# Patient Record
Sex: MALE | Race: ASIAN | Employment: OTHER | URBAN - METROPOLITAN AREA
[De-identification: names, ages, dates, MRNs, and addresses within clinical notes are randomized per-mention and may not be internally consistent; named-entity substitution may affect disease eponyms.]

---

## 2018-05-23 ENCOUNTER — HOSPITAL ENCOUNTER (OUTPATIENT)
Dept: GENERAL RADIOLOGY | Age: 83
Discharge: HOME | End: 2018-05-23
Payer: MEDICARE

## 2018-05-23 DIAGNOSIS — M16.0 PRIMARY OSTEOARTHRITIS OF BOTH HIPS: ICD-10-CM

## 2018-05-23 DIAGNOSIS — M17.0 PRIMARY OSTEOARTHRITIS OF BOTH KNEES: ICD-10-CM

## 2018-05-23 DIAGNOSIS — I10 HYPERTENSION: ICD-10-CM

## 2018-05-23 DIAGNOSIS — M54.16 LUMBAR RADICULOPATHY: ICD-10-CM

## 2018-05-23 PROCEDURE — 71046 X-RAY EXAM CHEST 2 VIEWS: CPT

## 2018-05-23 PROCEDURE — 72110 X-RAY EXAM L-2 SPINE 4/>VWS: CPT

## 2018-05-23 PROCEDURE — 73564 X-RAY EXAM KNEE 4 OR MORE: CPT

## 2018-05-23 PROCEDURE — 73521 X-RAY EXAM HIPS BI 2 VIEWS: CPT

## 2018-06-27 ENCOUNTER — HOSPITAL ENCOUNTER (EMERGENCY)
Age: 83
Discharge: HOME | End: 2018-06-27
Attending: EMERGENCY MEDICINE | Admitting: EMERGENCY MEDICINE
Payer: MEDICARE

## 2018-06-27 ENCOUNTER — APPOINTMENT (OUTPATIENT)
Dept: GENERAL RADIOLOGY | Age: 83
End: 2018-06-27
Attending: EMERGENCY MEDICINE
Payer: MEDICARE

## 2018-06-27 ENCOUNTER — APPOINTMENT (OUTPATIENT)
Dept: VASCULAR SURGERY | Age: 83
End: 2018-06-27
Attending: EMERGENCY MEDICINE
Payer: MEDICARE

## 2018-06-27 ENCOUNTER — APPOINTMENT (OUTPATIENT)
Dept: CT IMAGING | Age: 83
End: 2018-06-27
Attending: EMERGENCY MEDICINE
Payer: MEDICARE

## 2018-06-27 VITALS
HEIGHT: 68 IN | RESPIRATION RATE: 17 BRPM | DIASTOLIC BLOOD PRESSURE: 56 MMHG | WEIGHT: 200 LBS | OXYGEN SATURATION: 100 % | BODY MASS INDEX: 30.31 KG/M2 | TEMPERATURE: 97.3 F | HEART RATE: 64 BPM | SYSTOLIC BLOOD PRESSURE: 156 MMHG

## 2018-06-27 DIAGNOSIS — R29.898 WEAKNESS OF LEFT LOWER EXTREMITY: Primary | ICD-10-CM

## 2018-06-27 DIAGNOSIS — N30.00 ACUTE CYSTITIS WITHOUT HEMATURIA: ICD-10-CM

## 2018-06-27 LAB
ALBUMIN SERPL-MCNC: 3.4 G/DL (ref 3.4–5)
ALBUMIN/GLOB SERPL: 0.8 {RATIO} (ref 0.8–1.7)
ALP SERPL-CCNC: 83 U/L (ref 45–117)
ALT SERPL-CCNC: 22 U/L (ref 16–61)
ANION GAP SERPL CALC-SCNC: 5 MMOL/L (ref 3–18)
APPEARANCE UR: ABNORMAL
AST SERPL-CCNC: 24 U/L (ref 15–37)
BACTERIA URNS QL MICRO: ABNORMAL /HPF
BASOPHILS # BLD: 0.1 K/UL (ref 0–0.06)
BASOPHILS NFR BLD: 2 % (ref 0–2)
BILIRUB SERPL-MCNC: 0.4 MG/DL (ref 0.2–1)
BILIRUB UR QL: NEGATIVE
BUN SERPL-MCNC: 24 MG/DL (ref 7–18)
BUN/CREAT SERPL: 13 (ref 12–20)
CALCIUM SERPL-MCNC: 10 MG/DL (ref 8.5–10.1)
CHLORIDE SERPL-SCNC: 104 MMOL/L (ref 100–108)
CO2 SERPL-SCNC: 31 MMOL/L (ref 21–32)
COLOR UR: YELLOW
CREAT SERPL-MCNC: 1.79 MG/DL (ref 0.6–1.3)
D DIMER PPP FEU-MCNC: 0.82 UG/ML(FEU)
DIFFERENTIAL METHOD BLD: ABNORMAL
EOSINOPHIL # BLD: 0.9 K/UL (ref 0–0.4)
EOSINOPHIL NFR BLD: 12 % (ref 0–5)
EPITH CASTS URNS QL MICRO: ABNORMAL /LPF (ref 0–5)
ERYTHROCYTE [DISTWIDTH] IN BLOOD BY AUTOMATED COUNT: 12.8 % (ref 11.6–14.5)
GLOBULIN SER CALC-MCNC: 4.1 G/DL (ref 2–4)
GLUCOSE SERPL-MCNC: 121 MG/DL (ref 74–99)
GLUCOSE UR STRIP.AUTO-MCNC: NEGATIVE MG/DL
HCT VFR BLD AUTO: 32.7 % (ref 36–48)
HGB BLD-MCNC: 11.1 G/DL (ref 13–16)
HGB UR QL STRIP: ABNORMAL
INR PPP: 1 (ref 0.8–1.2)
KETONES UR QL STRIP.AUTO: NEGATIVE MG/DL
LEUKOCYTE ESTERASE UR QL STRIP.AUTO: ABNORMAL
LYMPHOCYTES # BLD: 1.7 K/UL (ref 0.9–3.6)
LYMPHOCYTES NFR BLD: 23 % (ref 21–52)
MCH RBC QN AUTO: 29 PG (ref 24–34)
MCHC RBC AUTO-ENTMCNC: 33.9 G/DL (ref 31–37)
MCV RBC AUTO: 85.4 FL (ref 74–97)
MONOCYTES # BLD: 0.5 K/UL (ref 0.05–1.2)
MONOCYTES NFR BLD: 7 % (ref 3–10)
NEUTS SEG # BLD: 4.3 K/UL (ref 1.8–8)
NEUTS SEG NFR BLD: 56 % (ref 40–73)
NITRITE UR QL STRIP.AUTO: NEGATIVE
PH UR STRIP: 5 [PH] (ref 5–8)
PLATELET # BLD AUTO: 217 K/UL (ref 135–420)
PMV BLD AUTO: 11.2 FL (ref 9.2–11.8)
POTASSIUM SERPL-SCNC: 3.7 MMOL/L (ref 3.5–5.5)
PROT SERPL-MCNC: 7.5 G/DL (ref 6.4–8.2)
PROT UR STRIP-MCNC: 30 MG/DL
PROTHROMBIN TIME: 13.1 SEC (ref 11.5–15.2)
RBC # BLD AUTO: 3.83 M/UL (ref 4.7–5.5)
RBC #/AREA URNS HPF: ABNORMAL /HPF (ref 0–5)
SODIUM SERPL-SCNC: 140 MMOL/L (ref 136–145)
SP GR UR REFRACTOMETRY: 1.01 (ref 1–1.03)
UROBILINOGEN UR QL STRIP.AUTO: 0.2 EU/DL (ref 0.2–1)
WBC # BLD AUTO: 7.5 K/UL (ref 4.6–13.2)
WBC URNS QL MICRO: ABNORMAL /HPF (ref 0–4)

## 2018-06-27 PROCEDURE — 80053 COMPREHEN METABOLIC PANEL: CPT | Performed by: EMERGENCY MEDICINE

## 2018-06-27 PROCEDURE — 85379 FIBRIN DEGRADATION QUANT: CPT | Performed by: EMERGENCY MEDICINE

## 2018-06-27 PROCEDURE — 87086 URINE CULTURE/COLONY COUNT: CPT | Performed by: EMERGENCY MEDICINE

## 2018-06-27 PROCEDURE — 81001 URINALYSIS AUTO W/SCOPE: CPT | Performed by: EMERGENCY MEDICINE

## 2018-06-27 PROCEDURE — 70450 CT HEAD/BRAIN W/O DYE: CPT

## 2018-06-27 PROCEDURE — 99285 EMERGENCY DEPT VISIT HI MDM: CPT

## 2018-06-27 PROCEDURE — 93005 ELECTROCARDIOGRAM TRACING: CPT

## 2018-06-27 PROCEDURE — 87186 SC STD MICRODIL/AGAR DIL: CPT | Performed by: EMERGENCY MEDICINE

## 2018-06-27 PROCEDURE — 87077 CULTURE AEROBIC IDENTIFY: CPT | Performed by: EMERGENCY MEDICINE

## 2018-06-27 PROCEDURE — 93971 EXTREMITY STUDY: CPT

## 2018-06-27 PROCEDURE — 85025 COMPLETE CBC W/AUTO DIFF WBC: CPT | Performed by: EMERGENCY MEDICINE

## 2018-06-27 PROCEDURE — 85610 PROTHROMBIN TIME: CPT | Performed by: EMERGENCY MEDICINE

## 2018-06-27 PROCEDURE — 71045 X-RAY EXAM CHEST 1 VIEW: CPT

## 2018-06-27 RX ORDER — CEPHALEXIN 500 MG/1
500 CAPSULE ORAL 2 TIMES DAILY
Qty: 14 CAP | Refills: 0 | Status: SHIPPED | OUTPATIENT
Start: 2018-06-27 | End: 2018-07-04

## 2018-06-27 NOTE — ED PROVIDER NOTES
EMERGENCY DEPARTMENT HISTORY AND PHYSICAL EXAM    1:28 PM      Date: 6/27/2018  Patient Name: Masha Valero    History of Presenting Illness     No chief complaint on file. History Provided By: Patient and patient's wife    Chief Complaint: Leg stiffness  Duration: 1 Weeks  Timing:  Acute  Location: left leg  Quality: n/a  Severity: Moderate  Modifying Factors: No modifying or aggravating factors were reported. Associated Symptoms: decreased  strength, fatigue    Additional History (Context): 1:32 PM Masha Valero is a 80 y.o. male with no pertinent PMHx who presents to ED with his wife for evaluation of acute moderate left leg stiffness and uncontrollability with associated sx of decreased  strength and fatigue. Patient has had problems with this leg for about two months, but the loss of control started a few days ago. Pt fell 3 days ago and 2 days ago. Pt uses a cane to ambulate. Denies a cough or recently choking. Denies taking any new medications. No hx of a stroke. Denies cigarette use, drug use, and etoh use. No modifying or aggravating factors were reported. No other concerns or symptoms at this time. PCP: Kendall Hernandez MD    Current Outpatient Prescriptions   Medication Sig Dispense Refill    cephALEXin (KEFLEX) 500 mg capsule Take 1 Cap by mouth two (2) times a day for 7 days. 14 Cap 0    amLODIPine (NORVASC) 5 mg tablet Take 1 Tab by mouth daily. Indications: HYPERTENSION 100 Tab 0    aspirin 81 mg chewable tablet Take 1 Tab by mouth daily. 100 Tab 0       Past History     Past Medical History:  Past Medical History:   Diagnosis Date    Cardiac echocardiogram 08/29/2016    EF 60-65%. No WMA. Mild LVH. Indeterminate diastolic fx. RVSP 35 mmHg. No significant valvular heart disease.  Cardiac nuclear imaging test, abnormal 08/29/2016    Intermediate risk. Previous inferior infarction w/very mild fabiana-infarct ischemia. Inferior hypk. EF 68%.   Nondiagnostic EKG on pharm stress test.  Pt's BP increased from 193/96 to 207/83 during study.  Carotid duplex 08/30/2016    Mild <50% bilateral ICA stenosis. Past Surgical History:  No past surgical history on file. Family History:  No family history on file. Social History:  Social History   Substance Use Topics    Smoking status: Never Smoker    Smokeless tobacco: Never Used    Alcohol use No       Allergies:  Not on File      Review of Systems       Review of Systems   Constitutional: Positive for fatigue. Negative for activity change, chills and fever. HENT: Negative for congestion, ear pain, sore throat and trouble swallowing. Eyes: Negative for visual disturbance. Respiratory: Negative for cough, shortness of breath and wheezing. Cardiovascular: Negative for chest pain, palpitations and leg swelling. Gastrointestinal: Negative for abdominal pain, diarrhea, nausea and vomiting. Genitourinary: Negative for decreased urine volume, dysuria, frequency and urgency. Musculoskeletal: Negative for arthralgias and joint swelling. Positive for leg stiffness  Decreased  strength   Skin: Negative for rash. Neurological: Negative for weakness, numbness and headaches. Psychiatric/Behavioral: Negative for agitation and confusion. All other systems reviewed and are negative. Physical Exam     Visit Vitals    /56    Pulse 64    Temp 97.3 °F (36.3 °C)    Resp 17    Ht 5' 8\" (1.727 m)    Wt 90.7 kg (200 lb)    SpO2 100%    BMI 30.41 kg/m2         Physical Exam   Constitutional: He is oriented to person, place, and time. He appears well-developed and well-nourished. He is cooperative. No distress. HENT:   Head: Normocephalic and atraumatic. Mouth/Throat: Oropharynx is clear and moist. No oropharyngeal exudate. Eyes: Conjunctivae and EOM are normal. Right eye exhibits no discharge. Left eye exhibits no discharge. No scleral icterus.    Neck: Normal range of motion and phonation normal. Neck supple. No JVD present. No thyromegaly present. Cardiovascular: Normal rate, regular rhythm, S1 normal, S2 normal, normal heart sounds and intact distal pulses. Exam reveals no gallop and no friction rub. No murmur heard. Pulmonary/Chest: Effort normal and breath sounds normal. No accessory muscle usage. No respiratory distress. He has no wheezes. He has no rhonchi. He has no rales. Abdominal: Soft. Normal appearance and bowel sounds are normal. He exhibits no distension and no pulsatile midline mass. There is no tenderness. There is no rebound and no guarding. Musculoskeletal: Normal range of motion. He exhibits edema ( trace edema on right extremity to the knee, 1,2+ pitting edema on left). He exhibits no deformity. Normal upper extremity strength, no pronator drift. LLE strength 4/5  RLE strength 5/5   Lymphadenopathy:        Head (right side): No submandibular adenopathy present. He has no cervical adenopathy. Neurological: He is alert and oriented to person, place, and time. No cranial nerve deficit. Moves all extremities. No obvious focal deficits or facial asymmetry. Slow to answer questions. Skin: Skin is warm and dry. No rash noted. Psychiatric: He has a normal mood and affect. His speech is normal and behavior is normal.   Nursing note and vitals reviewed. Diagnostic Study Results     Labs -  No results found for this or any previous visit (from the past 12 hour(s)). Radiologic Studies -   No results found. Medical Decision Making   I am the first provider for this patient. I reviewed the vital signs, available nursing notes, past medical history, past surgical history, family history and social history. Vital Signs-Reviewed the patient's vital signs.     Pulse Oximetry Analysis -  99% on room air (Interpretation)    Records Reviewed: Nursing Notes and Old Medical Records (Time of Review: 1:28 PM)      Provider Notes (Medical Decision Making):   ASSESSMENT / PLAN:       81y/o AAM w/PMhx of HTN, not on any meds presents with vague general weakness, left leg feeling heavy/swollen for past week or so. Wife reports seems to be having difficulty using eating utensils. Pt has no complaints other than leg now. Has had a few GLFs this past week due to this no injuries. No weakness or tingling. Exam with BP elevated ~170/70, Rest of vitals normal. NAD, HEENT, lung, cv, abd benging. Neuro benign other than ~4/5 proximal LE weakness but left leg does have ~1-2+ pitting edema to  knees where as right has only trace pitting edema. Some mild ttp behind calve but no cords or eyrhtma. Really nonfocal neuro exam but symptoms vague. Left leg swollen could be DVT. Also concerning for uremia, tamika, elecroltye disturbance, anemia, UTI or other occult infection.      -Head CT  -CBC, CMP, Coags  -UA  -EKG  -CXR  -D-dimer      Bimal Mkceon MD  EM-IM Physician      ED Course: Progress Notes, Reevaluation, and Consults:  UPDATE 7:50 PM  -Head CT nothing acute  -Labs benign  -D-dimer elevated    -Vascular US pending -will dispo per US for dvt    Soledad Sepulveda MD  EM-IM Physician             Diagnosis     Clinical Impression:   1. Weakness of left lower extremity    2. Acute cystitis without hematuria        Disposition: Home    Follow-up Information     Follow up With Details Comments [address], [address] [id number] [facility]  [phone]             Discharge Medication List as of 6/27/2018  9:27 PM      START taking these medications    Details   cephALEXin (KEFLEX) 500 mg capsule Take 1 Cap by mouth two (2) times a day for 7 days. , Print, Disp-14 Cap, R-0         CONTINUE these medications which have NOT CHANGED    Details   amLODIPine (NORVASC) 5 mg tablet Take 1 Tab by mouth daily. Indications: HYPERTENSION, Print, Disp-100 Tab, R-0      aspirin 81 mg chewable tablet Take 1 Tab by mouth daily. , Print, Disp-100 Tab, R-0 _______________________________    Attestations:  Scribe Attestation     Marilyn Dumont acting as a scribe for and in the presence of Dalila Lucero MD      June 27, 2018 at [facility] PM       Provider Attestation:      I personally performed the services described in the documentation, reviewed the documentation, as recorded by the scribe in my presence, and it accurately and completely records my words and actions.  June 27, 2018 at 1:28 PM - Dalila Lucero MD    _______________________________

## 2018-06-27 NOTE — ED TRIAGE NOTES
\"1 week looking funny\", dozing a lot, having hard time chewing and swallowing. = , no droop.  . Sensation bilat =,   does have left leg weakness

## 2018-06-27 NOTE — ED NOTES
Assumed care and received report of patient from [facility] RN,pt currently at vascular for leg [facility].

## 2018-06-28 LAB
ATRIAL RATE: 63 BPM
CALCULATED P AXIS, ECG09: 42 DEGREES
CALCULATED R AXIS, ECG10: -48 DEGREES
CALCULATED T AXIS, ECG11: -87 DEGREES
DIAGNOSIS, 93000: NORMAL
P-R INTERVAL, ECG05: 206 MS
Q-T INTERVAL, ECG07: 428 MS
QRS DURATION, ECG06: 118 MS
QTC CALCULATION (BEZET), ECG08: 437 MS
VENTRICULAR RATE, ECG03: 63 BPM

## 2018-06-28 NOTE — ED NOTES
7pm; pt turned over ot me dr Alonzo Paulino. Left leg weakness; has been \"giving out\" feels 100% after it gives out. General; AOX3. Pulmonary; CTA-B. Cardiac: RRR no MRG; Abd S/NT/ND. Plan:  No swelling. Signed out pending duplex. Duplex neg. Will d/c     UA noted. Asx but will tx.

## 2018-06-28 NOTE — ED NOTES
Pt returned from Vascular leg study at this time no acute distress noted pt placed back on cardiac monitor.

## 2018-06-28 NOTE — DISCHARGE INSTRUCTIONS

## 2018-06-28 NOTE — ED NOTES
I have reviewed discharge instructions with the patient and spouse. The patient and spouse verbalized understanding. I have reviewed the provider's instructions with the patient, answering all questions to his satisfaction. Discharge medications reviewed with patient and spouse and appropriate educational materials and side effects teaching were provided. Pt signed paper discharge instructions pt removed all belongings pt left ED via wheelchair without distress or discomfort.

## 2018-06-30 LAB
BACTERIA SPEC CULT: ABNORMAL
SERVICE CMNT-IMP: ABNORMAL

## 2018-07-16 ENCOUNTER — HOSPITAL ENCOUNTER (OUTPATIENT)
Dept: PHYSICAL THERAPY | Age: 83
Discharge: HOME | End: 2018-07-16
Payer: MEDICARE

## 2018-07-16 PROCEDURE — G8978 MOBILITY CURRENT STATUS: HCPCS

## 2018-07-16 PROCEDURE — G8979 MOBILITY GOAL STATUS: HCPCS

## 2018-07-16 PROCEDURE — 97110 THERAPEUTIC EXERCISES: CPT

## 2018-07-16 PROCEDURE — 97161 PT EVAL LOW COMPLEX 20 MIN: CPT

## 2018-07-16 NOTE — PROGRESS NOTES
[facility]  [facility]  [address] [facility]  [phone] fax    Plan of Care/ Statement of Necessity for Physical Therapy Services  Patient name: Yisroel Denver Start of Care: 2018   Referral source: Mesfin Hernadez MD : 1935    Medical Diagnosis: Repeated falls [R29.6]  Unspecified abnormalities of gait and mobility [R26.9]  Bilateral primary osteoarthritis of knee [M17.0]   Onset Date: initial 7 years ago per pt report, per [facility] 2 months ago    Treatment Diagnosis: left knee/LE pain, impaired balance and gait   Prior Hospitalization: see medical history Provider#: [id number]   Medications: Verified on Patient summary List    Comorbidities: hx CAD per [facility]     Prior Level of Function: States he lives with his wife and he is retired. Reports having progressively worsening pain in his left LE over the past several years. The Plan of Care and following information is based on the information from the initial evaluation. Assessment/ key information:   Pt is a 80year old male who presents to therapy today with left knee/LE pain, stiffness, and impaired balance/gait. Pt states that his symptoms began about 7 years ago with insidious onset. Pt seems to have some difficulty remembering recent hx but per [facility] the pt was in the ED in late 2018 for left LE stiffness. Per ED note dated 2018 in [facility], the pt's left LE symptoms started ~2 months ago. Pt reports that he was not able to move is left LE when he went to the ED and he states he has fallen ~5 times over the past 3 months. Per [facility], doppler was performed and no DVT was noted in the left LE. Pt presents to the clinic today with [facility] but has a FWW at home. Pt demonstrated decreased AROM, decreased strength, TUG time 1 min and 50 seconds with FWW. Pt had significant difficulty with sit to stands from the chair and a plinth.  Pt needed cueing to place his UEs on the chair's arm rest or on the plinth when performing a sit to stand, along with education on shifting his body weight to the edge of the chair/plinth. With ambulation with the pt's SPC, the pt's B feet would drag and cause stumbles where he would have to stop ambulating and regain his balance with his SPC. During ambulation with FWW, the pt had no stumbles but continues to drag his right foot at times. Educated pt to use his FWW for ambulation at this time secondary to pt being unsafe with his SPC. Pt would benefit from physical therapy to improve the above impairments to help the pt return to performing ADLs and functional  activities.      Evaluation Complexity History MEDIUM  Complexity : 1-2 comorbidities / personal factors will impact the outcome/ POC ; Examination MEDIUM Complexity : 3 Standardized tests and measures addressing body structure, function, activity limitation and / or participation in recreation  ;Presentation LOW Complexity : Stable, uncomplicated  ;Clinical Decision Making MEDIUM Complexity : FOTO score of 26-74  Overall Complexity Rating: LOW   Problem List: pain affecting function, decrease ROM, decrease strength, edema affecting function, impaired gait/ balance, decrease ADL/ functional abilitiies, decrease activity tolerance, decrease flexibility/ joint mobility and decrease transfer abilities   Treatment Plan may include any combination of the following: Therapeutic exercise, Therapeutic activities, Neuromuscular re-education, Physical agent/modality, Gait/balance training, Manual therapy, Patient education, Self Care training, Functional mobility training, Home safety training and Stair training  Patient / Family readiness to learn indicated by: asking questions, trying to perform skills and interest  Persons(s) to be included in education: patient (P) and family support person (FSP);list pt's wife  Barriers to Learning/Limitations: yes;  physical  Patient Goal (s): get rid of some stiffness and soreness  Patient Self Reported Health Status: fair  Rehabilitation Potential: fair    Short Term Goals: To be accomplished in 2 weeks:  1. Pt will report compliance and independence to [facility] to help the pt manage their pain and symptoms. Long Term Goals: To be accomplished in 8 weeks:  1. Pt will increase FOTO score to 61 points to improve ability to perform ADLs. 2. Pt will improve TUG score to 1 minute or less with FWW to decrease the pt's fall risk. 3. Pt will increase MMT left hip flex to 3+/5, left knee flex to 4/5 to improve ability to tolerate ambulation in the community. 4. Pt will report having no falls over the past month to improve the pt's safety at home. Frequency / Duration: Patient to be seen 2-3 times per week for 8 weeks. Patient/ Caregiver education and instruction: Diagnosis, prognosis, self care, activity modification and exercises   [x]  Plan of care has been reviewed with PTA    G-Codes (GP)  Mobility   Current  CL= 60-79%   Goal  CJ= 20-39%    The severity rating is based on clinical judgment and the FOTO score. Certification Period: 7/16/2018 - 9/13/2018  Evita Samson, PT 7/16/2018 11:00 AM  _____________________________________________________________________  I certify that the above Therapy Services are being furnished while the patient is under my care. I agree with the treatment plan and certify that this therapy is necessary.     Physician's Signature:____________________  Date:__________Time:______    Please sign and return to [facility] [address]  [phone] fax

## 2018-07-16 NOTE — PROGRESS NOTES
PT DAILY TREATMENT NOTE - [facility]     Patient Name: Bee Russell  Date:2018  : 1935  [x]  Patient  Verified  [place] / Plan: [place] PART [place] & B / Product Type: Medicare /    In time:10:15  Out time:10:58  Total Treatment Time (min): 43  Total Timed Codes (min): 15  1:1 Treatment Time ( [address] only): 37   Visit #: 1 of 24    Treatment Area: Repeated falls [R29.6]  Unspecified abnormalities of gait and mobility [R26.9]  Bilateral primary osteoarthritis of knee [M17.0]    SUBJECTIVE  Pain Level (0-10 scale): 5  Any medication changes, allergies to medications, adverse drug reactions, diagnosis change, or new procedure performed?: [x] No    [] Yes (see summary sheet for update)  Subjective functional status/changes:   [] No changes reported  See POC    OBJECTIVE    28 min [x]Eval                  []Re-Eval     15 min Therapeutic Exercise:  [] See flow sheet : HEP instruction and demonstration, pt education regarding anatomy and physiology of the LEs and how it relates to the pt's condition, education on using FWW for gait to improve safety. Rationale: increase ROM and increase strength to improve the patients ability to tolerate ADLs          With   [] TE   [] TA   [] neuro   [] other: Patient Education: [x] Review HEP    [] Progressed/Changed HEP based on:   [] positioning   [] body mechanics   [] transfers   [] heat/ice application    [] other:      Other Objective/Functional Measures: See evaluation. Pain Level (0-10 scale) post treatment: 5    ASSESSMENT/Changes in Function: Pt given HEP handout to perform. Pt understood exercises in HEP handout. Pt demonstrated decreased AROM, decreased strength, TUG time 1 min and 50 seconds with FWW. Pt had significant difficulty with sit to stands from the chair and a plinth.  Pt needed cueing to place his UEs on the chair's arm rest or on the plinth when performing a sit to stand, along with education on shifting his body weight to the edge of the chair/plinth. With ambulation with the pt's SPC, the pt's B feet would drag and cause stumbles where he would have to stop ambulating and regain his balance with his SPC. During ambulation with FWW, the pt had no stumbles but continues to drag his right foot at times. Educated pt to use his FWW for ambulation at this time secondary to pt being unsafe with his SPC. Pt would benefit from physical therapy to improve the above impairments to help the pt return to performing ADLs and functional  activities. Patient will continue to benefit from skilled PT services to modify and progress therapeutic interventions, address functional mobility deficits, address ROM deficits, address strength deficits, analyze and address soft tissue restrictions, analyze and cue movement patterns, analyze and modify body mechanics/ergonomics, address imbalance/dizziness and instruct in home and community integration to attain remaining goals. [x]  See Plan of Care  []  See progress note/recertification  []  See Discharge Summary         Progress towards goals / Updated goals:  See POC.      PLAN  [x]  Upgrade activities as tolerated     [x]  Continue plan of care  [x]  Update interventions per flow sheet       []  Discharge due to:_  []  Other:_      Janice Stacy, PT 7/16/2018  11:05 AM    Future Appointments  Date Time Provider Noel Katiuska   7/16/2018 10:00 AM Janice Stacy PT [id number]   7/19/2018 1:45 PM Won Reed MD [facility] [address]

## 2018-07-19 ENCOUNTER — OFFICE VISIT (OUTPATIENT)
Dept: ORTHOPEDIC SURGERY | Facility: CLINIC | Age: 83
End: 2018-07-19

## 2018-07-19 VITALS
RESPIRATION RATE: 16 BRPM | BODY MASS INDEX: 28.61 KG/M2 | HEART RATE: 63 BPM | HEIGHT: 68 IN | OXYGEN SATURATION: 98 % | SYSTOLIC BLOOD PRESSURE: 181 MMHG | TEMPERATURE: 95.9 F | WEIGHT: 188.8 LBS | DIASTOLIC BLOOD PRESSURE: 74 MMHG

## 2018-07-19 DIAGNOSIS — M25.562 CHRONIC PAIN OF LEFT KNEE: ICD-10-CM

## 2018-07-19 DIAGNOSIS — Z91.81 RISK FOR FALLS: ICD-10-CM

## 2018-07-19 DIAGNOSIS — M17.12 PRIMARY OSTEOARTHRITIS OF LEFT KNEE: Primary | ICD-10-CM

## 2018-07-19 DIAGNOSIS — G89.29 CHRONIC PAIN OF LEFT KNEE: ICD-10-CM

## 2018-07-19 RX ORDER — BUPIVACAINE HYDROCHLORIDE 2.5 MG/ML
4 INJECTION, SOLUTION EPIDURAL; INFILTRATION; INTRACAUDAL ONCE
Qty: 4 ML | Refills: 0
Start: 2018-07-19 | End: 2018-07-19

## 2018-07-19 RX ORDER — GARLIC 1000 MG
CAPSULE ORAL
COMMUNITY
End: 2020-10-03

## 2018-07-19 RX ORDER — BETAMETHASONE SODIUM PHOSPHATE AND BETAMETHASONE ACETATE 3; 3 MG/ML; MG/ML
6 INJECTION, SUSPENSION INTRA-ARTICULAR; INTRALESIONAL; INTRAMUSCULAR; SOFT TISSUE ONCE
Qty: 0.5 ML | Refills: 0
Start: 2018-07-19 | End: 2018-07-19

## 2018-07-19 NOTE — PROGRESS NOTES
Patient: Maggie Pryor                MRN: [id number]       SSN: [id number] YOB: 1935        AGE: 80 y.o. SEX: male PCP: Carmen Andrade MD 
07/19/18 Chief Complaint Patient presents with  Knee Pain L KNEE PAIN  
 
HISTORY:  Maggie Pryor is a 80 y.o. male who is seen for left knee pain. He reports that his knee pain has been stopping him from doing things around the house. He is usually very active, remodeling his home and other homes with friends. He notes pain with standing and walking. He has previously been seen at [facility] where x rays revealed arthritis but no acute abnormalities. He states he has balance problems. 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 He lives in [place] with his wife. He has 2 adult children, one son and one daughter. He has plenty of grandchildren. He lost 8 pounds recently due to diet changes  He is not diabetic. He is not hypertensive. Current weight is 188 pounds. He is 5'8\" tall. Lab Results Component Value Date/Time Hemoglobin A1c 5.8 (H) 08/29/2016 07:00 AM  
 
Weight Metrics 7/19/2018 6/27/2018 9/28/2016 9/1/2016 Weight 188 lb 12.8 oz 200 lb 192 lb 199 lb 15.3 oz  
BMI 28.71 kg/m2 30.41 kg/m2 30.99 kg/m2 32.27 kg/m2 Patient Active Problem List  
Diagnosis Code  Traumatic rhabdomyolysis ([facility] [address] T79. Allyne Oppenheim  CAD (coronary artery disease) I25.10  
 HTN (hypertension) I10  
 Troponin level elevated R74.8 REVIEW OF SYSTEMS: All Below are Negative except: See HPI Constitutional: negative for fever, chills, and weight loss. Cardiovascular: negative for chest pain, claudication, leg swelling, SOB, BERNARDO Gastrointestinal: Negative for pain, N/V/C/D, Blood in stool or urine, dysuria,  hematuria, incontinence, pelvic pain. Musculoskeletal: See HPI Neurological: Negative for dizziness and weakness. Negative for headaches, Visual changes, confusion, seizures Phychiatric/Behavioral: Negative for depression, memory loss, substance  abuse. Extremities: Negative for hair changes, rash, or skin lesion changes. Hematologic: Negative for bleeding problems, bruising, pallor or swollen lymph  nodes Peripheral Vascular: No calf pain, no circulation deficits. Social History Social History  Marital status:  Spouse name: N/A  
 Number of children: N/A  
 Years of education: N/A Occupational History  Not on file. Social History Main Topics  Smoking status: Never Smoker  Smokeless tobacco: Never Used  Alcohol use No  
 Drug use: Not on file  Sexual activity: Not on file Other Topics Concern  Not on file Social History Narrative No Known Allergies Current Outpatient Prescriptions Medication Sig  
 garlic 8,961 mg cap Take  by mouth.  amLODIPine (NORVASC) 5 mg tablet Take 1 Tab by mouth daily. Indications: HYPERTENSION  aspirin 81 mg chewable tablet Take 1 Tab by mouth daily. No current facility-administered medications for this visit. PHYSICAL EXAMINATION: 
Visit Vitals  /74  Pulse 63  Temp 95.9 °F (35.5 °C) (Oral)  Resp 16  
 Ht 5' 8\" (1.727 m)  Wt 188 lb 12.8 oz (85.6 kg)  SpO2 98%  BMI 28.71 kg/m2 ORTHO EXAMINATION: 
Examination Right knee Left knee Skin Intact Intact Range of motion 120-0 90-10 Effusion - - Medial joint line tenderness - + Lateral joint line tenderness - - Popliteal tenderness - -  
Osteophytes palpable - -  
Hernandos - - Patella crepitus - - Anterior drawer - - Lateral laxity - - Medial laxity - - Varus deformity - -  
Valgus deformity - - Pretibial edema - 3+ Calf tenderness - - Examination Right hip Left hip Skin Intact Intact External Rotation ROM 20 20 Internal Rotation ROM 10 10 Trochanteric tenderness - - Hip flexion contracture - - Antalgic gait - - Trendelenberg sign - - Lumbar tenderness - - Straight leg raise - - Calf tenderness - - Neurovascular Intact Intact Examination Lumbar Thoracic Skin Intact Intact Tenderness + - Tightness - - Lordosis Normal N/A Kyphosis N/A Normal  
Scoliosis - - Flexion Fingertips to ankle N/A Extension 10 N/A Knee reflexes Normal N/A Ankle reflexes Normal N/A Straight leg raise - N/A Calf tenderness - N/A  
 
PROCEDURE:  After discussing treatment options, patient's left knee was injected with 4 cc Marcaine and 1/2 cc Celestone. Chart reviewed for the following: 
 Torey Garcia MD, have reviewed the History, Physical and updated the Allergic reactions for Renetta Orourke TIME OUT performed immediately prior to start of procedure: 
Torey Garcia MD, have performed the following reviews on Renetta Orourke prior to the start of the procedure: 
         
* Patient was identified by name and date of birth * Agreement on procedure being performed was verified * Risks and Benefits explained to the patient * Procedure site verified and marked as necessary * Patient was positioned for comfort * Consent was obtained Time: 1:30 PM  
 
Date of procedure: 7/19/2018 Procedure performed by:  Won Reed MD 
 
Mr. Wolf Crews tolerated the procedure well with no complications. DUPLEX VENOUS STUDY EXT VENOUS BILAT 6/27/2018 No evidence of acute deep vein thrombosis in the left common femoral, superficial femoral, popliteal, posterior tibial, and peroneal veins.  The veins were imaged in the transverse and longitudinal planes. The vessels showed normal color filling and compressibility. Doppler interrogation showed phasic and spontaneous flow. RADIOGRAPHS:  
XR BILAT KNEES 5/23/2018 [facility] 
-I have independently reviewed these images during this office visit. -Dr. Yvonne Paniagua IMPRESSION:  Three views - No fractures, no effusion, moderate L>R joint space narrowing, patellar entesmophyte osteophytes present. IKDC Grade C squaring of condyles XR HIP 5/23/2018 [facility] 
-I have independently reviewed these images during this office visit. -Dr. Yvonne Paniagua IMPRESSION:  AP pelvis and two views - No fractures, mild joint space narrowing, + osteophytes present. Tonnis grade 1 XR SPINE 5/23/2018 [facility] 
-I have independently reviewed these images during this office visit. -Dr. Yvonne Paniagua IMPRESSION:  Two views - no fractures, L2-3 disc space narrowing, + osteophytes present, no spondylolisthesis. IMPRESSION:   
  ICD-10-CM ICD-9-CM 1. Primary osteoarthritis of left knee M17.12 715.16 betamethasone (CELESTONE SOLUSPAN) 6 mg/mL injection BETAMETHASONE ACETATE & SODIUM PHOSPHATE INJECTION 3 MG EA.  
   DRAIN/INJECT LARGE JOINT/BURSA  
   bupivacaine, PF, (MARCAINE, PF,) 0.25 % (2.5 mg/mL) injection REFERRAL TO PHYSICAL THERAPY PROCEDURE AUTHORIZATION TO   
2. Chronic pain of left knee M25.562 719.46 betamethasone (CELESTONE SOLUSPAN) 6 mg/mL injection G89.29 338.29 BETAMETHASONE ACETATE & SODIUM PHOSPHATE INJECTION 3 MG EA.  
   DRAIN/INJECT LARGE JOINT/BURSA  
   bupivacaine, PF, (MARCAINE, PF,) 0.25 % (2.5 mg/mL) injection REFERRAL TO PHYSICAL THERAPY PROCEDURE AUTHORIZATION TO  3. Risk for falls Z91.81 V15.88 PLAN:  After discussing treatment options, patient's left knee was injected with 4 cc Marcaine and 1/2 cc Celestone. He will follow up as needed. Consider visco supplementation if pain continues.   He will start a brief course of outpatient physical therapy to his left knee.    
 
Scribed by Sheri Forde [facility]) as dictated by Rocio Romero MD

## 2018-07-25 ENCOUNTER — HOSPITAL ENCOUNTER (OUTPATIENT)
Dept: PHYSICAL THERAPY | Age: 83
Discharge: HOME | End: 2018-07-25
Payer: MEDICARE

## 2018-07-25 PROCEDURE — 97110 THERAPEUTIC EXERCISES: CPT

## 2018-07-25 NOTE — PROGRESS NOTES
PT DAILY TREATMENT NOTE - [facility]     Patient Name: Guera Moreno  Date:2018  : 1935  [x]  Patient  Verified  Payor: [place] MEDICARE / Plan: [place] MEDICARE PART A & B / Product Type: Medicare /    In time: 8:30   Out time: 9:06  Total Treatment Time (min): 36  Total Timed Codes (min): 36  1:1 Treatment Time ( [address] only): 24  Visit #: 2 of 24    Treatment Area: Pain in left leg [M79.605]  Unspecified lack of coordination [R27.9]    SUBJECTIVE  Pain Level (0-10 scale): 0  Any medication changes, allergies to medications, adverse drug reactions, diagnosis change, or new procedure performed?: [x] No    [] Yes (see summary sheet for update)  Subjective functional status/changes:   [] No changes reported  Pt reports he's been having a good week. Reports compliance with HEP exercises. OBJECTIVE    36 min Therapeutic Exercise:  [x] See flow sheet :    Rationale: increase ROM and increase strength to improve the patients ability to tolerate ADLs          With   [] TE   [] TA   [] neuro   [] other: Patient Education: [x] Review HEP    [] Progressed/Changed HEP based on:   [] positioning   [] body mechanics   [] transfers   [] heat/ice application    [] other:      Other Objective/Functional Measures: Initiated exercises/interventions in flow sheet. Pain Level (0-10 scale) post treatment: 0    ASSESSMENT/Changes in Function: Needed cueing to avoid B hip circumduction with step ups on 4 inch step. Able to perform sit to stand from an elevated plinth with no UEs after proper cueing for B foot position, hip hinge, and moving his bottom towards the edge of the plinth to improve ease of sit to stands. Needed cueing to avoid excessive B hip ER with lateral stepping in parallel bars. B UE use used for all standing exercises but educated pt to put most of his weight through his LEs. Continue POC as tolerated.      Patient will continue to benefit from skilled PT services to modify and progress therapeutic interventions, address functional mobility deficits, address ROM deficits, address strength deficits, analyze and address soft tissue restrictions, analyze and cue movement patterns, analyze and modify body mechanics/ergonomics, address imbalance/dizziness and instruct in home and community integration to attain remaining goals. []  See Plan of Care  []  See progress note/recertification  []  See Discharge Summary         Progress towards goals / Updated goals:  Short Term Goals: To be accomplished in 2 weeks:  1. Pt will report compliance and independence to HEP to help the pt manage their pain and symptoms. Reports compliance with HEP 7/25/2018       Long Term Goals: To be accomplished in 8 weeks:  1. Pt will increase FOTO score to 61 points to improve ability to perform ADLs. 2. Pt will improve TUG score to 1 minute or less with FWW to decrease the pt's fall risk. 3. Pt will increase MMT left hip flex to 3+/5, left knee flex to 4/5 to improve ability to tolerate ambulation in the community. 4. Pt will report having no falls over the past month to improve the pt's safety at home.      PLAN  [x]  Upgrade activities as tolerated     [x]  Continue plan of care  [x]  Update interventions per flow sheet       []  Discharge due to:_  []  Other:_      Drenda Falling, PT 7/25/2018  8:45 AM    Future Appointments  Date Time Provider Noel Harp   7/30/2018 9:00 AM Drenda Falling, PT [facility] [address]   8/1/2018 9:00 AM Drenda Falling, PT [facility] [address]   8/6/2018 10:00 AM Drenda Falling, PT GGDECGL [address]   8/8/2018 2:00 PM Thienphuc Delories Woodruff [facility] [address]   8/14/2018 9:30 AM Thienphuc Delories Woodruff [facility] [address]   8/17/2018 9:30 AM Thienphuc Delories Woodruff [facility] [address]   8/21/2018 9:30 AM Thienphuc Delories Woodruff [facility] [address]   8/23/2018 1:20 PM Won Reed MD [facility]   8/24/2018 9:30 AM Thienphuc Delories Woodruff [facility] [address]   8/28/2018 9:00 AM [facility]rez [facility] [address]   8/31/2018 9:00 AM Tonya Arambula, PT [facility] [address] [facility]

## 2018-08-01 ENCOUNTER — HOSPITAL ENCOUNTER (OUTPATIENT)
Dept: PHYSICAL THERAPY | Age: 83
Discharge: HOME | End: 2018-08-01
Payer: MEDICARE

## 2018-08-01 PROCEDURE — 97110 THERAPEUTIC EXERCISES: CPT

## 2018-08-01 PROCEDURE — 97530 THERAPEUTIC ACTIVITIES: CPT

## 2018-08-01 NOTE — PROGRESS NOTES
PT DAILY TREATMENT NOTE - [facility]  Patient Name: Miky Espinoza Date:2018 : 1935 [x]  Patient  Verified Payor: [place] MEDICARE / Plan: Isaías Huff / Product Type: Medicare / In time:907  Out time:942 Total Treatment Time (min): 35 Total Timed Codes (min): 35 
1:1 Treatment Time ( only): 35 Visit #: 3 of 24 Treatment Area: Pain in left leg [M79.605] Unspecified lack of coordination [R27.9] SUBJECTIVE Pain Level (0-10 scale): 3/10 Any medication changes, allergies to medications, adverse drug reactions, diagnosis change, or new procedure performed?: [x] No    [] Yes (see summary sheet for update) Subjective functional status/changes:   [] No changes reported Reports he has been doing his HEP. OBJECTIVE 15 min Therapeutic Exercise:  [] See flow sheet :  
Rationale: increase ROM and increase strength to improve the patients ability to ease with ADL's 
 
20 min Therapeutic Activity:  []  See flow sheet :  
Rationale: increase ROM and increase strength  to improve the patients ability to ease with ADL's With 
 [] TE 
 [] TA 
 [] neuro 
 [] other: Patient Education: [x] Review HEP [] Progressed/Changed HEP based on:  
[] positioning   [] body mechanics   [] transfers   [] heat/ice application   
[] other:   
 
Other Objective/Functional Measures: Pt is a fall risk and a gait belt should be worn at all times. Pt continues to have weak hip flexors and quads. Poor balance during Romberg EC Pt ambulates with a stiff legged gait and stumbled 2 times while ambulating across the clinic. Pain Level (0-10 scale) post treatment: 0/10 ASSESSMENT/Changes in Function: Pt is progressing well today. He is pleased with ongoing progress all new HEP issued today. Pt is motivated to be able to walk w/o LOB and eventually w/o an AD. He is doing better than last 3 weeks. He was unable to walk due to weakness Patient will continue to benefit from skilled PT services to modify and progress therapeutic interventions, address functional mobility deficits, address ROM deficits, address strength deficits, analyze and address soft tissue restrictions, analyze and cue movement patterns, analyze and modify body mechanics/ergonomics, assess and modify postural abnormalities and address imbalance/dizziness to attain remaining goals. [x]  See Plan of Care 
[]  See progress note/recertification 
[]  See Discharge Summary Progress towards goals / Updated goals: 1. Pt will report compliance and independence to HEP to help the pt manage their pain and symptoms. Reports compliance with HEP 7/25/2018      
Long Term Goals: To be accomplished in 8 weeks: 1. Pt will increase FOTO score to 61 points to improve ability to perform ADLs. 2. Pt will improve TUG score to 1 minute or less with FWW to decrease the pt's fall risk. 3. Pt will increase MMT left hip flex to 3+/5, left knee flex to 4/5 to improve ability to tolerate ambulation in the community. 4. Pt will report having no falls over the past month to improve the pt's safety at home.  
  
 
PLAN 
[]  Upgrade activities as tolerated     [x]  Continue plan of care 
[]  Update interventions per flow sheet      
[]  Discharge due to:_ 
[]  Other:_ Odalys Betancourt PT 8/1/2018  9:12 AM 
 
Future Appointments Date Time Provider Noel Harp 8/6/2018 10:00 AM Yu Rodarte [facility]  
8/8/2018 2:00 PM [facility] [address] [facility]  
8/14/2018 9:30 AM Thienlul Annaome YWEHFAS [facility]  
8/17/2018 9:30 AM Ross Tang [id number] [facility]  
8/21/2018 9:30 AM [facility] [address] [facility]  
8/23/2018 1:20 PM Blade Meléndez MD [facility]  
8/24/2018 9:30 AM Thiporfirio Cullen [facility]  
8/28/2018 9:00 AM Coreen Beryle Levee, PT [id number] [facility]  
8/31/2018 9:00 AM Coreen Beryle Levee, PT [facility]

## 2018-08-06 ENCOUNTER — HOSPITAL ENCOUNTER (OUTPATIENT)
Dept: PHYSICAL THERAPY | Age: 83
Discharge: HOME | End: 2018-08-06
Payer: MEDICARE

## 2018-08-06 PROCEDURE — 97110 THERAPEUTIC EXERCISES: CPT

## 2018-08-06 NOTE — PROGRESS NOTES
PT DAILY TREATMENT NOTE - [facility]     Patient Name: Carin Lamb  Date:2018  : 1935  [x]  Patient  Verified  Payor: [place] MEDICARE / Plan: VA MEDICARE PART A & B / Product Type: Medicare /    In time:  Out time:1037  Total Treatment Time (min): 37  Total Timed Codes (min): 37  1:1 Treatment Time ( [address] only): 32   Visit #: 4 of 24    Treatment Area: Pain in left leg [M79.605]  Unspecified lack of coordination [R27.9]    SUBJECTIVE  Pain Level (0-10 scale): 1  Any medication changes, allergies to medications, adverse drug reactions, diagnosis change, or new procedure performed?: [x] No    [] Yes (see summary sheet for update)  Subjective functional status/changes:   [] No changes reported  Pt reports feeling some stiffness in left knee. OBJECTIVE      37 min Therapeutic Exercise:  [] See flow sheet :   Rationale: increase ROM and increase strength to improve the patients ability to perform ADL's and improve quality of gait. With   [x] TE   [] TA   [] neuro   [] other: Patient Education: [x] Review HEP    [] Progressed/Changed HEP based on:   [] positioning   [] body mechanics   [] transfers   [] heat/ice application    [] other:      Other Objective/Functional Measures: Pt had difficulty with knee flexion there ex on left LE due to limited ROM. Pt demonstrates compensatory behaviors during sit to stand with more weight shifted to right side. Pt cued to correct, limited carry over. Pain Level (0-10 scale) post treatment: 0    ASSESSMENT/Changes in Function: Pt tolerated there ex well, demonstrates limitations in left knee flexion. Focused on LE strengthening to ease ADL's and improve quality of gait and decrease fall risk. Pt reported decrease in sx at conclusion of session.     Patient will continue to benefit from skilled PT services to modify and progress therapeutic interventions, address functional mobility deficits, address ROM deficits and address strength deficits to attain remaining goals. [x]  See Plan of Care  []  See progress note/recertification  []  See Discharge Summary         Progress towards goals / Updated goals:  1. Pt will report compliance and independence to HEP to help the pt manage their pain and symptoms. Reports compliance with HEP 7/25/2018       Long Term Goals: To be accomplished in 8 weeks:  1. Pt will increase FOTO score to 61 points to improve ability to perform ADLs. 2. Pt will improve TUG score to 1 minute or less with FWW to decrease the pt's fall risk. 3. Pt will increase MMT left hip flex to 3+/5, left knee flex to 4/5 to improve ability to tolerate ambulation in the community. 4. Pt will report having no falls over the past month to improve the pt's safety at home.      PLAN  []  Upgrade activities as tolerated     [x]  Continue plan of care  []  Update interventions per flow sheet       []  Discharge due to:_  []  Other:_      Toña Javier 8/6/2018  10:10 AM    Future Appointments  Date Time Provider Noel Harp   8/8/2018 2:00 PM [facility] [address] [facility]   8/14/2018 9:30 AM Thienphuc Gladystine Boots [id number] [facility]   8/17/2018 9:30 AM Thienphuc M Car Peace [facility]   8/21/2018 9:30 AM [facility] PT [facility] [address] [facility]   8/23/2018 1:20 PM Lara Dotson MD [facility]   8/24/2018 9:30 AM Thienphuc Gladystine Boots [facility]   8/28/2018 9:00 AM William Valencia PT SIMIN [facility]   8/31/2018 9:00 AM Tonya Ramírez, PT [facility]

## 2018-08-08 ENCOUNTER — HOSPITAL ENCOUNTER (OUTPATIENT)
Dept: PHYSICAL THERAPY | Age: 83
Discharge: HOME | End: 2018-08-08
Payer: MEDICARE

## 2018-08-08 PROCEDURE — 97110 THERAPEUTIC EXERCISES: CPT

## 2018-08-08 NOTE — PROGRESS NOTES
PT DAILY TREATMENT NOTE - [facility]     Patient Name: Trisha Martin  Date:2018  : 1935  [x]  Patient  Verified  Payor: VA MEDICARE / Plan: VA MEDICARE PART A & B / Product Type: Medicare /    In time:200  Out time:248  Total Treatment Time (min): 48  Total Timed Codes (min): 43  1:1 Treatment Time ( [address] only): 25   Visit #: 5 of 24    Treatment Area: Pain in left leg [M79.605]  Unspecified lack of coordination [R27.9]    SUBJECTIVE  Pain Level (0-10 scale): 1  Any medication changes, allergies to medications, adverse drug reactions, diagnosis change, or new procedure performed?: [x] No    [] Yes (see summary sheet for update)  Subjective functional status/changes:   [] No changes reported  Pt reports feeling stiffness in the left knee and c/o weakness in the left hip. OBJECTIVE      25 1:1 min Therapeutic Exercise:  [x] See flow sheet :   Rationale: increase ROM and increase strength to improve the patients ability to perform ADL's and improve quality of gait. With   [x] TE   [] TA   [] neuro   [] other: Patient Education: [x] Review HEP    [] Progressed/Changed HEP based on:   [] positioning   [] body mechanics   [] transfers   [] heat/ice application    [] other:      Other Objective/Functional Measures:   FOTO 47/100  Pt with + Romberg EC on foam for LOB with UE assist to correct  Poor LLE hip flexion with compensatory circumduction with lefting    Pain Level (0-10 scale) post treatment: 0    ASSESSMENT/Changes in Function: Pt continues to have balance deficits and LLE weakness proximal>distal. Added clam shells and lateral stepping to increase hip strength. Pt with + left hip circumduction with step ups secondary to hip flexor weakness and knee flexion restrictions.     Patient will continue to benefit from skilled PT services to modify and progress therapeutic interventions, address functional mobility deficits, address ROM deficits and address strength deficits to attain remaining goals. [x]  See Plan of Care  []  See progress note/recertification  []  See Discharge Summary         Progress towards goals / Updated goals:  1. Pt will report compliance and independence to HEP to help the pt manage their pain and symptoms. Reports compliance with HEP 7/25/2018         Long Term Goals: To be accomplished in 8 weeks:  1. Pt will increase FOTO score to 61 points to improve ability to perform ADLs. PROGRESSING 47 8/8/18  2. Pt will improve TUG score to 1 minute or less with FWW to decrease the pt's fall risk. 3. Pt will increase MMT left hip flex to 3+/5, left knee flex to 4/5 to improve ability to tolerate ambulation in the community. 4. Pt will report having no falls over the past month to improve the pt's safety at home.      PLAN  []  Upgrade activities as tolerated     [x]  Continue plan of care  []  Update interventions per flow sheet       []  Discharge due to:_  []  Other:_      Rozella Fothergill, PT 8/8/2018  10:10 AM    Future Appointments  Date Time Provider Noel Harp   8/14/2018 9:30 AM Thienphuc Lei [place] [facility]   8/17/2018 9:30 AM Thienphuc Lei [place] [facility]   8/21/2018 9:30 AM [facility] [address] [facility]   8/23/2018 1:20 PM Rocio Romero MD [facility]   8/24/2018 9:30 AM Thienphuc Lei [place] [facility]   8/28/2018 9:00 AM Edith Mayorga, PT TYVEFUA [facility]   8/31/2018 9:00 AM Tonya Andrade, PT [facility]

## 2018-08-14 ENCOUNTER — HOSPITAL ENCOUNTER (OUTPATIENT)
Dept: PHYSICAL THERAPY | Age: 83
Discharge: HOME | End: 2018-08-14
Payer: MEDICARE

## 2018-08-14 PROCEDURE — 97110 THERAPEUTIC EXERCISES: CPT

## 2018-08-14 NOTE — PROGRESS NOTES
PT DAILY TREATMENT NOTE - [facility]     Patient Name: Trudy Moreno  Date:2018  : 1935  [x]  Patient  Verified  Payor: [place] MEDICARE / Plan: [place] MEDICARE PART A & B / Product Type: Medicare /    In time:930  Out time:1001  Total Treatment Time (min): 31  Total Timed Codes (min): 31  1:1 Treatment Time ( only): 31   Visit #: 6 of 24    Treatment Area: Pain in left leg [M79.605]  Unspecified lack of coordination [R27.9]    SUBJECTIVE  Pain Level (0-10 scale): 5/10  Any medication changes, allergies to medications, adverse drug reactions, diagnosis change, or new procedure performed?: [x] No    [] Yes (see summary sheet for update)  Subjective functional status/changes:   [] No changes reported  Pt stated that he mainly has stiffness and minimal pain. Reported that the pain is about gone    OBJECTIVE    31 min Therapeutic Exercise:  [x] See flow sheet :   Rationale: increase ROM and increase strength to improve the patients ability to increase ease with ADLs    With   [x] TE   [] TA   [] neuro   [] other: Patient Education: [x] Review HEP    [] Progressed/Changed HEP based on:   [] positioning   [] body mechanics   [] transfers   [] heat/ice application    [] other:      Other Objective/Functional Measures:   Cont to circumduct left leg with ambulation  No difficulty with bike  Had no LOB with static balance exercise     Pain Level (0-10 scale) post treatment: 0/10    ASSESSMENT/Changes in Function:   Pt is slowly progressing toward goals. Pt cont with weakness in left LE, but is improving. Cont to have some difficulty with sit to stand transfers. Pt reported that he has had no falls since eval    Patient will continue to benefit from skilled PT services to modify and progress therapeutic interventions, address functional mobility deficits, address ROM deficits, address strength deficits and address imbalance/dizziness to attain remaining goals.      [x]  See Plan of Care  []  See progress note/recertification  []  See Discharge Summary         Progress towards goals / Updated goals:  1. Pt will report compliance and independence to HEP to help the pt manage their pain and symptoms. Reports compliance with HEP 7/25/2018          Long Term Goals: To be accomplished in 8 weeks:  1. Pt will increase FOTO score to 61 points to improve ability to perform ADLs. PROGRESSING 47 8/8/18  2. Pt will improve TUG score to 1 minute or less with FWW to decrease the pt's fall risk. 3. Pt will increase MMT left hip flex to 3+/5, left knee flex to 4/5 to improve ability to tolerate ambulation in the community. 4. Pt will report having no falls over the past month to improve the pt's safety at home. Progressing.  8/14/18    PLAN  []  Upgrade activities as tolerated     [x]  Continue plan of care  []  Update interventions per flow sheet       []  Discharge due to:_  []  Other:_      Claudell Dye, PTA 8/14/2018  9:26 AM    Future Appointments  Date Time Provider Noel Harp   8/14/2018 9:30 AM Claudell Dye, PTA [facility]   8/17/2018 9:30 AM Thifadiphzulay Ellis Childes OBHSZDC [facility]   8/21/2018 9:30 AM [facility] PT [address] [facility]   8/23/2018 1:20 PM Cameron Lou MD [facility]   8/24/2018 9:30 AM Thienphzulay Yuanis Childes [facility]   8/28/2018 9:00 AM Elaina Rowe, PT REKATWX [facility]   8/31/2018 9:00 AM Tonya Torres, PT [facility]

## 2018-08-21 ENCOUNTER — HOSPITAL ENCOUNTER (OUTPATIENT)
Dept: PHYSICAL THERAPY | Age: 83
Discharge: HOME | End: 2018-08-21
Payer: MEDICARE

## 2018-08-21 PROCEDURE — G8978 MOBILITY CURRENT STATUS: HCPCS

## 2018-08-21 PROCEDURE — G8979 MOBILITY GOAL STATUS: HCPCS

## 2018-08-21 PROCEDURE — 97110 THERAPEUTIC EXERCISES: CPT

## 2018-08-21 NOTE — PROGRESS NOTES
PT DAILY TREATMENT NOTE - [facility]     Patient Name: Maggie Pryor  Date:2018  : 1935  [x]  Patient  Verified  Payor: [place] MEDICARE / Plan: [place] MEDICARE PART A & B / Product Type: Medicare /    In time: 10:05  Out time: 10:30  Total Treatment Time (min): 25  Total Timed Codes (min): 25  1:1 Treatment Time (MC only): 25  Visit #: 7 of 24    Treatment Area: Bilateral primary osteoarthritis of knee [M17.0]  Pain in left leg [M79.605]  Unspecified lack of coordination [R27.9]    SUBJECTIVE  Pain Level (0-10 scale): 5/10  Any medication changes, allergies to medications, adverse drug reactions, diagnosis change, or new procedure performed?: [x] No    [] Yes (see summary sheet for update)  Subjective functional status/changes:   [] No changes reported  I think it's helping, my pain is going down. OBJECTIVE    25 min Therapeutic Exercise:  [x] See flow sheet : TE, re-assess and TUG   Rationale: increase ROM and increase strength to improve the patients ability to increase ease with ADLs    With   [x] TE   [] TA   [] neuro   [] other: Patient Education: [x] Review HEP    [] Progressed/Changed HEP based on:   [] positioning   [] body mechanics   [] transfers   [] heat/ice application    [] other:      Other Objective/Functional Measures:   Pt continues to require cuing for form with all TE.  TU seconds  Left hip flex MMT: 3/5  Left knee flex MMT: 3+/5      Pain Level (0-10 scale) post treatment:     ASSESSMENT/Changes in Function: SEE PN    Pt reports 60% improvement since starting therapy. He reports max pain of 5/10. Patient will continue to benefit from skilled PT services to modify and progress therapeutic interventions, address functional mobility deficits, address ROM deficits, address strength deficits and address imbalance/dizziness to attain remaining goals.      []  See Plan of Care  [x]  See progress note/recertification  []  See Discharge Summary         Progress towards goals / Updated goals:   1. Pt will report compliance and independence to HEP to help the pt manage their pain and symptoms. Reports compliance with HEP 7/25/2018          Long Term Goals: To be accomplished in 8 weeks:  1. Pt will increase FOTO score to 61 points to improve ability to perform ADLs. PROGRESSING 47 8/8/18  2. Pt will improve TUG score to 1 minute or less with FWW to decrease the pt's fall risk. Current: Pt is able to ambulate the 3 meters of TUG Test with cane in 35 seconds. MET 8/21/18  3. Pt will increase MMT left hip flex to 3+/5, left knee flex to 4/5 to improve ability to tolerate ambulation in the community. Left hip flexion 3/5, left knee flex 3+/5, Progressing. 8/21/18  4. Pt will report having no falls over the past month to improve the pt's safety at home.    MET 8/21/18    PLAN  [x]  Upgrade activities as tolerated     [x]  Continue plan of care  []  Update interventions per flow sheet       []  Discharge due to:_  []  Other:_      VEGA Sinha 8/21/2018  10:30 AM    Future Appointments  Date Time Provider Noel Harp   8/21/2018 9:30 AM [facility] [address] [facility]   8/23/2018 1:20 PM Jefry Martin MD [facility]   8/24/2018 9:30 AM Ross Cali [facility]   8/28/2018 9:00 AM [facility]   8/31/2018 9:00 AM Tonya Ha, PT [facility]

## 2018-08-23 ENCOUNTER — OFFICE VISIT (OUTPATIENT)
Dept: ORTHOPEDIC SURGERY | Facility: CLINIC | Age: 83
End: 2018-08-23

## 2018-08-23 VITALS
TEMPERATURE: 97.9 F | BODY MASS INDEX: 30.41 KG/M2 | HEART RATE: 66 BPM | SYSTOLIC BLOOD PRESSURE: 166 MMHG | HEIGHT: 66 IN | OXYGEN SATURATION: 99 % | RESPIRATION RATE: 18 BRPM | DIASTOLIC BLOOD PRESSURE: 64 MMHG | WEIGHT: 189.2 LBS

## 2018-08-23 DIAGNOSIS — G89.29 CHRONIC PAIN OF LEFT KNEE: ICD-10-CM

## 2018-08-23 DIAGNOSIS — M25.562 CHRONIC PAIN OF LEFT KNEE: ICD-10-CM

## 2018-08-23 DIAGNOSIS — M17.12 PRIMARY OSTEOARTHRITIS OF LEFT KNEE: Primary | ICD-10-CM

## 2018-08-23 DIAGNOSIS — R60.9 PERIPHERAL EDEMA: ICD-10-CM

## 2018-08-23 RX ORDER — HYALURONATE SODIUM 10 MG/ML
2 SYRINGE (ML) INTRAARTICULAR ONCE
Qty: 2 ML | Refills: 0
Start: 2018-08-23 | End: 2018-08-23

## 2018-08-23 NOTE — PATIENT INSTRUCTIONS
Knee Arthritis: Care Instructions  Your Care Instructions    Knee arthritis is a breakdown of the cartilage that cushions your knee joint. When the cartilage wears down, your bones rub against each other. This causes pain and stiffness. Knee arthritis tends to get worse with time. Treatment for knee arthritis involves reducing pain, making the leg muscles stronger, and staying at a healthy body weight. The treatment usually does not improve the health of the cartilage, but it can reduce pain and improve how well your knee works. You can take simple measures to protect your knee joints, ease your pain, and help you stay active. Follow-up care is a key part of your treatment and safety. Be sure to make and go to all appointments, and call your doctor if you are having problems. It's also a good idea to know your test results and keep a list of the medicines you take. How can you care for yourself at home? · Know that knee arthritis will cause more pain on some days than on others. · Stay at a healthy weight. Lose weight if you are overweight. When you stand up, the pressure on your knees from every pound of body weight is multiplied four times. So if you lose 10 pounds, you will reduce the pressure on your knees by 40 pounds. · Talk to your doctor or physical therapist about exercises that will help ease joint pain. ¨ Stretch to help prevent stiffness and to prevent injury before you exercise. You may enjoy gentle forms of yoga to help keep your knee joints and muscles flexible. ¨ Walk instead of jog. ¨ Ride a bike. This makes your thigh muscles stronger and takes pressure off your knee. ¨ Wear well-fitting and comfortable shoes. ¨ Exercise in chest-deep water. This can help you exercise longer with less pain. ¨ Avoid exercises that include squatting or kneeling. They can put a lot of strain on your knees.   ¨ Talk to your doctor to make sure that the exercise you do is not making the arthritis worse.  · Do not sit for long periods of time. Try to walk once in a while to keep your knee from getting stiff. · Ask your doctor or physical therapist whether shoe inserts may reduce your arthritis pain. · If you can afford it, get new athletic shoes at least every year. This can help reduce the strain on your knees. · Use a device to help you do everyday activities. ¨ A cane or walking stick can help you keep your balance when you walk. Hold the cane or walking stick in the hand opposite the painful knee. ¨ If you feel like you may fall when you walk, try using crutches or a front-wheeled walker. These can prevent falls that could cause more damage to your knee. ¨ A knee brace may help keep your knee stable and prevent pain. ¨ You also can use other things to make life easier, such as a higher toilet seat and handrails in the bathtub or shower. · Take pain medicines exactly as directed. ¨ Do not wait until you are in severe pain. You will get better results if you take it sooner. ¨ If you are not taking a prescription pain medicine, take an over-the-counter medicine such as acetaminophen (Tylenol), ibuprofen (Advil, Motrin), or naproxen (Aleve). Read and follow all instructions on the label. ¨ Do not take two or more pain medicines at the same time unless the doctor told you to. Many pain medicines have acetaminophen, which is Tylenol. Too much acetaminophen (Tylenol) can be harmful. ¨ Tell your doctor if you take a blood thinner, have diabetes, or have allergies to shellfish. · Ask your doctor if you might benefit from a shot of steroid medicine into your knee. This may provide pain relief for several months. · Many people take the supplements glucosamine and chondroitin for osteoarthritis. Some people feel they help, but the medical research does not show that they work. Talk to your doctor before you take these supplements. When should you call for help?   Call your doctor now or seek immediate medical care if:    · You have sudden swelling, warmth, or pain in your knee.     · You have knee pain and a fever or rash.     · You have such bad pain that you cannot use your knee.    Watch closely for changes in your health, and be sure to contact your doctor if you have any problems. Where can you learn more? Go to http://sourav-shannon.info/. Enter H204 in the search box to learn more about \"Knee Arthritis: Care Instructions. \"  Current as of: October 10, 2017  Content Version: 11.7  © 7773-5211 [employer]. Care instructions adapted under license by [employer] (which disclaims liability or warranty for this information). If you have questions about a medical condition or this instruction, always ask your healthcare professional. [address] any warranty or liability for your use of this information. Knee Arthritis: Exercises  Your Care Instructions  Here are some examples of exercises for knee arthritis. Start each exercise slowly. Ease off the exercise if you start to have pain. Your doctor or physical therapist will tell you when you can start these exercises and which ones will work best for you. How to do the exercises  Knee flexion with heel slide    1. Lie on your back with your knees bent. 2. Slide your heel back by bending your affected knee as far as you can. Then hook your other foot around your ankle to help pull your heel even farther back. 3. Hold for about 6 seconds, then rest for up to 10 seconds. 4. Repeat 8 to 12 times. 5. Switch legs and repeat steps 1 through 4, even if only one knee is sore. Quad sets    1. Sit with your affected leg straight and supported on the floor or a firm bed. Place a small, rolled-up towel under your knee. Your other leg should be bent, with that foot flat on the floor. 2. Tighten the thigh muscles of your affected leg by pressing the back of your knee down into the towel.   3. Hold for about 6 seconds, then rest for up to 10 seconds. 4. Repeat 8 to 12 times. 5. Switch legs and repeat steps 1 through 4, even if only one knee is sore. Straight-leg raises to the front    1. Lie on your back with your good knee bent so that your foot rests flat on the floor. Your affected leg should be straight. Make sure that your low back has a normal curve. You should be able to slip your hand in between the floor and the small of your back, with your palm touching the floor and your back touching the back of your hand. 2. Tighten the thigh muscles in your affected leg by pressing the back of your knee flat down to the floor. Hold your knee straight. 3. Keeping the thigh muscles tight and your leg straight, lift your affected leg up so that your heel is about 12 inches off the floor. Hold for about 6 seconds, then lower slowly. 4. Relax for up to 10 seconds between repetitions. 5. Repeat 8 to 12 times. 6. Switch legs and repeat steps 1 through 5, even if only one knee is sore. Active knee flexion    1. Lie on your stomach with your knees straight. If your kneecap is uncomfortable, roll up a washcloth and put it under your leg just above your kneecap. 2. Lift the foot of your affected leg by bending the knee so that you bring the foot up toward your buttock. If this motion hurts, try it without bending your knee quite as far. This may help you avoid any painful motion. 3. Slowly move your leg up and down. 4. Repeat 8 to 12 times. 5. Switch legs and repeat steps 1 through 4, even if only one knee is sore. Quadriceps stretch (facedown)    1. Lie flat on your stomach, and rest your face on the floor. 2. Wrap a towel or belt strap around the lower part of your affected leg. Then use the towel or belt strap to slowly pull your heel toward your buttock until you feel a stretch. 3. Hold for about 15 to 30 seconds, then relax your leg against the towel or belt strap. 4. Repeat 2 to 4 times.   5. Switch legs and repeat steps 1 through 4, even if only one knee is sore. Stationary exercise bike    1. If you do not have a stationary exercise bike at home, you can find one to ride at your local health club or community center. 2. Adjust the height of the bike seat so that your knee is slightly bent when your leg is extended downward. If your knee hurts when the pedal reaches the top, you can raise the seat so that your knee does not bend as much. 3. Start slowly. At first, try to do 5 to 10 minutes of cycling with little to no resistance. Then increase your time and the resistance bit by bit until you can do 20 to 30 minutes without pain. 4. If you start to have pain, rest your knee until your pain gets back to the level that is normal for you. Or cycle for less time or with less effort. Follow-up care is a key part of your treatment and safety. Be sure to make and go to all appointments, and call your doctor if you are having problems. It's also a good idea to know your test results and keep a list of the medicines you take. Where can you learn more? Go to http://souravWrittenshannon.info/. Enter C159 in the search box to learn more about \"Knee Arthritis: Exercises. \"  Current as of: November 29, 2017  Content Version: 11.7  © 9050-8153 [employer]. Care instructions adapted under license by [employer] (which disclaims liability or warranty for this information). If you have questions about a medical condition or this instruction, always ask your healthcare professional. [place] [address] any warranty or liability for your use of this information. Hyaluronic Acid (By injection)   Hyaluronic Acid (ygp-ge-vnm-ON-ate AS-id)  Treats severe pain in your knee due to osteoarthritis. Brand Name(s): Euflexxa, Gel-One, GelSyn-3, GenVisc 850, Hyalgan, Hymovis, Monovisc, Orthovisc, Supartz FX, Visco-3   There may be other brand names for this medicine.   When This Medicine Should Not Be Used: This medicine is not right for everyone. You should not receive it if you had an allergic reaction to hyaluronic acid or if you have a bleeding disorder. How to Use This Medicine:   Injectable  · Your doctor will tell you how many injections you will need. This medicine is injected into your knee joint. · A nurse or other health provider will give you this medicine. Drugs and Foods to Avoid:      Ask your doctor or pharmacist before using any other medicine, including over-the-counter medicines, vitamins, and herbal products. Warnings While Using This Medicine:   · Tell your doctor if you are pregnant or breastfeeding, or if you have any allergies, including to birds, feathers, or eggs. · Rest your knee for 48 hours after you receive an injection. Do not do strenuous, weightbearing activities, such as jogging or tennis. Avoid activities that keep you standing for longer than 1 hour. Possible Side Effects While Using This Medicine:   Call your doctor right away if you notice any of these side effects:  · Allergic reaction: Itching or hives, swelling in your face or hands, swelling or tingling in your mouth or throat, chest tightness, trouble breathing  If you notice these less serious side effects, talk with your doctor:   · Mild increase in pain or swelling in your knee  · Pain, redness, or swelling where the medicine is injected  If you notice other side effects that you think are caused by this medicine, tell your doctor. Call your doctor for medical advice about side effects. You may report side effects to FDA at 5-995-FDA-8537  © 2017 [facility] Information is for End User's use only and may not be sold, redistributed or otherwise used for commercial purposes. The above information is an  only. It is not intended as medical advice for individual conditions or treatments.  Talk to your doctor, nurse or pharmacist before following any medical regimen to see if it is safe and effective for you.

## 2018-08-23 NOTE — PROGRESS NOTES
Patient: Horace Cipro                MRN: [id number]       SSN: [id number]  YOB: 1935        AGE: 80 y.o. SEX: male    PCP: Faiza Allen MD  08/23/18    CC: Peripheral edema and left knee pain    HISTORY:  Horace Bruce is a 80 y.o. male who is seen for left knee pain. He reports that his knee pain has been stopping him from doing things around the house. He is usually very active, remodeling his home and friend's homes. He notes pain with standing and walking. He has previously been seen at [facility] where x rays revealed arthritis but no acute abnormalities. He states he has balance problems. He reports pain relief from a cortisone iinjection last OV. He states he has started walking without a cane in his house. 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 He lives in [place] with his wife. He has 2 adult children, one son and one daughter. He states that he has plenty of grandchildren. He lost 8 pounds recently due to diet changes  He is not diabetic. He is not hypertensive. Current weight is 188 pounds. He is 5'8\" tall. Lab Results   Component Value Date/Time    Hemoglobin A1c 5.8 (H) 08/29/2016 07:00 AM     Weight Metrics 8/23/2018 7/19/2018 6/27/2018 9/28/2016 9/1/2016   Weight 189 lb 3.2 oz 188 lb 12.8 oz 200 lb 192 lb 199 lb 15.3 oz   BMI 30.54 kg/m2 28.71 kg/m2 30.41 kg/m2 30.99 kg/m2 32.27 kg/m2       Patient Active Problem List   Diagnosis Code    Traumatic rhabdomyolysis ([facility] [address] T79. 6XXA    CAD (coronary artery disease) I25.10    HTN (hypertension) I10    Troponin level elevated R74.8     REVIEW OF SYSTEMS: All Below are Negative except: See HPI   Constitutional: negative for fever, chills, and weight loss. Cardiovascular: negative for chest pain, claudication, leg swelling, SOB, BERNARDO   Gastrointestinal: Negative for pain, N/V/C/D, Blood in stool or urine, dysuria,  hematuria, incontinence, pelvic pain. Musculoskeletal: See HPI   Neurological: Negative for dizziness and weakness. Negative for headaches, Visual changes, confusion, seizures   Phychiatric/Behavioral: Negative for depression, memory loss, substance  abuse. Extremities: Negative for hair changes, rash, or skin lesion changes. Hematologic: Negative for bleeding problems, bruising, pallor or swollen lymph  nodes   Peripheral Vascular: No calf pain, no circulation deficits. Social History     Social History    Marital status:      Spouse name: N/A    Number of children: N/A    Years of education: N/A     Occupational History    Not on file. Social History Main Topics    Smoking status: Never Smoker    Smokeless tobacco: Never Used    Alcohol use No    Drug use: No    Sexual activity: Not on file     Other Topics Concern    Not on file     Social History Narrative      No Known Allergies   Current Outpatient Prescriptions   Medication Sig    garlic 4,036 mg cap Take  by mouth.  amLODIPine (NORVASC) 5 mg tablet Take 1 Tab by mouth daily. Indications: HYPERTENSION    aspirin 81 mg chewable tablet Take 1 Tab by mouth daily. No current facility-administered medications for this visit.        PHYSICAL EXAMINATION:  Visit Vitals    /64    Pulse 66    Temp 97.9 °F (36.6 °C) (Oral)    Resp 18    Ht 5' 6\" (1.676 m)    Wt 189 lb 3.2 oz (85.8 kg)    SpO2 99%    BMI 30.54 kg/m2      ORTHO EXAMINATION:  Examination Right knee Left knee   Skin Intact Intact   Range of motion 110-0 90-10   Effusion - -   Medial joint line tenderness - +   Lateral joint line tenderness - -   Popliteal tenderness - -   Osteophytes palpable - -   Hernandos - -   Patella crepitus + +   Anterior drawer - -   Lateral laxity - -   Medial laxity - -   Varus deformity - -   Valgus deformity - -   Pretibial edema 2+ 2-3+   Calf tenderness - -     Examination Right hip Left hip   Skin Intact Intact   External Rotation ROM 20 20   Internal Rotation ROM 10 10   Trochanteric tenderness - -   Hip flexion contracture - -   Antalgic gait - -   Trendelenberg sign - -   Lumbar tenderness - -   Straight leg raise - -   Calf tenderness - -   Neurovascular Intact Intact     Examination Lumbar Thoracic   Skin Intact Intact   Tenderness + -   Tightness - -   Lordosis Normal N/A   Kyphosis N/A Normal   Scoliosis - -   Flexion Fingertips to mid shin N/A   Extension 10 N/A   Knee reflexes Normal N/A   Ankle reflexes Normal N/A   Straight leg raise - N/A   Calf tenderness - N/A     PROCEDURE:  After discussing treatment options, patient's left knee was injected with 4 cc Marcaine and 1/2 cc Celestone. Chart reviewed for the following:   Jane Galicia MD, have reviewed the History, Physical and updated the Allergic reactions for [address] performed immediately prior to start of procedure:  Jane Galicia MD, have performed the following reviews on Guera Butt prior to the start of the procedure:            * Patient was identified by name and date of birth   * Agreement on procedure being performed was verified  * Risks and Benefits explained to the patient  * Procedure site verified and marked as necessary  * Patient was positioned for comfort  * Consent was obtained     Time: 1:30 PM     Date of procedure: 8/23/2018    Procedure performed by:  Rogelio Toledo MD    Mr. Tom Bloom tolerated the procedure well with no complications. DUPLEX VENOUS STUDY EXT VENOUS BILAT 6/27/2018   No evidence of acute deep vein thrombosis in the left common femoral, superficial femoral, popliteal, posterior tibial, and peroneal veins. The veins were imaged in the transverse and longitudinal planes. The vessels showed normal color filling and compressibility. Doppler interrogation showed phasic and spontaneous flow. RADIOGRAPHS:   XR BILAT KNEES 5/23/2018 [facility]  -I have independently reviewed these images during this office visit. -Dr. Earl Carr:  Three views - No fractures, no effusion, moderate L>R joint space narrowing, patellar entesmophyte osteophytes present. IKDC Grade C squaring of condyles    XR HIP 5/23/2018 [facility]  -I have independently reviewed these images during this office visit. -Dr. Earl Carr:  AP pelvis and two views - No fractures, mild joint space narrowing, + osteophytes present. Tonnis grade 1    XR SPINE 5/23/2018 [facility]  -I have independently reviewed these images during this office visit. -Dr. Earl Carr:  Two views - no fractures, L2-3 disc space narrowing, + osteophytes present, no spondylolisthesis. IMPRESSION:      ICD-10-CM ICD-9-CM    1. Primary osteoarthritis of left knee M17.12 715.16 [place] DRAIN/INJECT LARGE JOINT/BURSA      EUFLEXXA INJECTION PER DOSE      sodium hyaluronate (SUPARTZ FX/HYALGAN/GENIVSC) 10 mg/mL syrg injection   2. Chronic pain of left knee M25.562 719.46 [place] DRAIN/INJECT LARGE JOINT/BURSA    G89.29 338.29 EUFLEXXA INJECTION PER DOSE      sodium hyaluronate (SUPARTZ FX/HYALGAN/GENIVSC) 10 mg/mL syrg injection   3. Peripheral edema R60.9 782.3 AMB SUPPLY ORDER     PLAN:  He was provided a prescription for compression stockings. After discussing treatment options, patient's left knee was injected with 4 cc Marcaine and 1/2 cc Celestone. He will follow up in one week for Euflexxa #2. He will complete his course of outpatient physical therapy to his left knee.     Scribed by Rupert Olmstead [facility]) as dictated by Elena Braswell MD

## 2018-08-28 ENCOUNTER — HOSPITAL ENCOUNTER (OUTPATIENT)
Dept: PHYSICAL THERAPY | Age: 83
Discharge: HOME | End: 2018-08-28
Payer: MEDICARE

## 2018-08-28 PROCEDURE — G8979 MOBILITY GOAL STATUS: HCPCS

## 2018-08-28 PROCEDURE — G8978 MOBILITY CURRENT STATUS: HCPCS

## 2018-08-28 PROCEDURE — 97110 THERAPEUTIC EXERCISES: CPT

## 2018-08-28 NOTE — PROGRESS NOTES
[facility] [address] [facility] 
[phone] fax Continued Plan of Care/ Re-certification for Physical Therapy Services Patient name: Trisha Martin Start of Care: 2018 Referral source:Dr Price : 1935 Medical Diagnosis: Repeated falls [R29.6] Unspecified abnormalities of gait and mobility [R26.9] Bilateral primary osteoarthritis of knee [M17.0] Onset Date: initial 7 years ago per pt report, per [facility] care 2 months ago Treatment Diagnosis: left knee/LE pain, impaired balance and gait Prior Hospitalization: see medical history Provider#: [id number] Medications: Verified on Patient summary List  
 Comorbidities: hx CAD per [facility] care Prior Level of Function: States he lives with his wife and he is retired. Reports having progressively worsening pain in his left LE over the past several years. Visits from Start of Care: 8    Missed Visits: 1 The Plan of Care and following information is based on the patient's current status: 
Goal:Pt will report compliance and independence to [facility] to help the pt manage their pain and symptoms Status at last note/certification: 
Current Status: met Goal:Pt will increase FOTO score to 61 points to improve ability to perform ADLs Status at last note/certification: 
Current Status: progressing Goal:Pt will improve TUG score to 1 minute or less with FWW to decrease the pt's fall risk. Status at last note/certification: 
Current Status:MET TUG=35 sec Goal:Pt will increase MMT left hip flex to 3+/5, left knee flex to 4/5 to improve ability to tolerate ambulation in the community Status at last note/certification: 
Current Status: NOT met. Pt will report having no falls over the past month to improve the pt's safety at home. Progressing. 18 Key functional changes: Pt is progressing well in therapy. He continues to improve slowly with dynamic gait but is limited due to ongoing LE weakness. Left LE is weak and pt would mostly compensate with hip hike and flexors for stepping up strategies. Static balance is improving with EO/EC on a firm surface x 30 sec with decreased sway. TUG=35 sec. Pt reports the therapy is helping his balance and ambulation. He reports no falls. Problems/ barriers to goal attainment: NONE. Problem List: pain affecting function, decrease ROM, decrease strength, impaired gait/ balance, decrease ADL/ functional abilitiies, decrease activity tolerance, decrease flexibility/ joint mobility and decrease transfer abilities Treatment Plan: Therapeutic exercise, Therapeutic activities, Neuromuscular re-education, Physical agent/modality, Gait/balance training, Manual therapy, Patient education, Self Care training, Functional mobility training, Home safety training and Stair training Patient Goal (s) has been updated and includes: To get rid of the cane. Goals for this certification period to be accomplished in 4 weeks: 1. Pt will increase MMT left hip flex to 3+/5, left knee flex to 4/5 to improve ability to tolerate ambulation in the community. Left hip flexion 3/5, left knee flex 3+/5, 2. Pt will increase FOTO score to 61 points to improve ability to perform ADLs. 3. Pt will perform sit to stand independently and with min to no cueing for safety. 4. Pt will improve TUG score to 30 sec or less with FWW to decrease the pt's fall risk. Frequency / Duration: Patient to be seen 2 times per week for 4 weeks: 
Assessment / Recommendations:Pt continues to have balance deficits and LLE weakness proximal>distal. Added clam shells and lateral stepping to increase hip strength.  Pt with + left hip circumduction with step ups secondary to hip flexor weakness and knee flexion restrictions. 
  
 Patient will continue to benefit from skilled PT services to modify and progress therapeutic interventions, address functional mobility deficits, address ROM deficits and address strength deficits to attain remaining goals. 
   
 
G-Codes (GP) Mobility  Current  CK= 40-59%   Goal  CJ= 20-39% The severity rating is based on clinical judgment and the FOTO score. Certification Period: 8/28/18 - 9/27/18 Arturorisa Serafin, PT 8/28/2018 9:29 AM 
 
________________________________________________________________________ I certify that the above Therapy Services are being furnished while the patient is under my care. I agree with the treatment plan and certify that this therapy is necessary. [] I have read the above and request that my patient continue as recommended. [] I have read the above report and request that my patient continue therapy with the following changes/special instructions: _______________________________________ [] I have read the above report and request that my patient be discharged from therapy [de-identified] Signature:____________Date:_________TIME:________ 
 
[employer], Date and Time must be completed for valid certification ** Please sign and return to [facility]  [address] [facility] 
[phone] fax

## 2018-08-28 NOTE — PROGRESS NOTES
PT DAILY TREATMENT NOTE - [facility]  Patient Name: Naomi Ornelas Date:2018 : 1935 [x]  Patient  Verified Payor: [place] / Plan: Isaías Mills y / Product Type: Medicare / In time:907  Out time:938 Total Treatment Time (min): 31 Total Timed Codes (min): 31 
1:1 Treatment Time (MC only): 31 Visit #: 8 of 12 Treatment Area: Pain in left leg [M79.605] Unspecified lack of coordination [R27.9] SUBJECTIVE Pain Level (0-10 scale): 5/10 Any medication changes, allergies to medications, adverse drug reactions, diagnosis change, or new procedure performed?: [x] No    [] Yes (see summary sheet for update) Subjective functional status/changes:   [] No changes reported Reports he would like to get rid of his cane eventually. OBJECTIVE 31 min Therapeutic Exercise:  [] See flow sheet :  
Rationale: increase ROM, increase strength and improve balance to improve the patients ability to ease with ADl's With 
 [] TE 
 [] TA 
 [] neuro 
 [] other: Patient Education: [x] Review HEP [] Progressed/Changed HEP based on:  
[] positioning   [] body mechanics   [] transfers   [] heat/ice application   
[] other:   
 
Other Objective/Functional Measures: Requires cueing for LE control during Hipx 3. Pt continues to be unstable with EC. Pt is unstable dynamically with a SPC. Added 2 # to LE's, right >left. Pain Level (0-10 scale) post treatment: 5/10 ASSESSMENT/Changes in Function: Pt making good progress. He sometimes drags his left LE during ambulation when fatigued.   
 
Patient will continue to benefit from skilled PT services to modify and progress therapeutic interventions, address functional mobility deficits, address ROM deficits, address strength deficits, analyze and address soft tissue restrictions, analyze and cue movement patterns, analyze and modify body mechanics/ergonomics, assess and modify postural abnormalities and address imbalance/dizziness to attain remaining goals. []  See Plan of Care 
[]  See progress note/recertification 
[]  See Discharge Summary Progress towards goals / Updated goals: 1. Pt will report compliance and independence to HEP to help the pt manage their pain and symptoms. Reports compliance with HEP 7/25/2018      
   
Long Term Goals: To be accomplished in 8 weeks: 1. Pt will increase FOTO score to 61 points to improve ability to perform ADLs. PROGRESSING 47 8/8/18 2. Pt will improve TUG score to 1 minute or less with FWW to decrease the pt's fall risk. 3. Pt will increase MMT left hip flex to 3+/5, left knee flex to 4/5 to improve ability to tolerate ambulation in the community. 4. Pt will report having no falls over the past month to improve the pt's safety at home. Progressing. 8/14/18 PLAN 
[]  Upgrade activities as tolerated     []  Continue plan of care 
[]  Update interventions per flow sheet      
[]  Discharge due to:_ 
[]  Other:_ Stephanie Roach, PT 8/28/2018  9:13 AM 
 
Future Appointments Date Time Provider Noel Harp 8/30/2018 4:10 PM Charo Rivas MD [facility]  
8/31/2018 9:00 AM Stephanie Roach, PT [id number]  
9/6/2018 4:10 PM Charo Rivas MD [facility] [address]

## 2018-08-30 ENCOUNTER — OFFICE VISIT (OUTPATIENT)
Dept: ORTHOPEDIC SURGERY | Facility: CLINIC | Age: 83
End: 2018-08-30

## 2018-08-30 VITALS
DIASTOLIC BLOOD PRESSURE: 54 MMHG | SYSTOLIC BLOOD PRESSURE: 179 MMHG | RESPIRATION RATE: 16 BRPM | HEIGHT: 66 IN | OXYGEN SATURATION: 100 % | HEART RATE: 64 BPM | BODY MASS INDEX: 30.7 KG/M2 | TEMPERATURE: 98.6 F | WEIGHT: 191 LBS

## 2018-08-30 DIAGNOSIS — M25.562 CHRONIC PAIN OF LEFT KNEE: ICD-10-CM

## 2018-08-30 DIAGNOSIS — M17.12 PRIMARY OSTEOARTHRITIS OF LEFT KNEE: Primary | ICD-10-CM

## 2018-08-30 DIAGNOSIS — G89.29 CHRONIC PAIN OF LEFT KNEE: ICD-10-CM

## 2018-08-30 RX ORDER — HYALURONATE SODIUM 10 MG/ML
2 SYRINGE (ML) INTRAARTICULAR ONCE
Qty: 2 ML | Refills: 0
Start: 2018-08-30 | End: 2018-08-30

## 2018-08-30 NOTE — PROGRESS NOTES
Patient: Marcelina Johnston                MRN: [id number]       SSN: [id number] YOB: 1935        AGE: 80 y.o. SEX: male Body mass index is 30.83 kg/(m^2). PCP: France Daniels MD 
08/30/18 Chief Complaint Patient presents with  Knee Pain LEFT KNEE PAIN, F/U APPT. HISTORY:  Marcelina Johnston is a 80 y.o. male who is seen for left knee pain. ICD-10-CM ICD-9-CM 1. Primary osteoarthritis of left knee M17.12 715.16 [place] DRAIN/INJECT LARGE JOINT/BURSA EUFLEXXA INJECTION PER DOSE  
   sodium hyaluronate (SUPARTZ FX/HYALGAN/GENIVSC) 10 mg/mL syrg injection 2. Chronic pain of left knee M25.562 719.46 [place] DRAIN/INJECT LARGE JOINT/BURSA  
 G89.29 338.29 EUFLEXXA INJECTION PER DOSE  
   sodium hyaluronate (SUPARTZ FX/HYALGAN/GENIVSC) 10 mg/mL syrg injection PROCEDURE:  After discussing treatment options, Mr. Otto Barnes left knee was injected with 2 cc of Euflexxa. Chart reviewed for the following: 
 Renuka Luna MD, have reviewed the History, Physical and updated the Allergic reactions for Marcelina Johnston TIME OUT performed immediately prior to start of procedure: 
Renuka Luna MD, have performed the following reviews on Marcelina Johnston prior to the start of the procedure: 
         
* Patient was identified by name and date of birth * Agreement on procedure being performed was verified * Risks and Benefits explained to the patient * Procedure site verified and marked as necessary * Patient was positioned for comfort * Consent was obtained Time: 4:52 PM  
 
Date of procedure: 8/30/2018 Procedure performed by:  Beth Davies MD 
 
Mr. Kenny Antony tolerated the procedure well with no complications. PLAN:  After discussing treatment options, patient's left knee was injected with 2 cc of Euflexxa. Mr. Kenny Antony will follow up in one week to complete his Euflexxa injection series. Scribed by Lauro Lomeli [facility]) as dictated by Vikki Coffey MD

## 2018-09-06 ENCOUNTER — OFFICE VISIT (OUTPATIENT)
Dept: ORTHOPEDIC SURGERY | Facility: CLINIC | Age: 83
End: 2018-09-06

## 2018-09-06 VITALS
HEIGHT: 66 IN | WEIGHT: 187.6 LBS | OXYGEN SATURATION: 100 % | RESPIRATION RATE: 18 BRPM | TEMPERATURE: 98.5 F | SYSTOLIC BLOOD PRESSURE: 173 MMHG | DIASTOLIC BLOOD PRESSURE: 70 MMHG | HEART RATE: 68 BPM | BODY MASS INDEX: 30.15 KG/M2

## 2018-09-06 DIAGNOSIS — G89.29 CHRONIC PAIN OF LEFT KNEE: ICD-10-CM

## 2018-09-06 DIAGNOSIS — M25.562 CHRONIC PAIN OF LEFT KNEE: ICD-10-CM

## 2018-09-06 DIAGNOSIS — M17.12 PRIMARY OSTEOARTHRITIS OF LEFT KNEE: Primary | ICD-10-CM

## 2018-09-06 RX ORDER — HYALURONATE SODIUM 10 MG/ML
2 SYRINGE (ML) INTRAARTICULAR ONCE
Qty: 2 ML | Refills: 0
Start: 2018-09-06 | End: 2018-09-06

## 2018-09-06 NOTE — PROGRESS NOTES
Patient: Yvonne Connelly                MRN: [id number]       SSN: [id number] YOB: 1935        AGE: 80 y.o. SEX: male Body mass index is 30.28 kg/(m^2). PCP: Lina Jose MD 
09/06/18 Chief Complaint Patient presents with  Knee Pain Left HISTORY:  Yvonne Connelly is a 80 y.o. male who is seen for left knee pain. PROCEDURE:  After discussing treatment options, Mr. Justino Garcia left knee was injected with 2 cc of Euflexxa. TIME OUT performed immediately prior to start of procedure: 
Bk Goodrich MD, have performed the following reviews on Yvonne Connelly prior to the start of the procedure: 
         
* Patient was identified by name and date of birth * Agreement on procedure being performed was verified * Risks and Benefits explained to the patient * Procedure site verified and marked as necessary * Patient was positioned for comfort * Consent was obtained Time: 5:00 PM  
 
Date of procedure: 9/6/2018 Procedure performed by:  Charo Rivas MD 
 
Mr. Rehana Jerez tolerated the procedure well with no complications ICD-10-CM ICD-9-CM 1. Primary osteoarthritis of left knee M17.12 715.16 [place] DRAIN/INJECT LARGE JOINT/BURSA EUFLEXXA INJECTION PER DOSE  
   sodium hyaluronate (SUPARTZ FX/HYALGAN/GENIVSC) 10 mg/mL syrg injection 2. Chronic pain of left knee M25.562 719.46 [place] DRAIN/INJECT LARGE JOINT/BURSA  
 G89.29 338.29 EUFLEXXA INJECTION PER DOSE  
   sodium hyaluronate (SUPARTZ FX/HYALGAN/GENIVSC) 10 mg/mL syrg injection PLAN:  After discussing treatment options, patient's left knee was injected with 2 cc of Euflexxa. Mr. Rehana Jerez will follow up PRN now that he has completed his Euflexxa injection series.    
 
Scribed by Derick Marquez [facility]) as dictated by Charo Rivas

## 2018-10-05 ENCOUNTER — HOSPITAL ENCOUNTER (OUTPATIENT)
Dept: PHYSICAL THERAPY | Age: 83
Discharge: HOME | End: 2018-10-05
Payer: MEDICARE

## 2018-10-05 PROCEDURE — 97110 THERAPEUTIC EXERCISES: CPT

## 2018-10-05 PROCEDURE — G8978 MOBILITY CURRENT STATUS: HCPCS

## 2018-10-05 PROCEDURE — G8979 MOBILITY GOAL STATUS: HCPCS

## 2018-10-05 PROCEDURE — 97162 PT EVAL MOD COMPLEX 30 MIN: CPT

## 2018-10-05 NOTE — PROGRESS NOTES
[facility] [address] [facility] 
[phone] fax Plan of Care/ Statement of Necessity for Physical Therapy Services Patient name: Dylan Mauricio Start of Care: 10/5/2018 Referral source: Franklin Hartley MD : 1935 Medical Diagnosis: Gait disturbance [R26.9] Onset Date:>1 yr   
Treatment Diagnosis: Balance and Gait Prior Hospitalization: see medical history Provider#: [id number] Medications: Verified on Patient summary List  
 Comorbidities: CAD Prior Level of Function:  States he lives with his wife and he is retired . Reports having progressively worsening stiffnessin his left LE over the past several years. The Plan of Care and following information is based on the information from the initial evaluation. Assessment/ key information: Pt is a 80year old male who presents to therapy today with LE stiffness, and impaired balance/gait. Pt states that his symptoms began about 7 years ago with insidious onset. Per connect care, doppler was performed and no DVT was noted in the left LE. Pt presents to the clinic today with [facility] and with with dynamic instability. He has difficulty with transfers and with grooming and dressing activities due to stiffness to his LE's, decreased Hip/pelvic stability and decreased hip and ankle strategies during balance activities. Romberg Test was positive with EC. TUG=31 sec.  
 X rays reveals Arthritis w/o acute abnormalities.  . Pt reports current left knee pain relief from a cortisone injection he recently received.  
  
Patient will benefit from skilled PT services to modify and progress therapeutic interventions, address functional mobility deficits, address ROM deficits, address strength deficits, analyze and address soft tissue restrictions, analyze and cue movement patterns, analyze and modify body mechanics/ergonomics, assess and modify postural abnormalities and address imbalance/dizziness to attain functional goals. Evaluation Complexity History MEDIUM  Complexity : 1-2 comorbidities / personal factors will impact the outcome/ POC ; Examination MEDIUM Complexity : 3 Standardized tests and measures addressing body structure, function, activity limitation and / or participation in recreation  ;Presentation MEDIUM Complexity : Evolving with changing characteristics  ; Clinical Decision Making MEDIUM Complexity : FOTO score of 26-74 Overall Complexity Rating: MEDIUM Problem List: pain affecting function, decrease ROM, decrease strength, impaired gait/ balance, decrease ADL/ functional abilitiies, decrease activity tolerance, decrease flexibility/ joint mobility and decrease transfer abilities Treatment Plan may include any combination of the following: Therapeutic exercise, Therapeutic activities, Neuromuscular re-education, Physical agent/modality, Gait/balance training, Manual therapy, Patient education, Self Care training, Functional mobility training, Home safety training and Stair training Patient / Family readiness to learn indicated by: asking questions, trying to perform skills and interest 
Persons(s) to be included in education: patient (P) Barriers to Learning/Limitations: None Patient Goal (s): Loosening up, Stiffness to go away Patient Self Reported Health Status: good Rehabilitation Potential: good Short Term Goals: To be accomplished in 1 weeks: 1. Pt will report compliance and independence to HEP to help the pt manage stiffness and improve flexibility. Long Term Goals: To be accomplished in 6 weeks: 1. Pt will increase FOTO score by 11 points to improve ability to perform ADLs. 2. Pt will improve TUG score to 30 sec or less with SPC to decrease the pt's fall risk.  
3. Pt will increase MMT left hip flex to 3+/5, left knee flex to 4/5 to improve ability to tolerate ambulation in the community. 4. Pt will report >70% improvement to return to community ambulation safely. Frequency / Duration: Patient to be seen 2 times per week for 8 weeks. Patient/ Caregiver education and instruction: Diagnosis, prognosis, self care and exercises 
 [x]  Plan of care has been reviewed with ANJU 
 
G-Codes (GP) Mobility  Current  CK= 40-59%   Goal  CK= 40-59% The severity rating is based on clinical judgment and the FOTO score. Certification Period: 10/5/18-12/4/18 Lisa Johnson, PT 10/5/2018 9:41 AM 
 
________________________________________________________________________ I certify that the above Therapy Services are being furnished while the patient is under my care. I agree with the treatment plan and certify that this therapy is necessary. [de-identified] Signature:____________Date:_________TIME:________ 
 
[employer], Date and Time must be completed for valid certification ** Please sign and return to [address] [facility] [place] 
[phone] fax

## 2018-10-05 NOTE — PROGRESS NOTES
PT DAILY TREATMENT NOTE - [facility]  Patient Name: Viviann Blizzard Date:10/5/2018 : 1935 [x]  Patient  Verified Payor: [place] MED[place] / Plan: Isaías Mills y / Product Type: Medicare / In time:905  Out time:1000 Total Treatment Time (min): 55 Total Timed Codes (min): 45 
1:1 Treatment Time ( only): 55 Visit #: 1 of 12 Treatment Area: Gait disturbance [R26.9] SUBJECTIVE Pain Level (0-10 scale): 0/10 Any medication changes, allergies to medications, adverse drug reactions, diagnosis change, or new procedure performed?: [x] No    [] Yes (see summary sheet for update) Subjective functional status/changes:   [] No changes reported Chronic arthritis. Reports no pain only stiffness and imbalance. Had 4 cortisone shots and left knee pain is improved. Difficulty dressing bending due to stiffness. Left LE much improved. OBJECTIVE Modality rationale: decrease pain to improve the patients ability to ease with Ambulation Min Type Additional Details  
 [] Estim:  []Unatt       []IFC  []Premod []Other:  []w/ice   []w/heat Position: Location:  
 [] Estim: []Att    []TENS instruct  []NMES []Other:  []w/US   []w/ice   []w/heat Position: Location:  
 []  Traction: [] Cervical       []Lumbar 
                     [] Prone          []Supine []Intermittent   []Continuous Lbs: 
[] before manual 
[] after manual  
 []  Ultrasound: []Continuous   [] Pulsed []1MHz   []3MHz W/cm2: 
Location:  
 []  Iontophoresis with dexamethasone Location: [] Take home patch  
[] In clinic  
10 []  Ice     [x]  heat 
[]  Ice massage 
[]  Laser  
[]  Anodyne Position:supine Location:Bilateral thighs  
 []  Laser with stim 
[]  Other:  Position: Location:  
 []  Vasopneumatic Device Pressure:       [] lo [] med [] hi  
Temperature: [] lo [] med [] hi  
 [] Skin assessment post-treatment:  []intact []redness- no adverse reaction 
  []redness  adverse reaction:  
 
10 min []Eval                  []Re-Eval  
 
 
38 min Therapeutic Exercise with manual stretching:  [] See flow sheet :  
Rationale: increase ROM, increase strength, improve coordination and improve balance to improve the patients ability to ease with ambulation safety With 
 [] TE 
 [] TA 
 [] neuro 
 [] other: Patient Education: [x] Review HEP [] Progressed/Changed HEP based on:  
[] positioning   [] body mechanics   [] transfers   [] heat/ice application   
[] other:   
 
Other Objective/Functional Measures: TUG=31 sec Amb with spc with gait ataxia. Difficulty with transfers due to instability and stiffness. LE strength4/5. Decreased pelvic and HS flexibility. Hip are stiff and IT band and quads tight Positive for fall risk due to dynamic instability. Pain Level (0-10 scale) post treatment: 0/10 ASSESSMENT/Changes in Function: see poc Patient will continue to benefit from skilled PT services to modify and progress therapeutic interventions, address functional mobility deficits, address ROM deficits, address strength deficits, analyze and address soft tissue restrictions, analyze and cue movement patterns, analyze and modify body mechanics/ergonomics, assess and modify postural abnormalities and address imbalance/dizziness to attain remaining goals. [x]  See Plan of Care 
[]  See progress note/recertification 
[]  See Discharge Summary Progress towards goals / Updated goals: 
See poc PLAN [x]  Upgrade activities as tolerated     [x]  Continue plan of care 
[]  Update interventions per flow sheet      
[]  Discharge due to:_ 
[]  Other:_ Car Taylor, PT 10/5/2018  9:07 AM 
 
No future appointments.

## 2018-10-09 ENCOUNTER — HOSPITAL ENCOUNTER (OUTPATIENT)
Dept: PHYSICAL THERAPY | Age: 83
Discharge: HOME | End: 2018-10-09
Payer: MEDICARE

## 2018-10-09 PROCEDURE — 97110 THERAPEUTIC EXERCISES: CPT

## 2018-10-09 NOTE — PROGRESS NOTES
PT DAILY TREATMENT NOTE 10-18 Patient Name: Caesar Lr Date:10/9/2018 : 1935 [x]  Patient  Verified Payor: [place] MEDICARE / Plan: Isaías Huff / Product Type: Medicare / In time: 4:00 Out time:4:35 Total Treatment Time (min): 35 Visit #: 2 of 12 Medicare/BCBS Only Total Timed Codes (min):  35 1:1 Treatment Time:  35 Treatment Area: Gait disturbance [R26.9] SUBJECTIVE Pain Level (0-10 scale): 3/10 Any medication changes, allergies to medications, adverse drug reactions, diagnosis change, or new procedure performed?: [x] No    [] Yes (see summary sheet for update) Subjective functional status/changes:   [] No changes reported Pt reports generalized stiffness especially in his left hip. He notes his left knee is bending and moving better since the cortisone shot. He is doing his exercises at home and using heat to help limber himself up. OBJECTIVE 35 min Therapeutic Exercise:  [x] See flow sheet :  
Rationale: increase ROM and increase strength to improve the patients ability to ambulate with decreased stiffness With 
 [] TE 
 [] TA 
 [] neuro 
 [] other: Patient Education: [x] Review HEP [] Progressed/Changed HEP based on:  
[] positioning   [] body mechanics   [] transfers   [] heat/ice application   
[] other:   
 
Other Objective/Functional Measures:  
Initiated exercises with cues for form Educated on use of heat for stiffness; gave pt trial of hollis as well for left hip Decreased left hip mobility noted with flexion No increased pain with exercises Pain Level (0-10 scale) post treatment: 0/10 ASSESSMENT/Changes in Function: Initiated exercise program per POC towards established goals. Pt compliant with HEP for independent management of stiffness/pain. He has decreased left hip flexibility and mobility due to arthritic changes causing antalgic gait.  Will work to improve mobility through use of exercise and heat for improved ease of ambulation and transfers. Patient will continue to benefit from skilled PT services to modify and progress therapeutic interventions, address functional mobility deficits, address ROM deficits, address strength deficits, analyze and address soft tissue restrictions, analyze and cue movement patterns, analyze and modify body mechanics/ergonomics, assess and modify postural abnormalities, address imbalance/dizziness and instruct in home and community integration to attain remaining goals. Progress towards goals / Updated goals: 
Short Term Goals: To be accomplished in 1 weeks: 1. Pt will report compliance and independence to [facility] to help the pt manage stiffness and improve flexibility.        
Long Term Goals: To be accomplished in 6 weeks: 1. Pt will increase FOTO score by 11 points to improve ability to perform ADLs. 2. Pt will improve TUG score to 30 sec or less with SPC to decrease the pt's fall risk. 3. Pt will increase MMT left hip flex to 3+/5, left knee flex to 4/5 to improve ability to tolerate ambulation in the community. 4. Pt will report >70% improvement to return to community ambulation safely. PLAN [x]  Upgrade activities as tolerated     [x]  Continue plan of care 
[]  Update interventions per flow sheet      
[]  Discharge due to:_ 
[]  Other:_ Charles Pineda PTA 10/9/2018  3:45 PM 
 
Future Appointments Date Time Provider Noel Harp 10/9/2018 4:00 PM Charles Pineda PTA [facility]  
10/17/2018 10:30 AM Margie Allen PTA [facility]  
10/19/2018 9:00 AM Charles Pineda PTA [facility]  
10/23/2018 11:00 AM Inés Smoke, PTA [facility]  
10/26/2018 12:30 PM Charles Pineda PTA [facility]  
10/30/2018 9:00 AM Inés Smoke, PTA [facility]  
11/2/2018 9:00 AM Inés Smoke, PTA [facility]

## 2018-10-17 ENCOUNTER — HOSPITAL ENCOUNTER (OUTPATIENT)
Dept: PHYSICAL THERAPY | Age: 83
Discharge: HOME | End: 2018-10-17
Payer: MEDICARE

## 2018-10-17 PROCEDURE — 97110 THERAPEUTIC EXERCISES: CPT

## 2018-10-17 NOTE — PROGRESS NOTES
PT DAILY TREATMENT NOTE 10-18 Patient Name: Collette Rimes Date:10/17/2018 : 1935 [x]  Patient  Verified Payor: [place] MEDICARE / Plan: Isaías Huff / Product Type: Medicare / In time:10:34  Out time:11:13 Total Treatment Time (min): 39 Visit #: 4 of 12 Medicare/BCBS Only Total Timed Codes (min):  39 1:1 Treatment Time:  30 Treatment Area: Gait disturbance [R26.9] SUBJECTIVE Pain Level (0-10 scale): 5/10 Any medication changes, allergies to medications, adverse drug reactions, diagnosis change, or new procedure performed?: [x] No    [] Yes (see summary sheet for update) Subjective functional status/changes:   [] No changes reported \"I've been feeling pretty good, just stiff\". OBJECTIVE 39 min Therapeutic Exercise:  [x] See flow sheet :  
Rationale: increase ROM and increase strength to improve the patients ability to perform ADL's without pain. With 
 [x] TE 
 [] TA 
 [] neuro 
 [] other: Patient Education: [x] Review HEP [] Progressed/Changed HEP based on:  
[] positioning   [] body mechanics   [] transfers   [] heat/ice application   
[] other:   
 
Other Objective/Functional Measures: Pt extremely stiff, had to complete some stretches manually. Pain Level (0-10 scale) post treatment: 2/10 ASSESSMENT/Changes in Function: Pt progressed TE's evidenced by sit to stand repetitions and hip 3 ways. Pt was extremely stiff and hamstrings had to be stretched manually. At the conclusion of the session the pt was very happy stating \"I feel great\". Pt's pain decreased by the end of the session. Patient will continue to benefit from skilled PT services to modify and progress therapeutic interventions, address functional mobility deficits, address ROM deficits, address strength deficits, analyze and address soft tissue restrictions and analyze and cue movement patterns to attain remaining goals. [x]  See Plan of Care []  See progress note/recertification 
[]  See Discharge Summary Progress towards goals / Updated goals: 
Short Term Goals: To be accomplished in 1 weeks: 1. Pt will report compliance and independence to HEP to help the pt manage stiffness and improve flexibility.        
Long Term Goals: To be accomplished in 6 weeks: 1. Pt will increase FOTO score by 11 points to improve ability to perform ADLs. 2. Pt will improve TUG score to 30 sec or less with SPC to decrease the pt's fall risk. 3. Pt will increase MMT left hip flex to 3+/5, left knee flex to 4/5 to improve ability to tolerate ambulation in the community. 4. Pt will report >70% improvement to return to community ambulation safely.   
  
 
 
 
PLAN [x]  Upgrade activities as tolerated     [x]  Continue plan of care [x]  Update interventions per flow sheet      
[]  Discharge due to:_ 
[]  Other:_ Gabriele Dean, ANJU 10/17/2018  12:54 PM 
 
Future Appointments Date Time Provider Noel Harp 10/19/2018  9:00 AM Ady Hammond PTA [facility]  
10/23/2018 11:00 AM Zoila Gomez PTA [facility]  
10/26/2018 12:30 PM Ady Hammond PTA [facility]  
10/30/2018  9:00 AM Zoila Gomez PTA [facility]  
11/2/2018  9:00 AM Zoila Gomez PTA [facility]

## 2018-10-18 NOTE — PROGRESS NOTES
[facility] [address] [facility] 
[phone] fax Physical Therapy Discharge Summary Patient name: Miky Espinoza Start of Care:  18 Referral source: Bertha Angelo MD : 1935 Medical/Treatment Diagnosis: Pain in left leg [M79.605] Unspecified lack of coordination [R27.9] Onset Date:initial 7 years ago per pt report, per [facility] care 2 months ago           
  
Prior Hospitalization: see medical history Provider#: [id number] Medications: Verified on Patient Summary List   
Comorbidities: hx CAD per [facility]   
Prior Level of Function: States he lives with his wife and he is retired. Reports having progressively worsening pain in his left LE over the past several years. Visits from Start of Care: 8    Missed Visits: 4 Reporting Period : 18 to 18 Summary of Care: 
Goal: Pt will increase MMT left hip flex to 3+/5, left knee flex to 4/5 to improve ability to tolerate ambulation in the community. Left hip flexion 3/5, left knee flex 3+/5, Status at last note/certification: decreased strength Status at discharge: not met Goal: Pt will increase FOTO score to 61 points to improve ability to perform ADLs. Status at last note/certification: 47 points Status at discharge: not met Goal: Pt will perform sit to stand independently and with min to no cueing for safety. Status at last note/certification: unable Status at discharge: not met Goal: Pt will improve TUG score to 30 sec or less with FWW to decrease the pt's fall risk. Status at last note/certification: increased time Status at discharge: not met Pt. Did not return to PT after last progress note and will be D/C secondary to too many missed appointments. Per last progress note \"Pt continues to have balance deficits and LLE weakness proximal>distal. Added clam shells and lateral stepping to increase hip strength.  Pt with + left hip circumduction with step ups secondary to hip flexor weakness and knee flexion restrictions. \" 
 
ASSESSMENT/RECOMMENDATIONS: 
[x]Discontinue therapy: []Patient has reached or is progressing toward set goals [x]Patient is non-compliant or has abdicated 
    []Due to lack of appreciable progress towards set goals Darius Castillo, PT 10/18/2018 1:38 PM

## 2018-10-19 ENCOUNTER — HOSPITAL ENCOUNTER (OUTPATIENT)
Dept: PHYSICAL THERAPY | Age: 83
Discharge: HOME | End: 2018-10-19
Payer: MEDICARE

## 2018-10-19 PROCEDURE — 97110 THERAPEUTIC EXERCISES: CPT

## 2018-10-19 NOTE — PROGRESS NOTES
PT DAILY TREATMENT NOTE 10-18 Patient Name: Kavita De Los Santos Date:10/19/2018 : 1935 [x]  Patient  Verified Payor: [place] MEDICARE / Plan: sIaías Huff / Product Type: Medicare / In time:9:00  Out time: 9:34 Total Treatment Time (min): 34 Visit #: 5 of 12 Medicare/BCBS Only Total Timed Codes (min):  34 1:1 Treatment Time:  34 Treatment Area: Gait disturbance [R26.9] SUBJECTIVE Pain Level (0-10 scale): 10/10 Any medication changes, allergies to medications, adverse drug reactions, diagnosis change, or new procedure performed?: [x] No    [] Yes (see summary sheet for update) Subjective functional status/changes:   [] No changes reported Pt reports increased stiffness today due to the cold weather. He has a stepper type exercise machine at home that he uses twice a day. He notes the back of his legs feel really tight and he has a hard time lifting his left leg. Pt reports only using SPC in the community not at home. OBJECTIVE 34 min Therapeutic Exercise:  [x] See flow sheet :  
Rationale: increase ROM, increase strength, improve coordination, improve balance and increase proprioception to improve the patients ability to ambulate with decreased pain/stiffness With 
 [] TE 
 [] TA 
 [] neuro 
 [] other: Patient Education: [x] Review HEP [] Progressed/Changed HEP based on:  
[] positioning   [] body mechanics   [] transfers   [] heat/ice application   
[] other:   
 
Other Objective/Functional Measures:  
Challenged with hip flexor weakness on the left during step taps; able to perform with 1 UE Cues with hip x 3 to not use momentum for the exercise Decreased pain/stiffness with stretching Assisted manual with HS and SKTC stretches Cues to sit back with mini squats to prevent anterior knee translation Pain Level (0-10 scale) post treatment: 2/10 ASSESSMENT/Changes in Function: Pt progressing in regards to decreased pain/stiffness with exercises. He continues to have slow rae due to stiffness. He does have continued left hip weakness that needs further strengthening to assist with ambulation and transfers. He isn't using the [facility] at home just for community ambulation as a safety measure. Patient will continue to benefit from skilled PT services to modify and progress therapeutic interventions, address functional mobility deficits, address ROM deficits, address strength deficits, analyze and address soft tissue restrictions, analyze and cue movement patterns, analyze and modify body mechanics/ergonomics, assess and modify postural abnormalities, address imbalance/dizziness and instruct in home and community integration to attain remaining goals. Progress towards goals / Updated goals: 
Short Term Goals: To be accomplished in 1 weeks: 1. Pt will report compliance and independence to [facility] to help the pt manage stiffness and improve flexibility.  Met (10/19/18)     
Long Term Goals: To be accomplished in 6 weeks: 1. Pt will increase FOTO score by 11 points to improve ability to perform ADLs. 2. Pt will improve TUG score to 30 sec or less with SPC to decrease the pt's fall risk. 3. Pt will increase MMT left hip flex to 3+/5, left knee flex to 4/5 to improve ability to tolerate ambulation in the community. 4. Pt will report >70% improvement to return to community ambulation safely.   
 
 
PLAN [x]  Upgrade activities as tolerated     [x]  Continue plan of care 
[]  Update interventions per flow sheet      
[]  Discharge due to:_ 
[]  Other:_ Viviane Vaughn PTA 10/19/2018  8:28 AM 
 
Future Appointments Date Time Provider Noel Harp 10/19/2018  9:00 AM Mack Giang PTA [facility] [address]  
10/23/2018 11:00 AM John Philippe PTA [facility] [address]  
10/26/2018 12:30 PM Mack Giang PTA [facility] [address]  
10/30/2018  9:00 AM John Philippe PTA [facility] [address]  
 11/2/2018  9:00 AM John Philippe PTA [facility]

## 2018-10-23 ENCOUNTER — HOSPITAL ENCOUNTER (OUTPATIENT)
Dept: PHYSICAL THERAPY | Age: 83
Discharge: HOME | End: 2018-10-23
Payer: MEDICARE

## 2018-10-23 PROCEDURE — 97110 THERAPEUTIC EXERCISES: CPT

## 2018-10-26 ENCOUNTER — HOSPITAL ENCOUNTER (OUTPATIENT)
Dept: PHYSICAL THERAPY | Age: 83
Discharge: HOME | End: 2018-10-26
Payer: MEDICARE

## 2018-10-26 PROCEDURE — 97140 MANUAL THERAPY 1/> REGIONS: CPT

## 2018-10-26 PROCEDURE — 97110 THERAPEUTIC EXERCISES: CPT

## 2018-10-26 NOTE — PROGRESS NOTES
PT DAILY TREATMENT NOTE 10-18 Patient Name: Annabella Paniagua Date:10/26/2018 : 1935 [x]  Patient  Verified Payor: [place] MED[place]ARE / Plan: Isaías Huff / Product Type: Medicare / In time: 12:32 Out time: 1:00 Total Treatment Time (min): 28 Visit #: 6 of 12 Medicare/BCBS Only Total Timed Codes (min):  28 1:1 Treatment Time:  28 Treatment Area: Gait disturbance [R26.9] SUBJECTIVE Pain Level (0-10 scale): 5/10 Any medication changes, allergies to medications, adverse drug reactions, diagnosis change, or new procedure performed?: [x] No    [] Yes (see summary sheet for update) Subjective functional status/changes:   [] No changes reported Pt reports pinpoint pain in the left buttocks/hip (points to SI region). He states that is where it hurts a lot. OBJECTIVE 18 min Therapeutic Exercise:  [x] See flow sheet :  
Rationale: increase ROM, increase strength, improve coordination, improve balance and increase proprioception to improve the patients ability to ambulate with decreased pain 10 min Manual Therapy:  Pelvic alignment check; gentle leg pull x 10 for left upslip. MET for right AI; shotgun technique Isometric right hip IR and isometric left hip ER contraction to correct sacrum Rationale: decrease pain, increase ROM and increase tissue extensibility to decrease pain with ambulation With 
 [] TE 
 [] TA 
 [] neuro 
 [] other: Patient Education: [x] Review HEP [] Progressed/Changed HEP based on:  
[] positioning   [] body mechanics   [] transfers   [] heat/ice application   
[] other:   
 
Other Objective/Functional Measures: No pain in left SI post manual correction and pt very pleased Ambulates with decreased left knee flexion No increased pain with exercises Assistance for LE stretching Slow progression with position changes due to stiffness 1 UE assist for step taps Pain Level (0-10 scale) post treatment: 2/10 ASSESSMENT/Changes in Function: Pt making slow progression with fluctuations of pain. He did have a left upslip which was likely contributing to his left SI pain. He ambulates with decreased left knee flexion which does cause hip compensations. Will work to restore strength and mobility in the left LE for ease of ambulation and decreased fall risk. Patient will continue to benefit from skilled PT services to modify and progress therapeutic interventions, address functional mobility deficits, address ROM deficits, address strength deficits, analyze and address soft tissue restrictions, analyze and cue movement patterns, analyze and modify body mechanics/ergonomics, assess and modify postural abnormalities, address imbalance/dizziness and instruct in home and community integration to attain remaining goals. Progress towards goals / Updated goals: 
Short Term Goals: To be accomplished in 1 weeks: 1. Pt will report compliance and independence to [facility] to help the pt manage stiffness and improve flexibility.  Met (10/19/18)     
Long Term Goals: To be accomplished in 6 weeks: 1. Pt will increase FOTO score by 11 points to improve ability to perform ADLs. 2. Pt will improve TUG score to 30 sec or less with SPC to decrease the pt's fall risk. 3. Pt will increase MMT left hip flex to 3+/5, left knee flex to 4/5 to improve ability to tolerate ambulation in the community. 4. Pt will report >70% improvement to return to community ambulation safely.   
  
PLAN [x]  Upgrade activities as tolerated     [x]  Continue plan of care 
[]  Update interventions per flow sheet      
[]  Discharge due to:_ 
[]  Other:_ Melissa Mercado PTA 10/26/2018  1:30 PM 
 
Future Appointments Date Time Provider Noel Harp 10/30/2018  9:00 AM Samir Shine PTA [facility]  
11/2/2018  9:00 AM Samir Shine PTA [facility]

## 2018-10-30 ENCOUNTER — HOSPITAL ENCOUNTER (OUTPATIENT)
Dept: PHYSICAL THERAPY | Age: 83
Discharge: HOME | End: 2018-10-30
Payer: MEDICARE

## 2018-10-30 PROCEDURE — 97110 THERAPEUTIC EXERCISES: CPT

## 2018-10-30 NOTE — PROGRESS NOTES
PT DAILY TREATMENT NOTE 10-18 Patient Name: Caesar Lr Date:10/30/2018 : 1935 [x]  Patient  Verified Payor: [place] MEDICARE / Plan: Isaías Mills Refugio / Product Type: Medicare / In time:900  Out time:934 Total Treatment Time (min): 34 Visit #: 7 of 12 Medicare/BCBS Only Total Timed Codes (min):  34 1:1 Treatment Time:  23 Treatment Area: Gait disturbance [R26.9] SUBJECTIVE Pain Level (0-10 scale): 5/10 Any medication changes, allergies to medications, adverse drug reactions, diagnosis change, or new procedure performed?: [x] No    [] Yes (see summary sheet for update) Subjective functional status/changes:   [] No changes reported Pt stated that he is about the same OBJECTIVE 34 min Therapeutic Exercise:  [x] See flow sheet :  
Rationale: increase ROM and increase strength to improve the patients ability to increase ease with ADLs With 
 [x] TE 
 [] TA 
 [] neuro 
 [] other: Patient Education: [x] Review HEP [] Progressed/Changed HEP based on:  
[] positioning   [] body mechanics   [] transfers   [] heat/ice application   
[] other:   
 
Other Objective/Functional Measures:  
Cont with decreased flexibility in B HS Had no difficulty with standing exercises Needed cueing to slow down with LTR Pain Level (0-10 scale) post treatment: 0/10 ASSESSMENT/Changes in Function: Pt cont to slowly progress toward goals. Pt cont with decreased left strength. Cont to ambulate with SPC. Pt cont to have difficulty with sit to stand transfers. Patient will continue to benefit from skilled PT services to modify and progress therapeutic interventions, address functional mobility deficits, address ROM deficits and address strength deficits to attain remaining goals. [x]  See Plan of Care 
[]  See progress note/recertification 
[]  See Discharge Summary Progress towards goals / Updated goals: 
Short Term Goals: To be accomplished in 1 weeks: 1. Pt will report compliance and independence to HEP to help the pt manage stiffness and improve flexibility.  Met (10/19/18)     
Long Term Goals: To be accomplished in 6 weeks: 1. Pt will increase FOTO score by 11 points to improve ability to perform ADLs. 2. Pt will improve TUG score to 30 sec or less with SPC to decrease the pt's fall risk. 3. Pt will increase MMT left hip flex to 3+/5, left knee flex to 4/5 to improve ability to tolerate ambulation in the community. 4. Pt will report >70% improvement to return to community ambulation safely. PLAN 
[]  Upgrade activities as tolerated     [x]  Continue plan of care 
[]  Update interventions per flow sheet      
[]  Discharge due to:_ 
[]  Other:_ Inés Posey, PTA 10/30/2018  9:07 AM 
 
Future Appointments Date Time Provider Neol Harp 11/2/2018  9:00 AM Eric Tapia PTA [facility]

## 2018-11-02 ENCOUNTER — APPOINTMENT (OUTPATIENT)
Dept: PHYSICAL THERAPY | Age: 83
End: 2018-11-02
Payer: MEDICARE

## 2018-11-02 ENCOUNTER — HOSPITAL ENCOUNTER (OUTPATIENT)
Dept: PHYSICAL THERAPY | Age: 83
Discharge: HOME | End: 2018-11-02
Payer: MEDICARE

## 2018-11-06 ENCOUNTER — HOSPITAL ENCOUNTER (OUTPATIENT)
Dept: PHYSICAL THERAPY | Age: 83
Discharge: HOME | End: 2018-11-06
Payer: MEDICARE

## 2018-11-06 PROCEDURE — 97110 THERAPEUTIC EXERCISES: CPT

## 2018-11-06 PROCEDURE — 97530 THERAPEUTIC ACTIVITIES: CPT

## 2018-11-06 NOTE — PROGRESS NOTES
PT DAILY TREATMENT NOTE 10-18 Patient Name: Kacey Hooper Date:2018 : 1935 [x]  Patient  Verified Payor: [place] MEDICARE / Plan: Isaías Huff / Product Type: Medicare / In time:10:34  Out time:11:12 Total Treatment Time (min): 38 Visit #: 8 of 12 Medicare/BCBS Only Total Timed Codes (min):  38 1:1 Treatment Time:  25 Treatment Area: Gait disturbance [R26.9] SUBJECTIVE Pain Level (0-10 scale): 4/10 Any medication changes, allergies to medications, adverse drug reactions, diagnosis change, or new procedure performed?: [x] No    [] Yes (see summary sheet for update) Subjective functional status/changes:   [] No changes reported Pt reports he doesn't have that sharp pain in his left hip anymore. He notes 75% improvement since starting therapy and is getting up better from chairs. He wants to continue working on the strength of his left leg so he can walk better and get in/out of the car easier. OBJECTIVE 28 min Therapeutic Exercise:  [x] See flow sheet :  
Rationale: increase ROM, increase strength, improve coordination, improve balance and increase proprioception to improve the patients ability to ambulate with decreased stiffness 10 min Therapeutic Activity:  [x]  See flow sheet :  
Rationale: increase ROM, increase strength, improve coordination, improve balance and increase proprioception  to improve the patients ability to ambulate with decreased stiffness or fall risk With 
 [] TE 
 [] TA 
 [] neuro 
 [] other: Patient Education: [x] Review HEP [] Progressed/Changed HEP based on:  
[] positioning   [] body mechanics   [] transfers   [] heat/ice application   
[] other:   
 
Other Objective/Functional Measures: FOTO: 62 TUG: Trial 1: 38 seconds Trial 2: 27 seconds Trial 3: 29 seconds Average: 31.3 seconds Left hip flexion MMT: 3+/5 Left knee flexion MMT: 4-/5 
% improvement: 75% Observed car transfer; pt with increase posterior lean in order to lift left LE in car; no LOB but appears very unsafe Pain Level (0-10 scale) post treatment: 0/10 ASSESSMENT/Changes in Function: Pt progressing well in therapy meeting 3/5 goals in 8 visits. He has improving left LE strength with reduction of left hip pain. He has 75% improvement since [facility] with improvement in FOTO score. He would continue to benefit from further left LE strengthening to improve ease/safety with ambulation and car transfers. Patient will continue to benefit from skilled PT services to modify and progress therapeutic interventions, address functional mobility deficits, address ROM deficits, address strength deficits, analyze and address soft tissue restrictions, analyze and cue movement patterns, analyze and modify body mechanics/ergonomics, assess and modify postural abnormalities, address imbalance/dizziness and instruct in home and community integration to attain remaining goals. Progress towards goals / Updated goals: 
Short Term Goals: To be accomplished in 1 weeks: 1. Pt will report compliance and independence to [facility] to help the pt manage stiffness and improve flexibility.  Met (10/19/18)     
Long Term Goals: To be accomplished in 6 weeks: 1. Pt will increase FOTO score by 11 points to improve ability to perform ADLs. Met 2. Pt will improve TUG score to 30 sec or less with SPC to decrease the pt's fall risk. Not met 31.3 second average 3. Pt will increase MMT left hip flex to 3+/5, left knee flex to 4/5 to improve ability to tolerate ambulation in the community. Partially met; flexion 3+/5; knee flexion 4-/5 4. Pt will report >70% improvement to return to community ambulation safely. Met PLAN [x]  Upgrade activities as tolerated     [x]  Continue plan of care 
[]  Update interventions per flow sheet      
[]  Discharge due to:_ 
[]  Other:_ Steve Bustos, ANJU 11/6/2018  9:03 AM 
 
 Future Appointments Date Time Provider Noel Katiuska 11/6/2018 10:30 AM Edu Santos PTA [facility]  
11/9/2018  3:00 PM Edu Santos PTA [facility]  
11/13/2018  1:00 PM Edu Santos PTA [facility]  
11/16/2018 10:00 AM Leticia Mckeon, PT [facility]  
11/20/2018  9:00 AM Leticia Mckeon, PT [facility]  
11/26/2018  9:00 AM Edu Santos PTA [facility]  
11/28/2018  9:00 AM Edu Santos PTA [facility]

## 2018-11-08 NOTE — PROGRESS NOTES
[facility] [address] [facility], [place] 
[phone] fax Continued Plan of Care/ Re-certification for Physical Therapy Services Patient name: Edith Jacob Start of Care: 2018 Referral source: Albert Quiles MD : 1935 Medical Diagnosis: Repeated falls [R29.6] Unspecified abnormalities of gait and mobility [R26.9] Bilateral primary osteoarthritis of knee [M17.0] 
  Onset Date: initial 7 years ago per pt report, per [facility] care 2 months ago Treatment Diagnosis: left knee/LE pain, impaired balance and gait Prior Hospitalization: see medical history Provider#: [id number] Medications: Verified on Patient summary List  
 Comorbidities: hx CAD per [facility] Prior Level of Function: States he lives with his wife and he is retired. Reports having progressively worsening pain in his left LE over the past several years. Visits from Start of Care: 8    Missed Visits: 1 The Plan of Care and following information is based on the patient's current status: 
Goal:. Pt will report compliance and independence to [facility] to help the pt manage stiffness and improve flexibility. Status at last note/certification: 
Current Status: met Goal:. Pt will increase FOTO score by 11 points to improve ability to perform ADLs Status at last note/certification: 
Current Status: met Goal:Pt will improve TUG score to 30 sec or less with SPC to decrease the pt's fall risk. Status at last note/certification: 
Current Status: not met Goal:Pt will increase MMT left hip flex to 3+/5, left knee flex to 4/5 to improve ability to tolerate ambulation in the community. Status at last note/certification: 
Current Status: Progressing Key functional changes:   
TUG: Trial 1: 38 seconds Trial 2: 27 seconds Trial 3: 29 seconds Average: 31.3 seconds Left hip flexion MMT: 3+/5 Left knee flexion MMT: 4-/5 
% improvement: 75% Observed car transfer; pt with increase posterior lean in order to lift left LE in car; no LOB but appears very unsafe Problems/ barriers to goal attainment: NONE. Problem List: pain affecting function, decrease ROM, decrease strength, impaired gait/ balance, decrease ADL/ functional abilitiies, decrease activity tolerance, decrease flexibility/ joint mobility and decrease transfer abilities Treatment Plan: Therapeutic exercise, Therapeutic activities, Neuromuscular re-education, Physical agent/modality, Gait/balance training, Manual therapy, Aquatic therapy, Patient education, Self Care training, Functional mobility training, Home safety training and Stair training Patient Goal (s) has been updated and includes: To get stronger. Goals for this certification period to be accomplished in 4 weeks: 1. Pt will increase MMT left hip flex to 3+/5, left knee flex to 4/5 to improve ability to tolerate ambulation in the community. 2. Pt will improve TUG score to 30 sec or less with SPC to decrease the pt's fall risk. 3. Pt will be observed performing transfers to be able to safely transfer into his car without fall risk. Frequency / Duration: Patient to be seen 2 times per week for 4 weeks: 
Assessment / Recommendations: Pt progressing well in therapy. . He has improving left LE strength with reduction of left hip pain. He has 75% improvement since [facility] with improvement in FOTO score. He would continue to benefit from further left LE strengthening to improve ease/safety with ambulation and car transfers. G-Codes (GP) Mobility  Current  CK= 40-59%   Goal  CK= 40-59% The severity rating is based on clinical judgment and the FOTO score. Certification Period: 11/8/18-12/7/18 Johnn Soulier, PT 11/8/2018 1:46 PM 
 
________________________________________________________________________ I certify that the above Therapy Services are being furnished while the patient is under my care. I agree with the treatment plan and certify that this therapy is necessary. [] I have read the above and request that my patient continue as recommended. [] I have read the above report and request that my patient continue therapy with the following changes/special instructions: _______________________________________ [] I have read the above report and request that my patient be discharged from therapy [de-identified] Signature:____________Date:_________TIME:________ 
 
[employer], Date and Time must be completed for valid certification ** Please sign and return to [address] [facility] 
[phone] fax

## 2018-11-09 ENCOUNTER — HOSPITAL ENCOUNTER (OUTPATIENT)
Dept: PHYSICAL THERAPY | Age: 83
Discharge: HOME | End: 2018-11-09
Payer: MEDICARE

## 2018-11-09 PROCEDURE — 97110 THERAPEUTIC EXERCISES: CPT

## 2018-11-09 PROCEDURE — 97116 GAIT TRAINING THERAPY: CPT

## 2018-11-09 NOTE — PROGRESS NOTES
PT DAILY TREATMENT NOTE 10-18 Patient Name: Alecia Cabrera Date:2018 : 1935 [x]  Patient  Verified Payor: [place] MEDICARE / Plan: Isaías Huff / Product Type: Medicare / In time: 3:04  Out time: 3:36 Total Treatment Time (min): 32 Visit #: 9 of 12 Medicare/BCBS Only Total Timed Codes (min):  32 1:1 Treatment Time:  32 Treatment Area: Gait disturbance [R26.9] SUBJECTIVE Pain Level (0-10 scale): 3-4/10 Any medication changes, allergies to medications, adverse drug reactions, diagnosis change, or new procedure performed?: [x] No    [] Yes (see summary sheet for update) Subjective functional status/changes:   [] No changes reported Pt. Reports he is doing pretty good today. OBJECTIVE 24 min Therapeutic Exercise:  [x] See flow sheet :  
Rationale: increase ROM, increase strength and improve balance to improve the patients ability to increase ease of ADLs 8 min Gait Training:  _10__ feet x4 with _cane__ device on level surfaces with _SBA__ level of assist with mirror Rationale: to improve knee flexion during ambulation With 
 [x] TE 
 [] TA 
 [] neuro 
 [] other: Patient Education: [x] Review HEP [] Progressed/Changed HEP based on:  
[] positioning   [] body mechanics   [] transfers   [] heat/ice application   
[] other:   
 
Other Objective/Functional Measures:  
TUG test: 31, 21, 35 seconds MMT left hip flex: 4-/5  left knee flex: 4-/5 Pt. Was provided with band for HS curls for HEP. Pain Level (0-10 scale) post treatment: 0/10 ASSESSMENT/Changes in Function:  Pt. Is progressing well with physical therapy. He has improving TUG test scores. He continues to have decreased left hip strength Patient will continue to benefit from skilled PT services to modify and progress therapeutic interventions, address functional mobility deficits, address ROM deficits, address strength deficits, analyze and address soft tissue restrictions, analyze and cue movement patterns, analyze and modify body mechanics/ergonomics and assess and modify postural abnormalities to attain remaining goals. Progress towards goals / Updated goals: 
Short Term Goals: To be accomplished in 1 weeks: 1. Pt will report compliance and independence to HEP to help the pt manage stiffness and improve flexibility.  Met (10/19/18)     
Long Term Goals: To be accomplished in 6 weeks: 1. Pt will increase FOTO score by 11 points to improve ability to perform ADLs. Met 2. Pt will improve TUG score to 30 sec or less with SPC to decrease the pt's fall risk. Met: 21 seconds (11/9/19) 3. Pt will increase MMT left hip flex to 3+/5, left knee flex to 4/5 to improve ability to tolerate ambulation in the community. Not met: 4-/5 (11/9/18) 4. Pt will report >70% improvement to return to community ambulation safely. Met PLAN 
[]  Upgrade activities as tolerated     [x]  Continue plan of care 
[]  Update interventions per flow sheet      
[]  Discharge due to:_ 
[]  Other:_ Florian Desouza, PT 11/9/2018  3:10 PM 
 
Future Appointments Date Time Provider Noel Harp 11/13/2018  1:00 PM Luís Feliz PTA [facility]  
11/16/2018 10:00 AM Luís Feliz PTA [facility]  
11/20/2018  9:00 AM Melecio Candelaria PT [facility]  
11/26/2018  9:00 AM Luís Feliz PTA [facility]  
11/28/2018  9:00 AM Luís Feliz PTA [facility]

## 2018-11-13 ENCOUNTER — HOSPITAL ENCOUNTER (OUTPATIENT)
Dept: PHYSICAL THERAPY | Age: 83
Discharge: HOME | End: 2018-11-13
Payer: MEDICARE

## 2018-11-13 PROCEDURE — 97110 THERAPEUTIC EXERCISES: CPT

## 2018-11-13 NOTE — PROGRESS NOTES
PT DAILY TREATMENT NOTE 10-18 Patient Name: Dylan Mauricio Date:2018 : 1935 [x]  Patient  Verified Payor: [place] MEDICARE / Plan: Isaías Huff / Product Type: Medicare / In time:1:00  Out time:1:30 Total Treatment Time (min): 30 Visit #: 10 of 12 Medicare/BCBS Only Total Timed Codes (min):  30 1:1 Treatment Time:  30 Treatment Area: Gait disturbance [R26.9] SUBJECTIVE Pain Level (0-10 scale): 5/10 Any medication changes, allergies to medications, adverse drug reactions, diagnosis change, or new procedure performed?: [x] No    [] Yes (see summary sheet for update) Subjective functional status/changes:   [] No changes reported Pt reports an aching pain all the way down his left leg today. His wife (at the end of session) reported he fell the other day from his left leg giving out on him. Pt states hard time lifting his leg to place it in the car and hard time bending his knee when walking. OBJECTIVE 30 min Therapeutic Exercise:  [x] See flow sheet :  
Rationale: increase ROM, increase strength, improve coordination, improve balance and increase proprioception to improve the patients ability to improve ease of car transfers with decreased fall risk With 
 [] TE 
 [] TA 
 [] neuro 
 [] other: Patient Education: [x] Review HEP [] Progressed/Changed HEP based on:  
[] positioning   [] body mechanics   [] transfers   [] heat/ice application   
[] other:   
 
Other Objective/Functional Measures:  
Difficulty stepping into car due to high height of step >10 inches in height Decreased pain post stretching Will need more work on gait training and high step overs for carryover to gait and car transfers Pt unable to differentiate between nerve type versus muscle pain to rule out l/s causing leg symptoms. Decreased left knee flexion during gait Pain Level (0-10 scale) post treatment: 0/10 ASSESSMENT/Changes in Function: pt making slow steady progress towards goals. He continues with fluctuating left leg pain. He has difficulty with car transfers due to the height he must lift his left leg to step in the car. He has decreased left knee flexion during gait and locks his knee for stability which likely caused his recent fall. Will continue to progress strength and balance for ease of car transfers and to decrease fall risk with ambulation. Patient will continue to benefit from skilled PT services to modify and progress therapeutic interventions, address functional mobility deficits, address ROM deficits, address strength deficits, analyze and address soft tissue restrictions, analyze and cue movement patterns, analyze and modify body mechanics/ergonomics, assess and modify postural abnormalities, address imbalance/dizziness and instruct in home and community integration to attain remaining goals. Goals for this certification period to be accomplished in 4 weeks: 1. Pt will increase MMT left hip flex to 3+/5, left knee flex to 4/5 to improve ability to tolerate ambulation in the community. 2. Pt will improve TUG score to 30 sec or less with SPC to decrease the pt's fall risk. 3. Pt will be observed performing transfers to be able to safely transfer into his car without fall risk. PLAN [x]  Upgrade activities as tolerated     [x]  Continue plan of care 
[]  Update interventions per flow sheet      
[]  Discharge due to:_ 
[]  Other:_ Maira Collins PTA 11/13/2018  11:26 AM 
 
Future Appointments Date Time Provider Noel Harp 11/13/2018  1:00 PM Aaliyah Chirinos PTA [facility]  
11/16/2018 10:00 AM Aaliyah Chirinos PTA [facility]  
11/20/2018  9:00 AM Kenny Christopher, VERONICA [facility]  
11/26/2018  9:00 AM Aaliyah Chirinos PTA [facility]  
11/28/2018  9:00 AM Aaliyah Chirinos PTA [facility]

## 2018-11-16 ENCOUNTER — HOSPITAL ENCOUNTER (OUTPATIENT)
Dept: PHYSICAL THERAPY | Age: 83
Discharge: HOME | End: 2018-11-16
Payer: MEDICARE

## 2018-11-16 PROCEDURE — 97110 THERAPEUTIC EXERCISES: CPT

## 2018-11-16 NOTE — PROGRESS NOTES
PT DAILY TREATMENT NOTE 10-18 Patient Name: Trae Quinn Date:2018 : 1935 [x]  Patient  Verified Payor: [place] / Plan: Isaías Mills eric / Product Type: Medicare / In time:10:00  Out time:10:30 Total Treatment Time (min): 30 Visit #: 98 KG 45 Medicare/BCBS Only Total Timed Codes (min):  30 1:1 Treatment Time:  30 Treatment Area: Gait disturbance [R26.9] SUBJECTIVE Pain Level (0-10 scale): 4/10 Any medication changes, allergies to medications, adverse drug reactions, diagnosis change, or new procedure performed?: [x] No    [] Yes (see summary sheet for update) Subjective functional status/changes:   [] No changes reported Pt reports difficulty moving his left leg when he is walking. He is looking forward to his birthday cake next week (pineapple). He still is having a hard time lifting his leg into the car. OBJECTIVE 30 min Therapeutic Exercise:  [x] See flow sheet :  
Rationale: increase ROM, increase strength, improve coordination, improve balance and increase proprioception to improve the patients ability to transfer into the car and ambulate with improved gait pattern With 
 [] TE 
 [] TA 
 [] neuro 
 [] other: Patient Education: [x] Review HEP [] Progressed/Changed HEP based on:  
[] positioning   [] body mechanics   [] transfers   [] heat/ice application   
[] other:   
 
Other Objective/Functional Measures:  
Left hamstring 3-/5 Pt able to perform SLR Has difficulty with 8\" step up with the left Continues to struggle with car negotiation (lack of hamstring strength and high march; car floor board height is about 10\") Decreased knee flexion during swing phase causing foot drag and imbalance Educated to add AROM heel slides in bed to home program  
 
Pain Level (0-10 scale) post treatment: 0/10 ASSESSMENT/Changes in Function: Pt progressing in regards to decreased stiffness but continues to be fall risk due to decreased foot clearance during left swing phase of gait. He continues to struggle with car transfers due to high floor board and decreased hip flexion and knee flexion strength and mobility. Will continue progressing strength for safety with transfers and ambulation. Patient will continue to benefit from skilled PT services to modify and progress therapeutic interventions, address functional mobility deficits, address ROM deficits, address strength deficits, analyze and address soft tissue restrictions, analyze and cue movement patterns, analyze and modify body mechanics/ergonomics, assess and modify postural abnormalities, address imbalance/dizziness and instruct in home and community integration to attain remaining goals. Goals for this certification period to be accomplished in 4 weeks: 1. Pt will increase MMT left hip flex to 3+/5, left knee flex to 4/5 to improve ability to tolerate ambulation in the community. 2. Pt will improve TUG score to 30 sec or less with SPC to decrease the pt's fall risk. 3. Pt will be observed performing transfers to be able to safely transfer into his car without fall risk.  PLAN [x]  Upgrade activities as tolerated     [x]  Continue plan of care 
[]  Update interventions per flow sheet      
[]  Discharge due to:_ 
[]  Other:_ Edmund Palacios PTA 11/16/2018  11:08 AM 
 
Future Appointments Date Time Provider Noel Harp 11/20/2018  9:00 AM Agata Tsang PT [facility]  
11/26/2018  9:00 AM Rogelio Rubio PTA [facility]  
11/28/2018  9:00 AM Rogelio Rubio PTA [facility]

## 2018-11-20 ENCOUNTER — HOSPITAL ENCOUNTER (EMERGENCY)
Age: 83
Discharge: HOME | End: 2018-11-20
Attending: EMERGENCY MEDICINE
Payer: MEDICARE

## 2018-11-20 ENCOUNTER — APPOINTMENT (OUTPATIENT)
Dept: VASCULAR SURGERY | Age: 83
End: 2018-11-20
Attending: PHYSICIAN ASSISTANT
Payer: MEDICARE

## 2018-11-20 ENCOUNTER — APPOINTMENT (OUTPATIENT)
Dept: PHYSICAL THERAPY | Age: 83
End: 2018-11-20
Payer: MEDICARE

## 2018-11-20 VITALS
HEIGHT: 66 IN | TEMPERATURE: 99 F | BODY MASS INDEX: 28.93 KG/M2 | DIASTOLIC BLOOD PRESSURE: 63 MMHG | SYSTOLIC BLOOD PRESSURE: 176 MMHG | RESPIRATION RATE: 16 BRPM | OXYGEN SATURATION: 100 % | HEART RATE: 64 BPM | WEIGHT: 180 LBS

## 2018-11-20 DIAGNOSIS — L03.116 LEFT LEG CELLULITIS: Primary | ICD-10-CM

## 2018-11-20 DIAGNOSIS — M79.605 ACUTE LEG PAIN, LEFT: ICD-10-CM

## 2018-11-20 LAB
ALBUMIN SERPL-MCNC: 2.9 G/DL (ref 3.4–5)
ALBUMIN/GLOB SERPL: 0.6 {RATIO} (ref 0.8–1.7)
ALP SERPL-CCNC: 80 U/L (ref 45–117)
ALT SERPL-CCNC: 18 U/L (ref 16–61)
ANION GAP SERPL CALC-SCNC: 8 MMOL/L (ref 3–18)
APTT PPP: 29.1 SEC (ref 23–36.4)
AST SERPL-CCNC: 21 U/L (ref 15–37)
BASOPHILS # BLD: 0.1 K/UL (ref 0–0.1)
BASOPHILS NFR BLD: 1 % (ref 0–2)
BILIRUB SERPL-MCNC: 0.3 MG/DL (ref 0.2–1)
BUN SERPL-MCNC: 17 MG/DL (ref 7–18)
BUN/CREAT SERPL: 11 (ref 12–20)
CALCIUM SERPL-MCNC: 9 MG/DL (ref 8.5–10.1)
CHLORIDE SERPL-SCNC: 105 MMOL/L (ref 100–108)
CO2 SERPL-SCNC: 30 MMOL/L (ref 21–32)
CREAT SERPL-MCNC: 1.6 MG/DL (ref 0.6–1.3)
DIFFERENTIAL METHOD BLD: ABNORMAL
EOSINOPHIL # BLD: 0.5 K/UL (ref 0–0.4)
EOSINOPHIL NFR BLD: 7 % (ref 0–5)
ERYTHROCYTE [DISTWIDTH] IN BLOOD BY AUTOMATED COUNT: 12.5 % (ref 11.6–14.5)
GLOBULIN SER CALC-MCNC: 4.5 G/DL (ref 2–4)
GLUCOSE SERPL-MCNC: 125 MG/DL (ref 74–99)
HCT VFR BLD AUTO: 31.1 % (ref 36–48)
HGB BLD-MCNC: 10.1 G/DL (ref 13–16)
INR PPP: 1 (ref 0.8–1.2)
LYMPHOCYTES # BLD: 1.3 K/UL (ref 0.9–3.6)
LYMPHOCYTES NFR BLD: 17 % (ref 21–52)
MCH RBC QN AUTO: 28.1 PG (ref 24–34)
MCHC RBC AUTO-ENTMCNC: 32.5 G/DL (ref 31–37)
MCV RBC AUTO: 86.6 FL (ref 74–97)
MONOCYTES # BLD: 0.7 K/UL (ref 0.05–1.2)
MONOCYTES NFR BLD: 8 % (ref 3–10)
NEUTS SEG # BLD: 5.3 K/UL (ref 1.8–8)
NEUTS SEG NFR BLD: 67 % (ref 40–73)
PLATELET # BLD AUTO: 260 K/UL (ref 135–420)
PMV BLD AUTO: 10.8 FL (ref 9.2–11.8)
POTASSIUM SERPL-SCNC: 4.1 MMOL/L (ref 3.5–5.5)
PROT SERPL-MCNC: 7.4 G/DL (ref 6.4–8.2)
PROTHROMBIN TIME: 13.3 SEC (ref 11.5–15.2)
RBC # BLD AUTO: 3.59 M/UL (ref 4.7–5.5)
SODIUM SERPL-SCNC: 143 MMOL/L (ref 136–145)
WBC # BLD AUTO: 7.9 K/UL (ref 4.6–13.2)

## 2018-11-20 PROCEDURE — 85025 COMPLETE CBC W/AUTO DIFF WBC: CPT

## 2018-11-20 PROCEDURE — 85610 PROTHROMBIN TIME: CPT

## 2018-11-20 PROCEDURE — 93971 EXTREMITY STUDY: CPT

## 2018-11-20 PROCEDURE — 96374 THER/PROPH/DIAG INJ IV PUSH: CPT

## 2018-11-20 PROCEDURE — 74011250637 HC RX REV CODE- 250/637: Performed by: EMERGENCY MEDICINE

## 2018-11-20 PROCEDURE — 74011250636 HC RX REV CODE- 250/636: Performed by: EMERGENCY MEDICINE

## 2018-11-20 PROCEDURE — 85730 THROMBOPLASTIN TIME PARTIAL: CPT

## 2018-11-20 PROCEDURE — 80053 COMPREHEN METABOLIC PANEL: CPT

## 2018-11-20 PROCEDURE — 74011000250 HC RX REV CODE- 250: Performed by: EMERGENCY MEDICINE

## 2018-11-20 PROCEDURE — 99284 EMERGENCY DEPT VISIT MOD MDM: CPT

## 2018-11-20 RX ORDER — TRAMADOL HYDROCHLORIDE 50 MG/1
50 TABLET ORAL
Status: COMPLETED | OUTPATIENT
Start: 2018-11-20 | End: 2018-11-20

## 2018-11-20 RX ORDER — SULFAMETHOXAZOLE AND TRIMETHOPRIM 800; 160 MG/1; MG/1
1 TABLET ORAL 2 TIMES DAILY
Qty: 10 TAB | Refills: 0 | Status: SHIPPED | OUTPATIENT
Start: 2018-11-20 | End: 2018-11-25

## 2018-11-20 RX ORDER — CEPHALEXIN 500 MG/1
500 CAPSULE ORAL 4 TIMES DAILY
Qty: 28 CAP | Refills: 0 | Status: SHIPPED | OUTPATIENT
Start: 2018-11-20 | End: 2018-11-27

## 2018-11-20 RX ORDER — HYDROCODONE BITARTRATE AND ACETAMINOPHEN 5; 325 MG/1; MG/1
1 TABLET ORAL ONCE
Status: COMPLETED | OUTPATIENT
Start: 2018-11-20 | End: 2018-11-20

## 2018-11-20 RX ORDER — HYDROCODONE BITARTRATE AND ACETAMINOPHEN 5; 325 MG/1; MG/1
1 TABLET ORAL
Qty: 12 TAB | Refills: 0 | Status: SHIPPED | OUTPATIENT
Start: 2018-11-20 | End: 2020-10-03

## 2018-11-20 RX ADMIN — TRAMADOL HYDROCHLORIDE 50 MG: 50 TABLET, FILM COATED ORAL at 17:25

## 2018-11-20 RX ADMIN — HYDROCODONE BITARTRATE AND ACETAMINOPHEN 1 TABLET: 5; 325 TABLET ORAL at 14:21

## 2018-11-20 RX ADMIN — CEFTRIAXONE SODIUM 1 G: 1 INJECTION, POWDER, FOR SOLUTION INTRAMUSCULAR; INTRAVENOUS at 17:24

## 2018-11-20 NOTE — PROGRESS NOTES
conducted an initial consultation and Spiritual Assessment for Lisa Clarke, who is a 80 y. o.,male. Patients Primary Language is: [place]. According to the patients EMR Oriental orthodox Affiliation is: Orthodoxy. The reason the Patient came to the hospital is:  
Patient Active Problem List  
 Diagnosis Date Noted  CAD (coronary artery disease) 09/01/2016  
 HTN (hypertension) 09/01/2016  Troponin level elevated 09/01/2016  Traumatic rhabdomyolysis ([facility] [address] 08/29/2016 The  provided the following Interventions: 
Initiated a relationship of care and support. Explored issues of alan, belief, spirituality and Gnosticist/ritual needs while hospitalized. Listened empathically. Provided chaplaincy education. Provided information about Spiritual Care Services. Offered prayer and assurance of continued prayers on patient's behalf. Chart reviewed. The following outcomes where achieved: 
Patient shared limited information about both his medical narrative and spiritual journey/beliefs.  confirmed Patient's Oriental orthodox Affiliation. Patient processed feeling about current hospitalization. Patient expressed gratitude for 's visit. Assessment: 
Patient shared that his left leg was swollen and painful that prompted him to see his doctor and he was advised to come to the ER to be checked. Patient does not have any Gnosticist/cultural needs that will affect patients preferences in health care. There are no spiritual or Gnosticist issues which require intervention at this time. Plan: 
Chaplains will continue to follow and will provide pastoral care on an as needed/requested basis.  recommends bedside caregivers page  on duty if patient shows signs of acute spiritual or emotional distress. Chaplain Resident Tanna Valadez Spiritual Care  
[phone]

## 2018-11-20 NOTE — ED NOTES
Spoke with vascular tech after several attempts to contact by phone, she is aware of order and will come to ER next when she is finished with outpatient

## 2018-11-20 NOTE — DISCHARGE INSTRUCTIONS
Cellulitis: Care Instructions  Your Care Instructions    Cellulitis is a skin infection caused by bacteria, most often strep or staph. It often occurs after a break in the skin from a scrape, cut, bite, or puncture, or after a rash. Cellulitis may be treated without doing tests to find out what caused it. But your doctor may do tests, if needed, to look for a specific bacteria, like methicillin-resistant Staphylococcus aureus (MRSA). The doctor has checked you carefully, but problems can develop later. If you notice any problems or new symptoms, get medical treatment right away. Follow-up care is a key part of your treatment and safety. Be sure to make and go to all appointments, and call your doctor if you are having problems. It's also a good idea to know your test results and keep a list of the medicines you take. How can you care for yourself at home? · Take your antibiotics as directed. Do not stop taking them just because you feel better. You need to take the full course of antibiotics. · Prop up the infected area on pillows to reduce pain and swelling. Try to keep the area above the level of your heart as often as you can. · If your doctor told you how to care for your wound, follow your doctor's instructions. If you did not get instructions, follow this general advice:  ? Wash the wound with clean water 2 times a day. Don't use hydrogen peroxide or alcohol, which can slow healing. ? You may cover the wound with a thin layer of petroleum jelly, such as Vaseline, and a nonstick bandage. ? Apply more petroleum jelly and replace the bandage as needed. · Be safe with medicines. Take pain medicines exactly as directed. ? If the doctor gave you a prescription medicine for pain, take it as prescribed. ? If you are not taking a prescription pain medicine, ask your doctor if you can take an over-the-counter medicine.   To prevent cellulitis in the future  · Try to prevent cuts, scrapes, or other injuries to your skin. Cellulitis most often occurs where there is a break in the skin. · If you get a scrape, cut, mild burn, or bite, wash the wound with clean water as soon as you can to help avoid infection. Don't use hydrogen peroxide or alcohol, which can slow healing. · If you have swelling in your legs (edema), support stockings and good skin care may help prevent leg sores and cellulitis. · Take care of your feet, especially if you have diabetes or other conditions that increase the risk of infection. Wear shoes and socks. Do not go barefoot. If you have athlete's foot or other skin problems on your feet, talk to your doctor about how to treat them. When should you call for help? Call your doctor now or seek immediate medical care if:    · You have signs that your infection is getting worse, such as:  ? Increased pain, swelling, warmth, or redness. ? Red streaks leading from the area. ? Pus draining from the area. ? A fever.     · You get a rash.    Watch closely for changes in your health, and be sure to contact your doctor if:    · You do not get better as expected. Where can you learn more? Go to http://sourav-shannon.info/. Tommy Hughes in the search box to learn more about \"Cellulitis: Care Instructions. \"  Current as of: April 18, 2018  Content Version: 11.8  © 0078-4781 [employer]. Care instructions adapted under license by [employer] (which disclaims liability or warranty for this information). If you have questions about a medical condition or this instruction, always ask your healthcare professional. [place] [address] any warranty or liability for your use of this information. Leg Pain: Care Instructions  Your Care Instructions  Many things can cause leg pain. Too much exercise or overuse can cause a muscle cramp (or charley horse).  You can get leg cramps from not eating a balanced diet that has enough potassium, calcium, and other minerals. If you do not drink enough fluids or are taking certain medicines, you may develop leg cramps. Other causes of leg pain include injuries, blood flow problems, nerve damage, and twisted and enlarged veins (varicose veins). You can usually ease pain with self-care. Your doctor may recommend that you rest your leg and keep it elevated. Follow-up care is a key part of your treatment and safety. Be sure to make and go to all appointments, and call your doctor if you are having problems. It's also a good idea to know your test results and keep a list of the medicines you take. How can you care for yourself at home? · Take pain medicines exactly as directed. ? If the doctor gave you a prescription medicine for pain, take it as prescribed. ? If you are not taking a prescription pain medicine, ask your doctor if you can take an over-the-counter medicine. · Take any other medicines exactly as prescribed. Call your doctor if you think you are having a problem with your medicine. · Rest your leg while you have pain, and avoid standing for long periods of time. · Prop up your leg at or above the level of your heart when possible. · Make sure you are eating a balanced diet that is rich in calcium, potassium, and magnesium, especially if you are pregnant. · If directed by your doctor, put ice or a cold pack on the area for 10 to 20 minutes at a time. Put a thin cloth between the ice and your skin. · Your leg may be in a splint, a brace, or an elastic bandage, and you may have crutches to help you walk. Follow your doctor's directions about how long to wear supports and how to use the crutches. When should you call for help? Call 911 anytime you think you may need emergency care.  For example, call if:    · You have sudden chest pain and shortness of breath, or you cough up blood.     · Your leg is cool or pale or changes color.    Call your doctor now or seek immediate medical care if:    · You have increasing or severe pain.     · Your leg suddenly feels weak and you cannot move it.     · You have signs of a blood clot, such as:  ? Pain in your calf, back of the knee, thigh, or groin. ? Redness and swelling in your leg or groin.     · You have signs of infection, such as:  ? Increased pain, swelling, warmth, or redness. ? Red streaks leading from the sore area. ? Pus draining from a place on your leg. ? A fever.     · You cannot bear weight on your leg.    Watch closely for changes in your health, and be sure to contact your doctor if:    · You do not get better as expected. Where can you learn more? Go to http://sourav-shannon.info/. Enter R408 in the search box to learn more about \"Leg Pain: Care Instructions. \"  Current as of: November 20, 2017  Content Version: 11.8  © 7186-5906 [employer]. Care instructions adapted under license by [employer] (which disclaims liability or warranty for this information). If you have questions about a medical condition or this instruction, always ask your healthcare professional. [place] [address] any warranty or liability for your use of this information.

## 2018-11-20 NOTE — ED TRIAGE NOTES
Patient c/o left lower leg pain and swelling. His doctor told him to come here to check for blood clots.

## 2018-11-20 NOTE — ED PROVIDER NOTES
EMERGENCY DEPARTMENT HISTORY AND PHYSICAL EXAM 
 
1:58 PM 
 
 
Date: 11/20/2018 Patient Name: Kacey Hooper History of Presenting Illness Chief Complaint Patient presents with  Leg Pain History Provided By: Patient Chief Complaint: Leg Pain Duration: 2-3 Days Timing:  Constant Location: Left leg Quality: Swollen, Aching Severity: 10 out of 10 Modifying Factors: Nothing makes the Sx better or worse. Associated Symptoms: denies any other associated signs or symptoms Additional History (Context): Kacey Hooper is a 80 y.o. male with PMHx of HTN, carotid duplex who presents with constant aching and swollen left leg pain onset x 2-3 days ago. Pain level 10/10. Nothing makes the Sx better or worse. No Hx of blood clot. Denies cp, vomiting, fever. Denies any further complaints or symptoms at the moment. PCP: Vita Lux MD 
 
 
Past History Past Medical History: 
Past Medical History:  
Diagnosis Date  Cardiac echocardiogram 08/29/2016 EF 60-65%. No WMA. Mild LVH. Indeterminate diastolic fx. RVSP 35 mmHg. No significant valvular heart disease.  Cardiac nuclear imaging test, abnormal 08/29/2016 Intermediate risk. Previous inferior infarction w/very mild fabiana-infarct ischemia. Inferior hypk. EF 68%. Nondiagnostic EKG on pharm stress test.  Pt's BP increased from 193/96 to 207/83 during study.  Carotid duplex 08/30/2016 Mild <50% bilateral ICA stenosis.  Hypertension Past Surgical History: 
History reviewed. No pertinent surgical history. Family History: 
History reviewed. No pertinent family history. Social History: 
Social History Tobacco Use  Smoking status: Never Smoker  Smokeless tobacco: Never Used Substance Use Topics  Alcohol use: No  
 Drug use: No  
 
 
Allergies: 
No Known Allergies Review of Systems Review of Systems Constitutional: Negative for chills and fever. HENT: Negative for congestion, rhinorrhea, sore throat and trouble swallowing. Eyes: Negative for redness and visual disturbance. Respiratory: Negative for cough, shortness of breath and wheezing. Cardiovascular: Negative for chest pain and leg swelling. Gastrointestinal: Negative for abdominal pain, nausea and vomiting. Endocrine: Negative for polyuria. Genitourinary: Negative for difficulty urinating and dysuria. Musculoskeletal: Negative for arthralgias and neck stiffness. Positive for leg pain Skin: Negative for rash. Neurological: Negative for dizziness, weakness, numbness and headaches. Hematological: Does not bruise/bleed easily. Psychiatric/Behavioral: Negative for confusion and dysphoric mood. All other systems reviewed and are negative. Physical Exam  
 
Visit Vitals /63 Pulse 64 Temp 99 °F (37.2 °C) Resp 16 Ht 5' 6\" (1.676 m) Wt 81.6 kg (180 lb) SpO2 100% BMI 29.05 kg/m² Physical Exam  
Constitutional: He is oriented to person, place, and time. He appears well-developed and well-nourished. No distress. HENT:  
Head: Normocephalic and atraumatic. Mouth/Throat: Oropharynx is clear and moist.  
Eyes: Conjunctivae are normal. Pupils are equal, round, and reactive to light. No scleral icterus. Neck: Normal range of motion. Neck supple. Cardiovascular: Normal rate and intact distal pulses. Capillary refill < 3 seconds Symmetric dorsalis pedal pulse Pulmonary/Chest: Effort normal and breath sounds normal. No respiratory distress. He has no wheezes. Abdominal: Soft. Bowel sounds are normal. He exhibits no distension. There is no tenderness. Musculoskeletal: Normal range of motion. He exhibits edema (pitting in left lower extremity, warm to touch). Positive for calf TTP. Good dorsalis flexion, good plantar flexion. Can fully flex knees Lymphadenopathy:  
  He has no cervical adenopathy. Neurological: He is alert and oriented to person, place, and time. No cranial nerve deficit. Sensation intact. Skin: Skin is warm and dry. He is not diaphoretic. No wound, no drainage. Not circumferential  
Nursing note and vitals reviewed. Diagnostic Study Results Labs - Recent Results (from the past 12 hour(s)) CBC WITH AUTOMATED DIFF Collection Time: 11/20/18  2:10 PM  
Result Value Ref Range WBC 7.9 4.6 - 13.2 K/uL  
 RBC 3.59 (L) 4.70 - 5.50 M/uL  
 HGB 10.1 (L) 13.0 - 16.0 g/dL HCT 31.1 (L) 36.0 - 48.0 % MCV 86.6 74.0 - 97.0 FL  
 MCH 28.1 24.0 - 34.0 PG  
 MCHC 32.5 31.0 - 37.0 g/dL  
 RDW 12.5 11.6 - 14.5 % PLATELET 072 422 - 936 K/uL MPV 10.8 9.2 - 11.8 FL  
 NEUTROPHILS 67 40 - 73 % LYMPHOCYTES 17 (L) 21 - 52 % MONOCYTES 8 3 - 10 % EOSINOPHILS 7 (H) 0 - 5 % BASOPHILS 1 0 - 2 %  
 ABS. NEUTROPHILS 5.3 1.8 - 8.0 K/UL  
 ABS. LYMPHOCYTES 1.3 0.9 - 3.6 K/UL  
 ABS. MONOCYTES 0.7 0.05 - 1.2 K/UL  
 ABS. EOSINOPHILS 0.5 (H) 0.0 - 0.4 K/UL  
 ABS. BASOPHILS 0.1 0.0 - 0.1 K/UL  
 DF AUTOMATED METABOLIC PANEL, COMPREHENSIVE Collection Time: 11/20/18  2:10 PM  
Result Value Ref Range Sodium 143 136 - 145 mmol/L Potassium 4.1 3.5 - 5.5 mmol/L Chloride 105 100 - 108 mmol/L  
 CO2 30 21 - 32 mmol/L Anion gap 8 3.0 - 18 mmol/L Glucose 125 (H) 74 - 99 mg/dL BUN 17 7.0 - 18 MG/DL Creatinine 1.60 (H) 0.6 - 1.3 MG/DL  
 BUN/Creatinine ratio 11 (L) 12 - 20 GFR est AA 50 (L) >60 ml/min/1.73m2 GFR est non-AA 42 (L) >60 ml/min/1.73m2 Calcium 9.0 8.5 - 10.1 MG/DL Bilirubin, total 0.3 0.2 - 1.0 MG/DL  
 ALT (SGPT) 18 16 - 61 U/L  
 AST (SGOT) 21 15 - 37 U/L Alk. phosphatase 80 45 - 117 U/L Protein, total 7.4 6.4 - 8.2 g/dL Albumin 2.9 (L) 3.4 - 5.0 g/dL Globulin 4.5 (H) 2.0 - 4.0 g/dL A-G Ratio 0.6 (L) 0.8 - 1.7 PTT Collection Time: 11/20/18  2:10 PM  
Result Value Ref Range  aPTT 29.1 23.0 - 36.4 SEC  
 PROTHROMBIN TIME + INR Collection Time: 11/20/18  2:10 PM  
Result Value Ref Range Prothrombin time 13.3 11.5 - 15.2 sec INR 1.0 0.8 - 1.2 Radiologic Studies - No orders to display Medical Decision Making I am the first provider for this patient. I reviewed the vital signs, available nursing notes, past medical history, past surgical history, family history and social history. Vital Signs-Reviewed the patient's vital signs. Pulse Oximetry Analysis -  99 % on RA, normal  
 
Records Reviewed: Nursing Notes and Old Medical Records (Time of Review: 1:58 PM) Provider Notes (Medical Decision Making): MDM Number of Diagnoses or Management Options Acute leg pain, left:  
Left leg cellulitis:  
Diagnosis management comments: DDX: DVT, cellulitis, dependent edema, renal.  
Will check labs, vascular, and ultrasound. Amount and/or Complexity of Data Reviewed Clinical lab tests: ordered and reviewed Tests in the radiology section of CPT®: ordered and reviewed Tests in the medicine section of CPT®: ordered and reviewed Patient Progress Patient progress: improved Medications HYDROcodone-acetaminophen (NORCO) 5-325 mg per tablet 1 Tab (1 Tab Oral Given 11/20/18 1421) cefTRIAXone (ROCEPHIN) 1 g in sterile water (preservative free) 10 mL IV syringe (1 g IntraVENous Given 11/20/18 1724)  
traMADol (ULTRAM) tablet 50 mg (50 mg Oral Given 11/20/18 1725) ED Course: Progress Notes, Reevaluation, and Consults: 
 
Wbc wnl Awaiting duplex Duplex LLE: Interpretation Summary Left lower venous · No evidence of acute deep vein thrombosis in the left common femoral, superficial femoral, popliteal, posterior tibial, and peroneal veins. The veins were imaged in the transverse and longitudinal planes. The vessels showed normal color filling and compressibility. Doppler interrogation showed phasic and spontaneous flow. Right Lower Venous For comparison purposes, the right common femoral vein was briefly interrogated. The vein demonstrates normal color filing and compressibility. Doppler flow was phasic and spontaneous. Will give dose rocephin in ED. I have reassessed the patient. I have discussed the workup, results and plan with the patient and patient is in agreement. Patient is feeling better. Patient will be prescribed norco, bactrim, keflex. Patient was discharge in stable condition. Patient was given outpatient follow up. Patient is to return to emergency department if any new or worsening condition. Diagnosis Clinical Impression: 1. Left leg cellulitis 2. Acute leg pain, left Disposition: discharged. Follow-up Information Follow up With Specialties Details Why Contact Info Eliseo Tai MD Family Practice Schedule an appointment as soon as possible for a visit in 3 days  [address] [facility] [place] [address] 
[phone] [id number] [facility] EMERGENCY DEPT Emergency Medicine  As needed, If symptoms worsen [place] [id number] [phone] Medication List  
  
START taking these medications   
cephALEXin 500 mg capsule Commonly known as:  Yaima Camden Take 1 Cap by mouth four (4) times daily for 7 days. HYDROcodone-acetaminophen 5-325 mg per tablet Commonly known as:  [place] Take 1 Tab by mouth every eight (8) hours as needed for Pain. Max Daily Amount: 3 Tabs. trimethoprim-sulfamethoxazole 160-800 mg per tablet Commonly known as:  BACTRIM DS Take 1 Tab by mouth two (2) times a day for 5 days. ASK your doctor about these medications   
amLODIPine 5 mg tablet Commonly known as:  Maria Dean Take 1 Tab by mouth daily. Indications: HYPERTENSION 
  
garlic 2,473 mg Cap Where to Get Your Medications Information about where to get these medications is not yet available Ask your nurse or doctor about these medications · cephALEXin 500 mg capsule · HYDROcodone-acetaminophen 5-325 mg per tablet · trimethoprim-sulfamethoxazole 160-800 mg per tablet 
  
 
_______________________________ Attestations: 
Scribe Attestation Viktoria Thayer (Aj) acting as a scribe for and in the presence of Matt NicoleBelington, DO November 20, 2018 at 1:58 PM 
    
Provider Attestation:     
I personally performed the services described in the documentation, reviewed the documentation, as recorded by the scribe in my presence, and it accurately and completely records my words and actions. November 20, 2018 at 1:58 PM - Radha Aaron DO   
_______________________________

## 2018-11-20 NOTE — ED NOTES
Patient armband removed and shreddedI have reviewed discharge instructions with the patient and spouse. The patient and spouse verbalized understanding. rx x 3 given; pt to car via w[place], spouse driving

## 2018-11-23 ENCOUNTER — APPOINTMENT (OUTPATIENT)
Dept: VASCULAR SURGERY | Age: 83
End: 2018-11-23
Attending: EMERGENCY MEDICINE
Payer: MEDICARE

## 2018-11-23 ENCOUNTER — HOSPITAL ENCOUNTER (EMERGENCY)
Age: 83
Discharge: HOME | End: 2018-11-23
Attending: EMERGENCY MEDICINE | Admitting: EMERGENCY MEDICINE
Payer: MEDICARE

## 2018-11-23 ENCOUNTER — APPOINTMENT (OUTPATIENT)
Dept: GENERAL RADIOLOGY | Age: 83
End: 2018-11-23
Attending: EMERGENCY MEDICINE
Payer: MEDICARE

## 2018-11-23 VITALS
HEIGHT: 66 IN | DIASTOLIC BLOOD PRESSURE: 70 MMHG | SYSTOLIC BLOOD PRESSURE: 164 MMHG | WEIGHT: 180 LBS | HEART RATE: 64 BPM | BODY MASS INDEX: 28.93 KG/M2 | TEMPERATURE: 99 F | OXYGEN SATURATION: 98 % | RESPIRATION RATE: 16 BRPM

## 2018-11-23 DIAGNOSIS — M25.579 ANKLE PAIN, UNSPECIFIED CHRONICITY, UNSPECIFIED LATERALITY: Primary | ICD-10-CM

## 2018-11-23 DIAGNOSIS — R60.9 EDEMA, UNSPECIFIED TYPE: ICD-10-CM

## 2018-11-23 LAB
ANION GAP SERPL CALC-SCNC: 9 MMOL/L (ref 3–18)
BASOPHILS # BLD: 0.1 K/UL (ref 0–0.1)
BASOPHILS NFR BLD: 1 % (ref 0–2)
BUN SERPL-MCNC: 21 MG/DL (ref 7–18)
BUN/CREAT SERPL: 10 (ref 12–20)
CALCIUM SERPL-MCNC: 8.9 MG/DL (ref 8.5–10.1)
CHLORIDE SERPL-SCNC: 101 MMOL/L (ref 100–108)
CO2 SERPL-SCNC: 28 MMOL/L (ref 21–32)
CREAT SERPL-MCNC: 2.12 MG/DL (ref 0.6–1.3)
DIFFERENTIAL METHOD BLD: ABNORMAL
EOSINOPHIL # BLD: 0.5 K/UL (ref 0–0.4)
EOSINOPHIL NFR BLD: 5 % (ref 0–5)
ERYTHROCYTE [DISTWIDTH] IN BLOOD BY AUTOMATED COUNT: 12.5 % (ref 11.6–14.5)
GLUCOSE SERPL-MCNC: 112 MG/DL (ref 74–99)
HCT VFR BLD AUTO: 31.2 % (ref 36–48)
HGB BLD-MCNC: 10.2 G/DL (ref 13–16)
LYMPHOCYTES # BLD: 1.6 K/UL (ref 0.9–3.6)
LYMPHOCYTES NFR BLD: 18 % (ref 21–52)
MCH RBC QN AUTO: 28.1 PG (ref 24–34)
MCHC RBC AUTO-ENTMCNC: 32.7 G/DL (ref 31–37)
MCV RBC AUTO: 86 FL (ref 74–97)
MONOCYTES # BLD: 0.8 K/UL (ref 0.05–1.2)
MONOCYTES NFR BLD: 9 % (ref 3–10)
NEUTS SEG # BLD: 6.1 K/UL (ref 1.8–8)
NEUTS SEG NFR BLD: 67 % (ref 40–73)
PLATELET # BLD AUTO: 285 K/UL (ref 135–420)
PMV BLD AUTO: 10.9 FL (ref 9.2–11.8)
POTASSIUM SERPL-SCNC: 4.2 MMOL/L (ref 3.5–5.5)
RBC # BLD AUTO: 3.63 M/UL (ref 4.7–5.5)
SODIUM SERPL-SCNC: 138 MMOL/L (ref 136–145)
WBC # BLD AUTO: 9.1 K/UL (ref 4.6–13.2)

## 2018-11-23 PROCEDURE — 73610 X-RAY EXAM OF ANKLE: CPT

## 2018-11-23 PROCEDURE — 99283 EMERGENCY DEPT VISIT LOW MDM: CPT

## 2018-11-23 PROCEDURE — 85025 COMPLETE CBC W/AUTO DIFF WBC: CPT

## 2018-11-23 PROCEDURE — 96361 HYDRATE IV INFUSION ADD-ON: CPT

## 2018-11-23 PROCEDURE — 80048 BASIC METABOLIC PNL TOTAL CA: CPT

## 2018-11-23 PROCEDURE — 74011250637 HC RX REV CODE- 250/637: Performed by: EMERGENCY MEDICINE

## 2018-11-23 PROCEDURE — 74011250636 HC RX REV CODE- 250/636: Performed by: EMERGENCY MEDICINE

## 2018-11-23 PROCEDURE — 96360 HYDRATION IV INFUSION INIT: CPT

## 2018-11-23 PROCEDURE — 93971 EXTREMITY STUDY: CPT

## 2018-11-23 RX ORDER — PREDNISONE 20 MG/1
60 TABLET ORAL DAILY
Qty: 15 TAB | Refills: 0 | Status: SHIPPED | OUTPATIENT
Start: 2018-11-23 | End: 2018-11-28

## 2018-11-23 RX ORDER — HYDROCODONE BITARTRATE AND ACETAMINOPHEN 5; 325 MG/1; MG/1
2 TABLET ORAL
Status: COMPLETED | OUTPATIENT
Start: 2018-11-23 | End: 2018-11-23

## 2018-11-23 RX ORDER — FUROSEMIDE 20 MG/1
20 TABLET ORAL DAILY
COMMUNITY
End: 2020-10-03

## 2018-11-23 RX ORDER — LIDOCAINE 50 MG/G
PATCH TOPICAL
Qty: 1 EACH | Refills: 0 | Status: SHIPPED | OUTPATIENT
Start: 2018-11-23 | End: 2020-10-03

## 2018-11-23 RX ADMIN — HYDROCODONE BITARTRATE AND ACETAMINOPHEN 2 TABLET: 5; 325 TABLET ORAL at 15:43

## 2018-11-23 RX ADMIN — SODIUM CHLORIDE 500 ML: 900 INJECTION, SOLUTION INTRAVENOUS at 16:50

## 2018-11-23 NOTE — ED NOTES
4:49 PM: I updated the pt, they are aware that if the doppler study is negative they will be going home for outpatient treatment of gout.  
 
Nohemy Gaspar MD

## 2018-11-23 NOTE — DISCHARGE INSTRUCTIONS
Please see your doctor or clinic in 2-3 days. Please return for increased pain, swelling, fevers, chills or any other concerns.      Your kidney function was elevated, this could be a result of your medications, Please have your doctor recheck it in the office

## 2018-11-23 NOTE — ED TRIAGE NOTES
Patient states increased pain to left leg. States improvement in left leg swelling, but advises pain is worse.

## 2018-11-23 NOTE — ED PROVIDER NOTES
Pt with ankle and leg pain and swelling for about 1 week- was seen in ED prior, states that the swelling and redness no worse, but increased pain. Pt does have a history of gout, in his feet, states that is has been a very long time since then, but the pain was similar. No fevers or chills, no hcest pain or shortness of breath. No change in bowel or bladder The history is provided by the patient. Past Medical History:  
Diagnosis Date  Cardiac echocardiogram 08/29/2016 EF 60-65%. No WMA. Mild LVH. Indeterminate diastolic fx. RVSP 35 mmHg. No significant valvular heart disease.  Cardiac nuclear imaging test, abnormal 08/29/2016 Intermediate risk. Previous inferior infarction w/very mild fabiana-infarct ischemia. Inferior hypk. EF 68%. Nondiagnostic EKG on pharm stress test.  Pt's BP increased from 193/96 to 207/83 during study.  Carotid duplex 08/30/2016 Mild <50% bilateral ICA stenosis.  Hypertension History reviewed. No pertinent surgical history. History reviewed. No pertinent family history. Social History Socioeconomic History  Marital status:  Spouse name: Not on file  Number of children: Not on file  Years of education: Not on file  Highest education level: Not on file Social Needs  Financial resource strain: Not on file  Food insecurity - worry: Not on file  Food insecurity - inability: Not on file  Transportation needs - medical: Not on file  Transportation needs - non-medical: Not on file Occupational History  Not on file Tobacco Use  Smoking status: Never Smoker  Smokeless tobacco: Never Used Substance and Sexual Activity  Alcohol use: No  
 Drug use: No  
 Sexual activity: Not on file Other Topics Concern  Not on file Social History Narrative  Not on file ALLERGIES: Patient has no known allergies. Review of Systems Constitutional:      All ROS per HPI  
 Respiratory: Negative for chest tightness. Cardiovascular: Positive for leg swelling. Negative for chest pain. All other systems reviewed and are negative. Vitals:  
 11/23/18 1439 BP: 164/70 Pulse: 64 Resp: 16 Temp: 99 °F (37.2 °C) SpO2: 98% Weight: 81.6 kg (180 lb) Height: 5' 6\" (1.676 m) Physical Exam  
 
MDM Number of Diagnoses or Management Options Diagnosis management comments: Pt sx not suggestive of cellulitis- givne gout with sx worse in ankle, suspect gouty flair Procedures

## 2018-11-23 NOTE — ED TRIAGE NOTES
Pt c/o persistent LLE swelling, redness and increased pain. Diagnosed with cellulitis on 11/20/18. Reports compliance with cephalexin and bactrim DS. Negative PVL on 11/20/18. I performed a brief evaluation, including history and physical, of the patient here in triage and I have determined that pt will need further treatment and evaluation from the main side ER physician. I have placed initial orders to help in expediting patients care. November 23, 2018 at 2:41 PM - Vy Giang PA-C Visit Vitals /70 (BP 1 Location: Left arm, BP Patient Position: At rest) Pulse 64 Temp 99 °F (37.2 °C) Resp 16 Ht 5' 6\" (1.676 m) Wt 81.6 kg (180 lb) SpO2 98% BMI 29.05 kg/m²

## 2018-11-26 ENCOUNTER — APPOINTMENT (OUTPATIENT)
Dept: PHYSICAL THERAPY | Age: 83
End: 2018-11-26
Payer: MEDICARE

## 2018-11-28 ENCOUNTER — APPOINTMENT (OUTPATIENT)
Dept: PHYSICAL THERAPY | Age: 83
End: 2018-11-28
Payer: MEDICARE

## 2018-12-06 ENCOUNTER — OFFICE VISIT (OUTPATIENT)
Dept: ORTHOPEDIC SURGERY | Facility: CLINIC | Age: 83
End: 2018-12-06

## 2018-12-06 VITALS
RESPIRATION RATE: 16 BRPM | WEIGHT: 188 LBS | BODY MASS INDEX: 30.22 KG/M2 | HEIGHT: 66 IN | OXYGEN SATURATION: 99 % | SYSTOLIC BLOOD PRESSURE: 176 MMHG | DIASTOLIC BLOOD PRESSURE: 66 MMHG | TEMPERATURE: 96.7 F | HEART RATE: 66 BPM

## 2018-12-06 DIAGNOSIS — M54.50 LUMBAR PAIN: ICD-10-CM

## 2018-12-06 DIAGNOSIS — M25.562 CHRONIC PAIN OF BOTH KNEES: ICD-10-CM

## 2018-12-06 DIAGNOSIS — M17.12 PRIMARY OSTEOARTHRITIS OF LEFT KNEE: ICD-10-CM

## 2018-12-06 DIAGNOSIS — Z91.81 RISK FOR FALLS: ICD-10-CM

## 2018-12-06 DIAGNOSIS — G89.29 CHRONIC PAIN OF BOTH KNEES: ICD-10-CM

## 2018-12-06 DIAGNOSIS — R60.9 PERIPHERAL EDEMA: Primary | ICD-10-CM

## 2018-12-06 DIAGNOSIS — M25.561 CHRONIC PAIN OF BOTH KNEES: ICD-10-CM

## 2018-12-06 NOTE — PROGRESS NOTES
Patient: Chad Santos                MRN: [id number]       N: [id number] YOB: 1935        AGE: 80 y.o. SEX: male PCP: Lashaun Subramanian MD 
12/06/18 CC: Peripheral edema and left knee pain HISTORY:  Chad Santos is a 80 y.o. male who is seen for increased left knee/leg pain and swelling. He reports that his knee pain has been stopping him from doing things around the house. He is usually very active, remodeling his home and friend's homes. He notes pain with standing and walking. He has previously been seen at [facility] where x rays revealed arthritis but no acute abnormalities. He states he has balance problems. He reports pain relief from a cortisone iinjection last OV. He states he has started walking without a cane in his house. He completed a successful Euflexxa series on 9/6/18. He has had several negative duplex ultrasound vascular studies recently. He was seen at [facility] and treated for cellulitis on 11/23/18. Pain Assessment  12/6/2018 Location of Pain Leg Location Modifiers Left Severity of Pain 5 Quality of Pain Aching; Throbbing; Hildegard Deirdre Quality of Pain Comment - Duration of Pain A few minutes Duration of Pain Comment - Frequency of Pain Several times daily Frequency of Pain Comment - Aggravating Factors Other (Comment) Aggravating Factors Comment Pain occured after injection to left knee; pain radiates down lower leg to foot Limiting Behavior Yes Relieving Factors Nothing Result of Injury No  
Work-Related Injury - Type of Injury - Type of Injury Comment -  
 
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 He has not been active since retiring. His wife states he does not move enough or drink enough water. He lives in [place] with his wife. He has 2 adult children, one son and one daughter. He states that he has plenty of grandchildren.   He lost 8 pounds recently due to diet changes He is not diabetic. He is not hypertensive. Current weight is 188 pounds. He is 5'8\" tall. Lab Results Component Value Date/Time Hemoglobin A1c 5.8 (H) 08/29/2016 07:00 AM  
 
Weight Metrics 12/6/2018 11/23/2018 11/20/2018 9/6/2018 8/30/2018 8/23/2018 7/19/2018 Weight 188 lb 180 lb 180 lb 187 lb 9.6 oz 191 lb 189 lb 3.2 oz 188 lb 12.8 oz BMI 30.34 kg/m2 29.05 kg/m2 29.05 kg/m2 30.28 kg/m2 30.83 kg/m2 30.54 kg/m2 28.71 kg/m2 Patient Active Problem List  
Diagnosis Code  [facility] T79. Kassandra Rezaas  CAD (coronary artery disease) I25.10  
 HTN (hypertension) I10  
 Troponin level elevated R74.8 REVIEW OF SYSTEMS: All Below are Negative except: See HPI Constitutional: negative for fever, chills, and weight loss. Cardiovascular: negative for chest pain, claudication, leg swelling, SOB, BERNARDO Gastrointestinal: Negative for pain, N/V/C/D, Blood in stool or urine, dysuria,  hematuria, incontinence, pelvic pain. Musculoskeletal: See HPI Neurological: Negative for dizziness and weakness. Negative for headaches, Visual changes, confusion, seizures Phychiatric/Behavioral: Negative for depression, memory loss, substance  abuse. Extremities: Negative for hair changes, rash, or skin lesion changes. Hematologic: Negative for bleeding problems, bruising, pallor or swollen lymph  nodes Peripheral Vascular: No calf pain, no circulation deficits. Social History Socioeconomic History  Marital status:  Spouse name: Not on file  Number of children: Not on file  Years of education: Not on file  Highest education level: Not on file Social Needs  Financial resource strain: Not on file  Food insecurity - worry: Not on file  Food insecurity - inability: Not on file  Transportation needs - medical: Not on file  Transportation needs - non-medical: Not on file Occupational History  Not on file Tobacco Use  
  Smoking status: Never Smoker  Smokeless tobacco: Never Used Substance and Sexual Activity  Alcohol use: No  
 Drug use: No  
 Sexual activity: Not on file Other Topics Concern  Not on file Social History Narrative  Not on file No Known Allergies Current Outpatient Medications Medication Sig  cephalexin (KEFLEX PO) Take  by mouth.  furosemide (LASIX) 20 mg tablet Take 20 mg by mouth daily.  lidocaine (LIDODERM) 5 % Apply patch to the affected area for 12 hours a day and remove for 12 hours a day.  HYDROcodone-acetaminophen (NORCO) 5-325 mg per tablet Take 1 Tab by mouth every eight (8) hours as needed for Pain. Max Daily Amount: 3 Tabs.  garlic 4,622 mg cap Take  by mouth.  amLODIPine (NORVASC) 5 mg tablet Take 1 Tab by mouth daily. Indications: HYPERTENSION No current facility-administered medications for this visit. PHYSICAL EXAMINATION: 
Visit Vitals /66 (BP 1 Location: Left arm, BP Patient Position: Sitting) Pulse 66 Temp 96.7 °F (35.9 °C) (Oral) Resp 16 Ht 5' 6\" (1.676 m) Wt 188 lb (85.3 kg) SpO2 99% BMI 30.34 kg/m² ORTHO EXAMINATION: 
Examination Right knee Left knee Skin Intact Intact Range of motion 110-0 90-10 Effusion - - Medial joint line tenderness - + Lateral joint line tenderness - - Popliteal tenderness - -  
Osteophytes palpable - - Hernandos - - Patella crepitus + + Anterior drawer - - Lateral laxity - - Medial laxity - - Varus deformity - -  
Valgus deformity - - Pretibial edema 2+ 2-3+ Calf tenderness - - Examination Right hip Left hip Skin Intact Intact External Rotation ROM 20 20 Internal Rotation ROM 10 10 Trochanteric tenderness - - Hip flexion contracture - - Antalgic gait - - Trendelenberg sign - - Lumbar tenderness - - Straight leg raise - - Calf tenderness - - Neurovascular Intact Intact Examination Lumbar Thoracic Skin Intact Intact Tenderness + - Tightness - - Lordosis Normal N/A Kyphosis N/A Normal  
Scoliosis - - Flexion Fingertips to mid shin N/A Extension 10 N/A Knee reflexes Normal N/A Ankle reflexes Normal N/A Straight leg raise - N/A Calf tenderness - N/A  
 
DUPLEX VENOUS STUDY EXT VENOUS BILAT 11/23/2018 There is no evidence of a left lower extremity deep venous thrombosis. There is an enlarged lymph node in the left groin. DUPLEX VENOUS STUDY EXT VENOUS BILAT 6/27/2018 No evidence of acute deep vein thrombosis in the left common femoral, superficial femoral, popliteal, posterior tibial, and peroneal veins. The veins were imaged in the transverse and longitudinal planes. The vessels showed normal color filling and compressibility. Doppler interrogation showed phasic and spontaneous flow. RADIOGRAPHS:  
XR BILAT KNEES 5/23/2018 [facility] 
-I have independently reviewed these images during this office visit. -Dr. Galileo Rainey IMPRESSION:  Three views - No fractures, no effusion, moderate L>R joint space narrowing, patellar entesmophyte osteophytes present. IKDC Grade C squaring of condyles XR HIP 5/23/2018 [facility] 
-I have independently reviewed these images during this office visit. -Dr. Galileo Rainey IMPRESSION:  AP pelvis and two views - No fractures, mild joint space narrowing, + osteophytes present. Tonnis grade 1 XR SPINE 5/23/2018 [facility] 
-I have independently reviewed these images during this office visit. -Dr. aGlileo Rainey IMPRESSION:  Two views - no fractures, L2-3 disc space narrowing, + osteophytes present, no spondylolisthesis. IMPRESSION:   
  ICD-10-CM ICD-9-CM 1. Peripheral edema R60.9 782.3 AMB SUPPLY ORDER  
   REFERRAL TO PHYSICAL THERAPY 2. Chronic pain of both knees M25.561 719.46 REFERRAL TO PHYSICAL THERAPY  
 M25.562 338.29 G89.29 3. Lumbar pain M54.5 724.2 REFERRAL TO PHYSICAL THERAPY 4. Primary osteoarthritis of left knee M17.12 715.16   
5.  Risk for falls Z91.81 V15.88   
 
 PLAN:  He was provided a prescription for compression stockings. He will follow up with his PCP for peripheral edema. He will start a brief course of outpatient physical therapy.   
 
Scribed by Kimberly Agustin [address]) as dictated by Dani Figueroa MD

## 2018-12-13 ENCOUNTER — HOSPITAL ENCOUNTER (OUTPATIENT)
Dept: PHYSICAL THERAPY | Age: 83
Discharge: HOME | End: 2018-12-13
Payer: MEDICARE

## 2018-12-13 PROCEDURE — 97164 PT RE-EVAL EST PLAN CARE: CPT

## 2018-12-13 PROCEDURE — 97110 THERAPEUTIC EXERCISES: CPT

## 2018-12-13 PROCEDURE — G8978 MOBILITY CURRENT STATUS: HCPCS

## 2018-12-13 PROCEDURE — G8979 MOBILITY GOAL STATUS: HCPCS

## 2018-12-13 NOTE — PROGRESS NOTES
PT DAILY TREATMENT NOTE - [facility]     Patient Name: Shun Coleman  Date:2018  : 1935  [x]  Patient  Verified  [place]or: [place] MEDICARE / Plan: [place] MEDICARE PART A & B / Product Type: Medicare /    In time:1000  Out time:1040  Total Treatment Time (min): 40  Total Timed Codes (min): 40  1:1 Treatment Time ( only): 40   Visit #: 1 of 12    Treatment Area: Gait disturbance [R26.9]    SUBJECTIVE  Pain Level (0-10 scale): 3/10  Any medication changes, allergies to medications, adverse drug reactions, diagnosis change, or new procedure performed?: [x] No    [] Yes (see summary sheet for update)  Subjective functional status/changes:   [] No changes reported  Have been in hospital for pain, swelling, in left ankle and leg. Negative for blood clots. Pt has new script to continue with current treatment. Pt ambulates with a SPC. No falls reported. OBJECTIVE    15 min []Eval                  [x]Re-Eval       30 min Therapeutic Exercise:  [] See flow sheet :   Rationale: increase ROM, increase strength, improve coordination and improve balance to improve the patients ability to ease with ADL's          With   [] TE   [] TA   [] neuro   [] other: Patient Education: [x] Review HEP    [] Progressed/Changed HEP based on:   [] positioning   [] body mechanics   [] transfers   [] heat/ice application    [] other:      Other Objective/Functional Measures: Left hip flexor tightness and weakness. Left HS 4/5, abd=4/5  Hip flexor =4/5  Romberg=30 sec EO/EC  Ambulated with SPC x 30 ft. Pain Level (0-10 scale) post treatment: 0/10    ASSESSMENT/Changes in Function: Pt will continue with current PT to improve with balance, strength and safe ambulation in the community.     Patient will continue to benefit from skilled PT services to modify and progress therapeutic interventions, address functional mobility deficits, address ROM deficits, address strength deficits, analyze and address soft tissue restrictions, analyze and cue movement patterns, analyze and modify body mechanics/ergonomics, assess and modify postural abnormalities and address imbalance/dizziness to attain remaining goals. []  See Plan of Care  [x]  See progress note/recertification  []  See Discharge Summary         Progress towards goals / Updated goals:  See Recert.      PLAN  []  Upgrade activities as tolerated     []  Continue plan of care  []  Update interventions per flow sheet       []  Discharge due to:_  []  Other:_      Mamta Mendoza PT 12/13/2018  10:07 AM    Future Appointments   Date Time Provider Noel Harp   12/13/2018 10:30 AM Melecio Candelaria, PT [facility]

## 2018-12-13 NOTE — PROGRESS NOTES
[facility]  Lowella Loots  [address] [facility]  [phone] fax    Continued Plan of Care/ Re-certification for Physical Therapy Services    Patient name: Caesar Lr Start of Care: 18   Referral source:Dr Price : 1935   Medical/Treatment Diagnosis: Gait disturbance [R26.9]  Payor: [place] MEDICARE / Plan: [place] MEDICARE PART A & B / Product Type: Medicare /  Onset Date:>1 yr. Hospitalized recently for leg pain     Prior Hospitalization: see medical history Provider#: [id number]   Medications: Verified on Patient Summary List    Comorbidities: CAD  Prior Level of Function: States he lives with his wife and he is retired . Reports having progressively worsening stiffnessin his left LE over the past several years. Recently in PT for Gait and Balance. He was hospitalized a few weeks ago due to increased left LE pain and Swelling. He completed a successful Euflexxa series on 18. He has had several negative duplex ultrasound vascular studies recently. He was seen at [facility] and treated for cellulitis on 18.            Visits from Start of Care: 10 Previous    Missed Visits: 0    The Plan of Care and following information is based on the patient's current status:  Goal:  1. Pt will report compliance and independence to [facility] to help the pt manage stiffness and improve flexibility.   Status at last note/certification:  Current Status: met    Goal:Pt will increase FOTO score by 11 points to improve ability to perform ADLs. Status at last note/certification:  Current Status: met      Pt will improve TUG score to 30 sec or less with SPC to decrease the pt's fall risk.   Status at last note/certification:  Current Status: not met    Goal:Pt will be observed performing transfers to be able to safely transfer into his car without fall risk.    Status at last note/certification:  Current Status: not met    Key functional changes:  Pt has been seen in PT previously, 10 visits for Gait and Imbalance (prior to recent ER visit). He reported Left Leg pain and stiffness most times and his balance continued to be poor. He recently had left ankle  Swelling and leg pain that he went to the ER to be checked for blood clots and Gout. Doppler studies were Negative. He was placed on Antibiotics which helped decrease the pain in his Leg. Pt denies any LS pain    Left hip flexor tightness and weakness. Left HS 4/5, abd=4/5  Hip flexor =4/5. No swelling observed  Romberg=30 sec EO/EC  Ambulates with a SPC with decreased Gait Mechanics and Risk for Falls as observed by left foot drag. Problems/ barriers to goal attainment: Recent Hospitalization     Problem List: pain affecting function, decrease ROM, decrease strength, edema affecting function, impaired gait/ balance, decrease ADL/ functional abilitiies, decrease activity tolerance, decrease flexibility/ joint mobility and decrease transfer abilities    Treatment Plan: Therapeutic exercise, Therapeutic activities, Neuromuscular re-education, Physical agent/modality, Gait/balance training, Manual therapy, Aquatic therapy, Patient education, Self Care training, Functional mobility training, Home safety training and Stair training     Patient Goal (s) has been updated and includes: to work on strength and balance. Goals for this certification period to be accomplished in 8 weeks:  accomplished in 4 weeks:  1. Pt will increase MMT left hip flex to 3+/5, left knee flex to 4/5 to improve ability to tolerate ambulation in the community. 2. Pt will improve TUG score to 30 sec or less with SPC to decrease the pt's fall risk.    3. Pt will be observed performing transfers to be able to safely transfer into his car without fall risk.      Frequency / Duration: Patient to be seen 2 times per week for 8 weeks:    Assessment / Recommendations:Patient will continue to benefit from skilled PT services to modify and progress therapeutic interventions, address functional mobility deficits, address ROM deficits, address strength deficits, analyze and address soft tissue restrictions, analyze and cue movement patterns, analyze and modify body mechanics/ergonomics, assess and modify postural abnormalities, address imbalance/dizziness and instruct in home and community integration to attain remaining goals. G-Codes (GP)  Mobility  [id number] Current  CL= 60-79%  [id number] Goal  CK= 40-59%    The severity rating is based on clinical judgment and the FOTO score. Certification Period: 12/13/18-2/12/19    Bulmaro Izaguirre, PT 12/13/2018 1:10 PM    ________________________________________________________________________  I certify that the above Therapy Services are being furnished while the patient is under my care. I agree with the treatment plan and certify that this therapy is necessary. [] I have read the above and request that my patient continue as recommended.   [] I have read the above report and request that my patient continue therapy with the following changes/special instructions: _______________________________________  [] I have read the above report and request that my patient be discharged from therapy    Physician's Signature:____________Date:_________TIME:________    ** Signature, Date and Time must be completed for valid certification **    Please sign and return to [facility]  [facility] [employer] [facility]  JESUSITA  [address] [place] [address]  [phone] fax

## 2018-12-17 ENCOUNTER — HOSPITAL ENCOUNTER (OUTPATIENT)
Dept: PHYSICAL THERAPY | Age: 83
Discharge: HOME | End: 2018-12-17
Payer: MEDICARE

## 2018-12-17 PROCEDURE — 97110 THERAPEUTIC EXERCISES: CPT

## 2018-12-17 NOTE — PROGRESS NOTES
PT DAILY TREATMENT NOTE - [facility]     Patient Name: Erik Eckert  Date:2018  : 1935  [x]  Patient  Verified  Payor: [place] MEDICARE / Plan: VA MEDICARE PART A & B / Product Type: Medicare /    In time:907  Out time:935  Total Treatment Time (min): 28  Total Timed Codes (min): 28  1:1 Treatment Time (MC only): 28   Visit #: 2 of 12    Treatment Area: Gait disturbance [R26.9]    SUBJECTIVE  Pain Level (0-10 scale): 0/10  Any medication changes, allergies to medications, adverse drug reactions, diagnosis change, or new procedure performed?: [x] No    [] Yes (see summary sheet for update)  Subjective functional status/changes:   [] No changes reported  Reports he is very stiff today. OBJECTIVE      28 min Therapeutic Exercise:  [] See flow sheet :   Rationale: increase ROM, increase strength, improve coordination and improve balance to improve the patients ability to ease with ADL's         With   [] TE   [] TA   [] neuro   [] other: Patient Education: [x] Review HEP    [] Progressed/Changed HEP based on:   [] positioning   [] body mechanics   [] transfers   [] heat/ice application    [] other:      Other Objective/Functional Measures: Increased left >rigth HS tightness and general stiffness. Ambulated out to his car with cues to bend his left knee with heel toe gait mechanics. LE leg/calf roll with stick x 3 mins. Requires cueing for proper use of SPC/ gait mechanics. Pain Level (0-10 scale) post treatment: 0/10    ASSESSMENT/Changes in Function: Progressing well in therapy . Pt reports she will roll his legs with the rolling pin at home to decrease muscle stiffness.      Patient will continue to benefit from skilled PT services to modify and progress therapeutic interventions, address functional mobility deficits, address ROM deficits, address strength deficits, analyze and address soft tissue restrictions, analyze and cue movement patterns, analyze and modify body mechanics/ergonomics, assess and modify postural abnormalities and address imbalance/dizziness to attain remaining goals. []  See Plan of Care  []  See progress note/recertification  []  See Discharge Summary         Progress towards goals / Updated goals:  1. Pt will increase MMT left hip flex to 3+/5, left knee flex to 4/5 to improve ability to tolerate ambulation in the community. 2. Pt will improve TUG score to 30 sec or less with SPC to decrease the pt's fall risk.    3. Pt will be observed performing transfers to be able to safely transfer into his car without fall risk.       PLAN  []  Upgrade activities as tolerated     [x]  Continue plan of care  []  Update interventions per flow sheet       []  Discharge due to:_  []  Other:_      Pineda Pollard, PT 12/17/2018  9:17 AM    Future Appointments   Date Time Provider Noel Harp   12/19/2018 10:30 AM Sofia Walker, PT [facility]   12/26/2018 11:30 AM Marylou Lockwood PTA [facility]   12/31/2018  8:30 AM Marylou Lockwood PTA [facility]   1/3/2019  9:30 AM Sofia Walker PT [facility]   1/7/2019  9:00 AM Marylou Lockwood PTA [facility]   1/9/2019 10:00 AM [facility] Walker PT [facility]

## 2018-12-19 ENCOUNTER — HOSPITAL ENCOUNTER (OUTPATIENT)
Dept: PHYSICAL THERAPY | Age: 83
Discharge: HOME | End: 2018-12-19
Payer: MEDICARE

## 2018-12-19 PROCEDURE — 97140 MANUAL THERAPY 1/> REGIONS: CPT

## 2018-12-19 PROCEDURE — 97110 THERAPEUTIC EXERCISES: CPT

## 2018-12-19 NOTE — PROGRESS NOTES
PT DAILY TREATMENT NOTE - [facility]     Patient Name: Chad Santos  Date:2018  : 1935  [x]  Patient  Verified  Payor: [place] MEDICARE / Plan: [place] MEDICARE PART A & B / Product Type: Medicare /    In time:1033  Out time:1115  Total Treatment Time (min): 42  Total Timed Codes (min): 42  1:1 Treatment Time (MC only): 42   Visit #: 3 of 12    Treatment Area: Gait disturbance [R26.9]    SUBJECTIVE  Pain Level (0-10 scale): 4/10  Any medication changes, allergies to medications, adverse drug reactions, diagnosis change, or new procedure performed?: [x] No    [] Yes (see summary sheet for update)  Subjective functional status/changes:   [] No changes reported  Pt reports he has been rolling his muscles with a rolling pin at home. OBJECTIVE      34 min Therapeutic Exercise:  [] See flow sheet :   Rationale: increase ROM, increase strength, improve coordination and improve balance to improve the patients ability to ease with ADL's    8 min Manual Therapy:  Stick roll to both legs including medial thigh   Rationale: decrease pain, increase ROM and decrease trigger points to ease with ADL's          With   [] TE   [] TA   [] neuro   [] other: Patient Education: [x] Review HEP    [] Progressed/Changed HEP based on:   [] positioning   [] body mechanics   [] transfers   [] heat/ice application    [] other:      Other Objective/Functional Measures: improved flexibility to left LE after manual stick roll and manual stretch of left LE.   TUG=39 sec with LOB    Pain Level (0-10 scale) post treatment: 0/10    ASSESSMENT/Changes in Function: Progressing well with being able to step up on 6 inch step with greater ease after stick roll. Pt reminded to increase left knee flexion with self lunge stretch.     Patient will continue to benefit from skilled PT services to modify and progress therapeutic interventions, address functional mobility deficits, address ROM deficits, address strength deficits, analyze and address soft tissue restrictions, analyze and cue movement patterns, analyze and modify body mechanics/ergonomics, assess and modify postural abnormalities and address imbalance/dizziness to attain remaining goals. []  See Plan of Care  [x]  See progress note/recertification  []  See Discharge Summary         Progress towards goals / Updated goals:  1. Pt will increase MMT left hip flex to 3+/5, left knee flex to 4/5 to improve ability to tolerate ambulation in the community. 2. Pt will improve TUG score to 30 sec or less with SPC to decrease the pt's fall risk. NOT met. 12/19/18  3.  Pt will be observed performing transfers to be able to safely transfer into his car without fall risk.        PLAN  []  Upgrade activities as tolerated     [x]  Continue plan of care  []  Update interventions per flow sheet       []  Discharge due to:_  []  Other:_      Ayala Casiano, VERONICA 12/19/2018  11:15 AM    Future Appointments   Date Time Provider Noel Harp   12/26/2018 11:30 AM Elzie Bamberger, PTA [facility]   12/31/2018  8:30 AM Elzie Bamberger, PTA [facility]   1/3/2019  9:30 AM Nuris Shell, PT [facility]   1/7/2019  9:00 AM Elzie Bamberger, PTA [facility]   1/9/2019 10:00 AM Nuris Shell PT [facility]

## 2018-12-26 ENCOUNTER — HOSPITAL ENCOUNTER (OUTPATIENT)
Dept: PHYSICAL THERAPY | Age: 83
Discharge: HOME | End: 2018-12-26
Payer: MEDICARE

## 2018-12-26 PROCEDURE — 97110 THERAPEUTIC EXERCISES: CPT

## 2018-12-26 NOTE — PROGRESS NOTES
PT DAILY TREATMENT NOTE 10-18    Patient Name: Erik Eckert  Date:2018  : 1935  [x]  Patient  Verified  Payor: [place] MEDICARE / Plan: VA MEDICARE PART A & B / Product Type: Medicare /    In time: 11:30 Out time:12:07  Total Treatment Time (min): 37  Visit #: 4 of 12    Medicare/BCBS Only   Total Timed Codes (min):  37 1:1 Treatment Time:  30       Treatment Area: Gait disturbance [R26.9]    SUBJECTIVE  Pain Level (0-10 scale): 0/10  Any medication changes, allergies to medications, adverse drug reactions, diagnosis change, or new procedure performed?: [x] No    [] Yes (see summary sheet for update)  Subjective functional status/changes:   [] No changes reported  Pt states no current pain just stiffness. He is rolling his tight muscles at home. He still has a hard time picking up his left leg to get in the car. He also has a hard time with walking still. OBJECTIVE    37 min Therapeutic Exercise:  [x] See flow sheet :   Rationale: increase ROM and increase strength to improve the patients ability to ambulate and perform car transfers safely  With decreased fall risk          With   [] TE   [] TA   [] neuro   [] other: Patient Education: [x] Review HEP    [] Progressed/Changed HEP based on:   [] positioning   [] body mechanics   [] transfers   [] heat/ice application    [] other:      Other Objective/Functional Measures:   Lack of knee flexion during gait causing left toe drag during swing and LOB  Challenged with hip flexion for clearance of baseboard of car  Worked on assisted step up to practice for cars  Unable to perform standing [address] independently; educated on working on supine heel slides     Pain Level (0-10 scale) post treatment: 0/10    ASSESSMENT/Changes in Function: Pt making slow progress towards goals. He continues to have left LE weakness making car transfers and ambulation difficult. He has toe drag on the left during swing due to decreased clearance.  He also struggles with lifting his left LE to clear the car baseboard (~12 inches). Will continue to progress mobility and strength to improve independence and increase safety with transfers. Progress towards goals / Updated goals:  1. Pt will increase MMT left hip flex to 3+/5, left knee flex to 4/5 to improve ability to tolerate ambulation in the community. 2. Pt will improve TUG score to 30 sec or less with SPC to decrease the pt's fall risk. NOT met. 12/19/18  3.  Pt will be observed performing transfers to be able to safely transfer into his car without fall risk.     PLAN  [x]  Upgrade activities as tolerated     [x]  Continue plan of care  []  Update interventions per flow sheet       []  Discharge due to:_  []  Other:_      Aiden Vegas PTA 12/26/2018  8:49 AM    Future Appointments   Date Time Provider Noel Harp   12/26/2018 11:30 AM Connie Albert PTA [facility]   12/31/2018  8:30 AM Connie Albert PTA [facility]   1/3/2019  9:30 AM Juanpablo Richmond, PT [facility]   1/7/2019  9:00 AM Connie Albert PTA [facility]   1/9/2019 10:00 AM Juanpablo Richmond, PT [facility]

## 2018-12-31 ENCOUNTER — HOSPITAL ENCOUNTER (OUTPATIENT)
Dept: PHYSICAL THERAPY | Age: 83
Discharge: HOME | End: 2018-12-31
Payer: MEDICARE

## 2018-12-31 PROCEDURE — 97110 THERAPEUTIC EXERCISES: CPT

## 2018-12-31 NOTE — PROGRESS NOTES
PT DAILY TREATMENT NOTE 10-18 Patient Name: Dae Art Date:2018 : 1935 [x]  Patient  Verified Payor: [place] MEDICARE / Plan: Isaías Huff / Product Type: Medicare / In time: 8:30  Out time:9:15 Total Treatment Time (min): 45 Visit #: 5 of 12 Medicare/BCBS Only Total Timed Codes (min):  35 1:1 Treatment Time:  30 Treatment Area: Gait disturbance [R26.9] SUBJECTIVE Pain Level (0-10 scale): 8/10 stiff Any medication changes, allergies to medications, adverse drug reactions, diagnosis change, or new procedure performed?: [x] No    [] Yes (see summary sheet for update) Subjective functional status/changes:   [] No changes reported Pt reports increased pain/stiffness today in his left hip and leg. He states the left knee just doesn't want to bend. He has a walker at home but doesn't like to use it because he doesn't want to stay bent over. OBJECTIVE Modality rationale: decrease pain and increase tissue extensibility to improve the patients ability to improve ease of ambulation Min Type Additional Details  
 [] Estim:  []Unatt       []IFC  []Premod []Other:  []w/ice   []w/heat Position: Location:  
 [] Estim: []Att    []TENS instruct  []NMES []Other:  []w/US   []w/ice   []w/heat Position: Location:  
 []  Traction: [] Cervical       []Lumbar 
                     [] Prone          []Supine []Intermittent   []Continuous Lbs: 
[] before manual 
[] after manual  
 []  Ultrasound: []Continuous   [] Pulsed []1MHz   []3MHz W/cm2: 
Location:  
 []  Iontophoresis with dexamethasone Location: [] Take home patch  
[] In clinic  
10 []  Ice     [x]  heat 
[]  Ice massage 
[]  Laser  
[]  Anodyne Position:seated Location: left hip  
 []  Laser with stim 
[]  Other:  Position: Location:  
 []  Vasopneumatic Device Pressure:       [] lo [] med [] hi  
 Temperature: [] lo [] med [] hi  
[x] Skin assessment post-treatment:  [x]intact []redness- no adverse reaction 
  []redness  adverse reaction:  
 
35 min Therapeutic Exercise:  [x] See flow sheet :  
Rationale: increase ROM and increase strength to improve the patients ability to improve ease of ambulation With 
 [] TE 
 [] TA 
 [] neuro 
 [] other: Patient Education: [x] Review HEP [] Progressed/Changed HEP based on:  
[] positioning   [] body mechanics   [] transfers   [] heat/ice application   
[] other:   
 
Other Objective/Functional Measures:  
Educated pt to use walker until improvement in strength Educated wife of concern of ambulation and strength of left leg Pt should refer back to MD of continued weakness of left posterior leg HS MMT left 3-/5 Dragging of the left leg during gait increases risk for LOB Continues to be challenged with left hip flexion as well for car transfers Pain Level (0-10 scale) post treatment: 2/10 ASSESSMENT/Changes in Function: Pt is making limited progress towards goals. He continues to have limited progression of left leg strength needed for ambulation and transfers and was educated on use of RW for balance and safety. He has decrease knee flexion during gait causing leg to drag and sometime lose balance. Will suggest referring back to MD for continued weakness. Progress towards goals / Updated goals: 1. Pt will increase MMT left hip flex to 3+/5, left knee flex to 4/5 to improve ability to tolerate ambulation in the community. 2. Pt will improve TUG score to 30 sec or less with SPC to decrease the pt's fall risk.  
NOT met. 12/19/18 3. Pt will be observed performing transfers to be able to safely transfer into his car without fall risk.  PLAN [x]  Upgrade activities as tolerated     [x]  Continue plan of care 
[]  Update interventions per flow sheet      
[]  Discharge due to:_ 
[]  Other:_ Siomara Medina, ANJU 12/31/2018  12:57 PM 
 
 Future Appointments Date Time Provider Noel Katiuska 1/3/2019  9:30 AM Sheila Bustamante, VERONICA [facility]  
1/7/2019  9:00 AM Rudy Reyna PTA [facility]  
1/9/2019 10:00 AM Sheila Bustamante, PT [facility]

## 2019-01-03 ENCOUNTER — APPOINTMENT (OUTPATIENT)
Dept: PHYSICAL THERAPY | Age: 84
End: 2019-01-03
Payer: MEDICARE

## 2019-01-07 ENCOUNTER — HOSPITAL ENCOUNTER (OUTPATIENT)
Dept: PHYSICAL THERAPY | Age: 84
Discharge: HOME | End: 2019-01-07
Payer: MEDICARE

## 2019-01-07 PROCEDURE — 97110 THERAPEUTIC EXERCISES: CPT

## 2019-01-07 NOTE — PROGRESS NOTES
PT DAILY TREATMENT NOTE 10-18 Patient Name: Trey Valle Date:2019 : 1935 [x]  Patient  Verified Payor: [place] MEDICARE / Plan: Isaías Huff / Product Type: Medicare / In time: 9:00  Out time:9:33 Total Treatment Time (min): 33 Visit #: 6 of 12 Medicare/BCBS Only Total Timed Codes (min):  33 1:1 Treatment Time:  35 Treatment Area: Gait disturbance [R26.9] SUBJECTIVE Pain Level (0-10 scale): 4/10 Any medication changes, allergies to medications, adverse drug reactions, diagnosis change, or new procedure performed?: [x] No    [] Yes (see summary sheet for update) Subjective functional status/changes:   [] No changes reported Pt reports the walker is at home and he doesn't like walking with it. He notes the MD said he had a weak ligament in the back of the leg. He notes getting in the car is getting a little easier but he still has a hard time bending his knee when he walks. OBJECTIVE 33 min Therapeutic Exercise:  [x] See flow sheet :  
Rationale: increase ROM, increase strength, improve coordination, improve balance and increase proprioception to improve the patients ability to ambulate with decreased fall risk and improved gait pattern With 
 [] TE 
 [] TA 
 [] neuro 
 [] other: Patient Education: [x] Review HEP [] Progressed/Changed HEP based on:  
[] positioning   [] body mechanics   [] transfers   [] heat/ice application   
[] other:   
 
Other Objective/Functional Measures: SB flexion caused burning in the l/s and posterior left thigh but extension relieved it Continues with decreased HS strength in standing Circumduction of the left leg during gait causing increased instability walking with SPC; pt educated again to follow up with MD and use RW for more balance/support Assistance for high march to step to simulate car transfers Assistance for HS curls in standing Improving left HS flexibility and working on SLR strength but able to perform now with extensor lag Challenged with bridges with right leg forward Pain Level (0-10 scale) post treatment: 0/10 ASSESSMENT/Changes in Function: Pt continues to struggle with left hamstring strength which seems indicative of myotome loss despite max  Efforts of strengthening. While his car transfers have improved some and hip flexion strength has improved he continues to have decreased hamstring activation needed for gait and knee flexion for stairs/car transfers. Will continue for a few more sessions to see if any strength return is evident and then refer back to MD if no more progress is made. He does have peripheralization of symptoms with flexion and centralization with extension. Progress towards goals / Updated goals: 1. Pt will increase MMT left hip flex to 3+/5, left knee flex to 4/5 to improve ability to tolerate ambulation in the community. 2. Pt will improve TUG score to 30 sec or less with SPC to decrease the pt's fall risk.  
NOT met. 12/19/18 3. Pt will be observed performing transfers to be able to safely transfer into his car without fall risk.  
  
PLAN [x]  Upgrade activities as tolerated     [x]  Continue plan of care 
[]  Update interventions per flow sheet      
[]  Discharge due to:_ 
[]  Other:_ Dejan Pryor PTA 1/7/2019  8:48 AM 
 
Future Appointments Date Time Provider Noel Harp 1/7/2019  9:00 AM Kamari De La Paz, PTA [facility]  
1/9/2019 10:00 AM Mike Ha, PT [facility]

## 2019-01-10 ENCOUNTER — HOSPITAL ENCOUNTER (OUTPATIENT)
Dept: PHYSICAL THERAPY | Age: 84
Discharge: HOME | End: 2019-01-10
Payer: MEDICARE

## 2019-01-10 PROCEDURE — 97110 THERAPEUTIC EXERCISES: CPT

## 2019-01-10 PROCEDURE — 97140 MANUAL THERAPY 1/> REGIONS: CPT

## 2019-01-10 NOTE — PROGRESS NOTES
PT DAILY TREATMENT NOTE 10-18 Patient Name: Nicole Bryant Date:1/10/2019 : 1935 [x]  Patient  Verified Payor: [place] MEDICARE / Plan: Isaías Mills eric / Product Type: Medicare / In time:1130  Out time:1210 Total Treatment Time (min): 40 Visit #: 7 of 12 Medicare/BCBS Only Total Timed Codes (min):  40 1:1 Treatment Time:  40 Treatment Area: Gait disturbance [R26.9] SUBJECTIVE Pain Level (0-10 scale): 0/10 Any medication changes, allergies to medications, adverse drug reactions, diagnosis change, or new procedure performed?: [x] No    [] Yes (see summary sheet for update) Subjective functional status/changes:   [] No changes reported Reports he is 75% improved OBJECTIVE 32 min Therapeutic Exercise:  [] See flow sheet :  
Rationale: increase ROM, increase strength, improve coordination and improve balance to improve the patients ability to ease with ADL's 
 
8 min Manual Therapy:  Manual LE stretch, HS, Piriformis, SKTC, PNFLS rotation Rationale: decrease pain, increase ROM, increase tissue extensibility and decrease trigger points to ease with ADL's With 
 [] TE 
 [] TA 
 [] neuro 
 [] other: Patient Education: [x] Review HEP [] Progressed/Changed HEP based on:  
[] positioning   [] body mechanics   [] transfers   [] heat/ice application   
[] other:   
 
Other Objective/Functional Measures: All manual stretches with OP. Good foot clearance on the left during step over 1/2 foam.  
Pain Level (0-10 scale) post treatment: 0/10 ASSESSMENT/Changes in Function: Pt reports he does not have to use his can at home anymore and he can get into the car easier now.   
 
Patient will continue to benefit from skilled PT services to modify and progress therapeutic interventions, address functional mobility deficits, address ROM deficits, address strength deficits, analyze and address soft tissue restrictions, analyze and cue movement patterns, analyze and modify body mechanics/ergonomics, assess and modify postural abnormalities and address imbalance/dizziness to attain remaining goals. []  See Plan of Care [x]  See progress note/recertification 
[]  See Discharge Summary Progress towards goals / Updated goals: 1. Pt will increase MMT left hip flex to 3+/5, left knee flex to 4/5 to improve ability to tolerate ambulation in the community. 2. Pt will improve TUG score to 30 sec or less with SPC to decrease the pt's fall risk.  
NOT met. 12/19/18 3. Pt will be observed performing transfers to be able to safely transfer into his car without fall risk.  
  
 
 
 
PLAN [x]  Upgrade activities as tolerated     [x]  Continue plan of care 
[]  Update interventions per flow sheet      
[]  Discharge due to:_ 
[]  Other:_ Malgorzata Rivas, PT 1/10/2019  2:35 PM 
 
Future Appointments Date Time Provider Noel Harp 1/28/2019 10:00 AM Kylie Gilmore, PT [facility]  
2/4/2019 10:30 AM Cassidy Lynn, PTA [facility]  
2/6/2019 10:30 AM Kylie Gilmore, PT [facility]  
2/11/2019 10:30 AM Cassidy Lynn, PTA [facility]  
2/13/2019 10:30 AM Kylie Gilmore PT [facility]  
2/18/2019 10:30 AM Cassidy Shane, PTA [facility]  
2/20/2019 10:30 AM Kylie Gilmore, PT [facility]  
2/25/2019 10:30 AM Cassidy Lynn, PTA [facility]  
2/27/2019 10:30 AM [facility] Gilmore, [facility]

## 2019-01-28 ENCOUNTER — HOSPITAL ENCOUNTER (OUTPATIENT)
Dept: PHYSICAL THERAPY | Age: 84
Discharge: HOME | End: 2019-01-28
Payer: MEDICARE

## 2019-01-28 PROCEDURE — 97110 THERAPEUTIC EXERCISES: CPT

## 2019-01-28 NOTE — PROGRESS NOTES
PT DAILY TREATMENT NOTE 10-18 Patient Name: Raiza Wakefield Date:2019 : 1935 [x]  Patient  Verified Payor: [place] MEDICARE / Plan: Isaías Huff / Product Type: Medicare / In time:1001  Out time:1033 Total Treatment Time (min): 32 Visit #: 8 of 12 Medicare/BCBS Only Total Timed Codes (min):  32 1:1 Treatment Time:  21 Treatment Area: Gait disturbance [R26.9] SUBJECTIVE Pain Level (0-10 scale): 4/10 Any medication changes, allergies to medications, adverse drug reactions, diagnosis change, or new procedure performed?: [x] No    [] Yes (see summary sheet for update) Subjective functional status/changes:   [] No changes reported Reports he has had a lot of MD's appts lately. OBJECTIVE 32 min Therapeutic Exercise:  [] See flow sheet :  
Rationale: increase ROM and increase strength to improve the patients ability to ease with ADL's With 
 [] TE 
 [] TA 
 [] neuro 
 [] other: Patient Education: [x] Review HEP [] Progressed/Changed HEP based on:  
[] positioning   [] body mechanics   [] transfers   [] heat/ice application   
[] other:   
 
Other Objective/Functional Measures: NO pain , mostly stiffness. Piriformis stretch performed with greater ease. TUG=31 sec. Educated on picking up feet during swing phase  and bending left knee during toe off Pain Level (0-10 scale) post treatment: 0/10 ASSESSMENT/Changes in Function: Pt reports he can put on his shoes and socks with greater ease when he has more flexibility. Patient will continue to benefit from skilled PT services to modify and progress therapeutic interventions, address functional mobility deficits, address ROM deficits, address strength deficits, analyze and address soft tissue restrictions, analyze and cue movement patterns, analyze and modify body mechanics/ergonomics, assess and modify postural abnormalities and address imbalance/dizziness to attain remaining goals. []  See Plan of Care 
[]  See progress note/recertification 
[]  See Discharge Summary Progress towards goals / Updated goals: 1. Pt will increase MMT left hip flex to 3+/5, left knee flex to 4/5 to improve ability to tolerate ambulation in the community. 2. Pt will improve TUG score to 30 sec or less with SPC to decrease the pt's fall risk.  
NOT met. 12/19/18 3. Pt will be observed performing transfers to be able to safely transfer into his car without fall risk.  
  
 
 
 
PLAN [x]  Upgrade activities as tolerated     [x]  Continue plan of care 
[]  Update interventions per flow sheet      
[]  Discharge due to:_ 
[]  Other:_ Teddy Lennon, PT 1/28/2019  10:09 AM 
 
Future Appointments Date Time Provider Noel Harp 2/4/2019 10:30 AM Sun Bourne, PTA [facility]  
2/6/2019 10:30 AM Vaughn Albert, PT [facility]  
2/11/2019 10:30 AM Sarthak Quintero, PTA [facility]  
2/13/2019 10:30 AM Vaughn Albert, PT [id number]  
2/18/2019 10:30 AM Vaughn Albert, PT [facility]  
2/20/2019 10:30 AM Vaughn Albert, PT [facility]  
2/25/2019 10:30 AM Sun Bourne, PTA [facility]  
2/27/2019 10:30 AM Vaughn Albert, PT [facility]

## 2019-01-30 NOTE — PROGRESS NOTES
[facility] [address] [facility] 
[phone] fax Continued Plan of Care/ Re-certification for Physical Therapy Services 
  
Patient name: Janell Easton Start of Care: 18 Referral source:Dr Price : 1935 Medical/Treatment Diagnosis: Gait disturbance [R26.9] Payor: [place] MEDICARE / Plan: [facility] [address] / Product Type: Medicare /  Onset Date:>1 yr. Hospitalized recently for leg pain 
   
Prior Hospitalization: see medical history Provider#: [id number] Medications: Verified on Patient Summary List    
Comorbidities: CAD Prior Level of Function: States he lives with his wife and he is retired . Reports having progressively worsening stiffnessin his left LE over the past several years. 
   
 
Visits from Start of Care: 19    Missed Visits: 1 The Plan of Care and following information is based on the patient's current status: 
Goal:. Pt will increase MMT left hip flex to 3+/5, left knee flex to 4/5 to improve ability to tolerate ambulation in the community Status at last note/certification: 
Current Status: Progressing Goal:Pt will improve TUG score to 30 sec or less with SPC to decrease the pt's fall risk. Status at last note/certification: 
Current Status: not met Goal:Pt will be observed performing transfers to be able to safely transfer into his car without fall risk.  
Status at last note/certification: 
Current Status: Progressing. Pt was observed performing a car transfer and is aware of potential risks if not done properly. Key functional changes: Pt is slowly progressing in therapy, and has reported 75% improvement. He has been compliant with therapy, however he has been to several MD's appts recently, hence the gap in treatment. TUG score=31 sec   Pt is slowly progressing with ambulation function, but continues to require periodic reminders for foot clearance to prevent a fall. Pt continues to ambulate with a SPC. Problems/ barriers to goal attainment: NONE. Problem List: decrease ROM, decrease strength, impaired gait/ balance, decrease ADL/ functional abilitiies, decrease activity tolerance, decrease flexibility/ joint mobility and decrease transfer abilities Treatment Plan: Therapeutic exercise, Therapeutic activities, Neuromuscular re-education, Physical agent/modality, Gait/balance training, Manual therapy, Patient education, Self Care training, Functional mobility training, Home safety training and Stair training Patient Goal (s) has been updated and includes: improve walking Goals for this certification period to be accomplished in 4 weeks: 
11. Pt will increase MMT left hip flex to 4/5 or greater, left knee flex to 4/5 to improve ability to tolerate ambulation in the community. 2. Pt will improve TUG score to 30 sec or less with SPC to decrease the pt's fall risk.  
3. Pt will ambulate stairs x2 with bilateral HR to ease with ADL's. Frequency / Duration: Patient to be seen 2 times per week for 4 weeks: 
Assessment / Recommendations:  
Patient will continue to benefit from skilled PT services to modify and progress therapeutic interventions, address functional mobility deficits, address ROM deficits, address strength deficits, analyze and address soft tissue restrictions, analyze and cue movement patterns, analyze and modify body mechanics/ergonomics, assess and modify postural abnormalities and address imbalance/dizziness to attain remaining goals. Certification Period: 1/28/19-2/27/19 Erika Avalos, PT 1/29/2019 10:57 PM 
 
________________________________________________________________________ I certify that the above Therapy Services are being furnished while the patient is under my care. I agree with the treatment plan and certify that this therapy is necessary. [] I have read the above and request that my patient continue as recommended. [] I have read the above report and request that my patient continue therapy with the following changes/special instructions: _______________________________________ [] I have read the above report and request that my patient be discharged from therapy [de-identified] Signature:____________Date:_________TIME:________ 
 
[employer], Date and Time must be completed for valid certification ** Please sign and return to [facility]  [address] [facility] 
[phone] fax

## 2019-02-04 ENCOUNTER — HOSPITAL ENCOUNTER (OUTPATIENT)
Dept: PHYSICAL THERAPY | Age: 84
Discharge: HOME | End: 2019-02-04
Payer: MEDICARE

## 2019-02-04 PROCEDURE — 97110 THERAPEUTIC EXERCISES: CPT

## 2019-02-04 NOTE — PROGRESS NOTES
PT DAILY TREATMENT NOTE 10-18 Patient Name: Yelitza Jolly Date:2019 : 1935 [x]  Patient  Verified Payor: [place] MEDICARE / Plan: [place] [place] eric / Product Type: Medicare / In time:10:30  Out time:11:03 Total Treatment Time (min): 33 Visit #: 9 of 12 Medicare/BCBS Only Total Timed Codes (min):  33 1:1 Treatment Time:  23 Treatment Area: Gait disturbance [R26.9] SUBJECTIVE Pain Level (0-10 scale): 0/10 Any medication changes, allergies to medications, adverse drug reactions, diagnosis change, or new procedure performed?: [x] No    [] Yes (see summary sheet for update) Subjective functional status/changes:   [] No changes reported Pt reports when he followed up with the MD he told him to keep strengthening. He states it has gotten a little easier to bend his knee. He wants to keep working on the strength in his left leg. OBJECTIVE 33 min Therapeutic Exercise:  [x] See flow sheet :  
Rationale: increase ROM, increase strength, improve coordination, improve balance and increase proprioception to improve the patients ability to ambulate and negotiate stairs with greater ease With 
 [] TE 
 [] TA 
 [] neuro 
 [] other: Patient Education: [x] Review HEP [] Progressed/Changed HEP based on:  
[] positioning   [] body mechanics   [] transfers   [] heat/ice application   
[] other:   
 
Other Objective/Functional Measures:  
Continues to ambulate with decreased left knee flexion and foot clearance increasing his risk for falls Cues to prevent l/s compensation on machines No increased pain during session Continues to have complaints of weakness/stiffness Pain Level (0-10 scale) post treatment: 0/10 ASSESSMENT/Changes in Function: Pt making slow progress towards goals with continued decreased left LE strength. He struggles with foot clearance during ambulation which increases his risk for falls.  Will continue working on strength and balance for functional tasks like stairs, ambulation and car transfers with improved safety. Goals for this certification period to be accomplished in 4 weeks: 
11. Pt will increase MMT left hip flex to 4/5 or greater, left knee flex to 4/5 to improve ability to tolerate ambulation in the community. 2. Pt will improve TUG score to 30 sec or less with SPC to decrease the pt's fall risk.  
3. Pt will ambulate stairs x2 with bilateral HR to ease with ADLs. PLAN [x]  Upgrade activities as tolerated     [x]  Continue plan of care 
[]  Update interventions per flow sheet      
[]  Discharge due to:_ 
[]  Other:_ Dez Su, ANJU 2/4/2019  9:04 AM 
 
Future Appointments Date Time Provider Noel Harp 2/4/2019 10:30 AM Lenny Mcknight PTA [facility]  
2/6/2019 10:30 AM Robby Sue, PT [facility]  
2/11/2019 10:30 AM Angel Adaems, PTA [facility]  
2/13/2019 10:30 AM Robby Sue, PT [id number] [facility]  
2/18/2019 10:30 AM Robby Sue, PT [id number] [facility]  
2/20/2019 10:30 AM Robby Sue, PT [id number] [facility]  
2/25/2019 10:30 AM Lenny Mcknight PTA [facility]  
2/27/2019 10:30 AM Robby Sue, PT [facility]

## 2019-02-06 ENCOUNTER — APPOINTMENT (OUTPATIENT)
Dept: PHYSICAL THERAPY | Age: 84
End: 2019-02-06
Payer: MEDICARE

## 2019-02-11 ENCOUNTER — HOSPITAL ENCOUNTER (OUTPATIENT)
Dept: PHYSICAL THERAPY | Age: 84
Discharge: HOME | End: 2019-02-11
Payer: MEDICARE

## 2019-02-11 PROCEDURE — 97110 THERAPEUTIC EXERCISES: CPT

## 2019-02-11 NOTE — PROGRESS NOTES
PT DAILY TREATMENT NOTE 10-18 Patient Name: Trey Valle Date:2019 : 1935 [x]  Patient  Verified Payor: [place] MEDICARE / Plan: Isaías [place] / Product Type: Medicare / In time:10:34  Out time:11:00 Total Treatment Time (min): 26 Visit #: 10 of 12 Medicare/BCBS Only Total Timed Codes (min):  26 1:1 Treatment Time:  26  
 
 
Treatment Area: Gait disturbance [R26.9] SUBJECTIVE Pain Level (0-10 scale): 4/10 left knee Any medication changes, allergies to medications, adverse drug reactions, diagnosis change, or new procedure performed?: [x] No    [] Yes (see summary sheet for update) Subjective functional status/changes:   [] No changes reported \"I'm not hurting or anything, I just stiff\". OBJECTIVE 26 min Therapeutic Exercise:  [x] See flow sheet :  
Rationale: increase ROM and increase strength to improve the patients ability to perform ADL's safely and without pain. With 
 [x] TE 
 [] TA 
 [] neuro 
 [] other: Patient Education: [x] Review HEP [] Progressed/Changed HEP based on:  
[] positioning   [] body mechanics   [] transfers   [] heat/ice application   
[] other:   
 
Other Objective/Functional Measures: Performed TE's per flow sheet without increased pain. Pain Level (0-10 scale) post treatment: 0/10 ASSESSMENT/Changes in Function: Pt performed TE's well. Pt achieved LTG#2 completing the TUG in 25 seconds. Progressing towards LTG#1 with left hip flexion MMT 3+/5 and left knee flexion 3+/5. Patient will continue to benefit from skilled PT services to modify and progress therapeutic interventions, address functional mobility deficits, address ROM deficits, address strength deficits, analyze and address soft tissue restrictions and analyze and cue movement patterns to attain remaining goals. []  See Plan of Care 
[]  See progress note/recertification 
[]  See Discharge Summary Progress towards goals / Updated goals: 11. Pt will increase MMT left hip flex to 4/5 or greater, left knee flex to 4/5 to improve ability to tolerate ambulation in the community. Progressing 2/11/19 2. Pt will improve TUG score to 30 sec or less with SPC to decrease the pt's fall risk. MET 2/11/19  
3. Pt will ambulate stairs x2 with bilateral HR to ease with ADLs.   
 
 
PLAN 
[]  Upgrade activities as tolerated     []  Continue plan of care 
[]  Update interventions per flow sheet      
[]  Discharge due to:_ 
[]  Other:_ Laurita Chavira, ANJU 2/11/2019  10:13 AM 
 
Future Appointments Date Time Provider Noel Harp 2/11/2019 10:30 AM Adriana Anderson PTA [facility]  
2/13/2019 10:30 AM Vikki Hernandez, PT [id number] [facility]  
2/18/2019 10:30 AM Vikki Hernandez, PT [id number] [facility]  
2/20/2019 10:30 AM Vikki Hernandez, PT [facility]  
2/25/2019 10:30 AM Yesy Gloria PTA [facility]  
2/27/2019 10:30 AM Vikki Hernandez, PT [facility]

## 2019-02-13 ENCOUNTER — HOSPITAL ENCOUNTER (OUTPATIENT)
Dept: PHYSICAL THERAPY | Age: 84
Discharge: HOME | End: 2019-02-13
Payer: MEDICARE

## 2019-02-13 PROCEDURE — 97110 THERAPEUTIC EXERCISES: CPT

## 2019-02-13 NOTE — PROGRESS NOTES
PT DAILY TREATMENT NOTE 10-18 Patient Name: Alcides Guerrero Date:2019 : 1935 [x]  Patient  Verified Payor: [place] MEDICARE / Plan: Isaías Huff / Product Type: Medicare / In HAEN:2627  Out time:1105 Total Treatment Time (min): 35 Visit #: 07 MK 10 Medicare/BCBS Only Total Timed Codes (min):  35 1:1 Treatment Time:  35 Treatment Area: Gait disturbance [R26.9] SUBJECTIVE Pain Level (0-10 scale): 5/10 Any medication changes, allergies to medications, adverse drug reactions, diagnosis change, or new procedure performed?: [x] No    [] Yes (see summary sheet for update) Subjective functional status/changes:   [] No changes reported Reports his left  knee is hurting a lot today. OBJECTIVE 35 min Therapeutic Exercise:  [] See flow sheet :  
Rationale: increase ROM, increase strength, improve coordination and improve balance to improve the patients ability to ease with ADL's With 
 [] TE 
 [] TA 
 [] neuro 
 [] other: Patient Education: [x] Review HEP [] Progressed/Changed HEP based on:  
[] positioning   [] body mechanics   [] transfers   [] heat/ice application   
[] other:   
 
Other Objective/Functional Measures: Worked on functional standing stretches. Due to left knee pain, leg extension and HS curls on the machines were held. Ambulates with a stiff legged gait and corking on decreasing circumduction during high steo Pain Level (0-10 scale) post treatment: 0/10 ASSESSMENT/Changes in Function: Progressing well. Pt reports he is getting in and out of his car with greater ease.   
 
Patient will continue to benefit from skilled PT services to modify and progress therapeutic interventions, address functional mobility deficits, address ROM deficits, address strength deficits, analyze and address soft tissue restrictions, analyze and cue movement patterns, analyze and modify body mechanics/ergonomics, assess and modify postural abnormalities and address imbalance/dizziness to attain remaining goals. []  See Plan of Care [x]  See progress note/recertification 
[]  See Discharge Summary Progress towards goals / Updated goals: 1. Pt will increase MMT left hip flex to 4/5 or greater, left knee flex to 4/5 to improve ability to tolerate ambulation in the community. Progressing 2/11/19 2. Pt will improve TUG score to 30 sec or less with SPC to decrease the pt's fall risk. MET 2/11/19  
3. Pt will ambulate stairs x2 with bilateral HR to ease with ADLs PLAN [x]  Upgrade activities as tolerated     [x]  Continue plan of care 
[]  Update interventions per flow sheet      
[]  Discharge due to:_ 
[]  Other:_ Sruthi Hillman, PT 2/13/2019  10:49 AM 
 
Future Appointments Date Time Provider Noel Harp 2/18/2019 10:30 AM Gab Fried, PT [facility]  
2/20/2019 10:30 AM Gab Fried, PT [facility]  
2/25/2019 10:30 AM Johana Lux PTA [facility]  
2/27/2019 10:30 AM Gab Fried, PT [facility]

## 2019-02-18 ENCOUNTER — HOSPITAL ENCOUNTER (OUTPATIENT)
Dept: PHYSICAL THERAPY | Age: 84
Discharge: HOME | End: 2019-02-18
Payer: MEDICARE

## 2019-02-18 PROCEDURE — 97110 THERAPEUTIC EXERCISES: CPT

## 2019-02-18 NOTE — PROGRESS NOTES
PT DAILY TREATMENT NOTE 10-18 Patient Name: Alfonso Leonard Date:2019 : 1935 [x]  Patient  Verified Payor: [place] MEDICARE / Plan: Isaías Huff / Product Type: Medicare / In time:1030  Out time:1110 Total Treatment Time (min): 40 Visit #: 4 of 8 Medicare/BCBS Only Total Timed Codes (min):  40 1:1 Treatment Time:  30 Treatment Area: Gait disturbance [R26.9] SUBJECTIVE Pain Level (0-10 scale): 4/10 left knee Any medication changes, allergies to medications, adverse drug reactions, diagnosis change, or new procedure performed?: [x] No    [] Yes (see summary sheet for update) Subjective functional status/changes:   [] No changes reported Reports his left knee hurt a lot today. OBJECTIVE Modality rationale: decrease inflammation and decrease pain to improve the patients ability to ease with ambulation Min Type Additional Details  
 [] Estim:  []Unatt       []IFC  []Premod []Other:  []w/ice   []w/heat Position: Location:  
 [] Estim: []Att    []TENS instruct  []NMES []Other:  []w/US   []w/ice   []w/heat Position: Location:  
 []  Traction: [] Cervical       []Lumbar 
                     [] Prone          []Supine []Intermittent   []Continuous Lbs: 
[] before manual 
[] after manual  
 []  Ultrasound: []Continuous   [] Pulsed []1MHz   []3MHz W/cm2: 
Location:  
 []  Iontophoresis with dexamethasone Location: [] Take home patch  
[] In clinic  
10 []  Ice     [x]  heat 
[]  Ice massage 
[]  Laser  
[]  Anodyne Position:supine Location:left knee  
 []  Laser with stim 
[]  Other:  Position: Location:  
 []  Vasopneumatic Device Pressure:       [] lo [] med [] hi  
Temperature: [] lo [] med [] hi  
[] Skin assessment post-treatment:  []intact []redness- no adverse reaction 
  []redness  adverse 30 min Therapeutic Exercise:  [] See flow sheet :  
 Rationale: increase strength, improve coordination, improve balance and increase proprioception to improve the patients ability to ease with ADL's With 
 [] TE 
 [] TA 
 [] neuro 
 [] other: Patient Education: [x] Review HEP [] Progressed/Changed HEP based on:  
[] positioning   [] body mechanics   [] transfers   [] heat/ice application   
[] other:   
 
Other Objective/Functional Measures: Pt very stiff and unstable today. Ambulates with SPC with a decreased weight bearing on the left LE, stiff legged gait, and trunk side bend away from involved side. Pain Level (0-10 scale) post treatment: 0/10 ASSESSMENT/Changes in Function: Poor tolerance today to exercise  on the left LE due to left knee pain from arthritis. No change despite static stretches Patient will continue to benefit from skilled PT services to modify and progress therapeutic interventions, address functional mobility deficits, address ROM deficits, address strength deficits, analyze and address soft tissue restrictions, analyze and cue movement patterns, analyze and modify body mechanics/ergonomics and assess and modify postural abnormalities to attain remaining goals. []  See Plan of Care 
[]  See progress note/recertification 
[]  See Discharge Summary Progress towards goals / Updated goals: 1. Pt will increase MMT left hip flex to 4/5 or greater, left knee flex to 4/5 to improve ability to tolerate ambulation in the community. Progressing 2/11/19 2. Pt will improve TUG score to 30 sec or less with SPC to decrease the pt's fall risk.  MET 2/11/19  
3. Pt will ambulate stairs x2 with bilateral HR to ease with ADLs PLAN [x]  Upgrade activities as tolerated     [x]  Continue plan of care 
[]  Update interventions per flow sheet      
[]  Discharge due to:_ 
[]  Other:_ Ani Arriaga, PT 2/18/2019  10:38 AM 
 
Future Appointments Date Time Provider Noel Harp 2/20/2019 10:30 AM Jody Casiano, PT [facility]  
2/25/2019 10:30 AM Preston Emery, ANJU [facility]  
2/27/2019 10:30 AM Jody Casiano, PT [facility]

## 2019-02-20 ENCOUNTER — HOSPITAL ENCOUNTER (OUTPATIENT)
Dept: PHYSICAL THERAPY | Age: 84
Discharge: HOME | End: 2019-02-20
Payer: MEDICARE

## 2019-02-20 PROCEDURE — 97110 THERAPEUTIC EXERCISES: CPT

## 2019-02-20 PROCEDURE — 97140 MANUAL THERAPY 1/> REGIONS: CPT

## 2019-02-20 NOTE — PROGRESS NOTES
PT DAILY TREATMENT NOTE 10-18 Patient Name: De Reasoner Date:2019 : 1935 [x]  Patient  Verified Payor: [place] MEDICARE / Plan: Isaías Huff / Product Type: Medicare / In time:1030  Out time:1121 Total Treatment Time (min): 51 Visit #: 5 of 7 Medicare/BCBS Only Total Timed Codes (min):  51 1:1 Treatment Time:  51 Treatment Area: Gait disturbance [R26.9] SUBJECTIVE Pain Level (0-10 scale): 4/10 Any medication changes, allergies to medications, adverse drug reactions, diagnosis change, or new procedure performed?: [x] No    [] Yes (see summary sheet for update) Subjective functional status/changes:   [] No changes reported His left knee continues to hurt a lot. OBJECTIVE 36 min Therapeutic Exercise:  [] See flow sheet :  
Rationale: increase ROM, increase strength, improve coordination and improve balance to improve the patients ability to ease with ADL's 
 
15 min Manual Therapy:  Manual HS, piriformis, SKTC stretch. Stick roll. Rationale: decrease pain, increase ROM, increase tissue extensibility and decrease trigger points to ease with ADL's With 
 [] TE 
 [] TA 
 [] neuro 
 [] other: Patient Education: [x] Review HEP [] Progressed/Changed HEP based on:  
[] positioning   [] body mechanics   [] transfers   [] heat/ice application   
[] other:   
 
Other Objective/Functional Measures: Pt continues to be unstable dynamically while ambulating into clinic with SPC. He was offered a RW bu refused. Worked on supine manual stretching including HS, Piriformis,  
Standing: HS with stick roll to calf, ITB, quad. Observed ambulating with improved knee flexion during swing phase of gait. Pain Level (0-10 scale) post treatment: 3/10 ASSESSMENT/Changes in Function: Progressing slowly. Pt has a lot of tone in his muscles but mostly due to decreased flexibility.  He stated he would be purchasing a  (rolling pin) for loosing up muscles for ease of flexibility and mobility. Patient will continue to benefit from skilled PT services to modify and progress therapeutic interventions, address functional mobility deficits, address ROM deficits, address strength deficits, analyze and address soft tissue restrictions and analyze and cue movement patterns to attain remaining goals. []  See Plan of Care 
[]  See progress note/recertification 
[]  See Discharge Summary Progress towards goals / Updated goals: 1. Pt will increase MMT left hip flex to 4/5 or greater, left knee flex to 4/5 to improve ability to tolerate ambulation in the community. Progressing 2/11/19 2. Pt will improve TUG score to 30 sec or less with SPC to decrease the pt's fall risk.  MET 2/11/19  
3. Pt will ambulate stairs x2 with bilateral HR to ease with ADLs 
 Progressing. Ambulating 4 inch and 6 inch step. In parALLEL BArs. 2/20/19 PLAN 
[]  Upgrade activities as tolerated     []  Continue plan of care 
[]  Update interventions per flow sheet      
[]  Discharge due to:_ 
[]  Other:_ Vivian Vincent, PT 2/20/2019  11:50 AM 
 
Future Appointments Date Time Provider Noel Harp 2/25/2019 10:30 AM Kamari De La Paz, PTA [facility]  
2/27/2019 10:30 AM Mike Ha, PT [facility]

## 2019-02-25 ENCOUNTER — HOSPITAL ENCOUNTER (OUTPATIENT)
Dept: PHYSICAL THERAPY | Age: 84
Discharge: HOME | End: 2019-02-25
Payer: MEDICARE

## 2019-02-25 PROCEDURE — 97110 THERAPEUTIC EXERCISES: CPT

## 2019-02-25 NOTE — PROGRESS NOTES
PT DAILY TREATMENT NOTE 10-18 Patient Name: Eddie Mora Date:2019 : 1935 [x]  Patient  Verified Payor: [place] MEDICARE / Plan: Isaías Sanchezeric / Product Type: Medicare / In time: 10:30  Out time:11:00 Total Treatment Time (min): 30 Visit #: 6 of 7 Medicare/BCBS Only Total Timed Codes (min):  30 1:1 Treatment Time:  25 Treatment Area: Gait disturbance [R26.9] SUBJECTIVE Pain Level (0-10 scale): 4/10 Any medication changes, allergies to medications, adverse drug reactions, diagnosis change, or new procedure performed?: [x] No    [] Yes (see summary sheet for update) Subjective functional status/changes:   [] No changes reported Pt reports a lot of stiffness and swelling in his legs. He states he is on a fluid pill to help. He notes if he could just get his knee bending he would be alright. He still has a hard time getting his leg into the car. He states he is going to get the MD to send him back for more therapy. OBJECTIVE 30 min Therapeutic Exercise:  [x] See flow sheet :  
Rationale: increase ROM, increase strength, improve coordination, improve balance and increase proprioception to improve the patients ability to perform car transfers safely With 
 [] TE 
 [] TA 
 [] neuro 
 [] other: Patient Education: [x] Review HEP [] Progressed/Changed HEP based on:  
[] positioning   [] body mechanics   [] transfers   [] heat/ice application   
[] other:   
 
Other Objective/Functional Measures:  
Discussed D/C next visit with updated HEP; educated pt the goal of therapy is to progress independence to manage symptoms on his own and that he needs to do that at home as we have hit plateau at this time Will update HEP for next/final visit Decreased march height on the left making car transfers difficult Decreased left knee flexion during gait due to weakness Worked on independent stretching today with strap to show pt he could do it on his own without assistance Pain Level (0-10 scale) post treatment: 0/10 ASSESSMENT/Changes in Function: Pt is making slow progress towards final goals in therapy. He continues to have decreased left hip flexion and knee flexion strength needed for car transfers and ambulation. He has been educated on working on exercises at home and will be given updated HEP for final session. Pt will benefit from continuing independently at home as he has plateaued in therapy at this time with further progression. Progress towards goals / Updated goals: 1. Pt will increase MMT left hip flex to 4/5 or greater, left knee flex to 4/5 to improve ability to tolerate ambulation in the community. Progressing 2/11/19 2. Pt will improve TUG score to 30 sec or less with SPC to decrease the pt's fall risk.  MET 2/11/19  
3. Pt will ambulate stairs x2 with bilateral HR to ease with ADLs 
 Progressing. Ambulating 4 inch and 6 inch step. In parALLEL BArs. 2/20/19 PLAN [x]  Upgrade activities as tolerated     [x]  Continue plan of care 
[]  Update interventions per flow sheet      
[]  Discharge due to:_ 
[]  Other:_ Alfonso Krause, PTA 2/25/2019  11:23 AM 
 
Future Appointments Date Time Provider Noel Harp 2/27/2019 10:30 AM Rena Singh PT [facility]

## 2019-02-27 ENCOUNTER — APPOINTMENT (OUTPATIENT)
Dept: PHYSICAL THERAPY | Age: 84
End: 2019-02-27
Payer: MEDICARE

## 2019-03-27 NOTE — ANCILLARY DISCHARGE INSTRUCTIONS
[facility] [address] [facility] 
[phone] fax Physical Therapy Discharge Summary 
 
 
  
Patient name: Oumar LOPES [facility] of Care: 12/13/18 Referral source:Dr Becca Mesa YLL: [id number] Medical/Treatment Diagnosis: Gait disturbance [R26.9] Payor: VA MEDICARE / Plan: VA MEDICARE PART A & B / Product Type: Medicare /  Onset Date:>1 yr. Hospitalized recently for leg pain 
   
Prior Hospitalization: see medical history Provider#: [id number] Medications: Verified on Patient Summary List    
Comorbidities: CAD Prior Level of Function: States he lives with his wife and he is retired . Reports having progressively worsening stiffnessin his left LE over the past several years. 
   
 
Visits from Start of Care: 25    Missed Visits: 1 Summary of Care: 1. Pt will increase MMT left hip flex to 4/5 or greater, left knee flex to 4/5 to improve ability to tolerate ambulation in the community. Progressing 2/11/19 2. Pt will improve TUG score to 30 sec or less with SPC to decrease the pt's fall risk.  MET 2/11/19  
3. Pt will ambulate stairs x2 with bilateral HR to ease with ADLs 
 Progressing. Ambulating 4 inch and 6 inch step. In parALLEL BArs. 2/20/19 
  
  Pt is making slow progress towards final goals in therapy. He continues to have decreased left hip flexion and knee flexion strength needed for car transfers and ambulation. He has been educated on working on exercises at home and will be given updated HEP for final session. Pt will benefit from continuing independently at home as he has plateaued in therapy at this time with further progression. Pt DC'd. ASSESSMENT/RECOMMENDATIONS: 
[x]Discontinue therapy: []Patient has reached or is progressing toward set goals []Patient is non-compliant or has abdicated 
    []Due to lack of appreciable progress towards set goals Ankit Subramanian, PT 3/27/2019 10:14 AM

## 2019-05-20 ENCOUNTER — HOSPITAL ENCOUNTER (OUTPATIENT)
Dept: PHYSICAL THERAPY | Age: 84
Discharge: HOME | End: 2019-05-20
Payer: MEDICARE

## 2019-05-20 PROCEDURE — 97530 THERAPEUTIC ACTIVITIES: CPT

## 2019-05-20 PROCEDURE — 97162 PT EVAL MOD COMPLEX 30 MIN: CPT

## 2019-05-20 NOTE — PROGRESS NOTES
PT DAILY TREATMENT NOTE/KNEE EVAL 10-18 Patient Name: Mark River Date:2019 : 1935 [x]  Patient  Verified Payor: VA MEDICARE / Plan: Isaías Huff / Product Type: Medicare / In time:9:31  Out time:10:02 Total Treatment Time (min): 31 Visit #: 1 of - Medicare/BCBS Only Total Timed Codes (min):  10 1:1 Treatment Time:    
 
Treatment Area: Bilateral primary osteoarthritis of knee [M17.0] SUBJECTIVE Pain Level (0-10 scale): 10 []constant []intermittent []improving []worsening []no change since onset Any medication changes, allergies to medications, adverse drug reactions, diagnosis change, or new procedure performed?: [x] No    [] Yes (see summary sheet for update) Subjective functional status/changes:  
 
Pt is an 81 yo M who presents with left knee pain of insidious onset after retiring years ago. He had therapy in the past which helped, and his MD wanted him to return to therapy following another round of injections. Pt reports he took his BP medication at 8am this morning. He takes medication for BP and he takes medication for swelling in his legs, but he can't remember the name of the medications. Denies any abnormal symptoms.   
  
Barriers: []pain []financial []time []transportation []other Motivation: high Substance use: []Alcohol []Tobacco []other:  
FABQ Score: []low []elevate Cognition: A & O x 4    Other: OBJECTIVE/EXAMINATION 
 
5 minutes - therapist on phone with MD's office 16 min [x]Eval                  []Re-Eval  
 
 
10 min Therapeutic Activity:  []  See flow sheet :  
Rationale: Educated patient and wife regarding BP norms, advised monitoring BP 3-4x per day and keeping a log, follow up with MD if BP remained elevated, signs/sx of heart attack/stroke and go to emergency room immediately if signs/sx, plan to hold therapy for 1 week to allow pt to follow up with MD before returning to therapy Educated patient regarding signs/sx of DVT and go to emergency room immediately if signs/sx With 
 [] TE 
 [] TA 
 [] neuro 
 [] other: Patient Education: [x] Review HEP [] Progressed/Changed HEP based on:  
[] positioning   [] body mechanics   [] transfers   [] heat/ice application   
[] other:   
 
Other Objective/Functional Measures:  
 
/98 taken manually prior to therapy O2 98%, HR 70bpm prior to therapy Physical Therapy Evaluation - Knee Posture: [] Varus    [x] Valgus    [] Recurvatum     
  [] Tibial Torsion    [] Foot Supination    [] Foot Pronation Describe: 
 
Gait:  [] Normal    [x] Abnormal    [] Antalgic    [] NWB    Device: 
  Describe: right lateral trunk lean, maintains weight over SPC held on right, decreased WS to left, decreased rae, decreased heel strike B 
 
ROM / Strength [x] Unable to assess Flexibility: [x] Unable to assess at this time Palpation: 
 Neg/Pos  Neg/Pos  Neg/Pos Joint Line neg Quad tendon neg Patellar ligament neg Patella neg Fibular head neg Pes Anserinus neg Tibial tubercle neg Hamstring tendons neg Infrapatellar fat pad neg Optional Tests: 
Girth Measurements:  
  Cm 5\" distal to patella Left 39.8 Right  40.8 Other tests/comments: SPC height was too high, adjusted SPC to correct height and pt reported improved comfort with ambulation 1+ pitting edema BLE No tenderness, increased temp, or redness B calves No findings indicative of DVT using Well's Clinical Prediction Rules Held further evaluation/treatment d/t elevated BP Instructed pt to be available by phone today as his MD's office was going to call once MD arrived at 11am.  Pt reported he was going to stop by his MD's office on the way home instead Pain Level (0-10 scale) post treatment: 0/10 ASSESSMENT/Changes in Function: See POC Patient will continue to benefit from skilled PT services to modify and progress therapeutic interventions, address functional mobility deficits, address ROM deficits, address strength deficits, analyze and address soft tissue restrictions, analyze and cue movement patterns, assess and modify postural abnormalities, address imbalance/dizziness and instruct in home and community integration to attain remaining goals. [x]  See Plan of Care 
[]  See progress note/recertification 
[]  See Discharge Summary Progress towards goals / Updated goals: 
See POC PLAN [x]  Upgrade activities as tolerated     []  Continue plan of care [x]  Update interventions per flow sheet      
[]  Discharge due to:_ 
[x]  Other: complete initial evaluation pending lowered BP Margot Doshi, PT 5/20/2019  9:20 AM

## 2019-05-20 NOTE — PROGRESS NOTES
[facility] [address] [facility] 
[phone] fax Plan of Care/ Statement of Necessity for Physical Therapy Services Patient name: Floyd Mann Start of Care: 2019 Referral source: Manolo Acevedo MD : 1935 Medical Diagnosis: Bilateral primary osteoarthritis of knee [M17.0] Payor: VA MEDICARE / Plan: [address] [place] [address] / Product Type: Medicare /  Onset Date:years, injections over the past few months Treatment Diagnosis: Left Knee Pain, Unsteady Gait Prior Hospitalization: see medical history Provider#: [id number] Medications: Verified on Patient summary List  
 Comorbidities: coronary artery disease, HTN Prior Level of Function: lives with family in a 1story home with 3 steps to enter with handrail, SPC for community ambulation, Ind with household ambulation, Ind with ADLs The Plan of Care and following information is based on the information from the initial evaluation. Assessment/ key information: Pt is an 79 yo M who presents with left knee pain of insidious onset after retiring years ago. He had therapy in the past which helped, and his MD wanted him to return to therapy following another round of injections. Pt ambulates with right lateral trunk lean, maintains weight over SPC held on right, decreased WS to left, decreased rae, and decreased heel strike B. No tenderness is noted B knees. 1+ pitting edema is evident BLE with 1cm girth difference compared B, and pt reports he takes medication for edema. No findings indicative of DVT using Well's Clinical Prediction Rules. Pt presented with significantly elevated BP prior to therapy evaluation. /98 taken manually, HR 70bpm, O2 98%. Pt denied any abnormal sx. Further evaluation/treatment was held d/t elevated BP, and MD was notified.   Pending lowered BP for safe participation in therapy, pt will benefit from skilled PT to address strength, ROM, flexibility, and functional mobility deficits in order to improve ease/safety with ambulation and daily tasks and improve QOL. Evaluation Complexity History MEDIUM  Complexity : 1-2 comorbidities / personal factors will impact the outcome/ POC ; Examination MEDIUM Complexity : 3 Standardized tests and measures addressing body structure, function, activity limitation and / or participation in recreation  ;Presentation MEDIUM Complexity : Evolving with changing characteristics  ; Clinical Decision Making MEDIUM Complexity : FOTO score of 26-74 Overall Complexity Rating: MEDIUM Problem List: pain affecting function, decrease ROM, decrease strength, impaired gait/ balance, decrease ADL/ functional abilitiies, decrease activity tolerance, decrease flexibility/ joint mobility and decrease transfer abilities Treatment Plan may include any combination of the following: Therapeutic exercise, Therapeutic activities, Neuromuscular re-education, Physical agent/modality, Gait/balance training, Manual therapy, Patient education, Self Care training, Functional mobility training, Home safety training and Stair training Patient / Family readiness to learn indicated by: asking questions, trying to perform skills and interest 
Persons(s) to be included in education: patient (P) and family support person (FSP);list wife Barriers to Learning/Limitations: None Patient Goal (s): activate my limbs Patient Self Reported Health Status: good Rehabilitation Potential: good Short Term Goals: To be accomplished in 1 weeks: 1. Pt will be compliant with initial HEP in order to optimize therapy outcomes Long Term Goals: To be accomplished in 8 weeks: 1. Pt will improve FOTO by 10 points in order to demonstrate functional improvement 2. Pt will report 50% improvement in sx in order to improve QOL. 3. Pt will report a little difficulty with housework in order to demonstrate improved ease of household management. Frequency / Duration: Patient to be seen 2-3 times per week for 8 weeks. Patient/ Caregiver education and instruction: Diagnosis, prognosis, activity modification and exercises 
 [x]  Plan of care has been reviewed with PTA Certification Period: 5/20/19 to 7/17/19 Lewis Dawson, PT 5/20/2019 9:20 AM 
 
________________________________________________________________________ I certify that the above Therapy Services are being furnished while the patient is under my care. I agree with the treatment plan and certify that this therapy is necessary. [de-identified] Signature:____________Date:_________TIME:________ 
 
[employer], Date and Time must be completed for valid certification ** Please sign and return to [facility]  [address] [facility] [place] 
[phone] fax

## 2019-06-10 ENCOUNTER — HOSPITAL ENCOUNTER (OUTPATIENT)
Dept: PHYSICAL THERAPY | Age: 84
Discharge: HOME | End: 2019-06-10
Payer: MEDICARE

## 2019-06-10 PROCEDURE — 97110 THERAPEUTIC EXERCISES: CPT

## 2019-06-10 PROCEDURE — 97530 THERAPEUTIC ACTIVITIES: CPT

## 2019-06-10 NOTE — PROGRESS NOTES
PT DAILY TREATMENT NOTE 10-18    Patient Name: Naomi Ornelas  Date:6/10/2019  : 1935  [x]  Patient  Verified  Payor: VA MEDICARE / Plan: VA MEDICARE PART A & B / Product Type: Medicare /    In time:11:00 Out time:11:44  Total Treatment Time (min): 44  Visit #: 2 of     Medicare/BCBS Only   Total Timed Codes (min):  39 1:1 Treatment Time:  44       Treatment Area: Bilateral primary osteoarthritis of knee [M17.0]    SUBJECTIVE  Pain Level (0-10 scale): 0/10 \"stiff\"  Any medication changes, allergies to medications, adverse drug reactions, diagnosis change, or new procedure performed?: [x] No    [] Yes (see summary sheet for update)  Subjective functional status/changes:   [] No changes reported  Pt reports that he saw his MD after his BP was really high at his physical therapy evaluation. They prescribed some new BP medication, which he has been taking for ~2 weeks. He can't remember the name of the medication. Pt reports he took his BP medication around 8am.  He had breakfast.  Denies any abnormal symptoms. OBJECTIVE      5 min Completion of Initial Evaluation, No Charge - BLE strength and B knee AROM assessment        27 min Therapeutic Exercise:  [x] See flow sheet :   Rationale: increase ROM and increase strength to improve the patients ability to improve ease of ambulation and daily tasks     12 min Therapeutic Activity:  []  See flow sheet : monitoring BP, pt education    Rationale:  To ensure patient's safe participation in therapy         With   [] TE   [x] TA   [] neuro   [] other: Patient Education: [x] Review HEP    [] Progressed/Changed HEP based on:   [] positioning   [] body mechanics   [] transfers   [] heat/ice application    [x] other: reviewed signs/sx of heart attack/stroke and go to emergency room immediately if sign/sx, reviewed BP norms, importance of taking BP medication at the same time every day, continue to monitor BP 3-4x per day and keep a log, follow up with MD if BP remained elevated      Other Objective/Functional Measures:     /86, HR 83bpm taken electronically prior to therapy  /76 taken manually after 4 minute seated rest break    BLE Strength  Hip Flexion 4+/5 B  Hip ER Right 4+/5, Left 4/5  Hip IR 4/5 B  Knee Extension Right 4+/5, Left 4/5  Knee Flexion Right 4+/5, Left 3+/5  DF 4+/5    B Knee AROM  Right 0-110 dgrs  Left 0-85 dgrs     Challenged with HSC with green TB on left  No pain with interventions   Held balance assessment and standing interventions d/t elevated BP    Assessed BP following table interventions  /76 taken manually  /76 taken manually after 3 minute seated rest break     Gave and reviewed initial HEP    Pain Level (0-10 scale) post treatment: 0/10    ASSESSMENT/Changes in Function:     Initiated treatment per POC. Pt was motivated in therapy and put forth good effort with interventions. Pt was prescribed new BP medication following significantly elevated BP at initial evaluation. Initial evaluation was completed this visit as BP has lowered compared to initial evaluation; however, pt continues to present with asymptomatic elevated BP. Pt was instructed not to perform HEP if BP was higher than clinic's cutoff of 180/100. Called MD's office and left message asking for BP range for participation in therapy. Pt presents with mild strength deficits of left > right LE and decreased left knee flexion ROM. Findings consistent with referring dx of arthritic changes. Pt will benefit from skilled PT to address strength, ROM, flexibility, and functional mobility deficits in order to improve ease of ambulation/daily tasks.         Patient will continue to benefit from skilled PT services to modify and progress therapeutic interventions, address functional mobility deficits, address ROM deficits, address strength deficits, analyze and address soft tissue restrictions, analyze and cue movement patterns, assess and modify postural abnormalities, address imbalance/dizziness and instruct in home and community integration to attain remaining goals. Progress towards goals / Updated goals:  1. Pt will be compliant with initial HEP in order to optimize therapy outcomes. Initiated this visit 6/10/19   Long Term Goals: To be accomplished in 8 weeks:  1. Pt will improve FOTO by 10 points in order to demonstrate functional improvement  2. Pt will report 50% improvement in sx in order to improve QOL.   3. Pt will report a little difficulty with housework in order to demonstrate improved ease of household management.       PLAN  []  Upgrade activities as tolerated     [x]  Continue plan of care  []  Update interventions per flow sheet       []  Discharge due to:_  [x]  Other: assess Romberg Floor/Foam and TUG for balance assessment in future sessions pending lowered BP       Lewis Dawson, PT 6/10/2019  11:02 AM    Future Appointments   Date Time Provider Noel Harp   6/14/2019 12:00 PM Avtar Law PT [facility]   6/17/2019  9:00 AM Yulia Epstein [facility]   6/21/2019 12:00 PM Avtar Law PT [facility]   6/24/2019 11:00 AM Yulia Epstein [facility]   6/28/2019 10:30 AM Avtar Law PT [facility]   7/1/2019 10:30 AM Sandhya Trevizo PT [facility]   7/3/2019 10:30 AM Avtar Law PT [facility]   7/8/2019 11:00 AM Capgoyo Trevizo, PT [facility]   7/12/2019 12:00 PM Avtar Law PT [facility]   7/15/2019 11:00 AM Sandhya Trevizo, PT [facility]   7/19/2019 10:30 AM Avtar Law, PT [facility]

## 2019-06-14 ENCOUNTER — HOSPITAL ENCOUNTER (OUTPATIENT)
Dept: PHYSICAL THERAPY | Age: 84
Discharge: HOME | End: 2019-06-14
Payer: MEDICARE

## 2019-06-14 PROCEDURE — 97110 THERAPEUTIC EXERCISES: CPT

## 2019-06-14 NOTE — PROGRESS NOTES
PT DAILY TREATMENT NOTE 10-18    Patient Name: Félix Rod  Date:2019  : [id number]  [x]  Patient  Verified  Payor: VA MEDICARE / Plan: VA MEDICARE PART A & B / Product Type: Medicare /    In time:1200  Out time:1230  Total Treatment Time (min): 30  Visit #: 3 of -    Medicare/BCBS Only   Total Timed Codes (min):  30 1:1 Treatment Time:  30       Treatment Area: Bilateral primary osteoarthritis of knee [M17.0]    SUBJECTIVE  Pain Level (0-10 scale): 0/10  Any medication changes, allergies to medications, adverse drug reactions, diagnosis change, or new procedure performed?: [x] No    [] Yes (see summary sheet for update)  Subjective functional status/changes:   [] No changes reported  Reports he does not know why his BP is so high , he has lost a lot of weight, 35 #    OBJECTIVE      30 min Therapeutic Exercise:  [] See flow sheet :   Rationale: increase ROM, increase strength, improve coordination and improve balance to improve the patients ability to ease with ADL's       With   [] TE   [] TA   [] neuro   [] other: Patient Education: [x] Review HEP    [] Progressed/Changed HEP based on:   [] positioning   [] body mechanics   [] transfers   [] heat/ice application    [] other:      Other Objective/Functional Measures: BP Dhiexr=122/94, 5 ygmm=300/98  BP at end of rxurhcx=852/94  Pt reported he did not have any HA's or dizziness. Weight shift with lean to the  Right due to left LE weakness. Pt requires guarding during standing ex.'s.   Decreased left glute activation , and left hip flexor weakness. Pain Level (0-10 scale) post treatment: 0/10    ASSESSMENT/Changes in Function: Initiated ex's pt requires tactile feedback for posture treatment.      Patient will continue to benefit from skilled PT services to modify and progress therapeutic interventions, address functional mobility deficits, address ROM deficits, address strength deficits, analyze and address soft tissue restrictions, analyze and cue movement patterns, analyze and modify body mechanics/ergonomics and address imbalance/dizziness to attain remaining goals. [x]  See Plan of Care  []  See progress note/recertification  []  See Discharge Summary         Progress towards goals / Updated goals:    1. Pt will be compliant with initial HEP in order to optimize therapy outcomes   Long Term Goals: To be accomplished in 8 weeks:  1. Pt will improve FOTO by 10 points in order to demonstrate functional improvement  2. Pt will report 50% improvement in sx in order to improve QOL. 3. Pt will report a little difficulty with housework in order to demonstrate improved ease of household management.         PLAN  []  Upgrade activities as tolerated     [x]  Continue plan of care  []  Update interventions per flow sheet       []  Discharge due to:_  []  Other:_      Leona Parekh, PT 6/14/2019  12:10 PM    Future Appointments   Date Time Provider Noel Harp   6/17/2019  9:00 AM Emily Davis TTEYVUE [facility]   6/21/2019 12:00 PM Keith Huggins, PT [facility]   6/24/2019 11:00 AM Jane Jenkins YOCMEAG [facility]   6/28/2019 10:30 AM Keith Huggins, PT [facility]   7/1/2019 10:30 AM Saint Agatha James, PT [id number] [facility]   7/3/2019 10:30 AM Keith Huggins, PT [id number] [facility]   7/8/2019 11:00 AM Saint Agatha James, PT [facility]   7/12/2019 12:00 PM Keith Huggins, PT [id number] [facility]   7/15/2019 11:00 AM Kiersten James, PT [facility]   7/19/2019 10:30 AM Keith Huggins, PT [facility]

## 2019-06-17 ENCOUNTER — HOSPITAL ENCOUNTER (OUTPATIENT)
Dept: PHYSICAL THERAPY | Age: 84
Discharge: HOME | End: 2019-06-17
Payer: MEDICARE

## 2019-06-17 NOTE — PROGRESS NOTES
PT DAILY TREATMENT NOTE 10-18    Patient Name: Randy Cueva  Date:2019  : [id number]  [x]  Patient  Verified  Payor: [place] MED[place]ARE / Plan: [place] MEDICARE PART A & B / Product Type: Medicare /    In time: 8:56  Out time: 9:10  Total Treatment Time (min): N/A  Visit #: NC of 16-24    Medicare/BCBS Only   Total Timed Codes (min):  N/A 1:1 Treatment Time:  N/A       Treatment Area: Bilateral primary osteoarthritis of knee [M17.0]    SUBJECTIVE  Pain Level (0-10 scale): 0/10  Any medication changes, allergies to medications, adverse drug reactions, diagnosis change, or new procedure performed?: [x] No    [] Yes (see summary sheet for update)  Subjective functional status/changes:   [] No changes reported  They feel the same. Patient reports he has been taking his medications. OBJECTIVE    NC min Therapeutic Exercise:  [] See flow sheet :   Rationale: increase ROM, increase strength, improve coordination and improve balance to improve the patients ability to ease with ADL's       With   [] TE   [] TA   [] neuro   [] other: Patient Education: [x] Review HEP    [] Progressed/Changed HEP based on:   [] positioning   [] body mechanics   [] transfers   [] heat/ice application    [] other:      Other Objective/Functional Measures:   BP Before=203/96 via machine, 5 mins=190/82 manually  Session rescheduled due to elevated BP    Pain Level (0-10 scale) post treatment: -/10    ASSESSMENT/Changes in Function: Patient not seen, appt rescheduled to elevated BP. Patient will continue to benefit from skilled PT services to modify and progress therapeutic interventions, address functional mobility deficits, address ROM deficits, address strength deficits, analyze and address soft tissue restrictions, analyze and cue movement patterns, analyze and modify body mechanics/ergonomics and address imbalance/dizziness to attain remaining goals.      [x]  See Plan of Care  []  See progress note/recertification  []  See Discharge Summary         Progress towards goals / Updated goals:   Short Term Goals:  1. Pt will be compliant with initial HEP in order to optimize therapy outcomes   Long Term Goals: To be accomplished in 8 weeks:  1. Pt will improve FOTO by 10 points in order to demonstrate functional improvement  2. Pt will report 50% improvement in sx in order to improve QOL. 3. Pt will report a little difficulty with housework in order to demonstrate improved ease of household management.         PLAN  []  Upgrade activities as tolerated     [x]  Continue plan of care  []  Update interventions per flow sheet       []  Discharge due to:_  []  Other:_      Vamsi Watkins 6/17/2019  9:10 AM    Future Appointments   Date Time Provider Noel Harp   6/17/2019  9:00 AM Bassem Bee [id number] [facility]   6/21/2019 12:00 PM Bibiana Carballo, PT MMCPTPB [facility]   6/24/2019 11:00 AM Pham Arteaga [id number] [facility]   6/28/2019 10:30 AM Bibiana Carballo, PT MMCPTPB [facility]   7/1/2019 10:30 AM Micah Nova, PT [id number] [facility]   7/3/2019 10:30 AM Bibiana Carballo, PT [id number] [facility]   7/8/2019 11:00 AM Micah Nova, PT MMCPTPB [facility]   7/12/2019 12:00 PM Bibiana Carballo, PT MMCPTPB [facility]   7/15/2019 11:00 AM Micah Nova, PT MMCPTPB [facility]   7/19/2019 10:30 AM Bibiana Carballo, PT MMCPTPB [facility]

## 2019-07-03 ENCOUNTER — APPOINTMENT (OUTPATIENT)
Dept: PHYSICAL THERAPY | Age: 84
End: 2019-07-03

## 2019-07-08 ENCOUNTER — APPOINTMENT (OUTPATIENT)
Dept: PHYSICAL THERAPY | Age: 84
End: 2019-07-08

## 2019-07-09 ENCOUNTER — HOSPITAL ENCOUNTER (EMERGENCY)
Age: 84
Discharge: HOME | End: 2019-07-10
Attending: EMERGENCY MEDICINE
Payer: MEDICARE

## 2019-07-09 DIAGNOSIS — R00.2 PALPITATIONS: Primary | ICD-10-CM

## 2019-07-09 DIAGNOSIS — I10 ESSENTIAL HYPERTENSION: ICD-10-CM

## 2019-07-09 DIAGNOSIS — D64.9 ANEMIA, UNSPECIFIED TYPE: ICD-10-CM

## 2019-07-09 DIAGNOSIS — R79.89 ELEVATED SERUM CREATININE: ICD-10-CM

## 2019-07-09 PROCEDURE — 99283 EMERGENCY DEPT VISIT LOW MDM: CPT

## 2019-07-09 PROCEDURE — 93005 ELECTROCARDIOGRAM TRACING: CPT

## 2019-07-10 ENCOUNTER — APPOINTMENT (OUTPATIENT)
Dept: GENERAL RADIOLOGY | Age: 84
End: 2019-07-10
Attending: PHYSICIAN ASSISTANT
Payer: MEDICARE

## 2019-07-10 VITALS
OXYGEN SATURATION: 100 % | HEART RATE: 88 BPM | WEIGHT: 179 LBS | TEMPERATURE: 98.3 F | SYSTOLIC BLOOD PRESSURE: 193 MMHG | DIASTOLIC BLOOD PRESSURE: 80 MMHG | BODY MASS INDEX: 28.77 KG/M2 | HEIGHT: 66 IN | RESPIRATION RATE: 14 BRPM

## 2019-07-10 LAB
ANION GAP SERPL CALC-SCNC: 7 MMOL/L (ref 3–18)
BASOPHILS # BLD: 0.1 K/UL (ref 0–0.1)
BASOPHILS NFR BLD: 1 % (ref 0–2)
BUN SERPL-MCNC: 64 MG/DL (ref 7–18)
BUN/CREAT SERPL: 13 (ref 12–20)
CALCIUM SERPL-MCNC: 8.9 MG/DL (ref 8.5–10.1)
CHLORIDE SERPL-SCNC: 118 MMOL/L (ref 100–108)
CK MB CFR SERPL CALC: 1.7 % (ref 0–4)
CK MB SERPL-MCNC: 2.8 NG/ML (ref 5–25)
CK SERPL-CCNC: 163 U/L (ref 39–308)
CO2 SERPL-SCNC: 19 MMOL/L (ref 21–32)
CREAT SERPL-MCNC: 4.94 MG/DL (ref 0.6–1.3)
DIFFERENTIAL METHOD BLD: ABNORMAL
EOSINOPHIL # BLD: 0.3 K/UL (ref 0–0.4)
EOSINOPHIL NFR BLD: 3 % (ref 0–5)
ERYTHROCYTE [DISTWIDTH] IN BLOOD BY AUTOMATED COUNT: 15 % (ref 11.6–14.5)
GLUCOSE SERPL-MCNC: 104 MG/DL (ref 74–99)
HCT VFR BLD AUTO: 25.7 % (ref 36–48)
HGB BLD-MCNC: 8.7 G/DL (ref 13–16)
LYMPHOCYTES # BLD: 1.8 K/UL (ref 0.9–3.6)
LYMPHOCYTES NFR BLD: 22 % (ref 21–52)
MCH RBC QN AUTO: 28.2 PG (ref 24–34)
MCHC RBC AUTO-ENTMCNC: 33.9 G/DL (ref 31–37)
MCV RBC AUTO: 83.2 FL (ref 74–97)
MONOCYTES # BLD: 0.9 K/UL (ref 0.05–1.2)
MONOCYTES NFR BLD: 10 % (ref 3–10)
NEUTS SEG # BLD: 5.2 K/UL (ref 1.8–8)
NEUTS SEG NFR BLD: 64 % (ref 40–73)
PLATELET # BLD AUTO: 271 K/UL (ref 135–420)
PMV BLD AUTO: 10.4 FL (ref 9.2–11.8)
POTASSIUM SERPL-SCNC: 5.5 MMOL/L (ref 3.5–5.5)
RBC # BLD AUTO: 3.09 M/UL (ref 4.7–5.5)
SODIUM SERPL-SCNC: 144 MMOL/L (ref 136–145)
TROPONIN I SERPL-MCNC: <0.02 NG/ML (ref 0–0.04)
TSH SERPL DL<=0.05 MIU/L-ACNC: 3.72 UIU/ML (ref 0.36–3.74)
WBC # BLD AUTO: 8.2 K/UL (ref 4.6–13.2)

## 2019-07-10 PROCEDURE — 85025 COMPLETE CBC W/AUTO DIFF WBC: CPT

## 2019-07-10 PROCEDURE — 84443 ASSAY THYROID STIM HORMONE: CPT

## 2019-07-10 PROCEDURE — 80048 BASIC METABOLIC PNL TOTAL CA: CPT

## 2019-07-10 PROCEDURE — 82550 ASSAY OF CK (CPK): CPT

## 2019-07-10 PROCEDURE — 71045 X-RAY EXAM CHEST 1 VIEW: CPT

## 2019-07-10 NOTE — ED TRIAGE NOTES
Pt arrived to ED for c/o high blood pressure. Pt states that he felt like his heart was beating faster than normal, so his wife checked his BP and had a reading of 200/92 with a HR of 72. Pt denies chest pain, or SOB.

## 2019-07-10 NOTE — ED PROVIDER NOTES
EMERGENCY DEPARTMENT HISTORY AND PHYSICAL EXAM    12:51 AM      Date: 7/9/2019  Patient Name: Trish Victor    History of Presenting Illness     Chief Complaint   Patient presents with    Hypertension         History Provided By: Patient, Patient's wife    Additional History (Context): Trish Victor is a 80 y.o. male with hypertension who presents with wife due to palpitations and elevated blood pressure reading today. Patient denies dizziness, changes in vision, chest pain, shortness of breath, nausea, vomiting, diaphoresis, numbness or tingling. Notes he took his Amlodipine as prescribed this morning. PCP: Tammy Meehan MD    Current Outpatient Medications   Medication Sig Dispense Refill    cephalexin (KEFLEX PO) Take  by mouth.  furosemide (LASIX) 20 mg tablet Take 20 mg by mouth daily.  lidocaine (LIDODERM) 5 % Apply patch to the affected area for 12 hours a day and remove for 12 hours a day. 1 Each 0    HYDROcodone-acetaminophen (NORCO) 5-325 mg per tablet Take 1 Tab by mouth every eight (8) hours as needed for Pain. Max Daily Amount: 3 Tabs. 12 Tab 0    garlic 0,281 mg cap Take  by mouth.  amLODIPine (NORVASC) 5 mg tablet Take 1 Tab by mouth daily. Indications: HYPERTENSION 100 Tab 0       Past History     Past Medical History:  Past Medical History:   Diagnosis Date    Cardiac echocardiogram 08/29/2016    EF 60-65%. No WMA. Mild LVH. Indeterminate diastolic fx. RVSP 35 mmHg. No significant valvular heart disease.  Cardiac nuclear imaging test, abnormal 08/29/2016    Intermediate risk. Previous inferior infarction w/very mild fabiana-infarct ischemia. Inferior hypk. EF 68%. Nondiagnostic EKG on pharm stress test.  Pt's BP increased from 193/96 to 207/83 during study.  Carotid duplex 08/30/2016    Mild <50% bilateral ICA stenosis.  Hypertension        Past Surgical History:  No past surgical history on file.     Family History:  No family history on file.    Social History:  Social History     Tobacco Use    Smoking status: Never Smoker    Smokeless tobacco: Never Used   Substance Use Topics    Alcohol use: No    Drug use: No       Allergies:  No Known Allergies      Review of Systems       Review of Systems   Constitutional: Negative for chills and fever. Respiratory: Negative for shortness of breath. Cardiovascular: Positive for palpitations. Negative for chest pain. Gastrointestinal: Negative for abdominal pain, nausea and vomiting. Skin: Negative for rash. Neurological: Negative for weakness. All other systems reviewed and are negative. Physical Exam     Visit Vitals  /80   Pulse 88   Temp 98.3 °F (36.8 °C)   Resp 14   Ht 5' 6\" (1.676 m)   Wt 81.2 kg (179 lb)   SpO2 100%   BMI 28.89 kg/m²         Physical Exam   Constitutional: He appears well-developed and well-nourished. No distress. HENT:   Head: Normocephalic and atraumatic. Neck: Normal range of motion. Neck supple. Cardiovascular: Normal rate, regular rhythm, normal heart sounds and intact distal pulses. Exam reveals no gallop and no friction rub. No murmur heard. Pulmonary/Chest: Effort normal and breath sounds normal. No respiratory distress. He has no wheezes. He has no rales. Musculoskeletal: Normal range of motion. Neurological: He is alert. Skin: Skin is warm. No rash noted. He is not diaphoretic. Nursing note and vitals reviewed.         Diagnostic Study Results     Labs -  Recent Results (from the past 12 hour(s))   EKG, 12 LEAD, INITIAL    Collection Time: 07/09/19 11:27 PM   Result Value Ref Range    Ventricular Rate 73 BPM    Atrial Rate 73 BPM    P-R Interval 168 ms    QRS Duration 104 ms    Q-T Interval 416 ms    QTC Calculation (Bezet) 458 ms    Calculated P Axis -16 degrees    Calculated R Axis -45 degrees    Calculated T Axis -150 degrees    Diagnosis       Normal sinus rhythm  Left axis deviation  Voltage criteria for left ventricular hypertrophy  ST & T wave abnormality, consider inferolateral ischemia  Abnormal ECG  When compared with ECG of 27-JUN-2018 14:24,  T wave inversion less evident in Inferior leads     CBC WITH AUTOMATED DIFF    Collection Time: 07/10/19  1:53 AM   Result Value Ref Range    WBC 8.2 4.6 - 13.2 K/uL    RBC 3.09 (L) 4.70 - 5.50 M/uL    HGB 8.7 (L) 13.0 - 16.0 g/dL    HCT 25.7 (L) 36.0 - 48.0 %    MCV 83.2 74.0 - 97.0 FL    MCH 28.2 24.0 - 34.0 PG    MCHC 33.9 31.0 - 37.0 g/dL    RDW 15.0 (H) 11.6 - 14.5 %    PLATELET 083 946 - 211 K/uL    MPV 10.4 9.2 - 11.8 FL    NEUTROPHILS 64 40 - 73 %    LYMPHOCYTES 22 21 - 52 %    MONOCYTES 10 3 - 10 %    EOSINOPHILS 3 0 - 5 %    BASOPHILS 1 0 - 2 %    ABS. NEUTROPHILS 5.2 1.8 - 8.0 K/UL    ABS. LYMPHOCYTES 1.8 0.9 - 3.6 K/UL    ABS. MONOCYTES 0.9 0.05 - 1.2 K/UL    ABS. EOSINOPHILS 0.3 0.0 - 0.4 K/UL    ABS. BASOPHILS 0.1 0.0 - 0.1 K/UL    DF AUTOMATED     METABOLIC PANEL, BASIC    Collection Time: 07/10/19  1:53 AM   Result Value Ref Range    Sodium 144 136 - 145 mmol/L    Potassium 5.5 3.5 - 5.5 mmol/L    Chloride 118 (H) 100 - 108 mmol/L    CO2 19 (L) 21 - 32 mmol/L    Anion gap 7 3.0 - 18 mmol/L    Glucose 104 (H) 74 - 99 mg/dL    BUN 64 (H) 7.0 - 18 MG/DL    Creatinine 4.94 (H) 0.6 - 1.3 MG/DL    BUN/Creatinine ratio 13 12 - 20      GFR est AA 14 (L) >60 ml/min/1.73m2    GFR est non-AA 11 (L) >60 ml/min/1.73m2    Calcium 8.9 8.5 - 10.1 MG/DL   CARDIAC PANEL,(CK, CKMB & TROPONIN)    Collection Time: 07/10/19  1:53 AM   Result Value Ref Range     39 - 308 U/L    CK - MB 2.8 <3.6 ng/ml    CK-MB Index 1.7 0.0 - 4.0 %    Troponin-I, QT <0.02 0.0 - 0.045 NG/ML   TSH 3RD GENERATION    Collection Time: 07/10/19  1:53 AM   Result Value Ref Range    TSH 3.72 0.36 - 3.74 uIU/mL       Radiologic Studies -   XR CHEST PORT    (Results Pending)         Medical Decision Making   I am the first provider for this patient.     I reviewed the vital signs, available nursing notes, past medical history, past surgical history, family history and social history. Vital Signs-Reviewed the patient's vital signs. Pulse Oximetry Analysis -  100 on room air       Records Reviewed: Nursing Notes and Old Medical Records (Time of Review: 12:51 AM)    ED Course: Progress Notes, Reevaluation, and Consults:  3:00 AM Reviewed results with patient and family. Thorough discussion about anemia and elevated creatinine/BUN. Wife notes patient was evaluated by PMD last week and was told his \"kidney function is critical\", unsure the level. Discussed need for admission with patient and wife for further assessment and nephrology consultation. Pt adamantly declining. Will call nephrologist for further assessment tomorrow. Declining rectal examination as well. Denies BRBPR, melena, hx of GI bleed. Provider Notes (Medical Decision Making): 19-year-old male who presents due to palpitations and elevated blood pressure reading x 1 day. Denies chest pain, shortness of breath, diaphoresis, nausea or vomiting. EKG without ischemic changes, troponin negative. Labs demonstrate anemia and elevated creatinine at 4.94. Discussed importance of admission with patient and wife. Patient declining. Will employ kinder discharge and refer patient to nephrologist for close follow-up this week for further assessment. Diagnosis     Clinical Impression:   1. Palpitations    2. Essential hypertension    3. Anemia, unspecified type    4.  Elevated serum creatinine        Disposition: kinder discharge    Follow-up Information     Follow up With Specialties Details Why [address] [facility] EMERGENCY DEPT Emergency Medicine  If symptoms worsen [address] [place] [address] Rd [address]    Padmini Keen MD Nephrology Schedule an appointment as soon as possible for a visit nephrologist [address] [place] [address]      Leslie Osborne MD Nephrology Schedule an appointment as soon as possible for a visit nephrologist [address]  [place] [address] [facility]      Valentín Cleary MD Nephrology Schedule an appointment as soon as possible for a visit nephrologist [address] [address]  [phone]             Patient's Medications   Start Taking    No medications on file   Continue Taking    AMLODIPINE (NORVASC) 5 MG TABLET    Take 1 Tab by mouth daily. Indications: HYPERTENSION    CEPHALEXIN (KEFLEX PO)    Take  by mouth. FUROSEMIDE (LASIX) 20 MG TABLET    Take 20 mg by mouth daily. GARLIC 2,665 MG CAP    Take  by mouth. HYDROCODONE-ACETAMINOPHEN (NORCO) 5-325 MG PER TABLET    Take 1 Tab by mouth every eight (8) hours as needed for Pain. Max Daily Amount: 3 Tabs. LIDOCAINE (LIDODERM) 5 %    Apply patch to the affected area for 12 hours a day and remove for 12 hours a day. These Medications have changed    No medications on file   Stop Taking    No medications on file       Dictation disclaimer:  Please note that this dictation was completed with [employer] solution, the computer voice recognition software. Quite often unanticipated grammatical, syntax, homophones, and other interpretive errors are inadvertently transcribed by the computer software. Please disregard these errors. Please excuse any errors that have escaped final proofreading.

## 2019-07-10 NOTE — DISCHARGE INSTRUCTIONS
Patient Education      Take medication as prescribed. Follow-up with the nephrologist within 2 days for reassessment. Bring the results from this visit with you for their review. Your kidney function was 4.94 today. Return to the ED immediately for any new, worsening, or persistent symptoms. Anemia: Care Instructions  Your Care Instructions    Anemia is a low level of red blood cells, which carry oxygen throughout your body. Many things can cause anemia. Lack of iron is one of the most common causes. Your body needs iron to make hemoglobin, a substance in red blood cells that carries oxygen from the lungs to your body's cells. Without enough iron, the body produces fewer and smaller red blood cells. As a result, your body's cells do not get enough oxygen, and you feel tired and weak. And you may have trouble concentrating. Bleeding is the most common cause of a lack of iron. You may have heavy menstrual bleeding or bleeding caused by conditions such as ulcers, hemorrhoids, or cancer. Regular use of aspirin or other anti-inflammatory medicines (such as ibuprofen) also can cause bleeding in some people. A lack of iron in your diet also can cause anemia, especially at times when the body needs more iron, such as during pregnancy, infancy, and the teen years. Your doctor may have prescribed iron pills. It may take several months of treatment for your iron levels to return to normal. Your doctor also may suggest that you eat foods that are rich in iron, such as meat and beans. There are many other causes of anemia. It is not always due to a lack of iron. Finding the specific cause of your anemia will help your doctor find the right treatment for you. Follow-up care is a key part of your treatment and safety. Be sure to make and go to all appointments, and call your doctor if you are having problems. It's also a good idea to know your test results and keep a list of the medicines you take.   How can you care for yourself at home? · Take your medicines exactly as prescribed. Call your doctor if you think you are having a problem with your medicine. · If your doctor recommends iron pills, take them as directed:  ? Try to take the pills on an empty stomach about 1 hour before or 2 hours after meals. But you may need to take iron with food to avoid an upset stomach. ? Do not take antacids or drink milk or caffeine drinks (such as coffee, tea, or cola) at the same time or within 2 hours of the time that you take your iron. They can make it hard for your body to absorb the iron. ? Vitamin C (from food or supplements) helps your body absorb iron. Try taking iron pills with a glass of orange juice or some other food that is high in vitamin C, such as citrus fruits. ? Iron pills may cause stomach problems, such as heartburn, nausea, diarrhea, constipation, and cramps. Be sure to drink plenty of fluids, and include fruits, vegetables, and fiber in your diet each day. Iron pills often make your bowel movements dark or green. ? If you forget to take an iron pill, do not take a double dose of iron the next time you take a pill. ? Keep iron pills out of the reach of small children. An overdose of iron can be very dangerous. · Follow your doctor's advice about eating iron-rich foods. These include red meat, shellfish, poultry, eggs, beans, raisins, whole-grain bread, and leafy green vegetables. · Steam vegetables to help them keep their iron content. When should you call for help? Call 911 anytime you think you may need emergency care. For example, call if:    · You have symptoms of a heart attack. These may include:  ? Chest pain or pressure, or a strange feeling in the chest.  ? Sweating. ? Shortness of breath. ? Nausea or vomiting. ? Pain, pressure, or a strange feeling in the back, neck, jaw, or upper belly or in one or both shoulders or arms. ? Lightheadedness or sudden weakness.   ? A fast or irregular heartbeat. After you call 911, the  may tell you to chew 1 adult-strength or 2 to 4 low-dose aspirin. Wait for an ambulance. Do not try to drive yourself.     · You passed out (lost consciousness).    Call your doctor now or seek immediate medical care if:    · You have new or increased shortness of breath.     · You are dizzy or lightheaded, or you feel like you may faint.     · Your fatigue and weakness continue or get worse.     · You have any abnormal bleeding, such as:  ? Nosebleeds. ? Vaginal bleeding that is different (heavier, more frequent, at a different time of the month) than what you are used to.  ? Bloody or black stools, or rectal bleeding. ? Bloody or pink urine.    Watch closely for changes in your health, and be sure to contact your doctor if:    · You do not get better as expected. Where can you learn more? Go to http://sourav-shannon.info/. Enter R301 in the search box to learn more about \"Anemia: Care Instructions. \"  Current as of: May 6, 2018  Content Version: 11.9  © 7428-4184 [employer]. Care instructions adapted under license by [employer] (which disclaims liability or warranty for this information). If you have questions about a medical condition or this instruction, always ask your healthcare professional. [place] [address] any warranty or liability for your use of this information. Patient Education        High Blood Pressure: Care Instructions  Overview    It's normal for blood pressure to go up and down throughout the day. But if it stays up, you have high blood pressure. Another name for high blood pressure is hypertension. Despite what a lot of people think, high blood pressure usually doesn't cause headaches or make you feel dizzy or lightheaded. It usually has no symptoms. But it does increase your risk of stroke, heart attack, and other problems.  You and your doctor will talk about your risks of these problems based on your blood pressure. Your doctor will give you a goal for your blood pressure. Your goal will be based on your health and your age. Lifestyle changes, such as eating healthy and being active, are always important to help lower blood pressure. You might also take medicine to reach your blood pressure goal.  Follow-up care is a key part of your treatment and safety. Be sure to make and go to all appointments, and call your doctor if you are having problems. It's also a good idea to know your test results and keep a list of the medicines you take. How can you care for yourself at home? Medical treatment  · If you stop taking your medicine, your blood pressure will go back up. You may take one or more types of medicine to lower your blood pressure. Be safe with medicines. Take your medicine exactly as prescribed. Call your doctor if you think you are having a problem with your medicine. · Talk to your doctor before you start taking aspirin every day. Aspirin can help certain people lower their risk of a heart attack or stroke. But taking aspirin isn't right for everyone, because it can cause serious bleeding. · See your doctor regularly. You may need to see the doctor more often at first or until your blood pressure comes down. · If you are taking blood pressure medicine, talk to your doctor before you take decongestants or anti-inflammatory medicine, such as ibuprofen. Some of these medicines can raise blood pressure. · Learn how to check your blood pressure at home. Lifestyle changes  · Stay at a healthy weight. This is especially important if you put on weight around the waist. Losing even 10 pounds can help you lower your blood pressure. · If your doctor recommends it, get more exercise. Walking is a good choice. Bit by bit, increase the amount you walk every day. Try for at least 30 minutes on most days of the week. You also may want to swim, bike, or do other activities.   · Avoid or limit alcohol. Talk to your doctor about whether you can drink any alcohol. · Try to limit how much sodium you eat to less than 2,300 milligrams (mg) a day. Your doctor may ask you to try to eat less than 1,500 mg a day. · Eat plenty of fruits (such as bananas and oranges), vegetables, legumes, whole grains, and low-fat dairy products. · Lower the amount of saturated fat in your diet. Saturated fat is found in animal products such as milk, cheese, and meat. Limiting these foods may help you lose weight and also lower your risk for heart disease. · Do not smoke. Smoking increases your risk for heart attack and stroke. If you need help quitting, talk to your doctor about stop-smoking programs and medicines. These can increase your chances of quitting for good. When should you call for help? Call 911 anytime you think you may need emergency care. This may mean having symptoms that suggest that your blood pressure is causing a serious heart or blood vessel problem. Your blood pressure may be over 180/120.   For example, call 911 if:    · You have symptoms of a heart attack. These may include:  ? Chest pain or pressure, or a strange feeling in the chest.  ? Sweating. ? Shortness of breath. ? Nausea or vomiting. ? Pain, pressure, or a strange feeling in the back, neck, jaw, or upper belly or in one or both shoulders or arms. ? Lightheadedness or sudden weakness. ? A fast or irregular heartbeat.     · You have symptoms of a stroke. These may include:  ? Sudden numbness, tingling, weakness, or loss of movement in your face, arm, or leg, especially on only one side of your body. ? Sudden vision changes. ? Sudden trouble speaking. ? Sudden confusion or trouble understanding simple statements. ? Sudden problems with walking or balance. ? A sudden, severe headache that is different from past headaches.     · You have severe back or belly pain.    Do not wait until your blood pressure comes down on its own. Get help right away.   Call your doctor now or seek immediate care if:    · Your blood pressure is much higher than normal (such as 180/120 or higher), but you don't have symptoms.     · You think high blood pressure is causing symptoms, such as:  ? Severe headache.  ? Blurry vision.    Watch closely for changes in your health, and be sure to contact your doctor if:    · Your blood pressure measures higher than your doctor recommends at least 2 times. That means the top number is higher or the bottom number is higher, or both.     · You think you may be having side effects from your blood pressure medicine. Where can you learn more? Go to http://sourav-shannon.info/. Enter Q294 in the search box to learn more about \"High Blood Pressure: Care Instructions. \"  Current as of: July 22, 2018  Content Version: 11.9  © 9234-3984 [employer]. Care instructions adapted under license by [employer] (which disclaims liability or warranty for this information). If you have questions about a medical condition or this instruction, always ask your healthcare professional. [place] [address] any warranty or liability for your use of this information. Patient Education        Kidney Disease and High Blood Pressure: Care Instructions  Your Care Instructions    Long-term (chronic) kidney disease happens when the kidneys cannot remove waste and keep your body's fluids and chemicals in balance. Usually, the kidneys remove waste from the blood through the urine. When the kidneys are not working well, waste can build up so much that it poisons the body. Kidney disease can make you very tired. It also can cause swelling, or edema, in your legs or other areas of your body. High blood pressure is one of the major causes of chronic kidney disease. And kidney disease can also cause high blood pressure.  No matter which came first, having high blood pressure damages the tiny blood vessels in the kidneys. If you have high blood pressure, it is important to lower it. There are many things you can do to lower your blood pressure, which may help slow or stop the damage to your kidneys. Follow-up care is a key part of your treatment and safety. Be sure to make and go to all appointments, and call your doctor if you are having problems. It's also a good idea to know your test results and keep a list of the medicines you take. How can you care for yourself at home? · Be safe with medicines. Take your medicines exactly as prescribed. Call your doctor if you have any problems with your medicine. You will probably need more than one medicine to lower your blood pressure. You will get more details on the specific medicines your doctor prescribes. · Work with your doctor and a dietitian to plan meals that have the right amount of nutrients for you. You will probably have to limit salt, fluids, and protein. · Stay at a healthy weight. This is very important if you put on weight around the waist. Losing even 10 pounds can help you lower your blood pressure. · Manage other health problems such as diabetes and high cholesterol. You can help lower your risk for heart disease and blood vessel problems with a healthy lifestyle along with medicines. · Do not take ibuprofen (Advil, Motrin) or naproxen (Aleve), or similar medicines, unless your doctor tells you to. They may make chronic kidney disease worse. It is okay to take acetaminophen (Tylenol). · If your doctor recommends it, get more exercise. Walking is a good choice. Bit by bit, increase the amount you walk every day. Try for at least 30 minutes on most days of the week. You also may want to swim, bike, or do other activities. · Limit or avoid alcohol. Talk to your doctor about whether you can drink any alcohol. · Do not smoke or allow others to smoke around you.  If you need help quitting, talk to your doctor about stop-smoking programs and medicines. These can increase your chances of quitting for good. When should you call for help? Call 911 anytime you think you may need emergency care. For example, call if:    · You passed out (lost consciousness).    Call your doctor now or seek immediate medical care if:    · You have new or worse nausea and vomiting.     · You have much less urine than normal, or you have no urine.     · You are feeling confused or cannot think clearly.     · You have new or more blood in your urine.     · You have new swelling.     · You are dizzy or lightheaded, or you feel like you may faint.    Watch closely for changes in your health, and be sure to contact your doctor if:    · You do not get better as expected. Where can you learn more? Go to http://sourav-shannon.info/. Enter U490 in the search box to learn more about \"Kidney Disease and High Blood Pressure: Care Instructions. \"  Current as of: March 14, 2018  Content Version: 11.9  © 9504-5905 [employer]  Patient Education        Palpitations: Care Instructions  Your Care Instructions    Heart palpitations are the uncomfortable sensation that your heart is beating fast or irregularly. You might feel pounding or fluttering in your chest. It might feel like your heart is skipping a beat. Although palpitations may be caused by a heart problem, they also occur because of stress, fatigue, or use of alcohol, caffeine, or nicotine. Many medicines, including diet pills, antihistamines, decongestants, and some herbal products, can cause heart palpitations. Nearly everyone has palpitations from time to time. Depending on your symptoms, your doctor may need to do more tests to try to find the cause of your palpitations. Follow-up care is a key part of your treatment and safety. Be sure to make and go to all appointments, and call your doctor if you are having problems.  It's also a good idea to know your test results and keep a list of the medicines you take. How can you care for yourself at home? · Avoid caffeine, nicotine, and excess alcohol. · Do not take illegal drugs, such as methamphetamines and cocaine. · Do not take weight loss or diet medicines unless you talk with your doctor first.  · Get plenty of sleep. · Do not overeat. · If you have palpitations again, take deep breaths and try to relax. This may slow a racing heart. · If you start to feel lightheaded, lie down to avoid injuries that might result if you pass out and fall down. · Keep a record of your palpitations and bring it to your next doctor's appointment. Write down:  ? The date and time. ? Your pulse. (If your heart is beating fast, it may be hard to count your pulse.)  ? What you were doing when the palpitations started. ? How long the palpitations lasted. ? Any other symptoms. · If an activity causes palpitations, slow down or stop. Talk to your doctor before you do that activity again. · Take your medicines exactly as prescribed. Call your doctor if you think you are having a problem with your medicine. When should you call for help? Call 911 anytime you think you may need emergency care. For example, call if:    · You passed out (lost consciousness).     · You have symptoms of a heart attack. These may include:  ? Chest pain or pressure, or a strange feeling in the chest.  ? Sweating. ? Shortness of breath. ? Pain, pressure, or a strange feeling in the back, neck, jaw, or upper belly or in one or both shoulders or arms. ? Lightheadedness or sudden weakness. ? A fast or irregular heartbeat. After you call 911, the  may tell you to chew 1 adult-strength or 2 to 4 low-dose aspirin. Wait for an ambulance. Do not try to drive yourself.     · You have symptoms of a stroke. These may include:  ? Sudden numbness, tingling, weakness, or loss of movement in your face, arm, or leg, especially on only one side of your body. ? Sudden vision changes.   ? Sudden trouble speaking. ? Sudden confusion or trouble understanding simple statements. ? Sudden problems with walking or balance. ? A sudden, severe headache that is different from past headaches.    Call your doctor now or seek immediate medical care if:    · You have heart palpitations and:  ? Are dizzy or lightheaded, or you feel like you may faint. ? Have new or increased shortness of breath.    Watch closely for changes in your health, and be sure to contact your doctor if:    · You continue to have heart palpitations. Where can you learn more? Go to http://sourav-shannon.info/. Enter R508 in the search box to learn more about \"Palpitations: Care Instructions. \"  Current as of: July 22, 2018  Content Version: 11.9  © 3348-4770 [employer]. Care instructions adapted under license by [employer] (which disclaims liability or warranty for this information). If you have questions about a medical condition or this instruction, always ask your healthcare professional. [address] any warranty or liability for your use of this information. . Care instructions adapted under license by [employer] (which disclaims liability or warranty for this information). If you have questions about a medical condition or this instruction, always ask your healthcare professional. [address] any warranty or liability for your use of this information.

## 2019-07-11 LAB
ATRIAL RATE: 73 BPM
CALCULATED P AXIS, ECG09: -16 DEGREES
CALCULATED R AXIS, ECG10: -45 DEGREES
CALCULATED T AXIS, ECG11: -150 DEGREES
DIAGNOSIS, 93000: NORMAL
P-R INTERVAL, ECG05: 168 MS
Q-T INTERVAL, ECG07: 416 MS
QRS DURATION, ECG06: 104 MS
QTC CALCULATION (BEZET), ECG08: 458 MS
VENTRICULAR RATE, ECG03: 73 BPM

## 2019-07-12 ENCOUNTER — APPOINTMENT (OUTPATIENT)
Dept: PHYSICAL THERAPY | Age: 84
End: 2019-07-12

## 2019-07-15 ENCOUNTER — APPOINTMENT (OUTPATIENT)
Dept: PHYSICAL THERAPY | Age: 84
End: 2019-07-15

## 2019-07-19 ENCOUNTER — APPOINTMENT (OUTPATIENT)
Dept: PHYSICAL THERAPY | Age: 84
End: 2019-07-19

## 2019-07-31 NOTE — PROGRESS NOTES
[facility] [address]  [address] [facility]  [phone] fax    Physical Therapy Discharge Summary    Patient name: Maggie Pryor Start of Care: 19   Referral source: Ben Mcadams MD : [id number]   Medical/Treatment Diagnosis: Bilateral primary osteoarthritis of knee [M17.0]  Payor: [place] MED[place]ARE / Plan: [place] MEDICARE PART A & B / Product Type: Medicare /  Onset Date:years, injections over the past few months     Prior Hospitalization: see medical history Provider#: [id number]   Medications: Verified on Patient Summary List    Comorbidities: coronary artery disease, HTN    Prior Level of Function: lives with family in a 1story home with 3 steps to enter with handrail, SPC for community ambulation, Ind with household ambulation, Ind with ADLs                       Visits from [place] of Care: 3    Missed Visits: 4 (1 d/t elevated BP)    Reporting Period : 19 to 19    Summary of Care:    Short Term Goals:  1. Pt will be compliant with initial HEP in order to optimize therapy outcomes   Long Term Goals: To be accomplished in 8 weeks:  1. Pt will improve FOTO by 10 points in order to demonstrate functional improvement  2. Pt will report 50% improvement in sx in order to improve QOL. 3. Pt will report a little difficulty with housework in order to demonstrate improved ease of household management.       Goals not assessed d/t unplanned DC     ASSESSMENT/RECOMMENDATIONS:  Pt has made limited progress in therapy as he only attended 2 sessions since initial evaluation. Pt's BP was too high to participate in therapy for his last session, and he has NS 3 appointments since start of care. Pt is DC at this time d/t noncompliance and clinic's CX/NS policy.       [x]Discontinue therapy: []Patient has reached or is progressing toward set goals      [x]Patient is non-compliant or has abdicated      []Due to lack of appreciable progress towards set goals    Lon Ramírez, PT 7/31/2019 2:59 PM

## 2020-09-25 ENCOUNTER — HOSPITAL ENCOUNTER (INPATIENT)
Age: 85
LOS: 8 days | Discharge: HOME HEALTH | DRG: 674 | End: 2020-10-03
Attending: EMERGENCY MEDICINE | Admitting: INTERNAL MEDICINE
Payer: MEDICARE

## 2020-09-25 ENCOUNTER — APPOINTMENT (OUTPATIENT)
Dept: GENERAL RADIOLOGY | Age: 85
DRG: 674 | End: 2020-09-25
Attending: INTERNAL MEDICINE
Payer: MEDICARE

## 2020-09-25 DIAGNOSIS — N18.6 ESRD (END STAGE RENAL DISEASE) (HCC): ICD-10-CM

## 2020-09-25 DIAGNOSIS — E87.20 METABOLIC ACIDOSIS: ICD-10-CM

## 2020-09-25 DIAGNOSIS — D64.9 SYMPTOMATIC ANEMIA: ICD-10-CM

## 2020-09-25 DIAGNOSIS — R77.8 TROPONIN LEVEL ELEVATED: ICD-10-CM

## 2020-09-25 DIAGNOSIS — N13.30 HYDRONEPHROSIS, UNSPECIFIED HYDRONEPHROSIS TYPE: ICD-10-CM

## 2020-09-25 DIAGNOSIS — D12.2 ADENOMATOUS POLYP OF ASCENDING COLON: ICD-10-CM

## 2020-09-25 DIAGNOSIS — I10 ESSENTIAL HYPERTENSION: Chronic | ICD-10-CM

## 2020-09-25 DIAGNOSIS — N17.9 ACUTE RENAL FAILURE, UNSPECIFIED ACUTE RENAL FAILURE TYPE (HCC): ICD-10-CM

## 2020-09-25 LAB
ALBUMIN SERPL-MCNC: 3.6 G/DL (ref 3.4–5)
ALBUMIN/GLOB SERPL: 0.7 {RATIO} (ref 0.8–1.7)
ALP SERPL-CCNC: 79 U/L (ref 45–117)
ALT SERPL-CCNC: 15 U/L (ref 16–61)
ANION GAP SERPL CALC-SCNC: 14 MMOL/L (ref 3–18)
APPEARANCE UR: ABNORMAL
AST SERPL-CCNC: 10 U/L (ref 10–38)
BACTERIA URNS QL MICRO: ABNORMAL /HPF
BASOPHILS # BLD: 0.1 K/UL (ref 0–0.1)
BASOPHILS NFR BLD: 1 % (ref 0–2)
BILIRUB SERPL-MCNC: 0.3 MG/DL (ref 0.2–1)
BILIRUB UR QL: NEGATIVE
BUN SERPL-MCNC: 129 MG/DL (ref 7–18)
BUN/CREAT SERPL: 8 (ref 12–20)
CALCIUM SERPL-MCNC: 8.4 MG/DL (ref 8.5–10.1)
CHLORIDE SERPL-SCNC: 122 MMOL/L (ref 100–111)
CO2 SERPL-SCNC: 8 MMOL/L (ref 21–32)
COLOR UR: YELLOW
CREAT SERPL-MCNC: 15.4 MG/DL (ref 0.6–1.3)
CREAT UR-MCNC: 78 MG/DL (ref 30–125)
DIFFERENTIAL METHOD BLD: ABNORMAL
EOSINOPHIL # BLD: 0.2 K/UL (ref 0–0.4)
EOSINOPHIL #/AREA URNS HPF: NORMAL /[HPF]
EOSINOPHIL NFR BLD: 2 % (ref 0–5)
EPITH CASTS URNS QL MICRO: ABNORMAL /LPF (ref 0–5)
ERYTHROCYTE [DISTWIDTH] IN BLOOD BY AUTOMATED COUNT: 15.1 % (ref 11.6–14.5)
FERRITIN SERPL-MCNC: 108 NG/ML (ref 8–388)
GLOBULIN SER CALC-MCNC: 5.2 G/DL (ref 2–4)
GLUCOSE SERPL-MCNC: 82 MG/DL (ref 74–99)
GLUCOSE UR STRIP.AUTO-MCNC: NEGATIVE MG/DL
HBV SURFACE AG SER QL: <0.1 INDEX
HBV SURFACE AG SER QL: NEGATIVE
HCT VFR BLD AUTO: 19.8 % (ref 36–48)
HGB BLD-MCNC: 6.6 G/DL (ref 13–16)
HGB UR QL STRIP: ABNORMAL
HISTORY CHECKED?,CKHIST: NORMAL
IRON SATN MFR SERPL: 29 % (ref 20–50)
IRON SERPL-MCNC: 84 UG/DL (ref 50–175)
KETONES UR QL STRIP.AUTO: NEGATIVE MG/DL
LEUKOCYTE ESTERASE UR QL STRIP.AUTO: ABNORMAL
LYMPHOCYTES # BLD: 1.6 K/UL (ref 0.9–3.6)
LYMPHOCYTES NFR BLD: 17 % (ref 21–52)
MAGNESIUM SERPL-MCNC: 2 MG/DL (ref 1.6–2.6)
MCH RBC QN AUTO: 28.6 PG (ref 24–34)
MCHC RBC AUTO-ENTMCNC: 33.3 G/DL (ref 31–37)
MCV RBC AUTO: 85.7 FL (ref 74–97)
MONOCYTES # BLD: 0.7 K/UL (ref 0.05–1.2)
MONOCYTES NFR BLD: 7 % (ref 3–10)
NEUTS SEG # BLD: 6.8 K/UL (ref 1.8–8)
NEUTS SEG NFR BLD: 73 % (ref 40–73)
NITRITE UR QL STRIP.AUTO: NEGATIVE
PH UR STRIP: 5 [PH] (ref 5–8)
PLATELET # BLD AUTO: 277 K/UL (ref 135–420)
PMV BLD AUTO: 11.2 FL (ref 9.2–11.8)
POTASSIUM SERPL-SCNC: 5.2 MMOL/L (ref 3.5–5.5)
PROT SERPL-MCNC: 8.8 G/DL (ref 6.4–8.2)
PROT UR STRIP-MCNC: 100 MG/DL
PROT UR-MCNC: 72 MG/DL
PROT/CREAT UR-RTO: 0.9
RBC # BLD AUTO: 2.31 M/UL (ref 4.7–5.5)
RBC #/AREA URNS HPF: ABNORMAL /HPF (ref 0–5)
SODIUM SERPL-SCNC: 144 MMOL/L (ref 136–145)
SODIUM UR-SCNC: 74 MMOL/L (ref 20–110)
SP GR UR REFRACTOMETRY: 1.01 (ref 1–1.03)
T4 FREE SERPL-MCNC: 0.8 NG/DL (ref 0.7–1.5)
TIBC SERPL-MCNC: 289 UG/DL (ref 250–450)
TROPONIN I SERPL-MCNC: 0.35 NG/ML (ref 0–0.04)
TSH SERPL DL<=0.05 MIU/L-ACNC: 6.31 UIU/ML (ref 0.36–3.74)
UROBILINOGEN UR QL STRIP.AUTO: 0.2 EU/DL (ref 0.2–1)
WBC # BLD AUTO: 9.4 K/UL (ref 4.6–13.2)
WBC URNS QL MICRO: ABNORMAL /HPF (ref 0–4)

## 2020-09-25 PROCEDURE — 80053 COMPREHEN METABOLIC PANEL: CPT

## 2020-09-25 PROCEDURE — 74011000250 HC RX REV CODE- 250: Performed by: EMERGENCY MEDICINE

## 2020-09-25 PROCEDURE — 83935 ASSAY OF URINE OSMOLALITY: CPT

## 2020-09-25 PROCEDURE — 84300 ASSAY OF URINE SODIUM: CPT

## 2020-09-25 PROCEDURE — 87077 CULTURE AEROBIC IDENTIFY: CPT

## 2020-09-25 PROCEDURE — 96374 THER/PROPH/DIAG INJ IV PUSH: CPT

## 2020-09-25 PROCEDURE — 86923 COMPATIBILITY TEST ELECTRIC: CPT

## 2020-09-25 PROCEDURE — 36430 TRANSFUSION BLD/BLD COMPNT: CPT

## 2020-09-25 PROCEDURE — 87186 SC STD MICRODIL/AGAR DIL: CPT

## 2020-09-25 PROCEDURE — 87205 SMEAR GRAM STAIN: CPT

## 2020-09-25 PROCEDURE — 83883 ASSAY NEPHELOMETRY NOT SPEC: CPT

## 2020-09-25 PROCEDURE — 83735 ASSAY OF MAGNESIUM: CPT

## 2020-09-25 PROCEDURE — 86706 HEP B SURFACE ANTIBODY: CPT

## 2020-09-25 PROCEDURE — 30233N1 TRANSFUSION OF NONAUTOLOGOUS RED BLOOD CELLS INTO PERIPHERAL VEIN, PERCUTANEOUS APPROACH: ICD-10-PCS | Performed by: EMERGENCY MEDICINE

## 2020-09-25 PROCEDURE — 71045 X-RAY EXAM CHEST 1 VIEW: CPT

## 2020-09-25 PROCEDURE — 84443 ASSAY THYROID STIM HORMONE: CPT

## 2020-09-25 PROCEDURE — 86160 COMPLEMENT ANTIGEN: CPT

## 2020-09-25 PROCEDURE — 93005 ELECTROCARDIOGRAM TRACING: CPT

## 2020-09-25 PROCEDURE — 87086 URINE CULTURE/COLONY COUNT: CPT

## 2020-09-25 PROCEDURE — P9016 RBC LEUKOCYTES REDUCED: HCPCS

## 2020-09-25 PROCEDURE — 84439 ASSAY OF FREE THYROXINE: CPT

## 2020-09-25 PROCEDURE — 99223 1ST HOSP IP/OBS HIGH 75: CPT | Performed by: INTERNAL MEDICINE

## 2020-09-25 PROCEDURE — 84156 ASSAY OF PROTEIN URINE: CPT

## 2020-09-25 PROCEDURE — 82728 ASSAY OF FERRITIN: CPT

## 2020-09-25 PROCEDURE — 86900 BLOOD TYPING SEROLOGIC ABO: CPT

## 2020-09-25 PROCEDURE — 65660000000 HC RM CCU STEPDOWN

## 2020-09-25 PROCEDURE — 87147 CULTURE TYPE IMMUNOLOGIC: CPT

## 2020-09-25 PROCEDURE — 74011250636 HC RX REV CODE- 250/636: Performed by: EMERGENCY MEDICINE

## 2020-09-25 PROCEDURE — 84484 ASSAY OF TROPONIN QUANT: CPT

## 2020-09-25 PROCEDURE — 99284 EMERGENCY DEPT VISIT MOD MDM: CPT

## 2020-09-25 PROCEDURE — 86920 COMPATIBILITY TEST SPIN: CPT

## 2020-09-25 PROCEDURE — 81001 URINALYSIS AUTO W/SCOPE: CPT

## 2020-09-25 PROCEDURE — 83540 ASSAY OF IRON: CPT

## 2020-09-25 PROCEDURE — 85025 COMPLETE CBC W/AUTO DIFF WBC: CPT

## 2020-09-25 PROCEDURE — 87340 HEPATITIS B SURFACE AG IA: CPT

## 2020-09-25 RX ORDER — ACETAMINOPHEN 325 MG/1
650 TABLET ORAL
Status: DISCONTINUED | OUTPATIENT
Start: 2020-09-25 | End: 2020-10-03 | Stop reason: HOSPADM

## 2020-09-25 RX ORDER — POLYETHYLENE GLYCOL 3350 17 G/17G
17 POWDER, FOR SOLUTION ORAL DAILY PRN
Status: DISCONTINUED | OUTPATIENT
Start: 2020-09-25 | End: 2020-10-03 | Stop reason: HOSPADM

## 2020-09-25 RX ORDER — ACETAMINOPHEN 650 MG/1
650 SUPPOSITORY RECTAL
Status: DISCONTINUED | OUTPATIENT
Start: 2020-09-25 | End: 2020-10-03 | Stop reason: HOSPADM

## 2020-09-25 RX ORDER — SODIUM CHLORIDE 0.9 % (FLUSH) 0.9 %
5-40 SYRINGE (ML) INJECTION EVERY 8 HOURS
Status: DISCONTINUED | OUTPATIENT
Start: 2020-09-25 | End: 2020-10-03 | Stop reason: HOSPADM

## 2020-09-25 RX ORDER — SODIUM CHLORIDE 9 MG/ML
250 INJECTION, SOLUTION INTRAVENOUS AS NEEDED
Status: DISCONTINUED | OUTPATIENT
Start: 2020-09-25 | End: 2020-10-01 | Stop reason: ALTCHOICE

## 2020-09-25 RX ORDER — LABETALOL HCL 20 MG/4 ML
20 SYRINGE (ML) INTRAVENOUS
Status: DISCONTINUED | OUTPATIENT
Start: 2020-09-25 | End: 2020-10-03 | Stop reason: HOSPADM

## 2020-09-25 RX ORDER — PANTOPRAZOLE SODIUM 40 MG/1
40 TABLET, DELAYED RELEASE ORAL
Status: DISCONTINUED | OUTPATIENT
Start: 2020-09-26 | End: 2020-10-03 | Stop reason: HOSPADM

## 2020-09-25 RX ORDER — ONDANSETRON 2 MG/ML
4 INJECTION INTRAMUSCULAR; INTRAVENOUS
Status: DISCONTINUED | OUTPATIENT
Start: 2020-09-25 | End: 2020-10-03 | Stop reason: HOSPADM

## 2020-09-25 RX ORDER — PROMETHAZINE HYDROCHLORIDE 25 MG/1
12.5 TABLET ORAL
Status: DISCONTINUED | OUTPATIENT
Start: 2020-09-25 | End: 2020-10-03 | Stop reason: HOSPADM

## 2020-09-25 RX ORDER — SODIUM CHLORIDE 0.9 % (FLUSH) 0.9 %
5-40 SYRINGE (ML) INJECTION AS NEEDED
Status: DISCONTINUED | OUTPATIENT
Start: 2020-09-25 | End: 2020-10-03 | Stop reason: HOSPADM

## 2020-09-25 RX ADMIN — Medication 10 ML: at 22:00

## 2020-09-25 RX ADMIN — SODIUM BICARBONATE: 84 INJECTION, SOLUTION INTRAVENOUS at 20:52

## 2020-09-25 NOTE — ED NOTES
Sent to the ED by Dr. Sushil Salazar due to Cr 14, K 5.6, Hgb 7.0, elevated TSH. Pt denies any symptoms at this time. I performed a brief evaluation, including history and physical, of the patient here in triage and I have determined that pt will need further treatment and evaluation from the main side ER physician. I have placed initial orders to help in expediting patients care.      September 25, 2020 at 4:56 PM - CORNEL Morales        Visit Vitals  BP (!) 165/74 (BP 1 Location: Left arm, BP Patient Position: Sitting)   Pulse 91   Temp 97.1 °F (36.2 °C)   Resp 16   SpO2 97%

## 2020-09-25 NOTE — Clinical Note
TRANSFER - IN REPORT:     Verbal report received from: Wilfred Salazar RN. Report consisted of patient's Situation, Background, Assessment and   Recommendations(SBAR). Opportunity for questions and clarification was provided. Assessment completed upon patient's arrival to unit and care assumed. Patient transported with a Cardiac Cath Tech / Patient Care Tech.

## 2020-09-25 NOTE — Clinical Note
Right neck clipped prepped with ChloraPrep and draped. Wet prep solution applied at: 1249. Wet prep solution dried at: 1252. Wet prep elapsed drying time: 3 mins.

## 2020-09-25 NOTE — Clinical Note
TRANSFER - OUT REPORT:     Verbal report given to: Marvel Norris RN. Report consisted of patient's Situation, Background, Assessment and   Recommendations(SBAR). Opportunity for questions and clarification was provided. Patient transported with a Cardiac Cath Tech / Patient Care Tech. Patient transported to: [address].

## 2020-09-25 NOTE — PROGRESS NOTES
Sent this Patient from my office with very High BUN/Creatinine, Acidosis,Hyperkalemia & severe anemia with lab report  From 8/24/20. Maria De Jesus Tanyateddy Luis Felipeis very new& was First sai sent to me. , He looks Tired,Pale,weak,loss of Appetite with swollen ankle,no SOB . He needsFurther evaluation & admission tp plan of further care. Discussed with ER.

## 2020-09-26 ENCOUNTER — APPOINTMENT (OUTPATIENT)
Dept: NON INVASIVE DIAGNOSTICS | Age: 85
DRG: 674 | End: 2020-09-26
Attending: NURSE PRACTITIONER
Payer: MEDICARE

## 2020-09-26 PROBLEM — E87.20 METABOLIC ACIDOSIS: Status: ACTIVE | Noted: 2020-09-26

## 2020-09-26 PROBLEM — E87.5 HYPERKALEMIA: Status: ACTIVE | Noted: 2020-09-26

## 2020-09-26 PROBLEM — N18.6 ESRD (END STAGE RENAL DISEASE) (HCC): Status: ACTIVE | Noted: 2020-09-26

## 2020-09-26 PROBLEM — D64.9 ANEMIA: Status: ACTIVE | Noted: 2020-09-26

## 2020-09-26 PROBLEM — N25.81 SECONDARY HYPERPARATHYROIDISM, RENAL (HCC): Status: ACTIVE | Noted: 2020-09-26

## 2020-09-26 LAB
ANION GAP SERPL CALC-SCNC: 14 MMOL/L (ref 3–18)
ATRIAL RATE: 76 BPM
BASOPHILS # BLD: 0.1 K/UL (ref 0–0.1)
BASOPHILS NFR BLD: 1 % (ref 0–2)
BUN SERPL-MCNC: 122 MG/DL (ref 7–18)
BUN/CREAT SERPL: 8 (ref 12–20)
C3 SERPL-MCNC: 95 MG/DL (ref 82–167)
C4 SERPL-MCNC: 27 MG/DL (ref 14–44)
CALCIUM SERPL-MCNC: 8.1 MG/DL (ref 8.5–10.1)
CALCIUM SERPL-MCNC: 8.1 MG/DL (ref 8.5–10.1)
CALCULATED P AXIS, ECG09: 31 DEGREES
CALCULATED R AXIS, ECG10: -37 DEGREES
CALCULATED T AXIS, ECG11: -117 DEGREES
CHLORIDE SERPL-SCNC: 119 MMOL/L (ref 100–111)
CK MB CFR SERPL CALC: 3 % (ref 0–4)
CK MB SERPL-MCNC: 4.8 NG/ML (ref 5–25)
CK SERPL-CCNC: 161 U/L (ref 39–308)
CO2 SERPL-SCNC: 11 MMOL/L (ref 21–32)
CREAT SERPL-MCNC: 14.8 MG/DL (ref 0.6–1.3)
DIAGNOSIS, 93000: NORMAL
DIFFERENTIAL METHOD BLD: ABNORMAL
ECHO AO ROOT DIAM: 3.11 CM
ECHO LA AREA 4C: 19.16 CM2
ECHO LA VOL 2C: 61.08 ML (ref 18–58)
ECHO LA VOL 4C: 51.09 ML (ref 18–58)
ECHO LA VOL BP: 62.53 ML (ref 18–58)
ECHO LA VOL/BSA BIPLANE: 32.77 ML/M2 (ref 16–28)
ECHO LA VOLUME INDEX A2C: 32.01 ML/M2 (ref 16–28)
ECHO LA VOLUME INDEX A4C: 26.78 ML/M2 (ref 16–28)
ECHO LV E' SEPTAL VELOCITY: 3.59 CM/S
ECHO LV INTERNAL DIMENSION DIASTOLIC: 4.64 CM (ref 4.2–5.9)
ECHO LV INTERNAL DIMENSION SYSTOLIC: 3.1 CM
ECHO LV IVSD: 1.29 CM (ref 0.6–1)
ECHO LV MASS 2D: 228 G (ref 88–224)
ECHO LV MASS INDEX 2D: 119.5 G/M2 (ref 49–115)
ECHO LV POSTERIOR WALL DIASTOLIC: 1.27 CM (ref 0.6–1)
ECHO LVOT DIAM: 2.01 CM
ECHO MV A VELOCITY: 124.7 CM/S
ECHO MV E DECELERATION TIME (DT): 0.17 S
ECHO MV E VELOCITY: 74.91 CM/S
ECHO MV E/A RATIO: 0.6
ECHO MV E/E' SEPTAL: 20.87
EOSINOPHIL # BLD: 0.2 K/UL (ref 0–0.4)
EOSINOPHIL NFR BLD: 3 % (ref 0–5)
ERYTHROCYTE [DISTWIDTH] IN BLOOD BY AUTOMATED COUNT: 14.9 % (ref 11.6–14.5)
GLUCOSE SERPL-MCNC: 168 MG/DL (ref 74–99)
HCT VFR BLD AUTO: 21.1 % (ref 36–48)
HGB BLD-MCNC: 7.2 G/DL (ref 13–16)
LYMPHOCYTES # BLD: 1.4 K/UL (ref 0.9–3.6)
LYMPHOCYTES NFR BLD: 18 % (ref 21–52)
MAGNESIUM SERPL-MCNC: 1.9 MG/DL (ref 1.6–2.6)
MCH RBC QN AUTO: 29.1 PG (ref 24–34)
MCHC RBC AUTO-ENTMCNC: 34.1 G/DL (ref 31–37)
MCV RBC AUTO: 85.4 FL (ref 74–97)
MONOCYTES # BLD: 0.7 K/UL (ref 0.05–1.2)
MONOCYTES NFR BLD: 9 % (ref 3–10)
NEUTS SEG # BLD: 5.6 K/UL (ref 1.8–8)
NEUTS SEG NFR BLD: 69 % (ref 40–73)
OSMOLALITY UR: 312 MOSM/KG H2O (ref 50–1400)
P-R INTERVAL, ECG05: 172 MS
PHOSPHATE SERPL-MCNC: 8.5 MG/DL (ref 2.5–4.9)
PLATELET # BLD AUTO: 262 K/UL (ref 135–420)
PMV BLD AUTO: 12 FL (ref 9.2–11.8)
POTASSIUM SERPL-SCNC: 4.5 MMOL/L (ref 3.5–5.5)
PTH-INTACT SERPL-MCNC: 353.5 PG/ML (ref 18.4–88)
Q-T INTERVAL, ECG07: 456 MS
QRS DURATION, ECG06: 112 MS
QTC CALCULATION (BEZET), ECG08: 513 MS
RBC # BLD AUTO: 2.47 M/UL (ref 4.7–5.5)
SODIUM SERPL-SCNC: 144 MMOL/L (ref 136–145)
TROPONIN I SERPL-MCNC: 0.33 NG/ML (ref 0–0.04)
VENTRICULAR RATE, ECG03: 76 BPM
WBC # BLD AUTO: 8.1 K/UL (ref 4.6–13.2)

## 2020-09-26 PROCEDURE — 74011250637 HC RX REV CODE- 250/637: Performed by: NURSE PRACTITIONER

## 2020-09-26 PROCEDURE — 74011000250 HC RX REV CODE- 250: Performed by: INTERNAL MEDICINE

## 2020-09-26 PROCEDURE — 74011250636 HC RX REV CODE- 250/636: Performed by: INTERNAL MEDICINE

## 2020-09-26 PROCEDURE — 83735 ASSAY OF MAGNESIUM: CPT

## 2020-09-26 PROCEDURE — 74011250637 HC RX REV CODE- 250/637: Performed by: INTERNAL MEDICINE

## 2020-09-26 PROCEDURE — 74011250636 HC RX REV CODE- 250/636: Performed by: EMERGENCY MEDICINE

## 2020-09-26 PROCEDURE — 85025 COMPLETE CBC W/AUTO DIFF WBC: CPT

## 2020-09-26 PROCEDURE — 2709999900 HC NON-CHARGEABLE SUPPLY

## 2020-09-26 PROCEDURE — 83970 ASSAY OF PARATHORMONE: CPT

## 2020-09-26 PROCEDURE — 93306 TTE W/DOPPLER COMPLETE: CPT

## 2020-09-26 PROCEDURE — 99232 SBSQ HOSP IP/OBS MODERATE 35: CPT | Performed by: FAMILY MEDICINE

## 2020-09-26 PROCEDURE — 74011000250 HC RX REV CODE- 250: Performed by: EMERGENCY MEDICINE

## 2020-09-26 PROCEDURE — 86038 ANTINUCLEAR ANTIBODIES: CPT

## 2020-09-26 PROCEDURE — 82550 ASSAY OF CK (CPK): CPT

## 2020-09-26 PROCEDURE — 84100 ASSAY OF PHOSPHORUS: CPT

## 2020-09-26 PROCEDURE — 65660000000 HC RM CCU STEPDOWN

## 2020-09-26 PROCEDURE — 80048 BASIC METABOLIC PNL TOTAL CA: CPT

## 2020-09-26 PROCEDURE — 82784 ASSAY IGA/IGD/IGG/IGM EACH: CPT

## 2020-09-26 RX ORDER — AMLODIPINE BESYLATE 10 MG/1
10 TABLET ORAL DAILY
Status: DISCONTINUED | OUTPATIENT
Start: 2020-09-27 | End: 2020-10-03 | Stop reason: HOSPADM

## 2020-09-26 RX ORDER — AMLODIPINE BESYLATE 5 MG/1
2.5 TABLET ORAL DAILY
Status: DISCONTINUED | OUTPATIENT
Start: 2020-09-26 | End: 2020-09-26

## 2020-09-26 RX ORDER — CALCIUM ACETATE 667 MG/1
2 CAPSULE ORAL
Status: DISCONTINUED | OUTPATIENT
Start: 2020-09-26 | End: 2020-09-30

## 2020-09-26 RX ORDER — AMLODIPINE BESYLATE 5 MG/1
5 TABLET ORAL DAILY
Status: DISCONTINUED | OUTPATIENT
Start: 2020-09-26 | End: 2020-09-26

## 2020-09-26 RX ADMIN — SODIUM BICARBONATE: 84 INJECTION, SOLUTION INTRAVENOUS at 19:09

## 2020-09-26 RX ADMIN — Medication 5 ML: at 17:00

## 2020-09-26 RX ADMIN — AMLODIPINE BESYLATE 5 MG: 5 TABLET ORAL at 09:59

## 2020-09-26 RX ADMIN — PANTOPRAZOLE SODIUM 40 MG: 40 TABLET, DELAYED RELEASE ORAL at 09:59

## 2020-09-26 RX ADMIN — CEFTRIAXONE SODIUM 1 G: 1 INJECTION, POWDER, FOR SOLUTION INTRAMUSCULAR; INTRAVENOUS at 04:16

## 2020-09-26 RX ADMIN — Medication 10 ML: at 22:00

## 2020-09-26 NOTE — ED NOTES
TRANSFER - OUT REPORT:    Verbal report given to Iliana RN (name) on Marcelina Johnston  being transferred to [facility] (unit) for routine progression of care       Report consisted of patients Situation, Background, Assessment and   Recommendations(SBAR). Information from the following report(s) ED Summary was reviewed with the receiving nurse. Lines:   Peripheral IV 09/25/20 Right Hand (Active)   Site Assessment Clean, dry, & intact 09/25/20 1807   Phlebitis Assessment 0 09/25/20 1807   Infiltration Assessment 0 09/25/20 1807   Dressing Status Clean, dry, & intact 09/25/20 1807   Hub Color/Line Status Blue 09/25/20 1807        Opportunity for questions and clarification was provided.       Patient transported with:   Tech transportation

## 2020-09-26 NOTE — ED NOTES
Pt resting comfortably. Wife informed of pt status. No signs or symptoms of distress. Will continue to monitor.

## 2020-09-26 NOTE — H&P
History & Physical    Patient: Meredith Davidson MRN: [id number]  CSN: [id number]    YOB: 1935  Age: 80 y.o. Sex: male      DOA: 9/25/2020  CC: acute renal failure     PCP: Juan Velasquez MD       HPI:     Meredith Davidson is a 80 y.o. male who presents from home with recommendations from nephrology Dr. Arsen Barber. Patient was seen in Dr. Mills Grade office and referred to ER for further medical treatment. Currently patient c/o being cold. No complaint from the last few day. He is not sure why he needed to see Dr. Arsen Barber, he was referred to him by his PCP. Today was his first time meeting Dr. Arsen Barber. Denies active bleeding or bruising. Denies urinary symptoms. Left leg pain from arthritis and uses a cane. Wife confirmed this was the first time visit with nephrology. No kidney history that she can recall. No issues at home. In ER blood transfusion ordered. Sodium bicarb gtt initiated. Past Medical History:   Diagnosis Date    Cardiac echocardiogram 08/29/2016    EF 60-65%. No WMA. Mild LVH. Indeterminate diastolic fx. RVSP 35 mmHg. No significant valvular heart disease.  Cardiac nuclear imaging test, abnormal 08/29/2016    Intermediate risk. Previous inferior infarction w/very mild fabiana-infarct ischemia. Inferior hypk. EF 68%. Nondiagnostic EKG on pharm stress test.  Pt's BP increased from 193/96 to 207/83 during study.  Carotid duplex 08/30/2016    Mild <50% bilateral ICA stenosis.  Hypertension        History reviewed. No pertinent surgical history. History reviewed. No pertinent family history.     Social History     Socioeconomic History    Marital status:      Spouse name: Not on file    Number of children: Not on file    Years of education: Not on file    Highest education level: Not on file   Tobacco Use    Smoking status: Never Smoker    Smokeless tobacco: Never Used   Substance and Sexual Activity    Alcohol use: No    Drug use: No       Prior to Admission medications    Medication Sig Start Date End Date Taking? Authorizing Provider   cephalexin (KEFLEX PO) Take  by mouth. Provider, Historical   furosemide (LASIX) 20 mg tablet Take 20 mg by mouth daily. Other, MD Rick   lidocaine (LIDODERM) 5 % Apply patch to the affected area for 12 hours a day and remove for 12 hours a day. 11/23/18   Viviana Fenton MD   HYDROcodone-acetaminophen [facility]) 5-325 mg per tablet Take 1 Tab by mouth every eight (8) hours as needed for Pain. Max Daily Amount: 3 Tabs. 11/20/18   Phoebe Litten, DO   garlic 8,495 mg cap Take  by mouth. Provider, Historical   amLODIPine (NORVASC) 5 mg tablet Take 1 Tab by mouth daily. Indications: HYPERTENSION 9/1/16   Sondra Baumgarten, MD       No Known Allergies           Physical Exam:      Visit Vitals  BP (!) 165/74 (BP 1 Location: Left arm, BP Patient Position: Sitting)   Pulse 91   Temp 97.1 °F (36.2 °C)   Resp 16   SpO2 97%       Physical Exam:  General:  Alert, NAD  HEENT: normocephalic, EOM, PERRL, moist mucous membranes   Cardiovascular:  RRR  Pulmonary:  LSC throughout; respiratory effort WNL  GI:  +BS in all four quadrants, soft, non-tender  Extremities:  No edema; 2+ dorsalis pedis pulses bilaterally  Neuro: alert and oriented x3    Lab/Data Review:  Labs: Results:       Chemistry Recent Labs     09/25/20  1800   GLU 82      K 5.2   *   CO2 8*   *   CREA 15.40*   CA 8.4*   AGAP 14   BUCR 8*   AP 79   TP 8.8*   ALB 3.6   GLOB 5.2*   AGRAT 0.7*      CBC w/Diff Recent Labs     09/25/20  1800   WBC 9.4   RBC 2.31*   HGB 6.6*   HCT 19.8*      GRANS 73   LYMPH 17*   EOS 2      Coagulation No results for input(s): PTP, INR, APTT, INREXT in the last 72 hours. Iron/Ferritin Recent Labs     09/25/20  1800   IRON 84      BNP No results for input(s): BNPP in the last 72 hours. Cardiac Enzymes No results for input(s): CPK, CKND1, LILLY in the last 72 hours.     No lab exists for component: CKRMB, TROIP   Liver Enzymes Recent Labs     09/25/20  1800   TP 8.8*   ALB 3.6   AP 79      Thyroid Studies Lab Results   Component Value Date/Time    TSH 6.31 (H) 09/25/2020 06:00 PM          All Micro Results     None          Imaging Reviewed:  Chest xray pending       Assessment:   Active Problems:    Acute renal failure (ARF) ([address] (9/25/2020)    Acute Anemia   Metabolic Acidosis  Hyperkalemia   Elevated troponin   HTN  OA   Peripheral edema       Plan:   1. Nephrology consult, Dr. Eleno Figueroa. Continue sodium bicarb gtt. Urine studies. Renal US ordered. Urine culture pending. 2. Monitor HH and transfuse per protocol. PRBC ordered for tonight. Monitor for active bleeding. Occult stool with next BM. PPI   3. Monitor BP. Home amlodipine and lasix on hold. As needed labetalol with parameters. Follow cardiac enzymes and echo ordered. 4. Monitor metabolic panel and renal function. Monitor I and O's. Avoid nephrotoxin agents  5.  Updated wife Romeo Colon [phone] on admission             [facility], [place]  9/25/2020, 9:24 PM

## 2020-09-26 NOTE — CONSULTS
[facility] - Consult Note    Consultation request by Juliana Sprague MD for advice/opinion related to evaluating Acute renal failure (ARF) ([facility] [N17.9], Anemia, indeterminant trop, h/o abn nuc presumed CAD    Date of  Admission: 9/25/2020  4:56 PM   Primary Care Physician:  MD Dr. Elena Curiel, pt s & e, case extensively reviewed, my assessment is as follows:     Assessment:     Patient Active Problem List   Diagnosis Code    Traumatic rhabdomyolysis ([facility] T79. 6XXA    CAD (coronary artery disease) I25.10    HTN (hypertension) I10    Troponin level elevated R79.89    Acute renal failure (ARF) (H[facility]) N17.9     -Indeterminate troponin- secondary to demand ischemia in setting of acute renal failure. Do not suspect ACS  -Acute renal failure with creatinine at 14. Renal consulted and management plan pending.  -Hyperkalemia secondary to above  -Anemia most likely secondary to ARF    -No primary cardiologist/ Has seen Dr. Ayana Castillo in past last 2016     Plan:     -Stable, resting comfortably in ED at time of my evaluation with no CP  - Presented with SCr of 14, so trop very likely due to severe [place]/ electrolyte disturbances/ no signs of ACS (his ekg is unchanged)  - Had a nuc in 2016 with normal LV function, no reversible ischemia, but fixed defect suspected to be artifact, thus has had no ACS but he has presumed CAD due to his age and ASCVD/ mild carotid disease  -Echo overall unchanged, low normal LV function no significant volume overload, see full report for details, no further CV workup planned at this point, will follow with you  -Will continue on amlodipine and defer BP management to renal       History of Present Illness: This is a 80 y.o. male admitted for Acute renal failure (ARF) ([facility] [N17.9]. Patient complains of:  Patient presented on the advice of his nephrologist who noted him to be more weak, tired and run down.  It was noted that he had elevated potassium, anemia and a markedly elevated creatinine. The patient states that he is tired, but does not complain of any other symptoms. No chest pain, shortness of breath, lightheadedness, dizziness or other symptoms. Cardiac risk factors: sedentary life style, male gender, hypertension, age      Review of Symptoms:  Except as stated above include:  Constitutional:  negative  Respiratory:  negative  Cardiovascular:  negative  Gastrointestinal: negative  Genitourinary:  negative  Musculoskeletal:  Negative  Neurological:  Negative  Dermatological:  Negative  Endocrinological: Negative  Psychological:  Negative    Pertinent items are noted in HPI. Past Medical History:     Past Medical History:   Diagnosis Date    Cardiac echocardiogram 08/29/2016    EF 60-65%. No WMA. Mild LVH. Indeterminate diastolic fx. RVSP 35 mmHg. No significant valvular heart disease.  Cardiac nuclear imaging test, abnormal 08/29/2016    Intermediate risk. Previous inferior infarction w/very mild fabiana-infarct ischemia. Inferior hypk. EF 68%. Nondiagnostic EKG on pharm stress test.  Pt's BP increased from 193/96 to 207/83 during study.  Carotid duplex 08/30/2016    Mild <50% bilateral ICA stenosis.  Hypertension          Social History:     Social History     Socioeconomic History    Marital status:      Spouse name: Not on file    Number of children: Not on file    Years of education: Not on file    Highest education level: Not on file   Tobacco Use    Smoking status: Never Smoker    Smokeless tobacco: Never Used   Substance and Sexual Activity    Alcohol use: No    Drug use: No        Family History:   History reviewed. No pertinent family history.      Medications:   No Known Allergies     Current Facility-Administered Medications   Medication Dose Route Frequency    cefTRIAXone (ROCEPHIN) 1 g in sterile water (preservative free) 10 mL IV syringe  1 g IntraVENous Q24H    amLODIPine (NORVASC) tablet 5 mg  5 mg Oral DAILY    0.9% sodium chloride infusion 250 mL  250 mL IntraVENous PRN    sodium bicarbonate (8.4%) 100 mEq in dextrose 5% 1,000 mL infusion   IntraVENous CONTINUOUS    sodium chloride (NS) flush 5-40 mL  5-40 mL IntraVENous Q8H    sodium chloride (NS) flush 5-40 mL  5-40 mL IntraVENous PRN    acetaminophen (TYLENOL) tablet 650 mg  650 mg Oral Q6H PRN    Or    acetaminophen (TYLENOL) suppository 650 mg  650 mg Rectal Q6H PRN    polyethylene glycol (MIRALAX) packet 17 g  17 g Oral DAILY PRN    promethazine (PHENERGAN) tablet 12.5 mg  12.5 mg Oral Q6H PRN    Or    ondansetron (ZOFRAN) injection 4 mg  4 mg IntraVENous Q6H PRN    pantoprazole (PROTONIX) tablet 40 mg  40 mg Oral ACB    labetaloL (NORMODYNE;TRANDATE) 20 mg/4 mL (5 mg/mL) injection 20 mg  20 mg IntraVENous Q5MIN PRN     Current Outpatient Medications   Medication Sig    cephalexin (KEFLEX PO) Take  by mouth.  furosemide (LASIX) 20 mg tablet Take 20 mg by mouth daily.  lidocaine (LIDODERM) 5 % Apply patch to the affected area for 12 hours a day and remove for 12 hours a day.  HYDROcodone-acetaminophen (NORCO) 5-325 mg per tablet Take 1 Tab by mouth every eight (8) hours as needed for Pain. Max Daily Amount: 3 Tabs.  garlic 3,881 mg cap Take  by mouth.  amLODIPine (NORVASC) 5 mg tablet Take 1 Tab by mouth daily.  Indications: HYPERTENSION         Physical Exam:     Visit Vitals  BP (!) 157/53   Pulse 72   Temp 98.2 °F (36.8 °C)   Resp 15   Ht 5' 6\" (1.676 m)   Wt 81.2 kg (179 lb)   SpO2 100%   BMI 28.89 kg/m²     BP Readings from Last 3 Encounters:   09/26/20 (!) 157/53   07/10/19 193/80   12/06/18 176/66     Pulse Readings from Last 3 Encounters:   09/25/20 72   07/09/19 88   12/06/18 66     Wt Readings from Last 3 Encounters:   09/26/20 81.2 kg (179 lb)   07/09/19 81.2 kg (179 lb)   12/06/18 85.3 kg (188 lb)       General:  alert, cooperative, no distress, appears stated age  Neck:  nontender, no carotid bruit, no JVD  Lungs: clear to auscultation bilaterally  Heart:  regular rate and rhythm, S1, S2 normal, no murmur, click, rub or gallop  Abdomen:  abdomen is soft without significant tenderness, masses, organomegaly or guarding  Extremities:  extremities normal, atraumatic, no cyanosis or edema  Skin: Warm and dry.  no hyperpigmentation, vitiligo, or suspicious lesions  Neuro: alert, oriented x3, affect appropriate, no focal neurological deficits, moves all extremities well, no involuntary movements  Psych: non focal     Data Review:     Recent Labs     09/26/20  0410 09/25/20  1800   WBC 8.1 9.4   HGB 7.2* 6.6*   HCT 21.1* 19.8*    277     Recent Labs     09/26/20  0715 09/25/20  1800    144   K 4.5 5.2   * 122*   CO2 11* 8*   * 82   * 129*   CREA 14.80* 15.40*   CA 8.1*  8.1* 8.4*   MG 1.9 2.0   PHOS 8.5*  --    ALB  --  3.6   ALT  --  15*       Results for orders placed or performed during the hospital encounter of 09/25/20   EKG, 12 LEAD, INITIAL   Result Value Ref Range    Ventricular Rate 76 BPM    Atrial Rate 76 BPM    P-R Interval 172 ms    QRS Duration 112 ms    Q-T Interval 456 ms    QTC Calculation (Bezet) 513 ms    Calculated P Axis 31 degrees    Calculated R Axis -37 degrees    Calculated T Axis -117 degrees    Diagnosis       Normal sinus rhythm  Left axis deviation  Moderate voltage criteria for LVH, may be normal variant  Septal infarct , age undetermined  Marked ST abnormality, possible inferior subendocardial injury  Prolonged QT  Abnormal ECG  When compared with ECG of 09-JUL-2019 23:27,  Septal infarct is now present  ST more depressed in Inferior leads  T wave inversion more evident in Inferior leads  QT has lengthened     Results for orders placed or performed in visit on 09/28/16   AMB POC EKG ROUTINE W/ 12 LEADS, INTER & REP    Narrative    EKG: unchanged from previous tracings, normal sinus rhythm, nonspecific ST and T waves changes, left axis deviation, upper limit NV,mild IVCD         All Cardiac Markers in the last 24 hours:    Lab Results   Component Value Date/Time     09/26/2020 07:15 AM    CKMB 4.8 (H) 09/26/2020 07:15 AM    CKND1 3.0 09/26/2020 07:15 AM    TROIQ 0.33 (H) 09/26/2020 07:15 AM    TROIQ 0.35 (H) 09/25/2020 06:00 PM       Last Lipid:    Lab Results   Component Value Date/Time    Cholesterol, total 213 (H) 08/29/2016 01:35 AM    HDL Cholesterol 54 08/29/2016 01:35 AM    LDL, calculated 136.6 (H) 08/29/2016 01:35 AM    Triglyceride 112 08/29/2016 01:35 AM    CHOL/HDL Ratio 3.9 08/29/2016 01:35 AM     09/25/20   ECHO ADULT COMPLETE 09/26/2020 9/26/2020    Narrative · LV: Calculated LVEF is 50%. Visually measured ejection fraction. Normal   cavity size. Mildly increased wall thickness. Globally reduced systolic   function. Low normal systolic function. Mild (grade 1) left ventricular   diastolic dysfunction. · MV: Mild mitral valve regurgitation is present.         Signed by: Dana Lebron DO       Signed By: CORNEL Garner     September 26, 2020

## 2020-09-26 NOTE — CONSULTS
[address] [facility]    Name:  Maday Paredes  MR#:   [id number]  :  1935  ACCOUNT #:  [de-identified]  DATE OF SERVICE:  2020    RENAL CONSULTATION    Consult originally was requested by primary care physician, Dr. Joe Nunes. Subsequently, by the emergency room. Physician. REASON FOR CONSULTATION:  Renal failure. HISTORY OF PRESENT ILLNESS:  This pleasant 80-year-old [facility] male, whom I  have encountered first time for a renal problem. He was brought in the office on wheelchair by his wife. The patient was not sure why he was there. By reviewing the information that was sent from his primary care physician, found out that lab workup that was done sometime last month, 2020, which was abnormal lab with a very high blood urea nitrogen and creatinine, acidotic, hyperkalemic and severely anemic. Subsequently, I tried to go through the care everywhere, but I could not find any other information except few other labs as of 2019 with serum creatinine of 4.94. No renal evaluation was done. The patient feels tired, weak with a poor appetite. Denies any shortness of breath, any palpitation, any chest pain, or any urinary symptoms. No urgency, no hesitancy. No dysuria. The patient is not aware of any kidney problem. Denies any nausea or vomiting, but has poor appetite. No hematemesis or melena. No hematuria. No flank pain. He does not have any history of any diabetes, not sure whether he has taken any NSAIDs or not. By reviewing the chart, found that the echocardiogram done sometime in 2016 did show he had ejection fraction around 60%-65% with no wall motion abnormality, mild LVH pattern, and diastolic dysfunction with a right ventricular systolic pressure of 35 mmHg and also the abnormal nuclear cardiac image study with ejection fraction around 68%.   Beside that, he also has a nonsignificant mild bilateral internal carotid artery stenosis. PAST MEDICAL HISTORY:  Hypertension, diastolic dysfunction, nonsignificant carotid artery disease. PAST SURGICAL HISTORY:  None    FAMILY HISTORY:  Not clear. SOCIAL HISTORY:  . Never smoked. No drug or alcohol. LIST OF MEDICATIONS BEFORE ADMISSION:  Keflex by mouth how long he is taking that tablet is not clear Norvasc  tablet  5 mg daily and lidocaine patch applied to the affected area for 12 hours a day and off for 12 hours a day,  Norco 5/325 mg tablet every 8 hours p.r.n. for pain, also takes garlic 092 mg capsule and Norvasc 5 mg tablet daily. REVIEW OF SYSTEMS:  GENERAL:  An elderly person tired looking and fatigued. CARDIOVASCULAR SYSTEM:  No chest pain, no palpitations, but has leg edema. PULMONARY SYSTEM:  No cough, no wheezing, no hemoptysis. GASTROINTESTINAL SYSTEM:  Poor appetite without any nausea, vomiting, abdominal pain, or diarrhea. GENITOURINARY SYSTEM:  No urgency, no hesitancy, no hematuria, no flank pain. NEUROLOGIC:  No headache, no seizures. PHYSICAL EXAMINATION:  VITAL SIGNS:  Temperature 97.6; heart rate 79-91 per minute; blood pressure 165/74, 162/54; mean arterial pressure is  mmHg at rest; oxygen saturation is 97%-98% on room air. No weight was taken. HEENT:  Oral mucosa moist.  NECK:  Jugular venous pressure not distended. Carotid, no audible bruits. LUNGS:  Clear to auscultation mostly. HEART:  S1, S2 with systolic murmur in the apical area. ABDOMEN:  Soft. No rebound tenderness. Positive bowel sounds. EXTREMITIES:  Ankles, 2+ edema. No calf muscle tenderness. LABORATORY DATA:  Labs that was done in the emergency room today:  Sodium 144, potassium 5.2, chloride 122, bicarb of only 8, anion gap of 14, glucose of 82, blood urea nitrogen of 129, creatinine of 15.40, calcium of 8.4, magnesium 2.0, eGFR of only 4 mL/min, total protein of 8.8 with albumin of 3.6. Troponin 0.35. Iron saturation 29%, ferritin 100 and TIBC of 289. Chest x-ray pending. Hematology:  WBC of 9.4 with a hemoglobin of only 6.6, hematocrit 19.8, platelet of 801,409. Urinalysis being done shows specific gravity of 1.010, pH of 5.0, protein 100, glucose negative, ketone negative, blood small, nitrite negative, leukocyte esterase large, epithelial cells 2+, wbc 21-35, rbc 0-4, bacteria few . Urine chemistry:  Protein-creatinine ratio of 0.9, urine sodium of 74, urine-creatinine is 78. Renal ultrasound has been ordered, still pending. IMPRESSION AND PLAN:  The patient looks like in advanced renal failure. How long his renal failure is going on is not clear, but certainly for the last few years, seems he has reached to the point that probably is end-stage renal disease. He has severe anemia, acidotic, mildly hyperkalemic, has some symptoms of renal failure including tiredness, weak, fatigue, poor appetite. Certainly with this degree  of anemia, we must rule out whether he is passing any blood in the stool or not and to check the Hemoccult stool, but his iron saturation is more than 20% indicating this anemia is most likely from chronic kidney disease. He is quite acidotic. For tonight we are going to carefully give one unit of packed red blood cells and also start on bicarb drip. Watch for any signs of any volume overload, wait for the renal ultrasound. Although this renal failure is not clear, but definitely we will need to rule out any light chain disease versus myeloma or other causes. Hypertension is probably  the cause of his advanced renal failure. Most likely he will need renal replacement therapy provided if he wants. This question will be answered to the family.   His wife was already asking about this question while she was in the office and I told them that until we do the further workup, it will be premature to comment on any plan whether he needs any renal replacement therapy or not, but again we will do further workup and depending on the workup findings, we will decide whether he needs any renal replacement therapy or not, and again, tonight he will get one unit blood transfusion and the bicarb and wait for the further workup.         MD JAMISON [email or web address]  D:  09/25/2020 23:20  T:  09/26/2020 10:10  JOB #:  [id number]

## 2020-09-26 NOTE — ED NOTES
Spoke to patient wife, informed her that patient had a good night. He continues to wait for an inpatient bed. She will call back later.

## 2020-09-26 NOTE — PROGRESS NOTES
Progress Note    Bulmaro Juan  80 y.o. Admit Date: 9/25/2020  Active Problems:    Acute renal failure (ARF) ([facility] (9/25/2020) POA: Unknown      ESRD (end stage renal disease) ([facility] (9/26/2020) POA: Unknown      Hyperkalemia (9/26/2020) POA: Unknown      Metabolic acidosis (0/83/6848) POA: Unknown      Secondary hyperparathyroidism, renal ([facility] (9/26/2020) POA: Unknown      Anemia (9/26/2020) POA: Unknown            Subjective:     Patient says feels much better after long time  & he is glad that he has  agreed to come to hospital.  Received 1 Unit of PRBC last night & getting Low dose of Bicarb Drip. Says something needs to be done  Even ready for Dialysis both Hemo or Home Dialysis if possible. A comprehensive review of systems was negative except for that written in the History of Present Illness.     Objective:     Visit Vitals  BP (!) 175/66   Pulse 75   Temp 98.3 °F (36.8 °C)   Resp 18   Ht 5' 6\" (1.676 m)   Wt 81.2 kg (179 lb)   SpO2 100%   BMI 28.89 kg/m²       No intake or output data in the 24 hours ending 09/26/20 1232    Current Facility-Administered Medications   Medication Dose Route Frequency Provider Last Rate Last Dose    cefTRIAXone (ROCEPHIN) 1 g in sterile water (preservative free) 10 mL IV syringe  1 g IntraVENous Q24H Maegan Pugh MD   1 g at 09/26/20 0416    amLODIPine (NORVASC) tablet 5 mg  5 mg Oral DAILY Maegan Pugh MD   5 mg at 09/26/20 0959    0.9% sodium chloride infusion 250 mL  250 mL IntraVENous PRN Theone Balsam, DO        sodium bicarbonate (8.4%) 100 mEq in dextrose 5% 1,000 mL infusion   IntraVENous CONTINUOUS Theone Balsam, DO 75 mL/hr at 09/25/20 2052      sodium chloride (NS) flush 5-40 mL  5-40 mL IntraVENous Q8H Silva Beverly NP   10 mL at 09/25/20 2200    sodium chloride (NS) flush 5-40 mL  5-40 mL IntraVENous PRN Mitchell-Holston, Mortimer Santos, GAVIOTA        acetaminophen (TYLENOL) tablet 650 mg  650 mg Oral Q6H PRN Helena Zaragoza NP Or    acetaminophen (TYLENOL) suppository 650 mg  650 mg Rectal Q6H PRN Silva Beverly NP        polyethylene glycol (MIRALAX) packet 17 g  17 g Oral DAILY PRN Suresh Beverly NP        promethazine (PHENERGAN) tablet 12.5 mg  12.5 mg Oral Q6H PRN Silva Beverly NP        Or    ondansetron (ZOFRAN) injection 4 mg  4 mg IntraVENous Q6H PRN Silva Beverly NP        pantoprazole (PROTONIX) tablet 40 mg  40 mg Oral ACB Silva Beverly NP   40 mg at 09/26/20 0959    labetaloL (NORMODYNE;TRANDATE) 20 mg/4 mL (5 mg/mL) injection 20 mg  20 mg IntraVENous Q5MIN PRN Suresh Beverly NP         Current Outpatient Medications   Medication Sig Dispense Refill    cephalexin (KEFLEX PO) Take  by mouth.  furosemide (LASIX) 20 mg tablet Take 20 mg by mouth daily.  lidocaine (LIDODERM) 5 % Apply patch to the affected area for 12 hours a day and remove for 12 hours a day. 1 Each 0    HYDROcodone-acetaminophen (NORCO) 5-325 mg per tablet Take 1 Tab by mouth every eight (8) hours as needed for Pain. Max Daily Amount: 3 Tabs. 12 Tab 0    garlic 3,757 mg cap Take  by mouth.  amLODIPine (NORVASC) 5 mg tablet Take 1 Tab by mouth daily. Indications: HYPERTENSION 100 Tab 0        Physical Exam:     Physical Exam:   General:  Alert, cooperative, no distress, appears stated age. Neck: Supple, symmetrical, trachea midline, no adenopathy, thyroid: no enlargement/tenderness/nodules, no carotid bruit and no JVD. Lungs:   Clear to auscultation bilaterally. Heart:  Regular rate and rhythm, S1, S2 normal, no murmur, click, rub or gallop. Abdomen:   Soft, non-tender. Bowel sounds normal. No masses,  No organomegaly.    Extremities: Extremities normal, atraumatic, no cyanosis, has edema         Data Review:    CBC w/Diff    Recent Labs     09/26/20  0410 09/25/20  1800   WBC 8.1 9.4   RBC 2.47* 2.31*   HGB 7.2* 6.6*   HCT 21.1* 19.8*   MCV 85.4 85.7   MCH 29.1 28.6   MCHC 34.1 33.3   RDW 14.9* 15.1*    Recent Labs     09/26/20  0410 09/25/20  1800   MONOS 9 7   EOS 3 2   BASOS 1 1   RDW 14.9* 15.1*        Comprehensive Metabolic Profile    Recent Labs     09/26/20  0715 09/25/20  1800    144   K 4.5 5.2   * 122*   CO2 11* 8*   * 129*   CREA 14.80* 15.40*    Recent Labs     09/26/20  0715 09/25/20  1800   CA 8.1*  8.1* 8.4*   PHOS 8.5*  --    ALB  --  3.6   TP  --  8.8*   TBILI  --  0.3        Study Result     EXAM: CHEST PORTABLE      CLINICAL HISTORY/INDICATION: Shortness of breath for a few days, clinical  concern for CHF.     COMPARISON: 6/27/2018, 7/10/2019.     TECHNIQUE: Portable AP view was obtained.     FINDINGS:      LINES/DEVICES: None.     HEART/MEDIASTINUM: The cardiomediastinal silhouette is unremarkable in size.     LUNGS: No focal airspace opacity suggestive of pneumonia, pulmonary edema,  pneumothorax, or pleural effusion.     BONES: Unremarkable for age.     IMPRESSION  IMPRESSION:  1. No radiographic evidence of acute cardiopulmonary disease.     Thank you for enabling us to participate in the care of this patient. Results for Magui Cullen (MRN [id number]) as of 9/26/2020 12:38   Ref.  Range 9/25/2020 20:34   Color Latest Units:   YELLOW   Appearance Latest Units:   CLOUDY   Specific gravity Latest Ref Range: 1.005 - 1.030   1.010   pH (UA) Latest Ref Range: 5.0 - 8.0   5.0   Protein Latest Ref Range: NEG mg/dL 100 (A)   Glucose Latest Ref Range: NEG mg/dL Negative   Ketone Latest Ref Range: NEG mg/dL Negative   Blood Latest Ref Range: NEG   SMALL (A)   Bilirubin Latest Ref Range: NEG   Negative   Urobilinogen Latest Ref Range: 0.2 - 1.0 EU/dL 0.2   Nitrites Latest Ref Range: NEG   Negative   Leukocyte Esterase Latest Ref Range: NEG   LARGE (A)   Epithelial cells Latest Ref Range: 0 - 5 /lpf 2+   WBC Latest Ref Range: 0 - 4 /hpf 21 to 35   RBC Latest Ref Range: 0 - 5 /hpf 0 to 4   Bacteria Latest Ref Range: NEG /hpf FEW (A) Eosinophils,urine Latest Units:   MANY EOSINOPHILS SEEN     Results for Simon Silva (MRN [id number]) as of 9/26/2020 12:38   Ref. Range 9/25/2020 20:34   Creatinine, urine Latest Ref Range: 30 - 125 mg/dL 78.00   Protein, urine random Latest Ref Range: <11.9 mg/dL 72 (H)   Protein/Creat. urine Ratio Latest Units:   0.9   Sodium,urine random Latest Ref Range: 20 - 110 MMOL/L 74   Osmolality,urine Latest Ref Range: 50 - 1,400 MOSM/kg H2O 312       Results for Simon Silva (MRN [id number]) as of 9/26/2020 12:38   Ref. Range 11/23/2018 14:59 7/10/2019 01:53 9/25/2020 18:00 9/26/2020 07:15 9/26/2020 07:15   Troponin-I, Qt.  Latest Ref Range: 0.0 - 0.045 NG/ML  <0.02 0.35 (H) 0.33 (H)        /26/2020  6:39 AM - Yan, Card Result In     Component  Value  Ref Range & Units  Status    Ventricular Rate  76  BPM  Preliminary    Atrial Rate  76  BPM  Preliminary    P-R Interval  172  ms  Preliminary    QRS Duration  112  ms  Preliminary    Q-T Interval  456  ms  Preliminary    QTC Calculation (Bezet)  513  ms  Preliminary    Calculated P Axis  31  degrees  Preliminary    Calculated R Axis  -37  degrees  Preliminary    Calculated T Axis  -117  degrees  Preliminary    Diagnosis    Preliminary    Normal sinus rhythm   Left axis deviation   Moderate voltage criteria for LVH, may be normal variant   Septal infarct , age undetermined   Marked ST abnormality, possible inferior subendocardial injury   Prolonged QT   Abnormal ECG   When compared with ECG of 09-JUL-2019 23:27,   Septal infarct is now present   ST more depressed in Inferior leads   T wave inversion more evident in Inferior leads   QT has lengthened             Impression:       Active Hospital Problems    Diagnosis Date Noted    ESRD (end stage renal disease) ([facility] 09/26/2020    Hyperkalemia 36/97/1642    Metabolic acidosis 91/72/2594    Secondary hyperparathyroidism, renal ([facility] 09/26/2020    Anemia 09/26/2020    Acute renal failure (ARF) ([facility] 09/25/2020    Elevated Troponin        Plan:     He  Needs Long Term dialysis, will continue current Bicarb drip, add Rocaltrol, Needs TDC  By Vascular Surgery, also needs Cardiology to see for positive Troponin,Needs Echocardiogram. Start Phosphorus Binders, Rocaltrol, Nephrocap, Nepro & Renal Diet.  Discussed with Christy Calix MD, vascular Surgeon, he will try to insert [facility] on Monday, once done will initiate Dialysis,await Renal US. & other Jayla Youngblood MD

## 2020-09-26 NOTE — ED NOTES
Patient laying in bed alert and oriented 3, no signs of distress noted at this time. He is on cardiac monitoring equipment. Will continue to monitor.

## 2020-09-26 NOTE — PROGRESS NOTES
[facility] Hospital[facility]  Progress Note    Patient: Leticia Cantu Age: 80 y.o. : 1935 MR#: [id number] SSN: [id number]  Date: 2020     Subjective/24-hour events:     Remains roomed in ED    Assessment:   CHARLOTTE on CKD, now ESRD  Secondary hyperparathyroidism  Metabolic acidosis  Troponin elevation  HTN  Anemia, etiology uncertain  OA  Advanced age      Plan:   Bicarb infusion continued for now. Noted nephrology plan recommendation of initiation of maintenance hemodialysis. Renal US pending. Vascular surgery evaluation for HD access. Cardiology eval/recs noted. Assistance appreciated. Monitor H/H. No need for transfusion acutely. Check iron profile. Continue rocephin for now, follow urine cultures. PT/OT. Disposition TBD. Best supportive care o/w. Follow. Case discussed with:  [x]Patient  []Family  [x]Nursing  []Case Management  DVT Prophylaxis:  []Lovenox  []Hep SQ  [x]SCDs  []Coumadin   []On Heparin gtt    Objective:   VS:   Visit Vitals  BP (!) 160/59   Pulse 78   Temp 98.3 °F (36.8 °C)   Resp 19   Ht 5' 6\" (1.676 m)   Wt 81.2 kg (179 lb)   SpO2 100%   BMI 28.89 kg/m²      Tmax/24hrs: Temp (24hrs), Av.8 °F (36.6 °C), Min:97.1 °F (36.2 °C), Max:98.3 °F (36.8 °C)  No intake or output data in the 24 hours ending 20 1545    General:  In NAD. Nontoxic-appearing. Cardiovascular:  RRR. Pulmonary:  Lungs clear bilaterally, no wheezes. GI:  Abdomen soft, NTTP. Extremities:  Warm, no edema or ischemia. Neuro:  Awake and alert. Moves extremities spontaneously.     Labs:    Recent Results (from the past 24 hour(s))   TYPE & SCREEN    Collection Time: 20  6:00 PM   Result Value Ref Range    Crossmatch Expiration 2020     ABO/Rh(D) O POSITIVE     Antibody screen NEG     CALLED TO: CELESTINA MARTIN, AT 21:07 ON 2020 BY [facility]     Unit number [id number]     Blood component type RC LR,2     Unit division 00     Status of unit TRANSFUSED Crossmatch result Compatible    CBC WITH AUTOMATED DIFF    Collection Time: 09/25/20  6:00 PM   Result Value Ref Range    WBC 9.4 4.6 - 13.2 K/uL    RBC 2.31 (L) 4.70 - 5.50 M/uL    HGB 6.6 (L) 13.0 - 16.0 g/dL    HCT 19.8 (L) 36.0 - 48.0 %    MCV 85.7 74.0 - 97.0 FL    MCH 28.6 24.0 - 34.0 PG    MCHC 33.3 31.0 - 37.0 g/dL    RDW 15.1 (H) 11.6 - 14.5 %    PLATELET 340 793 - 211 K/uL    MPV 11.2 9.2 - 11.8 FL    NEUTROPHILS 73 40 - 73 %    LYMPHOCYTES 17 (L) 21 - 52 %    MONOCYTES 7 3 - 10 %    EOSINOPHILS 2 0 - 5 %    BASOPHILS 1 0 - 2 %    ABS. NEUTROPHILS 6.8 1.8 - 8.0 K/UL    ABS. LYMPHOCYTES 1.6 0.9 - 3.6 K/UL    ABS. MONOCYTES 0.7 0.05 - 1.2 K/UL    ABS. EOSINOPHILS 0.2 0.0 - 0.4 K/UL    ABS. BASOPHILS 0.1 0.0 - 0.1 K/UL    DF AUTOMATED     METABOLIC PANEL, COMPREHENSIVE    Collection Time: 09/25/20  6:00 PM   Result Value Ref Range    Sodium 144 136 - 145 mmol/L    Potassium 5.2 3.5 - 5.5 mmol/L    Chloride 122 (H) 100 - 111 mmol/L    CO2 8 (LL) 21 - 32 mmol/L    Anion gap 14 3.0 - 18 mmol/L    Glucose 82 74 - 99 mg/dL     (H) 7.0 - 18 MG/DL    Creatinine 15.40 (H) 0.6 - 1.3 MG/DL    BUN/Creatinine ratio 8 (L) 12 - 20      GFR est AA 4 (L) >60 ml/min/1.73m2    GFR est non-AA 3 (L) >60 ml/min/1.73m2    Calcium 8.4 (L) 8.5 - 10.1 MG/DL    Bilirubin, total 0.3 0.2 - 1.0 MG/DL    ALT (SGPT) 15 (L) 16 - 61 U/L    AST (SGOT) 10 10 - 38 U/L    Alk.  phosphatase 79 45 - 117 U/L    Protein, total 8.8 (H) 6.4 - 8.2 g/dL    Albumin 3.6 3.4 - 5.0 g/dL    Globulin 5.2 (H) 2.0 - 4.0 g/dL    A-G Ratio 0.7 (L) 0.8 - 1.7     IRON PROFILE    Collection Time: 09/25/20  6:00 PM   Result Value Ref Range    Iron 84 50 - 175 ug/dL    TIBC 289 250 - 450 ug/dL    Iron % saturation 29 20 - 50 %   FERRITIN    Collection Time: 09/25/20  6:00 PM   Result Value Ref Range    Ferritin 108 8 - 388 NG/ML   COMPLEMENT, C3 & C4    Collection Time: 09/25/20  6:00 PM   Result Value Ref Range    Complement C3 95 82 - 167 mg/dL    Complement C4 27 14 - 44 mg/dL   TROPONIN I    Collection Time: 09/25/20  6:00 PM   Result Value Ref Range    Troponin-I, QT 0.35 (H) 0.0 - 0.045 NG/ML   T4, FREE    Collection Time: 09/25/20  6:00 PM   Result Value Ref Range    T4, Free 0.8 0.7 - 1.5 NG/DL   TSH 3RD GENERATION    Collection Time: 09/25/20  6:00 PM   Result Value Ref Range    TSH 6.31 (H) 0.36 - 3.74 uIU/mL   MAGNESIUM    Collection Time: 09/25/20  6:00 PM   Result Value Ref Range    Magnesium 2.0 1.6 - 2.6 mg/dL   HEP B SURFACE AG    Collection Time: 09/25/20  6:00 PM   Result Value Ref Range    Hepatitis B surface Ag <0.10 <1.00 Index    Hep B surface Ag Interp.  Negative NEG     EKG, 12 LEAD, INITIAL    Collection Time: 09/25/20  6:11 PM   Result Value Ref Range    Ventricular Rate 76 BPM    Atrial Rate 76 BPM    P-R Interval 172 ms    QRS Duration 112 ms    Q-T Interval 456 ms    QTC Calculation (Bezet) 513 ms    Calculated P Axis 31 degrees    Calculated R Axis -37 degrees    Calculated T Axis -117 degrees    Diagnosis       Normal sinus rhythm  Left axis deviation  Moderate voltage criteria for LVH, may be normal variant  Septal infarct , age undetermined  Marked ST abnormality, possible inferior subendocardial injury  Prolonged QT  Abnormal ECG  When compared with ECG of 09-JUL-2019 23:27,  Septal infarct is now present  ST more depressed in Inferior leads  T wave inversion more evident in Inferior leads  QT has lengthened     URINALYSIS W/ RFLX MICROSCOPIC    Collection Time: 09/25/20  8:34 PM   Result Value Ref Range    Color YELLOW      Appearance CLOUDY      Specific gravity 1.010 1.005 - 1.030      pH (UA) 5.0 5.0 - 8.0      Protein 100 (A) NEG mg/dL    Glucose Negative NEG mg/dL    Ketone Negative NEG mg/dL    Bilirubin Negative NEG      Blood SMALL (A) NEG      Urobilinogen 0.2 0.2 - 1.0 EU/dL    Nitrites Negative NEG      Leukocyte Esterase LARGE (A) NEG     SODIUM, UR, RANDOM    Collection Time: 09/25/20  8:34 PM   Result Value Ref Range Sodium,urine random 74 20 - 110 MMOL/L   OSMOLALITY, UR    Collection Time: 09/25/20  8:34 PM   Result Value Ref Range    Osmolality,urine 312 50 - 1,400 MOSM/kg H2O   EOSINOPHILS, URINE    Collection Time: 09/25/20  8:34 PM   Result Value Ref Range    Eosinophils,urine MANY EOSINOPHILS SEEN     PROTEIN/CREATININE RATIO, URINE    Collection Time: 09/25/20  8:34 PM   Result Value Ref Range    Protein, urine random 72 (H) <11.9 mg/dL    Creatinine, urine 78.00 30 - 125 mg/dL    Protein/Creat. urine Ratio 0.9     URINE MICROSCOPIC ONLY    Collection Time: 09/25/20  8:34 PM   Result Value Ref Range    WBC 21 to 35 0 - 4 /hpf    RBC 0 to 4 0 - 5 /hpf    Epithelial cells 2+ 0 - 5 /lpf    Bacteria FEW (A) NEG /hpf   RBC, ALLOCATE    Collection Time: 09/25/20  9:00 PM   Result Value Ref Range    HISTORY CHECKED? Historical check performed    CBC WITH AUTOMATED DIFF    Collection Time: 09/26/20  4:10 AM   Result Value Ref Range    WBC 8.1 4.6 - 13.2 K/uL    RBC 2.47 (L) 4.70 - 5.50 M/uL    HGB 7.2 (L) 13.0 - 16.0 g/dL    HCT 21.1 (L) 36.0 - 48.0 %    MCV 85.4 74.0 - 97.0 FL    MCH 29.1 24.0 - 34.0 PG    MCHC 34.1 31.0 - 37.0 g/dL    RDW 14.9 (H) 11.6 - 14.5 %    PLATELET 958 385 - 189 K/uL    MPV 12.0 (H) 9.2 - 11.8 FL    NEUTROPHILS 69 40 - 73 %    LYMPHOCYTES 18 (L) 21 - 52 %    MONOCYTES 9 3 - 10 %    EOSINOPHILS 3 0 - 5 %    BASOPHILS 1 0 - 2 %    ABS. NEUTROPHILS 5.6 1.8 - 8.0 K/UL    ABS. LYMPHOCYTES 1.4 0.9 - 3.6 K/UL    ABS. MONOCYTES 0.7 0.05 - 1.2 K/UL    ABS. EOSINOPHILS 0.2 0.0 - 0.4 K/UL    ABS.  BASOPHILS 0.1 0.0 - 0.1 K/UL    DF AUTOMATED     METABOLIC PANEL, BASIC    Collection Time: 09/26/20  7:15 AM   Result Value Ref Range    Sodium 144 136 - 145 mmol/L    Potassium 4.5 3.5 - 5.5 mmol/L    Chloride 119 (H) 100 - 111 mmol/L    CO2 11 (L) 21 - 32 mmol/L    Anion gap 14 3.0 - 18 mmol/L    Glucose 168 (H) 74 - 99 mg/dL     (H) 7.0 - 18 MG/DL    Creatinine 14.80 (H) 0.6 - 1.3 MG/DL    BUN/Creatinine ratio 8 (L) 12 - 20      GFR est AA 4 (L) >60 ml/min/1.73m2    GFR est non-AA 3 (L) >60 ml/min/1.73m2    Calcium 8.1 (L) 8.5 - 10.1 MG/DL   CARDIAC PANEL,(CK, CKMB & TROPONIN)    Collection Time: 09/26/20  7:15 AM   Result Value Ref Range    CK - MB 4.8 (H) <3.6 ng/ml    CK-MB Index 3.0 0.0 - 4.0 %     39 - 308 U/L    Troponin-I, QT 0.33 (H) 0.0 - 0.045 NG/ML   MAGNESIUM    Collection Time: 09/26/20  7:15 AM   Result Value Ref Range    Magnesium 1.9 1.6 - 2.6 mg/dL   PHOSPHORUS    Collection Time: 09/26/20  7:15 AM   Result Value Ref Range    Phosphorus 8.5 (H) 2.5 - 4.9 MG/DL   PTH INTACT    Collection Time: 09/26/20  7:15 AM   Result Value Ref Range    Calcium 8.1 (L) 8.5 - 10.1 MG/DL    PTH, Intact 353.5 (H) 18.4 - 88.0 pg/mL   ECHO ADULT COMPLETE    Collection Time: 09/26/20  9:17 AM   Result Value Ref Range    IVSd 1.29 (A) 0.6 - 1.0 cm    LVIDd 4.64 4.2 - 5.9 cm    LVIDs 3.10 cm    LVOT d 2.01 cm    LVPWd 1.27 (A) 0.6 - 1.0 cm    LA Volume 62.53 18 - 58 mL    LA Area 4C 19.16 cm2    LA Vol 2C 61.08 (A) 18 - 58 mL    LA Vol 4C 51.09 18 - 58 mL    MV A Ike 124.70 cm/s    Mitral Valve E Wave Deceleration Time 0.17 s    MV E Ike 74.91 cm/s    MV E/A 0.60     E/E' septal 20.87     LV E' Septal Velocity 3.59 cm/s    Ao Root D 3.11 cm    LV Mass .0 88 - 224 g    LV Mass AL Index 119.5 49 - 115 g/m2    LA Vol Index 32.77 16 - 28 ml/m2    LA Vol Index 32.01 16 - 28 ml/m2    LA Vol Index 26.78 16 - 28 ml/m2       Signed By: Erika Stephens MD     September 26, 2020

## 2020-09-26 NOTE — CONSULTS
Comprehensive Nutrition Assessment    Type and Reason for Visit: Initial, Consult(Oral Nutrition Supplements)    Nutrition Recommendations/Plan:   - Add supplements: Nepro Shake, once daily.  - Monitor and encourage po intake as tolerated. - IVF per Nephrology    Nutrition Assessment:  Pt sent to ED from Nephrologist office 2/2 ARF on sodium bicarbonate drip. Renal diet changed to cardiac diet today. Consulted for oral supplements. Unable to speak with pt, in ED tday. Malnutrition Assessment:  Malnutrition Status:  No malnutrition      Nutrition History and Allergies: Hx of HTN. Per chart -8#, 4% x 2 years per chart otherwise recent stable weight hx. Weakness, fatigue and loss of appetite PTA. NKFA. Estimated Daily Nutrient Needs:  Energy (kcal):  5562-2263  Protein (g):  59-89       Fluid (ml/day):  8001-9214    Nutrition Related Findings:  BM PTA. Labs: Phos 8.5 mg/dL, K 4.5 mmol/L, Cr 14.8 mg/dL. Not on HD at this time      Wounds:    None       Additional Caloric Sources: Sodium bicarbonated in D5 at 75 mL/hr (90 gm dextrose, 306 kcal per day)    Current Nutrition Therapies:   DIET CARDIAC Regular    Anthropometric Measures:  · Height:  5' 6\" (167.6 cm)  · Current Body Wt:  81.2 kg (179 lb)   · Admission Body Wt:  179 lb    · Usual Body Wt:  180 lb     · Ideal Body Wt:  142 lbs:  126.1 %   · BMI Category: Overweight (BMI 25.0-29. 9)       Nutrition Diagnosis:   · Predicted inadequate energy intake related to renal dysfunction(decreased appetite and weakness PTA) as evidenced by (consulted by provider for oral supplements)    Nutrition Interventions:   Food and/or Nutrient Delivery: Continue current diet, Start oral nutrition supplement, IV fluid delivery  Nutrition Education and Counseling: Education not indicated  Coordination of Nutrition Care: Continued inpatient monitoring    Goals:  PO nutrition intake will meet >75% of patient estimated nutritional needs within the next 7 days.        Nutrition Monitoring and Evaluation:   Food/Nutrient Intake Outcomes: Food and nutrient intake, Supplement intake, IVF intake  Physical Signs/Symptoms Outcomes: Biochemical data, Fluid status or edema, Nutrition focused physical findings    Discharge Planning:     Too soon to determine     Electronically signed by Cat Camejo RD on 9/26/2020 at 10:15 AM    Contact: [phone]

## 2020-09-26 NOTE — ED NOTES
Spoke to Dr. Samantha Goodwin to inform him of troponin results. No new orders received at this time.

## 2020-09-26 NOTE — ED PROVIDER NOTES
Patient is a 29-year-old male with history of chronic kidney disease who presents to the emergency department today at the request of his nephrologist for remarkably abnormal laboratory studies. Patient had labs drawn on 8/24, ordered by an outside provider and patient followed up with nephrologist Dr. Donnie Sands and was referred immediately here. Evidently outpatient labs demonstrated bicarbonate level of 8, creatinine of over 14 with a potassium that was 5.6 with a hemoglobin of 7. Patient evidently had these labs drawn at the end of last month. Unknown if patient has had labs drawn in the interim. Patient was sent over by his nephrologist for admission. Patient has no complaints, denies any melena, hematochezia or any other areas of bleeding. Reports some generalized fatigue otherwise in denial of any new symptoms. Past Medical History:   Diagnosis Date    Cardiac echocardiogram 08/29/2016    EF 60-65%. No WMA. Mild LVH. Indeterminate diastolic fx. RVSP 35 mmHg. No significant valvular heart disease.  Cardiac nuclear imaging test, abnormal 08/29/2016    Intermediate risk. Previous inferior infarction w/very mild fabiana-infarct ischemia. Inferior hypk. EF 68%. Nondiagnostic EKG on pharm stress test.  Pt's BP increased from 193/96 to 207/83 during study.  Carotid duplex 08/30/2016    Mild <50% bilateral ICA stenosis.  Hypertension        History reviewed. No pertinent surgical history. History reviewed. No pertinent family history.     Social History     Socioeconomic History    Marital status:      Spouse name: Not on file    Number of children: Not on file    Years of education: Not on file    Highest education level: Not on file   Occupational History    Not on file   Social Needs    Financial resource strain: Not on file    Food insecurity     Worry: Not on file     Inability: Not on file    Transportation needs     Medical: Not on file     Non-medical: Not on file   Tobacco Use    Smoking status: Never Smoker    Smokeless tobacco: Never Used   Substance and Sexual Activity    Alcohol use: No    Drug use: No    Sexual activity: Not on file   Lifestyle    Physical activity     Days per week: Not on file     Minutes per session: Not on file    Stress: Not on file   Relationships    Social connections     Talks on phone: Not on file     Gets together: Not on file     Attends Taoism service: Not on file     Active member of club or organization: Not on file     Attends meetings of clubs or organizations: Not on file     Relationship status: Not on file    Intimate partner violence     Fear of current or ex partner: Not on file     Emotionally abused: Not on file     Physically abused: Not on file     Forced sexual activity: Not on file   Other Topics Concern    Not on file   Social History Narrative    Not on file         ALLERGIES: Patient has no known allergies. Review of Systems   Constitutional: Negative for chills and fever. HENT: Negative for congestion, rhinorrhea, sinus pressure and sneezing. Eyes: Negative for visual disturbance. Respiratory: Negative for cough and shortness of breath. Cardiovascular: Negative for chest pain and leg swelling. Gastrointestinal: Negative for abdominal pain, diarrhea, nausea and vomiting. Endocrine: Negative for polyuria. Genitourinary: Negative for dysuria, frequency and urgency. Musculoskeletal: Negative for back pain and neck pain. Skin: Negative for rash. Neurological: Negative for syncope, numbness and headaches. Psychiatric/Behavioral: Negative for confusion. Vitals:    09/25/20 2215 09/25/20 2245 09/25/20 2315 09/25/20 2345   BP:    (!) 157/53   Pulse:    72   Resp:    15   Temp: 97.8 °F (36.6 °C) 97.6 °F (36.4 °C) 98.2 °F (36.8 °C)    SpO2:    100%            Physical Exam  Constitutional:       General: He is not in acute distress. Appearance: Normal appearance.  He is normal weight. He is not ill-appearing or toxic-appearing. HENT:      Head: Normocephalic and atraumatic. Right Ear: External ear normal.      Left Ear: External ear normal.      Nose: Nose normal. No congestion or rhinorrhea. Mouth/Throat:      Mouth: Mucous membranes are moist.      Pharynx: Oropharynx is clear. No oropharyngeal exudate or posterior oropharyngeal erythema. Eyes:      Extraocular Movements: Extraocular movements intact. Pupils: Pupils are equal, round, and reactive to light. Neck:      Musculoskeletal: Normal range of motion and neck supple. No muscular tenderness. Cardiovascular:      Rate and Rhythm: Normal rate and regular rhythm. Pulses: Normal pulses. Heart sounds: Normal heart sounds. No murmur. Pulmonary:      Effort: Pulmonary effort is normal.      Breath sounds: Normal breath sounds. No wheezing, rhonchi or rales. Abdominal:      General: Abdomen is flat. Bowel sounds are normal.      Tenderness: There is no abdominal tenderness. There is no guarding or rebound. Musculoskeletal: Normal range of motion. General: No swelling, tenderness or deformity. Skin:     General: Skin is warm and dry. Capillary Refill: Capillary refill takes less than 2 seconds. Findings: No lesion or rash. Neurological:      General: No focal deficit present. Mental Status: He is alert. Motor: No weakness. EKG interpretation September 25, 2020, normal sinus rhythm with left axis deviation, ventricular rate of 76 bpm, TN and QRS intervals essentially normal, QTC of 513. Left axis. Diffuse T wave inversions in leads V2V6 as well as 1, aVL, 2, aVF, no ST segment elevation or depression. No TN depression.   Overall interpretation: Normal sinus rhythm with diffuse T wave inversions    Recent Results (from the past 12 hour(s))   TYPE & SCREEN    Collection Time: 09/25/20  6:00 PM   Result Value Ref Range    Crossmatch Expiration 09/28/2020 ABO/Rh(D) O POSITIVE     Antibody screen NEG     CALLED TO: CELESTINA MARTIN, AT 21:07 ON 9/25/2020 BY [facility]     Unit number [id number]     Blood component type RC LR,2     Unit division 00     Status of unit ISSUED     Crossmatch result Compatible    CBC WITH AUTOMATED DIFF    Collection Time: 09/25/20  6:00 PM   Result Value Ref Range    WBC 9.4 4.6 - 13.2 K/uL    RBC 2.31 (L) 4.70 - 5.50 M/uL    HGB 6.6 (L) 13.0 - 16.0 g/dL    HCT 19.8 (L) 36.0 - 48.0 %    MCV 85.7 74.0 - 97.0 FL    MCH 28.6 24.0 - 34.0 PG    MCHC 33.3 31.0 - 37.0 g/dL    RDW 15.1 (H) 11.6 - 14.5 %    PLATELET 932 671 - 023 K/uL    MPV 11.2 9.2 - 11.8 FL    NEUTROPHILS 73 40 - 73 %    LYMPHOCYTES 17 (L) 21 - 52 %    MONOCYTES 7 3 - 10 %    EOSINOPHILS 2 0 - 5 %    BASOPHILS 1 0 - 2 %    ABS. NEUTROPHILS 6.8 1.8 - 8.0 K/UL    ABS. LYMPHOCYTES 1.6 0.9 - 3.6 K/UL    ABS. MONOCYTES 0.7 0.05 - 1.2 K/UL    ABS. EOSINOPHILS 0.2 0.0 - 0.4 K/UL    ABS. BASOPHILS 0.1 0.0 - 0.1 K/UL    DF AUTOMATED     METABOLIC PANEL, COMPREHENSIVE    Collection Time: 09/25/20  6:00 PM   Result Value Ref Range    Sodium 144 136 - 145 mmol/L    Potassium 5.2 3.5 - 5.5 mmol/L    Chloride 122 (H) 100 - 111 mmol/L    CO2 8 (LL) 21 - 32 mmol/L    Anion gap 14 3.0 - 18 mmol/L    Glucose 82 74 - 99 mg/dL     (H) 7.0 - 18 MG/DL    Creatinine 15.40 (H) 0.6 - 1.3 MG/DL    BUN/Creatinine ratio 8 (L) 12 - 20      GFR est AA 4 (L) >60 ml/min/1.73m2    GFR est non-AA 3 (L) >60 ml/min/1.73m2    Calcium 8.4 (L) 8.5 - 10.1 MG/DL    Bilirubin, total 0.3 0.2 - 1.0 MG/DL    ALT (SGPT) 15 (L) 16 - 61 U/L    AST (SGOT) 10 10 - 38 U/L    Alk.  phosphatase 79 45 - 117 U/L    Protein, total 8.8 (H) 6.4 - 8.2 g/dL    Albumin 3.6 3.4 - 5.0 g/dL    Globulin 5.2 (H) 2.0 - 4.0 g/dL    A-G Ratio 0.7 (L) 0.8 - 1.7     IRON PROFILE    Collection Time: 09/25/20  6:00 PM   Result Value Ref Range    Iron 84 50 - 175 ug/dL    TIBC 289 250 - 450 ug/dL    Iron % saturation 29 20 - 50 %   FERRITIN    Collection Time: 09/25/20  6:00 PM   Result Value Ref Range    Ferritin 108 8 - 388 NG/ML   TROPONIN I    Collection Time: 09/25/20  6:00 PM   Result Value Ref Range    Troponin-I, QT 0.35 (H) 0.0 - 0.045 NG/ML   T4, FREE    Collection Time: 09/25/20  6:00 PM   Result Value Ref Range    T4, Free 0.8 0.7 - 1.5 NG/DL   TSH 3RD GENERATION    Collection Time: 09/25/20  6:00 PM   Result Value Ref Range    TSH 6.31 (H) 0.36 - 3.74 uIU/mL   MAGNESIUM    Collection Time: 09/25/20  6:00 PM   Result Value Ref Range    Magnesium 2.0 1.6 - 2.6 mg/dL   HEP B SURFACE AG    Collection Time: 09/25/20  6:00 PM   Result Value Ref Range    Hepatitis B surface Ag <0.10 <1.00 Index    Hep B surface Ag Interp.  Negative NEG     EKG, 12 LEAD, INITIAL    Collection Time: 09/25/20  6:11 PM   Result Value Ref Range    Ventricular Rate 76 BPM    Atrial Rate 76 BPM    P-R Interval 172 ms    QRS Duration 112 ms    Q-T Interval 456 ms    QTC Calculation (Bezet) 513 ms    Calculated P Axis 31 degrees    Calculated R Axis -37 degrees    Calculated T Axis -117 degrees    Diagnosis       Normal sinus rhythm  Left axis deviation  Moderate voltage criteria for LVH, may be normal variant  Septal infarct , age undetermined  Marked ST abnormality, possible inferior subendocardial injury  Prolonged QT  Abnormal ECG  When compared with ECG of 09-JUL-2019 23:27,  Septal infarct is now present  ST more depressed in Inferior leads  T wave inversion more evident in Inferior leads  QT has lengthened     URINALYSIS W/ RFLX MICROSCOPIC    Collection Time: 09/25/20  8:34 PM   Result Value Ref Range    Color YELLOW      Appearance CLOUDY      Specific gravity 1.010 1.005 - 1.030      pH (UA) 5.0 5.0 - 8.0      Protein 100 (A) NEG mg/dL    Glucose Negative NEG mg/dL    Ketone Negative NEG mg/dL    Bilirubin Negative NEG      Blood SMALL (A) NEG      Urobilinogen 0.2 0.2 - 1.0 EU/dL    Nitrites Negative NEG      Leukocyte Esterase LARGE (A) NEG     SODIUM, UR, RANDOM Collection Time: 09/25/20  8:34 PM   Result Value Ref Range    Sodium,urine random 74 20 - 110 MMOL/L   EOSINOPHILS, URINE    Collection Time: 09/25/20  8:34 PM   Result Value Ref Range    Eosinophils,urine MANY EOSINOPHILS SEEN     PROTEIN/CREATININE RATIO, URINE    Collection Time: 09/25/20  8:34 PM   Result Value Ref Range    Protein, urine random 72 (H) <11.9 mg/dL    Creatinine, urine 78.00 30 - 125 mg/dL    Protein/Creat. urine Ratio 0.9     URINE MICROSCOPIC ONLY    Collection Time: 09/25/20  8:34 PM   Result Value Ref Range    WBC 21 to 35 0 - 4 /hpf    RBC 0 to 4 0 - 5 /hpf    Epithelial cells 2+ 0 - 5 /lpf    Bacteria FEW (A) NEG /hpf   RBC, ALLOCATE    Collection Time: 09/25/20  9:00 PM   Result Value Ref Range    HISTORY CHECKED? Historical check performed          MDM  Number of Diagnoses or Management Options  Diagnosis management comments: 80-year-old male patient presenting to the emergency department today with a chief complaint of abnormal labs reported by his nephrologist.  His labs were repeated today, creatinine is slightly higher, bicarbonate low again at 8, hemoglobin is now below 7. The case was reviewed again with Dr. Angie Beck requested bicarbonate infusion as well as unit of packed red cells. Hemodynamically patient is stable, EKG does not show any QRS widening but does have diffuse T wave inversions patient denies having any chest pain or complaints of shortness of breath.,  His troponin is slightly elevated as well but I will hold off on heparinizing the patient as he is having no symptoms and this could be secondary to his renal failure. Nephrology plans to possibly dialyze the patient tomorrow, and I agree there is no emergent indications assuming that he can be stabilized using the bicarbonate drip. Differential includes blood loss anemia, anemia of chronic disease, anemia due to underlying chronic kidney disease, Prerenal azotemia, post renal failure, ATN.   Patient reports he has been urinating well, do not suspect a distal urinary tract obstruction. Ultrasound has been ordered by nephrology to evaluate for further pathology from that standpoint. Patient will require admission, he is hemodynamically stable but will go to the telemetry floor. Discussed with Dr. Shanelle Caraballo who agrees to admit the patient. NOTES   :  I have spent 35 minutes of critical care time involved in lab review, consultations with specialist, family decision- making, bedside attention and documentation. During this entire length of time I was immediately available to the patient. Karina Murphy DO         Amount and/or Complexity of Data Reviewed  Clinical lab tests: ordered and reviewed  Tests in the radiology section of CPT®: ordered and reviewed    Risk of Complications, Morbidity, and/or Mortality  Presenting problems: high  Diagnostic procedures: low  Management options: moderate      ED Course as of Sep 26 0009   Fri Sep 25, 2020   1930 My x-ray read: No pneumothorax. No focal infiltrate or consolidation. Overall no effusion or edema. [DELIA]   2052 Case discussed with Dr. Frankie Lucero. Recommended bicarbonate infusion 2 A/L +1 unit of packed red cells, bicarbonate of 75 cc/h.     [DELIA]      ED Course User Index  [DELIA] Eloise Koo DO       Procedures

## 2020-09-27 LAB
ANION GAP SERPL CALC-SCNC: 13 MMOL/L (ref 3–18)
BASOPHILS # BLD: 0.2 K/UL (ref 0–0.06)
BASOPHILS NFR BLD: 2 % (ref 0–3)
BUN SERPL-MCNC: 124 MG/DL (ref 7–18)
BUN/CREAT SERPL: 8 (ref 12–20)
CALCIUM SERPL-MCNC: 8.2 MG/DL (ref 8.5–10.1)
CHLORIDE SERPL-SCNC: 118 MMOL/L (ref 100–111)
CO2 SERPL-SCNC: 13 MMOL/L (ref 21–32)
CREAT SERPL-MCNC: 14.7 MG/DL (ref 0.6–1.3)
DIFFERENTIAL METHOD BLD: ABNORMAL
EOSINOPHIL # BLD: 0.1 K/UL (ref 0–0.4)
EOSINOPHIL NFR BLD: 1 % (ref 0–5)
ERYTHROCYTE [DISTWIDTH] IN BLOOD BY AUTOMATED COUNT: 15 % (ref 11.6–14.5)
GLUCOSE SERPL-MCNC: 113 MG/DL (ref 74–99)
HBV SURFACE AG SER QL: <0.1 INDEX
HBV SURFACE AG SER QL: NEGATIVE
HCT VFR BLD AUTO: 20.5 % (ref 36–48)
HGB BLD-MCNC: 6.9 G/DL (ref 13–16)
HISTORY CHECKED?,CKHIST: NORMAL
INR PPP: 1.1 (ref 0.8–1.2)
IRON SATN MFR SERPL: 20 % (ref 20–50)
IRON SERPL-MCNC: 59 UG/DL (ref 50–175)
LYMPHOCYTES # BLD: 1.4 K/UL (ref 0.8–3.5)
LYMPHOCYTES NFR BLD: 17 % (ref 20–51)
MAGNESIUM SERPL-MCNC: 1.8 MG/DL (ref 1.6–2.6)
MCH RBC QN AUTO: 28.5 PG (ref 24–34)
MCHC RBC AUTO-ENTMCNC: 33.7 G/DL (ref 31–37)
MCV RBC AUTO: 84.7 FL (ref 74–97)
MONOCYTES # BLD: 0.7 K/UL (ref 0–1)
MONOCYTES NFR BLD: 9 % (ref 2–9)
NEUTS SEG # BLD: 5.8 K/UL (ref 1.8–8)
NEUTS SEG NFR BLD: 71 % (ref 42–75)
PHOSPHATE SERPL-MCNC: 8.4 MG/DL (ref 2.5–4.9)
PLATELET # BLD AUTO: 244 K/UL (ref 135–420)
PLATELET COMMENTS,PCOM: ABNORMAL
PMV BLD AUTO: 11.6 FL (ref 9.2–11.8)
POTASSIUM SERPL-SCNC: 3.9 MMOL/L (ref 3.5–5.5)
PROTHROMBIN TIME: 14.5 SEC (ref 11.5–15.2)
RBC # BLD AUTO: 2.42 M/UL (ref 4.7–5.5)
RBC MORPH BLD: ABNORMAL
SODIUM SERPL-SCNC: 144 MMOL/L (ref 136–145)
TIBC SERPL-MCNC: 290 UG/DL (ref 250–450)
WBC # BLD AUTO: 8.2 K/UL (ref 4.6–13.2)

## 2020-09-27 PROCEDURE — 2709999900 HC NON-CHARGEABLE SUPPLY

## 2020-09-27 PROCEDURE — 86803 HEPATITIS C AB TEST: CPT

## 2020-09-27 PROCEDURE — 36415 COLL VENOUS BLD VENIPUNCTURE: CPT

## 2020-09-27 PROCEDURE — 83735 ASSAY OF MAGNESIUM: CPT

## 2020-09-27 PROCEDURE — 83540 ASSAY OF IRON: CPT

## 2020-09-27 PROCEDURE — 80048 BASIC METABOLIC PNL TOTAL CA: CPT

## 2020-09-27 PROCEDURE — 97161 PT EVAL LOW COMPLEX 20 MIN: CPT

## 2020-09-27 PROCEDURE — 97530 THERAPEUTIC ACTIVITIES: CPT

## 2020-09-27 PROCEDURE — 97165 OT EVAL LOW COMPLEX 30 MIN: CPT

## 2020-09-27 PROCEDURE — 74011250637 HC RX REV CODE- 250/637: Performed by: INTERNAL MEDICINE

## 2020-09-27 PROCEDURE — 99232 SBSQ HOSP IP/OBS MODERATE 35: CPT | Performed by: PHYSICIAN ASSISTANT

## 2020-09-27 PROCEDURE — 87340 HEPATITIS B SURFACE AG IA: CPT

## 2020-09-27 PROCEDURE — 74011250636 HC RX REV CODE- 250/636: Performed by: EMERGENCY MEDICINE

## 2020-09-27 PROCEDURE — 99221 1ST HOSP IP/OBS SF/LOW 40: CPT | Performed by: SURGERY

## 2020-09-27 PROCEDURE — 74011250637 HC RX REV CODE- 250/637: Performed by: NURSE PRACTITIONER

## 2020-09-27 PROCEDURE — 74011250636 HC RX REV CODE- 250/636: Performed by: INTERNAL MEDICINE

## 2020-09-27 PROCEDURE — 85025 COMPLETE CBC W/AUTO DIFF WBC: CPT

## 2020-09-27 PROCEDURE — 97535 SELF CARE MNGMENT TRAINING: CPT

## 2020-09-27 PROCEDURE — 74011000250 HC RX REV CODE- 250: Performed by: INTERNAL MEDICINE

## 2020-09-27 PROCEDURE — 84100 ASSAY OF PHOSPHORUS: CPT

## 2020-09-27 PROCEDURE — 85610 PROTHROMBIN TIME: CPT

## 2020-09-27 PROCEDURE — 86706 HEP B SURFACE ANTIBODY: CPT

## 2020-09-27 PROCEDURE — 86705 HEP B CORE ANTIBODY IGM: CPT

## 2020-09-27 PROCEDURE — 99232 SBSQ HOSP IP/OBS MODERATE 35: CPT | Performed by: FAMILY MEDICINE

## 2020-09-27 PROCEDURE — 74011000250 HC RX REV CODE- 250: Performed by: EMERGENCY MEDICINE

## 2020-09-27 PROCEDURE — 65660000000 HC RM CCU STEPDOWN

## 2020-09-27 RX ORDER — DOXERCALCIFEROL 4 UG/2ML
1 INJECTION INTRAVENOUS
Status: DISCONTINUED | OUTPATIENT
Start: 2020-09-28 | End: 2020-09-29

## 2020-09-27 RX ORDER — SODIUM CHLORIDE 9 MG/ML
250 INJECTION, SOLUTION INTRAVENOUS AS NEEDED
Status: DISCONTINUED | OUTPATIENT
Start: 2020-09-27 | End: 2020-10-01 | Stop reason: ALTCHOICE

## 2020-09-27 RX ORDER — LANOLIN ALCOHOL/MO/W.PET/CERES
1 CREAM (GRAM) TOPICAL
Status: DISCONTINUED | OUTPATIENT
Start: 2020-09-28 | End: 2020-10-03 | Stop reason: HOSPADM

## 2020-09-27 RX ADMIN — Medication 10 ML: at 05:28

## 2020-09-27 RX ADMIN — CALCIUM ACETATE 1334 MG: 667 CAPSULE ORAL at 17:25

## 2020-09-27 RX ADMIN — Medication 10 ML: at 22:00

## 2020-09-27 RX ADMIN — NEPHROCAP 1 CAPSULE: 1 CAP ORAL at 08:39

## 2020-09-27 RX ADMIN — PANTOPRAZOLE SODIUM 40 MG: 40 TABLET, DELAYED RELEASE ORAL at 08:39

## 2020-09-27 RX ADMIN — CALCIUM ACETATE 1334 MG: 667 CAPSULE ORAL at 08:39

## 2020-09-27 RX ADMIN — Medication 10 ML: at 17:26

## 2020-09-27 RX ADMIN — AMLODIPINE BESYLATE 10 MG: 10 TABLET ORAL at 08:39

## 2020-09-27 RX ADMIN — CALCIUM ACETATE 1334 MG: 667 CAPSULE ORAL at 13:04

## 2020-09-27 RX ADMIN — CEFTRIAXONE SODIUM 1 G: 1 INJECTION, POWDER, FOR SOLUTION INTRAMUSCULAR; INTRAVENOUS at 02:52

## 2020-09-27 RX ADMIN — SODIUM BICARBONATE: 84 INJECTION, SOLUTION INTRAVENOUS at 10:52

## 2020-09-27 NOTE — PROGRESS NOTES
[facility]  -  Progress Note      Patient: Merlene Brown MRN: [id number]  SSN: [id number]    YOB: 1935  Age: 80 y.o. Sex: male      Admit Date: 9/25/2020     Follow up  +trop in setting of severe ESRD, HTN    Assessment:     Hospital Problems  Date Reviewed: 12/6/2018          Codes Class Noted POA    ESRD (end stage renal disease) ([email or web address] 75.) ICD-10-CM: N18.6  ICD-9-CM: 585.6  9/26/2020 Unknown        Hyperkalemia ICD-10-CM: E87.5  ICD-9-CM: 276.7  9/26/2020 Unknown        Metabolic acidosis TDG-48-GR: E87.2  ICD-9-CM: 276.2  9/26/2020 Unknown        Secondary hyperparathyroidism, renal ([facility] [email or web address] 75.) ICD-10-CM: N25.81  ICD-9-CM: 588.81  9/26/2020 Unknown        Anemia ICD-10-CM: D64.9  ICD-9-CM: 285.9  9/26/2020 Unknown        Acute renal failure (ARF) ([facility]) ICD-10-CM: N17.9  ICD-9-CM: 584.9  9/25/2020 Unknown            -Indeterminate troponin- secondary to demand ischemia in setting of acute renal failure. Do not suspect ACS. No significant change to echo on 9/26/2020 with EF 50%. -Acute renal failure with creatinine at 14, assoc acidosis  -Hyperkalemia secondary to above- improved  -Anemia most likely secondary to ARF  -Carotid disease and presumed CAD, no reversible defects on nuc 2016     -No primary cardiologist/ Has seen Dr. Caleb Garcia in past last 2016    Plan:     Majority of his acute on chronic issues appear to be from declining renal function (incl anemia, acidosis/ uremia), suspect pending HD soon  BP under better control and no CV complaints. Continue with present regimen  No plans for any ischemic workup    Subjective:     No new complaints.  He is feeling a little better he states    Objective:      Patient Vitals for the past 8 hrs:   Temp Pulse Resp BP SpO2   09/27/20 0900 97.3 °F (36.3 °C) 77 20 (!) 160/70 100 %   09/27/20 0433 98.6 °F (37 °C) 73 18 (!) 156/66 99 %         Patient Vitals for the past 96 hrs:   Weight   09/26/20 0852 81.2 kg (179 lb) Intake/Output Summary (Last 24 hours) at 9/27/2020 0943  Last data filed at 9/27/2020 0925  Gross per 24 hour   Intake 1320 ml   Output 900 ml   Net 420 ml       Physical Exam:  General:  alert, cooperative, no distress, appears stated age  Neck:  nontender, no JVD  Lungs:  clear to auscultation bilaterally  Heart:  regular rate and rhythm, S1, S2 normal, no murmur, click, rub or gallop  Abdomen:  abdomen is soft without significant tenderness, masses, organomegaly or guarding  Extremities:  extremities normal, atraumatic, no cyanosis or edema    Data Review:     Labs: Results:       Chemistry Recent Labs     09/27/20  0433 09/26/20  0715 09/25/20  1800   * 168* 82    144 144   K 3.9 4.5 5.2   * 119* 122*   CO2 13* 11* 8*   * 122* 129*   CREA 14.70* 14.80* 15.40*   CA 8.2* 8.1*  8.1* 8.4*   MG 1.8 1.9 2.0   PHOS 8.4* 8.5*  --    AGAP 13 14 14   BUCR 8* 8* 8*   AP  --   --  79   TP  --   --  8.8*   ALB  --   --  3.6   GLOB  --   --  5.2*   AGRAT  --   --  0.7*      CBC w/Diff Recent Labs     09/27/20  0433 09/26/20  0410 09/25/20  1800   WBC 8.2 8.1 9.4   RBC 2.42* 2.47* 2.31*   HGB 6.9* 7.2* 6.6*   HCT 20.5* 21.1* 19.8*    262 277   GRANS 71 69 73   LYMPH 17* 18* 17*   EOS 1 3 2      Cardiac Enzymes No results found for: CPK, CK, CKMMB, CKMB, RCK3, CKMBT, CKNDX, CKND1, LILLY, TROPT, TROIQ, EDEL, TROPT, TNIPOC, BNP, BNPP   Coagulation No results for input(s): PTP, INR, APTT, INREXT in the last 72 hours.     Lipid Panel Lab Results   Component Value Date/Time    Cholesterol, total 213 (H) 08/29/2016 01:35 AM    HDL Cholesterol 54 08/29/2016 01:35 AM    LDL, calculated 136.6 (H) 08/29/2016 01:35 AM    VLDL, calculated 22.4 08/29/2016 01:35 AM    Triglyceride 112 08/29/2016 01:35 AM    CHOL/HDL Ratio 3.9 08/29/2016 01:35 AM      BNP No results found for: BNP, BNPP, XBNPT   Liver Enzymes Recent Labs     09/25/20  1800   TP 8.8*   ALB 3.6   AP 79      Digoxin    Thyroid Studies Lab Results   Component Value Date/Time    TSH 6.31 (H) 09/25/2020 06:00 PM

## 2020-09-27 NOTE — H&P (VIEW-ONLY)
Surgery Consult Patient: Liza Miller MRN: [id number]  CSN: [id number] YOB: 1935 Age: 80 y.o. Sex: male DOA: 9/25/2020 HPI:  
 
Liza Miller is a 80 y.o. male who presents with CKD in need of HD. Dr. Jac Todd has seen the patient and discussed starting dialysis with him. He has consulted the vascular service for access. The patient is without complaints today. Past Medical History:  
Diagnosis Date  Cardiac echocardiogram 08/29/2016 EF 60-65%. No WMA. Mild LVH. Indeterminate diastolic fx. RVSP 35 mmHg. No significant valvular heart disease.  Cardiac nuclear imaging test, abnormal 08/29/2016 Intermediate risk. Previous inferior infarction w/very mild fabiana-infarct ischemia. Inferior hypk. EF 68%. Nondiagnostic EKG on pharm stress test.  Pt's BP increased from 193/96 to 207/83 during study.  Carotid duplex 08/30/2016 Mild <50% bilateral ICA stenosis.  Hypertension History reviewed. No pertinent surgical history. History reviewed. No pertinent family history. Social History Socioeconomic History  Marital status:  Spouse name: Not on file  Number of children: Not on file  Years of education: Not on file  Highest education level: Not on file Tobacco Use  Smoking status: Never Smoker  Smokeless tobacco: Never Used Substance and Sexual Activity  Alcohol use: No  
 Drug use: No  
 
 
Prior to Admission medications Medication Sig Start Date End Date Taking? Authorizing Provider  
cephalexin (KEFLEX PO) Take  by mouth. Provider, Cecy  
furosemide (LASIX) 20 mg tablet Take 20 mg by mouth daily. Other, MD Rick  
lidocaine (LIDODERM) 5 % Apply patch to the affected area for 12 hours a day and remove for 12 hours a day.  11/23/18   Estela Mccormack MD  
HYDROcodone-acetaminophen [facility]) 5-325 mg per tablet Take 1 Tab by mouth every eight (8) hours as needed for Pain. Max Daily Amount: 3 Tabs. 11/20/18   Cathy Signs, DO  
garlic 2,911 mg cap Take  by mouth. Provider, Historical  
amLODIPine (NORVASC) 5 mg tablet Take 1 Tab by mouth daily. Indications: HYPERTENSION 9/1/16   Yadira Ward MD  
 
 
No Known Allergies Physical Exam:  
  
Visit Vitals /65 (BP 1 Location: Left arm, BP Patient Position: At rest) Pulse 69 Temp 98.3 °F (36.8 °C) Resp 18 Ht 5' 6\" (1.676 m) Wt 179 lb (81.2 kg) SpO2 98% BMI 28.89 kg/m² GENERAL: alert, cooperative, no distress, appears stated age, EYE: negative findings: anicteric sclera, LYMPHATIC: Cervical, supraclavicular, and axillary nodes normal. , THROAT & NECK: normal, LUNG: clear to auscultation bilaterally, HEART: regular rate and rhythm, ABDOMEN: soft, non-tender. Bowel sounds normal. No masses,  no organomegaly, EXTREMITIES:  No significant edema ROS: 
Constitutional: negative Eyes: negative Ears, nose, mouth, throat, and face: negative Respiratory: negative Cardiovascular: negative Gastrointestinal: negative Unless otherwise mentioned in the HPI. Data Review: CBC:  
Lab Results Component Value Date/Time WBC 8.2 09/27/2020 04:33 AM  
 RBC 2.42 (L) 09/27/2020 04:33 AM  
 HGB 6.9 (L) 09/27/2020 04:33 AM  
 HCT 20.5 (L) 09/27/2020 04:33 AM  
 PLATELET 478 53/22/8743 04:33 AM  
  
BMP:  
Lab Results Component Value Date/Time Glucose 113 (H) 09/27/2020 04:33 AM  
 Sodium 144 09/27/2020 04:33 AM  
 Potassium 3.9 09/27/2020 04:33 AM  
 Chloride 118 (H) 09/27/2020 04:33 AM  
 CO2 13 (L) 09/27/2020 04:33 AM  
  (H) 09/27/2020 04:33 AM  
 Creatinine 14.70 (H) 09/27/2020 04:33 AM  
 Calcium 8.2 (L) 09/27/2020 04:33 AM  
 
Coagulation:  
Lab Results Component Value Date/Time Prothrombin time 13.3 11/20/2018 02:10 PM  
 INR 1.0 11/20/2018 02:10 PM  
 aPTT 29.1 11/20/2018 02:10 PM  
 
 
 
Assessment/Plan 80 M with ARF with normalized potassium in need of HD. 1) Discussed placing a TDC with the patient. We discussed risks and benefits of the procedure including bleeding, infection, heart death and stroke. Mr. Leila Almanza wishes to proceed 2) Will place [facility] around noon tomorrow when cath lab is available and I can leave my office. 3) NPO after midnight. Active Problems: 
  Acute renal failure (ARF) ([address] (9/25/2020) ESRD (end stage renal disease) ([address] (9/26/2020) Hyperkalemia (9/26/2020) Metabolic acidosis (6/28/7096) Secondary hyperparathyroidism, renal ([address] (9/26/2020) Anemia (9/26/2020) Denita Cheema MD 
September 27, 2020

## 2020-09-27 NOTE — PROGRESS NOTES
Problem: Self Care Deficits Care Plan (Adult)  Goal: *Acute Goals and Plan of Care (Insert Text)  Description: Occupational Therapy Goals  Initiated 9/27/2020 within 7 day(s). 1.  Patient will perform grooming with modified independence. 2.  Patient will perform upper body dressing with modified independence. 3.  Patient will perform lower body dressing with minimal assistance  4. Patient will perform toilet transfers with contact guard assist.  5.  Patient will perform all aspects of toileting with minimal assistance/contact guard assist.    Prior Level of Function:Patient reported was modified independent with functional mobility PTA, with use of cane. Patient reported his wife helps bathe him and assists with socks/shoes. Outcome: Progressing Towards Goal   OCCUPATIONAL THERAPY EVALUATION    Patient: Mamta Iniguez (35 y.o. male)  Date: 9/27/2020  Primary Diagnosis: Acute renal failure (ARF) ([facility] [address] [N17.9]        Precautions:   Fall  PLOF: Patient reported was modified independent with functional mobility PTA, with use of cane. Patient reported his wife helps bathe him and assists with socks/shoes    ASSESSMENT :  Based on the objective data described below, the patient seen for OT evaluation, patient agreeable. Patient is oriented to person and place only, with confusion noted during conversation. LUE slightly weaker when compared to RUE. Min A to perform supine to sit transfer; ADLs performed seated on EOB. CGA for UE ADLs and max A for LE ADLs, with additional time. Min A x2 to stand with RW, progressing to min A x1. Mod/max A for balance and mod A for fabiana care tasks when standing with RW. Noted unsteadiness. Min A to return to supine. Consider SNF on d/c. Patient will benefit from skilled intervention to address the above impairments.   Patient's rehabilitation potential is considered to be Good  Factors which may influence rehabilitation potential include:   []             None noted  [] Mental ability/status  [x]             Medical condition  []             Home/family situation and support systems  []             Safety awareness  []             Pain tolerance/management  []             Other:      PLAN :  Recommendations and Planned Interventions:   [x]               Self Care Training                  [x]      Therapeutic Activities  [x]               Functional Mobility Training   []      Cognitive Retraining  [x]               Therapeutic Exercises           [x]      Endurance Activities  [x]               Balance Training                    []      Neuromuscular Re-Education  []               Visual/Perceptual Training     [x]      Home Safety Training  [x]               Patient Education                   [x]      Family Training/Education  []               Other (comment):    Frequency/Duration: Patient will be followed by occupational therapy 1-2 times per day/4-7 days per week to address goals. Discharge Recommendations: Piyush De León  Further Equipment Recommendations for Discharge: bedside commode and transfer bench     SUBJECTIVE:   Patient stated Mercedes Aus.     OBJECTIVE DATA SUMMARY:     Past Medical History:   Diagnosis Date    Cardiac echocardiogram 08/29/2016    EF 60-65%. No WMA. Mild LVH. Indeterminate diastolic fx. RVSP 35 mmHg. No significant valvular heart disease. Cardiac nuclear imaging test, abnormal 08/29/2016    Intermediate risk. Previous inferior infarction w/very mild fabiana-infarct ischemia. Inferior hypk. EF 68%. Nondiagnostic EKG on pharm stress test.  Pt's BP increased from 193/96 to 207/83 during study. Carotid duplex 08/30/2016    Mild <50% bilateral ICA stenosis. Hypertension    History reviewed. No pertinent surgical history.   Barriers to Learning/Limitations: yes;  cognitive and physical  Compensate with: visual, verbal, tactile, kinesthetic cues/model    Home Situation:   Home Situation  Home Environment: Private residence  # Steps to Enter: 3  Rails to Enter: Yes  Wheelchair Ramp: No  One/Two Story Residence: One story  Living Alone: No  Support Systems: Spouse/Significant Other/Partner  Patient Expects to be Discharged to[de-identified] Unknown  Current DME Used/Available at Home: Cane, straight, Walker, rolling  Tub or Shower Type: Tub/Shower combination  [x]  Right hand dominant   []  Left hand dominant    Cognitive/Behavioral Status:  Neurologic State: Alert;Confused  Orientation Level: Oriented to person;Oriented to place; Disoriented to time;Disoriented to situation  Cognition: Follows commands;Poor safety awareness    Skin: No skin changes noted    Edema: No edema noted     Vision/Perceptual:       Acuity: Within Defined Limits      Coordination: BUE  Coordination: Within functional limits       Balance:  Sitting: Impaired; With support  Sitting - Static: Fair (occasional)  Sitting - Dynamic: Fair (occasional)  Standing: Impaired; With support  Standing - Static: Fair  Standing - Dynamic : Poor    Strength: BUE  Strength: Generally decreased, functional(LUE<RUE)     Tone & Sensation: BUE  Tone: Normal  Sensation: Intact     Range of Motion: BUE  AROM: Generally decreased, functional     Functional Mobility and Transfers for ADLs:  Bed Mobility:  Supine to Sit: Minimum assistance  Sit to Supine: Minimum assistance  Scooting: Minimum assistance  Transfers:  Sit to Stand: Minimum assistance;Assist x2;Assist x1  Stand to Sit: Minimum assistance   Toilet Transfer : Minimum assistance;Assist x2(with RW to simulate BSC transfer)      ADL Assessment: (clinical judgement)  Feeding: Supervision    Oral Facial Hygiene/Grooming: Minimum assistance    Bathing: Moderate assistance    Upper Body Dressing: Contact guard assistance    Lower Body Dressing: Maximum assistance    Toileting:  Moderate assistance;Maximum assistance     Pain:  Pain level pre-treatment: 0/10   Pain level post-treatment: 0/10     Activity Tolerance:   Good     Please refer to the flowsheet for vital signs taken during this treatment. After treatment:   [] Patient left in no apparent distress sitting up in chair  [x] Patient left in no apparent distress in bed  [x] Call bell left within reach  [x] Nursing notified  [] Caregiver present  [x] Bed alarm activated    COMMUNICATION/EDUCATION:   [x] Role of Occupational Therapy in the acute care setting  [x] Home safety education was provided and the patient/caregiver indicated understanding. [x] Patient/family have participated as able in goal setting and plan of care. [x] Patient/family agree to work toward stated goals and plan of care. [] Patient understands intent and goals of therapy, but is neutral about his/her participation. [] Patient is unable to participate in goal setting and plan of care. Thank you for this referral.  Raul Arizmendi OTR/L  Time Calculation: 24 mins    Eval Complexity: History: LOW Complexity : Brief history review ; Examination: LOW Complexity : 1-3 performance deficits relating to physical, cognitive , or psychosocial skils that result in activity limitations and / or participation restrictions ;    Decision Making:LOW Complexity : No comorbidities that affect functional and no verbal or physical assistance needed to complete eval tasks

## 2020-09-27 NOTE — PROGRESS NOTES
[facility]  [facility]    Patient: Ericka Jacob Age: 80 y.o. : 1935 MR#: [id number] SSN: [id number]  Date: 2020     Subjective/24-hour events:     Feeling better overall. Denies chest pain, no acute SOB reported. Assessment:   [place] on CKD, now ESRD  Secondary hyperparathyroidism  Metabolic acidosis  Troponin elevation, no ACS  HTN  Anemia, etiology uncertain  OA  Advanced age      Plan:   Remains on bicarb infusion - management per nephrology. HD catheter placement per vascular. Renal US ordered - still pending. No plan for ischemia workup per cardiology. BPs high overall. Adjustments in antihypertensive regimen as necessary. Iron stores low normal.  Will d/w nephrology. Continue rocephin, follow cultures. PT/OT, mobilize as tolerated. Follow. Case discussed with:  [x]Patient  []Family  [x]Nursing  []Case Management  DVT Prophylaxis:  []Lovenox  []Hep SQ  [x]SCDs  []Coumadin   []On Heparin gtt    Objective:   VS:   Visit Vitals  BP (!) 160/70 (BP 1 Location: Left arm, BP Patient Position: At rest)   Pulse 77   Temp 97.3 °F (36.3 °C)   Resp 20   Ht 5' 6\" (1.676 m)   Wt 81.2 kg (179 lb)   SpO2 100%   BMI 28.89 kg/m²      Tmax/24hrs: Temp (24hrs), Av.2 °F (36.8 °C), Min:97.3 °F (36.3 °C), Max:98.6 °F (37 °C)      Intake/Output Summary (Last 24 hours) at 2020 0955  Last data filed at 2020 0925  Gross per 24 hour   Intake 1320 ml   Output 900 ml   Net 420 ml       General:  In NAD. Nontoxic-appearing. Cardiovascular:  RRR. Pulmonary:  Lungs clear bilaterally, no wheezes. GI:  Abdomen soft, NTTP. Extremities:  Warm, no edema or ischemia. Neuro:  Awake and alert. Moves extremities spontaneously.     Labs:    Recent Results (from the past 24 hour(s))   METABOLIC PANEL, BASIC    Collection Time: 20  4:33 AM   Result Value Ref Range    Sodium 144 136 - 145 mmol/L    Potassium 3.9 3.5 - 5.5 mmol/L    Chloride 118 (H) 100 - 111 mmol/L    CO2 13 (L) 21 - 32 mmol/L    Anion gap 13 3.0 - 18 mmol/L    Glucose 113 (H) 74 - 99 mg/dL     (H) 7.0 - 18 MG/DL    Creatinine 14.70 (H) 0.6 - 1.3 MG/DL    BUN/Creatinine ratio 8 (L) 12 - 20      GFR est AA 4 (L) >60 ml/min/1.73m2    GFR est non-AA 3 (L) >60 ml/min/1.73m2    Calcium 8.2 (L) 8.5 - 10.1 MG/DL   MAGNESIUM    Collection Time: 09/27/20  4:33 AM   Result Value Ref Range    Magnesium 1.8 1.6 - 2.6 mg/dL   CBC WITH AUTOMATED DIFF    Collection Time: 09/27/20  4:33 AM   Result Value Ref Range    WBC 8.2 4.6 - 13.2 K/uL    RBC 2.42 (L) 4.70 - 5.50 M/uL    HGB 6.9 (L) 13.0 - 16.0 g/dL    HCT 20.5 (L) 36.0 - 48.0 %    MCV 84.7 74.0 - 97.0 FL    MCH 28.5 24.0 - 34.0 PG    MCHC 33.7 31.0 - 37.0 g/dL    RDW 15.0 (H) 11.6 - 14.5 %    PLATELET 638 254 - 330 K/uL    MPV 11.6 9.2 - 11.8 FL    NEUTROPHILS 71 42 - 75 %    LYMPHOCYTES 17 (L) 20 - 51 %    MONOCYTES 9 2 - 9 %    EOSINOPHILS 1 0 - 5 %    BASOPHILS 2 0 - 3 %    ABS. NEUTROPHILS 5.8 1.8 - 8.0 K/UL    ABS. LYMPHOCYTES 1.4 0.8 - 3.5 K/UL    ABS. MONOCYTES 0.7 0 - 1.0 K/UL    ABS. EOSINOPHILS 0.1 0.0 - 0.4 K/UL    ABS.  BASOPHILS 0.2 (H) 0.0 - 0.06 K/UL    DF MANUAL      PLATELET COMMENTS ADEQUATE PLATELETS      RBC COMMENTS ANISOCYTOSIS  1+        RBC COMMENTS MICROCYTOSIS  2+        RBC COMMENTS POIKILOCYTOSIS  1+        RBC COMMENTS SCHISTOCYTES  2+       PHOSPHORUS    Collection Time: 09/27/20  4:33 AM   Result Value Ref Range    Phosphorus 8.4 (H) 2.5 - 4.9 MG/DL   IRON PROFILE    Collection Time: 09/27/20  4:33 AM   Result Value Ref Range    Iron 59 50 - 175 ug/dL    TIBC 290 250 - 450 ug/dL    Iron % saturation 20 20 - 50 %       Signed By: Pablito Garber MD     September 27, 2020

## 2020-09-27 NOTE — PROGRESS NOTES
Problem: Nutrition Deficit  Goal: *Optimize nutritional status  Outcome: Progressing Towards Goal     Problem: Falls - Risk of  Goal: *Absence of Falls  Description: Document Maria De Jesus Murray Fall Risk and appropriate interventions in the flowsheet.   Outcome: Progressing Towards Goal  Note: Fall Risk Interventions:  Mobility Interventions: Bed/chair exit alarm, Patient to call before getting OOB    Mentation Interventions: Adequate sleep, hydration, pain control, Bed/chair exit alarm    Medication Interventions: Bed/chair exit alarm, Patient to call before getting OOB, Teach patient to arise slowly    Elimination Interventions: Bed/chair exit alarm, Call light in reach, Patient to call for help with toileting needs

## 2020-09-27 NOTE — PROGRESS NOTES
Problem: Mobility Impaired (Adult and Pediatric)  Goal: *Acute Goals and Plan of Care (Insert Text)  Description: Physical Therapy Goals  Initiated 9/27/2020 and to be accomplished within 7 day(s)  1. Patient will move from supine to sit and sit to supine , scoot up and down, and roll side to side in bed with supervision/set-up. 2.  Patient will transfer from bed to chair and chair to bed with minimal assistance/contact guard assist using the least restrictive device. 3.  Patient will perform sit to stand with supervision/set-up. 4.  Patient will ambulate with minimal assistance/contact guard assist for 80 feet with the least restrictive device. 5.  Patient will ascend/descend 3 stairs with unilateral handrail(s) with minimal assistance/contact guard assist.    PLOF: Pt confused and lethargic throughout evaluation, reporting using cane at home. She lives with wife who assists with his ADLs. Lives in 1 story house with 3 AJAY, also has RW available at home. Outcome: Progressing Towards Goal    PHYSICAL THERAPY EVALUATION    Patient: Hira Camarillo (20 y.o. male)  Date: 9/27/2020  Primary Diagnosis: Acute renal failure (ARF) ([facility] [address] [N17.9]        Precautions:   Fall    PLOF: see above     ASSESSMENT :  Based on the objective data described below, the patient presents with decreased strength through L side, decreased balance reactions, decreased independence in functional mobility. Completed PT evaluation with OT to maximize safety and mobility. Completing bed mobility with Yaya. Sitting on EOB with constant support for first several minutes with trunk lean. Then able to sit with SBA with feet in contact with floor. Pt then completing sit to stand to RW x2 reps with minAx2 for first rep and minAx1 for second rep. Side steps to [facility] with RW with modA for walker management, preference for thighs in contact with bed and increased hip hinge.  Pt returned to supine in bed with all needs met and call bell in reach. Patient will benefit from skilled intervention to address the above impairments. Patient's rehabilitation potential is considered to be Fair  Factors which may influence rehabilitation potential include:   []         None noted  [x]         Mental ability/status  [x]         Medical condition  [x]         Home/family situation and support systems  []         Safety awareness  []         Pain tolerance/management  []         Other:      PLAN :  Recommendations and Planned Interventions:   [x]           Bed Mobility Training             []    Neuromuscular Re-Education  [x]           Transfer Training                   []    Orthotic/Prosthetic Training  [x]           Gait Training                          []    Modalities  [x]           Therapeutic Exercises           []    Edema Management/Control  [x]           Therapeutic Activities            [x]    Family Training/Education  [x]           Patient Education  []           Other (comment):    Frequency/Duration: Patient will be followed by physical therapy 1-2 times per day/4-7 days per week to address goals. Discharge Recommendations: Piyush De León  Further Equipment Recommendations for Discharge: rolling walker     SUBJECTIVE:   Patient stated That feels so good.  (when PT rubbing pt back)    OBJECTIVE DATA SUMMARY:     Past Medical History:   Diagnosis Date    Cardiac echocardiogram 08/29/2016    EF 60-65%. No WMA. Mild LVH. Indeterminate diastolic fx. RVSP 35 mmHg. No significant valvular heart disease. Cardiac nuclear imaging test, abnormal 08/29/2016    Intermediate risk. Previous inferior infarction w/very mild fabiana-infarct ischemia. Inferior hypk. EF 68%. Nondiagnostic EKG on pharm stress test.  Pt's BP increased from 193/96 to 207/83 during study. Carotid duplex 08/30/2016    Mild <50% bilateral ICA stenosis. Hypertension    History reviewed. No pertinent surgical history.   Barriers to Learning/Limitations: yes; altered mental status (i.e.Sedation, Confusion)  Compensate with: Visual Cues, Verbal Cues, and Tactile Cues  Home Situation:  Home Situation  Home Environment: Private residence  # Steps to Enter: 3  Rails to Enter: Yes  Wheelchair Ramp: No  One/Two Story Residence: One story  Living Alone: No  Support Systems: Spouse/Significant Other/Partner  Critical Behavior:  Neurologic State: Alert;Confused  Psychosocial  Patient Behaviors: Calm; Cooperative  Strength:    Strength: Generally decreased, functional(BLEs 3+/5)  Tone & Sensation:   Tone: Normal  Sensation: (unable to fully assess )  Range Of Motion:  AROM: Generally decreased, functional(BLEs L>R)  Posture:  Posture (WDL): Exceptions to WDL  Posture Assessment: Forward head; Increased;Trunk flexion  Functional Mobility:  Bed Mobility:  Supine to Sit: Minimum assistance; Additional time;Bed Modified  Sit to Supine: Minimum assistance  Scooting: Minimum assistance  Transfers:  Sit to Stand: Minimum assistance;Assist x1;Assist x2  Stand to Sit: Minimum assistance(poor eccentric control )  Balance:   Sitting: Impaired; With support  Sitting - Static: Fair (occasional)  Sitting - Dynamic: Fair (occasional)  Standing: Impaired; With support  Standing - Static: Fair  Standing - Dynamic : Poor  Ambulation/Gait Training:  Distance (ft): 2 Feet (ft)  Assistive Device: Walker, rolling  Ambulation - Level of Assistance: Moderate assistance  Gait Description (WDL): Exceptions to WDL  Gait Abnormalities: Decreased step clearance;Trunk sway increased  Base of Support: Center of gravity altered  Speed/Hanny: Slow  Step Length: Right shortened;Left shortened(side steps to [facility] )    Pain:  Pain level pre-treatment: 0/10   Pain level post-treatment: 0/10     Activity Tolerance:   Fair activity tolerance, requesting to return to bed after standing activity  Please refer to the flowsheet for vital signs taken during this treatment.   After treatment:   []         Patient left in no apparent distress sitting up in chair  [x]         Patient left in no apparent distress in bed  [x]         Call bell left within reach  [x]         Nursing notified  []         Caregiver present  [x]         Bed alarm activated  []         SCDs applied    COMMUNICATION/EDUCATION:   [x]         Role of Physical Therapy in the acute care setting. [x]         Fall prevention education was provided and the patient/caregiver indicated understanding. [x]         Patient/family have participated as able in goal setting and plan of care. [x]         Patient/family agree to work toward stated goals and plan of care. []         Patient understands intent and goals of therapy, but is neutral about his/her participation. []         Patient is unable to participate in goal setting/plan of care: ongoing with therapy staff.  []         Other:     Thank you for this referral.  Dina Yang, PT   Time Calculation: 23 mins      Eval Complexity: History: MEDIUM  Complexity : 1-2 comorbidities / personal factors will impact the outcome/ POC Exam:MEDIUM Complexity : 3 Standardized tests and measures addressing body structure, function, activity limitation and / or participation in recreation  Presentation: LOW Complexity : Stable, uncomplicated  Clinical Decision Making:Low Complexity low  Overall Complexity:LOW

## 2020-09-28 ENCOUNTER — APPOINTMENT (OUTPATIENT)
Dept: CT IMAGING | Age: 85
DRG: 674 | End: 2020-09-28
Attending: INTERNAL MEDICINE
Payer: MEDICARE

## 2020-09-28 ENCOUNTER — APPOINTMENT (OUTPATIENT)
Dept: ULTRASOUND IMAGING | Age: 85
DRG: 674 | End: 2020-09-28
Attending: INTERNAL MEDICINE
Payer: MEDICARE

## 2020-09-28 LAB
ANA SER QL: NEGATIVE
ANION GAP SERPL CALC-SCNC: 14 MMOL/L (ref 3–18)
BACTERIA SPEC CULT: ABNORMAL
BACTERIA SPEC CULT: ABNORMAL
BASOPHILS # BLD: 0 K/UL (ref 0–0.1)
BASOPHILS NFR BLD: 0 % (ref 0–2)
BUN SERPL-MCNC: 123 MG/DL (ref 7–18)
BUN/CREAT SERPL: 9 (ref 12–20)
CALCIUM SERPL-MCNC: 8.4 MG/DL (ref 8.5–10.1)
CC UR VC: ABNORMAL
CHLORIDE SERPL-SCNC: 117 MMOL/L (ref 100–111)
CO2 SERPL-SCNC: 15 MMOL/L (ref 21–32)
COVID-19 RAPID TEST, COVR: NOT DETECTED
CREAT SERPL-MCNC: 14.1 MG/DL (ref 0.6–1.3)
DIFFERENTIAL METHOD BLD: ABNORMAL
EOSINOPHIL # BLD: 0.3 K/UL (ref 0–0.4)
EOSINOPHIL NFR BLD: 3 % (ref 0–5)
ERYTHROCYTE [DISTWIDTH] IN BLOOD BY AUTOMATED COUNT: 14.8 % (ref 11.6–14.5)
GLUCOSE SERPL-MCNC: 102 MG/DL (ref 74–99)
HBV CORE IGM SER QL: NEGATIVE
HBV SURFACE AB SER QL IA: NEGATIVE
HBV SURFACE AB SER QL IA: NEGATIVE
HBV SURFACE AB SERPL IA-ACNC: 3.47 MIU/ML
HBV SURFACE AB SERPL IA-ACNC: 5.2 MIU/ML
HCT VFR BLD AUTO: 22.3 % (ref 36–48)
HCV AB SER IA-ACNC: 0.15 INDEX
HCV AB SERPL QL IA: NEGATIVE
HCV COMMENT,HCGAC: NORMAL
HEP BS AB COMMENT,HBSAC: ABNORMAL
HGB BLD-MCNC: 7.5 G/DL (ref 13–16)
KAPPA LC UR-MCNC: 319.66 MG/L (ref 0.63–113.79)
KAPPA LC/LAMBDA UR: 5.97 {RATIO} (ref 1.03–31.76)
LAMBDA LC UR-MCNC: 53.57 MG/L (ref 0.47–11.77)
LYMPHOCYTES # BLD: 1.5 K/UL (ref 0.9–3.6)
LYMPHOCYTES NFR BLD: 17 % (ref 21–52)
MAGNESIUM SERPL-MCNC: 1.9 MG/DL (ref 1.6–2.6)
MCH RBC QN AUTO: 28.2 PG (ref 24–34)
MCHC RBC AUTO-ENTMCNC: 33.6 G/DL (ref 31–37)
MCV RBC AUTO: 83.8 FL (ref 74–97)
MONOCYTES # BLD: 1 K/UL (ref 0.05–1.2)
MONOCYTES NFR BLD: 11 % (ref 3–10)
NEUTS SEG # BLD: 6.4 K/UL (ref 1.8–8)
NEUTS SEG NFR BLD: 69 % (ref 40–73)
PHOSPHATE SERPL-MCNC: 7.7 MG/DL (ref 2.5–4.9)
PLATELET # BLD AUTO: 248 K/UL (ref 135–420)
PMV BLD AUTO: 11.7 FL (ref 9.2–11.8)
POTASSIUM SERPL-SCNC: 3.9 MMOL/L (ref 3.5–5.5)
RBC # BLD AUTO: 2.66 M/UL (ref 4.7–5.5)
SERVICE CMNT-IMP: ABNORMAL
SODIUM SERPL-SCNC: 146 MMOL/L (ref 136–145)
SOURCE, COVRS: NORMAL
SPECIMEN TYPE, XMCV1T: NORMAL
WBC # BLD AUTO: 9.2 K/UL (ref 4.6–13.2)

## 2020-09-28 PROCEDURE — 0JH63XZ INSERTION OF TUNNELED VASCULAR ACCESS DEVICE INTO CHEST SUBCUTANEOUS TISSUE AND FASCIA, PERCUTANEOUS APPROACH: ICD-10-PCS | Performed by: SURGERY

## 2020-09-28 PROCEDURE — 74011250637 HC RX REV CODE- 250/637: Performed by: NURSE PRACTITIONER

## 2020-09-28 PROCEDURE — 74011250636 HC RX REV CODE- 250/636: Performed by: INTERNAL MEDICINE

## 2020-09-28 PROCEDURE — 77030002986 HC SUT PROL J&J -A: Performed by: SURGERY

## 2020-09-28 PROCEDURE — C1750 CATH, HEMODIALYSIS,LONG-TERM: HCPCS | Performed by: SURGERY

## 2020-09-28 PROCEDURE — 36415 COLL VENOUS BLD VENIPUNCTURE: CPT

## 2020-09-28 PROCEDURE — 99152 MOD SED SAME PHYS/QHP 5/>YRS: CPT | Performed by: SURGERY

## 2020-09-28 PROCEDURE — 85025 COMPLETE CBC W/AUTO DIFF WBC: CPT

## 2020-09-28 PROCEDURE — 74011250636 HC RX REV CODE- 250/636: Performed by: SURGERY

## 2020-09-28 PROCEDURE — 83735 ASSAY OF MAGNESIUM: CPT

## 2020-09-28 PROCEDURE — 2709999900 HC NON-CHARGEABLE SUPPLY

## 2020-09-28 PROCEDURE — 77030002933 HC SUT MCRYL J&J -A: Performed by: SURGERY

## 2020-09-28 PROCEDURE — 74011250637 HC RX REV CODE- 250/637: Performed by: INTERNAL MEDICINE

## 2020-09-28 PROCEDURE — 99232 SBSQ HOSP IP/OBS MODERATE 35: CPT | Performed by: INTERNAL MEDICINE

## 2020-09-28 PROCEDURE — 65660000000 HC RM CCU STEPDOWN

## 2020-09-28 PROCEDURE — 74176 CT ABD & PELVIS W/O CONTRAST: CPT

## 2020-09-28 PROCEDURE — 99153 MOD SED SAME PHYS/QHP EA: CPT | Performed by: SURGERY

## 2020-09-28 PROCEDURE — 76937 US GUIDE VASCULAR ACCESS: CPT | Performed by: SURGERY

## 2020-09-28 PROCEDURE — 74011000250 HC RX REV CODE- 250: Performed by: INTERNAL MEDICINE

## 2020-09-28 PROCEDURE — 77030013744: Performed by: SURGERY

## 2020-09-28 PROCEDURE — 84100 ASSAY OF PHOSPHORUS: CPT

## 2020-09-28 PROCEDURE — 87635 SARS-COV-2 COVID-19 AMP PRB: CPT

## 2020-09-28 PROCEDURE — 02HV33Z INSERTION OF INFUSION DEVICE INTO SUPERIOR VENA CAVA, PERCUTANEOUS APPROACH: ICD-10-PCS | Performed by: SURGERY

## 2020-09-28 PROCEDURE — 80048 BASIC METABOLIC PNL TOTAL CA: CPT

## 2020-09-28 PROCEDURE — 77030040830 HC CATH URETH FOL MDII -A

## 2020-09-28 PROCEDURE — 74011000250 HC RX REV CODE- 250: Performed by: SURGERY

## 2020-09-28 PROCEDURE — 77030039266 HC ADH SKN EXOFIN S2SG -A: Performed by: SURGERY

## 2020-09-28 PROCEDURE — 36558 INSERT TUNNELED CV CATH: CPT | Performed by: SURGERY

## 2020-09-28 PROCEDURE — 99232 SBSQ HOSP IP/OBS MODERATE 35: CPT | Performed by: HOSPITALIST

## 2020-09-28 PROCEDURE — 76770 US EXAM ABDO BACK WALL COMP: CPT

## 2020-09-28 RX ORDER — MIDAZOLAM HYDROCHLORIDE 1 MG/ML
INJECTION, SOLUTION INTRAMUSCULAR; INTRAVENOUS AS NEEDED
Status: DISCONTINUED | OUTPATIENT
Start: 2020-09-28 | End: 2020-09-28 | Stop reason: HOSPADM

## 2020-09-28 RX ORDER — HEPARIN SODIUM 5000 [USP'U]/ML
INJECTION, SOLUTION INTRAVENOUS; SUBCUTANEOUS AS NEEDED
Status: DISCONTINUED | OUTPATIENT
Start: 2020-09-28 | End: 2020-09-28 | Stop reason: HOSPADM

## 2020-09-28 RX ORDER — FENTANYL CITRATE 50 UG/ML
INJECTION, SOLUTION INTRAMUSCULAR; INTRAVENOUS AS NEEDED
Status: DISCONTINUED | OUTPATIENT
Start: 2020-09-28 | End: 2020-09-28 | Stop reason: HOSPADM

## 2020-09-28 RX ORDER — LIDOCAINE HYDROCHLORIDE 10 MG/ML
INJECTION, SOLUTION EPIDURAL; INFILTRATION; INTRACAUDAL; PERINEURAL AS NEEDED
Status: DISCONTINUED | OUTPATIENT
Start: 2020-09-28 | End: 2020-09-28 | Stop reason: HOSPADM

## 2020-09-28 RX ADMIN — NEPHROCAP 1 CAPSULE: 1 CAP ORAL at 11:39

## 2020-09-28 RX ADMIN — AMLODIPINE BESYLATE 10 MG: 10 TABLET ORAL at 11:38

## 2020-09-28 RX ADMIN — PANTOPRAZOLE SODIUM 40 MG: 40 TABLET, DELAYED RELEASE ORAL at 11:39

## 2020-09-28 RX ADMIN — CEFTRIAXONE SODIUM 1 G: 1 INJECTION, POWDER, FOR SOLUTION INTRAMUSCULAR; INTRAVENOUS at 03:00

## 2020-09-28 RX ADMIN — Medication 10 ML: at 15:39

## 2020-09-28 RX ADMIN — Medication 10 ML: at 23:59

## 2020-09-28 RX ADMIN — Medication 10 ML: at 05:23

## 2020-09-28 RX ADMIN — EPOETIN ALFA-EPBX 10000 UNITS: 10000 INJECTION, SOLUTION INTRAVENOUS; SUBCUTANEOUS at 23:58

## 2020-09-28 RX ADMIN — SODIUM BICARBONATE: 84 INJECTION, SOLUTION INTRAVENOUS at 23:58

## 2020-09-28 RX ADMIN — FERROUS SULFATE TAB 325 MG (65 MG ELEMENTAL FE) 325 MG: 325 (65 FE) TAB at 11:38

## 2020-09-28 RX ADMIN — SODIUM BICARBONATE: 84 INJECTION, SOLUTION INTRAVENOUS at 04:56

## 2020-09-28 NOTE — PROGRESS NOTES
TRANSFER - IN REPORT:    Verbal report received from JAMSHID Stanton(name) on Will Lowers  being received from Cath Lab(unit) for routine post - op      Report consisted of patients Situation, Background, Assessment and   Recommendations(SBAR). Information from the following report(s) SBAR, Procedure Summary, MAR and Recent Results was reviewed with the receiving nurse. Opportunity for questions and clarification was provided. Assessment completed upon patients arrival to unit and care assumed. Perm Cath R IJ CDI. 1/25; no anticoagulation given.  VSS

## 2020-09-28 NOTE — CONSULTS
Consult Note    Patient: Hira Camarillo               Sex: male          DOA: 9/25/2020       YOB: 1935      Age:  80 y.o.        LOS:  LOS: 3 days              Referring Provider: Chino Conley     ASSESSMENT:   1. Bilateral hydronephrosis, distended bladder  2. 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 UTI    HORACIO benign   Ultrasound reviewed, has > 700 in bladder with bilateral hydro   Stop condom cath, PLACE GONZALEZ  Perform CT   Next steps based on findings       Alexander Mahajan MD  [phone] Office  [phone]  Pager    Chief Complaint   Patient presents with    Abnormal Lab Results       HISTORY OF PRESENT ILLNESS:  Hira Camarillo is a 80 y.o. male who is seen in consultation as referred by Catrina Cornell  for renal failure, distended bladder, hydronephrosis bilaterally. Patient poor historian. Notes episode of hematuria. Denies UTI's, but has one currently. Denies previous radiation, surgery on urinary tract. Denies LUTS, but again poor historian. Location: Bladder  Onset: Unknown  Duration: At least 3 days   Associated symptoms: Kidney injury, hydronephrosis, acidosis      No flowsheet data found. Past Medical History:   Diagnosis Date    Cardiac echocardiogram 08/29/2016    EF 60-65%. No WMA. Mild LVH. Indeterminate diastolic fx. RVSP 35 mmHg. No significant valvular heart disease.  Cardiac nuclear imaging test, abnormal 08/29/2016    Intermediate risk. Previous inferior infarction w/very mild fabiaan-infarct ischemia. Inferior hypk. EF 68%. Nondiagnostic EKG on pharm stress test.  Pt's BP increased from 193/96 to 207/83 during study.  Carotid duplex 08/30/2016    Mild <50% bilateral ICA stenosis.  Hypertension        History reviewed. No pertinent surgical history. Social History     Tobacco Use    Smoking status: Never Smoker    Smokeless tobacco: Never Used   Substance Use Topics    Alcohol use: No    Drug use:  No No Known Allergies    History reviewed. No pertinent family history.     Current Facility-Administered Medications   Medication Dose Route Frequency Provider Last Rate Last Dose    epoetin brina-epbx (RETACRIT) injection 10,000 Units  10,000 Units SubCUTAneous Q Clarisa JONES MD        ferrous sulfate tablet 325 mg  1 Tab Oral DAILY WITH Akilah Bentley MD   325 mg at 09/28/20 1138    doxercalciferoL (HECTOROL) 4 mcg/2 mL injection 1 mcg  1 mcg IntraVENous DIALYSIS NUNO JONES & Naina Cavlillo MD        0.9% sodium chloride infusion 250 mL  250 mL IntraVENous PRN Maria De Jesus Antunez MD        cefTRIAXone (ROCEPHIN) 1 g in sterile water (preservative free) 10 mL IV syringe  1 g IntraVENous Q24H Tanya Tom MD   1 g at 09/28/20 0300    calcium acetate(phosphat bind) (PHOSLO) capsule 1,334 mg  2 Cap Oral TID WITH MEALS Maria De Jesus Antunez MD   Stopped at 09/28/20 0800    B complex-vitaminC-folic acid (NEPHROCAP) cap  1 Cap Oral DAILY Maria De Jesus Antunez MD   1 Cap at 09/28/20 1139    amLODIPine (NORVASC) tablet 10 mg  10 mg Oral DAILY Maria De Jesus Antunez MD   10 mg at 09/28/20 1138    0.9% sodium chloride infusion 250 mL  250 mL IntraVENous PRN Hamilton Martinez DO        sodium bicarbonate (8.4%) 100 mEq in dextrose 5% 1,000 mL infusion   IntraVENous CONTINUOUS Maria De Jesus Antunez MD 50 mL/hr at 09/28/20 0456      sodium chloride (NS) flush 5-40 mL  5-40 mL IntraVENous Q8H Silva Beverly NP   10 mL at 09/28/20 1539    sodium chloride (NS) flush 5-40 mL  5-40 mL IntraVENous PRN Merlin Beverly NP        acetaminophen (TYLENOL) tablet 650 mg  650 mg Oral Q6H PRN Merlin Beverly NP        Or    acetaminophen (TYLENOL) suppository 650 mg  650 mg Rectal Q6H PRN Silva Beverly NP        polyethylene glycol (MIRALAX) packet 17 g  17 g Oral DAILY PRN Silva Beverly NP        promethazine (PHENERGAN) tablet 12.5 mg  12.5 mg Oral Q6H PRN [place], [place] Aron NP        Or    ondansetron (ZOFRAN) injection 4 mg  4 mg IntraVENous Q6H PRN Silva Beverly NP        pantoprazole (PROTONIX) tablet 40 mg  40 mg Oral ACB Silva Beverly NP   40 mg at 09/28/20 1139    labetaloL (NORMODYNE;TRANDATE) 20 mg/4 mL (5 mg/mL) injection 20 mg  20 mg IntraVENous Q5MIN PRN Duc Beverly NP           Review of Systems  Constitutional: No fever, chills, or weight loss  Respiratory: No dyspnea  Cardiovascular: No chest pain  Gastrointestinal: No vomiting or abdominal pain. Genitourinary: Denies frequency, urgency, dysuria, hematuria. Neurological: No focal motor changes. PHYSICAL EXAMINATION:   Visit Vitals  BP (!) 141/59 (BP 1 Location: Right arm, BP Patient Position: At rest)   Pulse 68   Temp 97 °F (36.1 °C)   Resp 12   Ht 5' 6\" (1.676 m)   Wt 162 lb 1.6 oz (73.5 kg)   SpO2 100%   BMI 26.16 kg/m²     Constitutional: Well developed, well nourished male. No acute distress. HEENT: Normocephalic, Atraumatic, EOM's intact   CV:  Normal radial pulse. Respiratory: No respiratory distress or difficulties breathing   Abdomen:  Nontender, nondistended.  Male:  No CVA tenderness  HORACIO: Rectum with no hemorrhoids, fissures or masses. 30 gram prostate without nodules or indurations. Lateral sulci are free. SCROTUM:  No scrotal rash or lesions noticed. Normal bilateral testes without masses or tenderness. PENIS: Urethral meatus normal in location and size. No urethral discharge. Skin: No evidence of jaundice. Normal color  Neuro/Psych:  Alert and oriented. Affect appropriate. Lymphatic:   No enlarged inguinal lymph nodes.       REVIEW OF LABS AND IMAGING:      Labs: Results:   Chemistry    Recent Labs     09/28/20  0226 09/27/20  0433 09/26/20  0715 09/25/20  1800   * 113* 168* 82   * 144 144 144   K 3.9 3.9 4.5 5.2   * 118* 119* 122*   CO2 15* 13* 11* 8*   * 124* 122* 129*   CREA 14.10* 14.70* 14.80* 15.40*   CA 8.4* 8.2* 8.1*  8.1* 8.4*   AGAP 14 13 14 14   BUCR 9* 8* 8* 8*   AP  --   --   --  79   TP  --   --   --  8.8*   ALB  --   --   --  3.6   GLOB  --   --   --  5.2*   AGRAT  --   --   --  0.7*      CBC w/Diff Recent Labs     09/28/20  0226 09/27/20  0433 09/26/20  0410   WBC 9.2 8.2 8.1   RBC 2.66* 2.42* 2.47*   HGB 7.5* 6.9* 7.2*   HCT 22.3* 20.5* 21.1*    244 262   GRANS 69 71 69   LYMPH 17* 17* 18*   EOS 3 1 3      Cultures Recent Labs     09/25/20 2034   CULT ESCHERICHIA COLI*  STREPTOCOCCI, BETA HEMOLYTIC GROUP B Penicillin and ampicillin are drugs of choice for treatment of beta-hemolytic streptococcal infections. Susceptibility testing of penicillins and beta-lactams approved by the FDA for treatment of beta-hemolytic streptococcal infections need not be performed routinely, because nonsusceptible isolates are extremely rare. CLSI 2012*     All Micro Results     Procedure Component Value Units Date/Time    CULTURE, URINE [[id number]]  (Abnormal)  (Susceptibility) Collected:  09/25/20 2034    Order Status:  Completed Specimen:  Cath Urine Updated:  09/28/20 1323     Special Requests: NO SPECIAL REQUESTS        Benoit Count --        >100,000  COLONIES/mL       Culture result: ESCHERICHIA COLI               STREPTOCOCCI, BETA HEMOLYTIC GROUP B Penicillin and ampicillin are drugs of choice for treatment of beta-hemolytic streptococcal infections. Susceptibility testing of penicillins and beta-lactams approved by the FDA for treatment of beta-hemolytic streptococcal infections need not be performed routinely, because nonsusceptible isolates are extremely rare.  CLSI 2012                  Urinalysis Color   Date Value Ref Range Status   09/25/2020 YELLOW   Final     Appearance   Date Value Ref Range Status   09/25/2020 CLOUDY   Final     Specific gravity   Date Value Ref Range Status   09/25/2020 1.010 1.005 - 1.030   Final     pH (UA)   Date Value Ref Range Status   09/25/2020 5.0 5.0 - 8.0   Final Protein   Date Value Ref Range Status   09/25/2020 100 (A) NEG mg/dL Final     Ketone   Date Value Ref Range Status   09/25/2020 Negative NEG mg/dL Final     Bilirubin   Date Value Ref Range Status   09/25/2020 Negative NEG   Final     Blood   Date Value Ref Range Status   09/25/2020 SMALL (A) NEG   Final     Urobilinogen   Date Value Ref Range Status   09/25/2020 0.2 0.2 - 1.0 EU/dL Final     Nitrites   Date Value Ref Range Status   09/25/2020 Negative NEG   Final     Leukocyte Esterase   Date Value Ref Range Status   09/25/2020 LARGE (A) NEG   Final     Potassium   Date Value Ref Range Status   09/28/2020 3.9 3.5 - 5.5 mmol/L Final     Creatinine   Date Value Ref Range Status   09/28/2020 14.10 (H) 0.6 - 1.3 MG/DL Final     BUN   Date Value Ref Range Status   09/28/2020 123 (H) 7.0 - 18 MG/DL Final      PSA No results for input(s): PSA in the last 72 hours. Coagulation Lab Results   Component Value Date/Time    Prothrombin time 14.5 09/27/2020 12:25 PM    Prothrombin time 13.3 11/20/2018 02:10 PM    INR 1.1 09/27/2020 12:25 PM    INR 1.0 11/20/2018 02:10 PM    aPTT 29.1 11/20/2018 02:10 PM    aPTT 63.5 (H) 08/30/2016 05:15 PM           US Results (most recent):  Results from [place] encounter on 09/25/20   US RETROPERITONEUM COMP    Narrative EXAMINATION: Ultrasound retroperitoneum    INDICATION: Acute renal failure    COMPARISON: None    TECHNIQUE: Grayscale and color sonographic images of the retroperitoneum  obtained. FINDINGS:    Right kidney: 10.1 cm length. Limited visualization due to bowel gas. Normal  echotexture. Suspected moderate to severe hydronephrosis. No suspicious  parenchymal lesions. Left kidney: Very poorly visualized due to bowel gas. Bladder: Distended without focal abnormality. Impression IMPRESSION:    Right kidney with suspected moderate to severe hydronephrosis, however this may  be exaggerated or simulated by peripelvic cysts.  Visualization is limited due to  bowel gas. Left kidney very poorly visualized. Bladder distended without focal abnormality. chest    CT Results (most recent):   Results from Hospital Encounter encounter on 06/27/18   CT HEAD WO CONT    Narrative CT BRAIN    CPT code: 46873    History: Left leg weakness. Findings: Contiguous noncontrast axial images of the brain are obtained from the  foramen magnum to the vertex and reviewed in brain and bone windows. Comparison  study is 29 August 2016. No specific areas of decreased gray-white distinction to suggest acute ischemic  change is seen. There is diffuse decreased attenuation in the deep cerebral  white matter of the centrum semiovale, bilaterally. There is no mass, mass  effect or intracranial hemorrhage. The cerebrospinal fluid spaces are  [id number] prominent but symmetrical.  No significant extra-axial  abnormalities are seen. The pituitary fossa is unremarkable. The mastoids and  visualized paranasal sinuses are clear. No significant bony abnormalities are  seen. Impression IMPRESSION[de-identified]     No definite acute ischemic change is seen and there is no evidence of  intracranial hemorrhage. Note that CT is less sensitive for early acute  ischemic change than MRI. Mild-to-moderate burden nonspecific white matter lucencies, most likely  microvascular ischemic change. Mild-to-moderate atrophy. There is probably  little change from the prior study. .      ===================  Note: [facility] maintains that all CT scans at their facilities  are performed by using dose optimization technique as appropriate to a performed  examination, to include automated exposure control, adjustment of the mAs and/or  kVp according to patient size (including appropriate matching for site specific  examinations) or use of iterative reconstruction technique. ABD      MRI Results (most recent):   No procedure found.                                1. Acute renal failure, unspecified acute renal failure type ([facility]    2. Troponin level elevated    3. Essential hypertension    4. Symptomatic anemia    5. Metabolic acidosis    6.  ESRD (end stage renal disease) ([facility]

## 2020-09-28 NOTE — PROGRESS NOTES
Progress Note    Glenda Carson  80 y.o. Admit Date: 9/25/2020  Active Problems:    Acute renal failure (ARF) ([facility] (9/25/2020) POA: Unknown      ESRD (end stage renal disease) ([facility] (9/26/2020) POA: Unknown      Hyperkalemia (9/26/2020) POA: Unknown      Metabolic acidosis (2/65/8802) POA: Unknown      Secondary hyperparathyroidism, renal ([facility] (9/26/2020) POA: Unknown      Anemia (9/26/2020) POA: Unknown            Subjective:     Patient Rabia Harris, appetite is better,awaiting for [facility], Making Urine through [address], Just Back from Renal US       A comprehensive review of systems was negative except for that written in the History of Present Illness.     Objective:     Visit Vitals  BP (!) 144/69 (BP 1 Location: Right arm, BP Patient Position: At rest)   Pulse 72   Temp 97 °F (36.1 °C)   Resp 18   Ht 5' 6\" (1.676 m)   Wt 73.5 kg (162 lb 1.6 oz)   SpO2 100%   BMI 26.16 kg/m²         Intake/Output Summary (Last 24 hours) at 9/28/2020 1159  Last data filed at 9/27/2020 1755  Gross per 24 hour   Intake 240 ml   Output 300 ml   Net -60 ml       Current Facility-Administered Medications   Medication Dose Route Frequency Provider Last Rate Last Dose    epoetin brina-epbx (RETACRIT) injection 10,000 Units  10,000 Units SubCUTAneous Q Racquel JONES MD        ferrous sulfate tablet 325 mg  1 Tab Oral DAILY WITH Ashwin Darnell MD   325 mg at 09/28/20 1138    doxercalciferoL (HECTOROL) 4 mcg/2 mL injection 1 mcg  1 mcg IntraVENous DIALYSIS Racquel JONES MD        0.9% sodium chloride infusion 250 mL  250 mL IntraVENous PRN Darrius Sr MD        cefTRIAXone (ROCEPHIN) 1 g in sterile water (preservative free) 10 mL IV syringe  1 g IntraVENous Q24H Leobardo Elam MD   1 g at 09/28/20 0300    calcium acetate(phosphat bind) (PHOSLO) capsule 1,334 mg  2 Cap Oral TID WITH MEALS Darrius Sr MD   Stopped at 09/28/20 0800    B complex-vitaminC-folic acid (NEPHROCAP) cap  1 Cap Oral DAILY Wang Gusman MD   1 Cap at 09/28/20 1139    amLODIPine (NORVASC) tablet 10 mg  10 mg Oral DAILY Wang Gusman MD   10 mg at 09/28/20 1138    0.9% sodium chloride infusion 250 mL  250 mL IntraVENous PRN Rula Alcaraz DO        sodium bicarbonate (8.4%) 100 mEq in dextrose 5% 1,000 mL infusion   IntraVENous CONTINUOUS Wang Gusman MD 50 mL/hr at 09/28/20 0456      sodium chloride (NS) flush 5-40 mL  5-40 mL IntraVENous Q8H Sivla Beverly NP   10 mL at 09/28/20 0523    sodium chloride (NS) flush 5-40 mL  5-40 mL IntraVENous PRN Jakob Beverly NP        acetaminophen (TYLENOL) tablet 650 mg  650 mg Oral Q6H PRN Jakob Beverly NP        Or    acetaminophen (TYLENOL) suppository 650 mg  650 mg Rectal Q6H PRN Jakob Beverly NP        polyethylene glycol (MIRALAX) packet 17 g  17 g Oral DAILY PRN Jakob Beverly NP        promethazine (PHENERGAN) tablet 12.5 mg  12.5 mg Oral Q6H PRN Jakob Beverly NP        Or    ondansetron (ZOFRAN) injection 4 mg  4 mg IntraVENous Q6H PRN Silva Beverly NP        pantoprazole (PROTONIX) tablet 40 mg  40 mg Oral ACB Silva Beverly NP   40 mg at 09/28/20 1139    labetaloL (NORMODYNE;TRANDATE) 20 mg/4 mL (5 mg/mL) injection 20 mg  20 mg IntraVENous Q5MIN PRN Lupe Nelson NP            Physical Exam:     Physical Exam:   General:  Alert, cooperative, no distress, appears stated age. Neck: Supple, symmetrical, trachea midline, no adenopathy, thyroid: no enlargement/tenderness/nodules, no carotid bruit and no JVD. Lungs:   Clear to auscultation bilaterally. Heart:  Regular rate and rhythm, S1, S2 normal, no murmur, click, rub or gallop. Abdomen:   Soft, non-tender. Bowel sounds normal. No masses,  No organomegaly.    Extremities: Extremities normal, atraumatic, no cyanosis or edema   Skin: Skin color, texture, turgor normal. No rashes or lesions         Data Review:    CBC w/Diff    Recent Labs     09/28/20 0226 09/27/20 0433 09/26/20  0410   WBC 9.2 8.2 8.1   RBC 2.66* 2.42* 2.47*   HGB 7.5* 6.9* 7.2*   HCT 22.3* 20.5* 21.1*   MCV 83.8 84.7 85.4   MCH 28.2 28.5 29.1   MCHC 33.6 33.7 34.1   RDW 14.8* 15.0* 14.9*    Recent Labs     09/28/20 0226 09/27/20 0433 09/26/20  0410   MONOS 11* 9 9   EOS 3 1 3   BASOS 0 2 1   RDW 14.8* 15.0* 14.9*        Comprehensive Metabolic Profile    Recent Labs     09/28/20 0226 09/27/20 0433 09/26/20  0715   * 144 144   K 3.9 3.9 4.5   * 118* 119*   CO2 15* 13* 11*   * 124* 122*   CREA 14.10* 14.70* 14.80*    Recent Labs     09/28/20 0226 09/27/20 0433 09/26/20  0715 09/25/20  1800   CA 8.4* 8.2* 8.1*  8.1* 8.4*   PHOS 7.7* 8.4* 8.5*  --    ALB  --   --   --  3.6   TP  --   --   --  8.8*   TBILI  --   --   --  0.3        Result Information     Status: Final result (Exam End: 9/28/2020 09:39)  Provider Status: Open    Study Result     EXAMINATION: Ultrasound retroperitoneum     INDICATION: Acute renal failure     COMPARISON: None     TECHNIQUE: Grayscale and color sonographic images of the retroperitoneum  obtained.     FINDINGS:     Right kidney: 10.1 cm length. Limited visualization due to bowel gas. Normal  echotexture. Suspected moderate to severe hydronephrosis. No suspicious  parenchymal lesions.     Left kidney: Very poorly visualized due to bowel gas.     Bladder: Distended without focal abnormality.     IMPRESSION  IMPRESSION:     Right kidney with suspected moderate to severe hydronephrosis, however this may  be exaggerated or simulated by peripelvic cysts.  Visualization is limited due to  bowel gas.     Left kidney very poorly visualized.     Bladder distended without focal abnormality                     Impression:       Active Hospital Problems    Diagnosis Date Noted    [facility] 09/26/2020    Hyperkalemia 19/16/9532    Metabolic acidosis 20/37/5561    Secondary hyperparathyroidism, renal ([facility] 09/26/2020    Anemia 09/26/2020    Acute renal failure (ARF) ([facility] 09/25/2020      No Significant Improvement of Renal Function , making Urine, Non Oliguric, Now Renal US raise the suspicion   of Hydronephrosis with Poor Visualization  Of Kidneys      Plan:     Still Proceed with [facility]  & start Dialysis  & order  CT , recommend Urology to see, in the meanwhile also will start  Work jup for OP dialysis if he needs long term Dialysis.       Angel Orozco MD

## 2020-09-28 NOTE — PROGRESS NOTES
Reason for Admission:  Acute renal failure (ARF) ([facility] [N17.9]                 RUR Score:    19%           Plan for utilizing home health:   Patient has no home health orders, in place, at this time. This writer will continue to monitor for potential home health needs and orders. Likelihood of Readmission:   Moderate                         Do you (patient/family) have any concerns for transition/discharge?  no    Transition of Care Plan:  Patient plans to return home with help from his family. Patient's family will transport him home, upon discharge. Initial assessment completed with patient. Cognitive status of patient: Alert and oriented. Face sheet information confirmed:  yes. The patient designates his wife Anton Adames and his daughter Bacilio Lim) to participate in his discharge plan and to receive any needed information. This patient lives in a 1-story house, with his wife Anton Rosales). Patient is able to navigate steps as needed, but has gotten difficult. Prior to hospitalization, patient was considered to be independent with ADLs/IADLS : yes . Patient has a current ACP document on file: no. This writer completed an ACP, with patient. Patient's family will be available to transport patient home upon discharge. The patient already has a cane available in the home. Patient is not currently active with home health. Patient has not stayed in a skilled nursing facility or rehab. This patient is on dialysis :no    Currently, the discharge plan is for patient to return home with help from his family. His family will transport him home, upon discharge. Patient's wife/primary decision maker is Sammie Lockett, #[phone] and [place]#[phone]). Patient's daughter/secondary decision maker is (Shelby , PP#[phone]). Patient's PCP is Prince Brito MD. Patient is insured through Medicare A[employer]B and he also has [facility].      The patient states that he can obtain his medications from the [facility] and take his medications as directed. This writer will continue to monitor for discharge planning to ensure a safe discharge home from [facility]. Care Management Interventions  PCP Verified by CM: Yes(Dr. Eriberto Ortiz)  Palliative Care Criteria Met (RRAT>21 & CHF Dx)?: No  Mode of Transport at Discharge: Other (see comment)(Family will transport home.)  Transition of Care Consult (CM Consult): Discharge Planning  Current Support Network: Lives with Spouse  Confirm Follow Up Transport: Family  Discharge Location  Discharge Placement: Home with family assistance      Domonique Siu MSW  Care Manager  Pager#: [phone]

## 2020-09-28 NOTE — PROGRESS NOTES
Cath holding summary     Patient escorted to cath holding from 4N, alert and oriented x 4, voicing no complaints and placed on monitor. NPO since MN. Lab results, med rec and H&P reviewed on chart. PIV x 1 flushed without difficulty.

## 2020-09-28 NOTE — PROGRESS NOTES
Pt seen and examined. Pt is oriented and able to give consent. Also spoke with pt's wife and explained the procedure as well as risks and benefits. She gives her consent as well. Will place [facility] today.

## 2020-09-28 NOTE — ACP (ADVANCE CARE PLANNING)
Advance Care Planning     Advance Care Planning Clinical Specialist  Conversation Note      Date of ACP Conversation: 09/28/20    Conversation Conducted with:  Janel Aldridge    ACP Clinical Specialist: Christina Velazquez Decision Maker: NO    Current Designated Health Care Decision Maker: N/A    If no Decision Maker listed above or available through scanned documents, then:    If no Authorized Decision Maker has previously been identified, then patient chooses Health Care Decision Maker:  \"Who would you like to name as your primary health care decision-maker? \"    Name: Cheryl Arevalo            Relationship: Wife        Phone number: [phone] and [phone]  ur Clement this person be reached easily? \" YES  \"Who would you like to name as your back-up decision maker? \"   Name: Dora De León  Relationship: Daughter Phone number: [phone]  Thurl Clement this person be reached easily? \" YES    Care Preferences    Hospitalization: \"If your health worsens and it becomes clear that your chance of recovery is unlikely, what would your preference be regarding hospitalization? \"    Choice:  [x]  The patient wants hospitalization  []  The patient prefers comfort-focused treatment without hospitalization. [] Yes  [x] No   Educated Patient / Kait Lopez regarding differences between Advance Directives and portable DNR orders.     Length of ACP Conversation in minutes:      Conversation Outcomes:  [x] ACP discussion completed  [] Existing advance directive reviewed with patient; no changes to patient's previously recorded wishes   [x] New Advance Directive completed   [] Portable Do Not Resuscitate prepared for Provider review and signature  [] POLST/POST/MOLST/MOST prepared for Provider review and signature      Follow-up plan:    [] Schedule follow-up conversation to continue planning  [] Referred individual to Provider for additional questions/concerns   [] Advised patient/agent/surrogate to review completed ACP document and update if needed with changes in condition, patient preferences or care setting     [x] This note routed to one or more involved healthcare providers      Domonique Quezada [facility]  Care Manager  Pager#: [phone]

## 2020-09-28 NOTE — ROUTINE PROCESS
Bedside shift change report given to MONTSERRAT Sloan (oncoming nurse) by Maurice Azul RN and Elie Ramirez RN (offgoing nurse). Report included the following information SBAR, Kardex, MAR and Recent Results.

## 2020-09-28 NOTE — PROGRESS NOTES
[facility]  -  Progress Note      Patient: Pavel Nesbitt MRN: [id number]  SSN: [id number]    YOB: 1935  Age: 80 y.o. Sex: male      Admit Date: 9/25/2020    Assessment:     -Indeterminate troponin- secondary to demand ischemia in setting of acute renal failure. Do not suspect ACS. No significant change to echo on 9/26/2020 with EF 50%. No chest pain.  -Acute renal failure with creatinine at 14, assoc acidosis  -Hyperkalemia secondary to above- improved  -Anemia most likely secondary to ARF  -Carotid disease and presumed CAD, no reversible defects on nuc 2016     No primary cardiologist/ Has seen Dr. Anjum Dumas in past last 2016    Plan:     Planning dialysis after catheter today. Given normal EF without chest pain, no plans for cath at this time. Subjective:     No new complaints.      Objective:      Patient Vitals for the past 8 hrs:   Temp Pulse Resp BP SpO2   09/28/20 0914 97 °F (36.1 °C) 72 18 (!) 144/69 100 %   09/28/20 0425 97.7 °F (36.5 °C) 72 18 (!) 146/61 100 %         Patient Vitals for the past 96 hrs:   Weight   09/27/20 2112 73.5 kg (162 lb 1.6 oz)   09/26/20 0852 81.2 kg (179 lb)         Intake/Output Summary (Last 24 hours) at 9/28/2020 1009  Last data filed at 9/27/2020 1755  Gross per 24 hour   Intake 360 ml   Output 550 ml   Net -190 ml       Physical Exam:  General:  alert, cooperative, no distress, appears stated age  Neck:  nontender, no JVD  Lungs:  clear to auscultation bilaterally  Heart:  regular rate and rhythm, S1, S2 normal, no murmur, click, rub or gallop  Abdomen:  abdomen is soft without significant tenderness, masses, organomegaly or guarding  Extremities:  extremities normal, atraumatic, no cyanosis or edema    Data Review:     Labs: Results:       Chemistry Recent Labs     09/28/20  0226 09/27/20  0433 09/26/20  0715 09/25/20  1800   * 113* 168* 82   * 144 144 144   K 3.9 3.9 4.5 5.2   * 118* 119* 122*   CO2 15* 13* 11* 8*   * 124* 122* 129*   CREA 14.10* 14.70* 14.80* 15.40*   CA 8.4* 8.2* 8.1*  8.1* 8.4*   MG 1.9 1.8 1.9 2.0   PHOS 7.7* 8.4* 8.5*  --    AGAP 14 13 14 14   BUCR 9* 8* 8* 8*   AP  --   --   --  79   TP  --   --   --  8.8*   ALB  --   --   --  3.6   GLOB  --   --   --  5.2*   AGRAT  --   --   --  0.7*      CBC w/Diff Recent Labs     09/28/20  0226 09/27/20  0433 09/26/20  0410   WBC 9.2 8.2 8.1   RBC 2.66* 2.42* 2.47*   HGB 7.5* 6.9* 7.2*   HCT 22.3* 20.5* 21.1*    244 262   GRANS 69 71 69   LYMPH 17* 17* 18*   EOS 3 1 3      Cardiac Enzymes No results found for: CPK, CK, CKMMB, CKMB, RCK3, CKMBT, CKNDX, CKND1, LILLY, TROPT, TROIQ, EDEL, TROPT, TNIPOC, BNP, BNPP   Coagulation Recent Labs     09/27/20  1225   PTP 14.5   INR 1.1       Lipid Panel Lab Results   Component Value Date/Time    Cholesterol, total 213 (H) 08/29/2016 01:35 AM    HDL Cholesterol 54 08/29/2016 01:35 AM    LDL, calculated 136.6 (H) 08/29/2016 01:35 AM    VLDL, calculated 22.4 08/29/2016 01:35 AM    Triglyceride 112 08/29/2016 01:35 AM    CHOL/HDL Ratio 3.9 08/29/2016 01:35 AM      BNP No results found for: BNP, BNPP, XBNPT   Liver Enzymes Recent Labs     09/25/20  1800   TP 8.8*   ALB 3.6   AP 79      Digoxin    Thyroid Studies Lab Results   Component Value Date/Time    TSH 6.31 (H) 09/25/2020 06:00 PM

## 2020-09-28 NOTE — INTERVAL H&P NOTE
Update History & Physical 
 
The Patient's History and Physical of September 27, 2020 was reviewed with the patient and I examined the patient. There was no change. The surgical site was confirmed by the patient and me. Plan:  The risk, benefits, expected outcome, and alternative to the recommended procedure have been discussed with the patient. Patient understands and wants to proceed with the procedure.  
 
Electronically signed by Magdy Gómez MD on 9/28/2020 at 12:37 PM

## 2020-09-28 NOTE — PROGRESS NOTES
TRANSFER - IN REPORT:    Verbal report received from MONTSERRAT Barrientos(name) on Wen Fernando  being received from 4N(unit) for ordered procedure      Report consisted of patients Situation, Background, Assessment and   Recommendations(SBAR). Information from the following report(s) SBAR, ED Summary, Intake/Output, MAR, Recent Results, Pre Procedure Checklist, Procedure Verification, Quality Measures and Dual Neuro Assessment was reviewed with the receiving nurse. Opportunity for questions and clarification was provided. Assessment completed upon patients arrival to unit and care assumed.

## 2020-09-28 NOTE — PROGRESS NOTES
TRANSFER - OUT REPORT:    Verbal report given to JAMSHID Acevedo(name) on Lawrai Narrow  being transferred to Cath lab(unit) for ordered procedure       Report consisted of patients Situation, Background, Assessment and   Recommendations(SBAR). Information from the following report(s) SBAR, Intake/Output, MAR, Recent Results, Pre Procedure Checklist, Procedure Verification, Quality Measures and Dual Neuro Assessment was reviewed with the receiving nurse. Lines:   Peripheral IV 09/28/20 Left;Proximal;Upper Cephalic (Active)        Opportunity for questions and clarification was provided.       Patient transported with:   [facility]

## 2020-09-28 NOTE — PROGRESS NOTES
Problem: Nutrition Deficit  Goal: *Optimize nutritional status  Outcome: Progressing Towards Goal     Problem: Falls - Risk of  Goal: *Absence of Falls  Description: Document Jenniffer Cronin Fall Risk and appropriate interventions in the flowsheet. Outcome: Progressing Towards Goal  Note: Fall Risk Interventions:  Mobility Interventions: Bed/chair exit alarm    Mentation Interventions: Bed/chair exit alarm    Medication Interventions: Bed/chair exit alarm    Elimination Interventions: Call light in reach, Bed/chair exit alarm              Problem: Pressure Injury - Risk of  Goal: *Prevention of pressure injury  Description: Document Karthikeyan Scale and appropriate interventions in the flowsheet.   Outcome: Progressing Towards Goal  Note: Pressure Injury Interventions:  Sensory Interventions: Assess changes in LOC    Moisture Interventions: Absorbent underpads    Activity Interventions: Assess need for specialty bed    Mobility Interventions: HOB 30 degrees or less    Nutrition Interventions: Document food/fluid/supplement intake    Friction and Shear Interventions: Minimize layers

## 2020-09-28 NOTE — PROGRESS NOTES
[facility]  [facility]    Patient: Katy Artist Age: 80 y.o. : 1935 MR#: [id number] SSN: [id number]  Date: 2020     Subjective/24-hour events:     Patient seen and evaluated, lying in bed, no acute distress, renal ultrasound showed bilateral hydronephrosis, urology consulted    Assessment:   CHARLOTTE on CKD, now ESRD  Secondary hyperparathyroidism  Metabolic acidosis  Troponin elevation, no ACS  HTN  Anemia, etiology uncertain  OA  Advanced age      Plan:     HD catheter placement per vascular, underwent procedure today  Renal US ordered -showed bilateral hydronephrosis, urology consulted, CT abdomen pelvis ordered, Burris catheter placed. No plan for ischemia workup per cardiology. Blood pressure better controlled, continue current medications. Iron stores low normal.  Will d/w nephrology. Continue rocephin, follow cultures. PT/OT, mobilize as tolerated. Follow. Case discussed with:  [x]Patient  []Family  [x]Nursing  []Case Management  DVT Prophylaxis:  []Lovenox  []Hep SQ  [x]SCDs  []Coumadin   []On Heparin gtt    Objective:   VS:   Visit Vitals  BP (!) 141/59 (BP 1 Location: Right arm, BP Patient Position: At rest)   Pulse 68   Temp 97 °F (36.1 °C)   Resp 12   Ht 5' 6\" (1.676 m)   Wt 73.5 kg (162 lb 1.6 oz)   SpO2 100%   BMI 26.16 kg/m²      Tmax/24hrs: Temp (24hrs), Av °F (36.7 °C), Min:97 °F (36.1 °C), Max:98.5 °F (36.9 °C)      Intake/Output Summary (Last 24 hours) at 2020 1739  Last data filed at 2020 1634  Gross per 24 hour   Intake 240 ml   Output 1600 ml   Net -1360 ml       General:  In NAD. Nontoxic-appearing. Cardiovascular:  RRR. Pulmonary:  Lungs clear bilaterally, no wheezes. GI:  Abdomen soft, NTTP. Extremities:  Warm, no edema or ischemia. Neuro:  Awake and alert. Moves extremities spontaneously.     Labs:    Recent Results (from the past 24 hour(s))   METABOLIC PANEL, BASIC    Collection Time: 20  2:26 AM Result Value Ref Range    Sodium 146 (H) 136 - 145 mmol/L    Potassium 3.9 3.5 - 5.5 mmol/L    Chloride 117 (H) 100 - 111 mmol/L    CO2 15 (L) 21 - 32 mmol/L    Anion gap 14 3.0 - 18 mmol/L    Glucose 102 (H) 74 - 99 mg/dL     (H) 7.0 - 18 MG/DL    Creatinine 14.10 (H) 0.6 - 1.3 MG/DL    BUN/Creatinine ratio 9 (L) 12 - 20      GFR est AA 4 (L) >60 ml/min/1.73m2    GFR est non-AA 3 (L) >60 ml/min/1.73m2    Calcium 8.4 (L) 8.5 - 10.1 MG/DL   MAGNESIUM    Collection Time: 09/28/20  2:26 AM   Result Value Ref Range    Magnesium 1.9 1.6 - 2.6 mg/dL   CBC WITH AUTOMATED DIFF    Collection Time: 09/28/20  2:26 AM   Result Value Ref Range    WBC 9.2 4.6 - 13.2 K/uL    RBC 2.66 (L) 4.70 - 5.50 M/uL    HGB 7.5 (L) 13.0 - 16.0 g/dL    HCT 22.3 (L) 36.0 - 48.0 %    MCV 83.8 74.0 - 97.0 FL    MCH 28.2 24.0 - 34.0 PG    MCHC 33.6 31.0 - 37.0 g/dL    RDW 14.8 (H) 11.6 - 14.5 %    PLATELET 736 433 - 229 K/uL    MPV 11.7 9.2 - 11.8 FL    NEUTROPHILS 69 40 - 73 %    LYMPHOCYTES 17 (L) 21 - 52 %    MONOCYTES 11 (H) 3 - 10 %    EOSINOPHILS 3 0 - 5 %    BASOPHILS 0 0 - 2 %    ABS. NEUTROPHILS 6.4 1.8 - 8.0 K/UL    ABS. LYMPHOCYTES 1.5 0.9 - 3.6 K/UL    ABS. MONOCYTES 1.0 0.05 - 1.2 K/UL    ABS. EOSINOPHILS 0.3 0.0 - 0.4 K/UL    ABS.  BASOPHILS 0.0 0.0 - 0.1 K/UL    DF AUTOMATED     PHOSPHORUS    Collection Time: 09/28/20  2:26 AM   Result Value Ref Range    Phosphorus 7.7 (H) 2.5 - 4.9 MG/DL   SARS-COV-2    Collection Time: 09/28/20  8:00 AM   Result Value Ref Range    Specimen source Nasopharyngeal      COVID-19 rapid test Not detected NOTD      Specimen type NP Swab         Signed By: Yina Mishra MD     September 28, 2020

## 2020-09-28 NOTE — PROGRESS NOTES
TRANSFER - OUT REPORT:    Verbal report given to MONTSERRAT Barrientos(name) on Yoli Harris  being transferred to [address] [place] [address](unit) for routine progression of care       Report consisted of patients Situation, Background, Assessment and   Recommendations(SBAR). Information from the following report(s) SBAR, Procedure Summary, MAR and Recent Results was reviewed with the receiving nurse. Lines:   Peripheral IV 09/28/20 Left;Proximal;Upper Cephalic (Active)        Opportunity for questions and clarification was provided. Patient transported with:   [facility]ia [facility] to [facility], site CDI. 1V/25F. No anticoagulation given. VSS.

## 2020-09-29 PROBLEM — N13.30 HYDRONEPHROSIS: Status: ACTIVE | Noted: 2020-09-29

## 2020-09-29 PROBLEM — N39.0 UTI (URINARY TRACT INFECTION): Status: ACTIVE | Noted: 2020-09-29

## 2020-09-29 LAB
ABO + RH BLD: NORMAL
ALBUMIN SERPL ELPH-MCNC: 4.3 G/DL (ref 2.9–4.4)
ALBUMIN/GLOB SERPL: 1.6 {RATIO} (ref 0.7–1.7)
ALPHA1 GLOB SERPL ELPH-MCNC: 0.2 G/DL (ref 0–0.4)
ALPHA2 GLOB SERPL ELPH-MCNC: 1 G/DL (ref 0.4–1)
ANION GAP SERPL CALC-SCNC: 13 MMOL/L (ref 3–18)
B-GLOBULIN SERPL ELPH-MCNC: 0.9 G/DL (ref 0.7–1.3)
BASOPHILS # BLD: 0.1 K/UL (ref 0–0.1)
BASOPHILS NFR BLD: 1 % (ref 0–2)
BLD PROD TYP BPU: NORMAL
BLD PROD TYP BPU: NORMAL
BLOOD GROUP ANTIBODIES SERPL: NORMAL
BPU ID: NORMAL
BPU ID: NORMAL
BUN SERPL-MCNC: 115 MG/DL (ref 7–18)
BUN/CREAT SERPL: 8 (ref 12–20)
CALCIUM SERPL-MCNC: 8.1 MG/DL (ref 8.5–10.1)
CALLED TO:,BCALL1: NORMAL
CALLED TO:,BCALL2: NORMAL
CHLORIDE SERPL-SCNC: 115 MMOL/L (ref 100–111)
CO2 SERPL-SCNC: 17 MMOL/L (ref 21–32)
CREAT SERPL-MCNC: 13.9 MG/DL (ref 0.6–1.3)
CROSSMATCH RESULT,%XM: NORMAL
CROSSMATCH RESULT,%XM: NORMAL
DIFFERENTIAL METHOD BLD: ABNORMAL
EOSINOPHIL # BLD: 0.2 K/UL (ref 0–0.4)
EOSINOPHIL NFR BLD: 3 % (ref 0–5)
ERYTHROCYTE [DISTWIDTH] IN BLOOD BY AUTOMATED COUNT: 14.9 % (ref 11.6–14.5)
GAMMA GLOB SERPL ELPH-MCNC: 1.4 G/DL (ref 0.4–1.8)
GLOBULIN SER-MCNC: 2.7 G/DL (ref 2.2–3.9)
GLUCOSE SERPL-MCNC: 163 MG/DL (ref 74–99)
HCT VFR BLD AUTO: 21 % (ref 36–48)
HGB BLD-MCNC: 7 G/DL (ref 13–16)
IGA SERPL-MCNC: 408 MG/DL (ref 61–437)
IGG SERPL-MCNC: 1381 MG/DL (ref 603–1613)
IGM SERPL-MCNC: 221 MG/DL (ref 15–143)
INTERPRETATION SERPL IEP-IMP: ABNORMAL
KAPPA LC FREE SER-MCNC: 343.2 MG/L (ref 3.3–19.4)
KAPPA LC FREE/LAMBDA FREE SER: 3.3 {RATIO} (ref 0.26–1.65)
LAMBDA LC FREE SERPL-MCNC: 103.9 MG/L (ref 5.7–26.3)
LYMPHOCYTES # BLD: 1 K/UL (ref 0.9–3.6)
LYMPHOCYTES NFR BLD: 13 % (ref 21–52)
M PROTEIN SERPL ELPH-MCNC: 0.3 G/DL
MAGNESIUM SERPL-MCNC: 1.8 MG/DL (ref 1.6–2.6)
MCH RBC QN AUTO: 28.9 PG (ref 24–34)
MCHC RBC AUTO-ENTMCNC: 33.3 G/DL (ref 31–37)
MCV RBC AUTO: 86.8 FL (ref 74–97)
MONOCYTES # BLD: 0.7 K/UL (ref 0.05–1.2)
MONOCYTES NFR BLD: 9 % (ref 3–10)
NEUTS SEG # BLD: 5.8 K/UL (ref 1.8–8)
NEUTS SEG NFR BLD: 74 % (ref 40–73)
PHOSPHATE SERPL-MCNC: 7.8 MG/DL (ref 2.5–4.9)
PLATELET # BLD AUTO: 217 K/UL (ref 135–420)
PMV BLD AUTO: 11.5 FL (ref 9.2–11.8)
POTASSIUM SERPL-SCNC: 3.9 MMOL/L (ref 3.5–5.5)
PROT SERPL-MCNC: 7 G/DL (ref 6–8.5)
PSA SERPL-MCNC: 78 NG/ML (ref 0–4)
RBC # BLD AUTO: 2.42 M/UL (ref 4.7–5.5)
SODIUM SERPL-SCNC: 145 MMOL/L (ref 136–145)
SPECIMEN EXP DATE BLD: NORMAL
STATUS OF UNIT,%ST: NORMAL
STATUS OF UNIT,%ST: NORMAL
UNIT DIVISION, %UDIV: 0
UNIT DIVISION, %UDIV: 0
WBC # BLD AUTO: 7.8 K/UL (ref 4.6–13.2)

## 2020-09-29 PROCEDURE — 97530 THERAPEUTIC ACTIVITIES: CPT

## 2020-09-29 PROCEDURE — 2709999900 HC NON-CHARGEABLE SUPPLY

## 2020-09-29 PROCEDURE — 84100 ASSAY OF PHOSPHORUS: CPT

## 2020-09-29 PROCEDURE — 74011250637 HC RX REV CODE- 250/637: Performed by: NURSE PRACTITIONER

## 2020-09-29 PROCEDURE — 84153 ASSAY OF PSA TOTAL: CPT

## 2020-09-29 PROCEDURE — 80048 BASIC METABOLIC PNL TOTAL CA: CPT

## 2020-09-29 PROCEDURE — 97535 SELF CARE MNGMENT TRAINING: CPT

## 2020-09-29 PROCEDURE — 90935 HEMODIALYSIS ONE EVALUATION: CPT

## 2020-09-29 PROCEDURE — 5A1D70Z PERFORMANCE OF URINARY FILTRATION, INTERMITTENT, LESS THAN 6 HOURS PER DAY: ICD-10-PCS | Performed by: INTERNAL MEDICINE

## 2020-09-29 PROCEDURE — 74011250637 HC RX REV CODE- 250/637: Performed by: INTERNAL MEDICINE

## 2020-09-29 PROCEDURE — 74011250636 HC RX REV CODE- 250/636: Performed by: INTERNAL MEDICINE

## 2020-09-29 PROCEDURE — 65660000000 HC RM CCU STEPDOWN

## 2020-09-29 PROCEDURE — 99232 SBSQ HOSP IP/OBS MODERATE 35: CPT | Performed by: HOSPITALIST

## 2020-09-29 PROCEDURE — 74011000250 HC RX REV CODE- 250: Performed by: INTERNAL MEDICINE

## 2020-09-29 PROCEDURE — 97116 GAIT TRAINING THERAPY: CPT

## 2020-09-29 PROCEDURE — 36415 COLL VENOUS BLD VENIPUNCTURE: CPT

## 2020-09-29 PROCEDURE — 83735 ASSAY OF MAGNESIUM: CPT

## 2020-09-29 PROCEDURE — 85025 COMPLETE CBC W/AUTO DIFF WBC: CPT

## 2020-09-29 RX ORDER — HEPARIN SODIUM 1000 [USP'U]/ML
5000 INJECTION, SOLUTION INTRAVENOUS; SUBCUTANEOUS
Status: DISCONTINUED | OUTPATIENT
Start: 2020-09-29 | End: 2020-10-03 | Stop reason: HOSPADM

## 2020-09-29 RX ORDER — HEPARIN SODIUM 1000 [USP'U]/ML
INJECTION, SOLUTION INTRAVENOUS; SUBCUTANEOUS
Status: DISPENSED
Start: 2020-09-29 | End: 2020-09-29

## 2020-09-29 RX ADMIN — CEFTRIAXONE SODIUM 1 G: 1 INJECTION, POWDER, FOR SOLUTION INTRAMUSCULAR; INTRAVENOUS at 02:00

## 2020-09-29 RX ADMIN — CALCIUM ACETATE 1334 MG: 667 CAPSULE ORAL at 13:52

## 2020-09-29 RX ADMIN — CALCIUM ACETATE 1334 MG: 667 CAPSULE ORAL at 08:44

## 2020-09-29 RX ADMIN — Medication 10 ML: at 23:10

## 2020-09-29 RX ADMIN — DOXERCALCIFEROL 1 MCG: 4 INJECTION, SOLUTION INTRAVENOUS at 11:30

## 2020-09-29 RX ADMIN — CALCIUM ACETATE 1334 MG: 667 CAPSULE ORAL at 17:00

## 2020-09-29 RX ADMIN — NEPHROCAP 1 CAPSULE: 1 CAP ORAL at 08:44

## 2020-09-29 RX ADMIN — Medication 10 ML: at 15:33

## 2020-09-29 RX ADMIN — ACETAMINOPHEN 650 MG: 325 TABLET ORAL at 01:49

## 2020-09-29 RX ADMIN — PANTOPRAZOLE SODIUM 40 MG: 40 TABLET, DELAYED RELEASE ORAL at 08:44

## 2020-09-29 RX ADMIN — Medication 10 ML: at 05:10

## 2020-09-29 RX ADMIN — FERROUS SULFATE TAB 325 MG (65 MG ELEMENTAL FE) 325 MG: 325 (65 FE) TAB at 08:44

## 2020-09-29 NOTE — DIALYSIS
D[facility]        ACUTE HEMODIALYSIS FLOW SHEET      HEMODIALYSIS ORDERS: Physician: Anni Lugo     Dialyzer: revaclear   Duration: 2 hr  BFR: 300   DFR: 600   Dialysate:  Temp 36-37*C  K+   3    Ca+  3 Na 138 Bicarb 35   Weight:  167.6 kg    Patient Chart [x]     Unable to Obtain []   Dry weight/UF Goal: 1000   Access: Right chest TDC    Heparin []  Bolus      Units    [] Hourly       Units    [x]None      Catheter locking solution: heparin    Pre BP:   135/64    Pulse:     69       Respirations: 18  Temperature:   97.8   Labs: Pre        Post:         [x] N/A   Additional Orders(medications, blood products, hypotension management):   hectorol     [x] Darryl Consent Verified     CATHETER ACCESS: []N/A   [x]Right chest  []Left   []IJ     []Fem   []transhepatic   [] First use X-ray verified     [x]Permanent                [] Temporary   [x]No S/S infection  []Redness  []Drainage []Cultured []Swelling []Pain   [x]Medical Aseptic Prep Utilized   []Dressing Changed  [x] Biopatch  Date: 09/28/2020       []Clotted   [x]Patent   Flows: [x]Good  []Poor  []Reversed   If access problem,  notified: []Yes    [x]N/A         GRAFT/FISTULA ACCESS:  [x]N/A                               GENERAL ASSESSMENT:      LUNGS:  Rate 18 SaO2%        [] N/A    [x] Clear  [] Coarse  [] Crackles  [] Wheezing        [] Diminished     Location : []RLL   []LLL    []RUL  []ROBERT     Cough: []Productive  []Dry  [x]N/A   Respirations:  [x]Easy  []Labored     Therapy:   [x]RA  []NC  l/min    Mask: []NRB []Venti       O2%                  []Ventilator  []Intubated  [] Trach  [] BiPaP     CARDIAC: [x]Regular      [] Irregular   [] Pericardial Rub  [] JVD        []  Monitored  [] Bedside  [] Remotely monitored [x] N/A      EDEMA: [] None   [x]Generalized  [] Pitting [] 1    [] 2    [] 3    [] 4                 [] Facial  [] Pedal  []  UE  [] LE     SKIN:   [x] Warm   [] Hot     [] Cold   [x] Dry     [] Pale   [] Diaphoretic                  [] Flushed  [] Jaundiced  [] Cyanotic  [] Rash  [] Weeping     LOC:    [x] Alert      [x]Oriented:    [x] Person     [x] Place  [x]Time               [] Confused  [] Lethargic  [] Medicated  [] Non-responsive     GI / ABDOMEN:  [] Flat    [] Distended    [x] Soft    [] Firm   []  Obese                             [] Diarrhea  [x] Bowel Sounds  [] Nausea  [] Vomiting       / URINE ASSESSMENT:[] Voiding   [x] Oliguria  [] Anuria   []  Burris     [] Incontinent    []  Incontinent Brief      []  Bathroom Privileges       PAIN: [x] 0 []1  []2   []3   []4   []5   []6   []7   []8   []9   []10              Scale 0-10  Action/Follow Up:      MOBILITY:  [] Amb    [] Amb/Assist    [x] Bed    [] Wheelchair  [] Stretcher      All Vitals and Treatment Details on Attached [address]: [facility]          Room # 463/01      [] 1st Time Acute  [] Stat  [x] Routine  [] Urgent     [x] Acute Room  []  Bedside  [] ICU/CCU  [] ER   Isolation Precautions:   There are currently no Active Isolations      Special Considerations:         [] Blood Consent Verified [x]N/A      ALLERGIES:   No Known Allergies            Code Status:Full Code        Hepatitis Status:    2nd RN check:                     Lab Results   Component Value Date/Time    Hepatitis B surface Ag <0.10 09/27/2020 04:33 AM    Hepatitis B surface Ab 3.47 (L) 09/27/2020 04:33 AM    Hepatitis B core, IgM Negative 09/27/2020 04:33 AM    Hepatitis C virus Ab 0.15 09/27/2020 04:33 AM                     Current Labs:   Lab Results   Component Value Date/Time    Sodium 145 09/29/2020 04:51 AM    Potassium 3.9 09/29/2020 04:51 AM    Chloride 115 (H) 09/29/2020 04:51 AM    CO2 17 (L) 09/29/2020 04:51 AM    Anion gap 13 09/29/2020 04:51 AM    Glucose 163 (H) 09/29/2020 04:51 AM     (H) 09/29/2020 04:51 AM    Creatinine 13.90 (H) 09/29/2020 04:51 AM    BUN/Creatinine ratio 8 (L) 09/29/2020 04:51 AM    GFR est AA 4 (L) 09/29/2020 04:51 AM    GFR est non-AA 3 (L) 09/29/2020 04:51 AM Calcium 8.1 (L) 09/29/2020 04:51 AM      Lab Results   Component Value Date/Time    WBC 7.8 09/29/2020 04:51 AM    HGB 7.0 (L) 09/29/2020 04:51 AM    HCT 21.0 (L) 09/29/2020 04:51 AM    PLATELET 710 67/43/8728 04:51 AM    MCV 86.8 09/29/2020 04:51 AM                                                                                     DIET:   DIET NUTRITIONAL SUPPLEMENTS  DIET NUTRITIONAL SUPPLEMENTS  DIET RENAL WITH OPTIONS       PRIMARY NURSE REPORT: First initial/Last name/Title      Pre Dialysis: CHUCKY Matamoros RN     Time: 0449      EDUCATION:    [x] Patient [] Other         Knowledge Basis: []None [x]Minimal [] Substantial   Barriers to learning  [x]N/A   [] Access Care     [] S&S of infection     [] Fluid Management     []K+     [x]Procedural    []Albumin     [] Medications     [] Tx Options     [] Transplant     [] Diet     [] Other   Teaching Tools:  [x] Explain  [x] Demo  [] Handouts [] Video  Patient response:  [x] Verbalized understanding  [] Teach back  [] Return demonstration [] Requires follow up   Inappropriate due to:            [x]Time Out/Safety Check  [x]Extracorporeal Circuit Tested for integrity       RO/HEMODIALYSIS MACHINE SAFETY CHECKS  Before each treatment:     Machine Number:                   [facility]                                  [x] Unit Machine #6  with centralized RO                Alarm Test:  Pass time 0840               [x] RO/Machine Log Complete      Temp   36             Dialysate: pH  7.4 Conductivity: Meter   13.9     HD Machine   13.7                  TCD: 13.9  Dialyzer Lot # [id number]            Blood Tubing Lot # [id number]          Saline Lot #  [id number]     CHLORINE TESTING-Before each treatment and every 4 hours    Total Chlorine: [x] less than 0.1 ppm  Time: 0900   (if greater than 0.1 ppm from Primary then every 30 minutes from Secondary)     TREATMENT INITIATION  with Dialysis Precautions:   [x] All Connections Secured                 [x] Saline Line Double Clamped   [x] Venous Parameters Set                  [x] Arterial Parameters Set    [x] Prime Given 250ml                          [x]Air Foam Detector Engaged      Treatment Initiation Note:    Pt arrived to HD unit via bed in stable condition. A&Ox4 in no distress on RA. Right chest TDC assessed with no s/s complications. HD initiated without difficulty. During Treatment Notes:  1000 Pt awake and alert in no distress. Face and access in view with connections secure. 1030 Pt awake and alert in no distress. Face and access in view with connections secure. 1100 Pt awake and alert in no distress. Face and access in view with connections secure. 1130 Pt awake and alert in no distress. Face and access in view with connections secure. Medication Dose Volume Route Time [facility] name Title   hectorol 1 mcg 0.5 ml dialysis 1130 P Dominic MARIANO                   Post Assessment:   Dialyzer Cleared: [] Good [x] Fair  [] Poor  Blood processed:  34.9 L  UF Removed  1500 Ml  POst BP:   120/60       Pulse: 74        Respirations: 18  Temperature: 98.0 Lungs:     [x] Clear      [] Course         [] Crackles    [] Wheezing         [] Diminished   Post Tx Vascular Access:      N/A Cardiac:  [x] Regular   [] Irregular   [] Monitor  [x] N/A       Catheter:   Locking solution: Heparin 1:1000   Art. 1.9  Kyle. 1.9    Skin:   Pain:   [x] Warm  [x] Dry [] Diaphoretic    [] Flushed    [] Pale [] Cyanotic [x]0  []1  []2   []3  []4   []5   []6   []7   []8   []9   []10     Post Treatment Note:  Pt tolerated treatment well. Net 1 L UF removed.      POST TREATMENT PRIMARY NURSE HANDOFF REPORT:     First initial/Last name/Title         Post Dialysis: Timo Aguilar RN Time:  3161     Abbreviations: AVG-arterial venous graft, AVF-arterial venous fistula, IJ-Internal Jugular, Subcl-Subclavian, Fem-Femoral, Tx-treatment, AP/HR-apical heart rate, DFR-dialysate flow rate, BFR-blood flow rate, AP-arterial pressure, -venous pressure, UF-ultrafiltrate, TMP-transmembrane pressure, Kyle-Venous, Art-Arterial, RO-Reverse Osmosis

## 2020-09-29 NOTE — PROGRESS NOTES
Problem: Self Care Deficits Care Plan (Adult)  Goal: *Acute Goals and Plan of Care (Insert Text)  Description: Occupational Therapy Goals  Initiated 9/27/2020 within 7 day(s). 1.  Patient will perform grooming with modified independence. 2.  Patient will perform upper body dressing with modified independence. 3.  Patient will perform lower body dressing with minimal assistance  4. Patient will perform toilet transfers with contact guard assist.  5.  Patient will perform all aspects of toileting with minimal assistance/contact guard assist.    Prior Level of Function:Patient reported was modified independent with functional mobility PTA, with use of cane. Patient reported his wife helps bathe him and assists with socks/shoes. Outcome: Progressing Towards Goal   OCCUPATIONAL THERAPY TREATMENT    Patient: Charisse Maciel (87 y.o. male)  Date: 9/29/2020  Diagnosis: Acute renal failure (ARF) ([facility] [address] [N17.9]   <principal problem not specified>  Procedure(s) (LRB):  INSERTION TUNNELED CENTRAL VENOUS CATHETER (N/A) 1 Day Post-Op  Precautions: Fall  PLOF: Patient reported was modified independent with functional mobility PTA, with use of cane. Patient reported his wife helps bathe him and assists with socks/shoes. Chart, occupational therapy assessment, plan of care, and goals were reviewed. ASSESSMENT:  Pt cleared by RN for OT participation at this time. Pt presented supine in bed upon therapist arrival and agreeable for tx at this time. PT co tx to maximize pt safety and participation. Pt performed functional bed mobility supine-sit MIN A and CGA scooting to EOB with vc's for anterior weight shift. Pt sat EOB with intermittently posterior lean requiring vc's and tactile cueing to return to mid line. Attempted to performed LB dressing fixing socks however pt unable 2/2 decreased balance requiring MAX A.  STS transfer x 3 trials MIN/MOD A x 2 and MOD A x 1( last trial) with RW.  Pt required mod cues for leaning and pushing through B LE's with \"nose over toes\" with slight rocking technique. Pt able to take ~ 3 steps laterally to and 2 to left CGA with RW to increase functional activity tolerance needed for self cares, pt tolerated standing ~ 2 mins before seated rest break. Pt with notable difficulty with coordination and proprioception of L LE. Pt performed dyn sitting task reaching across mid line with B UE's to increase core strength and challenge balance to improve (I) during ADL tasks. Pt engaged in oral care task requiring S/U for brushing teeth w/ F balance. Pt returned self back to supine position with MIN A, pt's bed malfunctioning requiring MAX A x 2 to scoot pt to [facility], NSG made aware. Pt left supine in bed with HOB elevated and all needs left within reach. Progression toward goals:  []          Improving appropriately and progressing toward goals  [x]          Improving slowly and progressing toward goals  []          Not making progress toward goals and plan of care will be adjusted     PLAN:  Patient continues to benefit from skilled intervention to address the above impairments. Continue treatment per established plan of care. Discharge Recommendations: SNF  Further Equipment Recommendations for Discharge: RW     SUBJECTIVE:   Patient stated  I know I need to stand up and getting moving. \"     OBJECTIVE DATA SUMMARY:   Cognitive/Behavioral Status:  Neurologic State: Alert  Orientation Level: Oriented to place, Oriented to person, Oriented to situation, Disoriented to time  Cognition: Follows commands       Functional Mobility and Transfers for ADLs:   Bed Mobility:     Supine to Sit: Minimum assistance  Sit to Supine: Minimum assistance  Scooting: Contact guard assistance(scooting to EOB)   Transfers:  Sit to Stand: Minimum assistance; Moderate assistance;Assist x2(MOD A x 1 on last trial)  Stand to Sit: Minimum assistance(poor eccentric control )          Balance:  Sitting: Impaired; Without support  Sitting - Static: Fair (occasional)  Sitting - Dynamic: Fair (occasional)  Standing: Impaired; With support  Standing - Static: Fair  Standing - Dynamic : Poor    ADL Intervention:       Grooming  Grooming Assistance: Set-up  Position Performed: Seated edge of bed  Brushing Teeth: Set-up       Pain:  Pt reports L knee discomfort. Activity Tolerance:    Fair  Please refer to the flowsheet for vital signs taken during this treatment. After treatment:   []  Patient left in no apparent distress sitting up in chair  [x]  Patient left in no apparent distress in bed  [x]  Call bell left within reach  [x]  Nursing notified  []  Caregiver present  []  Bed alarm activated    COMMUNICATION/EDUCATION:   [] Role of Occupational Therapy in the acute care setting  [] Home safety education was provided and the patient/caregiver indicated understanding. [] Patient/family have participated as able in working towards goals and plan of care. [x] Patient/family agree to work toward stated goals and plan of care. [] Patient understands intent and goals of therapy, but is neutral about his/her participation. [] Patient is unable to participate in goal setting and plan of care.       Thank you for this referral.  LISA Flower  Time Calculation: 24 mins

## 2020-09-29 NOTE — PROGRESS NOTES
Problem: Nutrition Deficit  Goal: *Optimize nutritional status  Outcome: Progressing Towards Goal     Problem: Falls - Risk of  Goal: *Absence of Falls  Description: Document Kaylynn Santoroann Fall Risk and appropriate interventions in the flowsheet. Outcome: Progressing Towards Goal  Note: Fall Risk Interventions:  Mobility Interventions: Bed/chair exit alarm    Mentation Interventions: Bed/chair exit alarm    Medication Interventions: Bed/chair exit alarm    Elimination Interventions: Call light in reach              Problem: Patient Education: Go to Patient Education Activity  Goal: Patient/Family Education  Outcome: Progressing Towards Goal     Problem: Patient Education: Go to Patient Education Activity  Goal: Patient/Family Education  Outcome: Progressing Towards Goal     Problem: Patient Education: Go to Patient Education Activity  Goal: Patient/Family Education  Outcome: Progressing Towards Goal     Problem: Pressure Injury - Risk of  Goal: *Prevention of pressure injury  Description: Document Karthikeyan Scale and appropriate interventions in the flowsheet. Outcome: Progressing Towards Goal  Note: Pressure Injury Interventions:  Sensory Interventions: Assess changes in LOC    Moisture Interventions: Absorbent underpads    Activity Interventions: Assess need for specialty bed    Mobility Interventions: HOB 30 degrees or less    Nutrition Interventions: Document food/fluid/supplement intake    Friction and Shear Interventions: Minimize layers                Problem: Patient Education: Go to Patient Education Activity  Goal: Patient/Family Education  Outcome: Progressing Towards Goal     Problem:  Moderate Sedation (Adult)  Goal: *Patent airway  Outcome: Progressing Towards Goal  Goal: *Adequate oxygenation  Outcome: Progressing Towards Goal  Goal: *Absence of aspiration  Outcome: Progressing Towards Goal  Goal: *Hemodynamically stable  Outcome: Progressing Towards Goal  Goal: *Optimal pain control at patient's stated goal  Outcome: Progressing Towards Goal  Goal: *Absence of nausea/vomiting  Outcome: Progressing Towards Goal  Goal: *Anxiety reduced or absent  Outcome: Progressing Towards Goal  Goal: *Absence of injury  Outcome: Progressing Towards Goal  Goal: *Level of consciousness returns to baseline  Outcome: Progressing Towards Goal  Goal: Interventions  Outcome: Progressing Towards Goal     Problem: Patient Education: Go to Patient Education Activity  Goal: Patient/Family Education  Outcome: Progressing Towards Goal

## 2020-09-29 NOTE — CONSULTS
[email or web address]  [phone]    GASTROENTEROLOGY CONSULT      Impression:   1. Abnormal findings on CT scan- potential ascending colon mass- will need colonoscopy- timing to be determined  2. Acute on chronic anemia- marked decrease from baseline- H/H 7.0/21.0- no signs of overt GI bleeding  3. Bilateral hydronephrosis with distended bladder- improved with michel catheter placement  4. [place]- acute on chronic- now requiring HD  5. Secondary hyperparathyroidism  6. Elevated troponin; no ACS  7. HTN      Plan:     1. Will need colonoscopy- timing to be determined based on other teams testing/interventions/dialysis. Will tentatively plan for Thursday and place on clear liquid diet tomorrow. 2. Monitor H/H and transfuse per protocol  3. Medical management per primary team       Chief Complaint: abnormal CT finding      HPI:  Trini Velazquez is a 80 y.o. male who I am being asked to see in consultation for an opinion regarding the above. Patient was admitted 9/25/20 for acute renal failure and subsequently has been started on dialysis. He developed distended bladder and hydronephrosis bilaterally - he was seen by urology and had CT imaging performed which showed abnormality around the ascending colon near the hepatic flexure which is concerning for developing colon mass. Patient denies abd pain, changes in bowel habits, blood in stool/rectal bleeding, weight loss. Reports normal BMs daily or every other day. He has noted a mild change in appetite. No heartburn/dysphagia, N/V. He denies family h/o colon cancer and denies previous colonoscopy. CT scan abd/pelvis 9/28/20: IMPRESSION:     1.  Bilateral hydronephrosis and ureteral dilation, more pronounced on the left  side. 2.  Diffuse bladder wall thickening with Michel catheter placement. Moderate to  pronounced prostate enlargement. 3.  Abnormal appearance of the ascending colon near the hepatic flexure.    Possibly an artifact from peristalsis but a developing colon mass cannot be  excluded. Recommend correlation with either barium enema or colonoscopy. 4.  Small ventral hernia with protrusion of small bowel into the hernia sac at  the midline. No associated post-obstructive changes. 5.  Lung nodules    PMH:   Past Medical History:   Diagnosis Date    Cardiac echocardiogram 08/29/2016    EF 60-65%. No WMA. Mild LVH. Indeterminate diastolic fx. RVSP 35 mmHg. No significant valvular heart disease.  Cardiac nuclear imaging test, abnormal 08/29/2016    Intermediate risk. Previous inferior infarction w/very mild fabiana-infarct ischemia. Inferior hypk. EF 68%. Nondiagnostic EKG on pharm stress test.  Pt's BP increased from 193/96 to 207/83 during study.  Carotid duplex 08/30/2016    Mild <50% bilateral ICA stenosis.       Hypertension        PSH:   Past Surgical History:   Procedure Laterality Date    FL INSJ TUNNELED CVC W/O SUBQ PORT/ AGE 5 YR/> N/A 9/28/2020    INSERTION TUNNELED CENTRAL VENOUS CATHETER performed by Connie Saxena MD at [facility]       Social HX:   Social History     Socioeconomic History    Marital status:      Spouse name: Not on file    Number of children: Not on file    Years of education: Not on file    Highest education level: Not on file   Occupational History    Not on file   Social Needs    Financial resource strain: Not on file    Food insecurity     Worry: Not on file     Inability: Not on file    Transportation needs     Medical: Not on file     Non-medical: Not on file   Tobacco Use    Smoking status: Never Smoker    Smokeless tobacco: Never Used   Substance and Sexual Activity    Alcohol use: No    Drug use: No    Sexual activity: Not on file   Lifestyle    Physical activity     Days per week: Not on file     Minutes per session: Not on file    Stress: Not on file   Relationships    Social connections     Talks on phone: Not on file     Gets together: Not on file     Attends [employer] service: Not on file     Active member of club or organization: Not on file     Attends meetings of clubs or organizations: Not on file     Relationship status: Not on file    Intimate partner violence     Fear of current or ex partner: Not on file     Emotionally abused: Not on file     Physically abused: Not on file     Forced sexual activity: Not on file   Other Topics Concern    Not on file   Social History Narrative    Not on file       FHX:   History reviewed. No pertinent family history. Allergy:   No Known Allergies    Patient Active Problem List   Diagnosis Code    [facility]olysis [facility] T79. 6XXA    CAD (coronary artery disease) I25.10    HTN (hypertension) I10    Troponin level elevated R79.89    Acute renal failure (ARF) ([facility]) N17.9    ESRD (end stage renal disease) ([facility] N18.6    Hyperkalemia H75.1    Metabolic acidosis J37.6    Secondary hyperparathyroidism, renal ([facility]) N25.81    Anemia D64.9    Hydronephrosis N13.30    UTI (urinary tract infection) N39.0       Home Medications:     Medications Prior to Admission   Medication Sig    cephalexin (KEFLEX PO) Take  by mouth.  furosemide (LASIX) 20 mg tablet Take 20 mg by mouth daily.  lidocaine (LIDODERM) 5 % Apply patch to the affected area for 12 hours a day and remove for 12 hours a day.  HYDROcodone-acetaminophen (NORCO) 5-325 mg per tablet Take 1 Tab by mouth every eight (8) hours as needed for Pain. Max Daily Amount: 3 Tabs.  garlic 7,033 mg cap Take  by mouth.  amLODIPine (NORVASC) 5 mg tablet Take 1 Tab by mouth daily. Indications: HYPERTENSION       Review of Systems:     Constitutional: No fevers, chills, weight loss, fatigue. Skin: No rashes, pruritis, jaundice, ulcerations, erythema. HENT: No headaches, nosebleeds, sinus pressure, rhinorrhea, sore throat. Eyes: No visual changes, blurred vision, eye pain, photophobia, jaundice. Cardiovascular: No chest pain, heart palpitations. Respiratory: No cough, SOB, wheezing, chest discomfort, orthopnea. Gastrointestinal: See HPI   Genitourinary: No dysuria, bleeding, discharge, pyuria. Musculoskeletal: No weakness, arthralgias, wasting. Endo: No sweats. Heme: No bruising, easy bleeding. Allergies: As noted. Neurological: Cranial nerves intact. Alert and oriented. Gait not assessed. Psychiatric:  No anxiety, depression, hallucinations. Visit Vitals  BP (!) 161/65 (BP 1 Location: Left arm, BP Patient Position: At rest)   Pulse 88   Temp 98.2 °F (36.8 °C)   Resp 18   Ht 5' 6\" (1.676 m)   Wt 73.5 kg (162 lb 1.6 oz)   SpO2 95%   BMI 26.16 kg/m²       Physical Assessment:     constitutional: appearance: well developed, well nourished, normal habitus, no deformities, in no acute distress. skin: inspection: no rashes, ulcers, icterus or other lesions; no clubbing or telangiectasias. palpation: no induration or subcutaneos nodules. eyes: inspection: normal conjunctivae and lids; no jaundice pupils: normal  ENMT: mouth: normal oral mucosa,lips and gums; good dentition. oropharynx: normal tongue, hard and soft palate; posterior pharynx without erithema, exudate or lesions. neck: thyroid: normal size, consistency and position; no masses or tenderness. respiratory: effort: normal chest excursion; no intercostal retraction or accessory muscle use. cardiovascular: abdominal aorta: normal size and position; no bruits. palpation: PMI of normal size and position; normal rhythm; no thrill or murmurs. abdominal: abdomen: normal consistency; no tenderness or masses. hernias: no hernias appreciated. liver: normal size and consistency. spleen: not palpable. rectal: hemoccult/guaiac: not performed. musculoskeletal: digits and nails: no clubbing, cyanosis, petechiae or other inflammatory conditions. head and neck: normal range of motion; no pain, crepitation or contracture.  spine/ribs/pelvis: normal range of motion; no pain, deformity or contracture. neurologic: cranial nerves: II-XII normal. Pupils intact. psychiatric: judgement/insight: within normal limits. memory: within normal limits for recent and remote events. mood and affect: no evidence of depression, anxiety or agitation. orientation: oriented to time, space and person. Basic Metabolic Profile   Recent Labs     09/29/20  0451      K 3.9   *   CO2 17*   *   *   CA 8.1*   MG 1.8   PHOS 7.8*         CBC w/Diff    Recent Labs     09/29/20  0451   WBC 7.8   RBC 2.42*   HGB 7.0*   HCT 21.0*   MCV 86.8   MCH 28.9   MCHC 33.3   RDW 14.9*       Recent Labs     09/29/20  0451   GRANS 74*   LYMPH 13*   EOS 3        Hepatic Function   No results for input(s): ALB, TP, TBILI, AP, AML, LPSE in the last 72 hours. No lab exists for component: DBILI, GPT, SGOT     Coags   Recent Labs     09/27/20  1225   PTP 14.5   INR 1.1           Erickson Guzman NP. [facility] [address]  Cell: [phone]  [email or web address]

## 2020-09-29 NOTE — ROUTINE PROCESS
Bedside shift change report given to Penny Salazar RN (oncoming nurse) by  Cam Tracey (offgoing nurse). Report included the following information SBAR, Kardex, ED Summary and Intake/Output.

## 2020-09-29 NOTE — PROGRESS NOTES
[facility]  -  Progress Note      Patient: Terra Schofield MRN: [id number]  SSN: [id number]    YOB: 1935  Age: 80 y.o. Sex: male      Admit Date: 9/25/2020    Assessment:     -Indeterminate troponin- secondary to demand ischemia in setting of acute renal failure. Do not suspect ACS. No significant change to echo on 9/26/2020 with EF 50%. No chest pain.  -Acute renal failure with creatinine at 13.9.  -Hyperkalemia secondary to above- improved  -Anemia most likely secondary to ARF. Hgb, 7.0.  -Carotid disease and presumed CAD, no reversible defects on nuc 2016     No primary cardiologist/ Has seen Dr. Yazan Snyder in past last 2016    Plan:     Continue volume management per nephrology, dialysis today. Given normal EF without chest pain, no plans for cardiac work up at this time. Will be available if questions. Subjective:     No new complaints. Seen while in dialysis.      Objective:      Patient Vitals for the past 8 hrs:   Temp Pulse Resp BP SpO2   09/29/20 1205 98 °F (36.7 °C) 74 18 120/60    09/29/20 1200  73  128/63    09/29/20 1145  79  (!) 145/76    09/29/20 1130  61  129/65    09/29/20 1115  64  120/60    09/29/20 1100  73  122/64    09/29/20 1045  75  118/74    09/29/20 1030  77  129/73    09/29/20 1015  70  122/68    09/29/20 1000  69  132/71    09/29/20 0954  68  135/61    09/29/20 0951 97.8 °F (36.6 °C) 69 18 135/64    09/29/20 0854 98.4 °F (36.9 °C) 81 20 (!) 145/65 99 %   09/29/20 0540 98.3 °F (36.8 °C) 75 19 (!) 145/64 100 %         Patient Vitals for the past 96 hrs:   Weight   09/27/20 2112 73.5 kg (162 lb 1.6 oz)   09/26/20 0852 81.2 kg (179 lb)         Intake/Output Summary (Last 24 hours) at 9/29/2020 1211  Last data filed at 9/29/2020 1205  Gross per 24 hour   Intake 590 ml   Output 3800 ml   Net -3210 ml       Physical Exam:  General:  alert, cooperative, no distress, appears stated age  Neck:  nontender, no JVD  Lungs: clear to auscultation bilaterally  Heart:  regular rate and rhythm, S1, S2 normal, no murmur, click, rub or gallop  Abdomen:  abdomen is soft without significant tenderness, masses, organomegaly or guarding  Extremities:  extremities normal, atraumatic, no cyanosis or edema    Data Review:     Labs: Results:       Chemistry Recent Labs     09/29/20 0451 09/28/20 0226 09/27/20  0433   * 102* 113*    146* 144   K 3.9 3.9 3.9   * 117* 118*   CO2 17* 15* 13*   * 123* 124*   CREA 13.90* 14.10* 14.70*   CA 8.1* 8.4* 8.2*   MG 1.8 1.9 1.8   PHOS 7.8* 7.7* 8.4*   AGAP 13 14 13   BUCR 8* 9* 8*      CBC w/Diff Recent Labs     09/29/20 0451 09/28/20 0226 09/27/20  0433   WBC 7.8 9.2 8.2   RBC 2.42* 2.66* 2.42*   HGB 7.0* 7.5* 6.9*   HCT 21.0* 22.3* 20.5*    248 244   GRANS 74* 69 71   LYMPH 13* 17* 17*   EOS 3 3 1      Cardiac Enzymes No results found for: CPK, CK, CKMMB, CKMB, RCK3, CKMBT, CKNDX, CKND1, LILLY, TROPT, TROIQ, EDEL, TROPT, TNIPOC, BNP, BNPP   Coagulation Recent Labs     09/27/20  1225   PTP 14.5   INR 1.1       Lipid Panel Lab Results   Component Value Date/Time    Cholesterol, total 213 (H) 08/29/2016 01:35 AM    HDL Cholesterol 54 08/29/2016 01:35 AM    LDL, calculated 136.6 (H) 08/29/2016 01:35 AM    VLDL, calculated 22.4 08/29/2016 01:35 AM    Triglyceride 112 08/29/2016 01:35 AM    CHOL/HDL Ratio 3.9 08/29/2016 01:35 AM      BNP No results found for: BNP, BNPP, XBNPT   Liver Enzymes No results for input(s): TP, ALB, TBIL, AP in the last 72 hours.     No lab exists for component: SGOT, GPT, DBIL   Digoxin    Thyroid Studies Lab Results   Component Value Date/Time    TSH 6.31 (H) 09/25/2020 06:00 PM

## 2020-09-29 NOTE — PROGRESS NOTES
Bedside shift change report given to Angela Gu (oncoming nurse) by [facility], RN (offgoing nurse). Report included the following information SBAR, Kardex, Intake/Output, MAR and Recent Results.

## 2020-09-29 NOTE — PROGRESS NOTES
Patient resting in bed. Patient went to dialysis. 1L removed. A&Ox 3. UP with assist and cane. Bed locked in lowest position. Call bell within reach. Problem: Nutrition Deficit  Goal: *Optimize nutritional status  Outcome: Progressing Towards Goal     Problem: Falls - Risk of  Goal: *Absence of Falls  Description: Document Argelia Beata Fall Risk and appropriate interventions in the flowsheet. Outcome: Progressing Towards Goal  Note: Fall Risk Interventions:  Mobility Interventions: Bed/chair exit alarm    Mentation Interventions: Bed/chair exit alarm    Medication Interventions: Bed/chair exit alarm    Elimination Interventions: Call light in reach, Bed/chair exit alarm    History of Falls Interventions: Bed/chair exit alarm         Problem: Patient Education: Go to Patient Education Activity  Goal: Patient/Family Education  Outcome: Progressing Towards Goal     Problem: Patient Education: Go to Patient Education Activity  Goal: Patient/Family Education  Outcome: Progressing Towards Goal     Problem: Patient Education: Go to Patient Education Activity  Goal: Patient/Family Education  Outcome: Progressing Towards Goal     Problem: Pressure Injury - Risk of  Goal: *Prevention of pressure injury  Description: Document Karthikeyan Scale and appropriate interventions in the flowsheet. Outcome: Progressing Towards Goal  Note: Pressure Injury Interventions:  Sensory Interventions: Assess changes in LOC    Moisture Interventions: Absorbent underpads    Activity Interventions: Assess need for specialty bed    Mobility Interventions: HOB 30 degrees or less, Assess need for specialty bed    Nutrition Interventions: Document food/fluid/supplement intake    Friction and Shear Interventions: Minimize layers                Problem: Patient Education: Go to Patient Education Activity  Goal: Patient/Family Education  Outcome: Progressing Towards Goal     Problem:  Moderate Sedation (Adult)  Goal: *Patent airway  Outcome: Progressing Towards Goal  Goal: *Adequate oxygenation  Outcome: Progressing Towards Goal  Goal: *Absence of aspiration  Outcome: Progressing Towards Goal  Goal: *Hemodynamically stable  Outcome: Progressing Towards Goal  Goal: *Optimal pain control at patient's stated goal  Outcome: Progressing Towards Goal  Goal: *Absence of nausea/vomiting  Outcome: Progressing Towards Goal  Goal: *Anxiety reduced or absent  Outcome: Progressing Towards Goal  Goal: *Absence of injury  Outcome: Progressing Towards Goal  Goal: *Level of consciousness returns to baseline  Outcome: Progressing Towards Goal  Goal: Interventions  Outcome: Progressing Towards Goal     Problem: Patient Education: Go to Patient Education Activity  Goal: Patient/Family Education  Outcome: Progressing Towards Goal

## 2020-09-29 NOTE — PROGRESS NOTES
Urology Progress Note      I have seen and examined this patient independently, I reviewed pertinent labs and imaging, and I agree with the assessing provider's assessment and plan as outlined above, with ammendments as follows:     Tremendous urine output after michel placement suggestive of chronic outlet obstruction and bilateral hydronephrosis secondary to this   HORACIO was benign, PSA pending, but CT consistent with likely BPH as cause, no other findings urologically  Defer to medicine for further work up of colon findings   Defer to Nephrology for managemnt of possible post obstructive diuresis     Regarding hydronephrosis, will watch creatinine and urine output for next 48 hours. Repeat CT or Ultrasound in 48 hours to see if improving.         Bárbara Cruz MD  [facility] [place] [place]  Pager [phone]      Assessment/Plan:     Patient Active Problem List   Diagnosis Code    [facility] T79. 6XXA    CAD (coronary artery disease) I25.10    HTN (hypertension) I10    Troponin level elevated R79.89    Acute renal failure (ARF) ([facility]) N17.9    ESRD (end stage renal disease) ([facility] N18.6    Hyperkalemia F65.0    Metabolic acidosis S02.8    Secondary hyperparathyroidism, renal ([facility]) N25.81    Anemia D64.9     1. Bilateral hydronephrosis, distended bladder noted on CT 09/28  2. 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 UTI   UA 09/25/20 showed 20-35 WBC, Negative Nitrites, few bacteria   UCx 09/25/20 showed E.coli   WBC 7.8<9.2, HGB 7.0<7.5  4.  Phimosis, able to retract foreskin     Plan:  Appreciate overall care per medicine  Continue Abx per medicine  CT showed bilateral hydronephrosis and a distended bladder  Keep catheter in place, draining clear yellow urine  Nephrology to monitor for post-obstructive diuresis  Will repeat a CT ABD PELV WO Contrast in 48 hours to assess if hydro has resolved  Will continue to follow     Subjective:   Afebrile, mildly hypertensive, other VSS, patient is tolerating the michel well, draining clear yellow. He denies any flank pain, N/V or chills. Objective:     Visit Vitals  BP (!) 145/64   Pulse 75   Temp 98.3 °F (36.8 °C)   Resp 19   Ht 5' 6\" (1.676 m)   Wt 162 lb 1.6 oz (73.5 kg)   SpO2 100%   BMI 26.16 kg/m²        Temp (24hrs), Av °F (36.7 °C), Min:97 °F (36.1 °C), Max:98.9 °F (37.2 °C)      Intake and Output:   07 -  1900  In: 680 [P.O.:680]  Out: 2150 [Urine:2150]  1901 -  0700  In: 350 [P.O.:350]  Out: 1200 [Urine:1200]    PHYSICAL EXAMINATION:   Visit Vitals  BP (!) 145/64   Pulse 75   Temp 98.3 °F (36.8 °C)   Resp 19   Ht 5' 6\" (1.676 m)   Wt 162 lb 1.6 oz (73.5 kg)   SpO2 100%   BMI 26.16 kg/m²     Constitutional: Well developed, well nourished. No acute distress. HEENT: Normocephalic, Atraumatic, EOM's intact   CV:  no edema  Respiratory: No respiratory distress or difficulties breathing   Abdomen:  Soft and non-tender   Male:   CVA tenderness: none         SCROTUM:  No scrotal lesions or edema         PENIS: uncircumcised with mild phimosis  Michel: draining clear yellow urine  Skin: No evidence of jaundice. Normal color  Neuro/Psych:  Alert and oriented. Affect appropriate. Lab/Data Review:    CT ABD PELV WO Contrast 20:  FINDINGS:     Lung Bases:  0.5 cm nodule in the left lower lobe (axial #2). 0.3 cm nodule in  the right lower lobe anterior aspect (axial #10).    Liver, Gallbladder, Pancreas, Adrenal Glands, Spleen:  Unremarkable.     Kidneys-Ureters-Bladder:  Bilateral hydronephrosis with ureteral dilation, left  greater than right. Pronounced left renal cortical thinning. No ureteral stone  is detected bilaterally. Both ureters are dilated throughout their course with  transition at the ureter-bladder junction. Michel catheter placement into the  urinary bladder with diffuse bladder wall thickening.     GI Tract:  Mild hiatal hernia.   No acute abnormalities involving the stomach and  small bowel. Protrusion of the small bowel into the midline small ventral  hernia, measuring about 0.3 cm in diameter (axial #50). No evidence of  obstructive changes. The appendix is normal.  Focal area of eccentric colon  wall thickening is suggested in the ascending colon near the hepatic flexure  (axial #37-46, coronal #22). No associated adenopathy. No acute colitis or  diverticulitis.     Nodes:  No mesenteric or retroperitoneal adenopathy.     Vascular:  No acute aortic abnormalities.     Prostate: Moderate to prominent enlargement of the prostate, measuring about  5.9 x 4.7 x 5.2 cm.     Pelvic Sidewall:  No adenopathy. No free fluid.     Bones:  Anterior wedging at T12 and L1. Degenerative changes in the lumbar  spine.     IMPRESSION  IMPRESSION:     1.  Bilateral hydronephrosis and ureteral dilation, more pronounced on the left  side. 2.  Diffuse bladder wall thickening with Burris catheter placement. Moderate to  pronounced prostate enlargement. 3.  Abnormal appearance of the ascending colon near the hepatic flexure. Possibly an artifact from peristalsis but a developing colon mass cannot be  excluded. Recommend correlation with either barium enema or colonoscopy. 4.  Small ventral hernia with protrusion of small bowel into the hernia sac at  the midline. No associated post-obstructive changes. 5.  Lung nodules.                                  Labs:     Labs: Results:   Chemistry    Recent Labs     09/28/20 0226 09/27/20  0433 09/26/20  0715   * 113* 168*   * 144 144   K 3.9 3.9 4.5   * 118* 119*   CO2 15* 13* 11*   * 124* 122*   CREA 14.10* 14.70* 14.80*   CA 8.4* 8.2* 8.1*  8.1*   AGAP 14 13 14   BUCR 9* 8* 8*      CBC w/Diff Recent Labs     09/29/20  0451 09/28/20 0226 09/27/20  0433   WBC 7.8 9.2 8.2   RBC 2.42* 2.66* 2.42*   HGB 7.0* 7.5* 6.9*   HCT 21.0* 22.3* 20.5*    248 244   GRANS 74* 69 71   LYMPH 13* 17* 17*   EOS 3 3 1 Cultures No results for input(s): CULT in the last 72 hours. All Micro Results     Procedure Component Value Units Date/Time    CULTURE, URINE [[id number]]  (Abnormal)  (Susceptibility) Collected:  09/25/20 2034    Order Status:  Completed Specimen:  Cath Urine Updated:  09/28/20 1323     Special Requests: NO SPECIAL REQUESTS        [place] Count --        >100,000  COLONIES/mL       Culture result: ESCHERICHIA COLI               STREPTOCOCCI, BETA HEMOLYTIC GROUP B Penicillin and ampicillin are drugs of choice for treatment of beta-hemolytic streptococcal infections. Susceptibility testing of penicillins and beta-lactams approved by the FDA for treatment of beta-hemolytic streptococcal infections need not be performed routinely, because nonsusceptible isolates are extremely rare.  CLSI 2012                  Urinalysis Color   Date Value Ref Range Status   09/25/2020 YELLOW   Final     Appearance   Date Value Ref Range Status   09/25/2020 CLOUDY   Final     Specific gravity   Date Value Ref Range Status   09/25/2020 1.010 1.005 - 1.030   Final     pH (UA)   Date Value Ref Range Status   09/25/2020 5.0 5.0 - 8.0   Final     Protein   Date Value Ref Range Status   09/25/2020 100 (A) NEG mg/dL Final     Ketone   Date Value Ref Range Status   09/25/2020 Negative NEG mg/dL Final     Bilirubin   Date Value Ref Range Status   09/25/2020 Negative NEG   Final     Blood   Date Value Ref Range Status   09/25/2020 SMALL (A) NEG   Final     Urobilinogen   Date Value Ref Range Status   09/25/2020 0.2 0.2 - 1.0 EU/dL Final     Nitrites   Date Value Ref Range Status   09/25/2020 Negative NEG   Final     Leukocyte Esterase   Date Value Ref Range Status   09/25/2020 LARGE (A) NEG   Final     Potassium   Date Value Ref Range Status   09/28/2020 3.9 3.5 - 5.5 mmol/L Final     Creatinine   Date Value Ref Range Status   09/28/2020 14.10 (H) 0.6 - 1.3 MG/DL Final     BUN   Date Value Ref Range Status   09/28/2020 123 (H) 7.0 - 18 MG/DL Final      PSA No results for input(s): PSA in the last 72 hours.    Coagulation Lab Results   Component Value Date/Time    Prothrombin time 14.5 09/27/2020 12:25 PM    Prothrombin time 13.3 11/20/2018 02:10 PM    INR 1.1 09/27/2020 12:25 PM    INR 1.0 11/20/2018 02:10 PM    aPTT 29.1 11/20/2018 02:10 PM    aPTT 63.5 (H) 08/30/2016 05:15 PM

## 2020-09-29 NOTE — PROGRESS NOTES
Progress Note    Ericka Jacob  80 y.o. Admit Date: 9/25/2020  Active Problems:    Acute renal failure (ARF) ([facility] (9/25/2020) POA: Unknown      ESRD (end stage renal disease) ([facility] (9/26/2020) POA: Unknown      Hyperkalemia (9/26/2020) POA: Unknown      Metabolic acidosis (8/47/3368) POA: Unknown      Secondary hyperparathyroidism, renal ([facility] (9/26/2020) POA: Unknown      Anemia (9/26/2020) POA: Unknown      Hydronephrosis (9/29/2020) POA: Unknown            Subjective:     Patient feels ok, no SOB , on bicarb drip, Started Dialysis today. Has Burris catheter now      A comprehensive review of systems was negative except for that written in the History of Present Illness.     Objective:     Visit Vitals  /64   Pulse 73   Temp 97.8 °F (36.6 °C) (Oral)   Resp 18   Ht 5' 6\" (1.676 m)   Wt 73.5 kg (162 lb 1.6 oz)   SpO2 99%   BMI 26.16 kg/m²         Intake/Output Summary (Last 24 hours) at 9/29/2020 1109  Last data filed at 9/29/2020 9525  Gross per 24 hour   Intake 590 ml   Output 2800 ml   Net -2210 ml       Current Facility-Administered Medications   Medication Dose Route Frequency Provider Last Rate Last Dose    epoetin brina-epbx (RETACRIT) injection 10,000 Units  10,000 Units SubCUTAneous Q NUNO JONES & Isac Borjas MD   10,000 Units at 09/28/20 2358    ferrous sulfate tablet 325 mg  1 Tab Oral DAILY WITH Georgette Cooley MD   325 mg at 09/29/20 0844    doxercalciferoL (HECTOROL) 4 mcg/2 mL injection 1 mcg  1 mcg IntraVENous DIALYSIS Kadie JONES MD        0.9% sodium chloride infusion 250 mL  250 mL IntraVENous PRN Pallavi Harrison MD        cefTRIAXone (ROCEPHIN) 1 g in sterile water (preservative free) 10 mL IV syringe  1 g IntraVENous Q24H Leobardo Esparza MD   1 g at 09/29/20 0200    calcium acetate(phosphat bind) (PHOSLO) capsule 1,334 mg  2 Cap Oral TID WITH MEALS Pallavi Harrison MD   1,334 mg at 09/29/20 0844    B complex-vitaminC-folic acid (NEPHROCAP) cap  1 Cap Oral DAILY Betsy Guerrero MD   1 Cap at 09/29/20 0844    amLODIPine (NORVASC) tablet 10 mg  10 mg Oral DAILY Betsy Guerrero MD   Stopped at 09/29/20 0844    0.9% sodium chloride infusion 250 mL  250 mL IntraVENous PRN Zetta Trey, DO        sodium bicarbonate (8.4%) 100 mEq in dextrose 5% 1,000 mL infusion   IntraVENous CONTINUOUS Betsy Guerrero MD 50 mL/hr at 09/28/20 2358      sodium chloride (NS) flush 5-40 mL  5-40 mL IntraVENous Q8H [facility] Silva, NP   10 mL at 09/29/20 0510    sodium chloride (NS) flush 5-40 mL  5-40 mL IntraVENous PRN [facility], NP        acetaminophen (TYLENOL) tablet 650 mg  650 mg Oral Q6H PRN [facility], NP   650 mg at 09/29/20 0149    Or    acetaminophen (TYLENOL) suppository 650 mg  650 mg Rectal Q6H PRN [facility], NP        polyethylene glycol (MIRALAX) packet 17 g  17 g Oral DAILY PRN [facility], NP        promethazine (PHENERGAN) tablet 12.5 mg  12.5 mg Oral Q6H PRN [facility], NP        Or    ondansetron (ZOFRAN) injection 4 mg  4 mg IntraVENous Q6H PRN [facility] Greer, [place]        pantoprazole (PROTONIX) tablet 40 mg  40 mg Oral ACB [facility] Greer, NP   40 mg at 09/29/20 0844    labetaloL (NORMODYNE;TRANDATE) 20 mg/4 mL (5 mg/mL) injection 20 mg  20 mg IntraVENous Q5MIN PRN Kellie Cotto NP            Physical Exam:     Physical Exam:   General:  Alert, cooperative, no distress, appears stated age. Neck: Supple, symmetrical, trachea midline, no adenopathy, thyroid: no enlargement/tenderness/nodules, no carotid bruit and no JVD. Lungs:   Clear to auscultation bilaterally. Heart:  Regular rate and rhythm, S1, S2 normal, no murmur, click, rub or gallop. Abdomen:   Soft, non-tender. Bowel sounds normal. No masses,  No organomegaly.    Extremities: Extremities normal, atraumatic, no cyanosis , has edema,   Skin: Skin color, texture, turgor normal. No rashes or lesions         Data Review:    CBC w/Diff    Recent Labs     09/29/20 0451 09/28/20 0226 09/27/20  0433   WBC 7.8 9.2 8.2   RBC 2.42* 2.66* 2.42*   HGB 7.0* 7.5* 6.9*   HCT 21.0* 22.3* 20.5*   MCV 86.8 83.8 84.7   MCH 28.9 28.2 28.5   MCHC 33.3 33.6 33.7   RDW 14.9* 14.8* 15.0*    Recent Labs     09/29/20  0451 09/28/20 0226 09/27/20  0433   MONOS 9 11* 9   EOS 3 3 1   BASOS 1 0 2   RDW 14.9* 14.8* 15.0*        Comprehensive Metabolic Profile    Recent Labs     09/29/20 0451 09/28/20 0226 09/27/20  0433    146* 144   K 3.9 3.9 3.9   * 117* 118*   CO2 17* 15* 13*   * 123* 124*   CREA 13.90* 14.10* 14.70*    Recent Labs     09/29/20 0451 09/28/20 0226 09/27/20  0433   CA 8.1* 8.4* 8.2*   PHOS 7.8* 7.7* 8.4*        EXAM:  CT Abdomen-Pelvis without Contrast.     CLINICAL INDICATION:  Hydronephrosis.     COMPARISON:  None.     TECHNIQUE:       - Helical volumetric CT imaging of the abdomen and pelvis is performed without  IV contrast administration. Coronal and sagittal multiplanar reconstruction  images are generated for improved anatomic delineation.  - All CT scans at this facility are performed using dose optimization technique  as appropriate to the performed exam, to include automated exposure control,  adjustment of the mA and/or kV according to patient's size (Including  appropriate matching for site-specific examinations), or use of iterative  reconstruction technique.     FINDINGS:     Lung Bases:  0.5 cm nodule in the left lower lobe (axial #2). 0.3 cm nodule in  the right lower lobe anterior aspect (axial #10).    Liver, Gallbladder, Pancreas, Adrenal Glands, Spleen:  Unremarkable.     Kidneys-Ureters-Bladder:  Bilateral hydronephrosis with ureteral dilation, left  greater than right. Pronounced left renal cortical thinning. No ureteral stone  is detected bilaterally. Both ureters are dilated throughout their course with  transition at the ureter-bladder junction.   Burris catheter placement into the  urinary bladder with diffuse bladder wall thickening.     GI Tract:  Mild hiatal hernia. No acute abnormalities involving the stomach and  small bowel. Protrusion of the small bowel into the midline small ventral  hernia, measuring about 0.3 cm in diameter (axial #50). No evidence of  obstructive changes. The appendix is normal.  Focal area of eccentric colon  wall thickening is suggested in the ascending colon near the hepatic flexure  (axial #37-46, coronal #22). No associated adenopathy. No acute colitis or  diverticulitis.     Nodes:  No mesenteric or retroperitoneal adenopathy.     Vascular:  No acute aortic abnormalities.     Prostate: Moderate to prominent enlargement of the prostate, measuring about  5.9 x 4.7 x 5.2 cm.     Pelvic Sidewall:  No adenopathy. No free fluid.     Bones:  Anterior wedging at T12 and L1. Degenerative changes in the lumbar  spine.     IMPRESSION  IMPRESSION:     1.  Bilateral hydronephrosis and ureteral dilation, more pronounced on the left  side. 2.  Diffuse bladder wall thickening with Burris catheter placement. Moderate to  pronounced prostate enlargement. 3.  Abnormal appearance of the ascending colon near the hepatic flexure. Possibly an artifact from peristalsis but a developing colon mass cannot be  excluded. Recommend correlation with either barium enema or colonoscopy. 4.  Small ventral hernia with protrusion of small bowel into the hernia sac at  the midline. No associated post-obstructive changes. 5.  Lung nodules.                                                 Impression:       Active Hospital Problems    Diagnosis Date Noted    Hydronephrosis 09/29/2020    ESRD (end stage renal disease) ([facility] 09/26/2020    Hyperkalemia 03/79/1938    Metabolic acidosis 83/08/0286    Secondary hyperparathyroidism, renal ([address] 09/26/2020    Anemia 09/26/2020    Acute renal failure (ARF) ([facility] 09/25/2020            Plan:     Doing  short Dialysis as his 1st dialysis as scheduled & started, will hold off any Further Dialysis  Given the Findings of Bilateral Hydronephrosis with enlarged Prostate. Continue Burris drainage,DC Bicarb drip. Urology needs to decide if he Needs Percutaneous Nephrostomy tube Placement or not . Will follow.     Jay Ferrera MD

## 2020-09-29 NOTE — PROGRESS NOTES
9:45- Pt not seen for skilled PT due to:    [ ]  Nausea/vomiting  [ ]  Eating  [ ]  Pain  [ ]  Pt lethargic  [place].Nura ]  Off Unit  Other: For dialysis. Will f/u later as schedule allows. Thank you.   Lara Acosta, PT, DPT

## 2020-09-29 NOTE — PROGRESS NOTES
[facility] Hospitalists  Progress Note    Patient: Glenda Carson Age: 80 y.o. : 1935 MR#: [id number] SSN: [id number]  Date: 2020     Subjective/24-hour events:     Patient seen and evaluated, lying in bed, no acute distress, renal ultrasound showed bilateral hydronephrosis, urology consulted   CT Abd & Pelvis reviewed     Assessment:   CHARLOTTE on CKD, now ESRD  Secondary hyperparathyroidism  Metabolic acidosis  Troponin elevation, no ACS  HTN  Anemia, etiology uncertain  OA  Advanced age  Ascending flexure colonic mass       Plan:     HD catheter placement per vascular, underwent procedure today  Renal US ordered -showed bilateral hydronephrosis, urology consulted, CT abdomen pelvis reviewed -  Burris catheter placed- great urine output post catheter placement. No plan for ischemia workup per cardiology. Blood pressure better controlled, continue current medications. Iron stores low normal.  Will d/w nephrology. Continue rocephin, - Urine culture grew out E coli - sensitive to Rocephin. GI consulted for colonic mass - needs colonoscopy for further eval   PT/OT, mobilize as tolerated. Follow. Case discussed with:  [x]Patient  []Family  [x]Nursing  []Case Management  DVT Prophylaxis:  []Lovenox  []Hep SQ  [x]SCDs  []Coumadin   []On Heparin gtt    Objective:   VS:   Visit Vitals  /60   Pulse 74   Temp 98 °F (36.7 °C) (Oral)   Resp 18   Ht 5' 6\" (1.676 m)   Wt 73.5 kg (162 lb 1.6 oz)   SpO2 99%   BMI 26.16 kg/m²      Tmax/24hrs: Temp (24hrs), Av.2 °F (36.8 °C), Min:97.8 °F (36.6 °C), Max:98.9 °F (37.2 °C)      Intake/Output Summary (Last 24 hours) at 2020 1442  Last data filed at 2020 1205  Gross per 24 hour   Intake 590 ml   Output 3800 ml   Net -3210 ml       General:  In NAD. Nontoxic-appearing. Cardiovascular:  RRR. Pulmonary:  Lungs clear bilaterally, no wheezes. GI:  Abdomen soft, NTTP. Extremities:  Warm, no edema or ischemia.   Neuro: Awake and alert. Moves extremities spontaneously. Labs:    Recent Results (from the past 24 hour(s))   METABOLIC PANEL, BASIC    Collection Time: 09/29/20  4:51 AM   Result Value Ref Range    Sodium 145 136 - 145 mmol/L    Potassium 3.9 3.5 - 5.5 mmol/L    Chloride 115 (H) 100 - 111 mmol/L    CO2 17 (L) 21 - 32 mmol/L    Anion gap 13 3.0 - 18 mmol/L    Glucose 163 (H) 74 - 99 mg/dL     (H) 7.0 - 18 MG/DL    Creatinine 13.90 (H) 0.6 - 1.3 MG/DL    BUN/Creatinine ratio 8 (L) 12 - 20      GFR est AA 4 (L) >60 ml/min/1.73m2    GFR est non-AA 3 (L) >60 ml/min/1.73m2    Calcium 8.1 (L) 8.5 - 10.1 MG/DL   MAGNESIUM    Collection Time: 09/29/20  4:51 AM   Result Value Ref Range    Magnesium 1.8 1.6 - 2.6 mg/dL   CBC WITH AUTOMATED DIFF    Collection Time: 09/29/20  4:51 AM   Result Value Ref Range    WBC 7.8 4.6 - 13.2 K/uL    RBC 2.42 (L) 4.70 - 5.50 M/uL    HGB 7.0 (L) 13.0 - 16.0 g/dL    HCT 21.0 (L) 36.0 - 48.0 %    MCV 86.8 74.0 - 97.0 FL    MCH 28.9 24.0 - 34.0 PG    MCHC 33.3 31.0 - 37.0 g/dL    RDW 14.9 (H) 11.6 - 14.5 %    PLATELET 853 479 - 822 K/uL    MPV 11.5 9.2 - 11.8 FL    NEUTROPHILS 74 (H) 40 - 73 %    LYMPHOCYTES 13 (L) 21 - 52 %    MONOCYTES 9 3 - 10 %    EOSINOPHILS 3 0 - 5 %    BASOPHILS 1 0 - 2 %    ABS. NEUTROPHILS 5.8 1.8 - 8.0 K/UL    ABS. LYMPHOCYTES 1.0 0.9 - 3.6 K/UL    ABS. MONOCYTES 0.7 0.05 - 1.2 K/UL    ABS. EOSINOPHILS 0.2 0.0 - 0.4 K/UL    ABS.  BASOPHILS 0.1 0.0 - 0.1 K/UL    DF AUTOMATED     PHOSPHORUS    Collection Time: 09/29/20  4:51 AM   Result Value Ref Range    Phosphorus 7.8 (H) 2.5 - 4.9 MG/DL       Signed By: Pilar Ruiz MD     September 29, 2020

## 2020-09-30 PROBLEM — R35.89 POSTOBSTRUCTIVE DIURESIS: Status: ACTIVE | Noted: 2020-09-30

## 2020-09-30 LAB
ANION GAP SERPL CALC-SCNC: 7 MMOL/L (ref 3–18)
BASOPHILS # BLD: 0 K/UL (ref 0–0.1)
BASOPHILS NFR BLD: 0 % (ref 0–2)
BUN SERPL-MCNC: 79 MG/DL (ref 7–18)
BUN/CREAT SERPL: 8 (ref 12–20)
CALCIUM SERPL-MCNC: 8.8 MG/DL (ref 8.5–10.1)
CHLORIDE SERPL-SCNC: 107 MMOL/L (ref 100–111)
CO2 SERPL-SCNC: 25 MMOL/L (ref 21–32)
CREAT SERPL-MCNC: 9.88 MG/DL (ref 0.6–1.3)
DIFFERENTIAL METHOD BLD: ABNORMAL
EOSINOPHIL # BLD: 0.2 K/UL (ref 0–0.4)
EOSINOPHIL NFR BLD: 2 % (ref 0–5)
ERYTHROCYTE [DISTWIDTH] IN BLOOD BY AUTOMATED COUNT: 15.1 % (ref 11.6–14.5)
GLUCOSE SERPL-MCNC: 104 MG/DL (ref 74–99)
HCT VFR BLD AUTO: 22.7 % (ref 36–48)
HGB BLD-MCNC: 7.4 G/DL (ref 13–16)
LYMPHOCYTES # BLD: 1.7 K/UL (ref 0.9–3.6)
LYMPHOCYTES NFR BLD: 16 % (ref 21–52)
MAGNESIUM SERPL-MCNC: 1.9 MG/DL (ref 1.6–2.6)
MCH RBC QN AUTO: 28.1 PG (ref 24–34)
MCHC RBC AUTO-ENTMCNC: 32.6 G/DL (ref 31–37)
MCV RBC AUTO: 86.3 FL (ref 74–97)
MONOCYTES # BLD: 1.2 K/UL (ref 0.05–1.2)
MONOCYTES NFR BLD: 11 % (ref 3–10)
NEUTS SEG # BLD: 7.5 K/UL (ref 1.8–8)
NEUTS SEG NFR BLD: 71 % (ref 40–73)
PHOSPHATE SERPL-MCNC: 4.8 MG/DL (ref 2.5–4.9)
PLATELET # BLD AUTO: 237 K/UL (ref 135–420)
PMV BLD AUTO: 12.1 FL (ref 9.2–11.8)
POTASSIUM SERPL-SCNC: 3.9 MMOL/L (ref 3.5–5.5)
RBC # BLD AUTO: 2.63 M/UL (ref 4.7–5.5)
SODIUM SERPL-SCNC: 139 MMOL/L (ref 136–145)
WBC # BLD AUTO: 10.7 K/UL (ref 4.6–13.2)

## 2020-09-30 PROCEDURE — 99232 SBSQ HOSP IP/OBS MODERATE 35: CPT | Performed by: NURSE PRACTITIONER

## 2020-09-30 PROCEDURE — 65660000000 HC RM CCU STEPDOWN

## 2020-09-30 PROCEDURE — 36415 COLL VENOUS BLD VENIPUNCTURE: CPT

## 2020-09-30 PROCEDURE — 74011250637 HC RX REV CODE- 250/637: Performed by: INTERNAL MEDICINE

## 2020-09-30 PROCEDURE — 74011000250 HC RX REV CODE- 250: Performed by: INTERNAL MEDICINE

## 2020-09-30 PROCEDURE — 2709999900 HC NON-CHARGEABLE SUPPLY

## 2020-09-30 PROCEDURE — 97530 THERAPEUTIC ACTIVITIES: CPT

## 2020-09-30 PROCEDURE — 80048 BASIC METABOLIC PNL TOTAL CA: CPT

## 2020-09-30 PROCEDURE — 84100 ASSAY OF PHOSPHORUS: CPT

## 2020-09-30 PROCEDURE — 74011000250 HC RX REV CODE- 250: Performed by: NURSE PRACTITIONER

## 2020-09-30 PROCEDURE — 90000 NO LOS: CPT | Performed by: PHYSICIAN ASSISTANT

## 2020-09-30 PROCEDURE — 74011250636 HC RX REV CODE- 250/636: Performed by: INTERNAL MEDICINE

## 2020-09-30 PROCEDURE — 74011250637 HC RX REV CODE- 250/637: Performed by: NURSE PRACTITIONER

## 2020-09-30 PROCEDURE — 85025 COMPLETE CBC W/AUTO DIFF WBC: CPT

## 2020-09-30 PROCEDURE — 83735 ASSAY OF MAGNESIUM: CPT

## 2020-09-30 RX ORDER — SODIUM CHLORIDE 9 MG/ML
50 INJECTION, SOLUTION INTRAVENOUS CONTINUOUS
Status: DISCONTINUED | OUTPATIENT
Start: 2020-09-30 | End: 2020-10-02

## 2020-09-30 RX ADMIN — PANTOPRAZOLE SODIUM 40 MG: 40 TABLET, DELAYED RELEASE ORAL at 10:04

## 2020-09-30 RX ADMIN — NEPHROCAP 1 CAPSULE: 1 CAP ORAL at 10:04

## 2020-09-30 RX ADMIN — CEFTRIAXONE SODIUM 1 G: 1 INJECTION, POWDER, FOR SOLUTION INTRAMUSCULAR; INTRAVENOUS at 03:13

## 2020-09-30 RX ADMIN — EPOETIN ALFA-EPBX 10000 UNITS: 10000 INJECTION, SOLUTION INTRAVENOUS; SUBCUTANEOUS at 21:38

## 2020-09-30 RX ADMIN — Medication 10 ML: at 05:10

## 2020-09-30 RX ADMIN — CALCIUM ACETATE 1334 MG: 667 CAPSULE ORAL at 10:04

## 2020-09-30 RX ADMIN — SODIUM CHLORIDE 75 ML/HR: 900 INJECTION, SOLUTION INTRAVENOUS at 11:45

## 2020-09-30 RX ADMIN — AMLODIPINE BESYLATE 10 MG: 10 TABLET ORAL at 10:04

## 2020-09-30 RX ADMIN — FERROUS SULFATE TAB 325 MG (65 MG ELEMENTAL FE) 325 MG: 325 (65 FE) TAB at 10:04

## 2020-09-30 RX ADMIN — POLYETHYLENE GLYCOL 3350, SODIUM SULFATE ANHYDROUS, SODIUM BICARBONATE, SODIUM CHLORIDE, POTASSIUM CHLORIDE 2000 ML: 236; 22.74; 6.74; 5.86; 2.97 POWDER, FOR SOLUTION ORAL at 18:17

## 2020-09-30 RX ADMIN — Medication 10 ML: at 21:38

## 2020-09-30 RX ADMIN — Medication 10 ML: at 18:35

## 2020-09-30 NOTE — PROGRESS NOTES
Urology Progress Note             Josefina Emerson MD  [facility] [place], [place]  Pager [phone]      Assessment/Plan:     Patient Active Problem List   Diagnosis Code    Traumatic rhabdomyolysis ([facility] T79. 6XXA    CAD (coronary artery disease) I25.10    HTN (hypertension) I10    Troponin level elevated R79.89    Acute renal failure (ARF) ([facility]) N17.9    ESRD (end stage renal disease) (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 Maribel Vivar MD    I agree with the findings as documented, and participated in the development of the care plan, any changes were made to the note as needed and any additional comments are below:        Mateusz Damian. Maribel Vivar MD  [facility] (pager)   \                  Subjective:   Afebrile, mildly hypertensive, other VSS, patient is tolerating the michel, draining clear yellow. He denies any flank pain, N/V or chills.    Objective:     Visit Vitals  /64   Pulse 80   Temp 97.9 °F (36.6 °C)   Resp 18   Ht 5' 6\" (1.676 m)   Wt 162 lb 1.6 oz (73.5 kg)   SpO2 95%   BMI 26.16 kg/m²        Temp (24hrs), Av.2 °F (36.8 °C), Min:97.8 °F (36.6 °C), Max:98.7 °F (37.1 °C)      Intake and Output:   1901 -  0700  In: 790 [P.O.:790]  Out: 2950 [Urine:1950]  No intake/output data recorded. PHYSICAL EXAMINATION:   Visit Vitals  /64   Pulse 80   Temp 97.9 °F (36.6 °C)   Resp 18   Ht 5' 6\" (1.676 m)   Wt 162 lb 1.6 oz (73.5 kg)   SpO2 95%   BMI 26.16 kg/m²     Constitutional: Well developed, well nourished. No acute distress. HEENT: Normocephalic, Atraumatic, EOM's intact   CV:  no edema  Respiratory: No respiratory distress or difficulties breathing   Abdomen:  Soft and non-tender   Male:   CVA tenderness: none         SCROTUM:  No scrotal lesions or induration         PENIS: uncircumcised with mild phimosis  Burris: draining clear yellow urine  Skin: No evidence of jaundice. Normal color  Neuro/Psych:  Alert and oriented. Affect appropriate. Lab/Data Review:    CT ABD PELV WO Contrast 20:  FINDINGS:     Lung Bases:  0.5 cm nodule in the left lower lobe (axial #2). 0.3 cm nodule in  the right lower lobe anterior aspect (axial #10).    Liver, Gallbladder, Pancreas, Adrenal Glands, Spleen:  Unremarkable.     Kidneys-Ureters-Bladder:  Bilateral hydronephrosis with ureteral dilation, left  greater than right. Pronounced left renal cortical thinning. No ureteral stone  is detected bilaterally. Both ureters are dilated throughout their course with  transition at the ureter-bladder junction. Burris catheter placement into the  urinary bladder with diffuse bladder wall thickening.     GI Tract:  Mild hiatal hernia. No acute abnormalities involving the stomach and  small bowel. Protrusion of the small bowel into the midline small ventral  hernia, measuring about 0.3 cm in diameter (axial #50). No evidence of  obstructive changes.   The appendix is normal.  Focal area of eccentric colon  wall thickening is suggested in the ascending colon near the hepatic flexure  (axial #37-46, coronal #22). No associated adenopathy. No acute colitis or  diverticulitis.     Nodes:  No mesenteric or retroperitoneal adenopathy.     Vascular:  No acute aortic abnormalities.     Prostate: Moderate to prominent enlargement of the prostate, measuring about  5.9 x 4.7 x 5.2 cm.     Pelvic Sidewall:  No adenopathy. No free fluid.     Bones:  Anterior wedging at T12 and L1. Degenerative changes in the lumbar  spine.     IMPRESSION  IMPRESSION:     1.  Bilateral hydronephrosis and ureteral dilation, more pronounced on the left  side. 2.  Diffuse bladder wall thickening with Burris catheter placement. Moderate to  pronounced prostate enlargement. 3.  Abnormal appearance of the ascending colon near the hepatic flexure. Possibly an artifact from peristalsis but a developing colon mass cannot be  excluded. Recommend correlation with either barium enema or colonoscopy. 4.  Small ventral hernia with protrusion of small bowel into the hernia sac at  the midline. No associated post-obstructive changes. 5.  Lung nodules. Labs:     Labs: Results:   Chemistry    Recent Labs     09/30/20 0423 09/29/20 0451 09/28/20 0226   * 163* 102*    145 146*   K 3.9 3.9 3.9    115* 117*   CO2 25 17* 15*   BUN 79* 115* 123*   CREA 9.88* 13.90* 14.10*   CA 8.8 8.1* 8.4*   AGAP 7 13 14   BUCR 8* 8* 9*      CBC w/Diff Recent Labs     09/30/20 0423 09/29/20 0451 09/28/20 0226   WBC 10.7 7.8 9.2   RBC 2.63* 2.42* 2.66*   HGB 7.4* 7.0* 7.5*   HCT 22.7* 21.0* 22.3*    217 248   GRANS 71 74* 69   LYMPH 16* 13* 17*   EOS 2 3 3      Cultures No results for input(s): CULT in the last 72 hours.   All Micro Results     Procedure Component Value Units Date/Time    CULTURE, URINE [[id number]]  (Abnormal)  (Susceptibility) Collected:  09/25/20 2034    Order Status:  Completed Specimen:  Cath Urine Updated:  09/28/20 1323     Special Requests: NO SPECIAL REQUESTS        [place] Count --        >100,000  COLONIES/mL       Culture result: ESCHERICHIA COLI               STREPTOCOCCI, BETA HEMOLYTIC GROUP B Penicillin and ampicillin are drugs of choice for treatment of beta-hemolytic streptococcal infections. Susceptibility testing of penicillins and beta-lactams approved by the FDA for treatment of beta-hemolytic streptococcal infections need not be performed routinely, because nonsusceptible isolates are extremely rare.  CLSI 2012                  Urinalysis Color   Date Value Ref Range Status   09/25/2020 YELLOW   Final     Appearance   Date Value Ref Range Status   09/25/2020 CLOUDY   Final     Specific gravity   Date Value Ref Range Status   09/25/2020 1.010 1.005 - 1.030   Final     pH (UA)   Date Value Ref Range Status   09/25/2020 5.0 5.0 - 8.0   Final     Protein   Date Value Ref Range Status   09/25/2020 100 (A) NEG mg/dL Final     Ketone   Date Value Ref Range Status   09/25/2020 Negative NEG mg/dL Final     Bilirubin   Date Value Ref Range Status   09/25/2020 Negative NEG   Final     Blood   Date Value Ref Range Status   09/25/2020 SMALL (A) NEG   Final     Urobilinogen   Date Value Ref Range Status   09/25/2020 0.2 0.2 - 1.0 EU/dL Final     Nitrites   Date Value Ref Range Status   09/25/2020 Negative NEG   Final     Leukocyte Esterase   Date Value Ref Range Status   09/25/2020 LARGE (A) NEG   Final     Potassium   Date Value Ref Range Status   09/30/2020 3.9 3.5 - 5.5 mmol/L Final     Creatinine   Date Value Ref Range Status   09/30/2020 9.88 (H) 0.6 - 1.3 MG/DL Final     BUN   Date Value Ref Range Status   09/30/2020 79 (H) 7.0 - 18 MG/DL Final     Prostate Specific Ag   Date Value Ref Range Status   09/29/2020 78.0 (H) 0.0 - 4.0 ng/mL Final      PSA Recent Labs     09/29/20  0451   PSA 78.0*      Coagulation Lab Results   Component Value Date/Time    Prothrombin time 14.5 09/27/2020 12:25 PM    Prothrombin time 13.3 11/20/2018 02:10 PM    INR 1.1 09/27/2020 12:25 PM    INR 1.0 11/20/2018 02:10 PM    aPTT 29.1 11/20/2018 02:10 PM    aPTT 63.5 (H) 08/30/2016 05:15 PM

## 2020-09-30 NOTE — PROGRESS NOTES
Progress Note    Lawrance Narrow  80 y.o. Admit Date: 9/25/2020  Active Problems:    Acute renal failure (ARF) ([facility] (9/25/2020) POA: Unknown      ESRD (end stage renal disease) ([facility] (9/26/2020) POA: Unknown      Hyperkalemia (9/26/2020) POA: Unknown      Metabolic acidosis (1/20/0137) POA: Unknown      Secondary hyperparathyroidism, renal ([facility] (9/26/2020) POA: Unknown      Anemia (9/26/2020) POA: Unknown      Hydronephrosis (9/29/2020) POA: Unknown      UTI (urinary tract infection) (9/29/2020) POA: Unknown      Postobstructive diuresis (9/30/2020) POA: Unknown            Subjective:     Patient feels good, as usual does not complain any thing,was Dialyzed one time yesterday , has Burris & making urine. A comprehensive review of systems was negative except for that written in the History of Present Illness.     Objective:     Visit Vitals  /73 (BP 1 Location: Left arm)   Pulse 76   Temp 98.9 °F (37.2 °C)   Resp 20   Ht 5' 6\" (1.676 m)   Wt 73.5 kg (162 lb 1.6 oz)   SpO2 100%   BMI 26.16 kg/m²         Intake/Output Summary (Last 24 hours) at 9/30/2020 1146  Last data filed at 9/30/2020 1107  Gross per 24 hour   Intake 670 ml   Output 2125 ml   Net -1455 ml       Current Facility-Administered Medications   Medication Dose Route Frequency Provider Last Rate Last Dose    0.9% sodium chloride infusion  75 mL/hr IntraVENous CONTINUOUS Damon Sen MD 75 mL/hr at 09/30/20 1145 75 mL/hr at 09/30/20 1145    heparin (porcine) 1,000 unit/mL injection 5,000 Units  5,000 Units InterCATHeter DIALYSIS PRN Damon Sen MD        epoetin brina-epbx (RETACRIT) injection 10,000 Units  10,000 Units SubCUTAneous Q MON, WED & Jose Maria Paiz MD   10,000 Units at 09/28/20 4193    ferrous sulfate tablet 325 mg  1 Tab Oral DAILY WITH Melissa Avelar MD   325 mg at 09/30/20 1004    0.9% sodium chloride infusion 250 mL  250 mL IntraVENous PRN Damon Sen MD        B complex-vitaminC-folic acid (NEPHROCAP) cap  1 Cap Oral DAILY Maria De Jesus Antunez MD   1 Cap at 09/30/20 1004    amLODIPine (NORVASC) tablet 10 mg  10 mg Oral DAILY Maria De Jesus Antunez MD   10 mg at 09/30/20 1004    0.9% sodium chloride infusion 250 mL  250 mL IntraVENous PRN Hamilton Martinez,         sodium chloride (NS) flush 5-40 mL  5-40 mL IntraVENous Q8H WalterSilva Perkins NP   10 mL at 09/30/20 0510    sodium chloride (NS) flush 5-40 mL  5-40 mL IntraVENous PRN [place] GAVIOTA Sharp        acetaminophen (TYLENOL) tablet 650 mg  650 mg Oral Q6H PRN Jackie Puri NP   650 mg at 09/29/20 0149    Or    acetaminophen (TYLENOL) suppository 650 mg  650 mg Rectal Q6H PRN [place] GAVIOTA Sharp        polyethylene glycol (MIRALAX) packet 17 g  17 g Oral DAILY PRN WalterMaddie [place] GAVIOTA Sharp        promethazine (PHENERGAN) tablet 12.5 mg  12.5 mg Oral Q6H PRN WalterMaddie [place] GAVIOTA Sharp        Or    ondansetron (ZOFRAN) injection 4 mg  4 mg IntraVENous Q6H PRN Silva Beverly NP        pantoprazole (PROTONIX) tablet 40 mg  40 mg Oral ACB Silva Beverly NP   40 mg at 09/30/20 1004    labetaloL (NORMODYNE;TRANDATE) 20 mg/4 mL (5 mg/mL) injection 20 mg  20 mg IntraVENous Q5MIN PRN Jackie Puri NP            Physical Exam:     Physical Exam:   General:  Alert, cooperative, no distress, appears stated age. Lungs:   Clear to auscultation bilaterally. Heart:  Regular rate and rhythm, S1, S2 normal, no murmur, click, rub or gallop. Abdomen:   Soft, non-tender. Bowel sounds normal. No masses,  No organomegaly. Extremities: Extremities normal, atraumatic, no cyanosis or edema,HAs TDC.    Skin: Skin color, texture, turgor normal. No rashes or lesions         Data Review:    CBC w/Diff    Recent Labs     09/30/20  0423 09/29/20  0451 09/28/20  0226   WBC 10.7 7.8 9.2   RBC 2.63* 2.42* 2.66*   HGB 7.4* 7.0* 7.5*   HCT 22.7* 21.0* 22.3*   MCV 86.3 86.8 83.8   MCH 28.1 28.9 28.2   MCHC 32.6 33.3 33.6 RDW 15.1* 14.9* 14.8*    Recent Labs     09/30/20  0423 09/29/20  0451 09/28/20  0226   MONOS 11* 9 11*   EOS 2 3 3   BASOS 0 1 0   RDW 15.1* 14.9* 14.8*        Comprehensive Metabolic Profile    Recent Labs     09/30/20  0423 09/29/20  0451 09/28/20  0226    145 146*   K 3.9 3.9 3.9    115* 117*   CO2 25 17* 15*   BUN 79* 115* 123*   CREA 9.88* 13.90* 14.10*    Recent Labs     09/30/20  0423 09/29/20  0451 09/28/20  0226   CA 8.8 8.1* 8.4*   PHOS 4.8 7.8* 7.7*        Study Result     EXAM:  CT Abdomen-Pelvis without Contrast.     CLINICAL INDICATION:  Hydronephrosis.     COMPARISON:  None.     TECHNIQUE:       - Helical volumetric CT imaging of the abdomen and pelvis is performed without  IV contrast administration. Coronal and sagittal multiplanar reconstruction  images are generated for improved anatomic delineation.  - All CT scans at this facility are performed using dose optimization technique  as appropriate to the performed exam, to include automated exposure control,  adjustment of the mA and/or kV according to patient's size (Including  appropriate matching for site-specific examinations), or use of iterative  reconstruction technique.     FINDINGS:     Lung Bases:  0.5 cm nodule in the left lower lobe (axial #2). 0.3 cm nodule in  the right lower lobe anterior aspect (axial #10).    Liver, Gallbladder, Pancreas, Adrenal Glands, Spleen:  Unremarkable.     Kidneys-Ureters-Bladder:  Bilateral hydronephrosis with ureteral dilation, left  greater than right. Pronounced left renal cortical thinning. No ureteral stone  is detected bilaterally. Both ureters are dilated throughout their course with  transition at the ureter-bladder junction. Burris catheter placement into the  urinary bladder with diffuse bladder wall thickening.     GI Tract:  Mild hiatal hernia. No acute abnormalities involving the stomach and  small bowel.   Protrusion of the small bowel into the midline small ventral  hernia, measuring about 0.3 cm in diameter (axial #50). No evidence of  obstructive changes. The appendix is normal.  Focal area of eccentric colon  wall thickening is suggested in the ascending colon near the hepatic flexure  (axial #37-46, coronal #22). No associated adenopathy. No acute colitis or  diverticulitis.     Nodes:  No mesenteric or retroperitoneal adenopathy.     Vascular:  No acute aortic abnormalities.     Prostate: Moderate to prominent enlargement of the prostate, measuring about  5.9 x 4.7 x 5.2 cm.     Pelvic Sidewall:  No adenopathy. No free fluid.     Bones:  Anterior wedging at T12 and L1. Degenerative changes in the lumbar  spine.     IMPRESSION  IMPRESSION:     1.  Bilateral hydronephrosis and ureteral dilation, more pronounced on the left  side. 2.  Diffuse bladder wall thickening with Burris catheter placement. Moderate to  pronounced prostate enlargement. 3.  Abnormal appearance of the ascending colon near the hepatic flexure. Possibly an artifact from peristalsis but a developing colon mass cannot be  excluded. Recommend correlation with either barium enema or colonoscopy. 4.  Small ventral hernia with protrusion of small bowel into the hernia sac at  the midline. No associated post-obstructive changes. 5.  Lung nodules. Impression:       Active Hospital Problems    Diagnosis Date Noted    Postobstructive diuresis 09/30/2020    Hydronephrosis 09/29/2020    UTI (urinary tract infection) 09/29/2020    ESRD (end stage renal disease) ([address] 09/26/2020    Hyperkalemia 09/58/0697    Metabolic acidosis 45/18/9786    Secondary hyperparathyroidism, renal ([address] 09/26/2020    Anemia 09/26/2020    Acute renal failure (ARF) ([address] 09/25/2020            Plan:     No Dialysis  ,DC Phoslo, Replace Fluid  For Post Obstructive Diuresis approximately 1/2 cc per cc.  Follow electrolyte,renal function, Keep Burris,Follow Urology's recommendations. Agree with GI consult. Updated  His status to his wife.       Magan Sprague MD

## 2020-09-30 NOTE — PROGRESS NOTES
Problem: Nutrition Deficit  Goal: *Optimize nutritional status  Outcome: Progressing Towards Goal     Problem: Falls - Risk of  Goal: *Absence of Falls  Description: Document Jose Going Fall Risk and appropriate interventions in the flowsheet. Outcome: Progressing Towards Goal  Note: Fall Risk Interventions:  Mobility Interventions: Bed/chair exit alarm    Mentation Interventions: Bed/chair exit alarm    Medication Interventions: Bed/chair exit alarm    Elimination Interventions: Call light in reach    History of Falls Interventions: Bed/chair exit alarm         Problem: Patient Education: Go to Patient Education Activity  Goal: Patient/Family Education  Outcome: Progressing Towards Goal     Problem: Patient Education: Go to Patient Education Activity  Goal: Patient/Family Education  Outcome: Progressing Towards Goal     Problem: Patient Education: Go to Patient Education Activity  Goal: Patient/Family Education  Outcome: Progressing Towards Goal     Problem: Pressure Injury - Risk of  Goal: *Prevention of pressure injury  Description: Document Karthikeyan Scale and appropriate interventions in the flowsheet. Outcome: Progressing Towards Goal  Note: Pressure Injury Interventions:  Sensory Interventions: Assess need for specialty bed    Moisture Interventions: Absorbent underpads    Activity Interventions: Assess need for specialty bed    Mobility Interventions: HOB 30 degrees or less    Nutrition Interventions: Document food/fluid/supplement intake    Friction and Shear Interventions: Minimize layers                Problem: Patient Education: Go to Patient Education Activity  Goal: Patient/Family Education  Outcome: Progressing Towards Goal     Problem:  Moderate Sedation (Adult)  Goal: *Patent airway  Outcome: Progressing Towards Goal  Goal: *Adequate oxygenation  Outcome: Progressing Towards Goal  Goal: *Absence of aspiration  Outcome: Progressing Towards Goal  Goal: *Hemodynamically stable  Outcome: Progressing Towards Goal  Goal: *Optimal pain control at patient's stated goal  Outcome: Progressing Towards Goal  Goal: *Absence of nausea/vomiting  Outcome: Progressing Towards Goal  Goal: *Anxiety reduced or absent  Outcome: Progressing Towards Goal  Goal: *Absence of injury  Outcome: Progressing Towards Goal  Goal: *Level of consciousness returns to baseline  Outcome: Progressing Towards Goal  Goal: Interventions  Outcome: Progressing Towards Goal     Problem: Patient Education: Go to Patient Education Activity  Goal: Patient/Family Education  Outcome: Progressing Towards Goal

## 2020-09-30 NOTE — PROGRESS NOTES
[facility]  Progress Note    Patient: Zuleyka Avalos Age: 80 y.o. : 1935 MR#: [id number] SSN: [id number]  Date: 2020     Subjective/24-hour events:     Patient seen and evaluated, lying in bed, no acute distress, renal ultrasound showed bilateral hydronephrosis; denies SOB, CP, n/v/abd pain    Assessment:   CHARLOTTE on CKD, now ESRD  Secondary hyperparathyroidism  Metabolic acidosis  Troponin elevation, no ACS  HTN  Anemia, etiology uncertain  OA  Advanced age  Ascending flexure colonic mass       Plan:     S/p HD catheter placement per vascular to [facility]  Renal US ordered w/ bilateral hydronephrosis, urology following, cont urinary catheter  No plan for ischemia workup per cardiology. Blood pressure better controlled, continue current medications. Iron stores low normal.  Will d/w nephrology. Continue rocephin, - Urine culture grew out E coli - sensitive to Rocephin. GI consulted for colonic mass - needs colonoscopy either 10/01 or 10/02   PT/OT, mobilize as tolerated    Case discussed with:  [x]Patient  [x]Family  [x]Nursing  []Case Management  DVT Prophylaxis:  []Lovenox  []Hep SQ  [x]SCDs  []Coumadin   []On Heparin gtt    Objective:   VS:   Visit Vitals  /73 (BP 1 Location: Left arm)   Pulse 76   Temp 98.9 °F (37.2 °C)   Resp 20   Ht 5' 6\" (1.676 m)   Wt 73.5 kg (162 lb 1.6 oz)   SpO2 100%   BMI 26.16 kg/m²      Tmax/24hrs: Temp (24hrs), Av.5 °F (36.9 °C), Min:97.9 °F (36.6 °C), Max:99.2 °F (37.3 °C)      Intake/Output Summary (Last 24 hours) at 2020 1141  Last data filed at 2020 1107  Gross per 24 hour   Intake 670 ml   Output 2125 ml   Net -1455 ml       General:  In NAD. Nontoxic-appearing. Cardiovascular:  RRR. Pulmonary:  Lungs clear bilaterally, no wheezes. GI:  Abdomen soft, NTTP. Extremities:  Warm, no edema or ischemia. TDC to [facility] C/D/I  Neuro:  Awake and alert to person, place. Moves extremities spontaneously.     Labs: Recent Results (from the past 24 hour(s))   METABOLIC PANEL, BASIC    Collection Time: 09/30/20  4:23 AM   Result Value Ref Range    Sodium 139 136 - 145 mmol/L    Potassium 3.9 3.5 - 5.5 mmol/L    Chloride 107 100 - 111 mmol/L    CO2 25 21 - 32 mmol/L    Anion gap 7 3.0 - 18 mmol/L    Glucose 104 (H) 74 - 99 mg/dL    BUN 79 (H) 7.0 - 18 MG/DL    Creatinine 9.88 (H) 0.6 - 1.3 MG/DL    BUN/Creatinine ratio 8 (L) 12 - 20      GFR est AA 6 (L) >60 ml/min/1.73m2    GFR est non-AA 5 (L) >60 ml/min/1.73m2    Calcium 8.8 8.5 - 10.1 MG/DL   MAGNESIUM    Collection Time: 09/30/20  4:23 AM   Result Value Ref Range    Magnesium 1.9 1.6 - 2.6 mg/dL   CBC WITH AUTOMATED DIFF    Collection Time: 09/30/20  4:23 AM   Result Value Ref Range    WBC 10.7 4.6 - 13.2 K/uL    RBC 2.63 (L) 4.70 - 5.50 M/uL    HGB 7.4 (L) 13.0 - 16.0 g/dL    HCT 22.7 (L) 36.0 - 48.0 %    MCV 86.3 74.0 - 97.0 FL    MCH 28.1 24.0 - 34.0 PG    MCHC 32.6 31.0 - 37.0 g/dL    RDW 15.1 (H) 11.6 - 14.5 %    PLATELET 143 061 - 496 K/uL    MPV 12.1 (H) 9.2 - 11.8 FL    NEUTROPHILS 71 40 - 73 %    LYMPHOCYTES 16 (L) 21 - 52 %    MONOCYTES 11 (H) 3 - 10 %    EOSINOPHILS 2 0 - 5 %    BASOPHILS 0 0 - 2 %    ABS. NEUTROPHILS 7.5 1.8 - 8.0 K/UL    ABS. LYMPHOCYTES 1.7 0.9 - 3.6 K/UL    ABS. MONOCYTES 1.2 0.05 - 1.2 K/UL    ABS. EOSINOPHILS 0.2 0.0 - 0.4 K/UL    ABS.  BASOPHILS 0.0 0.0 - 0.1 K/UL    DF AUTOMATED     PHOSPHORUS    Collection Time: 09/30/20  4:23 AM   Result Value Ref Range    Phosphorus 4.8 2.5 - 4.9 MG/DL       Signed By: Hailey Arias NP     September 30, 2020

## 2020-09-30 NOTE — PROGRESS NOTES
Bedside shift change report given to Courtney Esquivel RN (oncoming nurse) by Carmen Truong RN (offgoing nurse). Report included the following information SBAR, Kardex, Intake/Output, MAR and Recent Results.

## 2020-09-30 NOTE — PROGRESS NOTES
Patient resting in bed. A&Ox 2. No complaints of pain. Up with assist. Advised to call before getting out of bed. Bed locked in lowest position. Side rails X3. Call bell within reach. Problem: Nutrition Deficit  Goal: *Optimize nutritional status  Outcome: Progressing Towards Goal     Problem: Falls - Risk of  Goal: *Absence of Falls  Description: Document Lisandra Caballero Fall Risk and appropriate interventions in the flowsheet. Outcome: Progressing Towards Goal  Note: Fall Risk Interventions:  Mobility Interventions: Bed/chair exit alarm    Mentation Interventions: Bed/chair exit alarm    Medication Interventions: Bed/chair exit alarm    Elimination Interventions: Call light in reach    History of Falls Interventions: Bed/chair exit alarm, Door open when patient unattended         Problem: Patient Education: Go to Patient Education Activity  Goal: Patient/Family Education  Outcome: Progressing Towards Goal     Problem: Patient Education: Go to Patient Education Activity  Goal: Patient/Family Education  Outcome: Progressing Towards Goal     Problem: Patient Education: Go to Patient Education Activity  Goal: Patient/Family Education  Outcome: Progressing Towards Goal     Problem: Pressure Injury - Risk of  Goal: *Prevention of pressure injury  Description: Document Karthikeyan Scale and appropriate interventions in the flowsheet. Outcome: Progressing Towards Goal  Note: Pressure Injury Interventions:  Sensory Interventions: Assess need for specialty bed    Moisture Interventions: Absorbent underpads    Activity Interventions: Assess need for specialty bed    Mobility Interventions: HOB 30 degrees or less    Nutrition Interventions: Document food/fluid/supplement intake    Friction and Shear Interventions: Apply protective barrier, creams and emollients                Problem: Patient Education: Go to Patient Education Activity  Goal: Patient/Family Education  Outcome: Progressing Towards Goal     Problem:  Moderate Sedation (Adult)  Goal: *Patent airway  Outcome: Progressing Towards Goal  Goal: *Adequate oxygenation  Outcome: Progressing Towards Goal  Goal: *Absence of aspiration  Outcome: Progressing Towards Goal  Goal: *Hemodynamically stable  Outcome: Progressing Towards Goal  Goal: *Optimal pain control at patient's stated goal  Outcome: Progressing Towards Goal  Goal: *Absence of nausea/vomiting  Outcome: Progressing Towards Goal  Goal: *Anxiety reduced or absent  Outcome: Progressing Towards Goal  Goal: *Absence of injury  Outcome: Progressing Towards Goal  Goal: *Level of consciousness returns to baseline  Outcome: Progressing Towards Goal  Goal: Interventions  Outcome: Progressing Towards Goal     Problem: Patient Education: Go to Patient Education Activity  Goal: Patient/Family Education  Outcome: Progressing Towards Goal

## 2020-09-30 NOTE — PROGRESS NOTES
[email or web address]  [phone]    Gastroenterology follow up-Progress note    Impression:  1. Abnormal findings on CT scan- potential ascending colon mass- will need colonoscopy- timing to be determined  2. Acute on chronic anemia- marked decrease from baseline- H/H 7.0/21.0- no signs of overt GI bleeding, H/H stable- iron profile normal  3. Bilateral hydronephrosis with distended bladder- improved with michel catheter placement  4. [place]- acute on chronic- now requiring HD  5. Secondary hyperparathyroidism  6. Elevated troponin; no ACS  7. HTN    Plan:  1. Will need colonoscopy- timing to be determined based on other teams testing/interventions/dialysis. Will tentatively plan for tomorrow and he is on clear liquids today with plans for golytely prep tonight- awaiting nephrology recommendations- if he needs dialysis tomorrow then we will plan for Friday. 2. Monitor H/H and transfuse per protocol  3. Medical management per primary team     Chief Complaint: abnormal CT finding, anemia      Subjective:  No issues today; he is aware of clear liquid diet and potential prep tonight. No abd pain, melena, hematochezia. ROS: Denies any fevers, chills, rash. Eyes: conjunctiva normal, EOM normal   Neck: ROM normal, supple and trachea normal   Cardiovascular: heart normal, intact distal pulses, normal rate and regular rhythm   Pulmonary/Chest Wall: breath sounds normal and effort normal   Abdominal: appearance normal, bowel sounds normal and soft, non-acute, non-tender     Patient Active Problem List   Diagnosis Code    Traumatic rhabdomyolysis ([facility] T79. 6XXA    CAD (coronary artery disease) I25.10    HTN (hypertension) I10    Troponin level elevated R79.89    Acute renal failure (ARF) (HCC) N17.9    ESRD (end stage renal disease) ([facility] N18.6    Hyperkalemia A63.1    Metabolic acidosis Y59.2    Secondary hyperparathyroidism, renal (HCC) N25.81    Anemia D64.9    Hydronephrosis N13.30    UTI (urinary tract infection) N39.0         Visit Vitals  /73 (BP 1 Location: Left arm)   Pulse 76   Temp 98.9 °F (37.2 °C)   Resp 20   Ht 5' 6\" (1.676 m)   Wt 73.5 kg (162 lb 1.6 oz)   SpO2 100%   BMI 26.16 kg/m²           Intake/Output Summary (Last 24 hours) at 9/30/2020 1127  Last data filed at 9/30/2020 1107  Gross per 24 hour   Intake 670 ml   Output 2125 ml   Net -1455 ml       CBC w/Diff    Lab Results   Component Value Date/Time    WBC 10.7 09/30/2020 04:23 AM    RBC 2.63 (L) 09/30/2020 04:23 AM    HGB 7.4 (L) 09/30/2020 04:23 AM    HCT 22.7 (L) 09/30/2020 04:23 AM    MCV 86.3 09/30/2020 04:23 AM    MCH 28.1 09/30/2020 04:23 AM    MCHC 32.6 09/30/2020 04:23 AM    RDW 15.1 (H) 09/30/2020 04:23 AM     09/30/2020 04:23 AM    Lab Results   Component Value Date/Time    GRANS 71 09/30/2020 04:23 AM    LYMPH 16 (L) 09/30/2020 04:23 AM    EOS 2 09/30/2020 04:23 AM    BASOS 0 09/30/2020 04:23 AM      Basic Metabolic Profile   Recent Labs     09/30/20  0423      K 3.9      CO2 25   BUN 79*   CA 8.8   MG 1.9   PHOS 4.8        Hepatic Function    Lab Results   Component Value Date/Time    ALB 3.6 09/25/2020 06:00 PM    TP 7.0 09/26/2020 07:15 AM    AP 79 09/25/2020 06:00 PM    No results found for: TBIL       Coags   Recent Labs     09/27/20  1225   PTP 14.5   INR 1.1               Erickson Guzman NP    [facility]. [email or web address]  Phone: [phone]  Pager: [phone]

## 2020-09-30 NOTE — PROGRESS NOTES
Problem: Mobility Impaired (Adult and Pediatric)  Goal: *Acute Goals and Plan of Care (Insert Text)  Description: Physical Therapy Goals  Initiated 9/27/2020 and to be accomplished within 7 day(s)  1. Patient will move from supine to sit and sit to supine , scoot up and down, and roll side to side in bed with supervision/set-up. 2.  Patient will transfer from bed to chair and chair to bed with minimal assistance/contact guard assist using the least restrictive device. 3.  Patient will perform sit to stand with supervision/set-up. 4.  Patient will ambulate with minimal assistance/contact guard assist for 80 feet with the least restrictive device. 5.  Patient will ascend/descend 3 stairs with unilateral handrail(s) with minimal assistance/contact guard assist.    PLOF: Pt confused and lethargic throughout evaluation, reporting using cane at home. She lives with wife who assists with his ADLs. Lives in 1 story house with 3 AJAY, also has RW available at home. Outcome: Progressing Towards Goal   PHYSICAL THERAPY TREATMENT    Patient: Renetta Orourke (60 y.o. male)  Date: 9/30/2020  Diagnosis: Acute renal failure (ARF) ([facility] [N17.9]   <principal problem not specified>  Procedure(s) (LRB):  INSERTION TUNNELED CENTRAL VENOUS CATHETER (N/A) 2 Days Post-Op  Precautions: Fall  PLOF: see above    ASSESSMENT:  Nurse cleared patient for participation in skilled physical therapy. Patient received reclined in bed, awake and alert, agreeable to physical therapy treatment. Patient on room air, +michel catheter. Patient just starting to try to eat dinner but too slumped down in bed. Patient agreeable to PT treatment in order to sit up better in bed. Required moderate assist x 1 with max verbal and tactile cues for sequencing supine to sit task with cues for LE positioning and use of hand rail. CGA to min assist to maintain midline in sitting without support with variable lean to left and right due to weakness.  Patient requested to put on his tennis shoes due to \"slippery floor. \" Required max assist to joanne shoes in sitting with PT assisting with providing support in sitting while assisting with donning shoes. Min assist to [facility] for scooting to edge of bed. Attempted sit to stand from edge of bed to RW but required second attempt with bed elevated with moderate assistance and pull to stand on walker in order to complete transfer. Patient has loss of balance posteriorly with moderate assist for recovery of balance upon immediate standing when adjusting feet under base of support. Patient completed side stepping to right x 4 steps, 2 feet, with RW and min assist for support and walker management. Patient had difficulty with picking up and placing RLE with increased stance time on LLE. Returned to sitting with min assist and cues for reaching back for bed rail. Min assist for sit to supine transfer for LE management. Patient returned to reclined upright in bed, head of bed elevated 45 deg for eating dinner, bed alarm on, call bell in reach, 3 rails up for safety, all needs met. Progression toward goals:   []      Improving appropriately and progressing toward goals  [x]      Improving slowly and progressing toward goals  []      Not making progress toward goals and plan of care will be adjusted     PLAN:  Patient continues to benefit from skilled intervention to address the above impairments. Continue treatment per established plan of care. Discharge Recommendations:  Piyush De León  Further Equipment Recommendations for Discharge:  has rolling walker and cane at home already; recommend bedside commode     SUBJECTIVE:   Patient stated I'll do what you tell me to do to do it right.     OBJECTIVE DATA SUMMARY:   Critical Behavior:  Neurologic State: Alert  Orientation Level: Oriented to person, Oriented to place  Cognition: Follows commands  Safety/Judgement: Fall prevention  Functional Mobility Training:  Bed Mobility: Supine to Sit: Moderate assistance;Assist x1  Sit to Supine: Minimum assistance;Assist x1  Scooting: Moderate assistance;Assist x1    Transfers:  Sit to Stand: Moderate assistance;Assist x1;Other (comment)(bed elevated)  Stand to Sit: Minimum assistance;Assist x1  Balance:  Sitting: Impaired; Without support  Sitting - Static: Fair (occasional)  Sitting - Dynamic: Fair (occasional)  Standing: Impaired;Pull to stand; With support  Standing - Static: Fair  Standing - Dynamic : Poor   Range Of Motion:   AROM: Generally decreased, functional   Ambulation/Gait Training:  Distance (ft): 2 Feet (ft)  Assistive Device: Walker, rolling  Ambulation - Level of Assistance: Minimal assistance;Assist x1;Adaptive equipment   Gait Description (WDL): Exceptions to WDL  Gait Abnormalities: Decreased step clearance; Step to gait    Base of Support: Narrowed; Shift to left  Stance: Left increased   Step Length: Left shortened;Right shortened  Pain:  Pain level pre-treatment: 0/10  Pain level post-treatment: 0/10   Pain Intervention(s): Medication (see MAR); Rest, Repositioning   Response to intervention: Nurse notified, See doc flow    Activity Tolerance:   good  Please refer to the flowsheet for vital signs taken during this treatment. After treatment:   [] Patient left in no apparent distress sitting up in chair  [x] Patient left in no apparent distress in bed  [x] Call bell left within reach  [x] Nursing notified  [] Caregiver present  [x] Bed alarm activated  [] SCDs applied      COMMUNICATION/EDUCATION:   [x]         Role of Physical Therapy in the acute care setting. [x]         Fall prevention education was provided and the patient/caregiver indicated understanding. [x]         Patient/family have participated as able in working toward goals and plan of care. []         Patient/family agree to work toward stated goals and plan of care.   []         Patient understands intent and goals of therapy, but is neutral about his/her participation.   []         Patient is unable to participate in stated goals/plan of care: ongoing with therapy staff.  []         Other:        Zechariah Juana, PT, DPT   Time Calculation: 15 mins

## 2020-09-30 NOTE — PROGRESS NOTES
Pt with ESRD now on HD. Has TDC in place which is functioning without issue. Pt can f/u in our office as outpt to discuss permanent dialysis access if needed. Will remain available as needed. Please call with further questions or concerns.

## 2020-09-30 NOTE — PROGRESS NOTES
I attempted to visit patient during my weekly rounds however, patient was sound asleep. I expressed silent prayers for strength.        [address] [place] [address]   [phone]

## 2020-09-30 NOTE — ROUTINE PROCESS
Bedside shift change report given to Mei Anguiano RN (oncoming nurse) by  Brennan Rai RN (offgoing nurse). Report included the following information SBAR, Kardex, ED Summary and Recent Results.

## 2020-09-30 NOTE — PROGRESS NOTES
GI Brief Progress Note    Please see full progress note from earlier- reviewed recommendations from nephrology- no plans for dialysis tomorrow- will plan for colonoscopy- He has been added to the endo schedule.      Plan:  Colonoscopy tomorrow  Clear liquid diet today  NPO after MN except for meds  Needs rapid covid testing for procedure  Golyte prep tonight  Monitor H/H and transfuse per protocol- Hgb need to be over 7 for procedure    Sanjay Paulino NP-C  [facility], [address] [place] [address]

## 2020-10-01 ENCOUNTER — APPOINTMENT (OUTPATIENT)
Dept: CT IMAGING | Age: 85
DRG: 674 | End: 2020-10-01
Attending: STUDENT IN AN ORGANIZED HEALTH CARE EDUCATION/TRAINING PROGRAM
Payer: MEDICARE

## 2020-10-01 ENCOUNTER — ANESTHESIA (OUTPATIENT)
Dept: ENDOSCOPY | Age: 85
DRG: 674 | End: 2020-10-01
Payer: MEDICARE

## 2020-10-01 ENCOUNTER — ANESTHESIA EVENT (OUTPATIENT)
Dept: ENDOSCOPY | Age: 85
DRG: 674 | End: 2020-10-01
Payer: MEDICARE

## 2020-10-01 PROBLEM — K63.5 POLYP OF ASCENDING COLON: Status: ACTIVE | Noted: 2020-10-01

## 2020-10-01 LAB
ANION GAP SERPL CALC-SCNC: 11 MMOL/L (ref 3–18)
BASOPHILS # BLD: 0 K/UL (ref 0–0.1)
BASOPHILS NFR BLD: 0 % (ref 0–2)
BUN SERPL-MCNC: 71 MG/DL (ref 7–18)
BUN/CREAT SERPL: 7 (ref 12–20)
CALCIUM SERPL-MCNC: 8 MG/DL (ref 8.5–10.1)
CHLORIDE SERPL-SCNC: 111 MMOL/L (ref 100–111)
CO2 SERPL-SCNC: 23 MMOL/L (ref 21–32)
COVID-19 RAPID TEST, COVR: NOT DETECTED
CREAT SERPL-MCNC: 9.85 MG/DL (ref 0.6–1.3)
DIFFERENTIAL METHOD BLD: ABNORMAL
EOSINOPHIL # BLD: 0.3 K/UL (ref 0–0.4)
EOSINOPHIL NFR BLD: 3 % (ref 0–5)
ERYTHROCYTE [DISTWIDTH] IN BLOOD BY AUTOMATED COUNT: 14.7 % (ref 11.6–14.5)
GLUCOSE SERPL-MCNC: 89 MG/DL (ref 74–99)
HCT VFR BLD AUTO: 21.1 % (ref 36–48)
HGB BLD-MCNC: 6.9 G/DL (ref 13–16)
HISTORY CHECKED?,CKHIST: NORMAL
LYMPHOCYTES # BLD: 1.6 K/UL (ref 0.9–3.6)
LYMPHOCYTES NFR BLD: 17 % (ref 21–52)
MAGNESIUM SERPL-MCNC: 1.7 MG/DL (ref 1.6–2.6)
MCH RBC QN AUTO: 28.8 PG (ref 24–34)
MCHC RBC AUTO-ENTMCNC: 32.7 G/DL (ref 31–37)
MCV RBC AUTO: 87.9 FL (ref 74–97)
MONOCYTES # BLD: 1 K/UL (ref 0.05–1.2)
MONOCYTES NFR BLD: 10 % (ref 3–10)
NEUTS SEG # BLD: 6.7 K/UL (ref 1.8–8)
NEUTS SEG NFR BLD: 70 % (ref 40–73)
PHOSPHATE SERPL-MCNC: 5.2 MG/DL (ref 2.5–4.9)
PLATELET # BLD AUTO: 182 K/UL (ref 135–420)
PMV BLD AUTO: 11.9 FL (ref 9.2–11.8)
POTASSIUM SERPL-SCNC: 4.2 MMOL/L (ref 3.5–5.5)
RBC # BLD AUTO: 2.4 M/UL (ref 4.7–5.5)
SODIUM SERPL-SCNC: 145 MMOL/L (ref 136–145)
SOURCE, COVRS: NORMAL
SPECIMEN TYPE, XMCV1T: NORMAL
WBC # BLD AUTO: 9.7 K/UL (ref 4.6–13.2)

## 2020-10-01 PROCEDURE — 36415 COLL VENOUS BLD VENIPUNCTURE: CPT

## 2020-10-01 PROCEDURE — 86923 COMPATIBILITY TEST ELECTRIC: CPT

## 2020-10-01 PROCEDURE — 83735 ASSAY OF MAGNESIUM: CPT

## 2020-10-01 PROCEDURE — 80048 BASIC METABOLIC PNL TOTAL CA: CPT

## 2020-10-01 PROCEDURE — 74011000250 HC RX REV CODE- 250: Performed by: ANESTHESIOLOGY

## 2020-10-01 PROCEDURE — 84100 ASSAY OF PHOSPHORUS: CPT

## 2020-10-01 PROCEDURE — 36430 TRANSFUSION BLD/BLD COMPNT: CPT

## 2020-10-01 PROCEDURE — 65660000000 HC RM CCU STEPDOWN

## 2020-10-01 PROCEDURE — 97530 THERAPEUTIC ACTIVITIES: CPT

## 2020-10-01 PROCEDURE — 77030003657 HC NDL SCLER BSC -B: Performed by: INTERNAL MEDICINE

## 2020-10-01 PROCEDURE — 77030008565 HC TBNG SUC IRR ERBE -B: Performed by: INTERNAL MEDICINE

## 2020-10-01 PROCEDURE — 97116 GAIT TRAINING THERAPY: CPT

## 2020-10-01 PROCEDURE — 2709999900 HC NON-CHARGEABLE SUPPLY: Performed by: INTERNAL MEDICINE

## 2020-10-01 PROCEDURE — 74011000250 HC RX REV CODE- 250: Performed by: NURSE PRACTITIONER

## 2020-10-01 PROCEDURE — 76060000032 HC ANESTHESIA 0.5 TO 1 HR: Performed by: INTERNAL MEDICINE

## 2020-10-01 PROCEDURE — 74011250636 HC RX REV CODE- 250/636: Performed by: ANESTHESIOLOGY

## 2020-10-01 PROCEDURE — 99100 ANES PT EXTEME AGE<1 YR&>70: CPT | Performed by: ANESTHESIOLOGY

## 2020-10-01 PROCEDURE — P9016 RBC LEUKOCYTES REDUCED: HCPCS

## 2020-10-01 PROCEDURE — 77030020018 HC MRKR ENDOSC SPOT 5ML SYR GISP -B: Performed by: INTERNAL MEDICINE

## 2020-10-01 PROCEDURE — 74011250636 HC RX REV CODE- 250/636: Performed by: INTERNAL MEDICINE

## 2020-10-01 PROCEDURE — 77030021593 HC FCPS BIOP ENDOSC BSC -A: Performed by: INTERNAL MEDICINE

## 2020-10-01 PROCEDURE — 74011250637 HC RX REV CODE- 250/637: Performed by: NURSE PRACTITIONER

## 2020-10-01 PROCEDURE — 00811 ANES LWR INTST NDSC NOS: CPT | Performed by: ANESTHESIOLOGY

## 2020-10-01 PROCEDURE — 2709999900 HC NON-CHARGEABLE SUPPLY

## 2020-10-01 PROCEDURE — 88305 TISSUE EXAM BY PATHOLOGIST: CPT

## 2020-10-01 PROCEDURE — 87635 SARS-COV-2 COVID-19 AMP PRB: CPT

## 2020-10-01 PROCEDURE — 0DBL8ZZ EXCISION OF TRANSVERSE COLON, VIA NATURAL OR ARTIFICIAL OPENING ENDOSCOPIC: ICD-10-PCS | Performed by: INTERNAL MEDICINE

## 2020-10-01 PROCEDURE — 74011250637 HC RX REV CODE- 250/637: Performed by: INTERNAL MEDICINE

## 2020-10-01 PROCEDURE — 77030013992 HC SNR POLYP ENDOSC BSC -B: Performed by: INTERNAL MEDICINE

## 2020-10-01 PROCEDURE — 76040000007: Performed by: INTERNAL MEDICINE

## 2020-10-01 PROCEDURE — 99223 1ST HOSP IP/OBS HIGH 75: CPT | Performed by: COLON & RECTAL SURGERY

## 2020-10-01 PROCEDURE — 0DBK8ZX EXCISION OF ASCENDING COLON, VIA NATURAL OR ARTIFICIAL OPENING ENDOSCOPIC, DIAGNOSTIC: ICD-10-PCS | Performed by: INTERNAL MEDICINE

## 2020-10-01 PROCEDURE — 0DBL8ZX EXCISION OF TRANSVERSE COLON, VIA NATURAL OR ARTIFICIAL OPENING ENDOSCOPIC, DIAGNOSTIC: ICD-10-PCS | Performed by: INTERNAL MEDICINE

## 2020-10-01 PROCEDURE — 74176 CT ABD & PELVIS W/O CONTRAST: CPT

## 2020-10-01 PROCEDURE — 86900 BLOOD TYPING SEROLOGIC ABO: CPT

## 2020-10-01 PROCEDURE — 97535 SELF CARE MNGMENT TRAINING: CPT

## 2020-10-01 PROCEDURE — 99232 SBSQ HOSP IP/OBS MODERATE 35: CPT | Performed by: NURSE PRACTITIONER

## 2020-10-01 PROCEDURE — 77030029384 HC SNR POLYP CAPTVR II BSC -B: Performed by: INTERNAL MEDICINE

## 2020-10-01 PROCEDURE — 85025 COMPLETE CBC W/AUTO DIFF WBC: CPT

## 2020-10-01 RX ORDER — PROPOFOL 10 MG/ML
INJECTION, EMULSION INTRAVENOUS AS NEEDED
Status: DISCONTINUED | OUTPATIENT
Start: 2020-10-01 | End: 2020-10-01 | Stop reason: HOSPADM

## 2020-10-01 RX ORDER — LIDOCAINE HYDROCHLORIDE 20 MG/ML
INJECTION, SOLUTION EPIDURAL; INFILTRATION; INTRACAUDAL; PERINEURAL AS NEEDED
Status: DISCONTINUED | OUTPATIENT
Start: 2020-10-01 | End: 2020-10-01 | Stop reason: HOSPADM

## 2020-10-01 RX ORDER — SODIUM CHLORIDE 9 MG/ML
250 INJECTION, SOLUTION INTRAVENOUS AS NEEDED
Status: DISCONTINUED | OUTPATIENT
Start: 2020-10-01 | End: 2020-10-02 | Stop reason: ALTCHOICE

## 2020-10-01 RX ORDER — PHENYLEPHRINE HCL IN 0.9% NACL 1 MG/10 ML
SYRINGE (ML) INTRAVENOUS AS NEEDED
Status: DISCONTINUED | OUTPATIENT
Start: 2020-10-01 | End: 2020-10-01 | Stop reason: HOSPADM

## 2020-10-01 RX ORDER — SODIUM CHLORIDE 9 MG/ML
INJECTION, SOLUTION INTRAVENOUS
Status: DISCONTINUED | OUTPATIENT
Start: 2020-10-01 | End: 2020-10-01 | Stop reason: HOSPADM

## 2020-10-01 RX ORDER — FAMOTIDINE 20 MG/1
20 TABLET, FILM COATED ORAL ONCE
Status: ACTIVE | OUTPATIENT
Start: 2020-10-01 | End: 2020-10-01

## 2020-10-01 RX ORDER — EPHEDRINE SULFATE/0.9% NACL/PF 50 MG/5 ML
SYRINGE (ML) INTRAVENOUS AS NEEDED
Status: DISCONTINUED | OUTPATIENT
Start: 2020-10-01 | End: 2020-10-01 | Stop reason: HOSPADM

## 2020-10-01 RX ORDER — DEXTROMETHORPHAN/PSEUDOEPHED 2.5-7.5/.8
DROPS ORAL AS NEEDED
Status: DISCONTINUED | OUTPATIENT
Start: 2020-10-01 | End: 2020-10-01 | Stop reason: HOSPADM

## 2020-10-01 RX ADMIN — ACETAMINOPHEN 650 MG: 325 TABLET ORAL at 05:39

## 2020-10-01 RX ADMIN — POLYETHYLENE GLYCOL 3350, SODIUM SULFATE ANHYDROUS, SODIUM BICARBONATE, SODIUM CHLORIDE, POTASSIUM CHLORIDE 2000 ML: 236; 22.74; 6.74; 5.86; 2.97 POWDER, FOR SOLUTION ORAL at 03:39

## 2020-10-01 RX ADMIN — SODIUM CHLORIDE: 900 INJECTION, SOLUTION INTRAVENOUS at 09:34

## 2020-10-01 RX ADMIN — NEPHROCAP 1 CAPSULE: 1 CAP ORAL at 08:40

## 2020-10-01 RX ADMIN — FERROUS SULFATE TAB 325 MG (65 MG ELEMENTAL FE) 325 MG: 325 (65 FE) TAB at 08:40

## 2020-10-01 RX ADMIN — PROPOFOL 50 MG: 10 INJECTION, EMULSION INTRAVENOUS at 10:05

## 2020-10-01 RX ADMIN — Medication 10 MG: at 10:11

## 2020-10-01 RX ADMIN — Medication 100 MCG: at 10:03

## 2020-10-01 RX ADMIN — Medication 10 ML: at 17:04

## 2020-10-01 RX ADMIN — PROPOFOL 80 MG: 10 INJECTION, EMULSION INTRAVENOUS at 09:55

## 2020-10-01 RX ADMIN — Medication 10 ML: at 06:00

## 2020-10-01 RX ADMIN — PROPOFOL 20 MG: 10 INJECTION, EMULSION INTRAVENOUS at 10:11

## 2020-10-01 RX ADMIN — PANTOPRAZOLE SODIUM 40 MG: 40 TABLET, DELAYED RELEASE ORAL at 06:38

## 2020-10-01 RX ADMIN — SODIUM CHLORIDE 75 ML/HR: 900 INJECTION, SOLUTION INTRAVENOUS at 17:03

## 2020-10-01 RX ADMIN — LIDOCAINE HYDROCHLORIDE 40 MG: 20 INJECTION, SOLUTION EPIDURAL; INFILTRATION; INTRACAUDAL; PERINEURAL at 09:55

## 2020-10-01 RX ADMIN — SODIUM CHLORIDE 75 ML/HR: 900 INJECTION, SOLUTION INTRAVENOUS at 03:39

## 2020-10-01 RX ADMIN — AMLODIPINE BESYLATE 10 MG: 10 TABLET ORAL at 08:40

## 2020-10-01 RX ADMIN — Medication 100 MCG: at 10:11

## 2020-10-01 RX ADMIN — Medication 10 ML: at 21:12

## 2020-10-01 NOTE — PROGRESS NOTES
HPI: Katy Mcmullen is a 80 y.o. male presenting with chief complain of colon mass x2. Pt recently admitted for renal failure and found to have hydronephrosis. Temporary dialysis catheter placed. Pt had significant anemia and ct showed possible mass in colon. Colonoscopy performed today showing two masses, one in ascending colon and one in hepatic flexure. Neither were obstructing. Pt states no prior colonoscopy. Denies abdominal pain, nausea or bloating. Denies gross blood per rectum. States no family history of colon cancer. Past Medical History:   Diagnosis Date    Cardiac echocardiogram 08/29/2016    EF 60-65%. No WMA. Mild LVH. Indeterminate diastolic fx. RVSP 35 mmHg. No significant valvular heart disease.  Cardiac nuclear imaging test, abnormal 08/29/2016    Intermediate risk. Previous inferior infarction w/very mild fabiana-infarct ischemia. Inferior hypk. EF 68%. Nondiagnostic EKG on pharm stress test.  Pt's BP increased from 193/96 to 207/83 during study.  Carotid duplex 08/30/2016    Mild <50% bilateral ICA stenosis.       Hypertension        Past Surgical History:   Procedure Laterality Date    COLONOSCOPY,DIAGNOSTIC  10/1/2020         COLONOSCOPY,REMV Orren Savers  10/1/2020         COLONOSCPY,FLEX,W/DIR SUBMUC INJECT  10/1/2020         IA INSJ TUNNELED CVC W/O SUBQ PORT/ AGE 5 YR/> N/A 9/28/2020    INSERTION TUNNELED CENTRAL VENOUS CATHETER performed by Suhail Beaver MD at [facility] CATH [facility]       [facility] negative for colorectal cancer    Social History     Socioeconomic History    Marital status:      Spouse name: Not on file    Number of children: Not on file    Years of education: Not on file    Highest education level: Not on file   Tobacco Use    Smoking status: Never Smoker    Smokeless tobacco: Never Used   Substance and Sexual Activity    Alcohol use: No    Drug use: No       Review of Systems - aside from above all others negative        No Known Allergies    Vitals:    10/01/20 1044 10/01/20 1054 10/01/20 1145 10/01/20 1608   BP: 137/66 (!) 137/108 (!) 147/59 (!) 144/73   Pulse: 80 89 75 100   Resp: 15 17 16 18   Temp:   97.9 °F (36.6 °C) 97.8 °F (36.6 °C)   TempSrc:       SpO2: 100% 100% 100% 98%   Weight:       Height:           Physical Exam  Constitutional:       Appearance: He is well-developed. HENT:      Head: Normocephalic and atraumatic. Eyes:      Conjunctiva/sclera: Conjunctivae normal.   Abdominal:      General: There is no distension. Palpations: Abdomen is soft. There is no mass. Tenderness: There is no abdominal tenderness. Musculoskeletal: Normal range of motion. Lymphadenopathy:      Cervical: No cervical adenopathy. Skin:     General: Skin is warm and dry. Neurological:      Sensory: No sensory deficit.    Psychiatric:         Speech: Speech normal.       CMP:   Lab Results   Component Value Date/Time     10/01/2020 05:13 AM    K 4.2 10/01/2020 05:13 AM     10/01/2020 05:13 AM    CO2 23 10/01/2020 05:13 AM    AGAP 11 10/01/2020 05:13 AM    GLU 89 10/01/2020 05:13 AM    BUN 71 (H) 10/01/2020 05:13 AM    CREA 9.85 (H) 10/01/2020 05:13 AM    GFRAA 6 (L) 10/01/2020 05:13 AM    GFRNA 5 (L) 10/01/2020 05:13 AM    CA 8.0 (L) 10/01/2020 05:13 AM    MG 1.7 10/01/2020 05:13 AM    PHOS 5.2 (H) 10/01/2020 05:13 AM     CBC:   Lab Results   Component Value Date/Time    WBC 9.7 10/01/2020 05:13 AM    HGB 6.9 (L) 10/01/2020 05:13 AM    HCT 21.1 (L) 10/01/2020 05:13 AM     10/01/2020 05:13 AM     CT personally visualized, can see hepatic flexure mass, no obstruction, no mets but non-contrast study    Colonoscopy as above with 2 masses in ascending colon and hepatic flexure, ink distal to most distal mass    Assessment / Plan    Colon mass x 2 and ARF with hydronephrosis  Recommend completing management of renal issues and discharge  May need PT at home  Follow up with me in office 2 weeks after discharge  Surgery is not urgent, and pt should recover from renal issues and from any debilitation related to his hospitalization prior to colon resection  Will advance diet  Will be high risk surgical candidate given ESRD, age    The diagnoses and plan were discussed with the patient. All questions answered. Plan of care agreed to by all concerned.

## 2020-10-01 NOTE — PROGRESS NOTES
Progress Note    Bill Gallagher  80 y.o. Admit Date: 9/25/2020  Patient Active Problem List   Diagnosis Code    Traumatic rhabdomyolysis ([facility] T79. 6XXA    CAD (coronary artery disease) I25.10    HTN (hypertension) I10    Troponin level elevated R77.8    Acute renal failure (ARF) (HCC) N17.9    ESRD (end stage renal disease) ([facility] [address] N18.6    Hyperkalemia Q30.1    Metabolic acidosis M77.2    Secondary hyperparathyroidism, renal (HCC) N25.81    Anemia D64.9    Hydronephrosis N13.30    UTI (urinary tract infection) N39.0    Postobstructive diuresis R35.8    Polyp of ascending colon K63.5           Subjective:     Patient is back from Colonoscopy & findings of colonoscopy noted. Off IVF, not Restarted yet. Has Burris   & Passing dilute Urine       A comprehensive review of systems was negative except for that written in the History of Present Illness.     Objective:     Visit Vitals  BP (!) 147/59 (BP 1 Location: Right arm, BP Patient Position: At rest)   Pulse 75   Temp 97.9 °F (36.6 °C)   Resp 16   Ht 5' 6\" (1.676 m)   Wt 70.9 kg (156 lb 6.4 oz)   SpO2 100%   BMI 25.24 kg/m²         Intake/Output Summary (Last 24 hours) at 10/1/2020 1539  Last data filed at 10/1/2020 1145  Gross per 24 hour   Intake 2720 ml   Output 1452 ml   Net 1268 ml       Current Facility-Administered Medications   Medication Dose Route Frequency Provider Last Rate Last Dose    famotidine (PEPCID) tablet 20 mg  20 mg Oral ONCE Brianne Colin MD   Stopped at 10/01/20 0914    0.9% sodium chloride infusion  75 mL/hr IntraVENous CONTINUOUS Ceasar Nunez MD 75 mL/hr at 10/01/20 0339 75 mL/hr at 10/01/20 0339    heparin (porcine) 1,000 unit/mL injection 5,000 Units  5,000 Units InterCATHeter DIALYSIS PRN Ceasar Nunez MD        epoetin brina-epbx (RETACRIT) injection 10,000 Units  10,000 Units SubCUTAneous Q MON, WED & Lauro Owens MD   10,000 Units at 09/30/20 2138    ferrous sulfate tablet 325 mg  1 Tab Oral DAILY WITH Celestina Gonzalez MD   325 mg at 10/01/20 0840    B complex-vitaminC-folic acid (NEPHROCAP) cap  1 Cap Oral DAILY Ceasar Nunez MD   1 Cap at 10/01/20 0840    amLODIPine (NORVASC) tablet 10 mg  10 mg Oral DAILY Ceasar Nunez MD   10 mg at 10/01/20 0840    sodium chloride (NS) flush 5-40 mL  5-40 mL IntraVENous Q8H Silva Beverly NP   10 mL at 10/01/20 0600    sodium chloride (NS) flush 5-40 mL  5-40 mL IntraVENous PRN Syed Beverly NP        acetaminophen (TYLENOL) tablet 650 mg  650 mg Oral Q6H PRN Radha Carias NP   650 mg at 10/01/20 7081    Or    acetaminophen (TYLENOL) suppository 650 mg  650 mg Rectal Q6H PRN Syed Beverly NP        polyethylene glycol (MIRALAX) packet 17 g  17 g Oral DAILY PRN Syed Beverly NP        promethazine (PHENERGAN) tablet 12.5 mg  12.5 mg Oral Q6H PRN Syed Beverly NP        Or    ondansetron (ZOFRAN) injection 4 mg  4 mg IntraVENous Q6H PRN Silva Beverly NP        pantoprazole (PROTONIX) tablet 40 mg  40 mg Oral ACB Silva Beverly NP   40 mg at 10/01/20 2197    labetaloL (NORMODYNE;TRANDATE) 20 mg/4 mL (5 mg/mL) injection 20 mg  20 mg IntraVENous Q5MIN PRN Radha Carias NP            Physical Exam:     Physical Exam:   General:  Alert, cooperative, no distress, appears stated age. Neck: Supple, symmetrical, trachea midline, no adenopathy, thyroid: no enlargement/tenderness/nodules, no carotid bruit and no JVD. Lungs:   Clear to auscultation bilaterally. Heart:  Regular rate and rhythm, S1, S2 normal, no murmur, click, rub or gallop. Abdomen:   Soft, non-tender. Bowel sounds normal. No masses,  No organomegaly. Extremities: Extremities normal, atraumatic, no cyanosis or edema.    Skin: Skin color, texture, turgor normal. No rashes or lesions         Data Review:    CBC w/Diff    Recent Labs     10/01/20  0513 09/30/20  0423 09/29/20  0451   WBC 9.7 10.7 7. 8   RBC 2.40* 2.63* 2.42*   HGB 6.9* 7.4* 7.0*   HCT 21.1* 22.7* 21.0*   MCV 87.9 86.3 86.8   MCH 28.8 28.1 28.9   MCHC 32.7 32.6 33.3   RDW 14.7* 15.1* 14.9*    Recent Labs     10/01/20  0513 09/30/20  0423 09/29/20  0451   MONOS 10 11* 9   EOS 3 2 3   BASOS 0 0 1   RDW 14.7* 15.1* 14.9*        Comprehensive Metabolic Profile    Recent Labs     10/01/20  0513 09/30/20  0423 09/29/20  0451    139 145   K 4.2 3.9 3.9    107 115*   CO2 23 25 17*   BUN 71* 79* 115*   CREA 9.85* 9.88* 13.90*    Recent Labs     10/01/20  0513 09/30/20  0423 09/29/20  0451   CA 8.0* 8.8 8.1*   PHOS 5.2* 4.8 7.8*            Lab Results   Component Value Date/Time    GFR est AA 6 (L) 10/01/2020 05:13 AM    GFR est non-AA 5 (L) 10/01/2020 05:13 AM    Creatinine 9.85 (H) 10/01/2020 05:13 AM    BUN 71 (H) 10/01/2020 05:13 AM    Sodium 145 10/01/2020 05:13 AM    Potassium 4.2 10/01/2020 05:13 AM    Chloride 111 10/01/2020 05:13 AM    CO2 23 10/01/2020 05:13 AM         Imaging:     Procedures/imaging: see electronic medical records for all procedures, Xrays and details which were not copied into this note but were reviewed prior to   taking my decision. Impression:       Active Hospital Problems    Diagnosis Date Noted    Polyp of ascending colon 10/01/2020    Postobstructive diuresis 09/30/2020    Hydronephrosis 09/29/2020    UTI (urinary tract infection) 09/29/2020    ESRD (end stage renal disease) ([facility] [address] 09/26/2020    Hyperkalemia 86/97/4940    Metabolic acidosis 59/50/0497    Secondary hyperparathyroidism, renal ([address] 09/26/2020    Anemia 09/26/2020    Acute renal failure (ARF) ([facility] 09/25/2020      Impression: Large/flat colon polyps are seen in the proximal colon and hepatic flexure-these are almost certainly dysplastic/precancerous; invasive cancer is also possible though surface biopsies that we have taken could sometimes miss invasive cancer.   Tattoo is applied on the rectal side of both of these polyps; if an extended right hemicolectomy is performed that includes the cecum, ileocecal valve and the tattoo burt, both of these polyps will be included in the resection specimen. Both of these polyps are not amenable to endoscopic resection-surgical resection is indicated     Recommendations:  1. Resume clear diet  2. Transfuse to keep Hgb >7  3. Recommend surgical consultation for consideration for colectomy. 4.  Await pathology-would recommend colectomy even if the biopsies do not show cancer      Plan:     Restart  IVF, follow Renal function, Transfuse 1 unit of PRBC as planned, await Repeat CT for  Hydronephrosis, no need to Dialysis, await surgery Consult.       Ney Weir MD

## 2020-10-01 NOTE — PROGRESS NOTES
Problem: Nutrition Deficit  Goal: *Optimize nutritional status  Outcome: Progressing Towards Goal     Problem: Falls - Risk of  Goal: *Absence of Falls  Description: Document Valeri Ha Fall Risk and appropriate interventions in the flowsheet. Outcome: Progressing Towards Goal  Note: Fall Risk Interventions:  Mobility Interventions: Bed/chair exit alarm    Mentation Interventions: Bed/chair exit alarm    Medication Interventions: Patient to call before getting OOB, Evaluate medications/consider consulting pharmacy, Bed/chair exit alarm    Elimination Interventions: Bed/chair exit alarm, Call light in reach    History of Falls Interventions: Bed/chair exit alarm         Problem: Pressure Injury - Risk of  Goal: *Prevention of pressure injury  Description: Document Karthikeyan Scale and appropriate interventions in the flowsheet.   Outcome: Progressing Towards Goal  Note: Pressure Injury Interventions:  Sensory Interventions: Assess changes in LOC, Keep linens dry and wrinkle-free, Maintain/enhance activity level, Minimize linen layers, Pressure redistribution bed/mattress (bed type)    Moisture Interventions: Absorbent underpads    Activity Interventions: Increase time out of bed, Pressure redistribution bed/mattress(bed type)    Mobility Interventions: Pressure redistribution bed/mattress (bed type)    Nutrition Interventions: Document food/fluid/supplement intake    Friction and Shear Interventions: Minimize layers

## 2020-10-01 NOTE — PROGRESS NOTES
Problem: Self Care Deficits Care Plan (Adult)  Goal: *Acute Goals and Plan of Care (Insert Text)  Description: Occupational Therapy Goals  Initiated 9/27/2020 within 7 day(s). 1.  Patient will perform grooming with modified independence. 2.  Patient will perform upper body dressing with modified independence. 3.  Patient will perform lower body dressing with minimal assistance  4. Patient will perform toilet transfers with contact guard assist.  5.  Patient will perform all aspects of toileting with minimal assistance/contact guard assist.    Prior Level of Function:Patient reported was modified independent with functional mobility PTA, with use of cane. Patient reported his wife helps bathe him and assists with socks/shoes. Outcome: Progressing Towards Goal   OCCUPATIONAL THERAPY TREATMENT    Patient: Anika Luevano (61 y.o. male)  Date: 10/1/2020  Diagnosis: Acute renal failure (ARF) ([facility] [address] [N17.9]   <principal problem not specified>  Procedure(s) (LRB):  COLONOSCOPY, b'x, w tattoo w polypectomy (N/A) Day of Surgery  Precautions: Fall    Chart, occupational therapy assessment, plan of care, and goals were reviewed. ASSESSMENT:  Pt is pleasant and cooperative, requesting to shave upon entry. Pt requires increase time w/bed mobility to maneuver to EOB. Pt tolerates ~ 15 minutes sitting EOB performing complex ADL grooming tasks w/set-up. Pt requires constant vc's for RLE placement on floor for improved static/dynamic sitting balance. Pt educated on energy conservation techniques and benefits of rehab. Pt requires hand over hand assist w/functional transfer to chair using stand pivot transfer technique. Pt left in chair and reinforced importance of calling for assistance.    Progression toward goals:  [x]          Improving appropriately and progressing toward goals  []          Improving slowly and progressing toward goals  []          Not making progress toward goals and plan of care will be adjusted     PLAN:  Patient continues to benefit from skilled intervention to address the above impairments. Continue treatment per established plan of care. Discharge Recommendations:  Rehab  Further Equipment Recommendations for Discharge:  shower chair and rolling walker     SUBJECTIVE:   Patient stated This feels so good to be in this chair.     OBJECTIVE DATA SUMMARY:   Cognitive/Behavioral Status:  Neurologic State: Alert  Orientation Level: Oriented X4  Cognition: Follows commands  Safety/Judgement: Fall prevention    Functional Mobility and Transfers for ADLs:   Bed Mobility:  Supine to Sit: Additional time;Stand-by assistance(w/HOB raised)  Sit to Supine: Contact guard assistance  Scooting: Contact guard assistance   Transfers:  Sit to Stand: Contact guard assistance  Bed to Chair: Minimum assistance(EOB --> chair xfer w/SPT)  Balance:  Sitting: Impaired; Without support  Sitting - Static: Fair (occasional)(fair plus)  Sitting - Dynamic: Fair (occasional)  Standing: Impaired; With support  Standing - Static: Fair  Standing - Dynamic : Fair(fair minus)    ADL Intervention:  Grooming  Position Performed: Seated edge of bed  Washing Face: Set-up  Washing Hands: Set-up  Brushing Teeth: Set-up  Shaving: Set-up    Cognitive Retraining  Safety/Judgement: Fall prevention    Pain:  Pain level pre-treatment: 0/10   Pain level post-treatment: 0/10    Activity Tolerance:    Good    Please refer to the flowsheet for vital signs taken during this treatment. After treatment:   [x]  Patient left in no apparent distress sitting up in chair  []  Patient left in no apparent distress in bed  [x]  Call bell left within reach  []  Nursing notified  []  Caregiver present  []  Bed alarm activated    COMMUNICATION/EDUCATION:   [] Role of Occupational Therapy in the acute care setting  [] Home safety education was provided and the patient/caregiver indicated understanding.   [] Patient/family have participated as able in working towards goals and plan of care. [x] Patient/family agree to work toward stated goals and plan of care. [] Patient understands intent and goals of therapy, but is neutral about his/her participation. [] Patient is unable to participate in goal setting and plan of care.       Thank you for this referral.  LISA Sarabia  Time Calculation: 23 mins

## 2020-10-01 NOTE — PROGRESS NOTES
Nutrition Assessment     Type and Reason for Visit: Initial, Consult(Oral Nutrition Supplements)    Nutrition Recommendations/Plan:   - Monitor GI symptoms and readiness for diet advancement  - Modify supplements: change to Ensure Clear once daily, Gelatein BID      Nutrition Assessment:  Pt off unit at time of visit. s/p colonoscopy today due to results of colonic mass found per CT imaging. Was NPO for procedure today; started on clear liquid diet per GI. did have fair/good intake of solid foods during previous days per chart documentation    Malnutrition Assessment:  Malnutrition Status: No malnutrition     Estimated Daily Nutrient Needs:  Energy (kcal):  5509-2344  Protein (g):  59-89       Fluid (ml/day):  5169-5974    Nutrition Related Findings:  BM 10/1. Labs: Phos 5.2 mg/dL (improving); Cr 9.85 mg/dL (improving). Not on HD at this time. medications: neprocap, ferrous sulfate      Current Nutrition Therapies:  DIET NUTRITIONAL SUPPLEMENTS Dinner; Nepro  DIET NUTRITIONAL SUPPLEMENTS HS Snack; Nepro  DIET CLEAR LIQUID    Anthropometric Measures:  · Height:  5' 6\" (167.6 cm)  · Current Body Wt:  81.2 kg (179 lb)  · BMI: 28.9    Nutrition Diagnosis:   · Predicted inadequate energy intake related to renal dysfunction(decreased appetite and weakness PTA) as evidenced by (consulted by provider for oral supplements)    · Inadequate energy intake related to (insufficient calorie diet due to colonic mass) as evidenced by NPO or clear liquid status due to medical condition      Nutrition Intervention:  Food and/or Nutrient Delivery: Continue current diet, Modify oral nutrition supplement, Vitamin supplement  Nutrition Education and Counseling: Education not indicated  Coordination of Nutrition Care: Continued inpatient monitoring    Goals:  PO nutrition intake will meet >75% of patient estimated nutritional needs within the next 7 days.        Nutrition Monitoring and Evaluation:   Food/Nutrient Intake Outcomes: Diet advancement/tolerance, Food and nutrient intake, Supplement intake, Vitamin/mineral intake  Physical Signs/Symptoms Outcomes: Biochemical data, Fluid status or edema, Nutrition focused physical findings    Discharge Planning:     Too soon to determine     Electronically signed by José Solomon RD on 10/1/2020 at 2:54 PM    Contact Number:  [phone]

## 2020-10-01 NOTE — PROGRESS NOTES
Chart reviewed. CT not able to be performed given the events of the day. Will see tomorrow once completed.

## 2020-10-01 NOTE — ANESTHESIA PREPROCEDURE EVALUATION
Relevant Problems   No relevant active problems       Anesthetic History   No history of anesthetic complications            Review of Systems / Medical History  Patient summary reviewed and pertinent labs reviewed    Pulmonary  Within defined limits                 Neuro/Psych   Within defined limits           Cardiovascular    Hypertension          CAD         GI/Hepatic/Renal  Within defined limits              Endo/Other  Within defined limits           Other Findings              Physical Exam    Airway  Mallampati: II  TM Distance: 4 - 6 cm  Neck ROM: normal range of motion   Mouth opening: Normal     Cardiovascular    Rhythm: regular  Rate: normal         Dental    Dentition: Poor dentition     Pulmonary  Breath sounds clear to auscultation               Abdominal  GI exam deferred       Other Findings            Anesthetic Plan    ASA: 4  Anesthesia type: MAC          Induction: Intravenous  Anesthetic plan and risks discussed with: Patient

## 2020-10-01 NOTE — PROGRESS NOTES
Problem: Nutrition Deficit  Goal: *Optimize nutritional status  Outcome: Progressing Towards Goal     Problem: Falls - Risk of  Goal: *Absence of Falls  Description: Document Edmonia Morning Fall Risk and appropriate interventions in the flowsheet. Outcome: Progressing Towards Goal  Note: Fall Risk Interventions:  Mobility Interventions: Bed/chair exit alarm    Mentation Interventions: Bed/chair exit alarm    Medication Interventions: Bed/chair exit alarm    Elimination Interventions: Bed/chair exit alarm    History of Falls Interventions: Bed/chair exit alarm         Problem: Pressure Injury - Risk of  Goal: *Prevention of pressure injury  Description: Document Karthikeyan Scale and appropriate interventions in the flowsheet.   Outcome: Progressing Towards Goal  Note: Pressure Injury Interventions:  Sensory Interventions: Assess changes in LOC    Moisture Interventions: Absorbent underpads    Activity Interventions: Increase time out of bed    Mobility Interventions: HOB 30 degrees or less    Nutrition Interventions: Document food/fluid/supplement intake    Friction and Shear Interventions: Minimize layers

## 2020-10-01 NOTE — PROGRESS NOTES
[facility]  Progress Note    Patient: Robert Maya Age: 80 y.o. : 1935 MR#: [id number] SSN: [id number]  Date: 10/1/2020     Subjective/24-hour events:     Patient seen and evaluated, lying in bed, no acute distress, denies SOB, CP, n/v/abd pain / \"feels fine\"    Assessment:   CHARLOTTE on CKD, now ESRD  Secondary hyperparathyroidism  Metabolic acidosis  Troponin elevation, no ACS  HTN  Anemia, etiology uncertain  OA  Advanced age  Ascending flexure colonic mass     Plan:     S/p HD catheter placement per vascular to [facility]  Renal US ordered w/ bilateral hydronephrosis, urology following, cont urinary catheter - plan for repeat CT w/o contrast   No plan for ischemia workup per cardiology. Blood pressure better controlled, continue current medications. Continue rocephin, - Urine culture grew out E coli - sensitive to Rocephin. GI consulted for colonic mass - in process of colonoscopy today per GI, discussed with GI fine for hgb of 6.9 as no active bleeding  1 unit of PRBC's ordered, VM left with wife to discuss; prior tx earlier in admission (will complete consents once discussed with wife)  PT/OT, mobilize as tolerated    VM left for wife Romeo Colon at, will retry later today  [phone]       Case discussed with:  [x]Patient  []Family  [x]Nursing  []Case Management  DVT Prophylaxis:  []Lovenox  []Hep SQ  [x]SCDs  []Coumadin   []On Heparin gtt    Objective:   VS:   Visit Vitals  BP (!) 149/67 (BP 1 Location: Right arm, BP Patient Position: At rest)   Pulse 74   Temp 98.8 °F (37.1 °C)   Resp 18   Ht 5' 6\" (1.676 m)   Wt 70.9 kg (156 lb 6.4 oz)   SpO2 99%   BMI 25.24 kg/m²      Tmax/24hrs: Temp (24hrs), Av.4 °F (36.9 °C), Min:97.2 °F (36.2 °C), Max:99.2 °F (37.3 °C)      Intake/Output Summary (Last 24 hours) at 10/1/2020 0801  Last data filed at 10/1/2020 0604  Gross per 24 hour   Intake 2610 ml   Output 1877 ml   Net 733 ml       General:  In NAD. Nontoxic-appearing. Cardiovascular:  RRR. Pulmonary:  Lungs clear bilaterally, no wheezes. GI:  Abdomen soft, NTTP. Extremities:  Warm, no edema or ischemia. TDC to [facility] C/D/I  Neuro:  Awake and alert to person, place very limited to time and situation. Moves extremities spontaneously. Labs:    Recent Results (from the past 24 hour(s))   METABOLIC PANEL, BASIC    Collection Time: 10/01/20  5:13 AM   Result Value Ref Range    Sodium 145 136 - 145 mmol/L    Potassium 4.2 3.5 - 5.5 mmol/L    Chloride 111 100 - 111 mmol/L    CO2 23 21 - 32 mmol/L    Anion gap 11 3.0 - 18 mmol/L    Glucose 89 74 - 99 mg/dL    BUN 71 (H) 7.0 - 18 MG/DL    Creatinine 9.85 (H) 0.6 - 1.3 MG/DL    BUN/Creatinine ratio 7 (L) 12 - 20      GFR est AA 6 (L) >60 ml/min/1.73m2    GFR est non-AA 5 (L) >60 ml/min/1.73m2    Calcium 8.0 (L) 8.5 - 10.1 MG/DL   MAGNESIUM    Collection Time: 10/01/20  5:13 AM   Result Value Ref Range    Magnesium 1.7 1.6 - 2.6 mg/dL   CBC WITH AUTOMATED DIFF    Collection Time: 10/01/20  5:13 AM   Result Value Ref Range    WBC 9.7 4.6 - 13.2 K/uL    RBC 2.40 (L) 4.70 - 5.50 M/uL    HGB 6.9 (L) 13.0 - 16.0 g/dL    HCT 21.1 (L) 36.0 - 48.0 %    MCV 87.9 74.0 - 97.0 FL    MCH 28.8 24.0 - 34.0 PG    MCHC 32.7 31.0 - 37.0 g/dL    RDW 14.7 (H) 11.6 - 14.5 %    PLATELET 644 586 - 081 K/uL    MPV 11.9 (H) 9.2 - 11.8 FL    NEUTROPHILS 70 40 - 73 %    LYMPHOCYTES 17 (L) 21 - 52 %    MONOCYTES 10 3 - 10 %    EOSINOPHILS 3 0 - 5 %    BASOPHILS 0 0 - 2 %    ABS. NEUTROPHILS 6.7 1.8 - 8.0 K/UL    ABS. LYMPHOCYTES 1.6 0.9 - 3.6 K/UL    ABS. MONOCYTES 1.0 0.05 - 1.2 K/UL    ABS. EOSINOPHILS 0.3 0.0 - 0.4 K/UL    ABS.  BASOPHILS 0.0 0.0 - 0.1 K/UL    DF AUTOMATED     PHOSPHORUS    Collection Time: 10/01/20  5:13 AM   Result Value Ref Range    Phosphorus 5.2 (H) 2.5 - 4.9 MG/DL       Signed By: Sade Corea NP     October 1, 2020

## 2020-10-01 NOTE — PROGRESS NOTES
Problem: Mobility Impaired (Adult and Pediatric)  Goal: *Acute Goals and Plan of Care (Insert Text)  Description: Physical Therapy Goals  Initiated 9/27/2020 and to be accomplished within 7 day(s)  1. Patient will move from supine to sit and sit to supine , scoot up and down, and roll side to side in bed with supervision/set-up. 2.  Patient will transfer from bed to chair and chair to bed with minimal assistance/contact guard assist using the least restrictive device. 3.  Patient will perform sit to stand with supervision/set-up. 4.  Patient will ambulate with minimal assistance/contact guard assist for 80 feet with the least restrictive device. 5.  Patient will ascend/descend 3 stairs with unilateral handrail(s) with minimal assistance/contact guard assist.    PLOF: Pt confused and lethargic throughout evaluation, reporting using cane at home. She lives with wife who assists with his ADLs. Lives in 1 story house with 3 AJAY, also has RW available at home. Outcome: Progressing Towards Goal     PHYSICAL THERAPY TREATMENT    Patient: Wen Fernando (86 y.o. male)  Date: 10/1/2020  Diagnosis: Acute renal failure (ARF) ([facility] [address] [N17.9]   <principal problem not specified>  Procedure(s) (LRB):  COLONOSCOPY (N/A) Day of Surgery  Precautions: Fall    ASSESSMENT:  RN cleared for PT to work with pt. Pt found supine in bed, RN in room, catheter in place, willing to work with PT prior to going off unit for colonoscopy. Pt able to perform supine-sit with CGA. Once sitting, patient presents with decreased balance and tends to lean on his R arm, but able to sit well over time with cueing. Pt able to stand with CGA with RW, but requested to sit back down to put his shoes on. Pt has a shoe lift in his left shoe for his left leg length discrepancy. RN notified patient should be wearing his shoes with lift during all standing/amb activities. PT donned shoes on dependently as patient unable to reach down to his feet.  Pt stood again with CGA and RW. Once standing, patient utilizing bed behind legs for support, cueing to step forward, patient able to step and remain standing without assist from bed. Pt took total of 10 side steps L/R fwd/bwd near bed with increased difficulty to left side due to poor foot clearance on L LE. Pt has a step-to pattern and requires assist for walker navigation and cueing to keep RW closer to him. Pt returned near bed and requested to walk more. Pt ambulated total of 30 feet around bed within room with RW and minAx1. Pt has trouble moving feet with the walker during turns and required cueing to slow down for safety. Pt states he wants to use his cane but edu on importance of showing safety with RW first as it is more stable. Pt back sitting EOB and performed exercises per flow chart. Transport and RN in room at end of session to take patient for colonoscopy. Pt returned back supine in bed CGA, call bell nearby, no new questions or concerns. Recommend rehab for this patient for all functional mobility. Progression toward goals:   []      Improving appropriately and progressing toward goals  [x]      Improving slowly and progressing toward goals  []      Not making progress toward goals and plan of care will be adjusted     PLAN:  Patient continues to benefit from skilled intervention to address the above impairments. Continue treatment per established plan of care. Discharge Recommendations:  Rehab  Further Equipment Recommendations for Discharge:  N/A     SUBJECTIVE:   Patient stated I want to get back to using my cane.     OBJECTIVE DATA SUMMARY:   Critical Behavior:  Neurologic State: Alert  Orientation Level: Oriented X4  Cognition: Follows commands  Safety/Judgement: Fall prevention  Functional Mobility Training:  Bed Mobility:     Supine to Sit: Contact guard assistance  Sit to Supine: Contact guard assistance  Scooting: Contact guard assistance    Transfers:  Sit to Stand: Contact guard assistance  Stand to Sit: Contact guard assistance    Balance:  Sitting: Impaired; Without support  Sitting - Static: Fair (occasional)  Sitting - Dynamic: Fair (occasional)  Standing: Impaired; With support  Standing - Static: Fair  Standing - Dynamic : Poor   Ambulation/Gait Training:  Distance (ft): 30 Feet (ft)  Assistive Device: Walker, rolling  Ambulation - Level of Assistance: Minimal assistance;Assist x1        Gait Abnormalities: Decreased step clearance;Trunk sway increased        Base of Support: Narrowed        Step Length: Right shortened;Left shortened  Therapeutic Exercises:   Pt performed exercises per flow sheet sitting Eob at end of session      EXERCISE   Sets   Reps   Active Active Assist   Passive Self ROM   Comments   Ankle Pumps    [] [] [] []    Quad Sets/Glut Sets    [] [] [] [] Hold for 5 secs   Hamstring Sets   [] [] [] []    Short Arc Quads   [] [] [] []    Heel Slides   [] [] [] []    Straight Leg Raises   [] [] [] []    Hip Add   [] [] [] [] Hold for 5 secs, w/ pillow squeeze   Long Arc Quads 1 10 [x] [] [] []    Seated Marching 1 10 [x] [] [] []    Standing Marching   [] [] [] []       [] [] [] []        Pain:  Pain level pre-treatment: 10/10- left knee pain  Pain level post-treatment: 10/10   Pain Intervention(s): Medication (see MAR); Rest, Repositioning   Response to intervention: Nurse notified, See doc flow    Activity Tolerance:   Pt tolerated mobility well, no knee buckling but still requires assist for safety and RW management  Please refer to the flowsheet for vital signs taken during this treatment. After treatment:   [] Patient left in no apparent distress sitting up in chair  [x] Patient left in no apparent distress in bed  [x] Call bell left within reach  [x] Nursing notified  [x] Caregiver present  [] Bed alarm activated  [] SCDs applied      COMMUNICATION/EDUCATION:   [x]         Role of Physical Therapy in the acute care setting.   [x]         Fall prevention education was provided and the patient/caregiver indicated understanding. [x]         Patient/family have participated as able in working toward goals and plan of care. []         Patient/family agree to work toward stated goals and plan of care. []         Patient understands intent and goals of therapy, but is neutral about his/her participation.   []         Patient is unable to participate in stated goals/plan of care: ongoing with therapy staff.  []         Other:        Everet Comings   Time Calculation: 23 mins

## 2020-10-01 NOTE — H&P
History and physical has been reviewed. The patient has been examined. There have been no significant clinical changes since the completion of the originally dated History and Physical.    Addendum: All lab tests orders pertaining to the procedure have been ordered by the anesthesia personnel and results will be addressed by the anesthesia team    Bhargav Bravo MD  [facility].  [email or web address]  Phone: [phone]  Cell: [phone]  [email or web address]

## 2020-10-01 NOTE — PROCEDURES
[email or web address]  [facility] [place] [place], [address]    Colonoscopy Procedure Note                 Indications:    colon mass on CT     :  José Miguel Vick MD    Referring Provider: Tiffany Swartz MD    Sedation:  MAC anesthesia    Procedure Details:  After informed consent was obtained with all risks and benefits of procedure explained and preoperative exam completed, the patient was taken to the endoscopy suite and placed in the left lateral decubitus position. Upon sequential sedation as per above, a digital rectal exam was performed and was normal.  The Olympus videocolonoscope was inserted in the rectum and carefully advanced to the cecum, which was identified by the ileocecal valve and appendiceal orifice. The quality of preparation was adequate. The colonoscope was slowly withdrawn with careful evaluation between folds. Retroflexion in the rectum was performed and was normal.    Findings:   Rectum: Small internal hemorrhoids noted. No bleeding seen  Sigmoid: normal  Descending Colon: normal  Transverse Colon: Single 6 mm sessile polyp seen in the transverse colon-endoscopic appearance was suggestive of a tubular adenoma-this was removed completely with cold snare polypectomy and retrieved. Hepatic flexure: In the region of the hepatic flexure, there is a flat, 3 to 4 cm polypoid mass. There are central areas of depression in this polyp. No surface ulceration seen. There is no bleeding at this time. The appearance of this polyp is very suggestive with dysplastic polyp, cancer cannot be ruled out. Multiple cold forcep biopsies performed for histologic evaluation. Spot dye was injected in 3 different orientations on the rectal side of this large polyp-this is about 1 or 2 folds on the rectal side. Ascending Colon:  In the proximal ascending colon, 1 or 2 folds on the rectal side of the ileocecal valve, there is a large mass-this is at least 6 to 8 cm or so in size, it spans at least 1 or 2 folds and occupies about 75% of the circumference. There are areas in this polyp that have central depression-this is at least a precancerous/dysplastic polyp in the colon no surface ulceration/bleeding seen-multiple biopsies taken. Cecum: normal  Terminal Ileum: Briefly intubated-appearance seems to be normal.    Interventions:  See below    Specimen Removed:    ID Type Source Tests Collected by Time Destination   1 : Ascending colon mass bx's Preservative Colon  Hilary Trevizo MD 10/1/2020 1015 Pathology   2 : hepatic flexure colon mass bx's Preservative Colon  Hilary Trevizo MD 10/1/2020 1017 Pathology   3 : transverse colon polyp Preservative Colon  Hilary Trevizo MD 10/1/2020 1018 Pathology       Complications: None. EBL:  None. Impression: Large/flat colon polyps are seen in the proximal colon and hepatic flexure-these are almost certainly dysplastic/precancerous; invasive cancer is also possible though surface biopsies that we have taken could sometimes miss invasive cancer. Tattoo is applied on the rectal side of both of these polyps; if an extended right hemicolectomy is performed that includes the cecum, ileocecal valve and the tattoo burt, both of these polyps will be included in the resection specimen. Both of these polyps are not amenable to endoscopic resection-surgical resection is indicated    Recommendations:  1. Resume clear diet  2. Transfuse to keep Hgb >7  3. Recommend surgical consultation for consideration for colectomy. 4.  Await pathology-would recommend colectomy even if the biopsies do not show cancer.      Bhargav Bravo MD  10/1/2020  10:30 AM

## 2020-10-01 NOTE — ANESTHESIA POSTPROCEDURE EVALUATION
Procedure(s):  COLONOSCOPY, b'x, w tattoo w polypectomy. MAC    Anesthesia Post Evaluation      Multimodal analgesia: multimodal analgesia used between 6 hours prior to anesthesia start to PACU discharge  Patient location during evaluation: bedside  Patient participation: complete - patient cannot participate  Level of consciousness: awake  Pain score: 1  Pain management: adequate  Airway patency: patent  Anesthetic complications: no  Cardiovascular status: acceptable  Respiratory status: acceptable  Hydration status: acceptable  Post anesthesia nausea and vomiting:  none      INITIAL Post-op Vital signs:   Vitals Value Taken Time   /108 10/1/2020 10:54 AM   Temp     Pulse 87 10/1/2020 10:56 AM   Resp 17 10/1/2020 10:56 AM   SpO2 100 % 10/1/2020 10:56 AM   Vitals shown include unvalidated device data.

## 2020-10-01 NOTE — ROUTINE PROCESS
Bedside shift change report given to Teetee Wiseman RN (oncoming nurse) by Conor Horne RN and Galindo Gilmore RN (offgoing nurse). Report included the following information SBAR, Kardex, MAR and Recent Results.

## 2020-10-01 NOTE — ROUTINE PROCESS
TRANSFER - IN REPORT: 
 
Verbal report received from [address] [place] [address] RN(name) on Mamta Music  being received from [facility](unit) for ordered procedure Report consisted of patients Situation, Background, Assessment and  
Recommendations(SBAR). Information from the following report(s) SBAR and Kardex was reviewed with the receiving nurse. Opportunity for questions and clarification was provided. Assessment completed upon patients arrival to unit and care assumed.

## 2020-10-01 NOTE — ROUTINE PROCESS
Bedside shift change report given to Agustín Ortiz RN and Madeline Kerr RN (oncoming nurse) by Errol Walker RN (offgoing nurse). Report included the following information SBAR, Kardex, MAR, Procedure Verification and Quality Measures.

## 2020-10-02 LAB
ABO + RH BLD: NORMAL
ANION GAP SERPL CALC-SCNC: 11 MMOL/L (ref 3–18)
BASOPHILS # BLD: 0 K/UL (ref 0–0.1)
BASOPHILS NFR BLD: 0 % (ref 0–2)
BLD PROD TYP BPU: NORMAL
BLOOD GROUP ANTIBODIES SERPL: NORMAL
BPU ID: NORMAL
BUN SERPL-MCNC: 65 MG/DL (ref 7–18)
BUN/CREAT SERPL: 7 (ref 12–20)
CALCIUM SERPL-MCNC: 8.2 MG/DL (ref 8.5–10.1)
CALLED TO:,BCALL1: NORMAL
CHLORIDE SERPL-SCNC: 115 MMOL/L (ref 100–111)
CO2 SERPL-SCNC: 20 MMOL/L (ref 21–32)
CREAT SERPL-MCNC: 9.64 MG/DL (ref 0.6–1.3)
CROSSMATCH RESULT,%XM: NORMAL
DIFFERENTIAL METHOD BLD: ABNORMAL
EOSINOPHIL # BLD: 0.3 K/UL (ref 0–0.4)
EOSINOPHIL NFR BLD: 3 % (ref 0–5)
ERYTHROCYTE [DISTWIDTH] IN BLOOD BY AUTOMATED COUNT: 14.6 % (ref 11.6–14.5)
GLUCOSE SERPL-MCNC: 84 MG/DL (ref 74–99)
HCT VFR BLD AUTO: 24.4 % (ref 36–48)
HGB BLD-MCNC: 7.8 G/DL (ref 13–16)
LYMPHOCYTES # BLD: 1.5 K/UL (ref 0.9–3.6)
LYMPHOCYTES NFR BLD: 16 % (ref 21–52)
MAGNESIUM SERPL-MCNC: 1.7 MG/DL (ref 1.6–2.6)
MCH RBC QN AUTO: 28.6 PG (ref 24–34)
MCHC RBC AUTO-ENTMCNC: 32 G/DL (ref 31–37)
MCV RBC AUTO: 89.4 FL (ref 74–97)
MONOCYTES # BLD: 0.7 K/UL (ref 0.05–1.2)
MONOCYTES NFR BLD: 7 % (ref 3–10)
NEUTS SEG # BLD: 7.2 K/UL (ref 1.8–8)
NEUTS SEG NFR BLD: 74 % (ref 40–73)
PHOSPHATE SERPL-MCNC: 5.2 MG/DL (ref 2.5–4.9)
PLATELET # BLD AUTO: 169 K/UL (ref 135–420)
PMV BLD AUTO: 11.8 FL (ref 9.2–11.8)
POTASSIUM SERPL-SCNC: 4.3 MMOL/L (ref 3.5–5.5)
RBC # BLD AUTO: 2.73 M/UL (ref 4.7–5.5)
SODIUM SERPL-SCNC: 146 MMOL/L (ref 136–145)
SPECIMEN EXP DATE BLD: NORMAL
STATUS OF UNIT,%ST: NORMAL
UNIT DIVISION, %UDIV: 0
WBC # BLD AUTO: 9.7 K/UL (ref 4.6–13.2)

## 2020-10-02 PROCEDURE — 80048 BASIC METABOLIC PNL TOTAL CA: CPT

## 2020-10-02 PROCEDURE — 83735 ASSAY OF MAGNESIUM: CPT

## 2020-10-02 PROCEDURE — 74011250636 HC RX REV CODE- 250/636: Performed by: INTERNAL MEDICINE

## 2020-10-02 PROCEDURE — 36415 COLL VENOUS BLD VENIPUNCTURE: CPT

## 2020-10-02 PROCEDURE — 90935 HEMODIALYSIS ONE EVALUATION: CPT

## 2020-10-02 PROCEDURE — 77030038269 HC DRN EXT URIN PURWCK BARD -A

## 2020-10-02 PROCEDURE — 74011000250 HC RX REV CODE- 250: Performed by: HOSPITALIST

## 2020-10-02 PROCEDURE — 74011250637 HC RX REV CODE- 250/637: Performed by: INTERNAL MEDICINE

## 2020-10-02 PROCEDURE — 99232 SBSQ HOSP IP/OBS MODERATE 35: CPT | Performed by: HOSPITALIST

## 2020-10-02 PROCEDURE — 99233 SBSQ HOSP IP/OBS HIGH 50: CPT | Performed by: INTERNAL MEDICINE

## 2020-10-02 PROCEDURE — 74011250637 HC RX REV CODE- 250/637: Performed by: NURSE PRACTITIONER

## 2020-10-02 PROCEDURE — 84100 ASSAY OF PHOSPHORUS: CPT

## 2020-10-02 PROCEDURE — 65660000000 HC RM CCU STEPDOWN

## 2020-10-02 PROCEDURE — 85025 COMPLETE CBC W/AUTO DIFF WBC: CPT

## 2020-10-02 RX ADMIN — NEPHROCAP 1 CAPSULE: 1 CAP ORAL at 08:31

## 2020-10-02 RX ADMIN — BACITRACIN ZINC, NEOMYCIN, POLYMYXIN B: 400; 3.5; 5 OINTMENT TOPICAL at 19:58

## 2020-10-02 RX ADMIN — AMLODIPINE BESYLATE 10 MG: 10 TABLET ORAL at 08:31

## 2020-10-02 RX ADMIN — PANTOPRAZOLE SODIUM 40 MG: 40 TABLET, DELAYED RELEASE ORAL at 08:31

## 2020-10-02 RX ADMIN — FERROUS SULFATE TAB 325 MG (65 MG ELEMENTAL FE) 325 MG: 325 (65 FE) TAB at 08:31

## 2020-10-02 RX ADMIN — EPOETIN ALFA-EPBX 10000 UNITS: 10000 INJECTION, SOLUTION INTRAVENOUS; SUBCUTANEOUS at 21:26

## 2020-10-02 RX ADMIN — Medication 10 ML: at 06:00

## 2020-10-02 RX ADMIN — Medication 10 ML: at 21:25

## 2020-10-02 RX ADMIN — Medication 10 ML: at 13:53

## 2020-10-02 NOTE — PROGRESS NOTES
Urology Progress Note        I have seen and examined this patient independently, I reviewed pertinent labs and imaging, and I agree with the assessing provider's assessment and plan as outlined above, with ammendments as follows:     Kidneys likely non functional given long standing hydro/obstruction/appearance on CT- now on Dialysis   PSA likely falsely elevated in light of UTI/retention, HORACIO negative for any locally advanced  Disease    Keep Burris   Return for repeat PSA, office cysto and cath change      Latisha Bose MD  [facility] [place] [place]  Pager [phone]    Bonifacio Cuevas sign off     Assessment/Plan:     Patient Active Problem List   Diagnosis Code    Traumatic rhabdomyolysis ([facility] [address] T79. 6XXA    CAD (coronary artery disease) I25.10    HTN (hypertension) I10    Troponin level elevated R77.8    Acute renal failure (ARF) ([facility] N17.9    ESRD (end stage renal disease) ([facility] [address] N18.6    Hyperkalemia L48.7    Metabolic acidosis P79.0    Secondary hyperparathyroidism, renal ([facility]) N25.81    Anemia D64.9    Hydronephrosis N13.30    UTI (urinary tract infection) N39.0    Postobstructive diuresis R35.8    Polyp of ascending colon K63.5     Bilateral hydronephrosis, distended bladder noted on CT 09/28, likely 2/2 to BPH   HORACIO was benign   PSA was 78 on 09/29/20   Repeat CT 10/01/20 showed bilat hydro improved    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  Yes    Colin Humphreys PA-C  [facility]  [phone]  Pager: [phone]     Subjective:   Afebrile, mildly hypertensive, other VSS, patient is tolerating the michel, draining clear yellow. He denies any flank pain, N/V or chills. Objective:     Visit Vitals  BP (!) 149/68   Pulse 73   Temp 98.9 °F (37.2 °C)   Resp 20   Ht 5' 6\" (1.676 m)   Wt 163 lb 11.2 oz (74.3 kg)   SpO2 93%   BMI 26.42 kg/m²        Temp (24hrs), Av °F (36.7 °C), Min:97.5 °F (36.4 °C), Max:98.9 °F (37.2 °C)      Intake and Output:   1901 - 10/02 0700  In: 9103 [P.O.:500; I.V.:3000]  Out: 2477 [Urine:2475]  No intake/output data recorded. PHYSICAL EXAMINATION:   Visit Vitals  BP (!) 149/68   Pulse 73   Temp 98.9 °F (37.2 °C)   Resp 20   Ht 5' 6\" (1.676 m)   Wt 163 lb 11.2 oz (74.3 kg)   SpO2 93%   BMI 26.42 kg/m²     Constitutional: Well developed, well nourished. No acute distress. HEENT: Normocephalic, Atraumatic, EOM's intact   CV:  no edema  Respiratory: No respiratory distress or difficulties breathing   Abdomen:  Soft and non-tender   Male:   CVA tenderness: none         SCROTUM:  No scrotal lesions or induration         PENIS: uncircumcised with phimosis  Michel: draining clear yellow urine  Skin: No evidence of jaundice. Normal color  Neuro/Psych:  Alert and oriented. Affect appropriate. Lab/Data Review:    CT ABD PELV WO Contrast 10/01/20: IMPRESSION[de-identified]  1. Chronic changes persist but are improved from . A Michel catheter  remains in place marked prostatic enlargement suggest a high-grade urinary  obstruction present. There is no evidence of any stent or instrumentation to  explain improved but not completely resolved hydronephrotic changes bilaterally.       CT ABD PELV WO Contrast 20:  FINDINGS:     Lung Bases:  0.5 cm nodule in the left lower lobe (axial #2). 0.3 cm nodule in  the right lower lobe anterior aspect (axial #10).      Liver, Gallbladder, Pancreas, Adrenal Glands, Spleen: Unremarkable.     Kidneys-Ureters-Bladder:  Bilateral hydronephrosis with ureteral dilation, left  greater than right. Pronounced left renal cortical thinning. No ureteral stone  is detected bilaterally. Both ureters are dilated throughout their course with  transition at the ureter-bladder junction. Burris catheter placement into the  urinary bladder with diffuse bladder wall thickening.     GI Tract:  Mild hiatal hernia. No acute abnormalities involving the stomach and  small bowel. Protrusion of the small bowel into the midline small ventral  hernia, measuring about 0.3 cm in diameter (axial #50). No evidence of  obstructive changes. The appendix is normal.  Focal area of eccentric colon  wall thickening is suggested in the ascending colon near the hepatic flexure  (axial #37-46, coronal #22). No associated adenopathy. No acute colitis or  diverticulitis.     Nodes:  No mesenteric or retroperitoneal adenopathy.     Vascular:  No acute aortic abnormalities.     Prostate: Moderate to prominent enlargement of the prostate, measuring about  5.9 x 4.7 x 5.2 cm.     Pelvic Sidewall:  No adenopathy. No free fluid.     Bones:  Anterior wedging at T12 and L1. Degenerative changes in the lumbar  spine.     IMPRESSION  IMPRESSION:     1.  Bilateral hydronephrosis and ureteral dilation, more pronounced on the left  side. 2.  Diffuse bladder wall thickening with Burris catheter placement. Moderate to  pronounced prostate enlargement. 3.  Abnormal appearance of the ascending colon near the hepatic flexure. Possibly an artifact from peristalsis but a developing colon mass cannot be  excluded. Recommend correlation with either barium enema or colonoscopy. 4.  Small ventral hernia with protrusion of small bowel into the hernia sac at  the midline. No associated post-obstructive changes. 5.  Lung nodules.                                  Labs:     Labs: Results:   Chemistry    Recent Labs     10/02/20  0867 10/01/20  0513 09/30/20  0423   GLU 84 89 104*   * 145 139   K 4.3 4.2 3.9   * 111 107   CO2 20* 23 25   BUN 65* 71* 79*   CREA 9.64* 9.85* 9.88*   CA 8.2* 8.0* 8.8   AGAP 11 11 7   BUCR 7* 7* 8*      CBC w/Diff Recent Labs     10/02/20  0337 10/01/20  0513 09/30/20  0423   WBC 9.7 9.7 10.7   RBC 2.73* 2.40* 2.63*   HGB 7.8* 6.9* 7.4*   HCT 24.4* 21.1* 22.7*    182 237   GRANS 74* 70 71   LYMPH 16* 17* 16*   EOS 3 3 2      Cultures No results for input(s): CULT in the last 72 hours. All Micro Results     Procedure Component Value Units Date/Time    CULTURE, URINE [[id number]]  (Abnormal)  (Susceptibility) Collected:  09/25/20 2034    Order Status:  Completed Specimen:  Cath Urine Updated:  09/28/20 1323     Special Requests: NO SPECIAL REQUESTS        Randolph Count --        >100,000  COLONIES/mL       Culture result: ESCHERICHIA COLI               STREPTOCOCCI, BETA HEMOLYTIC GROUP B Penicillin and ampicillin are drugs of choice for treatment of beta-hemolytic streptococcal infections. Susceptibility testing of penicillins and beta-lactams approved by the FDA for treatment of beta-hemolytic streptococcal infections need not be performed routinely, because nonsusceptible isolates are extremely rare.  CLSI 2012                  Urinalysis Color   Date Value Ref Range Status   09/25/2020 YELLOW   Final     Appearance   Date Value Ref Range Status   09/25/2020 CLOUDY   Final     Specific gravity   Date Value Ref Range Status   09/25/2020 1.010 1.005 - 1.030   Final     pH (UA)   Date Value Ref Range Status   09/25/2020 5.0 5.0 - 8.0   Final     Protein   Date Value Ref Range Status   09/25/2020 100 (A) NEG mg/dL Final     Ketone   Date Value Ref Range Status   09/25/2020 Negative NEG mg/dL Final     Bilirubin   Date Value Ref Range Status   09/25/2020 Negative NEG   Final     Blood   Date Value Ref Range Status   09/25/2020 SMALL (A) NEG   Final     Urobilinogen   Date Value Ref Range Status 09/25/2020 0.2 0.2 - 1.0 EU/dL Final     Nitrites   Date Value Ref Range Status   09/25/2020 Negative NEG   Final     Leukocyte Esterase   Date Value Ref Range Status   09/25/2020 LARGE (A) NEG   Final     Potassium   Date Value Ref Range Status   10/02/2020 4.3 3.5 - 5.5 mmol/L Final     Creatinine   Date Value Ref Range Status   10/02/2020 9.64 (H) 0.6 - 1.3 MG/DL Final     BUN   Date Value Ref Range Status   10/02/2020 65 (H) 7.0 - 18 MG/DL Final     Prostate Specific Ag   Date Value Ref Range Status   09/29/2020 78.0 (H) 0.0 - 4.0 ng/mL Final      PSA No results for input(s): PSA in the last 72 hours.    Coagulation Lab Results   Component Value Date/Time    Prothrombin time 14.5 09/27/2020 12:25 PM    Prothrombin time 13.3 11/20/2018 02:10 PM    INR 1.1 09/27/2020 12:25 PM    INR 1.0 11/20/2018 02:10 PM    aPTT 29.1 11/20/2018 02:10 PM    aPTT 63.5 (H) 08/30/2016 05:15 PM

## 2020-10-02 NOTE — PROGRESS NOTES
[email or web address]  [phone]    Gastroenterology follow up-Progress note    Impression:  1. Proximal colon and hepatic flexure mass like polyps- not amendable to endoscopic resection- surgery consulted and will f/u as outpatient for potential colectomy. 2. Acute on chronic anemia- marked decrease from baseline- H/H stable  3. Bilateral hydronephrosis with distended bladder- improved with michel catheter placement  4. CHARLOTTE- acute on chronic- now requiring HD  5. Secondary hyperparathyroidism  6. Elevated troponin; no ACS  7. HTN    Plan:  1. Dr. Sherie Vega has evaluated the patient- will see in follow up to schedule surgery. 2. Monitor H/H and transfuse per protocol  3. Await pathology- our office will call patient with results  4. Regular diet  5. Will sign off, please let us know if there are any GI questions or concerns. 6. Medical management per primary team     Chief Complaint: abnormal CT finding, anemia      Subjective:  No issues with abd pain or bleeding. No hematochezia. Passing flatus. Discussed with patient findings on colonoscopy and need for outpatient f/u with Sherie Vega. ROS: Denies any fevers, chills, rash. Eyes: conjunctiva normal, EOM normal   Neck: ROM normal, supple and trachea normal   Cardiovascular: heart normal, intact distal pulses, normal rate and regular rhythm   Pulmonary/Chest Wall: breath sounds normal and effort normal   Abdominal: appearance normal, bowel sounds normal and soft, non-acute, non-tender     Patient Active Problem List   Diagnosis Code    Traumatic rhabdomyolysis ([facility] [address] T79. 6XXA    CAD (coronary artery disease) I25.10    HTN (hypertension) I10    Troponin level elevated R77.8    Acute renal failure (ARF) (HCC) N17.9    ESRD (end stage renal disease) ([facility] [address] N18.6    Hyperkalemia S17.0    Metabolic acidosis J88.9    Secondary hyperparathyroidism, renal (HCC) N25.81    Anemia D64.9    Hydronephrosis N13.30    UTI (urinary tract infection) N39.0  Postobstructive diuresis R35.8    Polyp of ascending colon K63.5         Visit Vitals  BP (!) 149/68   Pulse 73   Temp 98.9 °F (37.2 °C)   Resp 20   Ht 5' 6\" (1.676 m)   Wt 74.3 kg (163 lb 11.2 oz)   SpO2 93%   BMI 26.42 kg/m²           Intake/Output Summary (Last 24 hours) at 10/2/2020 0922  Last data filed at 10/2/2020 0444  Gross per 24 hour   Intake 2810 ml   Output 1625 ml   Net 1185 ml       CBC w/Diff    Lab Results   Component Value Date/Time    WBC 9.7 10/02/2020 03:37 AM    RBC 2.73 (L) 10/02/2020 03:37 AM    HGB 7.8 (L) 10/02/2020 03:37 AM    HCT 24.4 (L) 10/02/2020 03:37 AM    MCV 89.4 10/02/2020 03:37 AM    MCH 28.6 10/02/2020 03:37 AM    MCHC 32.0 10/02/2020 03:37 AM    RDW 14.6 (H) 10/02/2020 03:37 AM     10/02/2020 03:37 AM    Lab Results   Component Value Date/Time    GRANS 74 (H) 10/02/2020 03:37 AM    LYMPH 16 (L) 10/02/2020 03:37 AM    EOS 3 10/02/2020 03:37 AM    BASOS 0 10/02/2020 03:37 AM      Basic Metabolic Profile   Recent Labs     10/02/20  0337   *   K 4.3   *   CO2 20*   BUN 65*   CA 8.2*   MG 1.7   PHOS 5.2*        Hepatic Function    Lab Results   Component Value Date/Time    ALB 3.6 09/25/2020 06:00 PM    TP 7.0 09/26/2020 07:15 AM    AP 79 09/25/2020 06:00 PM    No results found for: TBIL       Coags   No results for input(s): PTP, INR, APTT, INREXT, INREXT in the last 72 hours. Jhoan Martinez NP    [facility]. [email or web address]  Phone: [phone]  Pager: [phone]

## 2020-10-02 NOTE — PROGRESS NOTES
Discharge planning:    Patient is being recommended for skilled nursing facility placement. Patient and his wife both are adamant about patient returning home. They will not agree to SNF placement. Patient is also waiting for his home dialysis to be arranged. The unit and chair days and time will need to be set up, prior to patient discharging from the hospital. Yahaira Rice is currently working on this dialysis set up. This writer will continue to monitor for discharge planning to ensure a safe discharge home from [facility]. Domonique Pearce MSW  Care Manager  Pager#: [phone]

## 2020-10-02 NOTE — PROGRESS NOTES
Problem: Nutrition Deficit  Goal: *Optimize nutritional status  Outcome: Progressing Towards Goal     Problem: Falls - Risk of  Goal: *Absence of Falls  Description: Document Argelia Cota Fall Risk and appropriate interventions in the flowsheet.   Outcome: Progressing Towards Goal  Note: Fall Risk Interventions:  Mobility Interventions: Bed/chair exit alarm, OT consult for ADLs, Patient to call before getting OOB, PT Consult for mobility concerns, PT Consult for assist device competence, Utilize walker, cane, or other assistive device, Communicate number of staff needed for ambulation/transfer    Mentation Interventions: Adequate sleep, hydration, pain control, Bed/chair exit alarm, Door open when patient unattended, Evaluate medications/consider consulting pharmacy, Increase mobility, More frequent rounding, Reorient patient, Update white board, Toileting rounds    Medication Interventions: Bed/chair exit alarm, Evaluate medications/consider consulting pharmacy, Patient to call before getting OOB, Teach patient to arise slowly    Elimination Interventions: Bed/chair exit alarm, Call light in reach, Patient to call for help with toileting needs, Stay With Me (per policy), Toilet paper/wipes in reach, Toileting schedule/hourly rounds    History of Falls Interventions: Bed/chair exit alarm, Consult care management for discharge planning, Door open when patient unattended, Evaluate medications/consider consulting pharmacy, Investigate reason for fall, Room close to nurse's station, Utilize gait belt for transfer/ambulation, Assess for delayed presentation/identification of injury for 48 hrs (comment for end date), Vital signs minimum Q4HRs X 24 hrs (comment for end date)         Problem: Patient Education: Go to Patient Education Activity  Goal: Patient/Family Education  Outcome: Progressing Towards Goal     Problem: Patient Education: Go to Patient Education Activity  Goal: Patient/Family Education  Outcome: Progressing Towards Goal     Problem: Patient Education: Go to Patient Education Activity  Goal: Patient/Family Education  Outcome: Progressing Towards Goal     Problem: Pressure Injury - Risk of  Goal: *Prevention of pressure injury  Description: Document Karthikeyan Scale and appropriate interventions in the flowsheet. Outcome: Progressing Towards Goal  Note: Pressure Injury Interventions:  Sensory Interventions: Assess need for specialty bed, Check visual cues for pain, Discuss PT/OT consult with provider, Float heels, Keep linens dry and wrinkle-free, Maintain/enhance activity level, Minimize linen layers, Monitor skin under medical devices, Pad between skin to skin, Pressure redistribution bed/mattress (bed type), Sit a 90-degree angle/use footstool if needed, Turn and reposition approx. every two hours (pillows and wedges if needed), Use 30-degree side-lying position    Moisture Interventions: Apply protective barrier, creams and emollients, Absorbent underpads, Assess need for specialty bed, Check for incontinence Q2 hours and as needed, Limit adult briefs, Maintain skin hydration (lotion/cream), Minimize layers, Moisture barrier, Offer toileting Q_hr    Activity Interventions: Increase time out of bed, Pressure redistribution bed/mattress(bed type), PT/OT evaluation, Chair cushion, Assess need for specialty bed    Mobility Interventions: Pressure redistribution bed/mattress (bed type), HOB 30 degrees or less, PT/OT evaluation, Turn and reposition approx.  every two hours(pillow and wedges), Assess need for specialty bed    Nutrition Interventions: Document food/fluid/supplement intake, Discuss nutritional consult with provider, Offer support with meals,snacks and hydration    Friction and Shear Interventions: Lift sheet, Sit at 90-degree angle, HOB 30 degrees or less, Foam dressings/transparent film/skin sealants                Problem: Patient Education: Go to Patient Education Activity  Goal: Patient/Family Education  Outcome: Progressing Towards Goal     Problem:  Moderate Sedation (Adult)  Goal: *Patent airway  Outcome: Progressing Towards Goal  Goal: *Adequate oxygenation  Outcome: Progressing Towards Goal  Goal: *Absence of aspiration  Outcome: Progressing Towards Goal  Goal: *Hemodynamically stable  Outcome: Progressing Towards Goal  Goal: *Optimal pain control at patient's stated goal  Outcome: Progressing Towards Goal  Goal: *Absence of nausea/vomiting  Outcome: Progressing Towards Goal  Goal: *Anxiety reduced or absent  Outcome: Progressing Towards Goal  Goal: *Absence of injury  Outcome: Progressing Towards Goal  Goal: *Level of consciousness returns to baseline  Outcome: Progressing Towards Goal  Goal: Interventions  Outcome: Progressing Towards Goal     Problem: Patient Education: Go to Patient Education Activity  Goal: Patient/Family Education  Outcome: Progressing Towards Goal     Problem: Infection - Risk of, Central Venous Catheter-Associated Bloodstream Infection  Goal: *Absence of infection signs and symptoms  Outcome: Progressing Towards Goal     Problem: Patient Education: Go to Patient Education Activity  Goal: Patient/Family Education  Outcome: Progressing Towards Goal     Problem: Infection - Risk of, Urinary Catheter-Associated Urinary Tract Infection  Goal: *Absence of infection signs and symptoms  Outcome: Progressing Towards Goal     Problem: Patient Education: Go to Patient Education Activity  Goal: Patient/Family Education  Outcome: Progressing Towards Goal

## 2020-10-02 NOTE — PROGRESS NOTES
Nutrition    Pt tolerated clear liquids and advanced to solid foods yesterday afternoon per CRS. Tolerating diet. Has has variable meal intake since admission. Plan to modify supplements back to Nepro.   Pt progressing towards nutrition goal.      Nutrition Recommendations/Plan:   - Modify supplements: change to Nepro BID      Electronically signed by Derrick Medrano RD on 10/2/2020 at 1:36 PM     Contact Number:  [phone]

## 2020-10-02 NOTE — PROGRESS NOTES
Physical therapy treatment attempted at 1650 pm. Patient off the floor at this time. Will continue to follow.    Clement Bhandari, PT, DPT

## 2020-10-02 NOTE — PROGRESS NOTES
[facility] Hospital[facility]  Progress Note    Patient: Charisse Maciel Age: 80 y.o. : 1935 MR#: [id number] SSN: [id number]  Date: 10/2/2020     Subjective/24-hour events:     Pt seen and examined. NAD. Denies abdominal pain, N/V. Burris draining clear urine. Assessment:     CHARLOTTE on CKD, now ESRD  Secondary hyperparathyroidism  Metabolic acidosis  Troponin elevation, no ACS  HTN  Anemia, etiology uncertain  OA  Advanced age  Proximal colon and hepatic flexure mass like polyps- not amendable to endoscopic resection- plan for outpatient potential colectomy    Plan:     D/c IVF's  S/p HD catheter placement   HD per nephrology- appreciate assistance- plan for HD today and tomorrow- Dr. Frankie Lucero in communication with [facility]'s admission personnel for OP dialysis   Renal US w/ bilateral hydronephrosis, urology following, cont urinary catheter  Repeat CT abd/pelvis 10/1 hydronephrotic changes bilaterally not completely resolved but improved  No plan for ischemia workup per cardiology but will need to f/u outpatient  Blood pressure better controlled, continue current medications. Iron stores low normal- on ferrous sulfate daily  Completed Rocephin, - Urine culture grew out E coli  S/p colonoscopy 10/1- GI signed off, office will f/u regarding pathology  Colorectal surgery plan for outpatient colon resection, will need to f/u in 2 weeks once discharged  PT/OT, mobilize as tolerated      Disposition: PT/OT rec rehab, also shower chair and rolling walker DME but pt and wife prefers home w/ home health      Contact: wife Belem Medellin [phone]. I have called and spoken with wife and updated on plan of care. All questions answered. Case discussed with:  [x]Patient  [x]Family  [x]Nursing  [x]Case Management  DVT Prophylaxis:  []Lovenox  []Hep SQ  [x]SCDs  []Coumadin   []On Heparin gtt    Objective:   VS:   Visit Vitals  BP (!) 165/90 (BP 1 Location: Right arm, BP Patient Position:  At rest)   Pulse 73   Temp 98.1 °F (36.7 °C)   Resp 18   Ht 5' 6\" (1.676 m)   Wt 74.3 kg (163 lb 11.2 oz)   SpO2 99%   BMI 26.42 kg/m²      Tmax/24hrs: Temp (24hrs), Av.9 °F (36.6 °C), Min:96.8 °F (36 °C), Max:98.6 °F (37 °C)      Intake/Output Summary (Last 24 hours) at 10/2/2020 0756  Last data filed at 10/2/2020 0444  Gross per 24 hour   Intake 2810 ml   Output 1625 ml   Net 1185 ml       General:  In NAD. Nontoxic-appearing. Cardiovascular:  RRR. Pulmonary:  Lungs clear bilaterally, no wheezes. GI:  Abdomen soft, NTTP. : michel cath with clear yellow urine. Extremities:  Warm, no edema or ischemia. TDC to [facility], [facility] C/D/I  Neuro:  Awake and alert to person, place. Moves extremities spontaneously. Labs:    Recent Results (from the past 24 hour(s))   RBC, ALLOCATE    Collection Time: 10/01/20  4:30 PM   Result Value Ref Range    HISTORY CHECKED?  Historical check performed    TYPE & SCREEN    Collection Time: 10/01/20  4:40 PM   Result Value Ref Range    Crossmatch Expiration 10/04/2020     ABO/Rh(D) O POSITIVE     Antibody screen NEG     CALLED TO: CHRISTOPHER HERNANDES RN, 4N, 10/1/20 @2914 BY [facility]     Unit number [id number]     Blood component type  LR     Unit division 00     Status of unit ISSUED     Crossmatch result Compatible    METABOLIC PANEL, BASIC    Collection Time: 10/02/20  3:37 AM   Result Value Ref Range    Sodium 146 (H) 136 - 145 mmol/L    Potassium 4.3 3.5 - 5.5 mmol/L    Chloride 115 (H) 100 - 111 mmol/L    CO2 20 (L) 21 - 32 mmol/L    Anion gap 11 3.0 - 18 mmol/L    Glucose 84 74 - 99 mg/dL    BUN 65 (H) 7.0 - 18 MG/DL    Creatinine 9.64 (H) 0.6 - 1.3 MG/DL    BUN/Creatinine ratio 7 (L) 12 - 20      GFR est AA 6 (L) >60 ml/min/1.73m2    GFR est non-AA 5 (L) >60 ml/min/1.73m2    Calcium 8.2 (L) 8.5 - 10.1 MG/DL   MAGNESIUM    Collection Time: 10/02/20  3:37 AM   Result Value Ref Range    Magnesium 1.7 1.6 - 2.6 mg/dL   CBC WITH AUTOMATED DIFF    Collection Time: 10/02/20  3:37 AM   Result Value Ref Range    WBC 9.7 4.6 - 13.2 K/uL    RBC 2.73 (L) 4.70 - 5.50 M/uL    HGB 7.8 (L) 13.0 - 16.0 g/dL    HCT 24.4 (L) 36.0 - 48.0 %    MCV 89.4 74.0 - 97.0 FL    MCH 28.6 24.0 - 34.0 PG    MCHC 32.0 31.0 - 37.0 g/dL    RDW 14.6 (H) 11.6 - 14.5 %    PLATELET 500 429 - 987 K/uL    MPV 11.8 9.2 - 11.8 FL    NEUTROPHILS 74 (H) 40 - 73 %    LYMPHOCYTES 16 (L) 21 - 52 %    MONOCYTES 7 3 - 10 %    EOSINOPHILS 3 0 - 5 %    BASOPHILS 0 0 - 2 %    ABS. NEUTROPHILS 7.2 1.8 - 8.0 K/UL    ABS. LYMPHOCYTES 1.5 0.9 - 3.6 K/UL    ABS. MONOCYTES 0.7 0.05 - 1.2 K/UL    ABS. EOSINOPHILS 0.3 0.0 - 0.4 K/UL    ABS.  BASOPHILS 0.0 0.0 - 0.1 K/UL    DF AUTOMATED     PHOSPHORUS    Collection Time: 10/02/20  3:37 AM   Result Value Ref Range    Phosphorus 5.2 (H) 2.5 - 4.9 MG/DL       ONEIL Lopez  [facility]  pager [phone]

## 2020-10-02 NOTE — ROUTINE PROCESS
Bedside shift change report given to MONTSERRAT Sloan (oncoming nurse) by Freddy Kilpatrick RN and Yanira Hemphill RN (offgoing nurse). Report included the following information SBAR, Kardex, MAR and Recent Results.

## 2020-10-02 NOTE — PROGRESS NOTES
Problem: Nutrition Deficit  Goal: *Optimize nutritional status  10/2/2020 0118 by Blaine Rausch  Outcome: Progressing Towards Goal  10/2/2020 0117 by Blaine Rausch  Outcome: Progressing Towards Goal     Problem: Falls - Risk of  Goal: *Absence of Falls  Description: Document Ruel Ahujalane Fall Risk and appropriate interventions in the flowsheet. 10/2/2020 0118 by Blaine Rausch  Outcome: Progressing Towards Goal  Note: Fall Risk Interventions:  Mobility Interventions: Bed/chair exit alarm    Mentation Interventions: Bed/chair exit alarm    Medication Interventions: Bed/chair exit alarm, Patient to call before getting OOB, Teach patient to arise slowly    Elimination Interventions: Bed/chair exit alarm, Call light in reach    History of Falls Interventions: Bed/chair exit alarm      10/2/2020 0117 by Blaine Rausch  Outcome: Progressing Towards Goal  Note: Fall Risk Interventions:  Mobility Interventions: Bed/chair exit alarm    Mentation Interventions: Bed/chair exit alarm    Medication Interventions: Bed/chair exit alarm, Patient to call before getting OOB, Teach patient to arise slowly    Elimination Interventions: Bed/chair exit alarm, Call light in reach    History of Falls Interventions: Bed/chair exit alarm         Problem: Pressure Injury - Risk of  Goal: *Prevention of pressure injury  Description: Document Karthikeyan Scale and appropriate interventions in the flowsheet.   10/2/2020 0118 by Blaine Rausch  Outcome: Progressing Towards Goal  Note: Pressure Injury Interventions:  Sensory Interventions: Assess changes in LOC    Moisture Interventions: Absorbent underpads    Activity Interventions: Increase time out of bed    Mobility Interventions: Pressure redistribution bed/mattress (bed type)    Nutrition Interventions: Document food/fluid/supplement intake    Friction and Shear Interventions: Minimize layers             10/2/2020 0117 by Blaine Rausch  Outcome: Progressing Towards Goal  Note: Pressure Injury Interventions:  Sensory Interventions: Assess changes in LOC    Moisture Interventions: Absorbent underpads    Activity Interventions: Increase time out of bed    Mobility Interventions: Pressure redistribution bed/mattress (bed type)    Nutrition Interventions: Document food/fluid/supplement intake    Friction and Shear Interventions: Minimize layers

## 2020-10-02 NOTE — PROGRESS NOTES
conducted an initial consultation and Spiritual Assessment for Ileana Carmen, who is a 80 y.o.,male. Patient's Primary Language is: [place]. According to the patient's EMR Tenriism Affiliation is: Orthodox. The reason the Patient came to the hospital is:   Patient Active Problem List    Diagnosis Date Noted    Polyp of ascending colon 10/01/2020    Postobstructive diuresis 09/30/2020    Hydronephrosis 09/29/2020    UTI (urinary tract infection) 09/29/2020    ESRD (end stage renal disease) ([facility] [address] 09/26/2020    Hyperkalemia 88/41/0111    Metabolic acidosis 43/18/1424    Secondary hyperparathyroidism, renal ([facility] [address] 09/26/2020    Anemia 09/26/2020    Acute renal failure (ARF) ([facility] [address] 09/25/2020    CAD (coronary artery disease) 09/01/2016    HTN (hypertension) 09/01/2016    Troponin level elevated 09/01/2016    Traumatic rhabdomyolysis ([facility] 08/29/2016        The  provided the following Interventions:  Introduced myself and initiated a relationship of care and support. Explored issues of alan, belief, spirituality and Alevism/ritual needs while hospitalized. Listened empathically as patient shared about his family and experience at the hospital.  Provided information about [address] [place], [place] [address]ez [facility]. Offered prayer and assurance of continued prayers on patient's behalf. Chart reviewed. The following outcomes where achieved:  Patient shared limited information about both their medical narrative  Patient expressed gratitude for 's visit. Assessment:  Patient does not have any Alevism/cultural needs that will affect patient's preferences in health care. Plan:  Chaplains will continue to follow and will provide pastoral care on an as needed/requested basis.       [address] [place] [address]   [phone]

## 2020-10-02 NOTE — PROGRESS NOTES
Progress Note    Pavel Nesbitt  80 y.o. Admit Date: 9/25/2020  Patient Active Problem List   Diagnosis Code    Traumatic rhabdomyolysis ([facility] T79. 6XXA    CAD (coronary artery disease) I25.10    HTN (hypertension) I10    Troponin level elevated R77.8    Acute renal failure (ARF) ([facility]) N17.9    ESRD (end stage renal disease) ([facility] N18.6    Hyperkalemia G45.5    Metabolic acidosis O93.5    Secondary hyperparathyroidism, renal (HCC) N25.81    Anemia D64.9    Hydronephrosis N13.30    UTI (urinary tract infection) N39.0    Postobstructive diuresis R35.8    Polyp of ascending colon K63.5           Subjective:     Patient feels OK, no SOB, making Urine through . Burris. [facility]'s note  Reviewed, repeat CT reviewed, Hydronephrosis have  mproved than before but still there, Urology  Have no plan but to continue to drain Burris & DC  & Follow as OP. Renal function did not improve propotionately to the improvement of Hydronephrosis . A comprehensive review of systems was negative except for that written in the History of Present Illness.     Objective:     Visit Vitals  BP (!) 132/59   Pulse 67   Temp 98.8 °F (37.1 °C)   Resp 18   Ht 5' 6\" (1.676 m)   Wt 74.3 kg (163 lb 11.2 oz)   SpO2 100%   BMI 26.42 kg/m²         Intake/Output Summary (Last 24 hours) at 10/2/2020 1210  Last data filed at 10/2/2020 0444  Gross per 24 hour   Intake 1810 ml   Output 1375 ml   Net 435 ml       Current Facility-Administered Medications   Medication Dose Route Frequency Provider Last Rate Last Dose    0.9% sodium chloride infusion  50 mL/hr IntraVENous CONTINUOUS Aries French MD 75 mL/hr at 10/01/20 1703 75 mL/hr at 10/01/20 1703    heparin (porcine) 1,000 unit/mL injection 5,000 Units  5,000 Units InterCATHeter DIALYSIS PRN Aries French MD        epoetin brina-epbx (RETACRIT) injection 10,000 Units  10,000 Units SubCUTAneous Q MON, WED & Daiana Gilmore MD   10,000 Units at 09/30/20 2138    ferrous sulfate tablet 325 mg  1 Tab Oral DAILY WITH Horace Tao MD   325 mg at 10/02/20 0831    B complex-vitaminC-folic acid (NEPHROCAP) cap  1 Cap Oral DAILY Karol Richardson MD   1 Cap at 10/02/20 0831    amLODIPine (NORVASC) tablet 10 mg  10 mg Oral DAILY Karol Richardson MD   10 mg at 10/02/20 0831    sodium chloride (NS) flush 5-40 mL  5-40 mL IntraVENous Q8H Silva Beverly, NP   10 mL at 10/02/20 0600    sodium chloride (NS) flush 5-40 mL  5-40 mL IntraVENous PRN Cici Beverly NP        acetaminophen (TYLENOL) tablet 650 mg  650 mg Oral Q6H PRN Yelitza Sears NP   650 mg at 10/01/20 4781    Or    acetaminophen (TYLENOL) suppository 650 mg  650 mg Rectal Q6H PRN Cici Beverly NP        polyethylene glycol (MIRALAX) packet 17 g  17 g Oral DAILY PRN Cici Beverly NP        promethazine (PHENERGAN) tablet 12.5 mg  12.5 mg Oral Q6H PRN Cici Beverly NP        Or    ondansetron (ZOFRAN) injection 4 mg  4 mg IntraVENous Q6H PRN Silva Beverly NP        pantoprazole (PROTONIX) tablet 40 mg  40 mg Oral ACB Silva Beverly NP   40 mg at 10/02/20 0831    labetaloL (NORMODYNE;TRANDATE) 20 mg/4 mL (5 mg/mL) injection 20 mg  20 mg IntraVENous Q5MIN PRN Yelitza Sears NP            Physical Exam:     Physical Exam:   General:  Alert, cooperative, no distress, appears stated age. Mouth/Throat: Lips, mucosa, and tongue normal. Teeth and gums normal.   Neck: Supple, symmetrical, trachea midline, no adenopathy, thyroid: no enlargement/tenderness/nodules, no carotid bruit and no JVD. Lungs:   Clear to auscultation bilaterally. Heart:  Regular rate and rhythm, S1, S2 normal, no murmur, click, rub or gallop. Abdomen:   Soft, non-tender. Bowel sounds normal. No masses,  No organomegaly.    Extremities: Extremities normal, atraumatic, no cyanosis or edema,has TDC         Data Review:    CBC w/Diff    Recent Labs 10/02/20  0337 10/01/20  0513 09/30/20  0423   WBC 9.7 9.7 10.7   RBC 2.73* 2.40* 2.63*   HGB 7.8* 6.9* 7.4*   HCT 24.4* 21.1* 22.7*   MCV 89.4 87.9 86.3   MCH 28.6 28.8 28.1   MCHC 32.0 32.7 32.6   RDW 14.6* 14.7* 15.1*    Recent Labs     10/02/20  0337 10/01/20  0513 09/30/20  0423   MONOS 7 10 11*   EOS 3 3 2   BASOS 0 0 0   RDW 14.6* 14.7* 15.1*        Comprehensive Metabolic Profile    Recent Labs     10/02/20  0337 10/01/20  0513 09/30/20  0423   * 145 139   K 4.3 4.2 3.9   * 111 107   CO2 20* 23 25   BUN 65* 71* 79*   CREA 9.64* 9.85* 9.88*    Recent Labs     10/02/20  0337 10/01/20  0513 09/30/20  0423   CA 8.2* 8.0* 8.8   PHOS 5.2* 5.2* 4.8            Lab Results   Component Value Date/Time    GFR est AA 6 (L) 10/02/2020 03:37 AM    GFR est non-AA 5 (L) 10/02/2020 03:37 AM    Creatinine 9.64 (H) 10/02/2020 03:37 AM    BUN 65 (H) 10/02/2020 03:37 AM    Sodium 146 (H) 10/02/2020 03:37 AM    Potassium 4.3 10/02/2020 03:37 AM    Chloride 115 (H) 10/02/2020 03:37 AM    CO2 20 (L) 10/02/2020 03:37 AM         Imaging:     Procedures/imaging: see electronic medical records for all procedures, Xrays and details which were not copied into this note but were reviewed prior to   taking my decision. Impression:       Active Hospital Problems    Diagnosis Date Noted    Polyp of ascending colon 10/01/2020    Postobstructive diuresis 09/30/2020    Hydronephrosis 09/29/2020    UTI (urinary tract infection) 09/29/2020    ESRD (end stage renal disease) ([facility] 09/26/2020    Hyperkalemia 39/61/0807    Metabolic acidosis 68/77/3142    Secondary hyperparathyroidism, renal ([facility] 09/26/2020    Anemia 09/26/2020    Acute renal failure (ARF) ([facility] 09/25/2020            Plan:     Needs Dialysis ,will Dialyze today & tomorrow  & trying to Find OP dialysis center,will take OP center as Acute on CRF  & still should follow with Urology & Colorectal surgeon.  Discussed with Primary Team. Discussed  With [facility] admission Personnel. Samm Tomas MD

## 2020-10-02 NOTE — DIALYSIS
DARRYL        ACUTE HEMODIALYSIS FLOW SHEET      HEMODIALYSIS ORDERS: Physician:                                                                                                                                                                                                                                                   Dialyzer: revaclear   Duration: 3 hr  BFR: 400   DFR: 800   Dialysate:  Temp 36-37*C  K+   2    Ca+  2.5 Na 138 Bicarb 35   Weight: 74.3 kg   Patient Chart [x]     Unable to Obtain []   Dry weight/UF Goal: 1000 Access CVL  Needle Gauge     Heparin []  Bolus      Units    [] Hourly       Units    [x]None      Catheter locking solution heparin   Pre BP:   132/63    Pulse:     72       Respirations: 18  Temperature:   98.3   Labs: Pre        Post:        [x] N/A   Additional Orders(medications, blood products, hypotension management) [x] N/A     [x] Darryl Consent Verified     CATHETER ACCESS: []N/A   [x]Right   []Left   [x]IJ     []Fem   []chest wall   [] First use X-ray verified     [x]Tunnel                [] Non Tunneled   [x]No S/S infection  []Redness  []Drainage []Cultured []Swelling []Pain   [x]Medical Aseptic Prep Utilized   []Dressing Changed  [] Biopatch  Date:       []Clotted   [x]Patent   Flows: [x]Good  []Poor  []Reversed   If access problem,  notified: []Yes    [x]N/A  Date:                       GENERAL ASSESSMENT:      LUNGS:  Rate  SaO2% [] N/A    [x] Clear  [] Coarse  [] Crackles  [] Wheezing        [] Diminished     Location : []RLL   []LLL    []RUL  []ROBERT     Cough: []Productive  []Dry  [x]N/A   Respirations:  [x]Easy  []Labored     Therapy:   [x]RA  []NC  l/min    Mask: []NRB []Venti       O2%                  []Ventilator  []Intubated  [] Trach  [] BiPaP     CARDIAC: [x]Regular      [] Irregular   [] Pericardial Rub  [] JVD        []  Monitored  [] Bedside  [] Remotely monitored [] N/A  Rhythm:      EDEMA: [] None  [x]Generalized  [] Pitting [] 1    [] 2    [] 3    [] 4 [] Facial  [] Pedal  []  UE  [] LE     SKIN:   [x] Warm  [] Hot     [] Cold   [x] Dry     [] Pale   [] Diaphoretic                  [] Flushed  [] Jaundiced  [] Cyanotic  [] Rash  [] Weeping     LOC:    [x] Alert      [x]Oriented:    [x] Person     [x] Place  [x]Time               [] Confused  [] Lethargic  [] Medicated  [] Non-responsive     GI / ABDOMEN:  [] Flat    [] Distended    [x] Soft    [] Firm   []  Obese                             [] Diarrhea  [x] Bowel Sounds  [] Nausea  [] Vomiting       / URINE ASSESSMENT:[] Voiding   [x] Oliguria  [] Anuria   []  Burrsi     [] Incontinent    []  Incontinent Brief      []  Bathroom Privileges       PAIN: [x] 0 []1  []2   []3   []4   []5   []6   []7   []8   []9   []10              Scale 0-10  Action/Follow Up:      MOBILITY:  [] Amb    [] Amb/Assist    [x] Bed    [] Wheelchair  [] Stretcher      All Vitals and Treatment Details on Attached [address]: [facility]          Room # 463/01      [] 1st Time Acute  [] Stat  [x] Routine  [] Urgent     [x] Acute Room  []  Bedside  [] ICU/CCU  [] ER   Isolation Precautions:   There are currently no Active Isolations      Special Considerations:         [] Blood Consent Verified [x]N/A     ALLERGIES: No Known Allergies            Code Status:Full Code        Hepatitis Status:                        Lab Results   Component Value Date/Time    Hepatitis B surface Ag <0.10 09/27/2020 04:33 AM    Hepatitis B surface Ab 3.47 (L) 09/27/2020 04:33 AM    Hepatitis B core, IgM Negative 09/27/2020 04:33 AM    Hepatitis C virus Ab 0.15 09/27/2020 04:33 AM                     Current Labs:   Lab Results   Component Value Date/Time    Sodium 146 (H) 10/02/2020 03:37 AM    Potassium 4.3 10/02/2020 03:37 AM    Chloride 115 (H) 10/02/2020 03:37 AM    CO2 20 (L) 10/02/2020 03:37 AM    Anion gap 11 10/02/2020 03:37 AM    Glucose 84 10/02/2020 03:37 AM    BUN 65 (H) 10/02/2020 03:37 AM    Creatinine 9.64 (H) 10/02/2020 03:37 AM BUN/Creatinine ratio 7 (L) 10/02/2020 03:37 AM    GFR est AA 6 (L) 10/02/2020 03:37 AM    GFR est non-AA 5 (L) 10/02/2020 03:37 AM    Calcium 8.2 (L) 10/02/2020 03:37 AM      Lab Results   Component Value Date/Time    WBC 9.7 10/02/2020 03:37 AM    HGB 7.8 (L) 10/02/2020 03:37 AM    HCT 24.4 (L) 10/02/2020 03:37 AM    PLATELET 513 92/54/1436 03:37 AM    MCV 89.4 10/02/2020 03:37 AM                                                                                     DIET: DIET RENAL  DIET NUTRITIONAL SUPPLEMENTS       PRIMARY NURSE REPORT: First initial/Last name/Title      Pre Dialysis: Carina Contreras RN     Time: 1500      EDUCATION:    [x] Patient [] Other         Knowledge Basis: []None [x]Minimal [] Substantial   Barriers to learning  [x]N/A   [] Access Care     [] S&S of infection     [] Fluid Management     []K+     [x]Procedural    []Albumin     [] Medications     [] Tx Options     [] Transplant     [] Diet     [] Other   Teaching Tools:  [x] Explain  [] Demo  [] Handouts [] Video  Patient response:   [x] Verbalized understanding  [] Teach back  [] Return demonstration [] Requires follow up   Inappropriate due to            [x] Time Out/Safety Check  [x]Extracorporeal Circuit Tested for integrity       RO/HEMODIALYSIS MACHINE SAFETY CHECKS  Before each treatment:     Machine Number:                   [facility]                                   [x] Unit Machine # 5 with centralized RO                                  [] Portable Machine #1/RO serial # [id number]                                  [] Portable Machine #2/RO serial # [id number]                                  [] Portable Machine #4/RO serial # [id number]                                                     [address] [place] [address]                                  [] Portable Machine #11/RO serial # [id number]                                   [] Portable Machine #12/RO serial # [id number]                                  [] Portable Machine #13/RO serial # [id number]      Alarm Test:  Pass time 1500               [x] RO/Machine Log Complete      Temp    36*-37*             Dialysate: pH  7.4 Conductivity: Meter   14     HD Machine   14                  TCD: 14  Dialyzer Lot # [id number]          Blood Tubing Lot # [id number]          Saline Lot #  [id number]     CHLORINE TESTING-Before each treatment and every 4 hours    Total Chlorine: [x] less than 0.1 ppm  Time: 1300 4 Hr/2nd Check Time: 1700   (if greater than 0.1 ppm from Primary then every 30 minutes from Secondary)     TREATMENT INITIATION  with Dialysis Precautions:   [x] All Connections Secured                 [x] Saline Line Double Clamped   [x] Venous Parameters Set                  [x] Arterial Parameters Set    [x] Prime Given 250ml                          [x]Air Foam Detector Engaged      Treatment Initiation Note:Pt in stable condition. CVL accessed and treatment initiated without complication. Post Assessment:   Dialyzer Cleared: [] Good [x] Fair  [] Poor  Blood processed:  61.8 L  UF Removed  1000 Ml  POst BP:   143/63       Pulse: 65        Respirations: 16  Temperature: 97.9 Lungs:     [x] Clear      [] Course         [] Crackles    [] Wheezing         [] Diminished   Post Tx Vascular Access:   AVF/AVG: Bleeding stopped   Art  min. Kyle. Min   N/A Cardiac:   [x] Regular   [] Irregular   [] Monitor  [] N/A      Rhythm:       Catheter:   Locking solution: Heparin 1:1000   Art. 1.9  Kyle. 1.9      Skin:   Pain:    [x] Warm  [x] Dry [] Diaphoretic    [] Flushed    [] Pale [] Cyanotic [x]0  []1  []2   []3  []4   []5   []6   []7   []8   []9   []10     Post Treatment Note:   HD well tolerated. 1L UF removed.  NAD noted during or post treatment       POST TREATMENT PRIMARY NURSE HANDOFF REPORT:     First initial/Last name/Title         Post Dialysis: Partha Loius RN Time:  5516     Abbreviations: AVG-arterial venous graft, AVF-arterial venous fistula, IJ-Internal Jugular, Subcl-Subclavian, Fem-Femoral, Tx-treatment, AP/HR-apical heart rate, DFR-dialysate flow rate, BFR-blood flow rate, AP-arterial pressure, -venous pressure, UF-ultrafiltrate, TMP-transmembrane pressure, Kyle-Venous, Art-Arterial, RO-Reverse Osmosis

## 2020-10-02 NOTE — CONSULTS
[facility] - Followup note Note  Date of  Admission: 9/25/2020  4:56 PM   Primary Care Physician:  Augusto Ferro MD     Assessment:     -Asked to see again for preop, OP surgery planned for ascending colon mass  -Colonic masses by scope, seen by GI and Dr. Saranya Hernandez  -ARF with hydronephrosis, improving with Burris and brief dialysis  -Indeterminate troponin at presentation, likely secondary demand ischemia from ARF/anemia. Troponin peak 0.35, no chest pain.    -Echo 9/26/20 with EF 50%  -Hyperkalemia, resolved  -h/o carotid disease and presumed CAD by nuc 2016  -h/o HTN  -Acute on chronic anemia    No current primary cardiologist but saw Dr. Ed Wilkerson 2016     Plan:     Patient's functional status quite limited and patient has suspected CAD by previous stress test in 2016. Patients risk for surgery increased but any cardiac intervention would delay surgery. Given recent renal failure, there is also increased risk for irreversible kidney damage (improving Cr). Will not pursue any further ischemia evaluation this admission and plan follow-up as outpatient for further risk stratification. EF noted to be 50%. Remains high risk for surgery at this time. History of Present Illness: This is a 80 y.o. male admitted for Acute renal failure (ARF) ([facility] [N17.9]. Patient complains of:    Presented from Dr. Charles Anthony office on 9/25/20 for anemia, worsening renal failure. Denies chest pain or dyspnea. Found to have ARF from obstruction, improving and colonic mass, plans for possible outpatient surgery to further evaluate.     Review of Symptoms:  Except as stated above include:  Constitutional:  Negative  Ears, nose, throat:  Negative  Respiratory:  negative  Cardiovascular:  negative  Gastrointestinal: negative  Genitourinary:  negative  Musculoskeletal:  Negative  Neurological:  Negative  Dermatological:  Negative  Hematological: Negative  Endocrinological: Negative  Allergy: Negative  Psychological:  Negative     Past Medical History:     Past Medical History:   Diagnosis Date    Cardiac echocardiogram 08/29/2016    EF 60-65%. No WMA. Mild LVH. Indeterminate diastolic fx. RVSP 35 mmHg. No significant valvular heart disease.  Cardiac nuclear imaging test, abnormal 08/29/2016    Intermediate risk. Previous inferior infarction w/very mild fabiana-infarct ischemia. Inferior hypk. EF 68%. Nondiagnostic EKG on pharm stress test.  Pt's BP increased from 193/96 to 207/83 during study.  Carotid duplex 08/30/2016    Mild <50% bilateral ICA stenosis.  Hypertension          Social History:     Social History     Socioeconomic History    Marital status:      Spouse name: Not on file    Number of children: Not on file    Years of education: Not on file    Highest education level: Not on file   Tobacco Use    Smoking status: Never Smoker    Smokeless tobacco: Never Used   Substance and Sexual Activity    Alcohol use: No    Drug use: No        Family History:   History reviewed. No pertinent family history.      Medications:   No Known Allergies     Current Facility-Administered Medications   Medication Dose Route Frequency    0.9% sodium chloride infusion  75 mL/hr IntraVENous CONTINUOUS    heparin (porcine) 1,000 unit/mL injection 5,000 Units  5,000 Units InterCATHeter DIALYSIS PRN    epoetin brina-epbx (RETACRIT) injection 10,000 Units  10,000 Units SubCUTAneous Q MON, WED & FRI    ferrous sulfate tablet 325 mg  1 Tab Oral DAILY WITH BREAKFAST    B complex-vitaminC-folic acid (NEPHROCAP) cap  1 Cap Oral DAILY    amLODIPine (NORVASC) tablet 10 mg  10 mg Oral DAILY    sodium chloride (NS) flush 5-40 mL  5-40 mL IntraVENous Q8H    sodium chloride (NS) flush 5-40 mL  5-40 mL IntraVENous PRN    acetaminophen (TYLENOL) tablet 650 mg  650 mg Oral Q6H PRN    Or    acetaminophen (TYLENOL) suppository 650 mg  650 mg Rectal Q6H PRN    polyethylene glycol (MIRALAX) packet 17 g  17 g Oral DAILY PRN    promethazine (PHENERGAN) tablet 12.5 mg  12.5 mg Oral Q6H PRN    Or    ondansetron (ZOFRAN) injection 4 mg  4 mg IntraVENous Q6H PRN    pantoprazole (PROTONIX) tablet 40 mg  40 mg Oral ACB    labetaloL (NORMODYNE;TRANDATE) 20 mg/4 mL (5 mg/mL) injection 20 mg  20 mg IntraVENous Q5MIN PRN         Physical Exam:     Visit Vitals  BP (!) 149/68   Pulse 73   Temp 98.9 °F (37.2 °C)   Resp 20   Ht 5' 6\" (1.676 m)   Wt 74.3 kg (163 lb 11.2 oz)   SpO2 93%   BMI 26.42 kg/m²     BP Readings from Last 3 Encounters:   10/02/20 (!) 149/68   07/10/19 193/80   12/06/18 176/66     Pulse Readings from Last 3 Encounters:   10/02/20 73   07/09/19 88   12/06/18 66     Wt Readings from Last 3 Encounters:   10/01/20 74.3 kg (163 lb 11.2 oz)   07/09/19 81.2 kg (179 lb)   12/06/18 85.3 kg (188 lb)       General:  alert, cooperative, no distress, appears stated age  Neck:  nontender  Lungs:  clear to auscultation bilaterally  Heart:  regular rate and rhythm, S1, S2 normal, no murmur, click, rub or gallop  Abdomen:  abdomen is soft without significant tenderness, masses, organomegaly or guarding  Extremities:  extremities normal, atraumatic, no cyanosis or edema  Skin: Warm and dry.  no hyperpigmentation, vitiligo, or suspicious lesions  Neuro: alert, oriented x3, affect appropriate, no focal neurological deficits, moves all extremities well, no involuntary movements, reflexes at knee and ankle intact  Psych: non focal     Data Review:     Recent Labs     10/02/20  0337 10/01/20  0513 09/30/20  0423   WBC 9.7 9.7 10.7   HGB 7.8* 6.9* 7.4*   HCT 24.4* 21.1* 22.7*    182 237     Recent Labs     10/02/20  0337 10/01/20  0513 09/30/20  0423   * 145 139   K 4.3 4.2 3.9   * 111 107   CO2 20* 23 25   GLU 84 89 104*   BUN 65* 71* 79*   CREA 9.64* 9.85* 9.88*   CA 8.2* 8.0* 8.8   MG 1.7 1.7 1.9   PHOS 5.2* 5.2* 4.8       Results for orders placed or performed during the hospital encounter of 09/25/20   EKG, 12 LEAD, INITIAL   Result Value Ref Range    Ventricular Rate 76 BPM    Atrial Rate 76 BPM    P-R Interval 172 ms    QRS Duration 112 ms    Q-T Interval 456 ms    QTC Calculation (Bezet) 513 ms    Calculated P Axis 31 degrees    Calculated R Axis -37 degrees    Calculated T Axis -117 degrees    Diagnosis       Normal sinus rhythm  Left axis deviation  Moderate voltage criteria for LVH, may be normal variant  Septal infarct , age undetermined  Marked ST abnormality, possible inferior subendocardial injury  Abnormal ECG  When compared with ECG of 09-JUL-2019 23:27,  Inverted T waves have replaced nonspecific T wave abnormality in  Confirmed by Deven Lowe M.D., [address] [place] [address] ([id number]) on 9/26/2020 9:30:19 PM     Results for orders placed or performed in visit on 09/28/16   AMB POC EKG ROUTINE W/ 12 LEADS, INTER & REP    Narrative    EKG: unchanged from previous tracings, normal sinus rhythm, nonspecific ST and T waves changes, left axis deviation, upper limit SD,mild IVCD         All Cardiac Markers in the last 24 hours:  No results found for: CPK, CK, CKMMB, CKMB, RCK3, CKMBT, CKNDX, CKND1, LILLY, TROPT, TROIQ, EDEL, TROPT, TNIPOC, BNP, BNPP    Last Lipid:    Lab Results   Component Value Date/Time    Cholesterol, total 213 (H) 08/29/2016 01:35 AM    HDL Cholesterol 54 08/29/2016 01:35 AM    LDL, calculated 136.6 (H) 08/29/2016 01:35 AM    Triglyceride 112 08/29/2016 01:35 AM    CHOL/HDL Ratio 3.9 08/29/2016 01:35 AM       Signed By: Brittani Clifford MD     October 2, 2020

## 2020-10-03 VITALS
WEIGHT: 161.8 LBS | HEIGHT: 66 IN | OXYGEN SATURATION: 98 % | SYSTOLIC BLOOD PRESSURE: 128 MMHG | DIASTOLIC BLOOD PRESSURE: 60 MMHG | RESPIRATION RATE: 18 BRPM | BODY MASS INDEX: 26 KG/M2 | HEART RATE: 78 BPM | TEMPERATURE: 98.2 F

## 2020-10-03 LAB
ANION GAP SERPL CALC-SCNC: 6 MMOL/L (ref 3–18)
BASOPHILS # BLD: 0 K/UL (ref 0–0.1)
BASOPHILS NFR BLD: 0 % (ref 0–2)
BUN SERPL-MCNC: 25 MG/DL (ref 7–18)
BUN/CREAT SERPL: 5 (ref 12–20)
CALCIUM SERPL-MCNC: 7.9 MG/DL (ref 8.5–10.1)
CHLORIDE SERPL-SCNC: 105 MMOL/L (ref 100–111)
CO2 SERPL-SCNC: 28 MMOL/L (ref 21–32)
CREAT SERPL-MCNC: 5.13 MG/DL (ref 0.6–1.3)
DIFFERENTIAL METHOD BLD: ABNORMAL
EOSINOPHIL # BLD: 0.2 K/UL (ref 0–0.4)
EOSINOPHIL NFR BLD: 3 % (ref 0–5)
ERYTHROCYTE [DISTWIDTH] IN BLOOD BY AUTOMATED COUNT: 14.9 % (ref 11.6–14.5)
GLUCOSE SERPL-MCNC: 88 MG/DL (ref 74–99)
HCT VFR BLD AUTO: 24.6 % (ref 36–48)
HGB BLD-MCNC: 7.9 G/DL (ref 13–16)
LYMPHOCYTES # BLD: 1.2 K/UL (ref 0.9–3.6)
LYMPHOCYTES NFR BLD: 13 % (ref 21–52)
MAGNESIUM SERPL-MCNC: 1.8 MG/DL (ref 1.6–2.6)
MCH RBC QN AUTO: 28.9 PG (ref 24–34)
MCHC RBC AUTO-ENTMCNC: 32.1 G/DL (ref 31–37)
MCV RBC AUTO: 90.1 FL (ref 74–97)
MONOCYTES # BLD: 1 K/UL (ref 0.05–1.2)
MONOCYTES NFR BLD: 11 % (ref 3–10)
NEUTS SEG # BLD: 6.6 K/UL (ref 1.8–8)
NEUTS SEG NFR BLD: 73 % (ref 40–73)
PHOSPHATE SERPL-MCNC: 3.3 MG/DL (ref 2.5–4.9)
PLATELET # BLD AUTO: 156 K/UL (ref 135–420)
PMV BLD AUTO: 11.4 FL (ref 9.2–11.8)
POTASSIUM SERPL-SCNC: 3.4 MMOL/L (ref 3.5–5.5)
RBC # BLD AUTO: 2.73 M/UL (ref 4.7–5.5)
SODIUM SERPL-SCNC: 139 MMOL/L (ref 136–145)
WBC # BLD AUTO: 9.1 K/UL (ref 4.6–13.2)

## 2020-10-03 PROCEDURE — 74011250637 HC RX REV CODE- 250/637: Performed by: INTERNAL MEDICINE

## 2020-10-03 PROCEDURE — 77030012865 HC BG URIN LEG MDII -A

## 2020-10-03 PROCEDURE — 80048 BASIC METABOLIC PNL TOTAL CA: CPT

## 2020-10-03 PROCEDURE — 36415 COLL VENOUS BLD VENIPUNCTURE: CPT

## 2020-10-03 PROCEDURE — 90935 HEMODIALYSIS ONE EVALUATION: CPT

## 2020-10-03 PROCEDURE — 84100 ASSAY OF PHOSPHORUS: CPT

## 2020-10-03 PROCEDURE — 74011250637 HC RX REV CODE- 250/637: Performed by: NURSE PRACTITIONER

## 2020-10-03 PROCEDURE — 85025 COMPLETE CBC W/AUTO DIFF WBC: CPT

## 2020-10-03 PROCEDURE — 83735 ASSAY OF MAGNESIUM: CPT

## 2020-10-03 RX ORDER — LANOLIN ALCOHOL/MO/W.PET/CERES
325 CREAM (GRAM) TOPICAL
Qty: 30 TAB | Refills: 0 | Status: SHIPPED | OUTPATIENT
Start: 2020-10-04 | End: 2020-11-03

## 2020-10-03 RX ORDER — AMLODIPINE BESYLATE 10 MG/1
10 TABLET ORAL DAILY
Qty: 30 TAB | Refills: 0 | Status: SHIPPED | OUTPATIENT
Start: 2020-10-04 | End: 2020-11-03

## 2020-10-03 RX ADMIN — BACITRACIN ZINC, NEOMYCIN, POLYMYXIN B: 400; 3.5; 5 OINTMENT TOPICAL at 08:33

## 2020-10-03 RX ADMIN — NEPHROCAP 1 CAPSULE: 1 CAP ORAL at 08:28

## 2020-10-03 RX ADMIN — AMLODIPINE BESYLATE 10 MG: 10 TABLET ORAL at 08:28

## 2020-10-03 RX ADMIN — FERROUS SULFATE TAB 325 MG (65 MG ELEMENTAL FE) 325 MG: 325 (65 FE) TAB at 08:28

## 2020-10-03 RX ADMIN — PANTOPRAZOLE SODIUM 40 MG: 40 TABLET, DELAYED RELEASE ORAL at 08:28

## 2020-10-03 RX ADMIN — Medication 10 ML: at 05:40

## 2020-10-03 NOTE — DISCHARGE INSTRUCTIONS
DISCHARGE SUMMARY from Nurse    PATIENT INSTRUCTIONS:    After general anesthesia or intravenous sedation, for 24 hours or while taking prescription Narcotics:  · Limit your activities  · Do not drive and operate hazardous machinery  · Do not make important personal or business decisions  · Do  not drink alcoholic beverages  · If you have not urinated within 8 hours after discharge, please contact your surgeon on call. Report the following to your surgeon:  · Excessive pain, swelling, redness or odor of or around the surgical area  · Temperature over 100.5  · Nausea and vomiting lasting longer than 4 hours or if unable to take medications  · Any signs of decreased circulation or nerve impairment to extremity: change in color, persistent  numbness, tingling, coldness or increase pain  · Any questions    What to do at Home:  Recommended activity: Activity as tolerated. If you experience any of the following symptoms chest pain, fever, nausea/vomiting, or shortness of breath, please follow up with Dr. Jas Calhoun. *  Please give a list of your current medications to your Primary Care Provider. *  Please update this list whenever your medications are discontinued, doses are      changed, or new medications (including over-the-counter products) are added. *  Please carry medication information at all times in case of emergency situations. These are general instructions for a healthy lifestyle:    No smoking/ No tobacco products/ Avoid exposure to second hand smoke  Surgeon General's Warning:  Quitting smoking now greatly reduces serious risk to your health.     Obesity, smoking, and sedentary lifestyle greatly increases your risk for illness    A healthy diet, regular physical exercise & weight monitoring are important for maintaining a healthy lifestyle    You may be retaining fluid if you have a history of heart failure or if you experience any of the following symptoms:  Weight gain of 3 pounds or more overnight or 5 pounds in a week, increased swelling in our hands or feet or shortness of breath while lying flat in bed. Please call your doctor as soon as you notice any of these symptoms; do not wait until your next office visit. Patient armband removed and shredded  MyChart Activation    Thank you for requesting access to [employer]. Please follow the instructions below to securely access and download your online medical record. [employer] allows you to send messages to your doctor, view your test results, renew your prescriptions, schedule appointments, and more. How Do I Sign Up? 1. In your internet browser, go to www.[employer]  2. Click on the First Time User? Click Here link in the Sign In box. You will be redirect to the New Member Sign Up page. 3. Enter your [employer] Access Code exactly as it appears below. You will not need to use this code after youve completed the sign-up process. If you do not sign up before the expiration date, you must request a new code. [employer] Access Code: [email or web address]  Expires: 2020  4:53 PM (This is the date your [employer] access code will )    4. Enter the last four digits of your Social Security Number (xxxx) and Date of Birth (mm/dd/yyyy) as indicated and click Submit. You will be taken to the next sign-up page. 5. Create a [employer] ID. This will be your [employer] login ID and cannot be changed, so think of one that is secure and easy to remember. 6. Create a [employer] password. You can change your password at any time. 7. Enter your Password Reset Question and Answer. This can be used at a later time if you forget your password. 8. Enter your e-mail address. You will receive e-mail notification when new information is available in [address] 9. Click Sign Up. You can now view and download portions of your medical record. 10. Click the Download Summary menu link to download a portable copy of your medical information.     Additional Information    If you have questions, please visit the Frequently Asked Questions section of the [employer] website at [email or web address] Remember, [employer] is NOT to be used for urgent needs. For medical emergencies, dial 911. The discharge information has been reviewed with the patient. The patient verbalized understanding. Discharge medications reviewed with the patient and appropriate educational materials and side effects teaching were provided.   ___________________________________________________________________________________________________________________________________

## 2020-10-03 NOTE — ROUTINE PROCESS
Bedside and Verbal shift change report given to Elie Joe RN and MONTSERRAT Morton (oncoming nurse) by Tiffanie Luna RN (offgoing nurse). Report included the following information SBAR, Kardex, MAR and Recent Results. SITUATION: 
Code Status: Full Code Reason for Admission: Acute renal failure (ARF) ([facility] [N17.9] Hospital day: 8 Problem List:  
   
Hospital Problems  Date Reviewed: 12/6/2018 Codes Class Noted POA Polyp of ascending colon ICD-10-CM: K63.5 ICD-9-CM: 211.3  10/1/2020 Unknown Postobstructive diuresis ICD-10-CM: R35.8 ICD-9-CM: 788.42  9/30/2020 Unknown Hydronephrosis ICD-10-CM: N13.30 ICD-9-CM: 550  9/29/2020 Unknown UTI (urinary tract infection) ICD-10-CM: N39.0 ICD-9-CM: 599.0  9/29/2020 Unknown ESRD (end stage renal disease) ([facility] ICD-10-CM: N18.6 ICD-9-CM: 585.6  9/26/2020 Unknown Hyperkalemia ICD-10-CM: E87.5 ICD-9-CM: 276.7  9/26/2020 Unknown Metabolic acidosis [id number]-[id number]: E87.2 ICD-9-CM: 276.2  9/26/2020 Unknown Secondary hyperparathyroidism, renal ([facility] ICD-10-CM: N25.81 ICD-9-CM: 588.81  9/26/2020 Unknown Anemia ICD-10-CM: D64.9 ICD-9-CM: 285.9  9/26/2020 Unknown Acute renal failure (ARF) (HCC) ICD-10-CM: N17.9 ICD-9-CM: 584.9  9/25/2020 Unknown BACKGROUND: 
 Past Medical History:  
Past Medical History:  
Diagnosis Date  Cardiac echocardiogram 08/29/2016 EF 60-65%. No WMA. Mild LVH. Indeterminate diastolic fx. RVSP 35 mmHg. No significant valvular heart disease.  Cardiac nuclear imaging test, abnormal 08/29/2016 Intermediate risk. Previous inferior infarction w/very mild fabiana-infarct ischemia. Inferior hypk. EF 68%. Nondiagnostic EKG on pharm stress test.  Pt's BP increased from 193/96 to 207/83 during study.  Carotid duplex 08/30/2016 Mild <50% bilateral ICA stenosis.  Hypertension Patient taking anticoagulants no  Patient has a defibrillator: no  
 History of shots YES for example, flu, pneumonia, tetanus Isolation History NO for example, MRSA, CDiff ASSESSMENT: 
Changes in Assessment Throughout Shift: NONE Significant Changes in 24 hours (for example, RR/code, fall) Patient has Central Line: yes Reasons if yes: Hemodialysis Patient has Burris Cath: yes Reasons if yes: Acute urinary retention Mobility Issues PT 
IV Patency OR Checklist 
Pending Tests Last Vitals: 
Vitals w/ MEWS Score (last day) Date/Time MEWS Score Pulse Resp Temp BP Level of Consciousness SpO2  
 10/03/20 0000  1  74  18  98.2 °F (36.8 °C)  124/60  Alert  100 % 10/02/20 1953  1  67  18  97.5 °F (36.4 °C)  136/63  Alert  100 % 10/02/20 1915    65  18  97.9 °F (36.6 °C)  (!) 143/63      
 10/02/20 1900    65      135/62      
 10/02/20 1845    64      (!) 140/64      
 10/02/20 1830    60      126/63      
 10/02/20 1815    64      (!) 143/63      
 10/02/20 1800    66      (!) 143/71      
 10/02/20 1745    (!) 59      123/68      
 10/02/20 1730    68      (!) 125/56      
 10/02/20 1715    67      (!) 145/66      
 10/02/20 1700    70      137/69      
 10/02/20 1645    67      121/60      
 10/02/20 1630    67      130/67      
 10/02/20 1615    78      128/64      
 10/02/20 1607    73      123/64      
 10/02/20 1558    72  18  98.3 °F (36.8 °C)  132/63      
 10/02/20 1555  0  71  10  98.5 °F (36.9 °C)  (!) 144/65  Alert  100 % 10/02/20 1205  1  67  18  98.8 °F (37.1 °C)  (!) 132/59  Alert  100 % 10/02/20 0823  1  73  20  98.9 °F (37.2 °C)  (!) 149/68  Alert  93 % 10/02/20 0434  1  73  18  98.1 °F (36.7 °C)  (!) 165/90  Alert  99 % 10/02/20 0245  1  69  18  98.1 °F (36.7 °C)  (!) 144/88  Alert  100 % 10/02/20 0223  1  71  18  98.3 °F (36.8 °C)  (!) 145/60  Alert  100 % 10/02/20 0121  1  66  18  98.1 °F (36.7 °C)  (!) 150/67  Alert  100 % 10/02/20 0037  1  71  18  97.5 °F (36.4 °C)  (!) 149/68  Alert  100 % 10/02/20 0008  1  72  18  97.6 °F (36.4 °C)  (!) 144/65  Alert  100 % PAIN Pain Assessment Pain Intensity 1: 0 (10/03/20 0000) Pain Location 1: Knee Pain Intervention(s) 1: Medication (see MAR) Patient Stated Pain Goal: 0 Intervention effective: N/A Time of last intervention: N/A Reassessment Completed: yes Other actions taken for pain: Distraction Last 3 Weights: 
Last 3 Recorded Weights in this Encounter  
 09/27/20 2112 10/01/20 0341 10/01/20 2112 Weight: 73.5 kg (162 lb 1.6 oz) 70.9 kg (156 lb 6.4 oz) 74.3 kg (163 lb 11.2 oz) Weight change: INTAKE/OUPUT Current Shift: 10/02 1901 - 10/03 0700 In: 240 [P.O.:240] Out: 2000 [Urine:1000] Last three shifts: 10/01 0701 - 10/02 1900 In: 8692 [P.O.:500; I.V.:2000] Out: 1625 [FVAFL:6726] RECOMMENDATIONS AND DISCHARGE PLANNING Patient needs and requests: Assistance with ADL's 
 
Pending tests/procedures: labs Discharge plan for patient: Home Discharge planning Needs or Barriers: None Estimated Discharge Date: 10/05/2020 Posted on Whiteboard in [address] [place] [address] Room: yes \"HEALS\" SAFETY CHECK A safety check occurred in the patient's room between off going nurse and oncoming nurse listed above. The safety check included the below items: 
 
H High Alert Medications Verify all high alert medication drips (heparin, PCA, etc.) E Equipment Suction is set up for ALL patients (with yanker) Red plugs utilized for all equipment (IV pumps, etc.) WOWs wiped down at end of shift. Room stocked with oxygen, suction, and other unit-specific supplies A Alarms Bed alarm is set for fall risk patients Ensure chair alarm is in place and activated if patient is up in a chair L Lines Check IV for any infiltration Burris bag is empty if patient has a Burris Tubing and IV bags are labeled Myrna Orts Safety Room is clean, patient is clean, and equipment is clean. Hallways are clear from equipment besides carts. Fall bracelet on for fall risk patients Ensure room is clear and free of clutter Suction is set up for ALL patients (with marline) Hallways are clear from equipment besides carts. Isolation precautions followed, supplies available outside room, sign posted Anders Womack RN

## 2020-10-03 NOTE — PROGRESS NOTES
ENDER put patient in the queue for [facility], and called and spoke to Jaelyn, let her know patient discharged today.      Anastasiia Bora, RN  Case Management [phone]

## 2020-10-03 NOTE — PROGRESS NOTES
Progress Note    Pavel Nesbitt  80 y.o. Admit Date: 9/25/2020  Active Problems:    Acute renal failure (ARF) ([facility] (9/25/2020) POA: Unknown      ESRD (end stage renal disease) ([facility] (9/26/2020) POA: Unknown      Hyperkalemia (9/26/2020) POA: Unknown      Metabolic acidosis (9/72/3672) POA: Unknown      Secondary hyperparathyroidism, renal ([facility] (9/26/2020) POA: Unknown      Anemia (9/26/2020) POA: Unknown      Hydronephrosis (9/29/2020) POA: Unknown      UTI (urinary tract infection) (9/29/2020) POA: Unknown      Postobstructive diuresis (9/30/2020) POA: Unknown      Polyp of ascending colon (10/1/2020) POA: Unknown            Subjective:     Patient feels good, making urine through Burris,was dialyzed yesterday. Vlad Aguirre Requiring Dialysis as his Renal Function has  Not improved much even with Good UOP through Burris & Hydronephrosis getting better, Urology also following. A comprehensive review of systems was negative except for that written in the History of Present Illness.     Objective:     Visit Vitals  /68   Pulse 74   Temp 98.5 °F (36.9 °C)   Resp 18   Ht 5' 6\" (1.676 m)   Wt 73.4 kg (161 lb 12.8 oz)   SpO2 98%   BMI 26.12 kg/m²         Intake/Output Summary (Last 24 hours) at 10/3/2020 1218  Last data filed at 10/3/2020 5602  Gross per 24 hour   Intake 240 ml   Output 2300 ml   Net -2060 ml       Current Facility-Administered Medications   Medication Dose Route Frequency Provider Last Rate Last Dose    neomycin-bacitracin-polymyxin (NEOSPORIN) ointment   Topical BID Waleska Ambrocio MD        heparin (porcine) 1,000 unit/mL injection 5,000 Units  5,000 Units InterCATHeter DIALYSIS PRN Aries French MD        epoetin brina-epbx (RETACRIT) injection 10,000 Units  10,000 Units SubCUTAneous Q MON, WED & Daiana Gilmore MD   10,000 Units at 10/02/20 2126    ferrous sulfate tablet 325 mg  1 Tab Oral DAILY WITH Luis Braga MD   325 mg at 10/03/20 0828    B complex-vitaminC-folic acid (NEPHROCAP) cap  1 Cap Oral DAILY Jenne Klinefelter, MD   1 Cap at 10/03/20 0828    amLODIPine (NORVASC) tablet 10 mg  10 mg Oral DAILY Jenne Klinefelter, MD   10 mg at 10/03/20 0828    sodium chloride (NS) flush 5-40 mL  5-40 mL IntraVENous Q8H Silva Beverly NP   10 mL at 10/03/20 0540    sodium chloride (NS) flush 5-40 mL  5-40 mL IntraVENous PRN Vladislav Beverly NP        acetaminophen (TYLENOL) tablet 650 mg  650 mg Oral Q6H PRN Anish Bello NP   650 mg at 10/01/20 4569    Or    acetaminophen (TYLENOL) suppository 650 mg  650 mg Rectal Q6H PRN Vladislav Beverly NP        polyethylene glycol (MIRALAX) packet 17 g  17 g Oral DAILY PRN Vladislav Beverly NP        promethazine (PHENERGAN) tablet 12.5 mg  12.5 mg Oral Q6H PRN Vladislav Beverly NP        Or    ondansetron (ZOFRAN) injection 4 mg  4 mg IntraVENous Q6H PRN Silva Beverly NP        pantoprazole (PROTONIX) tablet 40 mg  40 mg Oral ACB Silva Beverly NP   40 mg at 10/03/20 8875    labetaloL (NORMODYNE;TRANDATE) 20 mg/4 mL (5 mg/mL) injection 20 mg  20 mg IntraVENous Q5MIN PRN Anish Bello NP            Physical Exam:     Physical Exam:   General:  Alert, cooperative, no distress, appears stated age. Neck: Supple, symmetrical, trachea midline, no adenopathy, thyroid: no enlargement/tenderness/nodules, no carotid bruit and no JVD. Lungs:   Clear to auscultation bilaterally. Heart:  Regular rate and rhythm, S1, S2 normal, no murmur, click, rub or gallop. Abdomen:   Soft, non-tender. Bowel sounds normal. No masses,  No organomegaly. Extremities: No edema, HD catheter in  Right IJV.    Skin: Skin color, texture, turgor normal. No rashes or lesions         Data Review:    CBC w/Diff    Recent Labs     10/03/20  0314 10/02/20  0337 10/01/20  0513   WBC 9.1 9.7 9.7   RBC 2.73* 2.73* 2.40*   HGB 7.9* 7.8* 6.9*   HCT 24.6* 24.4* 21.1*   MCV 90.1 89.4 87.9   MCH 28.9 28.6 28.8   MCHC 32.1 32.0 32.7   RDW 14.9* 14.6* 14.7*    Recent Labs     10/03/20  0314 10/02/20  0337 10/01/20  0513   MONOS 11* 7 10   EOS 3 3 3   BASOS 0 0 0   RDW 14.9* 14.6* 14.7*        Comprehensive Metabolic Profile    Recent Labs     10/03/20  0314 10/02/20  0337 10/01/20  0513    146* 145   K 3.4* 4.3 4.2    115* 111   CO2 28 20* 23   BUN 25* 65* 71*   CREA 5.13* 9.64* 9.85*    Recent Labs     10/03/20  0314 10/02/20  0337 10/01/20  0513   CA 7.9* 8.2* 8.0*   PHOS 3.3 5.2* 5.2*        COMPARISON: None     FINDINGS:           Lower chest: Visualization of myocardium suggest underlying anemia may be  present     Liver: Unremarkable.      Gallbladder/biliary: No stones are present gallbladder wall does not appear to  be inflamed Biliary tree unremarkable.     Spleen: Unremarkable     Pancreas: Unremarkable.     Left Kidney: Unremarkable hydronephrotic features are identified compared to the  recent September 28 exam however the hydronephrotic changes have improved.     Right Kidney: Hydronephrotic changes are present but improved from the recent  September 28 exam     Adrenals: Adrenal gland bodies are prominent but no definite nodular  hyperplastic features are suggestive     Bowels/mesentery: No obstruction or mesenteric inflammation. Appendix: Unremarkable     Retroperitoneal Spaces: No intraperitoneal free air or free fluid. No fluid  collection.     Pelvis: A catheter present within the collapsed bladder. Prostate is enlarged. Capsule intact. Seminal vesicles  away from the gland     Vascular: Aorta unremarkable for age. IVC unremarkable.     Lymph Nodes: No adenopathy.     Bones: Osteophytic changes and scoliotic curvature noted. In addition some facet  arthropathy and ankylosis of the SI joints present.     A single bone island identified in the ischial tuberosity on the left side down  metastasis.     IMPRESSION  IMPRESSION[de-identified]     1.  Chronic changes persist but are improved from September 28. A Burris catheter  remains in place marked prostatic enlargement suggest a high-grade urinary  obstruction present. There is no evidence of any stent or instrumentation to  explain improved but not completely resolved hydronephrotic changes bilaterally         Patient:  Perfecto Bernal  Specimen #:  [id number]    Age:  1935 (Age: 80)  Date of Procedure:  10/1/2020    Sex:  M  Date of Receipt:  10/1/2020    Hospital #:  [id number]  Date of Report:  10/2/2020    Med. Record #:  [id number]  Location:  [facility]    Physician(s):  Jayla Conroy MD ([id number])  Room/Bed:  [facility]           DIAGNOSIS:   Colon mass   TYPE OF SPECIMEN:   A: ASCENDING COLON MASS BXS   B: HEPATIC FLEXURE COLON MASS BXS   C: TRANSVERSE COLON POLYP   FINAL DIAGNOSIS:   A: COLON, ASCENDING, BIOPSY:   VILLOUS ADENOMA. B: COLON, HEPATIC FLEXURE, BIOPSY:   TUBULOVILLOUS ADENOMA. C: COLON, TRANSVERSE, POLYPECTOMY:   TUBULAR ADENOMA. GROSS DESCRIPTION:   A. Received in formalin labeled with patient's identifiers and \"ascending colon mass biopsy\", are multiple tan soft tissue fragments ranging from 0.2-0.3 cm in greatest dimension. All-one. Impression:       Active Hospital Problems    Diagnosis Date Noted    Polyp of ascending colon 10/01/2020    Postobstructive diuresis 09/30/2020    Hydronephrosis 09/29/2020    UTI (urinary tract infection) 09/29/2020    ESRD (end stage renal disease) ([facility] 09/26/2020    Hyperkalemia 91/43/5151    Metabolic acidosis 72/47/7555    Secondary hyperparathyroidism, renal ([facility] 09/26/2020    Anemia 09/26/2020    Acute renal failure (ARF) ([facility] 09/25/2020      Villous Adenoma      Plan: Will Dialyze again toda . Afterwards he can be Discharged to home. Start OP dialysis from Monday, at [facility], [address], [place], [address]. Tel No [phone]. Advised to drink plenty of Fluid. He needs to FU with Urology & Colorectal Surgeon. I will follow at Dialysis center. Discussed with his Wife Also Discussed with Dr. Alessio Greenfield.       Vernon Santiago MD

## 2020-10-03 NOTE — PROGRESS NOTES
Discharge instructions completed with patient. Provided opportunity to answer questions. All questions answered to patient's satisfaction. Patient discharged to home.

## 2020-10-03 NOTE — DISCHARGE SUMMARY
Discharge Summary    Patient: Mamta Iniguez MRN: [id number]  CSN: [id number]    YOB: 1935  Age: 80 y.o. Sex: male    DOA: 9/25/2020 LOS:  LOS: 8 days   Discharge Date: 10/3/2020     Admission Diagnoses: Acute renal failure (ARF) ([facility] [address] [N17.9]    Discharge Diagnoses: [place] on CKD, now ESRD  Secondary hyperparathyroidism  Metabolic acidosis  Troponin elevation, no ACS  HTN  Anemia, etiology uncertain  OA  Advanced age  Proximal colon and hepatic flexure mass like polyps- not amendable to endoscopic resection- plan for outpatient potential colectomy       Discharge Condition: Stable    PHYSICAL EXAM  Visit Vitals  /68   Pulse 74   Temp 98.5 °F (36.9 °C)   Resp 18   Ht 5' 6\" (1.676 m)   Wt 73.4 kg (161 lb 12.8 oz)   SpO2 98%   BMI 26.12 kg/m²          General:  In NAD. Nontoxic-appearing. Cardiovascular:  RRR. Pulmonary:  Lungs clear bilaterally, no wheezes. GI:  Abdomen soft, NTTP. : michel cath with clear yellow urine. Extremities:  Warm, no edema or ischemia. TDC to [facility], [facility] C/D/I  Neuro:  Awake and alert to person, place. Moves extremities spontaneously. Hospital Course: Per prior HPI, Karen Portillo is a 80 y.o. male who presents from home with recommendations from nephrology Dr. Donnie Sands. Patient was seen in Dr. Della Pope office and referred to ER for further medical treatment. Currently patient c/o being cold. No complaint from the last few day. He is not sure why he needed to see Dr. Donnie Sands, he was referred to him by his PCP. Today was his first time meeting Dr. Donnie Sands. Denies active bleeding or bruising. Denies urinary symptoms. Left leg pain from arthritis and uses a cane. Wife confirmed this was the first time visit with nephrology. No kidney history that she can recall. No issues at home. In ER blood transfusion ordered. Sodium bicarb gtt initiated. \"      CXR negative. On admission, pt  and creatinine 15. H/H 6.6 /19.8.   UA with  Large leukocyte esterase, few bacteria. Nephrology consulted in the ED. Pt was referred to him by PCP for lab work done in August 2020 with very high blood urea nitrogen and creatinine, acidotic, hyperkalemic and severely anemic. No other labs available for review except from July 2019 with Cr of 4.94 but no renal evaluation done. Cardiology consulted during hospitalization for elevated troponin- indeterminate- likely demand ischemia in setting of renal failure- doubt ACS. Echo obtained and showed EF 77%, grade 1 diastolic dysfunction, mild mitral valve regurgitation. Pt on IV Rocephin for UTI- urine culture >100,000 colonies E. Coli. Vascular consulted for dialysis access and s/p insertion on 9/28. Renal U/s on 9/28 showed right  Kidney with suspected moderate to severe hydronephrosis. CT a/p on 9/28 obtained to f/u on renal ultrasound results and revealed bilateral hydronephrosis and ureteral dilation, diffuse bladder wall thickening, moderate to pronounced prostate enlargement, Abnormal appearance of the ascending colon near the hepatic flexure, possibly an artifact from peristalsis but a developing colon mass cannot be excluded, recommend correlation with either barium enema or colonoscopy, small ventral hernia with protrusion of small bowel into the hernia sac at the midline. No associated post-obstructive changes. No plan for ischemia work up per cardiology- recommend outpatient follow up. Urology consulted- tremendous urine output after michel cath placed suggestive of chronic outlet obstructive and bilateral hydronephrosis, per urology note HORACIO benign. PSA elevated at 78. Pt received first short dialysis session on 9/29- because of urology findings- nephrologist held off on any further HD. GI consulted for colonic mass- pt had   Colonoscopy on 10/1    Findings:    Rectum: Small internal hemorrhoids noted.  No bleeding seen  Sigmoid: normal  Descending Colon: normal  Transverse Colon: Single 6 mm sessile polyp seen in the transverse colon-endoscopic appearance was suggestive of a tubular adenoma-this was removed completely with cold snare polypectomy and retrieved. Hepatic flexure: In the region of the hepatic flexure, there is a flat, 3 to 4 cm polypoid mass. There are central areas of depression in this polyp. No surface ulceration seen. There is no bleeding at this time. The appearance of this polyp is very suggestive with dysplastic polyp, cancer cannot be ruled out. Multiple cold forcep biopsies performed for histologic evaluation. Spot dye was injected in 3 different orientations on the rectal side of this large polyp-this is about 1 or 2 folds on the rectal side. Ascending Colon: In the proximal ascending colon, 1 or 2 folds on the rectal side of the ileocecal valve, there is a large mass-this is at least 6 to 8 cm or so in size, it spans at least 1 or 2 folds and occupies about 75% of the circumference. There are areas in this polyp that have central depression-this is at least a precancerous/dysplastic polyp in the colon no surface ulceration/bleeding seen-multiple biopsies taken.     Cecum: normal  Terminal Ileum: Briefly intubated-appearance seems to be normal    Colorectal surgery consulted on 10/1- recommend management of renal issues and f/u in 2 weeks after discharge for possible colon resection. Pt will be high risk surgical candidate given ESRD and age. Pt had repeat CT a/p on 10/2 which showed improvement in hydronephrotic changes bilaterally but not completely resolved. Urology still recommends pt to continue with michel cath and f/u in office outpatient. PTOT evaluated pt and recommended SNF however pt and wife refused- they are acceptable to home w/ home health. His wife will be able to take care of him at home. Pt continued with HD per nephrology on 10/2 and he received one unit of PRBCs during HD. He also had dialysis again today- nephrology is ok with patient discharging home. His outpatient dialysis center is secured at [facility], [address], [place], [address]. Tel No [phone]. Pt is medically stable for discharge home w/ home health PT/OT and skilled nursing, shower chair and rolling walker has also been ordered. Creatinine improved- decreased to 5 but will need to continue with HD outpatient. He will discharge with michel catheter in place and to complete four more days of oral abx for UTI. He is scheduled for HD on Monday at 9:30am.  He is to f/u with PCP within one week. Also f/u with cardiology, urology and colorectal surgery within 2 weeks. I have spoken with pt's wife over the phone to update on d/c plans and his follow up recommendations.     Consults: Nephrology, Cardiology, Urology    Significant Diagnostic Studies: labs:   Recent Results (from the past 24 hour(s))   METABOLIC PANEL, BASIC    Collection Time: 10/03/20  3:14 AM   Result Value Ref Range    Sodium 139 136 - 145 mmol/L    Potassium 3.4 (L) 3.5 - 5.5 mmol/L    Chloride 105 100 - 111 mmol/L    CO2 28 21 - 32 mmol/L    Anion gap 6 3.0 - 18 mmol/L    Glucose 88 74 - 99 mg/dL    BUN 25 (H) 7.0 - 18 MG/DL    Creatinine 5.13 (H) 0.6 - 1.3 MG/DL    BUN/Creatinine ratio 5 (L) 12 - 20      GFR est AA 13 (L) >60 ml/min/1.73m2    GFR est non-AA 11 (L) >60 ml/min/1.73m2    Calcium 7.9 (L) 8.5 - 10.1 MG/DL   MAGNESIUM    Collection Time: 10/03/20  3:14 AM   Result Value Ref Range    Magnesium 1.8 1.6 - 2.6 mg/dL   CBC WITH AUTOMATED DIFF    Collection Time: 10/03/20  3:14 AM   Result Value Ref Range    WBC 9.1 4.6 - 13.2 K/uL    RBC 2.73 (L) 4.70 - 5.50 M/uL    HGB 7.9 (L) 13.0 - 16.0 g/dL    HCT 24.6 (L) 36.0 - 48.0 %    MCV 90.1 74.0 - 97.0 FL    MCH 28.9 24.0 - 34.0 PG    MCHC 32.1 31.0 - 37.0 g/dL    RDW 14.9 (H) 11.6 - 14.5 %    PLATELET 453 978 - 411 K/uL    MPV 11.4 9.2 - 11.8 FL    NEUTROPHILS 73 40 - 73 %    LYMPHOCYTES 13 (L) 21 - 52 %    MONOCYTES 11 (H) 3 - 10 %    EOSINOPHILS 3 0 - 5 %    BASOPHILS 0 0 - 2 %    ABS. NEUTROPHILS 6.6 1.8 - 8.0 K/UL    ABS. LYMPHOCYTES 1.2 0.9 - 3.6 K/UL    ABS. MONOCYTES 1.0 0.05 - 1.2 K/UL    ABS. EOSINOPHILS 0.2 0.0 - 0.4 K/UL    ABS. BASOPHILS 0.0 0.0 - 0.1 K/UL    DF AUTOMATED     PHOSPHORUS    Collection Time: 10/03/20  3:14 AM   Result Value Ref Range    Phosphorus 3.3 2.5 - 4.9 MG/DL         Discharge Medications:     Current Discharge Medication List      START taking these medications    Details   b complex-vitamin c-folic acid (NEPHROCAPS) 1 mg capsule Take 1 Cap by mouth daily for 30 days. Qty: 30 Cap, Refills: 0      ferrous sulfate 325 mg (65 mg iron) tablet Take 1 Tab by mouth daily (with breakfast) for 30 days. Qty: 30 Tab, Refills: 0         CONTINUE these medications which have CHANGED    Details   amLODIPine (NORVASC) 10 mg tablet Take 1 Tab by mouth daily for 30 days. Qty: 30 Tab, Refills: 0         STOP taking these medications       cephalexin (KEFLEX PO) Comments:   Reason for Stopping:         furosemide (LASIX) 20 mg tablet Comments:   Reason for Stopping:         lidocaine (LIDODERM) 5 % Comments:   Reason for Stopping:         HYDROcodone-acetaminophen (NORCO) 5-325 mg per tablet Comments:   Reason for Stopping:         garlic 4,245 mg cap Comments:   Reason for Stopping:               Activity: activity as tolerated    Diet: Renal, Cardiac    Wound Care: None needed    Follow-up: with PCP, Zulay Moise MD in 7-10 days. Follow up with colorectal surgery Dr. Albania Flynn in 2 weeks. Follow up with cardiology in 2-3 weeks. Follow up with GI Dr. Heather Joyner for pathology results. Follow up with hemodialysis at [facility] @9:30a at [address] [facility], [facility]. Tel No [phone]. Follow up with urology within 2 weeks. Pt is to discharge with michel catheter in place.     Minutes spent on discharge: >30 minutes spent coordinating this discharge (review instructions/follow-up, prescriptions, preparing report for sign off).         Mo Stephens NP-C  [facility]  pager [phone]

## 2020-10-03 NOTE — PROGRESS NOTES
Patient resting in bed. A&Ox3. Up with assist. Bed locked in lowest position. Call bell within reach. Problem: Nutrition Deficit  Goal: *Optimize nutritional status  Outcome: Progressing Towards Goal     Problem: Falls - Risk of  Goal: *Absence of Falls  Description: Document Zaina Luciana Fall Risk and appropriate interventions in the flowsheet. Outcome: Progressing Towards Goal  Note: Fall Risk Interventions:  Mobility Interventions: Bed/chair exit alarm    Mentation Interventions: Adequate sleep, hydration, pain control, Bed/chair exit alarm, Door open when patient unattended, More frequent rounding, Reorient patient, Room close to nurse's station, Toileting rounds, Update white board    Medication Interventions: Bed/chair exit alarm, Patient to call before getting OOB, Teach patient to arise slowly    Elimination Interventions: Bed/chair exit alarm, Call light in reach, Elevated toilet seat, Patient to call for help with toileting needs, Toileting schedule/hourly rounds    History of Falls Interventions: Bed/chair exit alarm, Door open when patient unattended, Room close to nurse's station         Problem: Patient Education: Go to Patient Education Activity  Goal: Patient/Family Education  Outcome: Progressing Towards Goal     Problem: Patient Education: Go to Patient Education Activity  Goal: Patient/Family Education  Outcome: Progressing Towards Goal     Problem: Patient Education: Go to Patient Education Activity  Goal: Patient/Family Education  Outcome: Progressing Towards Goal     Problem: Pressure Injury - Risk of  Goal: *Prevention of pressure injury  Description: Document Karthikeyan Scale and appropriate interventions in the flowsheet.   Outcome: Progressing Towards Goal  Note: Pressure Injury Interventions:  Sensory Interventions: Assess changes in LOC, Assess need for specialty bed, Check visual cues for pain, Float heels, Keep linens dry and wrinkle-free, Minimize linen layers, Pressure redistribution bed/mattress (bed type), Turn and reposition approx. every two hours (pillows and wedges if needed)    Moisture Interventions: Absorbent underpads, Assess need for specialty bed, Internal/External fecal devices, Minimize layers, Moisture barrier, Offer toileting Q_hr    Activity Interventions: Increase time out of bed, Pressure redistribution bed/mattress(bed type)    Mobility Interventions: Assess need for specialty bed, Float heels, HOB 30 degrees or less, Pressure redistribution bed/mattress (bed type), Turn and reposition approx. every two hours(pillow and wedges)    Nutrition Interventions: Document food/fluid/supplement intake    Friction and Shear Interventions: HOB 30 degrees or less, Lift sheet, Minimize layers                Problem: Patient Education: Go to Patient Education Activity  Goal: Patient/Family Education  Outcome: Progressing Towards Goal     Problem:  Moderate Sedation (Adult)  Goal: *Patent airway  Outcome: Progressing Towards Goal  Goal: *Adequate oxygenation  Outcome: Progressing Towards Goal  Goal: *Absence of aspiration  Outcome: Progressing Towards Goal  Goal: *Hemodynamically stable  Outcome: Progressing Towards Goal  Goal: *Optimal pain control at patient's stated goal  Outcome: Progressing Towards Goal  Goal: *Absence of nausea/vomiting  Outcome: Progressing Towards Goal  Goal: *Anxiety reduced or absent  Outcome: Progressing Towards Goal  Goal: *Absence of injury  Outcome: Progressing Towards Goal  Goal: *Level of consciousness returns to baseline  Outcome: Progressing Towards Goal  Goal: Interventions  Outcome: Progressing Towards Goal     Problem: Patient Education: Go to Patient Education Activity  Goal: Patient/Family Education  Outcome: Progressing Towards Goal     Problem: Infection - Risk of, Central Venous Catheter-Associated Bloodstream Infection  Goal: *Absence of infection signs and symptoms  Outcome: Progressing Towards Goal     Problem: Patient Education: Go to Patient Education Activity  Goal: Patient/Family Education  Outcome: Progressing Towards Goal     Problem: Infection - Risk of, Urinary Catheter-Associated Urinary Tract Infection  Goal: *Absence of infection signs and symptoms  Outcome: Progressing Towards Goal     Problem: Patient Education: Go to Patient Education Activity  Goal: Patient/Family Education  Outcome: Progressing Towards Goal

## 2020-10-03 NOTE — PROGRESS NOTES
ACUTE HEMODIALYSIS FLOW SHEET    HEMODIALYSIS ORDERS: Physician: Dr. Foreign Colindres: Vince   Duration: 3 hr  BFR: 400   DFR: 800   Dialysate:  Temp 36.0   K+   2    Ca+  2.5   Na 138   Bicarb 35   Wt Readings from Last 1 Encounters:   10/03/20 73.4 kg (161 lb 12.8 oz)    Patient Chart [x]   Unable to Obtain []  Dry weight/UF Goal: 1000 ml  Access RIJ     Heparin []  Bolus    Units    [] Hourly    Units    [x]None      Catheter locking solution Heparin 1:1000   Pre BP:   121/62    Pulse:  76   Respirations: 18    Temperature:  98.2    Tx: NSS   ml/Bolus   [] N/A   Labs: []  Pre  []  Post:   [x] N/A   Additional Orders(medications, blood products, hypotension management): [] Yes   [x] No     [x]  [facility] Consent Verified     CATHETER ACCESS:  []N/A   [x]Right   []Left   [x]IJ     []Fem   []Chest wall   [] First use X-ray verified     []Tunnel    [] Non Tunneled   []No S/S infection  []Redness  []Drainage []Cultured []Swelling []Pain   []Medical Aseptic Prep Utilized   []Dressing Changed  [] Biopatch  Date: 10/3/20   []Clotted   [x]Patent   Flows: [x]Good  []Poor  []Reversed   If access problem,  notified: []Yes    [x]N/A        GRAFT/FISTULA ACCESS:   [x]N/A     []Right     []Left     []UE     []LE   []AVG   []AVF     []Buttonhole    []Medical Aseptic Prep Utilized   []No S/S infection  []Redness  []Drainage []Cultured  [] Swelling  [] Pain  Bruit:   [] Strong    [] Weak       Thrill :   [] Strong    [] Weak     Needle Gauge: 15   Length: 1 inch   If access problem,  notified: []Yes     [x]N/A     Please describe access if present and not used: N/A       GENERAL ASSESSMENT:    LUNGS:   SaO2%      [] Clear  [x] Coarse  [] Crackles  [] Wheezing                                                [] Diminished     Location : []RLL   []LLL    []RUL  []ROBERT   Cough: []Productive  []Dry  [x]N/A   Respirations:  [x]Easy  []Labored   Therapy:  [x]RA  []NC l/min    Mask: []NRB  [] Venti    O2% []Ventilator  []Intubated  [] Trach  [] BiPaP   CARDIAC: []Regular      [x] Irregular   [] Pericardial Rub  [] JVD          []  Monitored  [] Bedside  [] Remotely monitored     EDEMA: [] None  [x]Generalized  [] Pitting [] 1    [] 2    [] 3    [] 4                 [] Facial  [] Pedal  []  UE  [] LE   SKIN:   [x] Warm  [] Hot     [] Cold   [x] Dry     [] Pale   [] Diaphoretic                  [] Flushed  [] Jaundiced  [] Cyanotic  [] Rash  [] Weeping   LOC:    [x] Alert      [x]Oriented:    [x] Person     [x] Place  [x]Time               [] Confused  [] Lethargic  [] Medicated  [] Non-responsive   GI / ABDOMEN:                     [] Flat    [] Distended    [x] Soft    [] Firm   []  Obese                   [] Diarrhea  [x] Bowel Sounds  [] Nausea  [] Vomiting     / URINE ASSESSMENT:                   [] Voiding   [] Oliguria  [] Anuria   [x]  Burris                  [] Incontinent  []  Incontinent Brief []  Fecal Management System     PAIN:  [x] 0 []1  []2   []3   []4   []5   []6   []7   []8   []9   []10            Scale 0-10  Action/Follow Up:    MOBILITY:  [x] Bed    [] Stretcher      All Vitals and Treatment Details on Attached [address]: [facility]          Room # 971/01   [] 1st Time Acute      [] Stat       [x] Routine      [] Urgent     [x] Acute Room  []  Bedside  [] ICU/CCU  [] ER   Isolation Precautions:  [x] Dialysis    There are currently no Active Isolations       ALLERGIES:     No Known Allergies   Code Status:  Full Code     Hepatitis Status     Lab Results   Component Value Date/Time    Hepatitis B surface Ag <0.10 09/27/2020 04:33 AM    Hepatitis B surface Ab 3.47 (L) 09/27/2020 04:33 AM    Hepatitis B core, IgM Negative 09/27/2020 04:33 AM    Hepatitis C virus Ab 0.15 09/27/2020 04:33 AM        Current Labs:      Lab Results   Component Value Date/Time    WBC 9.1 10/03/2020 03:14 AM    HGB 7.9 (L) 10/03/2020 03:14 AM    HCT 24.6 (L) 10/03/2020 03:14 AM    PLATELET 858 89/07/2340 03:14 AM    MCV 90.1 10/03/2020 03:14 AM     Lab Results   Component Value Date/Time    Sodium 139 10/03/2020 03:14 AM    Potassium 3.4 (L) 10/03/2020 03:14 AM    Chloride 105 10/03/2020 03:14 AM    CO2 28 10/03/2020 03:14 AM    Anion gap 6 10/03/2020 03:14 AM    Glucose 88 10/03/2020 03:14 AM    BUN 25 (H) 10/03/2020 03:14 AM    Creatinine 5.13 (H) 10/03/2020 03:14 AM    BUN/Creatinine ratio 5 (L) 10/03/2020 03:14 AM    GFR est AA 13 (L) 10/03/2020 03:14 AM    GFR est non-AA 11 (L) 10/03/2020 03:14 AM    Calcium 7.9 (L) 10/03/2020 03:14 AM          DIET:  DIET RENAL  DIET NUTRITIONAL SUPPLEMENTS      PRIMARY NURSE REPORT:   Pre Dialysis: KAVON Gil     Time: 1243        EDUCATION:    [x] Patient [] Other           Knowledge Basis: []None [x]Minimal [] Substantial   Barriers to learning  [x]N/A   [] Access Care     [] S&S of infection  [] Fluid Management  [] K+   [x] Procedural    []Albumin   [] Medications   [] Tx Options   [] Transplant   [] Diet   [] Other   Teaching Tools:  [x] Explain  [] Demo  [] Handouts [] Video  Patient response: [x] Verbalized understanding  [] Teach back  [] Return demonstration   [] Requires follow up      [x]Time Out/Safety Check  [x] Extracorporeal Circuit Tested for integrity       RO/HEMODIALYSIS MACHINE SAFETY CHECKS  Before each treatment:     Machine Number:                   [facility]                                    [x] Unit Machine # 6 with centralized RO                                                                                  Alarm Test:  Pass time 4551            [x] RO/Machine Log Complete    Machine Temp    36.0             Dialysate: pH  7.4    Conductivity: Meter 13.8     HD Machine  13.8      TCD: 13.8  Dialyzer Lot # [id number]     Blood Tubing Lot # [id number]     Saline Lot # [id number]     CHLORINE TESTING-Before each treatment and every 4 hours    Total Chlorine: [x] less than 0.1 ppm  Initial Time Check: 0900       4 Hr/2nd Check Time: 1300   (if greater than 0.1 ppm from Primary then every 30 minutes from Secondary)     TREATMENT INITIATION  with Dialysis Precautions:   [x] All Connections Secured              [x] Saline Line Double Clamped   [x] Venous Parameters Set               [x] Arterial Parameters Set    [x] Prime Given 250ml NSS              [x]Air Foam Detector Engaged      Treatment Initiation Note:  4148  Pt arrived to HD without any complaints. Pt A &O X 3, follows commands, no distress noted       During Treatment Notes:  1302  RIJ CVC assessed no abnormalities noted, line patent with good flow. CVC accessed without any difficulty, pt tolerated well. Vascular access visible with arterial and venous line connections intact. 1400  Vascular access visible with arterial and venous line connections intact. 1500  Vascular access visible with arterial and venous line connections intact. 1600  Vascular access visible with arterial and venous line connections intact. Medication Dose Volume Route Time [facility], Title   none     Donal Duron RN     Post Assessment  Dialyzer Cleared:[] Good [x] Fair  [] Poor  Blood processed:  68.1 L  UF Removed:  1500 Ml  Post /60  Pulse  78 Resp  18   Temp 98.2    Post Tx Vascular Access: [x] N/A    CVC Catheter: [] N/A  Locking solution: Heparin 1:1000 U  Arterial port 1.9 ml   Venous port 1.9ml         Skin:[x] Warm  [x] Dry [] Diaphoretic               [] Flushed  [] Pale [] Cyanotic   Pain:  [x]0  []1 []2  []3 []4  []5  []6 []7 []8  []9  []10       Post Treatment Note:   3525  HD completed at this time, pt tolerated well. Dressing clean, dry and intact. POST TREATMENT PRIMARY NURSE HANDOFF REPORT:   Post Dialysis: KAVON Duong                Time:  65       Abbreviations: AVG-arterial venous graft, AVF-arterial venous fistula, IJ-Internal Jugular, Subcl-Subclavian, Fem-Femoral, Tx-treatment, AP/HR-apical heart rate, DFR-dialysate flow rate, BFR-blood flow rate, AP-arterial pressure, -venous pressure, UF-ultrafiltrate, TMP-transmembrane pressure, Kyle-Venous, Art-Arterial, RO-Reverse Osmosis

## 2020-10-03 NOTE — PROGRESS NOTES
Discharge order noted for today. Pt has been accepted to [facility] agency. Spoke with patient's wife Mayte Hernandez and is agreeable to the transition plan today. Transport has been arranged through patient's wife. Patient's  home health  orders have been forwarded to  [facility] home health  agency via [address]   Discharge information has been documented on the AVS.       Severo Monks, RN  Case Management [phone]

## 2020-10-04 ENCOUNTER — HOME HEALTH ADMISSION (OUTPATIENT)
Dept: HOME HEALTH SERVICES | Facility: HOME HEALTH | Age: 85
End: 2020-10-04
Payer: MEDICARE

## 2020-10-05 ENCOUNTER — HOME CARE VISIT (OUTPATIENT)
Dept: HOME HEALTH SERVICES | Facility: HOME HEALTH | Age: 85
End: 2020-10-05
Payer: MEDICARE

## 2020-10-06 ENCOUNTER — HOME CARE VISIT (OUTPATIENT)
Dept: SCHEDULING | Facility: HOME HEALTH | Age: 85
End: 2020-10-06
Payer: MEDICARE

## 2020-10-06 PROCEDURE — 400013 HH SOC

## 2020-10-06 PROCEDURE — 3331090001 HH PPS REVENUE CREDIT

## 2020-10-06 PROCEDURE — 3331090002 HH PPS REVENUE DEBIT

## 2020-10-06 PROCEDURE — G0151 HHCP-SERV OF PT,EA 15 MIN: HCPCS

## 2020-10-07 ENCOUNTER — HOME CARE VISIT (OUTPATIENT)
Dept: HOME HEALTH SERVICES | Facility: HOME HEALTH | Age: 85
End: 2020-10-07
Payer: MEDICARE

## 2020-10-07 VITALS
DIASTOLIC BLOOD PRESSURE: 52 MMHG | SYSTOLIC BLOOD PRESSURE: 119 MMHG | TEMPERATURE: 98.5 F | RESPIRATION RATE: 20 BRPM | OXYGEN SATURATION: 98 % | HEART RATE: 86 BPM

## 2020-10-07 PROCEDURE — 3331090002 HH PPS REVENUE DEBIT

## 2020-10-07 PROCEDURE — 3331090001 HH PPS REVENUE CREDIT

## 2020-10-08 ENCOUNTER — HOME CARE VISIT (OUTPATIENT)
Dept: HOME HEALTH SERVICES | Facility: HOME HEALTH | Age: 85
End: 2020-10-08
Payer: MEDICARE

## 2020-10-08 ENCOUNTER — HOME CARE VISIT (OUTPATIENT)
Dept: SCHEDULING | Facility: HOME HEALTH | Age: 85
End: 2020-10-08
Payer: MEDICARE

## 2020-10-08 PROCEDURE — 3331090001 HH PPS REVENUE CREDIT

## 2020-10-08 PROCEDURE — 3331090002 HH PPS REVENUE DEBIT

## 2020-10-08 PROCEDURE — G0299 HHS/HOSPICE OF RN EA 15 MIN: HCPCS

## 2020-10-09 PROCEDURE — 3331090002 HH PPS REVENUE DEBIT

## 2020-10-09 PROCEDURE — 3331090001 HH PPS REVENUE CREDIT

## 2020-10-10 PROCEDURE — 3331090001 HH PPS REVENUE CREDIT

## 2020-10-10 PROCEDURE — 3331090002 HH PPS REVENUE DEBIT

## 2020-10-11 VITALS
RESPIRATION RATE: 18 BRPM | OXYGEN SATURATION: 98 % | DIASTOLIC BLOOD PRESSURE: 80 MMHG | TEMPERATURE: 98.2 F | SYSTOLIC BLOOD PRESSURE: 122 MMHG | HEART RATE: 80 BPM

## 2020-10-11 PROCEDURE — 3331090002 HH PPS REVENUE DEBIT

## 2020-10-11 PROCEDURE — 3331090001 HH PPS REVENUE CREDIT

## 2020-10-12 PROCEDURE — 3331090002 HH PPS REVENUE DEBIT

## 2020-10-12 PROCEDURE — 3331090001 HH PPS REVENUE CREDIT

## 2020-10-13 ENCOUNTER — HOME CARE VISIT (OUTPATIENT)
Dept: SCHEDULING | Facility: HOME HEALTH | Age: 85
End: 2020-10-13
Payer: MEDICARE

## 2020-10-13 ENCOUNTER — HOME CARE VISIT (OUTPATIENT)
Dept: HOME HEALTH SERVICES | Facility: HOME HEALTH | Age: 85
End: 2020-10-13
Payer: MEDICARE

## 2020-10-13 VITALS
OXYGEN SATURATION: 99 % | RESPIRATION RATE: 18 BRPM | SYSTOLIC BLOOD PRESSURE: 135 MMHG | HEART RATE: 69 BPM | DIASTOLIC BLOOD PRESSURE: 78 MMHG | TEMPERATURE: 98.5 F

## 2020-10-13 PROCEDURE — G0299 HHS/HOSPICE OF RN EA 15 MIN: HCPCS

## 2020-10-13 PROCEDURE — 3331090001 HH PPS REVENUE CREDIT

## 2020-10-13 PROCEDURE — 3331090002 HH PPS REVENUE DEBIT

## 2020-10-13 PROCEDURE — G0152 HHCP-SERV OF OT,EA 15 MIN: HCPCS

## 2020-10-14 PROCEDURE — 3331090001 HH PPS REVENUE CREDIT

## 2020-10-14 PROCEDURE — 3331090002 HH PPS REVENUE DEBIT

## 2020-10-15 ENCOUNTER — HOME CARE VISIT (OUTPATIENT)
Dept: SCHEDULING | Facility: HOME HEALTH | Age: 85
End: 2020-10-15
Payer: MEDICARE

## 2020-10-15 VITALS
SYSTOLIC BLOOD PRESSURE: 110 MMHG | OXYGEN SATURATION: 99 % | DIASTOLIC BLOOD PRESSURE: 60 MMHG | TEMPERATURE: 99.1 F | HEART RATE: 67 BPM

## 2020-10-15 VITALS
OXYGEN SATURATION: 99 % | RESPIRATION RATE: 18 BRPM | HEART RATE: 69 BPM | TEMPERATURE: 98.5 F | SYSTOLIC BLOOD PRESSURE: 135 MMHG | DIASTOLIC BLOOD PRESSURE: 78 MMHG

## 2020-10-15 VITALS
DIASTOLIC BLOOD PRESSURE: 62 MMHG | SYSTOLIC BLOOD PRESSURE: 118 MMHG | TEMPERATURE: 99.2 F | HEART RATE: 67 BPM | OXYGEN SATURATION: 97 %

## 2020-10-15 PROCEDURE — G0300 HHS/HOSPICE OF LPN EA 15 MIN: HCPCS

## 2020-10-15 PROCEDURE — G0157 HHC PT ASSISTANT EA 15: HCPCS

## 2020-10-15 PROCEDURE — 3331090002 HH PPS REVENUE DEBIT

## 2020-10-15 PROCEDURE — 3331090001 HH PPS REVENUE CREDIT

## 2020-10-16 PROCEDURE — 3331090001 HH PPS REVENUE CREDIT

## 2020-10-16 PROCEDURE — 3331090002 HH PPS REVENUE DEBIT

## 2020-10-17 PROCEDURE — 3331090002 HH PPS REVENUE DEBIT

## 2020-10-17 PROCEDURE — 3331090001 HH PPS REVENUE CREDIT

## 2020-10-18 PROCEDURE — 3331090002 HH PPS REVENUE DEBIT

## 2020-10-18 PROCEDURE — 3331090001 HH PPS REVENUE CREDIT

## 2020-10-19 PROCEDURE — 3331090001 HH PPS REVENUE CREDIT

## 2020-10-19 PROCEDURE — 3331090002 HH PPS REVENUE DEBIT

## 2020-10-20 ENCOUNTER — HOME CARE VISIT (OUTPATIENT)
Dept: SCHEDULING | Facility: HOME HEALTH | Age: 85
End: 2020-10-20
Payer: MEDICARE

## 2020-10-20 VITALS
SYSTOLIC BLOOD PRESSURE: 118 MMHG | OXYGEN SATURATION: 98 % | TEMPERATURE: 98.9 F | DIASTOLIC BLOOD PRESSURE: 60 MMHG | HEART RATE: 67 BPM

## 2020-10-20 PROCEDURE — G0157 HHC PT ASSISTANT EA 15: HCPCS

## 2020-10-20 PROCEDURE — 3331090001 HH PPS REVENUE CREDIT

## 2020-10-20 PROCEDURE — 3331090002 HH PPS REVENUE DEBIT

## 2020-10-21 ENCOUNTER — HOME CARE VISIT (OUTPATIENT)
Dept: HOME HEALTH SERVICES | Facility: HOME HEALTH | Age: 85
End: 2020-10-21
Payer: MEDICARE

## 2020-10-21 PROCEDURE — 3331090002 HH PPS REVENUE DEBIT

## 2020-10-21 PROCEDURE — 3331090001 HH PPS REVENUE CREDIT

## 2020-10-22 ENCOUNTER — OFFICE VISIT (OUTPATIENT)
Dept: SURGERY | Age: 85
End: 2020-10-22
Payer: MEDICARE

## 2020-10-22 ENCOUNTER — OFFICE VISIT (OUTPATIENT)
Dept: CARDIOLOGY CLINIC | Age: 85
End: 2020-10-22

## 2020-10-22 ENCOUNTER — HOME CARE VISIT (OUTPATIENT)
Dept: HOME HEALTH SERVICES | Facility: HOME HEALTH | Age: 85
End: 2020-10-22
Payer: MEDICARE

## 2020-10-22 VITALS
TEMPERATURE: 97 F | DIASTOLIC BLOOD PRESSURE: 58 MMHG | RESPIRATION RATE: 18 BRPM | SYSTOLIC BLOOD PRESSURE: 130 MMHG | WEIGHT: 167 LBS | HEART RATE: 70 BPM | HEIGHT: 66 IN | BODY MASS INDEX: 26.84 KG/M2 | OXYGEN SATURATION: 99 %

## 2020-10-22 VITALS — BODY MASS INDEX: 26.95 KG/M2 | HEIGHT: 66 IN

## 2020-10-22 DIAGNOSIS — K63.89 COLONIC MASS: Primary | ICD-10-CM

## 2020-10-22 PROCEDURE — 99213 OFFICE O/P EST LOW 20 MIN: CPT | Performed by: COLON & RECTAL SURGERY

## 2020-10-22 PROCEDURE — 3331090002 HH PPS REVENUE DEBIT

## 2020-10-22 PROCEDURE — G8432 DEP SCR NOT DOC, RNG: HCPCS | Performed by: COLON & RECTAL SURGERY

## 2020-10-22 PROCEDURE — G8536 NO DOC ELDER MAL SCRN: HCPCS | Performed by: COLON & RECTAL SURGERY

## 2020-10-22 PROCEDURE — 1100F PTFALLS ASSESS-DOCD GE2>/YR: CPT | Performed by: COLON & RECTAL SURGERY

## 2020-10-22 PROCEDURE — 3331090001 HH PPS REVENUE CREDIT

## 2020-10-22 PROCEDURE — 3288F FALL RISK ASSESSMENT DOCD: CPT | Performed by: COLON & RECTAL SURGERY

## 2020-10-22 PROCEDURE — G8419 CALC BMI OUT NRM PARAM NOF/U: HCPCS | Performed by: COLON & RECTAL SURGERY

## 2020-10-22 PROCEDURE — G9231 DOC ESRD DIA TRANS PREG: HCPCS | Performed by: COLON & RECTAL SURGERY

## 2020-10-22 PROCEDURE — G8427 DOCREV CUR MEDS BY ELIG CLIN: HCPCS | Performed by: COLON & RECTAL SURGERY

## 2020-10-22 PROCEDURE — 1111F DSCHRG MED/CURRENT MED MERGE: CPT | Performed by: COLON & RECTAL SURGERY

## 2020-10-22 RX ORDER — NEOMYCIN SULFATE 500 MG/1
1000 TABLET ORAL 3 TIMES DAILY
Qty: 6 TAB | Refills: 0 | Status: SHIPPED | OUTPATIENT
Start: 2020-10-22 | End: 2020-10-23

## 2020-10-22 RX ORDER — METRONIDAZOLE 500 MG/1
500 TABLET ORAL 3 TIMES DAILY
Qty: 3 TAB | Refills: 0 | Status: SHIPPED | OUTPATIENT
Start: 2020-10-22 | End: 2020-10-23

## 2020-10-22 RX ORDER — METRONIDAZOLE 500 MG/1
500 TABLET ORAL 3 TIMES DAILY
Qty: 3 TAB | Refills: 0 | Status: SHIPPED | OUTPATIENT
Start: 2020-10-22 | End: 2020-10-22

## 2020-10-22 RX ORDER — NEOMYCIN SULFATE 500 MG/1
1000 TABLET ORAL 3 TIMES DAILY
Qty: 6 TAB | Refills: 0 | Status: SHIPPED | OUTPATIENT
Start: 2020-10-22 | End: 2020-10-22

## 2020-10-22 NOTE — PROGRESS NOTES
Subjective: Patient was recently hospitalized and found to have colon and hepatic flexure mass. Pathology was consistent with adenomatous disease but there was concern for cancer. He is seen here in follow-up. He is tolerating a diet. He is moving his bowels. He has some hydronephrosis which apparently has resolved with Burris catheter placement. He is to see urology shortly. He was also seen by cardiology and asked to follow-up in the office for risk stratification. Past medical history and ROS were reviewed and unchanged. Abdomen: Soft, nontender nondistended  Digital exam: Moderate tone, no mass    Assessment / Plan    Ascending colon and hepatic flexure mass  Follow-up with urology regarding Burris and any further treatment for obstructive uropathy  Follow-up with cardiology for risk stratification  Will need a robotic right colectomy, perhaps this can be performed in December after the above have been managed    A total of 15 minutes was spent with the patient, with >50% of time spent on counseling and coordination of care. The diagnoses and plan were discussed with patient. All questions answered. Plan of care agreed to by all concerned.

## 2020-10-22 NOTE — LETTER
10/22/20 Patient: Margaret Meléndez YOB: 1935 Date of Visit: 10/22/2020 Mily Contreras MD 
[address] [place] [address] [address] [id number] [place] [id number] VIA In Basket Timo Lakhani MD 
[address] [place] [address] VIA Facsimile: [phone] Dear Erik Bright is seen in the office in follow-up after his hospitalization where he identified masses in his ascending colon and hepatic flexure. He is tolerating a diet and recovering from his hospitalization. Pathology showed adenomatous disease. 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 He appears somewhat debilitated from his hospitalization and we will therefore give him additional time to recuperate and plan for surgery in early December. He is a relatively high risk surgical candidate given all the above as well as his underlying end-stage renal disease. If you have questions, please do not hesitate to call me. I look forward to following your patient along with you. Sincerely, Ajay Green MD

## 2020-10-23 PROCEDURE — 3331090002 HH PPS REVENUE DEBIT

## 2020-10-23 PROCEDURE — 3331090001 HH PPS REVENUE CREDIT

## 2020-10-24 ENCOUNTER — HOME CARE VISIT (OUTPATIENT)
Dept: SCHEDULING | Facility: HOME HEALTH | Age: 85
End: 2020-10-24
Payer: MEDICARE

## 2020-10-24 PROCEDURE — 3331090001 HH PPS REVENUE CREDIT

## 2020-10-24 PROCEDURE — 3331090002 HH PPS REVENUE DEBIT

## 2020-10-25 PROCEDURE — 3331090002 HH PPS REVENUE DEBIT

## 2020-10-25 PROCEDURE — 3331090001 HH PPS REVENUE CREDIT

## 2020-10-26 PROCEDURE — 3331090001 HH PPS REVENUE CREDIT

## 2020-10-26 PROCEDURE — 3331090002 HH PPS REVENUE DEBIT

## 2020-10-27 ENCOUNTER — OFFICE VISIT (OUTPATIENT)
Dept: CARDIOLOGY CLINIC | Age: 85
End: 2020-10-27
Payer: MEDICARE

## 2020-10-27 ENCOUNTER — HOME CARE VISIT (OUTPATIENT)
Dept: HOME HEALTH SERVICES | Facility: HOME HEALTH | Age: 85
End: 2020-10-27
Payer: MEDICARE

## 2020-10-27 ENCOUNTER — HOSPITAL ENCOUNTER (OUTPATIENT)
Dept: CT IMAGING | Age: 85
Discharge: HOME | End: 2020-10-27
Attending: UROLOGY
Payer: MEDICARE

## 2020-10-27 VITALS
HEIGHT: 66 IN | DIASTOLIC BLOOD PRESSURE: 52 MMHG | BODY MASS INDEX: 27.38 KG/M2 | OXYGEN SATURATION: 98 % | HEART RATE: 72 BPM | WEIGHT: 170.4 LBS | SYSTOLIC BLOOD PRESSURE: 134 MMHG

## 2020-10-27 DIAGNOSIS — I10 ESSENTIAL HYPERTENSION: ICD-10-CM

## 2020-10-27 DIAGNOSIS — R07.9 CHEST PAIN, UNSPECIFIED TYPE: ICD-10-CM

## 2020-10-27 DIAGNOSIS — N20.0 KIDNEY STONES: ICD-10-CM

## 2020-10-27 DIAGNOSIS — N17.9 ACUTE RENAL FAILURE, UNSPECIFIED ACUTE RENAL FAILURE TYPE (HCC): ICD-10-CM

## 2020-10-27 DIAGNOSIS — I25.10 CORONARY ARTERY DISEASE INVOLVING NATIVE CORONARY ARTERY OF NATIVE HEART WITHOUT ANGINA PECTORIS: Primary | ICD-10-CM

## 2020-10-27 DIAGNOSIS — I25.10 CORONARY ARTERY DISEASE INVOLVING NATIVE CORONARY ARTERY OF NATIVE HEART WITHOUT ANGINA PECTORIS: ICD-10-CM

## 2020-10-27 PROCEDURE — G8536 NO DOC ELDER MAL SCRN: HCPCS | Performed by: INTERNAL MEDICINE

## 2020-10-27 PROCEDURE — 3331090001 HH PPS REVENUE CREDIT

## 2020-10-27 PROCEDURE — 3331090002 HH PPS REVENUE DEBIT

## 2020-10-27 PROCEDURE — G8427 DOCREV CUR MEDS BY ELIG CLIN: HCPCS | Performed by: INTERNAL MEDICINE

## 2020-10-27 PROCEDURE — G9231 DOC ESRD DIA TRANS PREG: HCPCS | Performed by: INTERNAL MEDICINE

## 2020-10-27 PROCEDURE — G8419 CALC BMI OUT NRM PARAM NOF/U: HCPCS | Performed by: INTERNAL MEDICINE

## 2020-10-27 PROCEDURE — 93000 ELECTROCARDIOGRAM COMPLETE: CPT | Performed by: INTERNAL MEDICINE

## 2020-10-27 PROCEDURE — G8510 SCR DEP NEG, NO PLAN REQD: HCPCS | Performed by: INTERNAL MEDICINE

## 2020-10-27 PROCEDURE — 99203 OFFICE O/P NEW LOW 30 MIN: CPT | Performed by: INTERNAL MEDICINE

## 2020-10-27 PROCEDURE — 74176 CT ABD & PELVIS W/O CONTRAST: CPT

## 2020-10-27 NOTE — PROGRESS NOTES
Conan Gaucher presents today for   Chief Complaint   Patient presents with    Surgical Clearance     Robotic assisted right colectomy with  on 12/01/2020 @[facility]       Conan Gaucher preferred language for health care discussion is english/other. Is someone accompanying this pt? no    Is the patient using any DME equipment during [address]? no    Depression Screening:  3 most recent PHQ Screens 10/27/2020   PHQ Not Done -   Little interest or pleasure in doing things Not at all   Feeling down, depressed, irritable, or hopeless Not at all   Total Score PHQ 2 0       Learning Assessment:  Learning Assessment 9/28/2016   PRIMARY LEARNER Patient   HIGHEST LEVEL OF EDUCATION - PRIMARY LEARNER  GRADUATED HIGH SCHOOL OR GED   BARRIERS PRIMARY LEARNER NONE   CO-LEARNER CAREGIVER No   PRIMARY LANGUAGE ENGLISH    NEED No   LEARNER PREFERENCE PRIMARY READING   ANSWERED BY patient   RELATIONSHIP SELF       Abuse Screening:  Abuse Screening Questionnaire 10/27/2020   Do you ever feel afraid of your partner? N   Are you in a relationship with someone who physically or mentally threatens you? N   Is it safe for you to go home? Y       Fall Risk  Fall Risk Assessment, last 12 mths 10/27/2020   Able to walk? Yes   Fall in past 12 months? No   Fall with injury? -   Number of falls in past 12 months -   Fall Risk Score -       Pt currently taking Anticoagulant therapy? no    Coordination of Care:  1. Have you been to the ER, urgent care clinic since your last visit? Hospitalized since your last visit? no    2. Have you seen or consulted any other health care providers outside of the [address] [place] [address] since your last visit? Include any pap smears or colon screening.  no

## 2020-10-27 NOTE — PROGRESS NOTES
HISTORY OF PRESENT ILLNESS  Janie Whatley is a 80 y.o. male. ASSESSMENT and PLAN    Mr. Janie Whatley has no documented history of CAD. However, he does have history of mild carotid stenosis. Historically, his nuclear scan back in 2016 showed a small to medium sized fixed defect in the inferior wall consistent with diaphragmatic attenuation, and EF 60%. He is a non-smoker. He does have history of hypertension. He has abnormal electrocardiogram with Q waves noted in V1, T wave inversions noted in V2 through V6; this was noted back in 2018 but was not noted back in 2016 tracing. His echocardiogram in September 2020 showed no normal LV function with mild hypertrophy. Mild mitral valve regurgitation is noted. Because of polyps noted in his colonoscopy, he is scheduled to undergo right colectomy laparoscopically in December 2020. From cardiac standpoint, he has no symptoms of angina. He has not noted significant changes in his exercise capacity or breathing. He does use a walker to ambulate. His blood pressures well controlled. His heart rate is stable. There is no evidence of decompensated CHF noted. His weight today is 170 pounds. He denies tobacco use. He had not been on statins. He comes in for preoperative cardiac evaluation. Because of his abnormal electrocardiogram, will proceed with nuclear scan. Obviously, if he has significant abnormalities, he will likely need to undergo coronary angiography prior to any invasive surgical procedure. I would have low threshold to start him on aspirin and statins. If the nuclear scan is unremarkable, I will plan on seeing him back in 6 months. Thank you. Encounter Diagnoses   Name Primary?     Coronary artery disease involving native coronary artery of native heart without angina pectoris Yes    Essential hypertension     Acute renal failure, unspecified acute renal failure type ([address]     Chest pain, unspecified type      current treatment plan is effective, no change in therapy  lab results and schedule of future lab studies reviewed with patient  reviewed diet, exercise and weight control  cardiovascular risk and specific lipid/LDL goals reviewed  use of aspirin to prevent MI and TIA's discussed      HPI  Today, Mr. Shasta Rivera comes in as a new patient. In 2016, he was seen by one of my associates, Dr. Caleb Garcia. Since that time, he was lost to follow-up. He comes in for preoperative clearance for right colectomy to be done in December 2024 abnormal colonoscopy. He is accompanied by his family. He denies any chest pains. He denies any increased shortness of breath or decreased exercise capacity. He denies any orthopnea or PND. He denies any palpitations or dizziness. All in all, he feels his baseline. Review of Systems   Respiratory: Negative for shortness of breath. Cardiovascular: Negative for chest pain, palpitations, orthopnea, claudication, leg swelling and PND. All other systems reviewed and are negative. Physical Exam  Vitals signs and nursing note reviewed. Constitutional:       Appearance: Normal appearance. HENT:      Head: Normocephalic. Eyes:      Conjunctiva/sclera: Conjunctivae normal.   Neck:      Musculoskeletal: No neck rigidity. Cardiovascular:      Rate and Rhythm: Normal rate and regular rhythm. Pulmonary:      Breath sounds: Normal breath sounds. Abdominal:      General: Bowel sounds are normal.      Palpations: Abdomen is soft. Musculoskeletal:         General: No swelling. Skin:     General: Skin is warm and dry. Neurological:      General: No focal deficit present. Mental Status: He is alert and oriented to person, place, and time. Psychiatric:         Mood and Affect: Mood normal.         Behavior: Behavior normal.         PCP: Daniel Jacobs MD    Past Medical History:   Diagnosis Date    Cardiac echocardiogram 08/29/2016    EF 60-65%. No WMA. Mild LVH.   Indeterminate diastolic fx.  RVSP 35 mmHg. No significant valvular heart disease.  Cardiac nuclear imaging test, abnormal 08/29/2016    Intermediate risk. Previous inferior infarction w/very mild fabiana-infarct ischemia. Inferior hypk. EF 68%. Nondiagnostic EKG on pharm stress test.  Pt's BP increased from 193/96 to 207/83 during study.  Carotid duplex 08/30/2016    Mild <50% bilateral ICA stenosis.  Chronic kidney disease     dialysis    Enlarged prostate     Hypertension        Past Surgical History:   Procedure Laterality Date    COLONOSCOPY N/A 10/1/2020    COLONOSCOPY, b'x, w tattoo w polypectomy [facility]on [facility]efer [facility]  10/1/2020         Celia Colleen  10/1/2020         COLON[facility]CPY[facility]FLEX,W/ [facility] [place]  10/1/2020         [place] INSJ TUNNELED CVC W/O SUBQ PORT/ AGE 5 YR/> N/A 9/28/2020    INSERTION TUNNELED CENTRAL VENOUS CATHETER performed by Radha Fishman MD at [facility] CATH LAB    VASCULAR SURGERY PROCEDURE UNLIST      dialysis access       Current Outpatient Medications   Medication Sig Dispense Refill    amLODIPine (NORVASC) 10 mg tablet Take 1 Tab by mouth daily for 30 days. 30 Tab 0    b complex-vitamin c-folic acid (NEPHROCAPS) 1 mg capsule Take 1 Cap by mouth daily for 30 days. 30 Cap 0    ferrous sulfate 325 mg (65 mg iron) tablet Take 1 Tab by mouth daily (with breakfast) for 30 days.  30 Tab 0       The patient has a family history of    Social History     Tobacco Use    Smoking status: Never Smoker    Smokeless tobacco: Never Used   Substance Use Topics    Alcohol use: No    Drug use: No       Lab Results   Component Value Date/Time    Cholesterol, total 213 (H) 08/29/2016 01:35 AM    HDL Cholesterol 54 08/29/2016 01:35 AM    LDL, calculated 136.6 (H) 08/29/2016 01:35 AM    Triglyceride 112 08/29/2016 01:35 AM    CHOL/HDL Ratio 3.9 08/29/2016 01:35 AM        BP Readings from Last 3 Encounters:   10/27/20 (!) 134/52   10/22/20 (!) 130/58   10/20/20 118/60        Pulse Readings from Last 3 Encounters:   10/27/20 72   10/22/20 70   10/20/20 67       Wt Readings from Last 3 Encounters:   10/27/20 77.3 kg (170 lb 6.4 oz)   10/22/20 75.8 kg (167 lb)   10/03/20 73.4 kg (161 lb 12.8 oz)         EKG: unchanged from previous tracings, normal sinus rhythm, Q waves in V1 with T wave inversions noted in V2-V6, as well as inferolaterally in leads II, III, and aVF.

## 2020-10-28 PROCEDURE — 3331090001 HH PPS REVENUE CREDIT

## 2020-10-28 PROCEDURE — 3331090002 HH PPS REVENUE DEBIT

## 2020-10-29 ENCOUNTER — HOME CARE VISIT (OUTPATIENT)
Dept: SCHEDULING | Facility: HOME HEALTH | Age: 85
End: 2020-10-29
Payer: MEDICARE

## 2020-10-29 VITALS
DIASTOLIC BLOOD PRESSURE: 60 MMHG | TEMPERATURE: 98.9 F | HEART RATE: 64 BPM | OXYGEN SATURATION: 98 % | SYSTOLIC BLOOD PRESSURE: 132 MMHG

## 2020-10-29 PROCEDURE — 3331090001 HH PPS REVENUE CREDIT

## 2020-10-29 PROCEDURE — G0151 HHCP-SERV OF PT,EA 15 MIN: HCPCS

## 2020-10-29 PROCEDURE — 3331090002 HH PPS REVENUE DEBIT

## 2020-10-30 PROCEDURE — 3331090001 HH PPS REVENUE CREDIT

## 2020-10-30 PROCEDURE — 3331090002 HH PPS REVENUE DEBIT

## 2020-10-31 PROCEDURE — 3331090002 HH PPS REVENUE DEBIT

## 2020-10-31 PROCEDURE — 3331090001 HH PPS REVENUE CREDIT

## 2020-11-01 PROCEDURE — 3331090001 HH PPS REVENUE CREDIT

## 2020-11-01 PROCEDURE — 3331090002 HH PPS REVENUE DEBIT

## 2020-11-02 PROCEDURE — 3331090001 HH PPS REVENUE CREDIT

## 2020-11-02 PROCEDURE — 3331090002 HH PPS REVENUE DEBIT

## 2020-11-03 ENCOUNTER — HOME CARE VISIT (OUTPATIENT)
Dept: SCHEDULING | Facility: HOME HEALTH | Age: 85
End: 2020-11-03
Payer: MEDICARE

## 2020-11-03 VITALS
OXYGEN SATURATION: 100 % | SYSTOLIC BLOOD PRESSURE: 122 MMHG | HEART RATE: 70 BPM | TEMPERATURE: 98.8 F | DIASTOLIC BLOOD PRESSURE: 64 MMHG

## 2020-11-03 PROCEDURE — 3331090001 HH PPS REVENUE CREDIT

## 2020-11-03 PROCEDURE — G0157 HHC PT ASSISTANT EA 15: HCPCS

## 2020-11-03 PROCEDURE — 3331090002 HH PPS REVENUE DEBIT

## 2020-11-04 PROCEDURE — 3331090001 HH PPS REVENUE CREDIT

## 2020-11-04 PROCEDURE — 3331090002 HH PPS REVENUE DEBIT

## 2020-11-05 ENCOUNTER — HOME CARE VISIT (OUTPATIENT)
Dept: HOME HEALTH SERVICES | Facility: HOME HEALTH | Age: 85
End: 2020-11-05
Payer: MEDICARE

## 2020-11-05 ENCOUNTER — HOME CARE VISIT (OUTPATIENT)
Dept: SCHEDULING | Facility: HOME HEALTH | Age: 85
End: 2020-11-05
Payer: MEDICARE

## 2020-11-05 PROCEDURE — 3331090002 HH PPS REVENUE DEBIT

## 2020-11-05 PROCEDURE — 400013 HH SOC

## 2020-11-05 PROCEDURE — G0300 HHS/HOSPICE OF LPN EA 15 MIN: HCPCS

## 2020-11-05 PROCEDURE — 3331090001 HH PPS REVENUE CREDIT

## 2020-11-06 PROCEDURE — 3331090001 HH PPS REVENUE CREDIT

## 2020-11-06 PROCEDURE — 3331090002 HH PPS REVENUE DEBIT

## 2020-11-07 ENCOUNTER — HOME CARE VISIT (OUTPATIENT)
Dept: HOME HEALTH SERVICES | Facility: HOME HEALTH | Age: 85
End: 2020-11-07
Payer: MEDICARE

## 2020-11-07 ENCOUNTER — HOSPITAL ENCOUNTER (EMERGENCY)
Age: 85
Discharge: HOME | End: 2020-11-07
Attending: EMERGENCY MEDICINE
Payer: MEDICARE

## 2020-11-07 VITALS
WEIGHT: 180 LBS | SYSTOLIC BLOOD PRESSURE: 157 MMHG | OXYGEN SATURATION: 100 % | HEART RATE: 86 BPM | BODY MASS INDEX: 28.93 KG/M2 | TEMPERATURE: 98.4 F | HEIGHT: 66 IN | RESPIRATION RATE: 18 BRPM | DIASTOLIC BLOOD PRESSURE: 73 MMHG

## 2020-11-07 DIAGNOSIS — Z46.6 ENCOUNTER FOR FOLEY CATHETER REPLACEMENT: Primary | ICD-10-CM

## 2020-11-07 DIAGNOSIS — R33.9 URINARY RETENTION: ICD-10-CM

## 2020-11-07 PROCEDURE — 51702 INSERT TEMP BLADDER CATH: CPT

## 2020-11-07 PROCEDURE — 3331090002 HH PPS REVENUE DEBIT

## 2020-11-07 PROCEDURE — 99284 EMERGENCY DEPT VISIT MOD MDM: CPT

## 2020-11-07 PROCEDURE — 3331090001 HH PPS REVENUE CREDIT

## 2020-11-07 NOTE — ED TRIAGE NOTES
Pt had michel replaced yesterday and last night it came out.  Pt c/o feeling the need to urinate and abdominal pain

## 2020-11-07 NOTE — ED PROVIDER NOTES
EMERGENCY DEPARTMENT HISTORY AND PHYSICAL EXAM      Date: 11/7/2020  Patient Name: Merlene Whalen    History of Presenting Illness     Chief Complaint   Patient presents with    Urinary Catheter Problem       History Provided By: Patient    HPI: Merlene Whalen, 80 y.o. male PMHx significant for htn, CKD, enlarged prostate presents ambulatory to the ED. Patient states he had Michel catheter placed yesterday by urologist, Dr. Liliane Ramírez. He states shortly after arriving home last night the catheter fell out. Patient reports he attempted to reinsert michel, but was unsuccessful. Patient reports abdominal distention and fullness to the lower abdomen. Pt feels like he needs to urinate. Denies trauma or injury. Denies vomiting, diarrhea, fever/chills. There are no other complaints, changes, or physical findings at this time. PCP: Phill Campo MD    No current facility-administered medications on file prior to encounter. Current Outpatient Medications on File Prior to Encounter   Medication Sig Dispense Refill    furosemide (LASIX) 40 mg tablet Take 40 mg by mouth daily.  neomycin (MYCIFRADIN) 500 mg tablet take 2 tablets by mouth three times a day AT 1 PM, 2 PM, AND 11 PM THE DAY prior to surgery      triamcinolone acetonide (KENALOG) 0.1 % topical cream APPLY TO AFFECTED AREAA  twice a day      levoFLOXacin (LEVAQUIN) 500 mg tablet Take one po Now, then po after dialysis until procedure 3 Tab 0       Past History     Past Medical History:  Past Medical History:   Diagnosis Date    Cardiac echocardiogram 08/29/2016    EF 60-65%. No WMA. Mild LVH. Indeterminate diastolic fx. RVSP 35 mmHg. No significant valvular heart disease.  Cardiac nuclear imaging test, abnormal 08/29/2016    Intermediate risk. Previous inferior infarction w/very mild fabiana-infarct ischemia. Inferior hypk. EF 68%. Nondiagnostic EKG on pharm stress test.  Pt's BP increased from 193/96 to 207/83 during study.     Carotid duplex 08/30/2016    Mild <50% bilateral ICA stenosis.  Chronic kidney disease     dialysis    Enlarged prostate     Hypertension        Past Surgical History:  Past Surgical History:   Procedure Laterality Date    COLONOSCOPY N/A 10/1/2020    COLONOSCOPY, b'x, w tattoo w polypectomy performed by Werner Browning MD at [address] [place] [address]  10/1/2020         Christy Cavazos  10/1/2020         COLONOSCPY,FLEX,W/ [facility]  10/1/2020         [place] INSJ TUNNELED CVC W/O SUBQ PORT/ AGE 5 YR/> N/A 9/28/2020    INSERTION TUNNELED CENTRAL VENOUS CATHETER performed by Jevon Blair MD at [facility] CATH LAB    VASCULAR SURGERY PROCEDURE UNLIST      dialysis access       Family History:  History reviewed. No pertinent family history. Social History:  Social History     Tobacco Use    Smoking status: Never Smoker    Smokeless tobacco: Never Used   Substance Use Topics    Alcohol use: No    Drug use: No       Allergies:  No Known Allergies      Review of Systems   Review of Systems   Constitutional: Negative for chills and fever. HENT: Negative for facial swelling. Eyes: Negative for photophobia and visual disturbance. Respiratory: Negative for shortness of breath. Cardiovascular: Negative for chest pain. Gastrointestinal: Positive for abdominal distention and abdominal pain. Negative for nausea and vomiting. Genitourinary: Negative for flank pain. Skin: Negative for color change, pallor, rash and wound. Neurological: Negative for dizziness, weakness, light-headedness and headaches. All other systems reviewed and are negative. Physical Exam   Physical Exam  Vitals signs and nursing note reviewed. Constitutional:       General: He is not in acute distress. Appearance: He is well-developed. Comments: Patient well-appearing in NAD   HENT:      Head: Normocephalic and atraumatic.    Eyes:      Conjunctiva/sclera: Conjunctivae normal. Cardiovascular:      Rate and Rhythm: Normal rate and regular rhythm. Heart sounds: Normal heart sounds. Pulmonary:      Effort: Pulmonary effort is normal. No respiratory distress. Breath sounds: Normal breath sounds. Abdominal:      General: Bowel sounds are normal.      Palpations: Abdomen is soft. Tenderness: There is abdominal tenderness in the suprapubic area. Comments: Mild abd distension to lower abdomen  No guarding or rigidity   Musculoskeletal: Normal range of motion. Skin:     General: Skin is warm. Findings: No rash. Neurological:      Mental Status: He is alert and oriented to person, place, and time. Cranial Nerves: No cranial nerve deficit. Psychiatric:         Behavior: Behavior normal.         Diagnostic Study Results     Labs -   No results found for this or any previous visit (from the past 12 hour(s)). Radiologic Studies -   No orders to display     CT Results  (Last 48 hours)    None        CXR Results  (Last 48 hours)    None          Medical Decision Making   I am the first provider for this patient. I reviewed the vital signs, available nursing notes, past medical history, past surgical history, family history and social history. Vital Signs-Reviewed the patient's vital signs. Patient Vitals for the past 12 hrs:   Temp Pulse Resp BP SpO2   11/07/20 1005 98.4 °F (36.9 °C) 86 18 (!) 157/73 100 %       Records Reviewed: Nursing Notes and Old Medical Records    Provider Notes (Medical Decision Making):   DDx: Urinary retention, Burris catheter insertion    79 yo M who presents with because his Burris catheter fell out after insertion yesterday. Patient states he has not been able to urinate with assoc mild abdominal discomfort and distention. 12 F inserted by urologist. 25 F inserted in ED to ensure it would stay in place. Pt tolerated without difficulty. Discussed with pt need for prompt urology follow up. Pt non-toxic appearing in NAD.  Pt stable for prompt outpatient f/u with PCP in 1-2 days. Patient given strict instructions to return if symptoms worsen. ED Course:   Initial assessment performed. The patients presenting problems have been discussed, and they are in agreement with the care plan formulated and outlined with them. I have encouraged them to ask questions as they arise throughout their visit. Chart review:  Urology office visit with Dr. Yoan Marrero yesterday (11/6)  Severe trilobar hypertrophy  Suspect long standing blockage from BPH that cause hydro  Michel inserted for the above reasons  16F coude michel catheter used    18 F coude michel inserted by MONTSERRAT Abdi without difficulty. Pt voided with ease after insertion. Disposition:  11:33 AM  Discussed dx and treatment plan. Discussed importance of PCP follow up. All questions answered. Pt voiced they understood. Return if sx worsen. PLAN:  1. Current Discharge Medication List        2. Follow-up Information     Follow up With Specialties Details Why Contact Info    Shauna Abernathy MD Urology Schedule an appointment as soon as possible for a visit in 1 day  [address] [facility] EMERGENCY DEPT Emergency Medicine  If symptoms worsen [address] [facility]  [phone]        Return to ED if worse     Diagnosis     Clinical Impression:   1. Encounter for Michel catheter replacement    2. Urinary retention        Attestations:    CORNEL Arreola    Please note that this dictation was completed with Keep[employer], the computer voice recognition software. Quite often unanticipated grammatical, syntax, homophones, and other interpretive errors are inadvertently transcribed by the computer software. Please disregard these errors. Please excuse any errors that have escaped final proofreading. Thank you.

## 2020-11-07 NOTE — ED NOTES
Reviewed discharge instructions with pt. Pt verbalized understanding of insrtuctions. Pt ambulatory, in no distress upon discharge.

## 2020-11-07 NOTE — DISCHARGE INSTRUCTIONS
Patient Education        Urinary Retention: Care Instructions  Your Care Instructions     Urinary retention means that you aren't able to urinate. In men, it is often caused by a blockage of the urinary tract from an enlarged prostate gland. In men and women, it can also be caused by an infection or nerve damage. Or it may be a side effect of a medicine. The doctor may have drained the urine from your bladder. If you still have problems passing urine, you may need to use a catheter at home. This is used to empty your bladder until the problem can be fixed. Your doctor may put a catheter in your bladder before you go home. If so, he or she will tell you when to come back to have the catheter removed. The doctor has checked you closely. But problems can develop later. If you notice any problems or new symptoms, get medical treatment right away. Follow-up care is a key part of your treatment and safety. Be sure to make and go to all appointments, and call your doctor if you are having problems. It's also a good idea to know your test results and keep a list of the medicines you take. How can you care for yourself at home? · Take your medicines exactly as prescribed. Call your doctor if you think you are having a problem with your medicine. You will get more details on the specific medicines your doctor prescribes. · Check with your doctor before you use any over-the-counter medicines. Many cold and allergy medicines, for example, can make this problem worse. Make sure your doctor knows all of the medicines, vitamins, supplements, and herbal remedies you take. · Spread out through the day the amount of fluid you drink. Do not drink a lot at bedtime. · Avoid alcohol and caffeine. · If you have been given a catheter, or if one is already in place, follow the instructions you were given. Always wash your hands before and after you handle the catheter. When should you call for help?    Call your doctor now or seek immediate medical care if:    · You cannot urinate at all, or it is getting harder to urinate.     · You have symptoms of a urinary tract infection. These may include:  ? Pain or burning when you urinate. ? A frequent need to urinate without being able to pass much urine. ? Pain in the flank, which is just below the rib cage and above the waist on either side of the back. ? Blood in your urine. ? A fever. Watch closely for changes in your health, and be sure to contact your doctor if:    · You have any problems with your catheter.     · You do not get better as expected. Where can you learn more? Go to http://www.gray.com/  Enter M244 in the search box to learn more about \"Urinary Retention: Care Instructions. \"  Current as of: June 29, 2020               Content Version: 12.6  © 7043-1286 [employer]. Care instructions adapted under license by [employer] (which disclaims liability or warranty for this information). If you have questions about a medical condition or this instruction, always ask your healthcare professional. [place] [address] any warranty or liability for your use of this information.

## 2020-11-08 VITALS
TEMPERATURE: 98.4 F | HEART RATE: 88 BPM | SYSTOLIC BLOOD PRESSURE: 130 MMHG | DIASTOLIC BLOOD PRESSURE: 64 MMHG | OXYGEN SATURATION: 99 %

## 2020-11-08 PROCEDURE — 3331090002 HH PPS REVENUE DEBIT

## 2020-11-08 PROCEDURE — 3331090001 HH PPS REVENUE CREDIT

## 2020-11-09 ENCOUNTER — TELEPHONE (OUTPATIENT)
Dept: CARDIOLOGY CLINIC | Age: 85
End: 2020-11-09

## 2020-11-09 PROCEDURE — 3331090002 HH PPS REVENUE DEBIT

## 2020-11-09 PROCEDURE — 3331090001 HH PPS REVENUE CREDIT

## 2020-11-09 NOTE — TELEPHONE ENCOUNTER
Patient has decided not to go through with colon surgery that is scheduled for later this year. Therefore, he does not want to go through with the stress test that Dr. Samaria Whatley ordered that is scheduled for 11/10/20. He just wants the message relayed to Dr. Samaria Whatley.

## 2020-11-10 ENCOUNTER — HOME CARE VISIT (OUTPATIENT)
Dept: SCHEDULING | Facility: HOME HEALTH | Age: 85
End: 2020-11-10
Payer: MEDICARE

## 2020-11-10 PROCEDURE — 3331090001 HH PPS REVENUE CREDIT

## 2020-11-10 PROCEDURE — 3331090002 HH PPS REVENUE DEBIT

## 2020-11-10 PROCEDURE — G0157 HHC PT ASSISTANT EA 15: HCPCS

## 2020-11-11 VITALS
OXYGEN SATURATION: 100 % | DIASTOLIC BLOOD PRESSURE: 64 MMHG | SYSTOLIC BLOOD PRESSURE: 138 MMHG | TEMPERATURE: 98.7 F | HEART RATE: 64 BPM

## 2020-11-11 PROCEDURE — 3331090001 HH PPS REVENUE CREDIT

## 2020-11-11 PROCEDURE — 3331090002 HH PPS REVENUE DEBIT

## 2020-11-12 ENCOUNTER — HOME CARE VISIT (OUTPATIENT)
Dept: HOME HEALTH SERVICES | Facility: HOME HEALTH | Age: 85
End: 2020-11-12
Payer: MEDICARE

## 2020-11-12 PROCEDURE — 3331090001 HH PPS REVENUE CREDIT

## 2020-11-12 PROCEDURE — 3331090002 HH PPS REVENUE DEBIT

## 2020-11-13 ENCOUNTER — HOME CARE VISIT (OUTPATIENT)
Dept: HOME HEALTH SERVICES | Facility: HOME HEALTH | Age: 85
End: 2020-11-13
Payer: MEDICARE

## 2020-11-13 ENCOUNTER — APPOINTMENT (OUTPATIENT)
Dept: GENERAL RADIOLOGY | Age: 85
End: 2020-11-13
Attending: EMERGENCY MEDICINE
Payer: MEDICARE

## 2020-11-13 ENCOUNTER — HOSPITAL ENCOUNTER (EMERGENCY)
Age: 85
Discharge: HOME | End: 2020-11-13
Attending: EMERGENCY MEDICINE
Payer: MEDICARE

## 2020-11-13 VITALS
WEIGHT: 169.75 LBS | DIASTOLIC BLOOD PRESSURE: 51 MMHG | BODY MASS INDEX: 27.28 KG/M2 | HEIGHT: 66 IN | TEMPERATURE: 99 F | RESPIRATION RATE: 18 BRPM | HEART RATE: 72 BPM | OXYGEN SATURATION: 100 % | SYSTOLIC BLOOD PRESSURE: 142 MMHG

## 2020-11-13 DIAGNOSIS — R55 SYNCOPE, UNSPECIFIED SYNCOPE TYPE: Primary | ICD-10-CM

## 2020-11-13 LAB
ANION GAP SERPL CALC-SCNC: 5 MMOL/L (ref 3–18)
ATRIAL RATE: 73 BPM
BASOPHILS # BLD: 0 K/UL (ref 0–0.1)
BASOPHILS NFR BLD: 0 % (ref 0–2)
BUN SERPL-MCNC: 16 MG/DL (ref 7–18)
BUN/CREAT SERPL: 6 (ref 12–20)
CALCIUM SERPL-MCNC: 7.9 MG/DL (ref 8.5–10.1)
CALCULATED P AXIS, ECG09: 27 DEGREES
CALCULATED R AXIS, ECG10: -56 DEGREES
CALCULATED T AXIS, ECG11: -123 DEGREES
CHLORIDE SERPL-SCNC: 103 MMOL/L (ref 100–111)
CO2 SERPL-SCNC: 30 MMOL/L (ref 21–32)
CREAT SERPL-MCNC: 2.87 MG/DL (ref 0.6–1.3)
DIAGNOSIS, 93000: NORMAL
DIFFERENTIAL METHOD BLD: ABNORMAL
EOSINOPHIL # BLD: 0.3 K/UL (ref 0–0.4)
EOSINOPHIL NFR BLD: 2 % (ref 0–5)
ERYTHROCYTE [DISTWIDTH] IN BLOOD BY AUTOMATED COUNT: 13.3 % (ref 11.6–14.5)
GLUCOSE SERPL-MCNC: 98 MG/DL (ref 74–99)
HCT VFR BLD AUTO: 34.3 % (ref 36–48)
HGB BLD-MCNC: 10.8 G/DL (ref 13–16)
LYMPHOCYTES # BLD: 1.6 K/UL (ref 0.9–3.6)
LYMPHOCYTES NFR BLD: 12 % (ref 21–52)
MCH RBC QN AUTO: 29.5 PG (ref 24–34)
MCHC RBC AUTO-ENTMCNC: 31.5 G/DL (ref 31–37)
MCV RBC AUTO: 93.7 FL (ref 74–97)
MONOCYTES # BLD: 1.1 K/UL (ref 0.05–1.2)
MONOCYTES NFR BLD: 9 % (ref 3–10)
NEUTS SEG # BLD: 10 K/UL (ref 1.8–8)
NEUTS SEG NFR BLD: 77 % (ref 40–73)
P-R INTERVAL, ECG05: 234 MS
PLATELET # BLD AUTO: 164 K/UL (ref 135–420)
PMV BLD AUTO: 9.6 FL (ref 9.2–11.8)
POTASSIUM SERPL-SCNC: 3.2 MMOL/L (ref 3.5–5.5)
Q-T INTERVAL, ECG07: 462 MS
QRS DURATION, ECG06: 118 MS
QTC CALCULATION (BEZET), ECG08: 508 MS
RBC # BLD AUTO: 3.66 M/UL (ref 4.7–5.5)
SODIUM SERPL-SCNC: 138 MMOL/L (ref 136–145)
VENTRICULAR RATE, ECG03: 73 BPM
WBC # BLD AUTO: 13.1 K/UL (ref 4.6–13.2)

## 2020-11-13 PROCEDURE — 3331090001 HH PPS REVENUE CREDIT

## 2020-11-13 PROCEDURE — 71045 X-RAY EXAM CHEST 1 VIEW: CPT

## 2020-11-13 PROCEDURE — 80048 BASIC METABOLIC PNL TOTAL CA: CPT

## 2020-11-13 PROCEDURE — 93005 ELECTROCARDIOGRAM TRACING: CPT

## 2020-11-13 PROCEDURE — 99285 EMERGENCY DEPT VISIT HI MDM: CPT

## 2020-11-13 PROCEDURE — 85025 COMPLETE CBC W/AUTO DIFF WBC: CPT

## 2020-11-13 PROCEDURE — 3331090002 HH PPS REVENUE DEBIT

## 2020-11-13 NOTE — ED NOTES
Conan Gaucher is a 80 y.o. male that was discharged in stable condition. The patients diagnosis, condition and treatment were explained to  patient and aftercare instructions were given. The patient verbalized understanding. Patient armband removed and shredded.

## 2020-11-13 NOTE — DISCHARGE INSTRUCTIONS
Patient Education        Fainting: Care Instructions  Your Care Instructions     When you faint, or pass out, you lose consciousness for a short time. A brief drop in blood flow to the brain often causes it. When you fall or lie down, more blood flows to your brain and you regain consciousness. Emotional stress, pain, or overheating--especially if you have been standing--can make you faint. In these cases, fainting is usually not serious. But fainting can be a sign of a more serious problem. Your doctor may want you to have more tests to rule out other causes. The treatment you need depends on the reason why you fainted. The doctor has checked you carefully, but problems can develop later. If you notice any problems or new symptoms, get medical treatment right away. Follow-up care is a key part of your treatment and safety. Be sure to make and go to all appointments, and call your doctor if you are having problems. It's also a good idea to know your test results and keep a list of the medicines you take. How can you care for yourself at home? · Drink plenty of fluids to prevent dehydration. If you have kidney, heart, or liver disease and have to limit fluids, talk with your doctor before you increase your fluid intake. When should you call for help? Call 911 anytime you think you may need emergency care. For example, call if:    · You have symptoms of a heart problem. These may include:  ? Chest pain or pressure. ? Severe trouble breathing. ? A fast or irregular heartbeat. ? Lightheadedness or sudden weakness. ? Coughing up pink, foamy mucus. ? Passing out. After you call 911, the  may tell you to chew 1 adult-strength or 2 to 4 low-dose aspirin. Wait for an ambulance. Do not try to drive yourself.     · You have symptoms of a stroke. These may include:  ? Sudden numbness, tingling, weakness, or loss of movement in your face, arm, or leg, especially on only one side of your body.   ? Sudden vision changes. ? Sudden trouble speaking. ? Sudden confusion or trouble understanding simple statements. ? Sudden problems with walking or balance. ? A sudden, severe headache that is different from past headaches.     · You passed out (lost consciousness) again. Watch closely for changes in your health, and be sure to contact your doctor if:    · You do not get better as expected. Where can you learn more? Go to http://www.gray.com/  Enter A848 in the search box to learn more about \"Fainting: Care Instructions. \"  Current as of: June 26, 2019               Content Version: 12.6  © 3795-1766 [employer]. Care instructions adapted under license by [employer] (which disclaims liability or warranty for this information). If you have questions about a medical condition or this instruction, always ask your healthcare professional. [address] any warranty or liability for your use of this information.

## 2020-11-13 NOTE — PROGRESS NOTES
conducted an initial consultation and Spiritual Assessment for Nicole Pulido, who is a 80 y.o.,male. Patient's Primary Language is: [place]. According to the patient's EMR Yazidism Affiliation is: Caodaism. The reason the Patient came to the hospital is:   Patient Active Problem List    Diagnosis Date Noted    Polyp of ascending colon 10/01/2020    Postobstructive diuresis 09/30/2020    Hydronephrosis 09/29/2020    UTI (urinary tract infection) 09/29/2020    ESRD (end stage renal disease) ([facility] 09/26/2020    Hyperkalemia 45/51/5568    Metabolic acidosis 12/89/6263    Secondary hyperparathyroidism, renal ([facility] 09/26/2020    Anemia 09/26/2020    Acute renal failure (ARF) ([facility] 09/25/2020    CAD (coronary artery disease) 09/01/2016    HTN (hypertension) 09/01/2016    Troponin level elevated 09/01/2016    Traumatic rhabdomyolysis ([facility] 08/29/2016        The  provided the following Interventions:  Initiated a relationship of care and support. Explored issues of alan, belief, spirituality and Worship/ritual needs while hospitalized. Listened empathically. Provided chaplaincy education. Provided information about Spiritual Care Services. Offered prayer and assurance of continued prayers on patient's behalf. Chart reviewed. The following outcomes where achieved:  Patient shared limited information about both their medical narrative and spiritual journey/beliefs.  confirmed Patient's Yazidism Affiliation. Patient processed feeling about current hospitalization. Patient expressed gratitude for 's visit. Assessment:  Patient does not have any Worship/cultural needs that will affect patient's preferences in health care. There are no spiritual or Worship issues which require intervention at this time. Plan:  Chaplains will continue to follow and will provide pastoral care on an as needed/requested basis.    recommends bedside caregivers page  on duty if patient shows signs of acute spiritual or emotional distress. Chaplain KERR  [address] [facility]   [phone]

## 2020-11-13 NOTE — ED PROVIDER NOTES
EMERGENCY DEPARTMENT HISTORY AND PHYSICAL EXAM    3:07 PM      Date: 11/13/2020  Patient Name: Juve Odonnell    History of Presenting Illness     Chief Complaint   Patient presents with    Syncope         History Provided By: Patient and EMS    Additional History (Context): Juve Odonnell is a 80 y.o. male with End-stage renal disease, hypertension who presents with chief complaint per EMS patient had syncopal episode during dialysis. Apparently was an error during his dialysis when his catheter was \"hooked backwards\" per EMS. According to EMS, dialysis staff states that they took off 600 mL of his blood by error, and at that time he had a syncopal episode. There were then able to replenish back some of his blood in 400 cc of normal saline. He then became more alert. On arrival patient is alert and oriented x3. He states that he believes he did 3 hours of his dialysis. Denies any shortness of breath, dizziness, chest pain, palpitations, weakness, visual change, abdominal pain, and no complaint. PCP: Anthony Sears MD        Past History     Past Medical History:  Past Medical History:   Diagnosis Date    Cardiac echocardiogram 08/29/2016    EF 60-65%. No WMA. Mild LVH. Indeterminate diastolic fx. RVSP 35 mmHg. No significant valvular heart disease.  Cardiac nuclear imaging test, abnormal 08/29/2016    Intermediate risk. Previous inferior infarction w/very mild fabiana-infarct ischemia. Inferior hypk. EF 68%. Nondiagnostic EKG on pharm stress test.  Pt's BP increased from 193/96 to 207/83 during study.  Carotid duplex 08/30/2016    Mild <50% bilateral ICA stenosis.       Chronic kidney disease     dialysis    Enlarged prostate     Hypertension        Past Surgical History:  Past Surgical History:   Procedure Laterality Date    COLONOSCOPY N/A 10/1/2020    COLONOSCOPY, b'x, w tattoo w polypectomy performed by Rubén Jiang MD at [facility]  10/1/2020  COLONOSCOPY,REMV LESN,SNARE  10/1/2020         COLONOSCPY,FLEX,W/DIR SUBMUC INJECT  10/1/2020         [place] INSJ TUNNELED CVC W/O SUBQ PORT/ AGE 5 YR/> N/A 9/28/2020    INSERTION TUNNELED CENTRAL VENOUS CATHETER performed by Willy Manley MD at [facility] CATH LAB    VASCULAR SURGERY PROCEDURE UNLIST      dialysis access       Family History:  History reviewed. No pertinent family history. Social History:  Social History     Tobacco Use    Smoking status: Never Smoker    Smokeless tobacco: Never Used   Substance Use Topics    Alcohol use: No    Drug use: No       Allergies:  No Known Allergies      Review of Systems       Review of Systems   Constitutional: Negative for chills and fever. HENT: Negative for trouble swallowing. Eyes: Negative for visual disturbance. Respiratory: Negative for cough and shortness of breath. Cardiovascular: Negative for chest pain, palpitations and leg swelling. Gastrointestinal: Negative for abdominal pain, nausea and vomiting. Genitourinary: Negative for flank pain. Musculoskeletal: Negative for arthralgias, back pain, neck pain and neck stiffness. Skin: Negative for color change and rash. Neurological: Positive for syncope. Negative for dizziness, weakness, numbness and headaches. Hematological: Does not bruise/bleed easily. Psychiatric/Behavioral: Negative for confusion and dysphoric mood. All other systems reviewed and are negative. Physical Exam     Visit Vitals  BP (!) 120/51 (BP 1 Location: Left arm, BP Patient Position: At rest)   Pulse 72   Temp 99 °F (37.2 °C)   Resp 18   Ht 5' 6\" (1.676 m)   Wt 77 kg (169 lb 12.1 oz)   SpO2 100%   BMI 27.40 kg/m²         Physical Exam  Vitals signs and nursing note reviewed. Constitutional:       General: He is not in acute distress. Appearance: He is well-developed. He is not ill-appearing, toxic-appearing or diaphoretic. HENT:      Head: Normocephalic and atraumatic.    Eyes: General: No scleral icterus. Conjunctiva/sclera: Conjunctivae normal.      Pupils: Pupils are equal, round, and reactive to light. Neck:      Musculoskeletal: Normal range of motion and neck supple. Cardiovascular:      Rate and Rhythm: Normal rate. Pulses: Normal pulses. Comments: Capillary refill < 3 seconds  Pulmonary:      Effort: Pulmonary effort is normal. No respiratory distress. Breath sounds: Normal breath sounds. No stridor. No wheezing. Abdominal:      General: Bowel sounds are normal. There is no distension. Palpations: Abdomen is soft. Tenderness: There is no abdominal tenderness. Musculoskeletal: Normal range of motion. Skin:     General: Skin is warm and dry. Capillary Refill: Capillary refill takes less than 2 seconds. Neurological:      General: No focal deficit present. Mental Status: He is alert and oriented to person, place, and time. Cranial Nerves: No cranial nerve deficit. Sensory: No sensory deficit. Motor: No weakness. Coordination: Coordination normal.   Psychiatric:         Thought Content: Thought content normal.           Diagnostic Study Results     Labs -  No results found for this or any previous visit (from the past 12 hour(s)). Radiologic Studies -       Medical Decision Making   I am the first provider for this patient. I reviewed the vital signs, available nursing notes, past medical history, past surgical history, family history and social history. Vital Signs-Reviewed the patient's vital signs. Pulse Oximetry Analysis -  100% on room air (Interpretation) normal    Cardiac Monitor:  Rate: 72  Rhythm: Sinus rhythm    EKG: Interpreted by the EP Dr. Katrina Salazar.    Rate: 73   Rhythm: Sinus rhythm   Interpretation: Prolonged QTC, T wave inversions in inferior and anterolateral leads   Comparison: T wave inversions similar and old EKG on 9/25/2020, no significant change    Records Reviewed: Nursing Notes and Old Medical Records (Time of Review: 3:07 PM)    Provider Notes (Medical Decision Making): Patient had syncopal episode at dialysis when about 600 cc of blood was mistakenly drawn off of him. They were able to give back about 400 cc of blood along with saline. Patient arrived to ED he was alert and oriented x3. Was in no distress. No dizziness, chest pain, shortness of breath or abdominal pain. Check some labs and call his nephrologist Dr. Jenni Mares      MDM    Medications - No data to display        ED Course: Progress Notes, Reevaluation, and Consults:  WBC normal  Hemoglobin 10.8 and stable  Sodium 138  Potassium 3.2  Just had his dialysis prior to arrival to ED today. Consult:  Discussed care with Dr. Jenni Mares, Specialty: Nephrologist, standard discussion; including history of patients chief complaint, available diagnostic results, and treatment course. States patient can be discharged home. He has been a discussion with patient's wife to monitor patient. Patient will get dialysis on Monday. 4:50 PM, 11/13/2020     I have reassessed the patient. I have discussed the workup, results and plan with the patient and patient is in agreement. Patient has no complaint. Patient was discharge in stable condition. Patient was given outpatient follow up. Patient is to return to emergency department if any new or worsening condition. Diagnosis     Clinical Impression:   1.  Syncope, unspecified syncope type        Disposition: Discharged    Follow-up Information     Follow up With Specialties Details Why Contact Info    Jennifer Hirsch MD Family Medicine In 1 week  [id number] [facility]  [phone]      Go to your dialysis appointment on Monday without fail        [facility] EMERGENCY DEPT Emergency Medicine  As needed, If symptoms worsen [address] [facility]  [phone]           Patient's Medications   Start Taking    No medications on file   Continue Taking    FUROSEMIDE (LASIX) 40 MG TABLET    Take 40 mg by mouth daily. LEVOFLOXACIN (LEVAQUIN) 500 MG TABLET    Take one po Now, then po after dialysis until procedure    NEOMYCIN (MYCIFRADIN) 500 MG TABLET    take 2 tablets by mouth three times a day AT 1 PM, 2 PM, AND 11 PM THE DAY prior to surgery    TRIAMCINOLONE ACETONIDE (KENALOG) 0.1 % TOPICAL CREAM    APPLY TO AFFECTED AREAA  twice a day   These Medications have changed    No medications on file   Stop Taking    No medications on file         DO Guy Villa medical dictation software was used for portions of this report. Unintended transcription errors may occur. My signature above authenticates this document and my orders, the final    diagnosis (es), discharge prescription (s), and instructions in the Epic    record.

## 2020-11-13 NOTE — ED TRIAGE NOTES
Was at dialysis when he passed out after nurse \"hooked his dialysis catheter backwards\" per EMS. States they took off 600 mL of blood and replenished with 400 NS. Patient A&Ox3 with no complaints at this time.

## 2020-11-14 PROCEDURE — 3331090001 HH PPS REVENUE CREDIT

## 2020-11-14 PROCEDURE — 3331090002 HH PPS REVENUE DEBIT

## 2020-11-15 PROCEDURE — 3331090001 HH PPS REVENUE CREDIT

## 2020-11-15 PROCEDURE — 3331090002 HH PPS REVENUE DEBIT

## 2020-11-16 PROCEDURE — 3331090001 HH PPS REVENUE CREDIT

## 2020-11-16 PROCEDURE — 3331090002 HH PPS REVENUE DEBIT

## 2020-11-17 ENCOUNTER — HOME CARE VISIT (OUTPATIENT)
Dept: SCHEDULING | Facility: HOME HEALTH | Age: 85
End: 2020-11-17
Payer: MEDICARE

## 2020-11-17 ENCOUNTER — TELEPHONE (OUTPATIENT)
Dept: SURGERY | Age: 85
End: 2020-11-17

## 2020-11-17 VITALS
DIASTOLIC BLOOD PRESSURE: 62 MMHG | HEART RATE: 72 BPM | SYSTOLIC BLOOD PRESSURE: 120 MMHG | OXYGEN SATURATION: 99 % | TEMPERATURE: 98.2 F

## 2020-11-17 PROCEDURE — 3331090002 HH PPS REVENUE DEBIT

## 2020-11-17 PROCEDURE — G0300 HHS/HOSPICE OF LPN EA 15 MIN: HCPCS

## 2020-11-17 PROCEDURE — 3331090001 HH PPS REVENUE CREDIT

## 2020-11-17 PROCEDURE — G0151 HHCP-SERV OF PT,EA 15 MIN: HCPCS

## 2020-11-17 NOTE — TELEPHONE ENCOUNTER
Per dr. Romeo Dickens I called this patient to confirm his surgery on 12/1/2020. The patient's wife stated that he does not want to have surgery. She put Mr. Jamaal Monique on the phone and he stated \" that he knew 3 people that dies from having this type of surgery\" and \" that he is fine and at the age of 80years old does want to have surgery. \"  I informed them that I would cancel the procedure and let Dr. Romeo Dickens know.

## 2020-11-18 VITALS
TEMPERATURE: 97.8 F | DIASTOLIC BLOOD PRESSURE: 90 MMHG | HEART RATE: 67 BPM | SYSTOLIC BLOOD PRESSURE: 174 MMHG | OXYGEN SATURATION: 97 %

## 2020-11-18 PROCEDURE — 3331090002 HH PPS REVENUE DEBIT

## 2020-11-18 PROCEDURE — 3331090001 HH PPS REVENUE CREDIT

## 2020-11-19 NOTE — PROGRESS NOTES
PT D/C:  Pt has made progress w/ strength in L hip flex/ext 3- - 4/5 at [facility] to L hip flex/ext 4- - 4+/5; L knee flex 4-, ext 3+ to L knee flex/ext 4- - 5/5 at d/c; R hip flex/ext 4/5 progressed to 4+-5/5 at d/c and R knee flex/ext 4/5 at [facility] to R knee flex 5/5, ext 4/5 at d/c; bed mob progressed from [address] (A) to Sup/(I) at d/c; pt able to transfer w/ SBA/Sup at d/c from Min/Mod (A) to SBA/Sup for safety at d/c; gait from amb at 35 ft w/  CGA to > 100 ft using RW w/ SBA/Close Sup for safety; pt w/ occasional shuffling and fwd trunk posture in stance; v/cs needed at times due to pt advancing walker too far fwd requiring v/cs for safety; balance remains the same per TUG at 54 secs at d/c from 51 secs at Reassessment visit; able to amb up/down 2 steps to enter/exit home w/ CGA/Min (A) due to no handrails at the back entrance; pt in need of further therapy and would benefit  from more but spouse feels pt is able to function throughout home w/ her (A) and no further therapy is need; will therefore d/c home PT services.   Thanks for your referral.  Kris Johnston, MPT

## 2020-11-23 ENCOUNTER — DOCUMENTATION ONLY (OUTPATIENT)
Dept: SURGERY | Age: 85
End: 2020-11-23

## 2020-11-23 NOTE — PROGRESS NOTES
I telephoned the patient today after being told he has canceled his colon resection. I told him that the gastroenterologist felt he had a cancer even no initial biopsies proved polyp. I told him that he likely has a cancer, and if left untreated this can spread to the rest of the body and kill him. I told him that he was higher than average risk surgical candidate given his end-stage renal disease, however I thought that he would survive surgery. I tell him that in general the mortality rate for colon surgery is less than 1%. I tell him that if he had friends who have  of this type of surgery that there are all sorts of circumstances surrounding this. They may had not have had surgery for the same problem. They may have had a different kind of surgery entirely. Additionally I tell him that even though he feels fine right now, if you were to let the disease progress he would develop symptoms such as bleeding, obstruction or pain. I tell him at that point his disease process may be further along in his cancer may have spread to the rest of the body and he would be incurable. I asked him if he understands all of the above and he says yes. I asked him if he would like to proceed with surgery and he says no. I politely tell him that if he reconsiders he can feel free to call the office and I would be happy to take care of him. He understands.

## 2021-01-01 ENCOUNTER — HOME CARE VISIT (OUTPATIENT)
Dept: HOME HEALTH SERVICES | Facility: HOME HEALTH | Age: 86
End: 2021-01-01
Payer: MEDICARE

## 2021-01-01 ENCOUNTER — HOME CARE VISIT (OUTPATIENT)
Dept: SCHEDULING | Facility: HOME HEALTH | Age: 86
End: 2021-01-01
Payer: MEDICARE

## 2021-01-01 ENCOUNTER — APPOINTMENT (OUTPATIENT)
Dept: GENERAL RADIOLOGY | Age: 86
DRG: 177 | End: 2021-01-01
Attending: INTERNAL MEDICINE
Payer: MEDICARE

## 2021-01-01 ENCOUNTER — APPOINTMENT (OUTPATIENT)
Dept: GENERAL RADIOLOGY | Age: 86
End: 2021-01-01
Attending: SURGERY
Payer: MEDICARE

## 2021-01-01 ENCOUNTER — APPOINTMENT (OUTPATIENT)
Dept: CT IMAGING | Age: 86
DRG: 177 | End: 2021-01-01
Attending: STUDENT IN AN ORGANIZED HEALTH CARE EDUCATION/TRAINING PROGRAM
Payer: MEDICARE

## 2021-01-01 ENCOUNTER — ANESTHESIA EVENT (OUTPATIENT)
Dept: CARDIOTHORACIC SURGERY | Age: 86
End: 2021-01-01
Payer: MEDICARE

## 2021-01-01 ENCOUNTER — HOSPITAL ENCOUNTER (OUTPATIENT)
Age: 86
Setting detail: OUTPATIENT SURGERY
Discharge: HOME | End: 2021-12-30
Attending: SURGERY | Admitting: SURGERY
Payer: MEDICARE

## 2021-01-01 ENCOUNTER — APPOINTMENT (OUTPATIENT)
Dept: GENERAL RADIOLOGY | Age: 86
DRG: 177 | End: 2021-01-01
Attending: PHYSICIAN ASSISTANT
Payer: MEDICARE

## 2021-01-01 ENCOUNTER — TRANSCRIBE ORDER (OUTPATIENT)
Dept: SCHEDULING | Age: 86
End: 2021-01-01

## 2021-01-01 ENCOUNTER — HOME HEALTH ADMISSION (OUTPATIENT)
Dept: HOME HEALTH SERVICES | Facility: HOME HEALTH | Age: 86
End: 2021-01-01
Payer: MEDICARE

## 2021-01-01 ENCOUNTER — ANESTHESIA (OUTPATIENT)
Dept: CARDIOTHORACIC SURGERY | Age: 86
End: 2021-01-01
Payer: MEDICARE

## 2021-01-01 ENCOUNTER — HOSPITAL ENCOUNTER (OUTPATIENT)
Age: 86
Setting detail: OUTPATIENT SURGERY
Discharge: HOME | End: 2021-08-05
Attending: SURGERY | Admitting: SURGERY
Payer: MEDICARE

## 2021-01-01 ENCOUNTER — HOSPITAL ENCOUNTER (INPATIENT)
Age: 86
LOS: 8 days | Discharge: HOME HEALTH | DRG: 177 | End: 2021-10-27
Attending: STUDENT IN AN ORGANIZED HEALTH CARE EDUCATION/TRAINING PROGRAM | Admitting: STUDENT IN AN ORGANIZED HEALTH CARE EDUCATION/TRAINING PROGRAM
Payer: MEDICARE

## 2021-01-01 VITALS
DIASTOLIC BLOOD PRESSURE: 66 MMHG | OXYGEN SATURATION: 100 % | HEIGHT: 66 IN | BODY MASS INDEX: 25.71 KG/M2 | TEMPERATURE: 97.4 F | WEIGHT: 160 LBS | HEART RATE: 72 BPM | RESPIRATION RATE: 20 BRPM | SYSTOLIC BLOOD PRESSURE: 134 MMHG

## 2021-01-01 VITALS
HEART RATE: 73 BPM | SYSTOLIC BLOOD PRESSURE: 138 MMHG | WEIGHT: 155 LBS | BODY MASS INDEX: 24.91 KG/M2 | RESPIRATION RATE: 16 BRPM | DIASTOLIC BLOOD PRESSURE: 61 MMHG | HEIGHT: 66 IN | TEMPERATURE: 98.2 F | OXYGEN SATURATION: 100 %

## 2021-01-01 VITALS
DIASTOLIC BLOOD PRESSURE: 60 MMHG | HEART RATE: 85 BPM | OXYGEN SATURATION: 100 % | TEMPERATURE: 98.2 F | SYSTOLIC BLOOD PRESSURE: 130 MMHG | RESPIRATION RATE: 18 BRPM

## 2021-01-01 VITALS
HEART RATE: 84 BPM | OXYGEN SATURATION: 100 % | SYSTOLIC BLOOD PRESSURE: 122 MMHG | TEMPERATURE: 98.6 F | DIASTOLIC BLOOD PRESSURE: 60 MMHG | RESPIRATION RATE: 18 BRPM

## 2021-01-01 VITALS
SYSTOLIC BLOOD PRESSURE: 114 MMHG | TEMPERATURE: 98 F | DIASTOLIC BLOOD PRESSURE: 62 MMHG | OXYGEN SATURATION: 99 % | HEART RATE: 78 BPM

## 2021-01-01 VITALS
OXYGEN SATURATION: 99 % | TEMPERATURE: 98.4 F | SYSTOLIC BLOOD PRESSURE: 120 MMHG | DIASTOLIC BLOOD PRESSURE: 80 MMHG | RESPIRATION RATE: 17 BRPM | HEART RATE: 77 BPM

## 2021-01-01 VITALS
RESPIRATION RATE: 18 BRPM | HEIGHT: 66 IN | WEIGHT: 157 LBS | DIASTOLIC BLOOD PRESSURE: 66 MMHG | SYSTOLIC BLOOD PRESSURE: 136 MMHG | TEMPERATURE: 98 F | OXYGEN SATURATION: 100 % | HEART RATE: 84 BPM | BODY MASS INDEX: 25.23 KG/M2

## 2021-01-01 DIAGNOSIS — N18.6 END STAGE RENAL DISEASE (HCC): Primary | ICD-10-CM

## 2021-01-01 DIAGNOSIS — K56.7 ILEUS (HCC): ICD-10-CM

## 2021-01-01 DIAGNOSIS — R31.9 HEMATURIA SYNDROME: ICD-10-CM

## 2021-01-01 DIAGNOSIS — N18.6 ESRD (END STAGE RENAL DISEASE) (HCC): Primary | ICD-10-CM

## 2021-01-01 DIAGNOSIS — J18.9 MULTIFOCAL PNEUMONIA: Primary | ICD-10-CM

## 2021-01-01 LAB
ABO + RH BLD: NORMAL
ALBUMIN SERPL-MCNC: 2.8 G/DL (ref 3.4–5)
ALBUMIN SERPL-MCNC: 3.4 G/DL (ref 3.4–5)
ALBUMIN/GLOB SERPL: 0.8 {RATIO} (ref 0.8–1.7)
ALBUMIN/GLOB SERPL: 0.8 {RATIO} (ref 0.8–1.7)
ALP SERPL-CCNC: 162 U/L (ref 45–117)
ALP SERPL-CCNC: 209 U/L (ref 45–117)
ALT SERPL-CCNC: 13 U/L (ref 16–61)
ALT SERPL-CCNC: 16 U/L (ref 16–61)
ANION GAP SERPL CALC-SCNC: 10 MMOL/L (ref 3–18)
ANION GAP SERPL CALC-SCNC: 11 MMOL/L (ref 3–18)
ANION GAP SERPL CALC-SCNC: 12 MMOL/L (ref 3–18)
ANION GAP SERPL CALC-SCNC: 12 MMOL/L (ref 3–18)
ANION GAP SERPL CALC-SCNC: 7 MMOL/L (ref 3–18)
ANION GAP SERPL CALC-SCNC: 7 MMOL/L (ref 3–18)
ANION GAP SERPL CALC-SCNC: 9 MMOL/L (ref 3–18)
AST SERPL-CCNC: 20 U/L (ref 10–38)
AST SERPL-CCNC: 28 U/L (ref 10–38)
ATRIAL RATE: 105 BPM
ATRIAL RATE: 95 BPM
BACTERIA SPEC CULT: ABNORMAL
BACTERIA SPEC CULT: NORMAL
BACTERIA SPEC CULT: NORMAL
BASOPHILS # BLD: 0 K/UL (ref 0–0.1)
BASOPHILS # BLD: 0.1 K/UL (ref 0–0.1)
BASOPHILS # BLD: 0.1 K/UL (ref 0–0.1)
BASOPHILS # BLD: 0.3 K/UL (ref 0–0.1)
BASOPHILS NFR BLD: 0 % (ref 0–2)
BASOPHILS NFR BLD: 1 % (ref 0–2)
BILIRUB SERPL-MCNC: 0.3 MG/DL (ref 0.2–1)
BILIRUB SERPL-MCNC: 0.4 MG/DL (ref 0.2–1)
BLOOD GROUP ANTIBODIES SERPL: NORMAL
BUN SERPL-MCNC: 24 MG/DL (ref 7–18)
BUN SERPL-MCNC: 26 MG/DL (ref 7–18)
BUN SERPL-MCNC: 33 MG/DL (ref 7–18)
BUN SERPL-MCNC: 36 MG/DL (ref 7–18)
BUN SERPL-MCNC: 39 MG/DL (ref 7–18)
BUN SERPL-MCNC: 44 MG/DL (ref 7–18)
BUN SERPL-MCNC: 47 MG/DL (ref 7–18)
BUN SERPL-MCNC: 49 MG/DL (ref 7–18)
BUN SERPL-MCNC: 56 MG/DL (ref 7–18)
BUN SERPL-MCNC: 59 MG/DL (ref 7–18)
BUN SERPL-MCNC: 71 MG/DL (ref 7–18)
BUN/CREAT SERPL: 12 (ref 12–20)
BUN/CREAT SERPL: 6 (ref 12–20)
BUN/CREAT SERPL: 6 (ref 12–20)
BUN/CREAT SERPL: 7 (ref 12–20)
BUN/CREAT SERPL: 8 (ref 12–20)
BUN/CREAT SERPL: 9 (ref 12–20)
CALCIUM SERPL-MCNC: 7.1 MG/DL (ref 8.5–10.1)
CALCIUM SERPL-MCNC: 7.1 MG/DL (ref 8.5–10.1)
CALCIUM SERPL-MCNC: 7.9 MG/DL (ref 8.5–10.1)
CALCIUM SERPL-MCNC: 8 MG/DL (ref 8.5–10.1)
CALCIUM SERPL-MCNC: 8 MG/DL (ref 8.5–10.1)
CALCIUM SERPL-MCNC: 8.3 MG/DL (ref 8.5–10.1)
CALCIUM SERPL-MCNC: 8.3 MG/DL (ref 8.5–10.1)
CALCIUM SERPL-MCNC: 8.4 MG/DL (ref 8.5–10.1)
CALCIUM SERPL-MCNC: 8.6 MG/DL (ref 8.5–10.1)
CALCIUM SERPL-MCNC: 8.9 MG/DL (ref 8.5–10.1)
CALCIUM SERPL-MCNC: 9.5 MG/DL (ref 8.5–10.1)
CALCIUM SERPL-MCNC: 9.6 MG/DL (ref 8.5–10.1)
CALCULATED P AXIS, ECG09: 38 DEGREES
CALCULATED P AXIS, ECG09: 46 DEGREES
CALCULATED R AXIS, ECG10: -51 DEGREES
CALCULATED R AXIS, ECG10: -54 DEGREES
CALCULATED T AXIS, ECG11: 103 DEGREES
CALCULATED T AXIS, ECG11: 104 DEGREES
CHLORIDE SERPL-SCNC: 100 MMOL/L (ref 100–111)
CHLORIDE SERPL-SCNC: 103 MMOL/L (ref 100–111)
CHLORIDE SERPL-SCNC: 91 MMOL/L (ref 100–111)
CHLORIDE SERPL-SCNC: 93 MMOL/L (ref 100–111)
CHLORIDE SERPL-SCNC: 93 MMOL/L (ref 100–111)
CHLORIDE SERPL-SCNC: 94 MMOL/L (ref 100–111)
CHLORIDE SERPL-SCNC: 94 MMOL/L (ref 100–111)
CHLORIDE SERPL-SCNC: 95 MMOL/L (ref 100–111)
CHLORIDE SERPL-SCNC: 96 MMOL/L (ref 100–111)
CHLORIDE SERPL-SCNC: 96 MMOL/L (ref 100–111)
CHLORIDE SERPL-SCNC: 98 MMOL/L (ref 100–111)
CK MB CFR SERPL CALC: ABNORMAL % (ref 0–4)
CK MB CFR SERPL CALC: NORMAL % (ref 0–4)
CK MB SERPL-MCNC: <1 NG/ML (ref 5–25)
CK MB SERPL-MCNC: <1 NG/ML (ref 5–25)
CK SERPL-CCNC: 27 U/L (ref 39–308)
CK SERPL-CCNC: 44 U/L (ref 39–308)
CO2 SERPL-SCNC: 23 MMOL/L (ref 21–32)
CO2 SERPL-SCNC: 27 MMOL/L (ref 21–32)
CO2 SERPL-SCNC: 27 MMOL/L (ref 21–32)
CO2 SERPL-SCNC: 28 MMOL/L (ref 21–32)
CO2 SERPL-SCNC: 28 MMOL/L (ref 21–32)
CO2 SERPL-SCNC: 29 MMOL/L (ref 21–32)
CO2 SERPL-SCNC: 30 MMOL/L (ref 21–32)
CO2 SERPL-SCNC: 31 MMOL/L (ref 21–32)
CO2 SERPL-SCNC: 31 MMOL/L (ref 21–32)
COVID-19 RAPID TEST, COVR: NOT DETECTED
CREAT SERPL-MCNC: 3.64 MG/DL (ref 0.6–1.3)
CREAT SERPL-MCNC: 3.67 MG/DL (ref 0.6–1.3)
CREAT SERPL-MCNC: 3.88 MG/DL (ref 0.6–1.3)
CREAT SERPL-MCNC: 4.96 MG/DL (ref 0.6–1.3)
CREAT SERPL-MCNC: 5.68 MG/DL (ref 0.6–1.3)
CREAT SERPL-MCNC: 5.8 MG/DL (ref 0.6–1.3)
CREAT SERPL-MCNC: 5.83 MG/DL (ref 0.6–1.3)
CREAT SERPL-MCNC: 5.96 MG/DL (ref 0.6–1.3)
CREAT SERPL-MCNC: 6.46 MG/DL (ref 0.6–1.3)
CREAT SERPL-MCNC: 7.63 MG/DL (ref 0.6–1.3)
CREAT SERPL-MCNC: 7.83 MG/DL (ref 0.6–1.3)
DIAGNOSIS, 93000: NORMAL
DIAGNOSIS, 93000: NORMAL
DIFFERENTIAL METHOD BLD: ABNORMAL
EOSINOPHIL # BLD: 0 K/UL (ref 0–0.4)
EOSINOPHIL # BLD: 0.1 K/UL (ref 0–0.4)
EOSINOPHIL # BLD: 0.3 K/UL (ref 0–0.4)
EOSINOPHIL # BLD: 0.3 K/UL (ref 0–0.4)
EOSINOPHIL # BLD: 0.6 K/UL (ref 0–0.4)
EOSINOPHIL # BLD: 0.7 K/UL (ref 0–0.4)
EOSINOPHIL # BLD: 1 K/UL (ref 0–0.4)
EOSINOPHIL NFR BLD: 0 % (ref 0–5)
EOSINOPHIL NFR BLD: 1 % (ref 0–5)
EOSINOPHIL NFR BLD: 1 % (ref 0–5)
EOSINOPHIL NFR BLD: 2 % (ref 0–5)
EOSINOPHIL NFR BLD: 2 % (ref 0–5)
EOSINOPHIL NFR BLD: 4 % (ref 0–5)
ERYTHROCYTE [DISTWIDTH] IN BLOOD BY AUTOMATED COUNT: 12.6 % (ref 11.6–14.5)
ERYTHROCYTE [DISTWIDTH] IN BLOOD BY AUTOMATED COUNT: 12.7 % (ref 11.6–14.5)
ERYTHROCYTE [DISTWIDTH] IN BLOOD BY AUTOMATED COUNT: 12.7 % (ref 11.6–14.5)
ERYTHROCYTE [DISTWIDTH] IN BLOOD BY AUTOMATED COUNT: 12.8 % (ref 11.6–14.5)
ERYTHROCYTE [DISTWIDTH] IN BLOOD BY AUTOMATED COUNT: 12.9 % (ref 11.6–14.5)
ERYTHROCYTE [DISTWIDTH] IN BLOOD BY AUTOMATED COUNT: 13.1 % (ref 11.6–14.5)
ERYTHROCYTE [DISTWIDTH] IN BLOOD BY AUTOMATED COUNT: 13.2 % (ref 11.6–14.5)
ERYTHROCYTE [DISTWIDTH] IN BLOOD BY AUTOMATED COUNT: 13.2 % (ref 11.6–14.5)
ERYTHROCYTE [DISTWIDTH] IN BLOOD BY AUTOMATED COUNT: 13.4 % (ref 11.6–14.5)
ERYTHROCYTE [DISTWIDTH] IN BLOOD BY AUTOMATED COUNT: 13.4 % (ref 11.6–14.5)
GLOBULIN SER CALC-MCNC: 3.6 G/DL (ref 2–4)
GLOBULIN SER CALC-MCNC: 4.3 G/DL (ref 2–4)
GLUCOSE BLD STRIP.AUTO-MCNC: 102 MG/DL (ref 70–110)
GLUCOSE BLD STRIP.AUTO-MCNC: 104 MG/DL (ref 70–110)
GLUCOSE BLD STRIP.AUTO-MCNC: 108 MG/DL (ref 70–110)
GLUCOSE BLD STRIP.AUTO-MCNC: 109 MG/DL (ref 70–110)
GLUCOSE BLD STRIP.AUTO-MCNC: 120 MG/DL (ref 70–110)
GLUCOSE BLD STRIP.AUTO-MCNC: 121 MG/DL (ref 70–110)
GLUCOSE BLD STRIP.AUTO-MCNC: 126 MG/DL (ref 70–110)
GLUCOSE BLD STRIP.AUTO-MCNC: 134 MG/DL (ref 70–110)
GLUCOSE BLD STRIP.AUTO-MCNC: 168 MG/DL (ref 70–110)
GLUCOSE BLD STRIP.AUTO-MCNC: 91 MG/DL (ref 70–110)
GLUCOSE BLD STRIP.AUTO-MCNC: 94 MG/DL (ref 70–110)
GLUCOSE SERPL-MCNC: 104 MG/DL (ref 74–99)
GLUCOSE SERPL-MCNC: 106 MG/DL (ref 74–99)
GLUCOSE SERPL-MCNC: 110 MG/DL (ref 74–99)
GLUCOSE SERPL-MCNC: 110 MG/DL (ref 74–99)
GLUCOSE SERPL-MCNC: 113 MG/DL (ref 74–99)
GLUCOSE SERPL-MCNC: 123 MG/DL (ref 74–99)
GLUCOSE SERPL-MCNC: 145 MG/DL (ref 74–99)
GLUCOSE SERPL-MCNC: 78 MG/DL (ref 74–99)
GLUCOSE SERPL-MCNC: 94 MG/DL (ref 74–99)
GLUCOSE SERPL-MCNC: 96 MG/DL (ref 74–99)
GLUCOSE SERPL-MCNC: 97 MG/DL (ref 74–99)
GRAM STN SPEC: ABNORMAL
GRAM STN SPEC: ABNORMAL
HBV SURFACE AB SER QL IA: NEGATIVE
HBV SURFACE AB SERPL IA-ACNC: <3.1 MIU/ML
HBV SURFACE AG SER QL: <0.1 INDEX
HBV SURFACE AG SER QL: NEGATIVE
HCT VFR BLD AUTO: 31.5 % (ref 36–48)
HCT VFR BLD AUTO: 31.6 % (ref 36–48)
HCT VFR BLD AUTO: 32.8 % (ref 36–48)
HCT VFR BLD AUTO: 33 % (ref 36–48)
HCT VFR BLD AUTO: 34.5 % (ref 36–48)
HCT VFR BLD AUTO: 35.1 % (ref 36–48)
HCT VFR BLD AUTO: 35.7 % (ref 36–48)
HCT VFR BLD AUTO: 35.8 % (ref 36–48)
HCT VFR BLD AUTO: 36.1 % (ref 36–48)
HCT VFR BLD AUTO: 36.7 % (ref 36–48)
HCT VFR BLD AUTO: 37.8 % (ref 36–48)
HCT VFR BLD AUTO: 37.9 % (ref 36–48)
HCT VFR BLD AUTO: 38.4 % (ref 36–48)
HCT VFR BLD AUTO: 38.6 % (ref 36–48)
HCT VFR BLD AUTO: 39.5 % (ref 36–48)
HCT VFR BLD AUTO: 42.6 % (ref 36–48)
HEP BS AB COMMENT,HBSAC: ABNORMAL
HGB BLD-MCNC: 10.1 G/DL (ref 13–16)
HGB BLD-MCNC: 10.6 G/DL (ref 13–16)
HGB BLD-MCNC: 10.8 G/DL (ref 13–16)
HGB BLD-MCNC: 10.8 G/DL (ref 13–16)
HGB BLD-MCNC: 11.3 G/DL (ref 13–16)
HGB BLD-MCNC: 11.5 G/DL (ref 13–16)
HGB BLD-MCNC: 11.5 G/DL (ref 13–16)
HGB BLD-MCNC: 11.6 G/DL (ref 13–16)
HGB BLD-MCNC: 11.8 G/DL (ref 13–16)
HGB BLD-MCNC: 12 G/DL (ref 13–16)
HGB BLD-MCNC: 12 G/DL (ref 13–16)
HGB BLD-MCNC: 12.4 G/DL (ref 13–16)
HGB BLD-MCNC: 12.5 G/DL (ref 13–16)
HGB BLD-MCNC: 12.7 G/DL (ref 13–16)
HGB BLD-MCNC: 12.8 G/DL (ref 13–16)
HGB BLD-MCNC: 14.3 G/DL (ref 13–16)
INR PPP: 0.9 (ref 0.8–1.2)
INR PPP: 0.9 (ref 0.8–1.2)
LACTATE BLD-SCNC: 2.17 MMOL/L (ref 0.4–2)
LACTATE BLD-SCNC: 3.17 MMOL/L (ref 0.4–2)
LACTATE SERPL-SCNC: 1.7 MMOL/L (ref 0.4–2)
LYMPHOCYTES # BLD: 1 K/UL (ref 0.9–3.6)
LYMPHOCYTES # BLD: 1.2 K/UL (ref 0.9–3.6)
LYMPHOCYTES # BLD: 1.3 K/UL (ref 0.9–3.6)
LYMPHOCYTES # BLD: 1.5 K/UL (ref 0.9–3.6)
LYMPHOCYTES # BLD: 1.5 K/UL (ref 0.9–3.6)
LYMPHOCYTES # BLD: 2.3 K/UL (ref 0.9–3.6)
LYMPHOCYTES # BLD: 2.4 K/UL (ref 0.9–3.6)
LYMPHOCYTES # BLD: 3.1 K/UL (ref 0.9–3.6)
LYMPHOCYTES # BLD: 3.3 K/UL (ref 0.9–3.6)
LYMPHOCYTES NFR BLD: 12 % (ref 21–52)
LYMPHOCYTES NFR BLD: 14 % (ref 21–52)
LYMPHOCYTES NFR BLD: 4 % (ref 21–52)
LYMPHOCYTES NFR BLD: 4 % (ref 21–52)
LYMPHOCYTES NFR BLD: 5 % (ref 21–52)
LYMPHOCYTES NFR BLD: 5 % (ref 21–52)
LYMPHOCYTES NFR BLD: 6 % (ref 21–52)
LYMPHOCYTES NFR BLD: 8 % (ref 21–52)
LYMPHOCYTES NFR BLD: 9 % (ref 21–52)
MAGNESIUM SERPL-MCNC: 2.3 MG/DL (ref 1.6–2.6)
MCH RBC QN AUTO: 30.3 PG (ref 24–34)
MCH RBC QN AUTO: 30.7 PG (ref 24–34)
MCH RBC QN AUTO: 30.7 PG (ref 24–34)
MCH RBC QN AUTO: 31.1 PG (ref 24–34)
MCH RBC QN AUTO: 31.4 PG (ref 24–34)
MCH RBC QN AUTO: 31.5 PG (ref 24–34)
MCH RBC QN AUTO: 31.7 PG (ref 24–34)
MCH RBC QN AUTO: 32.2 PG (ref 24–34)
MCHC RBC AUTO-ENTMCNC: 31.7 G/DL (ref 31–37)
MCHC RBC AUTO-ENTMCNC: 32.1 G/DL (ref 31–37)
MCHC RBC AUTO-ENTMCNC: 32.1 G/DL (ref 31–37)
MCHC RBC AUTO-ENTMCNC: 32.4 G/DL (ref 31–37)
MCHC RBC AUTO-ENTMCNC: 32.5 G/DL (ref 31–37)
MCHC RBC AUTO-ENTMCNC: 32.7 G/DL (ref 31–37)
MCHC RBC AUTO-ENTMCNC: 32.8 G/DL (ref 31–37)
MCHC RBC AUTO-ENTMCNC: 32.9 G/DL (ref 31–37)
MCHC RBC AUTO-ENTMCNC: 33.5 G/DL (ref 31–37)
MCHC RBC AUTO-ENTMCNC: 33.6 G/DL (ref 31–37)
MCV RBC AUTO: 93.6 FL (ref 78–100)
MCV RBC AUTO: 95.5 FL (ref 78–100)
MCV RBC AUTO: 95.7 FL (ref 78–100)
MCV RBC AUTO: 95.7 FL (ref 78–100)
MCV RBC AUTO: 95.9 FL (ref 78–100)
MCV RBC AUTO: 96 FL (ref 78–100)
MCV RBC AUTO: 96.2 FL (ref 78–100)
MCV RBC AUTO: 97 FL (ref 78–100)
MONOCYTES # BLD: 0.3 K/UL (ref 0.05–1.2)
MONOCYTES # BLD: 0.5 K/UL (ref 0.05–1.2)
MONOCYTES # BLD: 0.7 K/UL (ref 0.05–1.2)
MONOCYTES # BLD: 1 K/UL (ref 0.05–1.2)
MONOCYTES # BLD: 1.2 K/UL (ref 0.05–1.2)
MONOCYTES # BLD: 1.3 K/UL (ref 0.05–1.2)
MONOCYTES # BLD: 2.3 K/UL (ref 0.05–1.2)
MONOCYTES NFR BLD: 1 % (ref 3–10)
MONOCYTES NFR BLD: 2 % (ref 3–10)
MONOCYTES NFR BLD: 2 % (ref 3–10)
MONOCYTES NFR BLD: 4 % (ref 3–10)
MONOCYTES NFR BLD: 5 % (ref 3–10)
MONOCYTES NFR BLD: 5 % (ref 3–10)
MONOCYTES NFR BLD: 9 % (ref 3–10)
NEUTS SEG # BLD: 18.3 K/UL (ref 1.8–8)
NEUTS SEG # BLD: 20.7 K/UL (ref 1.8–8)
NEUTS SEG # BLD: 21 K/UL (ref 1.8–8)
NEUTS SEG # BLD: 21.1 K/UL (ref 1.8–8)
NEUTS SEG # BLD: 21.5 K/UL (ref 1.8–8)
NEUTS SEG # BLD: 22.2 K/UL (ref 1.8–8)
NEUTS SEG # BLD: 23.2 K/UL (ref 1.8–8)
NEUTS SEG # BLD: 26.3 K/UL (ref 1.8–8)
NEUTS SEG # BLD: 34.2 K/UL (ref 1.8–8)
NEUTS SEG NFR BLD: 77 % (ref 40–73)
NEUTS SEG NFR BLD: 83 % (ref 40–73)
NEUTS SEG NFR BLD: 85 % (ref 40–73)
NEUTS SEG NFR BLD: 88 % (ref 40–73)
NEUTS SEG NFR BLD: 89 % (ref 40–73)
NEUTS SEG NFR BLD: 90 % (ref 40–73)
NEUTS SEG NFR BLD: 92 % (ref 40–73)
NRBC # BLD: 0 K/UL (ref 0–0.01)
NRBC BLD-RTO: 0 PER 100 WBC
P-R INTERVAL, ECG05: 164 MS
P-R INTERVAL, ECG05: 176 MS
PHOSPHATE SERPL-MCNC: 3.1 MG/DL (ref 2.5–4.9)
PHOSPHATE SERPL-MCNC: 3.2 MG/DL (ref 2.5–4.9)
PHOSPHATE SERPL-MCNC: 3.5 MG/DL (ref 2.5–4.9)
PHOSPHATE SERPL-MCNC: 4.5 MG/DL (ref 2.5–4.9)
PHOSPHATE SERPL-MCNC: 5 MG/DL (ref 2.5–4.9)
PHOSPHATE SERPL-MCNC: 5 MG/DL (ref 2.5–4.9)
PLATELET # BLD AUTO: 244 K/UL (ref 135–420)
PLATELET # BLD AUTO: 266 K/UL (ref 135–420)
PLATELET # BLD AUTO: 293 K/UL (ref 135–420)
PLATELET # BLD AUTO: 295 K/UL (ref 135–420)
PLATELET # BLD AUTO: 300 K/UL (ref 135–420)
PLATELET # BLD AUTO: 309 K/UL (ref 135–420)
PLATELET # BLD AUTO: 311 K/UL (ref 135–420)
PLATELET # BLD AUTO: 321 K/UL (ref 135–420)
PLATELET # BLD AUTO: 329 K/UL (ref 135–420)
PLATELET # BLD AUTO: 335 K/UL (ref 135–420)
PLATELET COMMENTS,PCOM: ABNORMAL
PMV BLD AUTO: 10.1 FL (ref 9.2–11.8)
PMV BLD AUTO: 10.3 FL (ref 9.2–11.8)
PMV BLD AUTO: 10.4 FL (ref 9.2–11.8)
PMV BLD AUTO: 10.4 FL (ref 9.2–11.8)
PMV BLD AUTO: 10.5 FL (ref 9.2–11.8)
PMV BLD AUTO: 10.6 FL (ref 9.2–11.8)
PMV BLD AUTO: 10.6 FL (ref 9.2–11.8)
PMV BLD AUTO: 11.1 FL (ref 9.2–11.8)
POTASSIUM SERPL-SCNC: 3.3 MMOL/L (ref 3.5–5.5)
POTASSIUM SERPL-SCNC: 3.4 MMOL/L (ref 3.5–5.5)
POTASSIUM SERPL-SCNC: 3.6 MMOL/L (ref 3.5–5.5)
POTASSIUM SERPL-SCNC: 3.7 MMOL/L (ref 3.5–5.5)
POTASSIUM SERPL-SCNC: 3.7 MMOL/L (ref 3.5–5.5)
POTASSIUM SERPL-SCNC: 3.8 MMOL/L (ref 3.5–5.5)
POTASSIUM SERPL-SCNC: 3.9 MMOL/L (ref 3.5–5.5)
POTASSIUM SERPL-SCNC: 4 MMOL/L (ref 3.5–5.5)
POTASSIUM SERPL-SCNC: 4.1 MMOL/L (ref 3.5–5.5)
POTASSIUM SERPL-SCNC: 4.2 MMOL/L (ref 3.5–5.5)
POTASSIUM SERPL-SCNC: 4.7 MMOL/L (ref 3.5–5.5)
PROCALCITONIN SERPL-MCNC: 1.76 NG/ML
PROT SERPL-MCNC: 6.4 G/DL (ref 6.4–8.2)
PROT SERPL-MCNC: 7.7 G/DL (ref 6.4–8.2)
PROTHROMBIN TIME: 11.8 SEC (ref 11.5–15.2)
PROTHROMBIN TIME: 12.2 SEC (ref 11.5–15.2)
PTH-INTACT SERPL-MCNC: 197.1 PG/ML (ref 18.4–88)
Q-T INTERVAL, ECG07: 366 MS
Q-T INTERVAL, ECG07: 402 MS
QRS DURATION, ECG06: 124 MS
QRS DURATION, ECG06: 96 MS
QTC CALCULATION (BEZET), ECG08: 483 MS
QTC CALCULATION (BEZET), ECG08: 505 MS
RBC # BLD AUTO: 3.29 M/UL (ref 4.35–5.65)
RBC # BLD AUTO: 3.29 M/UL (ref 4.35–5.65)
RBC # BLD AUTO: 3.41 M/UL (ref 4.35–5.65)
RBC # BLD AUTO: 3.43 M/UL (ref 4.35–5.65)
RBC # BLD AUTO: 3.65 M/UL (ref 4.35–5.65)
RBC # BLD AUTO: 3.68 M/UL (ref 4.35–5.65)
RBC # BLD AUTO: 3.75 M/UL (ref 4.35–5.65)
RBC # BLD AUTO: 3.96 M/UL (ref 4.35–5.65)
RBC # BLD AUTO: 4.12 M/UL (ref 4.35–5.65)
RBC # BLD AUTO: 4.55 M/UL (ref 4.35–5.65)
RBC MORPH BLD: ABNORMAL
SERVICE CMNT-IMP: ABNORMAL
SERVICE CMNT-IMP: NORMAL
SERVICE CMNT-IMP: NORMAL
SODIUM SERPL-SCNC: 130 MMOL/L (ref 136–145)
SODIUM SERPL-SCNC: 132 MMOL/L (ref 136–145)
SODIUM SERPL-SCNC: 133 MMOL/L (ref 136–145)
SODIUM SERPL-SCNC: 133 MMOL/L (ref 136–145)
SODIUM SERPL-SCNC: 134 MMOL/L (ref 136–145)
SODIUM SERPL-SCNC: 135 MMOL/L (ref 136–145)
SODIUM SERPL-SCNC: 136 MMOL/L (ref 136–145)
SODIUM SERPL-SCNC: 138 MMOL/L (ref 136–145)
SOURCE, COVRS: NORMAL
SPECIMEN EXP DATE BLD: NORMAL
TROPONIN I SERPL-MCNC: <0.02 NG/ML (ref 0–0.04)
TROPONIN I SERPL-MCNC: <0.02 NG/ML (ref 0–0.04)
URATE SERPL-MCNC: 3.2 MG/DL (ref 2.6–7.2)
URATE SERPL-MCNC: 3.9 MG/DL (ref 2.6–7.2)
VANCOMYCIN SERPL-MCNC: 15.9 UG/ML (ref 5–40)
VANCOMYCIN SERPL-MCNC: 18.6 UG/ML (ref 5–40)
VENTRICULAR RATE, ECG03: 105 BPM
VENTRICULAR RATE, ECG03: 95 BPM
WBC # BLD AUTO: 16.7 K/UL (ref 4.6–13.2)
WBC # BLD AUTO: 23.3 K/UL (ref 4.6–13.2)
WBC # BLD AUTO: 23.6 K/UL (ref 4.6–13.2)
WBC # BLD AUTO: 23.8 K/UL (ref 4.6–13.2)
WBC # BLD AUTO: 25.1 K/UL (ref 4.6–13.2)
WBC # BLD AUTO: 25.3 K/UL (ref 4.6–13.2)
WBC # BLD AUTO: 25.5 K/UL (ref 4.6–13.2)
WBC # BLD AUTO: 26 K/UL (ref 4.6–13.2)
WBC # BLD AUTO: 29.6 K/UL (ref 4.6–13.2)
WBC # BLD AUTO: 37.1 K/UL (ref 4.6–13.2)

## 2021-01-01 PROCEDURE — 99239 HOSP IP/OBS DSCHRG MGMT >30: CPT | Performed by: INTERNAL MEDICINE

## 2021-01-01 PROCEDURE — 400018 HH-NO PAY CLAIM PROCEDURE

## 2021-01-01 PROCEDURE — 74011000250 HC RX REV CODE- 250: Performed by: NURSE ANESTHETIST, CERTIFIED REGISTERED

## 2021-01-01 PROCEDURE — 76210000021 HC REC RM PH II 0.5 TO 1 HR: Performed by: SURGERY

## 2021-01-01 PROCEDURE — 74011000250 HC RX REV CODE- 250: Performed by: INTERNAL MEDICINE

## 2021-01-01 PROCEDURE — 84550 ASSAY OF BLOOD/URIC ACID: CPT

## 2021-01-01 PROCEDURE — 74011636637 HC RX REV CODE- 636/637: Performed by: STUDENT IN AN ORGANIZED HEALTH CARE EDUCATION/TRAINING PROGRAM

## 2021-01-01 PROCEDURE — 80053 COMPREHEN METABOLIC PANEL: CPT

## 2021-01-01 PROCEDURE — 74011250637 HC RX REV CODE- 250/637: Performed by: STUDENT IN AN ORGANIZED HEALTH CARE EDUCATION/TRAINING PROGRAM

## 2021-01-01 PROCEDURE — 3331090001 HH PPS REVENUE CREDIT

## 2021-01-01 PROCEDURE — 83970 ASSAY OF PARATHORMONE: CPT

## 2021-01-01 PROCEDURE — 83605 ASSAY OF LACTIC ACID: CPT

## 2021-01-01 PROCEDURE — 3331090002 HH PPS REVENUE DEBIT

## 2021-01-01 PROCEDURE — 99233 SBSQ HOSP IP/OBS HIGH 50: CPT | Performed by: INTERNAL MEDICINE

## 2021-01-01 PROCEDURE — 84145 PROCALCITONIN (PCT): CPT

## 2021-01-01 PROCEDURE — 82962 GLUCOSE BLOOD TEST: CPT

## 2021-01-01 PROCEDURE — 76010000112 HC CV SURG 0.5 TO 1 HR: Performed by: SURGERY

## 2021-01-01 PROCEDURE — 82553 CREATINE MB FRACTION: CPT

## 2021-01-01 PROCEDURE — 74011250636 HC RX REV CODE- 250/636: Performed by: INTERNAL MEDICINE

## 2021-01-01 PROCEDURE — 80048 BASIC METABOLIC PNL TOTAL CA: CPT

## 2021-01-01 PROCEDURE — 71045 X-RAY EXAM CHEST 1 VIEW: CPT

## 2021-01-01 PROCEDURE — 74011250637 HC RX REV CODE- 250/637: Performed by: FAMILY MEDICINE

## 2021-01-01 PROCEDURE — 94760 N-INVAS EAR/PLS OXIMETRY 1: CPT

## 2021-01-01 PROCEDURE — 01844 ANES VASC SHUNT/SHUNT REVJ: CPT | Performed by: ANESTHESIOLOGY

## 2021-01-01 PROCEDURE — 5A1D70Z PERFORMANCE OF URINARY FILTRATION, INTERMITTENT, LESS THAN 6 HOURS PER DAY: ICD-10-PCS | Performed by: INTERNAL MEDICINE

## 2021-01-01 PROCEDURE — C1757 CATH, THROMBECTOMY/EMBOLECT: HCPCS | Performed by: SURGERY

## 2021-01-01 PROCEDURE — 74011250637 HC RX REV CODE- 250/637: Performed by: INTERNAL MEDICINE

## 2021-01-01 PROCEDURE — 36415 COLL VENOUS BLD VENIPUNCTURE: CPT

## 2021-01-01 PROCEDURE — 00532 ANES ACCESS CTR VENOUS CRCJ: CPT | Performed by: NURSE ANESTHETIST, CERTIFIED REGISTERED

## 2021-01-01 PROCEDURE — 85025 COMPLETE CBC W/AUTO DIFF WBC: CPT

## 2021-01-01 PROCEDURE — 74011000250 HC RX REV CODE- 250: Performed by: STUDENT IN AN ORGANIZED HEALTH CARE EDUCATION/TRAINING PROGRAM

## 2021-01-01 PROCEDURE — 92610 EVALUATE SWALLOWING FUNCTION: CPT

## 2021-01-01 PROCEDURE — 74011250636 HC RX REV CODE- 250/636: Performed by: NURSE ANESTHETIST, CERTIFIED REGISTERED

## 2021-01-01 PROCEDURE — C1725 CATH, TRANSLUMIN NON-LASER: HCPCS | Performed by: SURGERY

## 2021-01-01 PROCEDURE — 97116 GAIT TRAINING THERAPY: CPT

## 2021-01-01 PROCEDURE — 77030031139 HC SUT VCRL2 J&J -A: Performed by: SURGERY

## 2021-01-01 PROCEDURE — 77030040830 HC CATH URETH FOL MDII -A

## 2021-01-01 PROCEDURE — 74011250636 HC RX REV CODE- 250/636: Performed by: STUDENT IN AN ORGANIZED HEALTH CARE EDUCATION/TRAINING PROGRAM

## 2021-01-01 PROCEDURE — 87040 BLOOD CULTURE FOR BACTERIA: CPT

## 2021-01-01 PROCEDURE — 77030002933 HC SUT MCRYL J&J -A: Performed by: SURGERY

## 2021-01-01 PROCEDURE — 74011250636 HC RX REV CODE- 250/636: Performed by: ANESTHESIOLOGY

## 2021-01-01 PROCEDURE — 2709999900 HC NON-CHARGEABLE SUPPLY: Performed by: SURGERY

## 2021-01-01 PROCEDURE — 84100 ASSAY OF PHOSPHORUS: CPT

## 2021-01-01 PROCEDURE — 2709999900 HC NON-CHARGEABLE SUPPLY

## 2021-01-01 PROCEDURE — 83735 ASSAY OF MAGNESIUM: CPT

## 2021-01-01 PROCEDURE — 86901 BLOOD TYPING SEROLOGIC RH(D): CPT

## 2021-01-01 PROCEDURE — 74011250637 HC RX REV CODE- 250/637: Performed by: NURSE ANESTHETIST, CERTIFIED REGISTERED

## 2021-01-01 PROCEDURE — 70450 CT HEAD/BRAIN W/O DYE: CPT

## 2021-01-01 PROCEDURE — G0151 HHCP-SERV OF PT,EA 15 MIN: HCPCS

## 2021-01-01 PROCEDURE — 86706 HEP B SURFACE ANTIBODY: CPT

## 2021-01-01 PROCEDURE — 65660000000 HC RM CCU STEPDOWN

## 2021-01-01 PROCEDURE — 97530 THERAPEUTIC ACTIVITIES: CPT

## 2021-01-01 PROCEDURE — 74011250636 HC RX REV CODE- 250/636: Performed by: SURGERY

## 2021-01-01 PROCEDURE — 99232 SBSQ HOSP IP/OBS MODERATE 35: CPT | Performed by: INTERNAL MEDICINE

## 2021-01-01 PROCEDURE — 74011250636 HC RX REV CODE- 250/636: Performed by: FAMILY MEDICINE

## 2021-01-01 PROCEDURE — 77030010507 HC ADH SKN DERMBND J&J -B: Performed by: SURGERY

## 2021-01-01 PROCEDURE — 01844 ANES VASC SHUNT/SHUNT REVJ: CPT | Performed by: NURSE ANESTHETIST, CERTIFIED REGISTERED

## 2021-01-01 PROCEDURE — 77030003390 HC NDL ANGI MRTM -A: Performed by: SURGERY

## 2021-01-01 PROCEDURE — C1769 GUIDE WIRE: HCPCS | Performed by: SURGERY

## 2021-01-01 PROCEDURE — 90935 HEMODIALYSIS ONE EVALUATION: CPT

## 2021-01-01 PROCEDURE — C9113 INJ PANTOPRAZOLE SODIUM, VIA: HCPCS | Performed by: STUDENT IN AN ORGANIZED HEALTH CARE EDUCATION/TRAINING PROGRAM

## 2021-01-01 PROCEDURE — 99100 ANES PT EXTEME AGE<1 YR&>70: CPT | Performed by: NURSE ANESTHETIST, CERTIFIED REGISTERED

## 2021-01-01 PROCEDURE — 77030003666 HC NDL SPINAL BD -A: Performed by: NURSE ANESTHETIST, CERTIFIED REGISTERED

## 2021-01-01 PROCEDURE — 74011000250 HC RX REV CODE- 250: Performed by: FAMILY MEDICINE

## 2021-01-01 PROCEDURE — 77030040392 HC DRSG OPTIFOAM MDII -A

## 2021-01-01 PROCEDURE — 74011000258 HC RX REV CODE- 258: Performed by: INTERNAL MEDICINE

## 2021-01-01 PROCEDURE — G0157 HHC PT ASSISTANT EA 15: HCPCS

## 2021-01-01 PROCEDURE — 77030040922 HC BLNKT HYPOTHRM STRY -A: Performed by: SURGERY

## 2021-01-01 PROCEDURE — 80202 ASSAY OF VANCOMYCIN: CPT

## 2021-01-01 PROCEDURE — 77030002986 HC SUT PROL J&J -A: Performed by: SURGERY

## 2021-01-01 PROCEDURE — 96374 THER/PROPH/DIAG INJ IV PUSH: CPT

## 2021-01-01 PROCEDURE — 74018 RADEX ABDOMEN 1 VIEW: CPT

## 2021-01-01 PROCEDURE — 77030012865 HC BG URIN LEG MDII -A

## 2021-01-01 PROCEDURE — 85027 COMPLETE CBC AUTOMATED: CPT

## 2021-01-01 PROCEDURE — 00532 ANES ACCESS CTR VENOUS CRCJ: CPT | Performed by: ANESTHESIOLOGY

## 2021-01-01 PROCEDURE — 94761 N-INVAS EAR/PLS OXIMETRY MLT: CPT

## 2021-01-01 PROCEDURE — 85014 HEMATOCRIT: CPT

## 2021-01-01 PROCEDURE — 97161 PT EVAL LOW COMPLEX 20 MIN: CPT

## 2021-01-01 PROCEDURE — 87635 SARS-COV-2 COVID-19 AMP PRB: CPT

## 2021-01-01 PROCEDURE — 76060000032 HC ANESTHESIA 0.5 TO 1 HR: Performed by: SURGERY

## 2021-01-01 PROCEDURE — 85018 HEMOGLOBIN: CPT

## 2021-01-01 PROCEDURE — 74011000250 HC RX REV CODE- 250: Performed by: SURGERY

## 2021-01-01 PROCEDURE — 99223 1ST HOSP IP/OBS HIGH 75: CPT | Performed by: STUDENT IN AN ORGANIZED HEALTH CARE EDUCATION/TRAINING PROGRAM

## 2021-01-01 PROCEDURE — 37248 TRLUML BALO ANGIOP 1ST VEIN: CPT

## 2021-01-01 PROCEDURE — 99232 SBSQ HOSP IP/OBS MODERATE 35: CPT | Performed by: FAMILY MEDICINE

## 2021-01-01 PROCEDURE — 64415 NJX AA&/STRD BRCH PLXS IMG: CPT | Performed by: ANESTHESIOLOGY

## 2021-01-01 PROCEDURE — 400013 HH SOC

## 2021-01-01 PROCEDURE — 97165 OT EVAL LOW COMPLEX 30 MIN: CPT

## 2021-01-01 PROCEDURE — 77030041076 HC DRSG AG OPTICELL MDII -A

## 2021-01-01 PROCEDURE — 99233 SBSQ HOSP IP/OBS HIGH 50: CPT | Performed by: FAMILY MEDICINE

## 2021-01-01 PROCEDURE — 99100 ANES PT EXTEME AGE<1 YR&>70: CPT | Performed by: ANESTHESIOLOGY

## 2021-01-01 PROCEDURE — 85610 PROTHROMBIN TIME: CPT

## 2021-01-01 PROCEDURE — 93005 ELECTROCARDIOGRAM TRACING: CPT

## 2021-01-01 PROCEDURE — 76942 ECHO GUIDE FOR BIOPSY: CPT | Performed by: ANESTHESIOLOGY

## 2021-01-01 PROCEDURE — 77030014023 HC SYR INFL LVEEN BSC -B: Performed by: SURGERY

## 2021-01-01 PROCEDURE — 87340 HEPATITIS B SURFACE AG IA: CPT

## 2021-01-01 PROCEDURE — 74176 CT ABD & PELVIS W/O CONTRAST: CPT

## 2021-01-01 PROCEDURE — 76210000006 HC OR PH I REC 0.5 TO 1 HR: Performed by: SURGERY

## 2021-01-01 PROCEDURE — 97535 SELF CARE MNGMENT TRAINING: CPT

## 2021-01-01 PROCEDURE — 76210000063 HC OR PH I REC FIRST 0.5 HR: Performed by: SURGERY

## 2021-01-01 PROCEDURE — 97164 PT RE-EVAL EST PLAN CARE: CPT

## 2021-01-01 PROCEDURE — 99285 EMERGENCY DEPT VISIT HI MDM: CPT

## 2021-01-01 PROCEDURE — C1894 INTRO/SHEATH, NON-LASER: HCPCS | Performed by: SURGERY

## 2021-01-01 RX ORDER — HYDROCODONE BITARTRATE AND ACETAMINOPHEN 5; 325 MG/1; MG/1
1 TABLET ORAL
Qty: 40 TABLET | Refills: 0 | Status: SHIPPED | OUTPATIENT
Start: 2021-01-01 | End: 2022-01-01

## 2021-01-01 RX ORDER — FACIAL-BODY WIPES
10 EACH TOPICAL
Status: COMPLETED | OUTPATIENT
Start: 2021-01-01 | End: 2021-01-01

## 2021-01-01 RX ORDER — HEPARIN SODIUM 1000 [USP'U]/ML
INJECTION, SOLUTION INTRAVENOUS; SUBCUTANEOUS
Status: DISPENSED
Start: 2021-01-01 | End: 2021-01-01

## 2021-01-01 RX ORDER — FAMOTIDINE 20 MG/1
20 TABLET, FILM COATED ORAL ONCE
Status: COMPLETED | OUTPATIENT
Start: 2021-01-01 | End: 2021-01-01

## 2021-01-01 RX ORDER — PROPOFOL 10 MG/ML
VIAL (ML) INTRAVENOUS
Status: DISCONTINUED | OUTPATIENT
Start: 2021-01-01 | End: 2021-01-01 | Stop reason: HOSPADM

## 2021-01-01 RX ORDER — HEPARIN SODIUM 5000 [USP'U]/ML
5000 INJECTION, SOLUTION INTRAVENOUS; SUBCUTANEOUS EVERY 8 HOURS
Status: DISCONTINUED | OUTPATIENT
Start: 2021-01-01 | End: 2021-01-01

## 2021-01-01 RX ORDER — ONDANSETRON 2 MG/ML
4 INJECTION INTRAMUSCULAR; INTRAVENOUS
Status: DISCONTINUED | OUTPATIENT
Start: 2021-01-01 | End: 2021-01-01 | Stop reason: HOSPADM

## 2021-01-01 RX ORDER — LIDOCAINE 50 MG/G
OINTMENT TOPICAL DAILY PRN
Status: DISCONTINUED | OUTPATIENT
Start: 2021-01-01 | End: 2021-01-01 | Stop reason: HOSPADM

## 2021-01-01 RX ORDER — ACETAMINOPHEN 325 MG/1
650 TABLET ORAL
Status: DISCONTINUED | OUTPATIENT
Start: 2021-01-01 | End: 2021-01-01 | Stop reason: HOSPADM

## 2021-01-01 RX ORDER — PROPOFOL 10 MG/ML
INJECTION, EMULSION INTRAVENOUS AS NEEDED
Status: DISCONTINUED | OUTPATIENT
Start: 2021-01-01 | End: 2021-01-01 | Stop reason: HOSPADM

## 2021-01-01 RX ORDER — AMOXICILLIN AND CLAVULANATE POTASSIUM 500; 125 MG/1; MG/1
1 TABLET, FILM COATED ORAL
Status: DISCONTINUED | OUTPATIENT
Start: 2021-01-01 | End: 2021-01-01 | Stop reason: HOSPADM

## 2021-01-01 RX ORDER — FENTANYL CITRATE 50 UG/ML
INJECTION, SOLUTION INTRAMUSCULAR; INTRAVENOUS AS NEEDED
Status: DISCONTINUED | OUTPATIENT
Start: 2021-01-01 | End: 2021-01-01 | Stop reason: HOSPADM

## 2021-01-01 RX ORDER — LIDOCAINE HYDROCHLORIDE 10 MG/ML
INJECTION, SOLUTION EPIDURAL; INFILTRATION; INTRACAUDAL; PERINEURAL AS NEEDED
Status: DISCONTINUED | OUTPATIENT
Start: 2021-01-01 | End: 2021-01-01 | Stop reason: HOSPADM

## 2021-01-01 RX ORDER — POLYETHYLENE GLYCOL 3350 17 G/17G
17 POWDER, FOR SOLUTION ORAL DAILY
Status: DISCONTINUED | OUTPATIENT
Start: 2021-01-01 | End: 2021-01-01

## 2021-01-01 RX ORDER — ROPIVACAINE HYDROCHLORIDE 5 MG/ML
INJECTION, SOLUTION EPIDURAL; INFILTRATION; PERINEURAL
Status: COMPLETED
Start: 2021-01-01 | End: 2021-01-01

## 2021-01-01 RX ORDER — PANTOPRAZOLE SODIUM 40 MG/1
40 TABLET, DELAYED RELEASE ORAL
Status: DISCONTINUED | OUTPATIENT
Start: 2021-01-01 | End: 2021-01-01 | Stop reason: HOSPADM

## 2021-01-01 RX ORDER — SODIUM CHLORIDE 9 MG/ML
1000 INJECTION, SOLUTION INTRAVENOUS CONTINUOUS
Status: DISCONTINUED | OUTPATIENT
Start: 2021-01-01 | End: 2021-01-01 | Stop reason: HOSPADM

## 2021-01-01 RX ORDER — INSULIN LISPRO 100 [IU]/ML
INJECTION, SOLUTION INTRAVENOUS; SUBCUTANEOUS ONCE
Status: DISCONTINUED | OUTPATIENT
Start: 2021-01-01 | End: 2021-01-01 | Stop reason: HOSPADM

## 2021-01-01 RX ORDER — LIDOCAINE HYDROCHLORIDE 10 MG/ML
0.1 INJECTION, SOLUTION EPIDURAL; INFILTRATION; INTRACAUDAL; PERINEURAL AS NEEDED
Status: DISCONTINUED | OUTPATIENT
Start: 2021-01-01 | End: 2021-01-01 | Stop reason: HOSPADM

## 2021-01-01 RX ORDER — MIDAZOLAM HYDROCHLORIDE 1 MG/ML
INJECTION, SOLUTION INTRAMUSCULAR; INTRAVENOUS
Status: COMPLETED
Start: 2021-01-01 | End: 2021-01-01

## 2021-01-01 RX ORDER — AMLODIPINE BESYLATE 10 MG/1
10 TABLET ORAL DAILY
Status: DISCONTINUED | OUTPATIENT
Start: 2021-01-01 | End: 2021-01-01 | Stop reason: HOSPADM

## 2021-01-01 RX ORDER — ONDANSETRON 2 MG/ML
4 INJECTION INTRAMUSCULAR; INTRAVENOUS ONCE
Status: DISCONTINUED | OUTPATIENT
Start: 2021-01-01 | End: 2021-01-01 | Stop reason: HOSPADM

## 2021-01-01 RX ORDER — POLYETHYLENE GLYCOL 3350 17 G/17G
17 POWDER, FOR SOLUTION ORAL
Status: DISCONTINUED | OUTPATIENT
Start: 2021-01-01 | End: 2021-01-01 | Stop reason: HOSPADM

## 2021-01-01 RX ORDER — SODIUM CHLORIDE 0.9 % (FLUSH) 0.9 %
5-40 SYRINGE (ML) INJECTION EVERY 8 HOURS
Status: DISCONTINUED | OUTPATIENT
Start: 2021-01-01 | End: 2021-01-01 | Stop reason: HOSPADM

## 2021-01-01 RX ORDER — LEVOFLOXACIN 500 MG/1
500 TABLET, FILM COATED ORAL
Qty: 2 TABLET | Refills: 0 | Status: SHIPPED | OUTPATIENT
Start: 2021-01-01 | End: 2021-01-01

## 2021-01-01 RX ORDER — HEPARIN SODIUM 200 [USP'U]/100ML
INJECTION, SOLUTION INTRAVENOUS
Status: DISCONTINUED
Start: 2021-01-01 | End: 2021-01-01 | Stop reason: HOSPADM

## 2021-01-01 RX ORDER — ACETAMINOPHEN 650 MG/1
650 SUPPOSITORY RECTAL
Status: DISCONTINUED | OUTPATIENT
Start: 2021-01-01 | End: 2021-01-01 | Stop reason: HOSPADM

## 2021-01-01 RX ORDER — HEPARIN SODIUM 200 [USP'U]/100ML
INJECTION, SOLUTION INTRAVENOUS
Status: COMPLETED | OUTPATIENT
Start: 2021-01-01 | End: 2021-01-01

## 2021-01-01 RX ORDER — CHOLECALCIFEROL (VITAMIN D3) 125 MCG
5 CAPSULE ORAL
Status: DISCONTINUED | OUTPATIENT
Start: 2021-01-01 | End: 2021-01-01 | Stop reason: HOSPADM

## 2021-01-01 RX ORDER — VANCOMYCIN 2 GRAM/500 ML IN 0.9 % SODIUM CHLORIDE INTRAVENOUS
2000
Status: COMPLETED | OUTPATIENT
Start: 2021-01-01 | End: 2021-01-01

## 2021-01-01 RX ORDER — MIDAZOLAM HYDROCHLORIDE 1 MG/ML
INJECTION, SOLUTION INTRAMUSCULAR; INTRAVENOUS
Status: SHIPPED | OUTPATIENT
Start: 2021-01-01 | End: 2021-01-01

## 2021-01-01 RX ORDER — FENTANYL CITRATE 50 UG/ML
50 INJECTION, SOLUTION INTRAMUSCULAR; INTRAVENOUS AS NEEDED
Status: DISCONTINUED | OUTPATIENT
Start: 2021-01-01 | End: 2021-01-01 | Stop reason: HOSPADM

## 2021-01-01 RX ORDER — IPRATROPIUM BROMIDE AND ALBUTEROL SULFATE 2.5; .5 MG/3ML; MG/3ML
3 SOLUTION RESPIRATORY (INHALATION)
Status: DISCONTINUED | OUTPATIENT
Start: 2021-01-01 | End: 2021-01-01 | Stop reason: HOSPADM

## 2021-01-01 RX ORDER — SODIUM CHLORIDE 0.9 % (FLUSH) 0.9 %
5-40 SYRINGE (ML) INJECTION AS NEEDED
Status: DISCONTINUED | OUTPATIENT
Start: 2021-01-01 | End: 2021-01-01 | Stop reason: HOSPADM

## 2021-01-01 RX ORDER — LEVOFLOXACIN 500 MG/1
500 TABLET, FILM COATED ORAL
Status: DISCONTINUED | OUTPATIENT
Start: 2021-01-01 | End: 2021-01-01 | Stop reason: HOSPADM

## 2021-01-01 RX ORDER — CHOLECALCIFEROL (VITAMIN D3) 125 MCG
5 CAPSULE ORAL
Status: DISCONTINUED | OUTPATIENT
Start: 2021-01-01 | End: 2021-01-01

## 2021-01-01 RX ORDER — SODIUM CHLORIDE 9 MG/ML
25 INJECTION, SOLUTION INTRAVENOUS CONTINUOUS
Status: DISCONTINUED | OUTPATIENT
Start: 2021-01-01 | End: 2021-01-01 | Stop reason: HOSPADM

## 2021-01-01 RX ORDER — ABIRATERONE ACETATE 250 MG/1
1000 TABLET ORAL DAILY
Status: DISCONTINUED | OUTPATIENT
Start: 2021-01-01 | End: 2021-01-01 | Stop reason: HOSPADM

## 2021-01-01 RX ORDER — HEPARIN SODIUM 1000 [USP'U]/ML
INJECTION, SOLUTION INTRAVENOUS; SUBCUTANEOUS AS NEEDED
Status: DISCONTINUED | OUTPATIENT
Start: 2021-01-01 | End: 2021-01-01 | Stop reason: HOSPADM

## 2021-01-01 RX ORDER — ACETAMINOPHEN 500 MG
1000 TABLET ORAL
Status: COMPLETED | OUTPATIENT
Start: 2021-01-01 | End: 2021-01-01

## 2021-01-01 RX ORDER — AMOXICILLIN AND CLAVULANATE POTASSIUM 500; 125 MG/1; MG/1
1 TABLET, FILM COATED ORAL EVERY 12 HOURS
Qty: 6 TABLET | Refills: 0 | Status: SHIPPED | OUTPATIENT
Start: 2021-01-01 | End: 2021-01-01

## 2021-01-01 RX ORDER — DOXERCALCIFEROL 4 UG/2ML
0.5 INJECTION INTRAVENOUS
Status: DISCONTINUED | OUTPATIENT
Start: 2021-01-01 | End: 2021-01-01 | Stop reason: HOSPADM

## 2021-01-01 RX ORDER — ROPIVACAINE HYDROCHLORIDE 5 MG/ML
INJECTION, SOLUTION EPIDURAL; INFILTRATION; PERINEURAL
Status: SHIPPED | OUTPATIENT
Start: 2021-01-01 | End: 2021-01-01

## 2021-01-01 RX ORDER — CALCIUM ACETATE 667 MG/1
1 CAPSULE ORAL
Status: DISCONTINUED | OUTPATIENT
Start: 2021-01-01 | End: 2021-01-01 | Stop reason: HOSPADM

## 2021-01-01 RX ORDER — AMOXICILLIN AND CLAVULANATE POTASSIUM 500; 125 MG/1; MG/1
1 TABLET, FILM COATED ORAL EVERY 24 HOURS
Status: DISCONTINUED | OUTPATIENT
Start: 2021-01-01 | End: 2021-01-01 | Stop reason: HOSPADM

## 2021-01-01 RX ORDER — LIDOCAINE HYDROCHLORIDE 20 MG/ML
INJECTION, SOLUTION EPIDURAL; INFILTRATION; INTRACAUDAL; PERINEURAL AS NEEDED
Status: DISCONTINUED | OUTPATIENT
Start: 2021-01-01 | End: 2021-01-01 | Stop reason: HOSPADM

## 2021-01-01 RX ORDER — IODIXANOL 320 MG/ML
INJECTION, SOLUTION INTRAVASCULAR
Status: DISCONTINUED
Start: 2021-01-01 | End: 2021-01-01 | Stop reason: HOSPADM

## 2021-01-01 RX ORDER — LIDOCAINE HYDROCHLORIDE 10 MG/ML
INJECTION, SOLUTION EPIDURAL; INFILTRATION; INTRACAUDAL; PERINEURAL
Status: DISCONTINUED
Start: 2021-01-01 | End: 2021-01-01 | Stop reason: HOSPADM

## 2021-01-01 RX ORDER — PREDNISONE 5 MG/1
5 TABLET ORAL 2 TIMES DAILY
Status: DISCONTINUED | OUTPATIENT
Start: 2021-01-01 | End: 2021-01-01 | Stop reason: HOSPADM

## 2021-01-01 RX ORDER — DOXERCALCIFEROL 4 UG/2ML
0.5 INJECTION INTRAVENOUS
Status: DISCONTINUED | OUTPATIENT
Start: 2021-01-01 | End: 2021-01-01

## 2021-01-01 RX ORDER — PANTOPRAZOLE SODIUM 40 MG/1
40 TABLET, DELAYED RELEASE ORAL
Qty: 60 TABLET | Refills: 0 | Status: SHIPPED | OUTPATIENT
Start: 2021-01-01 | End: 2022-01-01

## 2021-01-01 RX ADMIN — PREDNISONE 5 MG: 5 TABLET ORAL at 09:24

## 2021-01-01 RX ADMIN — CALCIUM ACETATE 667 MG: 667 CAPSULE ORAL at 19:06

## 2021-01-01 RX ADMIN — CALCIUM ACETATE 667 MG: 667 CAPSULE ORAL at 08:09

## 2021-01-01 RX ADMIN — HEPARIN SODIUM 5000 UNITS: 5000 INJECTION INTRAVENOUS; SUBCUTANEOUS at 20:56

## 2021-01-01 RX ADMIN — PREDNISONE 5 MG: 5 TABLET ORAL at 10:47

## 2021-01-01 RX ADMIN — PREDNISONE 5 MG: 5 TABLET ORAL at 08:45

## 2021-01-01 RX ADMIN — Medication 10 ML: at 04:30

## 2021-01-01 RX ADMIN — DOXERCALCIFEROL 0.5 MCG: 4 INJECTION, SOLUTION INTRAVENOUS at 10:09

## 2021-01-01 RX ADMIN — Medication 10 ML: at 05:13

## 2021-01-01 RX ADMIN — HEPARIN SODIUM 5000 UNITS: 5000 INJECTION INTRAVENOUS; SUBCUTANEOUS at 06:25

## 2021-01-01 RX ADMIN — HEPARIN SODIUM 5000 UNITS: 5000 INJECTION INTRAVENOUS; SUBCUTANEOUS at 22:07

## 2021-01-01 RX ADMIN — MIDAZOLAM HYDROCHLORIDE 1 MG: 2 INJECTION, SOLUTION INTRAMUSCULAR; INTRAVENOUS at 16:14

## 2021-01-01 RX ADMIN — ACETAMINOPHEN 650 MG: 325 TABLET ORAL at 10:55

## 2021-01-01 RX ADMIN — ROPIVACAINE HYDROCHLORIDE 20 ML: 5 INJECTION, SOLUTION EPIDURAL; INFILTRATION; PERINEURAL at 14:07

## 2021-01-01 RX ADMIN — Medication 5 MG: at 21:19

## 2021-01-01 RX ADMIN — VANCOMYCIN HYDROCHLORIDE 750 MG: 750 INJECTION, POWDER, LYOPHILIZED, FOR SOLUTION INTRAVENOUS at 19:30

## 2021-01-01 RX ADMIN — ABIRATERONE ACETATE 1000 MG: 250 TABLET ORAL at 10:49

## 2021-01-01 RX ADMIN — Medication 10 ML: at 22:08

## 2021-01-01 RX ADMIN — PANTOPRAZOLE 40 MG: 40 TABLET, DELAYED RELEASE ORAL at 07:30

## 2021-01-01 RX ADMIN — SODIUM CHLORIDE 80 MG: 9 INJECTION, SOLUTION INTRAMUSCULAR; INTRAVENOUS; SUBCUTANEOUS at 20:59

## 2021-01-01 RX ADMIN — ACETAMINOPHEN 650 MG: 325 TABLET ORAL at 09:31

## 2021-01-01 RX ADMIN — ACETAMINOPHEN 650 MG: 325 TABLET ORAL at 19:12

## 2021-01-01 RX ADMIN — VANCOMYCIN HYDROCHLORIDE 750 MG: 750 INJECTION, POWDER, LYOPHILIZED, FOR SOLUTION INTRAVENOUS at 11:23

## 2021-01-01 RX ADMIN — WATER 1 G: 1 INJECTION INTRAMUSCULAR; INTRAVENOUS; SUBCUTANEOUS at 20:56

## 2021-01-01 RX ADMIN — CALCIUM ACETATE 667 MG: 667 CAPSULE ORAL at 18:21

## 2021-01-01 RX ADMIN — PANTOPRAZOLE 40 MG: 40 TABLET, DELAYED RELEASE ORAL at 19:06

## 2021-01-01 RX ADMIN — HEPARIN SODIUM 5000 UNITS: 5000 INJECTION INTRAVENOUS; SUBCUTANEOUS at 14:54

## 2021-01-01 RX ADMIN — PREDNISONE 5 MG: 5 TABLET ORAL at 19:06

## 2021-01-01 RX ADMIN — HEPARIN SODIUM 5000 UNITS: 5000 INJECTION INTRAVENOUS; SUBCUTANEOUS at 04:14

## 2021-01-01 RX ADMIN — Medication 40 ML: at 16:39

## 2021-01-01 RX ADMIN — ABIRATERONE ACETATE 1000 MG: 250 TABLET ORAL at 09:00

## 2021-01-01 RX ADMIN — Medication 10 ML: at 14:53

## 2021-01-01 RX ADMIN — PROPOFOL 75 MCG/KG/MIN: 10 INJECTION, EMULSION INTRAVENOUS at 08:32

## 2021-01-01 RX ADMIN — NEPHROCAP 1 CAPSULE: 1 CAP ORAL at 08:45

## 2021-01-01 RX ADMIN — HEPARIN SODIUM 5000 UNITS: 5000 INJECTION INTRAVENOUS; SUBCUTANEOUS at 05:45

## 2021-01-01 RX ADMIN — Medication 5 MG: at 22:09

## 2021-01-01 RX ADMIN — PREDNISONE 5 MG: 5 TABLET ORAL at 18:30

## 2021-01-01 RX ADMIN — Medication 10 ML: at 22:44

## 2021-01-01 RX ADMIN — PIPERACILLIN AND TAZOBACTAM 2.25 G: 2; .25 INJECTION, POWDER, LYOPHILIZED, FOR SOLUTION INTRAVENOUS at 03:36

## 2021-01-01 RX ADMIN — Medication 10 ML: at 05:45

## 2021-01-01 RX ADMIN — Medication 10 ML: at 05:51

## 2021-01-01 RX ADMIN — HEPARIN SODIUM 5000 UNITS: 5000 INJECTION INTRAVENOUS; SUBCUTANEOUS at 22:42

## 2021-01-01 RX ADMIN — IPRATROPIUM BROMIDE AND ALBUTEROL SULFATE 3 ML: .5; 2.5 SOLUTION RESPIRATORY (INHALATION) at 15:02

## 2021-01-01 RX ADMIN — CALCIUM ACETATE 667 MG: 667 CAPSULE ORAL at 16:59

## 2021-01-01 RX ADMIN — FENTANYL CITRATE 25 MCG: 50 INJECTION, SOLUTION INTRAMUSCULAR; INTRAVENOUS at 08:37

## 2021-01-01 RX ADMIN — WATER 2 G: 1 INJECTION INTRAMUSCULAR; INTRAVENOUS; SUBCUTANEOUS at 08:41

## 2021-01-01 RX ADMIN — BISACODYL 10 MG: 10 SUPPOSITORY RECTAL at 14:51

## 2021-01-01 RX ADMIN — PIPERACILLIN AND TAZOBACTAM 2.25 G: 2; .25 INJECTION, POWDER, LYOPHILIZED, FOR SOLUTION INTRAVENOUS at 22:45

## 2021-01-01 RX ADMIN — PIPERACILLIN AND TAZOBACTAM 2.25 G: 2; .25 INJECTION, POWDER, LYOPHILIZED, FOR SOLUTION INTRAVENOUS at 21:37

## 2021-01-01 RX ADMIN — PREDNISONE 5 MG: 5 TABLET ORAL at 08:49

## 2021-01-01 RX ADMIN — ONDANSETRON 4 MG: 2 INJECTION INTRAMUSCULAR; INTRAVENOUS at 23:57

## 2021-01-01 RX ADMIN — CALCIUM ACETATE 667 MG: 667 CAPSULE ORAL at 08:46

## 2021-01-01 RX ADMIN — PREDNISONE 5 MG: 5 TABLET ORAL at 18:44

## 2021-01-01 RX ADMIN — WATER 1 G: 1 INJECTION INTRAMUSCULAR; INTRAVENOUS; SUBCUTANEOUS at 22:43

## 2021-01-01 RX ADMIN — PREDNISONE 5 MG: 5 TABLET ORAL at 19:07

## 2021-01-01 RX ADMIN — PIPERACILLIN AND TAZOBACTAM 2.25 G: 2; .25 INJECTION, POWDER, LYOPHILIZED, FOR SOLUTION INTRAVENOUS at 22:08

## 2021-01-01 RX ADMIN — FAMOTIDINE 20 MG: 20 TABLET ORAL at 07:53

## 2021-01-01 RX ADMIN — ACETAMINOPHEN 1000 MG: 500 TABLET ORAL at 04:04

## 2021-01-01 RX ADMIN — NEPHROCAP 1 CAPSULE: 1 CAP ORAL at 08:49

## 2021-01-01 RX ADMIN — Medication 10 ML: at 13:42

## 2021-01-01 RX ADMIN — Medication 10 ML: at 22:03

## 2021-01-01 RX ADMIN — Medication 10 ML: at 20:56

## 2021-01-01 RX ADMIN — CALCIUM ACETATE 667 MG: 667 CAPSULE ORAL at 08:48

## 2021-01-01 RX ADMIN — WATER 2 G: 1 INJECTION INTRAMUSCULAR; INTRAVENOUS; SUBCUTANEOUS at 15:55

## 2021-01-01 RX ADMIN — AMLODIPINE BESYLATE 10 MG: 10 TABLET ORAL at 08:45

## 2021-01-01 RX ADMIN — Medication 5 MG: at 22:57

## 2021-01-01 RX ADMIN — AMLODIPINE BESYLATE 10 MG: 10 TABLET ORAL at 13:42

## 2021-01-01 RX ADMIN — AMLODIPINE BESYLATE 10 MG: 10 TABLET ORAL at 10:48

## 2021-01-01 RX ADMIN — Medication 10 ML: at 06:14

## 2021-01-01 RX ADMIN — FAMOTIDINE 20 MG: 20 TABLET ORAL at 12:46

## 2021-01-01 RX ADMIN — Medication 5 MG: at 22:45

## 2021-01-01 RX ADMIN — PIPERACILLIN AND TAZOBACTAM 2.25 G: 2; .25 INJECTION, POWDER, LYOPHILIZED, FOR SOLUTION INTRAVENOUS at 05:12

## 2021-01-01 RX ADMIN — VANCOMYCIN HYDROCHLORIDE 2000 MG: 10 INJECTION, POWDER, LYOPHILIZED, FOR SOLUTION INTRAVENOUS at 04:33

## 2021-01-01 RX ADMIN — HEPARIN SODIUM 5000 UNITS: 5000 INJECTION INTRAVENOUS; SUBCUTANEOUS at 05:50

## 2021-01-01 RX ADMIN — HEPARIN SODIUM 5000 UNITS: 5000 INJECTION INTRAVENOUS; SUBCUTANEOUS at 13:35

## 2021-01-01 RX ADMIN — Medication 10 ML: at 05:43

## 2021-01-01 RX ADMIN — ONDANSETRON 4 MG: 2 INJECTION INTRAMUSCULAR; INTRAVENOUS at 03:31

## 2021-01-01 RX ADMIN — ACETAMINOPHEN 650 MG: 325 TABLET ORAL at 22:35

## 2021-01-01 RX ADMIN — Medication 5 MG: at 22:43

## 2021-01-01 RX ADMIN — HEPARIN SODIUM 5000 UNITS: 5000 INJECTION INTRAVENOUS; SUBCUTANEOUS at 06:36

## 2021-01-01 RX ADMIN — PREDNISONE 5 MG: 5 TABLET ORAL at 18:21

## 2021-01-01 RX ADMIN — LEVOFLOXACIN 500 MG: 500 TABLET, FILM COATED ORAL at 15:00

## 2021-01-01 RX ADMIN — PIPERACILLIN AND TAZOBACTAM 2.25 G: 2; .25 INJECTION, POWDER, LYOPHILIZED, FOR SOLUTION INTRAVENOUS at 22:53

## 2021-01-01 RX ADMIN — WATER 2 G: 1 INJECTION INTRAMUSCULAR; INTRAVENOUS; SUBCUTANEOUS at 04:13

## 2021-01-01 RX ADMIN — IPRATROPIUM BROMIDE AND ALBUTEROL SULFATE 3 ML: .5; 2.5 SOLUTION RESPIRATORY (INHALATION) at 22:53

## 2021-01-01 RX ADMIN — Medication 10 ML: at 06:05

## 2021-01-01 RX ADMIN — CALCIUM ACETATE 667 MG: 667 CAPSULE ORAL at 13:41

## 2021-01-01 RX ADMIN — PIPERACILLIN AND TAZOBACTAM 2.25 G: 2; .25 INJECTION, POWDER, LYOPHILIZED, FOR SOLUTION INTRAVENOUS at 05:02

## 2021-01-01 RX ADMIN — PIPERACILLIN AND TAZOBACTAM 2.25 G: 2; .25 INJECTION, POWDER, LYOPHILIZED, FOR SOLUTION INTRAVENOUS at 21:19

## 2021-01-01 RX ADMIN — ACETAMINOPHEN 650 MG: 325 TABLET ORAL at 10:49

## 2021-01-01 RX ADMIN — CALCIUM ACETATE 667 MG: 667 CAPSULE ORAL at 18:44

## 2021-01-01 RX ADMIN — NEPHROCAP 1 CAPSULE: 1 CAP ORAL at 10:47

## 2021-01-01 RX ADMIN — SODIUM CHLORIDE 25 ML/HR: 900 INJECTION, SOLUTION INTRAVENOUS at 07:30

## 2021-01-01 RX ADMIN — Medication 10 ML: at 22:56

## 2021-01-01 RX ADMIN — Medication 10 ML: at 22:45

## 2021-01-01 RX ADMIN — DOXERCALCIFEROL 0.5 MCG: 4 INJECTION, SOLUTION INTRAVENOUS at 10:05

## 2021-01-01 RX ADMIN — CALCIUM ACETATE 667 MG: 667 CAPSULE ORAL at 18:30

## 2021-01-01 RX ADMIN — PIPERACILLIN AND TAZOBACTAM 2.25 G: 2; .25 INJECTION, POWDER, LYOPHILIZED, FOR SOLUTION INTRAVENOUS at 06:13

## 2021-01-01 RX ADMIN — HEPARIN SODIUM 3000 UNITS: 1000 INJECTION, SOLUTION INTRAVENOUS; SUBCUTANEOUS at 16:15

## 2021-01-01 RX ADMIN — Medication 40 ML: at 15:01

## 2021-01-01 RX ADMIN — ONDANSETRON 4 MG: 2 INJECTION INTRAMUSCULAR; INTRAVENOUS at 17:03

## 2021-01-01 RX ADMIN — ABIRATERONE ACETATE 1000 MG: 250 TABLET ORAL at 09:31

## 2021-01-01 RX ADMIN — POLYETHYLENE GLYCOL 3350 17 G: 17 POWDER, FOR SOLUTION ORAL at 18:45

## 2021-01-01 RX ADMIN — CALCIUM ACETATE 667 MG: 667 CAPSULE ORAL at 13:00

## 2021-01-01 RX ADMIN — HEPARIN SODIUM 5000 UNITS: 5000 INJECTION INTRAVENOUS; SUBCUTANEOUS at 13:44

## 2021-01-01 RX ADMIN — PREDNISONE 5 MG: 5 TABLET ORAL at 17:00

## 2021-01-01 RX ADMIN — ONDANSETRON 4 MG: 2 INJECTION INTRAMUSCULAR; INTRAVENOUS at 05:03

## 2021-01-01 RX ADMIN — NEPHROCAP 1 CAPSULE: 1 CAP ORAL at 13:43

## 2021-01-01 RX ADMIN — POLYETHYLENE GLYCOL 3350 17 G: 17 POWDER, FOR SOLUTION ORAL at 08:45

## 2021-01-01 RX ADMIN — CALCIUM ACETATE 667 MG: 667 CAPSULE ORAL at 14:54

## 2021-01-01 RX ADMIN — MIDAZOLAM HYDROCHLORIDE 1 MG: 2 INJECTION, SOLUTION INTRAMUSCULAR; INTRAVENOUS at 15:51

## 2021-01-01 RX ADMIN — FENTANYL CITRATE 25 MCG: 50 INJECTION, SOLUTION INTRAMUSCULAR; INTRAVENOUS at 08:40

## 2021-01-01 RX ADMIN — PIPERACILLIN AND TAZOBACTAM 2.25 G: 2; .25 INJECTION, POWDER, LYOPHILIZED, FOR SOLUTION INTRAVENOUS at 14:52

## 2021-01-01 RX ADMIN — DOXERCALCIFEROL 0.5 MCG: 4 INJECTION, SOLUTION INTRAVENOUS at 11:38

## 2021-01-01 RX ADMIN — CALCIUM ACETATE 667 MG: 667 CAPSULE ORAL at 14:51

## 2021-01-01 RX ADMIN — IPRATROPIUM BROMIDE AND ALBUTEROL SULFATE 3 ML: .5; 2.5 SOLUTION RESPIRATORY (INHALATION) at 02:42

## 2021-01-01 RX ADMIN — ABIRATERONE ACETATE 1000 MG: 250 TABLET ORAL at 13:43

## 2021-01-01 RX ADMIN — IPRATROPIUM BROMIDE AND ALBUTEROL SULFATE 3 ML: .5; 2.5 SOLUTION RESPIRATORY (INHALATION) at 08:45

## 2021-01-01 RX ADMIN — Medication 10 ML: at 06:36

## 2021-01-01 RX ADMIN — LIDOCAINE HYDROCHLORIDE 100 MG: 20 INJECTION, SOLUTION EPIDURAL; INFILTRATION; INTRACAUDAL; PERINEURAL at 08:32

## 2021-01-01 RX ADMIN — PIPERACILLIN AND TAZOBACTAM 2.25 G: 2; .25 INJECTION, POWDER, LYOPHILIZED, FOR SOLUTION INTRAVENOUS at 12:27

## 2021-01-01 RX ADMIN — PIPERACILLIN AND TAZOBACTAM 2.25 G: 2; .25 INJECTION, POWDER, LYOPHILIZED, FOR SOLUTION INTRAVENOUS at 05:43

## 2021-01-01 RX ADMIN — Medication 10 ML: at 12:28

## 2021-01-01 RX ADMIN — PIPERACILLIN AND TAZOBACTAM 0.75 G: 2; .25 INJECTION, POWDER, LYOPHILIZED, FOR SOLUTION INTRAVENOUS at 19:13

## 2021-01-01 RX ADMIN — CALCIUM ACETATE 667 MG: 667 CAPSULE ORAL at 09:24

## 2021-01-01 RX ADMIN — NEPHROCAP 1 CAPSULE: 1 CAP ORAL at 09:24

## 2021-01-01 RX ADMIN — SODIUM CHLORIDE 25 ML/HR: 900 INJECTION, SOLUTION INTRAVENOUS at 12:44

## 2021-01-01 RX ADMIN — PROPOFOL 30 MG: 10 INJECTION, EMULSION INTRAVENOUS at 08:32

## 2021-01-01 RX ADMIN — MIDAZOLAM HYDROCHLORIDE 2 MG: 2 INJECTION, SOLUTION INTRAMUSCULAR; INTRAVENOUS at 14:07

## 2021-01-01 RX ADMIN — Medication 5 MG: at 20:56

## 2021-01-01 RX ADMIN — ONDANSETRON 4 MG: 2 INJECTION INTRAMUSCULAR; INTRAVENOUS at 22:57

## 2021-01-01 RX ADMIN — PIPERACILLIN AND TAZOBACTAM 2.25 G: 2; .25 INJECTION, POWDER, LYOPHILIZED, FOR SOLUTION INTRAVENOUS at 14:51

## 2021-01-01 RX ADMIN — Medication 10 ML: at 18:32

## 2021-01-21 ENCOUNTER — TRANSCRIBE ORDER (OUTPATIENT)
Dept: SCHEDULING | Age: 86
End: 2021-01-21

## 2021-01-21 DIAGNOSIS — M20.40 ACQUIRED HAMMER TOE: ICD-10-CM

## 2021-01-21 DIAGNOSIS — I70.213 ATHEROSCLEROSIS OF NATIVE ARTERY OF BOTH LOWER EXTREMITIES WITH INTERMITTENT CLAUDICATION (HCC): Primary | ICD-10-CM

## 2021-01-26 ENCOUNTER — HOSPITAL ENCOUNTER (OUTPATIENT)
Dept: VASCULAR SURGERY | Age: 86
Discharge: HOME | End: 2021-01-26
Attending: PODIATRIST
Payer: MEDICARE

## 2021-01-26 DIAGNOSIS — I70.213 ATHEROSCLEROSIS OF NATIVE ARTERY OF BOTH LOWER EXTREMITIES WITH INTERMITTENT CLAUDICATION (HCC): ICD-10-CM

## 2021-01-26 DIAGNOSIS — M20.40 ACQUIRED HAMMER TOE: ICD-10-CM

## 2021-01-26 PROCEDURE — 93923 UPR/LXTR ART STDY 3+ LVLS: CPT

## 2021-01-27 LAB
LEFT ABI: 0.62
LEFT ANTERIOR TIBIAL: 74 MMHG
LEFT ARM BP: 141 MMHG
LEFT CALF PRESSURE: 108 MMHG
LEFT POSTERIOR TIBIAL: 88 MMHG
LEFT TBI: 0.48
LEFT TOE PRESSURE: 68 MMHG
RIGHT ABI: 0.64
RIGHT ANTERIOR TIBIAL: 75 MMHG
RIGHT ARM BP: 139 MMHG
RIGHT CALF PRESSURE: 102 MMHG
RIGHT POSTERIOR TIBIAL: 90 MMHG
RIGHT TBI: 0.28
RIGHT TOE PRESSURE: 39 MMHG

## 2021-02-24 RX ORDER — AMLODIPINE BESYLATE 10 MG/1
TABLET ORAL DAILY
COMMUNITY
End: 2021-03-31

## 2021-02-25 ENCOUNTER — HOSPITAL ENCOUNTER (OUTPATIENT)
Dept: PREADMISSION TESTING | Age: 86
Discharge: HOME | End: 2021-02-25
Payer: MEDICARE

## 2021-02-25 PROCEDURE — U0003 INFECTIOUS AGENT DETECTION BY NUCLEIC ACID (DNA OR RNA); SEVERE ACUTE RESPIRATORY SYNDROME CORONAVIRUS 2 (SARS-COV-2) (CORONAVIRUS DISEASE [COVID-19]), AMPLIFIED PROBE TECHNIQUE, MAKING USE OF HIGH THROUGHPUT TECHNOLOGIES AS DESCRIBED BY CMS-2020-01-R: HCPCS

## 2021-02-26 LAB — SARS-COV-2, COV2NT: NOT DETECTED

## 2021-03-01 ENCOUNTER — ANESTHESIA EVENT (OUTPATIENT)
Dept: SURGERY | Age: 86
End: 2021-03-01
Payer: MEDICARE

## 2021-03-02 ENCOUNTER — HOSPITAL ENCOUNTER (OUTPATIENT)
Age: 86
Discharge: HOME | End: 2021-03-02
Attending: UROLOGY | Admitting: UROLOGY
Payer: MEDICARE

## 2021-03-02 ENCOUNTER — APPOINTMENT (OUTPATIENT)
Dept: GENERAL RADIOLOGY | Age: 86
End: 2021-03-02
Attending: UROLOGY
Payer: MEDICARE

## 2021-03-02 ENCOUNTER — ANESTHESIA (OUTPATIENT)
Dept: SURGERY | Age: 86
End: 2021-03-02
Payer: MEDICARE

## 2021-03-02 VITALS
RESPIRATION RATE: 13 BRPM | OXYGEN SATURATION: 100 % | TEMPERATURE: 97.5 F | WEIGHT: 162.25 LBS | HEART RATE: 63 BPM | BODY MASS INDEX: 28.75 KG/M2 | DIASTOLIC BLOOD PRESSURE: 63 MMHG | SYSTOLIC BLOOD PRESSURE: 153 MMHG | HEIGHT: 63 IN

## 2021-03-02 DIAGNOSIS — D49.4 BLADDER TUMOR: Primary | ICD-10-CM

## 2021-03-02 LAB
ANION GAP SERPL CALC-SCNC: 10 MMOL/L (ref 3–18)
BUN SERPL-MCNC: 33 MG/DL (ref 7–18)
BUN/CREAT SERPL: 6 (ref 12–20)
CALCIUM SERPL-MCNC: 8.8 MG/DL (ref 8.5–10.1)
CHLORIDE SERPL-SCNC: 101 MMOL/L (ref 100–111)
CO2 SERPL-SCNC: 29 MMOL/L (ref 21–32)
CREAT SERPL-MCNC: 5.8 MG/DL (ref 0.6–1.3)
GLUCOSE SERPL-MCNC: 93 MG/DL (ref 74–99)
POTASSIUM SERPL-SCNC: 3.7 MMOL/L (ref 3.5–5.5)
PSA SERPL-MCNC: 689 NG/ML (ref 0–4)
SODIUM SERPL-SCNC: 140 MMOL/L (ref 136–145)

## 2021-03-02 PROCEDURE — 77030040361 HC SLV COMPR DVT MDII -B: Performed by: UROLOGY

## 2021-03-02 PROCEDURE — 74011250637 HC RX REV CODE- 250/637: Performed by: NURSE ANESTHETIST, CERTIFIED REGISTERED

## 2021-03-02 PROCEDURE — 99100 ANES PT EXTEME AGE<1 YR&>70: CPT | Performed by: NURSE ANESTHETIST, CERTIFIED REGISTERED

## 2021-03-02 PROCEDURE — 80048 BASIC METABOLIC PNL TOTAL CA: CPT

## 2021-03-02 PROCEDURE — 77030005514 HC CATH URETH FOL14 BARD -A: Performed by: UROLOGY

## 2021-03-02 PROCEDURE — 99100 ANES PT EXTEME AGE<1 YR&>70: CPT | Performed by: ANESTHESIOLOGY

## 2021-03-02 PROCEDURE — 77030013079 HC BLNKT BAIR HGGR 3M -A: Performed by: ANESTHESIOLOGY

## 2021-03-02 PROCEDURE — 74011250636 HC RX REV CODE- 250/636: Performed by: UROLOGY

## 2021-03-02 PROCEDURE — 84153 ASSAY OF PSA TOTAL: CPT

## 2021-03-02 PROCEDURE — 2709999900 HC NON-CHARGEABLE SUPPLY: Performed by: UROLOGY

## 2021-03-02 PROCEDURE — 74011000636 HC RX REV CODE- 636: Performed by: UROLOGY

## 2021-03-02 PROCEDURE — 88344 IMHCHEM/IMCYTCHM EA MLT ANTB: CPT

## 2021-03-02 PROCEDURE — 77030040922 HC BLNKT HYPOTHRM STRY -A: Performed by: UROLOGY

## 2021-03-02 PROCEDURE — 77030010509 HC AIRWY LMA MSK TELE -A: Performed by: ANESTHESIOLOGY

## 2021-03-02 PROCEDURE — 77030019927 HC TBNG IRR CYSTO BAXT -A: Performed by: UROLOGY

## 2021-03-02 PROCEDURE — 76010000149 HC OR TIME 1 TO 1.5 HR: Performed by: UROLOGY

## 2021-03-02 PROCEDURE — 74420 UROGRAPHY RTRGR +-KUB: CPT

## 2021-03-02 PROCEDURE — 00910 ANES TRANSURETHRAL PX NOS: CPT | Performed by: ANESTHESIOLOGY

## 2021-03-02 PROCEDURE — 74011250636 HC RX REV CODE- 250/636: Performed by: ANESTHESIOLOGY

## 2021-03-02 PROCEDURE — 77030031458 HC ELECTRD TUR BTTN OCOA -F: Performed by: UROLOGY

## 2021-03-02 PROCEDURE — 74011250636 HC RX REV CODE- 250/636: Performed by: NURSE ANESTHETIST, CERTIFIED REGISTERED

## 2021-03-02 PROCEDURE — 74011000250 HC RX REV CODE- 250: Performed by: NURSE ANESTHETIST, CERTIFIED REGISTERED

## 2021-03-02 PROCEDURE — 88305 TISSUE EXAM BY PATHOLOGIST: CPT

## 2021-03-02 PROCEDURE — 76060000033 HC ANESTHESIA 1 TO 1.5 HR: Performed by: UROLOGY

## 2021-03-02 PROCEDURE — 76210000006 HC OR PH I REC 0.5 TO 1 HR: Performed by: UROLOGY

## 2021-03-02 PROCEDURE — 74011250637 HC RX REV CODE- 250/637: Performed by: UROLOGY

## 2021-03-02 PROCEDURE — 77030020268 HC MISC GENERAL SUPPLY: Performed by: UROLOGY

## 2021-03-02 PROCEDURE — 76210000026 HC REC RM PH II 1 TO 1.5 HR: Performed by: UROLOGY

## 2021-03-02 PROCEDURE — 77030003481 HC NDL BIOP GUN BARD -B: Performed by: UROLOGY

## 2021-03-02 PROCEDURE — 74011000250 HC RX REV CODE- 250: Performed by: UROLOGY

## 2021-03-02 PROCEDURE — 00910 ANES TRANSURETHRAL PX NOS: CPT | Performed by: NURSE ANESTHETIST, CERTIFIED REGISTERED

## 2021-03-02 PROCEDURE — 88307 TISSUE EXAM BY PATHOLOGIST: CPT

## 2021-03-02 RX ORDER — SODIUM CHLORIDE 9 MG/ML
25 INJECTION, SOLUTION INTRAVENOUS CONTINUOUS
Status: DISCONTINUED | OUTPATIENT
Start: 2021-03-02 | End: 2021-03-02 | Stop reason: HOSPADM

## 2021-03-02 RX ORDER — ONDANSETRON 2 MG/ML
INJECTION INTRAMUSCULAR; INTRAVENOUS AS NEEDED
Status: DISCONTINUED | OUTPATIENT
Start: 2021-03-02 | End: 2021-03-02 | Stop reason: HOSPADM

## 2021-03-02 RX ORDER — ONDANSETRON 2 MG/ML
4 INJECTION INTRAMUSCULAR; INTRAVENOUS ONCE
Status: DISCONTINUED | OUTPATIENT
Start: 2021-03-02 | End: 2021-03-02 | Stop reason: HOSPADM

## 2021-03-02 RX ORDER — SODIUM CHLORIDE 0.9 % (FLUSH) 0.9 %
5-40 SYRINGE (ML) INJECTION AS NEEDED
Status: DISCONTINUED | OUTPATIENT
Start: 2021-03-02 | End: 2021-03-02 | Stop reason: HOSPADM

## 2021-03-02 RX ORDER — FENTANYL CITRATE 50 UG/ML
INJECTION, SOLUTION INTRAMUSCULAR; INTRAVENOUS AS NEEDED
Status: DISCONTINUED | OUTPATIENT
Start: 2021-03-02 | End: 2021-03-02 | Stop reason: HOSPADM

## 2021-03-02 RX ORDER — DEXAMETHASONE SODIUM PHOSPHATE 4 MG/ML
INJECTION, SOLUTION INTRA-ARTICULAR; INTRALESIONAL; INTRAMUSCULAR; INTRAVENOUS; SOFT TISSUE AS NEEDED
Status: DISCONTINUED | OUTPATIENT
Start: 2021-03-02 | End: 2021-03-02 | Stop reason: HOSPADM

## 2021-03-02 RX ORDER — FAMOTIDINE 20 MG/1
20 TABLET, FILM COATED ORAL ONCE
Status: COMPLETED | OUTPATIENT
Start: 2021-03-02 | End: 2021-03-02

## 2021-03-02 RX ORDER — OXYCODONE AND ACETAMINOPHEN 5; 325 MG/1; MG/1
1 TABLET ORAL
Qty: 10 TAB | Refills: 0 | Status: SHIPPED | OUTPATIENT
Start: 2021-03-02 | End: 2021-03-05

## 2021-03-02 RX ORDER — SODIUM CHLORIDE 0.9 % (FLUSH) 0.9 %
5-40 SYRINGE (ML) INJECTION EVERY 8 HOURS
Status: DISCONTINUED | OUTPATIENT
Start: 2021-03-02 | End: 2021-03-02 | Stop reason: HOSPADM

## 2021-03-02 RX ORDER — SODIUM CHLORIDE 9 MG/ML
INJECTION, SOLUTION INTRAVENOUS
Status: COMPLETED | OUTPATIENT
Start: 2021-03-02 | End: 2021-03-02

## 2021-03-02 RX ORDER — LIDOCAINE HYDROCHLORIDE 10 MG/ML
0.1 INJECTION, SOLUTION EPIDURAL; INFILTRATION; INTRACAUDAL; PERINEURAL AS NEEDED
Status: DISCONTINUED | OUTPATIENT
Start: 2021-03-02 | End: 2021-03-02 | Stop reason: HOSPADM

## 2021-03-02 RX ORDER — LIDOCAINE HYDROCHLORIDE 20 MG/ML
INJECTION, SOLUTION EPIDURAL; INFILTRATION; INTRACAUDAL; PERINEURAL AS NEEDED
Status: DISCONTINUED | OUTPATIENT
Start: 2021-03-02 | End: 2021-03-02 | Stop reason: HOSPADM

## 2021-03-02 RX ORDER — INSULIN LISPRO 100 [IU]/ML
INJECTION, SOLUTION INTRAVENOUS; SUBCUTANEOUS ONCE
Status: DISCONTINUED | OUTPATIENT
Start: 2021-03-02 | End: 2021-03-02 | Stop reason: HOSPADM

## 2021-03-02 RX ORDER — PROPOFOL 10 MG/ML
INJECTION, EMULSION INTRAVENOUS AS NEEDED
Status: DISCONTINUED | OUTPATIENT
Start: 2021-03-02 | End: 2021-03-02 | Stop reason: HOSPADM

## 2021-03-02 RX ORDER — FENTANYL CITRATE 50 UG/ML
25 INJECTION, SOLUTION INTRAMUSCULAR; INTRAVENOUS AS NEEDED
Status: DISCONTINUED | OUTPATIENT
Start: 2021-03-02 | End: 2021-03-02 | Stop reason: HOSPADM

## 2021-03-02 RX ORDER — SODIUM CHLORIDE, SODIUM LACTATE, POTASSIUM CHLORIDE, CALCIUM CHLORIDE 600; 310; 30; 20 MG/100ML; MG/100ML; MG/100ML; MG/100ML
75 INJECTION, SOLUTION INTRAVENOUS CONTINUOUS
Status: DISCONTINUED | OUTPATIENT
Start: 2021-03-02 | End: 2021-03-02 | Stop reason: HOSPADM

## 2021-03-02 RX ORDER — ATROPA BELLADONNA AND OPIUM 16.2; 3 MG/1; MG/1
SUPPOSITORY RECTAL AS NEEDED
Status: DISCONTINUED | OUTPATIENT
Start: 2021-03-02 | End: 2021-03-02 | Stop reason: HOSPADM

## 2021-03-02 RX ADMIN — FENTANYL CITRATE 50 MCG: 50 INJECTION, SOLUTION INTRAMUSCULAR; INTRAVENOUS at 14:23

## 2021-03-02 RX ADMIN — PROPOFOL 50 MG: 10 INJECTION, EMULSION INTRAVENOUS at 14:22

## 2021-03-02 RX ADMIN — FAMOTIDINE 20 MG: 20 TABLET ORAL at 13:45

## 2021-03-02 RX ADMIN — FENTANYL CITRATE 25 MCG: 50 INJECTION, SOLUTION INTRAMUSCULAR; INTRAVENOUS at 14:51

## 2021-03-02 RX ADMIN — ONDANSETRON 4 MG: 2 INJECTION INTRAMUSCULAR; INTRAVENOUS at 14:41

## 2021-03-02 RX ADMIN — WATER 1 G: 1 INJECTION INTRAMUSCULAR; INTRAVENOUS; SUBCUTANEOUS at 15:45

## 2021-03-02 RX ADMIN — FENTANYL CITRATE 25 MCG: 50 INJECTION, SOLUTION INTRAMUSCULAR; INTRAVENOUS at 14:53

## 2021-03-02 RX ADMIN — DEXAMETHASONE SODIUM PHOSPHATE 4 MG: 4 INJECTION, SOLUTION INTRAMUSCULAR; INTRAVENOUS at 14:41

## 2021-03-02 RX ADMIN — SODIUM CHLORIDE 25 ML/HR: 900 INJECTION, SOLUTION INTRAVENOUS at 13:10

## 2021-03-02 RX ADMIN — LIDOCAINE HYDROCHLORIDE 50 MG: 20 INJECTION, SOLUTION EPIDURAL; INFILTRATION; INTRACAUDAL; PERINEURAL at 14:06

## 2021-03-02 RX ADMIN — FENTANYL CITRATE 25 MCG: 50 INJECTION, SOLUTION INTRAMUSCULAR; INTRAVENOUS at 14:14

## 2021-03-02 RX ADMIN — VANCOMYCIN HYDROCHLORIDE 1000 MG: 1 INJECTION, POWDER, LYOPHILIZED, FOR SOLUTION INTRAVENOUS at 13:42

## 2021-03-02 RX ADMIN — FENTANYL CITRATE 25 MCG: 50 INJECTION, SOLUTION INTRAMUSCULAR; INTRAVENOUS at 14:29

## 2021-03-02 RX ADMIN — PROPOFOL 150 MG: 10 INJECTION, EMULSION INTRAVENOUS at 14:06

## 2021-03-02 NOTE — DISCHARGE INSTRUCTIONS
Discharge Instructions          DIET: General     ACTIVITY:   You are restricted from lifting greater than 10 pounds for 1 week from the date of surgery until the urine is consistently clear, whichever is longer. You may resume driving once able to perform the activities of driving without pain. CATHETER:  Indwelling catheter care:  1. Maintain sterile, closed gravity drainage system:          a. Secure the catheter to upper thigh or abdomen to avoid urethral irritation and contamination. b. Empty the catheter bag as needed. c. Do not routinely irrigate the catheter. d. Do not clamp or kink the drainage tubing, and keep the collection bag below bladder level at all times. 2.     If urine leaks around the catheter in the absence of obstruction, it is likely due to a bladder spasm. ADDITIONAL INFORMATION:     - If you experience any fevers > 100.4, significant nausea / vomiting, or significant worsening of pain, please contact us at the number below. - It is common to experience recurrent blood in your urine for several days to weeks after surgery. Although there should be a general trend of decreasing bleeding over time, it is not uncommon for bleeding to recur after periods of no bleeding. If you notice passage of clots, you should increase your liquid intake in order to reduce the number of clots encountered. If the bleeding continues to worsen with inability to pass clots, inability to urinate, or you experience significant light-headedness, please contact us. FOLLOW UP CARE:  You have an appointment in 2 weeks for discussion of pathology results.

## 2021-03-02 NOTE — ANESTHESIA PREPROCEDURE EVALUATION
Relevant Problems   No relevant active problems       Anesthetic History   No history of anesthetic complications            Review of Systems / Medical History  Patient summary reviewed, nursing notes reviewed and pertinent labs reviewed    Pulmonary  Within defined limits                 Neuro/Psych   Within defined limits           Cardiovascular    Hypertension: well controlled              Exercise tolerance: <4 METS     GI/Hepatic/Renal         Renal disease: ESRD and dialysis      Comments: Last dialysis yesterday Endo/Other        Cancer     Other Findings   Comments: Prostate enlargement poss bladder ca.            Physical Exam    Airway  Mallampati: II  TM Distance: 4 - 6 cm  Neck ROM: normal range of motion   Mouth opening: Normal     Cardiovascular    Rhythm: regular  Rate: normal         Dental    Dentition: Full lower dentures and Full upper dentures     Pulmonary  Breath sounds clear to auscultation               Abdominal  GI exam deferred       Other Findings            Anesthetic Plan    ASA: 3  Anesthesia type: general          Induction: Intravenous  Anesthetic plan and risks discussed with: Patient      Plan LMA and may change to ETT if surgical findings dictate

## 2021-03-02 NOTE — OP NOTES
Operative Note  Patient: Magui Carney               Sex: male             MRN: [id number]  YOB: 1935      Age:  80 y.o. Preoperative Diagnosis: N13.30 HYDRONEPHROSIS  Postoperative Diagnosis:  N13.30 HYDRONEPHROSIS  Surgeon: Thiago Mendoza     Indication: This is an 79 y/o man initially presented with CHARLOTTE on CKD requiring dialysis with bilateral hydroureteronephrosis and distended bladder. Michel was placed and workup included abnormal HORACIO, PSA 70, negative CT and repeat CT showing improving hydronephrosis with michel. Atrophic kidneys bilaterally. Cysto showed some small papillary lesions and obstructing benign appearing prostate. He has been with michel since that time, doing well, here today Cystoscopy, cystogram, retrogrades, bladder biopsy and prostate biopsy. Procedure:    1) Cystoscopy  2) Cystogram   3). Bilateral retrograde pyelograms  4). TURBT, small ~1cm   5) Transrectal ultrasound of prostate  6) TRUS guided prostate biopsy       Findings:    1). Cystoscopy with very high riding bladder neck and bilobar hypertrophy as well as several papillary posterior wall lesions   2). Cystogram with small capacity bladder, no reflux   3). Bilateral retrogrades with tortuous ureters, decompressed systems, no filling defects and rapid drainage bilaterally with no sign of ureteral obstruction   4). TURBT performed of bladder wall lesions as well as bladder neck area that appeared suspicious   5). Bimanual with abnormal firm prostate on Right side  6). 20fr 3 way placed and CBI started  7) Prostate 80cc in volume   8) Standard 12 core biopsy performed     Procedure in Detail: The patient was brought to the operative suite. Anesthesia was induced and preoperative antibiotics were administered. They were then placed in the dorsal lithotomy position and their external genitalia was prepped and draped in the usual fashion.  A surgical timeout was performed confirming the patient's name, date of birth, laterality, and antibiotics. All were in agreement. A 22 [place] cystourethroscope was then inserted into the patients bladder. The urethra revealed no abnormalities. Prostate was with severe bilobar hypertrophy, high riding bladder neck. Thorough cystoscopy was performed with both the 30 degree and 70 degree lens which revealed papillary change at bladder neck and posterior wall. Cystogram was performed showing small bladder, likely high pressure ~60cc H20 at volume of 200. NO reflux, smooth wall. Retrograde showed some chronic bilateral hydroureteronephrosis to level of bladder that quickly decompressed and drained rapidly. There is no evidence of ureteral obstruction. The urethra was then dilated to 28 Fr using [employer]. The resectoscope was inserted and resection of the tumor was performed. Hemostasis was achieved and pieces were Elikked out of the bladder. Reinspection revealed no residual visible tumor.       A 20fr 3 way michel catheter was then placed with 10 cc of sterile water in the balloon. CBI was started. We then turned to prostate biopsies. HORACIO revealed abnormal firmness of the right lobe. TRUS was performed revealing 80gm prostate. Standard 12 core biopsy was performed. There was good hemostasis. The patient was then awoken and transferred to the recovery room in stable condition.      Estimated Blood Loss: 10cc     Anesthesia:  General                  Implants: * No implants in log *    Specimens:   ID Type Source Tests Collected by Time Destination   1 : BLADDER TUMOR Preservative Bladder  Jamaal Cool MD 3/2/2021  2:52 PM Pathology   2 : PROSTATE  RIGHT BASE Preservative Prostate  Jamaal Cool MD 3/2/2021  3:17 PM Pathology   3 : PROSTATE RIGHT APEX Preservative Prostate  Jamaal Cool MD 3/2/2021  3:18 PM Pathology   4 : PROSTATE RIGHT MID Preservative Prostate  Jamaal Cool MD 3/2/2021  3:19 PM Pathology   5 : PROSTATE  LEFT MID Preservative Prostate  Romario Lezama MD 3/2/2021  3:19 PM Pathology   6 : PROSTATE LEFT BASE Preservative Prostate  Romario Lezama MD 3/2/2021  3:20 PM Pathology   7 : PROSTATE LEFT APEX Preservative Prostate  Romario Lezama MD 3/2/2021  3:20 PM Pathology        Drains: 21 Fr Burris catheter            Complications:  None           Plan  1. CBI in PACU and shut off. If urine looks good, home  2. Return in 2 weeks for pathology discussion.      Irais Huynh MD  3/2/2021

## 2021-03-02 NOTE — H&P
H&P    Patient: Jeni Diop               Sex: male          DOA: 3/2/2021       YOB: 1935      Age:  80 y.o.        LOS:  LOS: 0 days              ASSESSMENT:   1. ESRD on HD  2. Bilateral resolving hydroureteronephrosis, improving with michel catheter, negative HORACIO  3. Elevated PSA in 70 range   4. Abnormal cystoscopy with small lesions     Discussed plan today   He is very happy with michel and if this is all BPH, then likely will continue management of retention that way in the long run    Plan for cysto, cystogram, bladder biopsies, retrogrades, possible stents, prostate biopsy       Sarina Garcia MD  [phone] Office  [phone]  Pager    CC: Retention, hydronephrosis     HISTORY OF PRESENT ILLNESS:  Jeni Diop is a 80 y.o. male who initially presented with bilateral hydroneprhosis, renal failure, placed michel with transient improvement but still required dialysis. HORACIO was negative, but PSA is 70 range and still elevated on repeat. Cystoscopy with abnormal erythematous patches but nothing subtrigonal to suggest locally advanced CAP. CT without lymphadenopathy. He had positive culture, took abx preop. No flowsheet data found. Past Medical History:   Diagnosis Date    Cardiac echocardiogram 08/29/2016    EF 60-65%. No WMA. Mild LVH. Indeterminate diastolic fx. RVSP 35 mmHg. No significant valvular heart disease.  Cardiac nuclear imaging test, abnormal 08/29/2016    Intermediate risk. Previous inferior infarction w/very mild fabiana-infarct ischemia. Inferior hypk. EF 68%. Nondiagnostic EKG on pharm stress test.  Pt's BP increased from 193/96 to 207/83 during study.  Carotid duplex 08/30/2016    Mild <50% bilateral ICA stenosis.       Chronic kidney disease     dialysis    Colonic mass     per Dr Bárbara Miguel notes    Enlarged prostate     Hypertension        Past Surgical History:   Procedure Laterality Date    COLONOSCOPY N/A 10/1/2020 COLONOSCOPY, b'x, w tattoo w polypectomy performed by Raisa Ram[facility]rez [facility] at [facility]  10/1/2020         Yamilet Moore  10/1/2020         COLON[facility]CPY[facility]FLEX[facility]W/ [facility]  10/1/2020         [place] INSJ TUNNELED CVC W/O SUBQ PORT/ AGE 5 YR/> N/A 9/28/2020    INSERTION TUNNELED CENTRAL VENOUS CATHETER performed by Gildardo Wang MD at [facility] CATH LAB    VASCULAR SURGERY PROCEDURE UNLIST      dialysis access- right neck       Social History     Tobacco Use    Smoking status: Never Smoker    Smokeless tobacco: Never Used   Substance Use Topics    Alcohol use: No    Drug use: No       No Known Allergies    History reviewed. No pertinent family history.     Current Facility-Administered Medications   Medication Dose Route Frequency Provider Last Rate Last Admin    lidocaine (PF) (XYLOCAINE) 10 mg/mL (1 %) injection 0.1 mL  0.1 mL SubCUTAneous PRN Rosezena Harleen, CRNA        lactated Ringers infusion  75 mL/hr IntraVENous CONTINUOUS Rosezena Harleen, CRNA        sodium chloride (NS) flush 5-40 mL  5-40 mL IntraVENous Q8H Rosezena Harleen, CRNA        sodium chloride (NS) flush 5-40 mL  5-40 mL IntraVENous PRN Rosezena Harleen, CRNA        famotidine (PEPCID) tablet 20 mg  20 mg Oral Enmanuel Gaunt, CRNA        insulin lispro (HUMALOG) injection   SubCUTAneous Enmanuel Gaunt, CRNA        gemcitabine (GEMZAR) 2,000 mg in 0.9% sodium chloride 52.6 mL for Instillation   Bladder Instillation ONCE Gianna Almendarez MD        cefTRIAXone (ROCEPHIN) 1 g in sterile water (preservative free) 10 mL IV syringe  1 g IntraVENous ONCE Humberto Miller MD        0.9% sodium chloride infusion  25 mL/hr IntraVENous CONTINUOUS Lisa Doll MD 25 mL/hr at 03/02/21 1310 25 mL/hr at 03/02/21 1310    vancomycin (VANCOCIN) 1,000 mg in 0.9% sodium chloride 250 mL (VIAL-MATE)  1,000 mg IntraVENous ON CALL TO OR Humberto Miller MD           Review of Systems  Constitutional: No fever, chills, or weight loss  Respiratory: No dyspnea  Cardiovascular: No chest pain  Gastrointestinal: No vomiting or abdominal pain. Genitourinary: Denies frequency, urgency, dysuria, hematuria. Neurological: No focal motor changes. PHYSICAL EXAMINATION:   Visit Vitals  BP (!) 160/72 (BP 1 Location: Right arm, BP Patient Position: At rest)   Pulse 67   Temp 98.8 °F (37.1 °C)   Resp 18   Ht 5' 6\" (1.676 m)   Wt 162 lb 6.4 oz (73.7 kg)   SpO2 99%   BMI 26.21 kg/m²     Constitutional: Well developed, well nourished male. No acute distress. HEENT: Normocephalic, Atraumatic, EOM's intact   CV:  Normal radial pulse. Respiratory: No respiratory distress or difficulties breathing   Abdomen:  Nontender, nondistended.  Male:  No CVA tendernes. Burris draining CYU. Skin: No evidence of jaundice. Normal color  Neuro/Psych:  Alert and oriented. Affect appropriate. Lymphatic:   No enlarged inguinal lymph nodes. REVIEW OF LABS AND IMAGING:      Labs: Results:   Chemistry  No results for input(s): GLU, NA, K, CL, CO2, BUN, CREA, CA, AGAP, BUCR, TBIL, AP, TP, ALB, GLOB, AGRAT in the last 72 hours. No lab exists for component: GPT   CBC w/Diff No results for input(s): WBC, RBC, HGB, HCT, PLT, GRANS, LYMPH, EOS, HGBEXT, HCTEXT, PLTEXT in the last 72 hours. Cultures No results for input(s): CULT in the last 72 hours.   All Micro Results     None            Urinalysis Color   Date Value Ref Range Status   09/25/2020 YELLOW   Final     Appearance   Date Value Ref Range Status   09/25/2020 CLOUDY   Final     Specific gravity   Date Value Ref Range Status   09/25/2020 1.010 1.005 - 1.030   Final     pH (UA)   Date Value Ref Range Status   09/25/2020 5.0 5.0 - 8.0   Final     Protein   Date Value Ref Range Status   09/25/2020 100 (A) NEG mg/dL Final     Ketone   Date Value Ref Range Status   09/25/2020 Negative NEG mg/dL Final     Bilirubin   Date Value Ref Range Status   09/25/2020 Negative NEG   Final     Blood   Date Value Ref Range Status   09/25/2020 SMALL (A) NEG   Final     Urobilinogen   Date Value Ref Range Status   09/25/2020 0.2 0.2 - 1.0 EU/dL Final     Nitrites   Date Value Ref Range Status   09/25/2020 Negative NEG   Final     Leukocyte Esterase   Date Value Ref Range Status   09/25/2020 LARGE (A) NEG   Final     Potassium   Date Value Ref Range Status   02/23/2021 4.3 3.5 - 5.2 mmol/L Final     Creatinine   Date Value Ref Range Status   02/23/2021 5.29 (H) 0.76 - 1.27 mg/dL Final     BUN   Date Value Ref Range Status   02/23/2021 38 (H) 8 - 27 mg/dL Final     Prostate Specific Ag   Date Value Ref Range Status   10/29/2020 73.2 (H) 0.0 - 4.0 ng/mL Final     Comment:     Roche ECLIA methodology. According to the American Urological Association, Serum PSA should  decrease and remain at undetectable levels after radical  prostatectomy. The AUA defines biochemical recurrence as an initial  PSA value 0.2 ng/mL or greater followed by a subsequent confirmatory  PSA value 0.2 ng/mL or greater. Values obtained with different assay methods or kits cannot be used  interchangeably. Results cannot be interpreted as absolute evidence  of the presence or absence of malignant disease. PSA No results for input(s): PSA in the last 72 hours. Coagulation Lab Results   Component Value Date/Time    Prothrombin time 14.5 09/27/2020 12:25 PM    Prothrombin time 13.3 11/20/2018 02:10 PM    INR 1.1 09/27/2020 12:25 PM    INR 1.0 11/20/2018 02:10 PM    aPTT 29.1 11/20/2018 02:10 PM    aPTT 63.5 (H) 08/30/2016 05:15 PM           US Results (most recent):  Results from [place] encounter on 09/25/20   US RETROPERITONEUM COMP    Narrative EXAMINATION: Ultrasound retroperitoneum    INDICATION: Acute renal failure    COMPARISON: None    TECHNIQUE: Grayscale and color sonographic images of the retroperitoneum  obtained. FINDINGS:    Right kidney: 10.1 cm length. Limited visualization due to bowel gas. Normal  echotexture. Suspected moderate to severe hydronephrosis. No suspicious  parenchymal lesions. Left kidney: Very poorly visualized due to bowel gas. Bladder: Distended without focal abnormality. Impression IMPRESSION:    Right kidney with suspected moderate to severe hydronephrosis, however this may  be exaggerated or simulated by peripelvic cysts. Visualization is limited due to  bowel gas. Left kidney very poorly visualized. Bladder distended without focal abnormality. chest    CT Results (most recent):   Results from [place] encounter on 10/27/20   CT ABD PELV WO CONT    Narrative CT ABDOMEN AND PELVIS WITHOUT CONTRAST    COMPARISON: October 1, 2020. INDICATIONS: Renal calculi. TECHNIQUE: Volumetric data acquisition was performed of the abdomen and pelvis  on a multislice scanner and reconstructed in axial coronal and sagittal planes    CT ABDOMEN FINDINGS:     Lung Bases: Clear. Liver/Gallbladder/Biliary: Cholelithiasis. Otherwise unremarkable hepatobiliary  appearance. Spleen: Normal.    Adrenal Glands: Normal.    Kidneys: The left kidney shows severe cortical atrophy. There is some persistent  collecting system dilation improved when compared with October 1. No calculus is  evident. There is prominence of the urothelium. The right kidney also shows  atrophy to a lesser degree. Hypodensities suggesting cysts are present. The  collecting system is not dilated. The ureter is not dilated. Calculi are not  evident on either side. .    Pancreas: Normal.    Stomach, Small Bowel, and Colon: Normal.    Lymph Nodes: Normal in size and number. The abdominal aorta is unremarkable. The IVC is unremarkable. Peritoneal Spaces: No free fluid or free air is present. Abdominal wall: There is a fat-containing umbilical hernia which shows mild  induration similar to previous. Bladder: The bladder is thick walled collapsed about an indwelling Burris  catheter.     Osseous Structures Of Abdomen And Pelvis: Unremarkable for age. Impression IMPRESSION:     Improved but not completely resolved left renal collecting system dilation. Some  persistent thickening of the urothelium suggests continued inflammation. Contracted thick-walled bladder with Burris in place. Unchanged. No urinary calculi are evident. Small gallstones are again noted. .    All CT scans at this facility are performed using dose optimization technique as  appropriate to the performed exam, to include automated exposure control,  adjustment of the mA and/or kV according to patient's size (Including  appropriate matching for site-specific examinations), or use of iterative  reconstruction technique. ABD      MRI Results (most recent): No procedure found. No diagnosis found.

## 2021-03-03 NOTE — ANESTHESIA POSTPROCEDURE EVALUATION
Procedure(s):  CYSTOSCOPY, BILATERAL RETROGRADE PYELOGRAM, CYSTOGRAM , TRANSURETHRAL RESECTION OF BLADDER TUMOR  /C-ARM  TRANSRECTAL ULTRASOUND GUIDED PROSTATE BIOPSY, TRANSURETHRAL ULTRASOUND OF PROSTATE.    general    Anesthesia Post Evaluation      Multimodal analgesia: multimodal analgesia used between 6 hours prior to anesthesia start to PACU discharge  Patient location during evaluation: PACU  Patient participation: complete - patient participated  Level of consciousness: awake and alert  Pain management: adequate  Airway patency: patent  Anesthetic complications: no  Cardiovascular status: acceptable  Respiratory status: acceptable  Hydration status: acceptable  Post anesthesia nausea and vomiting:  none  Final Post Anesthesia Temperature Assessment:  Normothermia (36.0-37.5 degrees C)      INITIAL Post-op Vital signs:   Vitals Value Taken Time   /62 03/02/21 1600   Temp 36.4 °C (97.5 °F) 03/02/21 1526   Pulse 64 03/02/21 1604   Resp 11 03/02/21 1604   SpO2 100 % 03/02/21 1604   Vitals shown include unvalidated device data.

## 2021-03-09 ENCOUNTER — HOSPITAL ENCOUNTER (OUTPATIENT)
Dept: NUCLEAR MEDICINE | Age: 86
Discharge: HOME | End: 2021-03-09
Attending: UROLOGY
Payer: MEDICARE

## 2021-03-09 ENCOUNTER — HOSPITAL ENCOUNTER (OUTPATIENT)
Dept: CT IMAGING | Age: 86
Discharge: HOME | End: 2021-03-09
Attending: UROLOGY
Payer: MEDICARE

## 2021-03-09 DIAGNOSIS — R97.20 ELEVATED PSA: ICD-10-CM

## 2021-03-09 DIAGNOSIS — C61 PROSTATE CANCER (HCC): ICD-10-CM

## 2021-03-09 PROCEDURE — 74176 CT ABD & PELVIS W/O CONTRAST: CPT

## 2021-03-09 PROCEDURE — 74011000636 HC RX REV CODE- 636: Performed by: UROLOGY

## 2021-03-09 RX ADMIN — DIATRIZOATE MEGLUMINE AND DIATRIZOATE SODIUM 30 ML: 600; 100 SOLUTION ORAL; RECTAL at 13:00

## 2021-03-19 ENCOUNTER — TRANSCRIBE ORDER (OUTPATIENT)
Dept: REGISTRATION | Age: 86
End: 2021-03-19

## 2021-03-19 ENCOUNTER — HOSPITAL ENCOUNTER (OUTPATIENT)
Dept: PREADMISSION TESTING | Age: 86
Discharge: HOME | End: 2021-03-19
Payer: MEDICARE

## 2021-03-19 DIAGNOSIS — Z01.818 PRE-OP TESTING: Primary | ICD-10-CM

## 2021-03-19 DIAGNOSIS — Z01.818 PRE-OP TESTING: ICD-10-CM

## 2021-03-19 PROCEDURE — U0003 INFECTIOUS AGENT DETECTION BY NUCLEIC ACID (DNA OR RNA); SEVERE ACUTE RESPIRATORY SYNDROME CORONAVIRUS 2 (SARS-COV-2) (CORONAVIRUS DISEASE [COVID-19]), AMPLIFIED PROBE TECHNIQUE, MAKING USE OF HIGH THROUGHPUT TECHNOLOGIES AS DESCRIBED BY CMS-2020-01-R: HCPCS

## 2021-03-20 LAB — SARS-COV-2, COV2NT: NOT DETECTED

## 2021-03-24 ENCOUNTER — ANESTHESIA EVENT (OUTPATIENT)
Dept: CARDIOTHORACIC SURGERY | Age: 86
End: 2021-03-24
Payer: MEDICARE

## 2021-03-25 ENCOUNTER — ANESTHESIA (OUTPATIENT)
Dept: CARDIOTHORACIC SURGERY | Age: 86
End: 2021-03-25
Payer: MEDICARE

## 2021-03-25 ENCOUNTER — HOSPITAL ENCOUNTER (OUTPATIENT)
Age: 86
Setting detail: OUTPATIENT SURGERY
Discharge: HOME | End: 2021-03-25
Attending: SURGERY | Admitting: SURGERY
Payer: MEDICARE

## 2021-03-25 VITALS
HEIGHT: 66 IN | RESPIRATION RATE: 20 BRPM | TEMPERATURE: 97 F | OXYGEN SATURATION: 100 % | BODY MASS INDEX: 27 KG/M2 | DIASTOLIC BLOOD PRESSURE: 48 MMHG | WEIGHT: 168 LBS | SYSTOLIC BLOOD PRESSURE: 120 MMHG | HEART RATE: 65 BPM

## 2021-03-25 VITALS — OXYGEN SATURATION: 97 % | HEART RATE: 85 BPM | DIASTOLIC BLOOD PRESSURE: 61 MMHG | SYSTOLIC BLOOD PRESSURE: 143 MMHG

## 2021-03-25 DIAGNOSIS — N18.6 ESRD (END STAGE RENAL DISEASE) (HCC): Primary | ICD-10-CM

## 2021-03-25 LAB
ANION GAP SERPL CALC-SCNC: 6 MMOL/L (ref 3–18)
BUN SERPL-MCNC: 22 MG/DL (ref 7–18)
BUN/CREAT SERPL: 5 (ref 12–20)
CALCIUM SERPL-MCNC: 8.6 MG/DL (ref 8.5–10.1)
CHLORIDE SERPL-SCNC: 100 MMOL/L (ref 100–111)
CO2 SERPL-SCNC: 31 MMOL/L (ref 21–32)
CREAT SERPL-MCNC: 4.17 MG/DL (ref 0.6–1.3)
GLUCOSE SERPL-MCNC: 88 MG/DL (ref 74–99)
POTASSIUM SERPL-SCNC: 3.9 MMOL/L (ref 3.5–5.5)
SODIUM SERPL-SCNC: 137 MMOL/L (ref 136–145)

## 2021-03-25 PROCEDURE — 77030010507 HC ADH SKN DERMBND J&J -B: Performed by: SURGERY

## 2021-03-25 PROCEDURE — 76060000034 HC ANESTHESIA 1.5 TO 2 HR: Performed by: SURGERY

## 2021-03-25 PROCEDURE — 74011250636 HC RX REV CODE- 250/636: Performed by: NURSE ANESTHETIST, CERTIFIED REGISTERED

## 2021-03-25 PROCEDURE — C1768 GRAFT, VASCULAR: HCPCS | Performed by: SURGERY

## 2021-03-25 PROCEDURE — 77030002933 HC SUT MCRYL J&J -A: Performed by: SURGERY

## 2021-03-25 PROCEDURE — 74011000250 HC RX REV CODE- 250: Performed by: ANESTHESIOLOGY

## 2021-03-25 PROCEDURE — 77030010512 HC APPL CLP LIG J&J -C: Performed by: SURGERY

## 2021-03-25 PROCEDURE — 77030040922 HC BLNKT HYPOTHRM STRY -A: Performed by: SURGERY

## 2021-03-25 PROCEDURE — 77030002987 HC SUT PROL J&J -B: Performed by: SURGERY

## 2021-03-25 PROCEDURE — 74011000250 HC RX REV CODE- 250: Performed by: SURGERY

## 2021-03-25 PROCEDURE — 77030002996 HC SUT SLK J&J -A: Performed by: SURGERY

## 2021-03-25 PROCEDURE — 99100 ANES PT EXTEME AGE<1 YR&>70: CPT | Performed by: ANESTHESIOLOGY

## 2021-03-25 PROCEDURE — 77030002986 HC SUT PROL J&J -A: Performed by: SURGERY

## 2021-03-25 PROCEDURE — 76210000020 HC REC RM PH II FIRST 0.5 HR: Performed by: SURGERY

## 2021-03-25 PROCEDURE — 2709999900 HC NON-CHARGEABLE SUPPLY: Performed by: SURGERY

## 2021-03-25 PROCEDURE — 76210000006 HC OR PH I REC 0.5 TO 1 HR: Performed by: SURGERY

## 2021-03-25 PROCEDURE — 76010000129 HC CV SURG 1.5 TO 2 HR: Performed by: SURGERY

## 2021-03-25 PROCEDURE — 80048 BASIC METABOLIC PNL TOTAL CA: CPT

## 2021-03-25 PROCEDURE — 01844 ANES VASC SHUNT/SHUNT REVJ: CPT | Performed by: ANESTHESIOLOGY

## 2021-03-25 PROCEDURE — 74011250637 HC RX REV CODE- 250/637: Performed by: NURSE ANESTHETIST, CERTIFIED REGISTERED

## 2021-03-25 PROCEDURE — 77030002924 HC SUT GORTX WLGO -B: Performed by: SURGERY

## 2021-03-25 PROCEDURE — 74011250636 HC RX REV CODE- 250/636: Performed by: ANESTHESIOLOGY

## 2021-03-25 PROCEDURE — 74011250636 HC RX REV CODE- 250/636: Performed by: SURGERY

## 2021-03-25 DEVICE — GRAFT VASC L45CM DIA4-7MM PTFE CBAS HEP SURF STD WALLED: Type: IMPLANTABLE DEVICE | Site: ARM | Status: FUNCTIONAL

## 2021-03-25 RX ORDER — HEPARIN SODIUM 1000 [USP'U]/ML
INJECTION, SOLUTION INTRAVENOUS; SUBCUTANEOUS AS NEEDED
Status: DISCONTINUED | OUTPATIENT
Start: 2021-03-25 | End: 2021-03-25 | Stop reason: HOSPADM

## 2021-03-25 RX ORDER — HEPARIN SODIUM 200 [USP'U]/100ML
INJECTION, SOLUTION INTRAVENOUS
Status: DISCONTINUED
Start: 2021-03-25 | End: 2021-03-25 | Stop reason: HOSPADM

## 2021-03-25 RX ORDER — SODIUM CHLORIDE 0.9 % (FLUSH) 0.9 %
5-40 SYRINGE (ML) INJECTION AS NEEDED
Status: DISCONTINUED | OUTPATIENT
Start: 2021-03-25 | End: 2021-03-25 | Stop reason: HOSPADM

## 2021-03-25 RX ORDER — SODIUM CHLORIDE 9 MG/ML
25 INJECTION, SOLUTION INTRAVENOUS CONTINUOUS
Status: DISCONTINUED | OUTPATIENT
Start: 2021-03-25 | End: 2021-03-25 | Stop reason: HOSPADM

## 2021-03-25 RX ORDER — MIDAZOLAM HYDROCHLORIDE 1 MG/ML
INJECTION, SOLUTION INTRAMUSCULAR; INTRAVENOUS AS NEEDED
Status: DISCONTINUED | OUTPATIENT
Start: 2021-03-25 | End: 2021-03-25 | Stop reason: HOSPADM

## 2021-03-25 RX ORDER — SODIUM CHLORIDE 9 MG/ML
INJECTION, SOLUTION INTRAVENOUS
Status: DISCONTINUED | OUTPATIENT
Start: 2021-03-25 | End: 2021-03-25 | Stop reason: HOSPADM

## 2021-03-25 RX ORDER — LIDOCAINE HYDROCHLORIDE 10 MG/ML
INJECTION, SOLUTION EPIDURAL; INFILTRATION; INTRACAUDAL; PERINEURAL AS NEEDED
Status: DISCONTINUED | OUTPATIENT
Start: 2021-03-25 | End: 2021-03-25 | Stop reason: HOSPADM

## 2021-03-25 RX ORDER — HEPARIN SODIUM 200 [USP'U]/100ML
INJECTION, SOLUTION INTRAVENOUS
Status: COMPLETED | OUTPATIENT
Start: 2021-03-25 | End: 2021-03-25

## 2021-03-25 RX ORDER — FENTANYL CITRATE 50 UG/ML
INJECTION, SOLUTION INTRAMUSCULAR; INTRAVENOUS AS NEEDED
Status: DISCONTINUED | OUTPATIENT
Start: 2021-03-25 | End: 2021-03-25 | Stop reason: HOSPADM

## 2021-03-25 RX ORDER — LIDOCAINE HYDROCHLORIDE 10 MG/ML
0.1 INJECTION, SOLUTION EPIDURAL; INFILTRATION; INTRACAUDAL; PERINEURAL AS NEEDED
Status: DISCONTINUED | OUTPATIENT
Start: 2021-03-25 | End: 2021-03-25 | Stop reason: HOSPADM

## 2021-03-25 RX ORDER — SODIUM CHLORIDE 0.9 % (FLUSH) 0.9 %
5-40 SYRINGE (ML) INJECTION EVERY 8 HOURS
Status: DISCONTINUED | OUTPATIENT
Start: 2021-03-25 | End: 2021-03-25 | Stop reason: HOSPADM

## 2021-03-25 RX ORDER — HYDROCODONE BITARTRATE AND ACETAMINOPHEN 5; 325 MG/1; MG/1
1 TABLET ORAL
Qty: 40 TAB | Refills: 0 | Status: SHIPPED | OUTPATIENT
Start: 2021-03-25 | End: 2021-04-01

## 2021-03-25 RX ORDER — PROPOFOL 10 MG/ML
INJECTION, EMULSION INTRAVENOUS AS NEEDED
Status: DISCONTINUED | OUTPATIENT
Start: 2021-03-25 | End: 2021-03-25 | Stop reason: HOSPADM

## 2021-03-25 RX ORDER — CEFAZOLIN SODIUM 1 G/3ML
INJECTION, POWDER, FOR SOLUTION INTRAMUSCULAR; INTRAVENOUS AS NEEDED
Status: DISCONTINUED | OUTPATIENT
Start: 2021-03-25 | End: 2021-03-25 | Stop reason: HOSPADM

## 2021-03-25 RX ORDER — FAMOTIDINE 20 MG/1
20 TABLET, FILM COATED ORAL ONCE
Status: COMPLETED | OUTPATIENT
Start: 2021-03-25 | End: 2021-03-25

## 2021-03-25 RX ORDER — CEFAZOLIN SODIUM 2 G/50ML
2 SOLUTION INTRAVENOUS ONCE
Status: DISCONTINUED | OUTPATIENT
Start: 2021-03-25 | End: 2021-03-25 | Stop reason: HOSPADM

## 2021-03-25 RX ORDER — KETAMINE HCL 50MG/ML(1)
SYRINGE (ML) INTRAVENOUS AS NEEDED
Status: DISCONTINUED | OUTPATIENT
Start: 2021-03-25 | End: 2021-03-25 | Stop reason: HOSPADM

## 2021-03-25 RX ORDER — LIDOCAINE HYDROCHLORIDE 10 MG/ML
INJECTION, SOLUTION EPIDURAL; INFILTRATION; INTRACAUDAL; PERINEURAL
Status: DISCONTINUED
Start: 2021-03-25 | End: 2021-03-25 | Stop reason: HOSPADM

## 2021-03-25 RX ADMIN — FENTANYL CITRATE 25 MCG: 50 INJECTION, SOLUTION INTRAMUSCULAR; INTRAVENOUS at 09:15

## 2021-03-25 RX ADMIN — SODIUM CHLORIDE: 9 INJECTION, SOLUTION INTRAVENOUS at 07:08

## 2021-03-25 RX ADMIN — Medication 10 MG: at 09:12

## 2021-03-25 RX ADMIN — HEPARIN SODIUM 3000 UNITS: 1000 INJECTION, SOLUTION INTRAVENOUS; SUBCUTANEOUS at 09:20

## 2021-03-25 RX ADMIN — Medication 10 MG: at 08:57

## 2021-03-25 RX ADMIN — Medication 10 MG: at 09:33

## 2021-03-25 RX ADMIN — FAMOTIDINE 20 MG: 20 TABLET ORAL at 06:42

## 2021-03-25 RX ADMIN — FENTANYL CITRATE 25 MCG: 50 INJECTION, SOLUTION INTRAMUSCULAR; INTRAVENOUS at 09:23

## 2021-03-25 RX ADMIN — MIDAZOLAM HYDROCHLORIDE 0.5 MG: 2 INJECTION, SOLUTION INTRAMUSCULAR; INTRAVENOUS at 09:31

## 2021-03-25 RX ADMIN — Medication 10 MG: at 09:23

## 2021-03-25 RX ADMIN — CEFAZOLIN SODIUM 2 G: 1 INJECTION, POWDER, FOR SOLUTION INTRAMUSCULAR; INTRAVENOUS at 07:54

## 2021-03-25 RX ADMIN — PROPOFOL 20 MG: 10 INJECTION, EMULSION INTRAVENOUS at 08:57

## 2021-03-25 RX ADMIN — SODIUM CHLORIDE: 9 INJECTION, SOLUTION INTRAVENOUS at 07:35

## 2021-03-25 RX ADMIN — SODIUM CHLORIDE 25 ML/HR: 900 INJECTION, SOLUTION INTRAVENOUS at 06:42

## 2021-03-25 RX ADMIN — FENTANYL CITRATE 25 MCG: 50 INJECTION, SOLUTION INTRAMUSCULAR; INTRAVENOUS at 09:06

## 2021-03-25 RX ADMIN — FENTANYL CITRATE 25 MCG: 50 INJECTION, SOLUTION INTRAMUSCULAR; INTRAVENOUS at 08:57

## 2021-03-25 RX ADMIN — MIDAZOLAM HYDROCHLORIDE 0.5 MG: 2 INJECTION, SOLUTION INTRAMUSCULAR; INTRAVENOUS at 09:18

## 2021-03-25 RX ADMIN — PROPOFOL 10 MG: 10 INJECTION, EMULSION INTRAVENOUS at 09:05

## 2021-03-25 RX ADMIN — Medication 10 MG: at 09:27

## 2021-03-25 RX ADMIN — PROPOFOL 10 MG: 10 INJECTION, EMULSION INTRAVENOUS at 09:13

## 2021-03-25 RX ADMIN — PROPOFOL 10 MG: 10 INJECTION, EMULSION INTRAVENOUS at 09:10

## 2021-03-25 RX ADMIN — MIDAZOLAM HYDROCHLORIDE 1 MG: 2 INJECTION, SOLUTION INTRAMUSCULAR; INTRAVENOUS at 08:57

## 2021-03-25 NOTE — ANESTHESIA PREPROCEDURE EVALUATION
Relevant Problems   No relevant active problems       Anesthetic History   No history of anesthetic complications            Review of Systems / Medical History  Patient summary reviewed, nursing notes reviewed and pertinent labs reviewed    Pulmonary  Within defined limits                 Neuro/Psych   Within defined limits           Cardiovascular    Hypertension: well controlled          CAD    Exercise tolerance: <4 METS  Comments: Normal cavity size. Mildly increased wall thickness. The calculated EF is 50%. GI/Hepatic/Renal         Renal disease: ESRD and dialysis      Comments: Last dialysis yesterday Endo/Other        Cancer     Other Findings   Comments: Prostate enlargement poss bladder ca.          Physical Exam    Airway  Mallampati: II  TM Distance: 4 - 6 cm  Neck ROM: normal range of motion   Mouth opening: Normal     Cardiovascular    Rhythm: regular  Rate: normal         Dental    Dentition: Full lower dentures and Full upper dentures     Pulmonary  Breath sounds clear to auscultation               Abdominal  GI exam deferred       Other Findings            Anesthetic Plan    ASA: 4  Anesthesia type: MAC          Induction: Intravenous  Anesthetic plan and risks discussed with: Patient      Plan LMA and may change to ETT if surgical findings dictate

## 2021-03-25 NOTE — ANESTHESIA POSTPROCEDURE EVALUATION
Procedure(s):  LEFT ARM ARTERIO VENOUS GRAFT. MAC    Anesthesia Post Evaluation      Multimodal analgesia: multimodal analgesia used between 6 hours prior to anesthesia start to PACU discharge  Patient location during evaluation: bedside  Patient participation: complete - patient cannot participate  Level of consciousness: awake  Pain score: 1  Pain management: adequate  Airway patency: patent  Anesthetic complications: no  Cardiovascular status: acceptable  Respiratory status: acceptable  Hydration status: acceptable  Post anesthesia nausea and vomiting:  none      INITIAL Post-op Vital signs:   Vitals Value Taken Time   /47 03/25/21 1031   Temp 36.6 °C (97.8 °F) 03/25/21 1012   Pulse 68 03/25/21 1033   Resp 16 03/25/21 1033   SpO2 99 % 03/25/21 1033   Vitals shown include unvalidated device data.

## 2021-03-25 NOTE — PERIOP NOTES
TRANSFER - OUT REPORT:    Verbal report given to Zechariah RN(name) on Dat Sanz  being transferred to CVT(unit) for ordered procedure       Report consisted of patients Situation, Background, Assessment and   Recommendations(SBAR). Information from the following report(s) SBAR, Kardex, [facility] and Recent Results was reviewed with the receiving nurse. Lines:   Peripheral IV 03/25/21 Right Forearm (Active)   Site Assessment Clean, dry, & intact 03/25/21 0629   Phlebitis Assessment 0 03/25/21 0629   Infiltration Assessment 0 03/25/21 0629   Dressing Status Clean, dry, & intact; Occlusive;New 03/25/21 0629   Dressing Type Transparent 03/25/21 0629   Hub Color/Line Status Pink; Infusing 03/25/21 1153        Opportunity for questions and clarification was provided.       Patient transported with:   Registered Nurse

## 2021-03-25 NOTE — DISCHARGE INSTRUCTIONS
DISCHARGE SUMMARY from Nurse    PATIENT INSTRUCTIONS:    After general anesthesia or intravenous sedation, for 24 hours or while taking prescription Narcotics:  · Limit your activities  · Do not drive and operate hazardous machinery  · Do not make important personal or business decisions  · Do  not drink alcoholic beverages  · If you have not urinated within 8 hours after discharge, please contact your surgeon on call. Report the following to your surgeon:  · Excessive pain, swelling, redness or odor of or around the surgical area  · Temperature over 100.5  · Nausea and vomiting lasting longer than 4 hours or if unable to take medications  · Any signs of decreased circulation or nerve impairment to extremity: change in color, persistent  numbness, tingling, coldness or increase pain  · Any questions    What to do at Home:  Recommended activity: Activity as tolerated,       *  Please update this list whenever your medications are discontinued, doses are      changed, or new medications (including over-the-counter products) are added. *  Please carry medication information at all times in case of emergency situations. These are general instructions for a healthy lifestyle:    No smoking/ No tobacco products/ Avoid exposure to second hand smoke  Surgeon General's Warning:  Quitting smoking now greatly reduces serious risk to your health. Obesity, smoking, and sedentary lifestyle greatly increases your risk for illness    A healthy diet, regular physical exercise & weight monitoring are important for maintaining a healthy lifestyle    You may be retaining fluid if you have a history of heart failure or if you experience any of the following symptoms:  Weight gain of 3 pounds or more overnight or 5 pounds in a week, increased swelling in our hands or feet or shortness of breath while lying flat in bed.   Please call your doctor as soon as you notice any of these symptoms; do not wait until your next office visit.        The discharge information has been reviewed with the patient. The patient verbalized understanding. Discharge medications reviewed with the patient and appropriate educational materials and side effects teaching were provided.     Patient armband removed and shredded  ___________________________________________________________________________________________________________________________________

## 2021-03-25 NOTE — H&P
Surgery History and Physical    Subjective:      Manny Wright is a 80 y.o.  male who presents with need for perm dialysis access  . Patient Active Problem List    Diagnosis Date Noted    Polyp of ascending colon 10/01/2020    Postobstructive diuresis 09/30/2020    Hydronephrosis 09/29/2020    UTI (urinary tract infection) 09/29/2020    ESRD (end stage renal disease) ([facility] 09/26/2020    Hyperkalemia 20/45/3028    Metabolic acidosis 43/36/3954    Secondary hyperparathyroidism, renal ([facility] 09/26/2020    Anemia 09/26/2020    Acute renal failure (ARF) ([facility] 09/25/2020    CAD (coronary artery disease) 09/01/2016    HTN (hypertension) 09/01/2016    Troponin level elevated 09/01/2016    Traumatic rhabdomyolysis ([facility] 08/29/2016     Past Medical History:   Diagnosis Date    Cancer [facility])     Prostate    Cardiac echocardiogram 08/29/2016    EF 60-65%. No WMA. Mild LVH. Indeterminate diastolic fx. RVSP 35 mmHg. No significant valvular heart disease.  Cardiac nuclear imaging test, abnormal 08/29/2016    Intermediate risk. Previous inferior infarction w/very mild fabiana-infarct ischemia. Inferior hypk. EF 68%. Nondiagnostic EKG on pharm stress test.  Pt's BP increased from 193/96 to 207/83 during study.  Carotid duplex 08/30/2016    Mild <50% bilateral ICA stenosis.       Chronic kidney disease     dialysis    Colonic mass     per Dr Salma Berg notes    Enlarged prostate     Hypertension       Past Surgical History:   Procedure Laterality Date    COLONOSCOPY N/A 10/1/2020    COLONOSCOPY, b'x, w tattoo w polypectomy performed by Marquis Daisy MD at [facility]  10/1/2020         Reba Castro  10/1/2020         COLONOSCPY,FLEX,W/ [facility]  10/1/2020         [place] INSJ TUNNELED CVC W/O SUBQ PORT/ AGE 5 YR/> N/A 9/28/2020    INSERTION TUNNELED CENTRAL VENOUS CATHETER performed by Cynthia Rojo MD at [facility] CATH LAB    VASCULAR SURGERY PROCEDURE UNLIST      dialysis access- right neck      Social History     Tobacco Use    Smoking status: Never Smoker    Smokeless tobacco: Never Used   Substance Use Topics    Alcohol use: No      History reviewed. No pertinent family history. Prior to Admission medications    Medication Sig Start Date End Date Taking? Authorizing Provider   trimethoprim-sulfamethoxazole (BACTRIM DS, SEPTRA DS) 160-800 mg per tablet TAKE 1 TAB BY MOUTH NOW FOR 1 DOSE. BACTRIM ONCE A DAY AFTER HEMODIALYSIS ON HEMODIALYSIS DAYS ONLY 3/1/21  Yes Provider, Historical   bicalutamide (CASODEX) 50 mg tablet Take 1 Tab by mouth daily. 3/19/21  Yes Greg Florez MD   amLODIPine (NORVASC) 10 mg tablet Take  by mouth daily. Yes Provider, Historical   B complex w-C no.20/folic acid (TRIPHROCAPS PO) Take 1 Cap by mouth daily. Yes Provider, Historical   mupirocin (BACTROBAN) 2 % ointment APPLY TO TOES 3 TIMES A DAY 21   Provider, Historical   amLODIPine-atorvastatin (CADUET) 10-40 mg per tablet Take 1 Tab by mouth daily. 10 mg    Provider, Historical   furosemide (LASIX) 40 mg tablet Take 40 mg by mouth daily. Provider, Historical     No Known Allergies      Review of Systems:    A comprehensive review of systems was negative except for that written in the History of Present Illness. Objective:     Patient Vitals for the past 8 hrs:   BP Temp Pulse Resp SpO2 Weight   21 0649 134/72 97.7 °F (36.5 °C) 66 18 100 % 76.2 kg (168 lb)       Temp (24hrs), Av.7 °F (36.5 °C), Min:97.7 °F (36.5 °C), Max:97.7 °F (36.5 °C)      Physical Exam:  LUNG: clear to auscultation bilaterally, HEART: S1, S2 normal, ABDOMEN: soft, non-tender.  Bowel sounds normal. No masses,  no organomegaly    Labs:   Recent Results (from the past 24 hour(s))   METABOLIC PANEL, BASIC    Collection Time: 21  6:22 AM   Result Value Ref Range    Sodium 137 136 - 145 mmol/L    Potassium 3.9 3.5 - 5.5 mmol/L    Chloride 100 100 - 111 mmol/L    CO2 31 21 - 32 mmol/L    Anion gap 6 3.0 - 18 mmol/L    Glucose 88 74 - 99 mg/dL    BUN 22 (H) 7.0 - 18 MG/DL    Creatinine 4.17 (H) 0.6 - 1.3 MG/DL    BUN/Creatinine ratio 5 (L) 12 - 20      GFR est AA 17 (L) >60 ml/min/1.73m2    GFR est non-AA 14 (L) >60 ml/min/1.73m2    Calcium 8.6 8.5 - 10.1 MG/DL       Data Review:    BMP:   Lab Results   Component Value Date/Time    Glucose 88 03/25/2021 06:22 AM    Sodium 137 03/25/2021 06:22 AM    Potassium 3.9 03/25/2021 06:22 AM    Chloride 100 03/25/2021 06:22 AM    CO2 31 03/25/2021 06:22 AM    BUN 22 (H) 03/25/2021 06:22 AM    Creatinine 4.17 (H) 03/25/2021 06:22 AM    Calcium 8.6 03/25/2021 06:22 AM       Assessment:     Active Problems:    ESRD (end stage renal disease) ([facility] (9/26/2020)        Plan:     Left arm avg vs avf    Signed By: Nick Jansen MD     March 25, 2021

## 2021-03-25 NOTE — OP NOTES
Preoperative diagnosis: End-stage renal disease    Postoperative diagnosis: Same    Procedures performed:  #1  Creation left arm brachial axillary arteriovenous graft      Cultures: None    Specimens: None    Drains: None    Estimated blood loss: Less than 50 mL    Assistants: None    Implants: Please see above    Complications: None    Anesthesia: Moderate conscious sedation provided by anesthesia. Indications for the procedure:  Samantha Clifton is a 80 y.o. impending need for dialysis permanent access in the arm. Patient was given the appropriate risk and benefits of the procedure including but not limited to bleeding, infection, damage to adjacent structures, MI, stroke, death, loss of lower extremity, need for further surgery. Patient was understanding of all the risks and underwent a procedure. Operative findings:   #1  Successful creation upper arm graft left side    Procedure:  Patient was correctly identified in the pre op area taken to the operating room in stable condition. Patient had pre-incision timeout prior to any incision. Patient was prepped and draped in the normal sterile fashion according to CDC guidelines aseptic technique. Appropriate antibiotics of been given. Localized 1% lidocaine and made incisions directly over the pulse at the antecubital fossa and in the axilla. Dissected out the brachial artery is soft and appropriate for access. Cutdown and dissected out the axillary vein placed Vesseloops on all the sites made a curvilinear tunnel and placed a 4 to 7 mm tapered propatent graft. Anticoagulated the patient then again arterial controls first main arteriotomy cut the 4 mm endograft on a bevel and sewed it end-to-side to the artery. Clamped the graft with controls off there was a good pulse in the artery again.   Again the vein controls made a venotomy and sewed the graft on a bevel end-to-side to the vein also using CV 6 Custer-Armand suture circular in standard fashion upon completion of the anastomosis to open up all the controls there was a wonderful thrill throughout the graft irrigated and closed in layers 3-0 Monocryl for subcutaneous fascia and 4 Monocryl and Dermabond glue for the skin.   Transferred recovery in stable condition with no pain in his hand

## 2021-03-31 PROBLEM — R63.0 DECREASE IN APPETITE: Status: ACTIVE | Noted: 2020-09-25

## 2021-03-31 PROBLEM — M19.90 OSTEOARTHROSIS: Status: ACTIVE | Noted: 2020-09-25

## 2021-03-31 PROBLEM — R97.20 ELEVATED PROSTATE SPECIFIC ANTIGEN (PSA): Status: ACTIVE | Noted: 2020-09-25

## 2021-03-31 PROBLEM — R06.02 SHORTNESS OF BREATH: Status: ACTIVE | Noted: 2020-09-25

## 2021-03-31 PROBLEM — N18.9 CHRONIC RENAL FAILURE: Status: ACTIVE | Noted: 2020-09-25

## 2021-04-06 ENCOUNTER — HOSPITAL ENCOUNTER (OUTPATIENT)
Dept: LAB | Age: 86
Discharge: HOME | End: 2021-04-06
Payer: MEDICARE

## 2021-04-06 LAB
ALBUMIN SERPL-MCNC: 3.4 G/DL (ref 3.4–5)
ALBUMIN/GLOB SERPL: 0.9 {RATIO} (ref 0.8–1.7)
ALP SERPL-CCNC: 144 U/L (ref 45–117)
ALT SERPL-CCNC: 10 U/L (ref 16–61)
ANION GAP SERPL CALC-SCNC: 8 MMOL/L (ref 3–18)
AST SERPL-CCNC: 17 U/L (ref 10–38)
BILIRUB SERPL-MCNC: 0.4 MG/DL (ref 0.2–1)
BUN SERPL-MCNC: 29 MG/DL (ref 7–18)
BUN/CREAT SERPL: 7 (ref 12–20)
CALCIUM SERPL-MCNC: 8.6 MG/DL (ref 8.5–10.1)
CHLORIDE SERPL-SCNC: 98 MMOL/L (ref 100–111)
CHOLEST SERPL-MCNC: 180 MG/DL
CO2 SERPL-SCNC: 29 MMOL/L (ref 21–32)
CREAT SERPL-MCNC: 4.31 MG/DL (ref 0.6–1.3)
CRP SERPL-MCNC: 1.4 MG/DL (ref 0–0.3)
ERYTHROCYTE [SEDIMENTATION RATE] IN BLOOD: 44 MM/HR (ref 0–20)
GLOBULIN SER CALC-MCNC: 3.7 G/DL (ref 2–4)
GLUCOSE SERPL-MCNC: 81 MG/DL (ref 74–99)
HDLC SERPL-MCNC: 59 MG/DL (ref 40–60)
HDLC SERPL: 3.1 {RATIO} (ref 0–5)
LDLC SERPL CALC-MCNC: 102.2 MG/DL (ref 0–100)
LIPID PROFILE,FLP: ABNORMAL
POTASSIUM SERPL-SCNC: 4.1 MMOL/L (ref 3.5–5.5)
PROT SERPL-MCNC: 7.1 G/DL (ref 6.4–8.2)
SODIUM SERPL-SCNC: 135 MMOL/L (ref 136–145)
TRIGL SERPL-MCNC: 94 MG/DL (ref ?–150)
URATE SERPL-MCNC: 4.3 MG/DL (ref 2.6–7.2)
VLDLC SERPL CALC-MCNC: 18.8 MG/DL

## 2021-04-06 PROCEDURE — 80053 COMPREHEN METABOLIC PANEL: CPT

## 2021-04-06 PROCEDURE — 85652 RBC SED RATE AUTOMATED: CPT

## 2021-04-06 PROCEDURE — 80061 LIPID PANEL: CPT

## 2021-04-06 PROCEDURE — 84550 ASSAY OF BLOOD/URIC ACID: CPT

## 2021-04-06 PROCEDURE — 86140 C-REACTIVE PROTEIN: CPT

## 2021-04-06 PROCEDURE — 36415 COLL VENOUS BLD VENIPUNCTURE: CPT

## 2021-05-07 ENCOUNTER — HOSPITAL ENCOUNTER (EMERGENCY)
Age: 86
Discharge: HOME | End: 2021-05-07
Attending: EMERGENCY MEDICINE
Payer: MEDICARE

## 2021-05-07 VITALS
DIASTOLIC BLOOD PRESSURE: 57 MMHG | HEART RATE: 84 BPM | TEMPERATURE: 98.8 F | RESPIRATION RATE: 18 BRPM | SYSTOLIC BLOOD PRESSURE: 151 MMHG | OXYGEN SATURATION: 100 %

## 2021-05-07 DIAGNOSIS — T83.9XXA FOLEY CATHETER PROBLEM, INITIAL ENCOUNTER (HCC): Primary | ICD-10-CM

## 2021-05-07 PROCEDURE — 51702 INSERT TEMP BLADDER CATH: CPT

## 2021-05-07 PROCEDURE — 99282 EMERGENCY DEPT VISIT SF MDM: CPT

## 2021-05-07 PROCEDURE — 99283 EMERGENCY DEPT VISIT LOW MDM: CPT

## 2021-05-07 NOTE — ED PROVIDER NOTES
EMERGENCY DEPARTMENT HISTORY AND PHYSICAL EXAM    4:54 AM  Date: 5/7/2021  Patient Name: Terrie Arana    History of Presenting Illness     Chief Complaint   Patient presents with    Urinary Catheter Problem        History Provided By: Patient    HPI: Terrie Arana is a 80 y.o. male with history of multiple medical problems as below including indwelling Burris catheter. Patient is here tonight because he thinks his Burris was pulled out because he is leaking urine around it and feels some pelvic discomfort. Denies nausea, vomiting or pain. Denies change in urine color. Location:  Severity:  Timing/course: Onset/Duration:     PCP: Raisa Pierson MD    Past History     Past Medical History:  Past Medical History:   Diagnosis Date    Cancer [facility])     Prostate    Cardiac echocardiogram 08/29/2016    EF 60-65%. No WMA. Mild LVH. Indeterminate diastolic fx. RVSP 35 mmHg. No significant valvular heart disease.  Cardiac nuclear imaging test, abnormal 08/29/2016    Intermediate risk. Previous inferior infarction w/very mild fabiana-infarct ischemia. Inferior hypk. EF 68%. Nondiagnostic EKG on pharm stress test.  Pt's BP increased from 193/96 to 207/83 during study.  Carotid duplex 08/30/2016    Mild <50% bilateral ICA stenosis.       Chronic kidney disease     dialysis    Colonic mass     per Dr Campuzano Render notes    Enlarged prostate     Hypertension        Past Surgical History:  Past Surgical History:   Procedure Laterality Date    COLONOSCOPY N/A 10/1/2020    COLONOSCOPY, b'x, w tattoo w polypectomy performed by Christy Harrison MD at [address] [place] [address]  10/1/2020         [facility]  10/1/2020         COLONOSCPY,FLEX,W/ [facility] [place]  10/1/2020         [place] INSJ TUNNELED CVC W/O SUBQ PORT/ AGE 5 YR/> N/A 9/28/2020    INSERTION TUNNELED CENTRAL VENOUS CATHETER performed by Melchor Dyer MD at [facility] CATH LAB   [facility] dialysis access- right neck       Family History:  No family history on file. Social History:  Social History     Tobacco Use    Smoking status: Never Smoker    Smokeless tobacco: Never Used   Substance Use Topics    Alcohol use: No    Drug use: Never       Allergies:  No Known Allergies    Review of Systems   Review of Systems   Genitourinary: Positive for difficulty urinating. All other systems reviewed and are negative. Physical Exam     Patient Vitals for the past 12 hrs:   Temp Pulse Resp BP SpO2   05/07/21 0410 98.8 °F (37.1 °C) 84 18 (!) 151/57 100 %       Physical Exam  Vitals signs and nursing note reviewed. Constitutional:       Appearance: Normal appearance. HENT:      Head: Normocephalic and atraumatic. Eyes:      Extraocular Movements: Extraocular movements intact. Neck:      Musculoskeletal: Normal range of motion and neck supple. Cardiovascular:      Rate and Rhythm: Normal rate. Pulmonary:      Effort: Pulmonary effort is normal. No respiratory distress. Abdominal:      General: There is no distension. Palpations: Abdomen is soft. Tenderness: There is no abdominal tenderness. Musculoskeletal: Normal range of motion. Skin:     General: Skin is warm and dry. Neurological:      General: No focal deficit present. Mental Status: He is alert and oriented to person, place, and time. Psychiatric:         Mood and Affect: Mood normal.         Behavior: Behavior normal.         Diagnostic Study Results     Labs -  No results found for this or any previous visit (from the past 12 hour(s)). Radiologic Studies -   No results found. Medical Decision Making     ED Course: Progress Notes, Reevaluation, and Consults:    4:54 AM Initial assessment performed. The patients presenting problems have been discussed, and they/their family are in agreement with the care plan formulated and outlined with them.   I have encouraged them to ask questions as they arise throughout their visit. Provider Notes (Medical Decision Making): 70-year-old male presenting with Burris catheter blockage versus dislodgment, urine leaking around it and distended bladder with no urine in the bag. Burris was advanced and balloon was inflated more with no changes in his symptoms and no drainage so it was changed which resulted in immediate relief of his symptoms and urine drainage. I have no concern for UTI patient is afebrile and does not have complaints in the urine appears clear. Will discharge. Procedures:     Critical Care Time:     Vital Signs-Reviewed the patient's vital signs. Reviewed pt's pulse ox reading. EKG: Interpreted by the EP. Time Interpreted:    Rate:    Rhythm:    Interpretation:   Comparison:     Records Reviewed: Nursing Notes (Time of Review: 4:54 AM)  -I am the first provider for this patient.  -I reviewed the vital signs, available nursing notes, past medical history, past surgical history, family history and social history. Current Outpatient Medications   Medication Sig Dispense Refill    abiraterone (Zytiga) 250 mg tab Take four tablets by mouth daily on an empty stomach. Take one hour prior to food or two hours after food. Tablets should be swallowed whole with water. Do not crush or chew tablets. 120 Tab 5    predniSONE (DELTASONE) 5 mg tablet Take 1 Tab by mouth two (2) times a day. 60 Tab 5    mupirocin (BACTROBAN) 2 % ointment APPLY TO TOES 3 TIMES A DAY      trimethoprim-sulfamethoxazole (BACTRIM DS, SEPTRA DS) 160-800 mg per tablet TAKE 1 TAB BY MOUTH NOW FOR 1 DOSE. BACTRIM ONCE A DAY AFTER HEMODIALYSIS ON HEMODIALYSIS DAYS ONLY      bicalutamide (CASODEX) 50 mg tablet Take 1 Tab by mouth daily. 14 Tab 0    B complex w-C no.20/folic acid (TRIPHROCAPS PO) Take 1 Cap by mouth daily.  amLODIPine-atorvastatin (CADUET) 10-40 mg per tablet Take 1 Tab by mouth daily.  10 mg      furosemide (LASIX) 40 mg tablet Take 40 mg by mouth daily. Clinical Impression     Clinical Impression: No diagnosis found. Disposition: dc  .    This note was dictated utilizing voice recognition software which may lead to typographical errors. I apologize in advance if the situation occurs. If questions arise please do not hesitate to contact me or call our department.     Donal Estrada MD  4:54 AM

## 2021-05-09 ENCOUNTER — HOSPITAL ENCOUNTER (EMERGENCY)
Age: 86
Discharge: HOME | End: 2021-05-09
Attending: EMERGENCY MEDICINE
Payer: MEDICARE

## 2021-05-09 VITALS
OXYGEN SATURATION: 100 % | DIASTOLIC BLOOD PRESSURE: 68 MMHG | TEMPERATURE: 98.5 F | SYSTOLIC BLOOD PRESSURE: 116 MMHG | RESPIRATION RATE: 18 BRPM | HEART RATE: 76 BPM

## 2021-05-09 DIAGNOSIS — N18.6 ESRD ON DIALYSIS (HCC): ICD-10-CM

## 2021-05-09 DIAGNOSIS — Z99.2 ESRD ON DIALYSIS (HCC): ICD-10-CM

## 2021-05-09 DIAGNOSIS — T83.021A DISPLACEMENT OF FOLEY CATHETER, INITIAL ENCOUNTER (HCC): Primary | ICD-10-CM

## 2021-05-09 LAB
ALBUMIN SERPL-MCNC: 3.6 G/DL (ref 3.4–5)
ALBUMIN/GLOB SERPL: 0.8 {RATIO} (ref 0.8–1.7)
ALP SERPL-CCNC: 157 U/L (ref 45–117)
ALT SERPL-CCNC: 16 U/L (ref 16–61)
ANION GAP SERPL CALC-SCNC: 8 MMOL/L (ref 3–18)
AST SERPL-CCNC: 20 U/L (ref 10–38)
BASOPHILS # BLD: 0.1 K/UL (ref 0–0.1)
BASOPHILS NFR BLD: 1 % (ref 0–2)
BILIRUB SERPL-MCNC: 0.4 MG/DL (ref 0.2–1)
BUN SERPL-MCNC: 56 MG/DL (ref 7–18)
BUN/CREAT SERPL: 9 (ref 12–20)
CALCIUM SERPL-MCNC: 8.8 MG/DL (ref 8.5–10.1)
CHLORIDE SERPL-SCNC: 99 MMOL/L (ref 100–111)
CO2 SERPL-SCNC: 26 MMOL/L (ref 21–32)
CREAT SERPL-MCNC: 6.25 MG/DL (ref 0.6–1.3)
DIFFERENTIAL METHOD BLD: ABNORMAL
EOSINOPHIL # BLD: 0.4 K/UL (ref 0–0.4)
EOSINOPHIL NFR BLD: 5 % (ref 0–5)
ERYTHROCYTE [DISTWIDTH] IN BLOOD BY AUTOMATED COUNT: 13.6 % (ref 11.6–14.5)
GLOBULIN SER CALC-MCNC: 4.4 G/DL (ref 2–4)
GLUCOSE SERPL-MCNC: 97 MG/DL (ref 74–99)
HCT VFR BLD AUTO: 31.2 % (ref 36–48)
HGB BLD-MCNC: 10.6 G/DL (ref 13–16)
LYMPHOCYTES # BLD: 1.6 K/UL (ref 0.9–3.6)
LYMPHOCYTES NFR BLD: 20 % (ref 21–52)
MCH RBC QN AUTO: 31 PG (ref 24–34)
MCHC RBC AUTO-ENTMCNC: 34 G/DL (ref 31–37)
MCV RBC AUTO: 91.2 FL (ref 74–97)
MONOCYTES # BLD: 0.7 K/UL (ref 0.05–1.2)
MONOCYTES NFR BLD: 9 % (ref 3–10)
NEUTS SEG # BLD: 5 K/UL (ref 1.8–8)
NEUTS SEG NFR BLD: 64 % (ref 40–73)
PLATELET # BLD AUTO: 218 K/UL (ref 135–420)
PMV BLD AUTO: 9.9 FL (ref 9.2–11.8)
POTASSIUM SERPL-SCNC: 4 MMOL/L (ref 3.5–5.5)
PROT SERPL-MCNC: 8 G/DL (ref 6.4–8.2)
RBC # BLD AUTO: 3.42 M/UL (ref 4.35–5.65)
SODIUM SERPL-SCNC: 133 MMOL/L (ref 136–145)
WBC # BLD AUTO: 7.8 K/UL (ref 4.6–13.2)

## 2021-05-09 PROCEDURE — 80053 COMPREHEN METABOLIC PANEL: CPT

## 2021-05-09 PROCEDURE — 99282 EMERGENCY DEPT VISIT SF MDM: CPT

## 2021-05-09 PROCEDURE — 85025 COMPLETE CBC W/AUTO DIFF WBC: CPT

## 2021-05-09 PROCEDURE — 51702 INSERT TEMP BLADDER CATH: CPT

## 2021-05-09 PROCEDURE — 51798 US URINE CAPACITY MEASURE: CPT

## 2021-05-09 NOTE — ED TRIAGE NOTES
Per patient family patient had a michel cath placed on Friday and there has not been any drainage since yesterday. Pt also reports lower abdominal pressure. 36.6

## 2021-05-09 NOTE — ED PROVIDER NOTES
EMERGENCY DEPARTMENT HISTORY AND PHYSICAL EXAM    Date: 5/9/2021  Patient Name: Oracio Reyna    History of Presenting Illness     No chief complaint on file. History Provided By: Patient      Additional History (Context): Oracio Reyna is a 80 y.o. male with prostate CA who presents with indwelling Burris replaced last week in the emergency department now with some leaking around the tubing. He said he woke with his underwear wet. He is never had this problem previously and has been using a Burris for 3 years. Denies any pelvic pain dysuria fever flank pain. PCP: Malinda Haftield MD    Current Outpatient Medications   Medication Sig Dispense Refill    abiraterone (Zytiga) 250 mg tab Take four tablets by mouth daily on an empty stomach. Take one hour prior to food or two hours after food. Tablets should be swallowed whole with water. Do not crush or chew tablets. 120 Tab 5    predniSONE (DELTASONE) 5 mg tablet Take 1 Tab by mouth two (2) times a day. 60 Tab 5    mupirocin (BACTROBAN) 2 % ointment APPLY TO TOES 3 TIMES A DAY      trimethoprim-sulfamethoxazole (BACTRIM DS, SEPTRA DS) 160-800 mg per tablet TAKE 1 TAB BY MOUTH NOW FOR 1 DOSE. BACTRIM ONCE A DAY AFTER HEMODIALYSIS ON HEMODIALYSIS DAYS ONLY      bicalutamide (CASODEX) 50 mg tablet Take 1 Tab by mouth daily. 14 Tab 0    B complex w-C no.20/folic acid (TRIPHROCAPS PO) Take 1 Cap by mouth daily.  amLODIPine-atorvastatin (CADUET) 10-40 mg per tablet Take 1 Tab by mouth daily. 10 mg      furosemide (LASIX) 40 mg tablet Take 40 mg by mouth daily. Past History     Past Medical History:  Past Medical History:   Diagnosis Date    Cancer [facility])     Prostate    Cardiac echocardiogram 08/29/2016    EF 60-65%. No WMA. Mild LVH. Indeterminate diastolic fx. RVSP 35 mmHg. No significant valvular heart disease.  Cardiac nuclear imaging test, abnormal 08/29/2016    Intermediate risk.   Previous inferior infarction w/very mild fabiana-infarct ischemia. Inferior hypk. EF 68%. Nondiagnostic EKG on pharm stress test.  Pt's BP increased from 193/96 to 207/83 during study.  Carotid duplex 08/30/2016    Mild <50% bilateral ICA stenosis.  Chronic kidney disease     dialysis    Colonic mass     per Dr Nicole Will notes    Enlarged prostate     Hypertension        Past Surgical History:  Past Surgical History:   Procedure Laterality Date    COLONOSCOPY N/A 10/1/2020    COLONOSCOPY, b'x, w tattoo w polypectomy performed [facility] at [facility]  10/1/2020         [place]  10/1/2020         [place] [facility] [place]  10/1/2020         [place] INSJ TUNNELED CVC W/O SUBQ PORT/ AGE 5 YR/> N/A 9/28/2020    INSERTION TUNNELED CENTRAL VENOUS CATHETER performed by Wilder Vidal MD at [facility] CATH LAB    VASCULAR SURGERY PROCEDURE UNLIST      dialysis access- right neck       Family History:  No family history on file. Social History:  Social History     Tobacco Use    Smoking status: Never Smoker    Smokeless tobacco: Never Used   Substance Use Topics    Alcohol use: No    Drug use: Never       Allergies:  No Known Allergies      Review of Systems   Review of Systems   Constitutional: Negative for fever. HENT: Negative. Eyes: Negative. Respiratory: Negative. Cardiovascular: Negative. Gastrointestinal: Negative. Endocrine: Negative. Genitourinary: Negative for difficulty urinating, discharge, dysuria, enuresis, flank pain, frequency, genital sores, hematuria, penile pain and testicular pain. Musculoskeletal: Negative. Skin: Negative. Allergic/Immunologic: Negative. Neurological: Negative. Psychiatric/Behavioral: Negative. All Other Systems Negative  Physical Exam     Vitals:    05/09/21 1049   BP: 116/68   Pulse: 76   Resp: 18   Temp: 98.5 °F (36.9 °C)   SpO2: 100%     Physical Exam  Vitals signs and nursing note reviewed. Constitutional:       General: He is not in acute distress. Appearance: He is well-developed. He is not ill-appearing, toxic-appearing or diaphoretic. HENT:      Head: Normocephalic and atraumatic. Neck:      Musculoskeletal: Normal range of motion and neck supple. Thyroid: No thyromegaly. Vascular: No carotid bruit. Trachea: No tracheal deviation. Cardiovascular:      Rate and Rhythm: Normal rate and regular rhythm. Heart sounds: Normal heart sounds. No murmur. No friction rub. No gallop. Pulmonary:      Effort: Pulmonary effort is normal. No respiratory distress. Breath sounds: Normal breath sounds. No stridor. No wheezing or rales. Chest:      Chest wall: No tenderness. Abdominal:      General: There is no distension. Palpations: Abdomen is soft. There is no mass. Tenderness: There is no abdominal tenderness. There is no right CVA tenderness, left CVA tenderness, guarding or rebound. Musculoskeletal: Normal range of motion. Skin:     General: Skin is warm and dry. Coloration: Skin is not pale. Neurological:      Mental Status: He is alert. Psychiatric:         Speech: Speech normal.         Behavior: Behavior normal.         Thought Content: Thought content normal.         Judgment: Judgment normal.            Diagnostic Study Results     Labs -     Recent Results (from the past 12 hour(s))   CBC WITH AUTOMATED DIFF    Collection Time: 05/09/21 12:33 PM   Result Value Ref Range    WBC 7.8 4.6 - 13.2 K/uL    RBC 3.42 (L) 4.35 - 5.65 M/uL    HGB 10.6 (L) 13.0 - 16.0 g/dL    HCT 31.2 (L) 36.0 - 48.0 %    MCV 91.2 74.0 - 97.0 FL    MCH 31.0 24.0 - 34.0 PG    MCHC 34.0 31.0 - 37.0 g/dL    RDW 13.6 11.6 - 14.5 %    PLATELET 273 368 - 476 K/uL    MPV 9.9 9.2 - 11.8 FL    NEUTROPHILS 64 40 - 73 %    LYMPHOCYTES 20 (L) 21 - 52 %    MONOCYTES 9 3 - 10 %    EOSINOPHILS 5 0 - 5 %    BASOPHILS 1 0 - 2 %    ABS. NEUTROPHILS 5.0 1.8 - 8.0 K/UL    ABS. LYMPHOCYTES 1.6 0.9 - 3.6 K/UL    ABS. MONOCYTES 0.7 0.05 - 1.2 K/UL    ABS. EOSINOPHILS 0.4 0.0 - 0.4 K/UL    ABS. BASOPHILS 0.1 0.0 - 0.1 K/UL    DF AUTOMATED     METABOLIC PANEL, COMPREHENSIVE    Collection Time: 05/09/21 12:33 PM   Result Value Ref Range    Sodium 133 (L) 136 - 145 mmol/L    Potassium 4.0 3.5 - 5.5 mmol/L    Chloride 99 (L) 100 - 111 mmol/L    CO2 26 21 - 32 mmol/L    Anion gap 8 3.0 - 18 mmol/L    Glucose 97 74 - 99 mg/dL    BUN 56 (H) 7.0 - 18 MG/DL    Creatinine 6.25 (H) 0.6 - 1.3 MG/DL    BUN/Creatinine ratio 9 (L) 12 - 20      GFR est AA 10 (L) >60 ml/min/1.73m2    GFR est non-AA 9 (L) >60 ml/min/1.73m2    Calcium 8.8 8.5 - 10.1 MG/DL    Bilirubin, total 0.4 0.2 - 1.0 MG/DL    ALT (SGPT) 16 16 - 61 U/L    AST (SGOT) 20 10 - 38 U/L    Alk. phosphatase 157 (H) 45 - 117 U/L    Protein, total 8.0 6.4 - 8.2 g/dL    Albumin 3.6 3.4 - 5.0 g/dL    Globulin 4.4 (H) 2.0 - 4.0 g/dL    A-G Ratio 0.8 0.8 - 1.7         Radiologic Studies -   No orders to display     CT Results  (Last 48 hours)    None        CXR Results  (Last 48 hours)    None            Medical Decision Making   I am the first provider for this patient. I reviewed the vital signs, available nursing notes, past medical history, past surgical history, family history and social history. Vital Signs-Reviewed the patient's vital signs. Procedures:  Procedures    Provider Notes (Medical Decision Making): michel replaced w/18g. No additional leakage. No urinary complaints; do not obtain specimen. Creatinine doubled but is already a dialysis pt. D/c home. MED RECONCILIATION:  No current facility-administered medications for this encounter. Current Outpatient Medications   Medication Sig    abiraterone (Zytiga) 250 mg tab Take four tablets by mouth daily on an empty stomach. Take one hour prior to food or two hours after food. Tablets should be swallowed whole with water. Do not crush or chew tablets.     predniSONE (DELTASONE) 5 mg tablet Take 1 Tab by mouth two (2) times a day.  mupirocin (BACTROBAN) 2 % ointment APPLY TO TOES 3 TIMES A DAY    trimethoprim-sulfamethoxazole (BACTRIM DS, SEPTRA DS) 160-800 mg per tablet TAKE 1 TAB BY MOUTH NOW FOR 1 DOSE. BACTRIM ONCE A DAY AFTER HEMODIALYSIS ON HEMODIALYSIS DAYS ONLY    bicalutamide (CASODEX) 50 mg tablet Take 1 Tab by mouth daily.  B complex w-C no.20/folic acid (TRIPHROCAPS PO) Take 1 Cap by mouth daily.  amLODIPine-atorvastatin (CADUET) 10-40 mg per tablet Take 1 Tab by mouth daily. 10 mg    furosemide (LASIX) 40 mg tablet Take 40 mg by mouth daily. Disposition:  home    DISCHARGE NOTE:   1:30 PM    Pt has been reexamined. Patient has no new complaints, changes, or physical findings. Care plan outlined and precautions discussed. Results of labs were reviewed with the patient. All medications were reviewed with the patient. All of pt's questions and concerns were addressed. Patient was instructed and agrees to follow up with Urology, as well as to return to the ED upon further deterioration. Patient is ready to go home. Follow-up Information     Follow up With Specialties Details Why Contact Info    Gio Garcia MD [facility] Medicine Schedule an appointment as soon as possible for a visit in 2 days  [address] [address]  [phone]      [facility]  Schedule an appointment as soon as possible for a visit in 1 day  [facility] [address] [facility] EMERGENCY DEPT Emergency Medicine  If symptoms worsen return immediaetly [address] [place] [id number]  [phone]          Current Discharge Medication List            Diagnosis     Clinical Impression:   1. Displacement of Burris catheter, initial encounter ([facility] [address]    2.  ESRD on dialysis [facility])

## 2021-05-13 ENCOUNTER — HOSPITAL ENCOUNTER (OUTPATIENT)
Age: 86
Discharge: HOME | End: 2021-05-13
Attending: NURSE PRACTITIONER
Payer: MEDICARE

## 2021-05-13 DIAGNOSIS — K76.9 HEPATIC LESION: ICD-10-CM

## 2021-05-13 PROCEDURE — 74181 MRI ABDOMEN W/O CONTRAST: CPT

## 2021-08-05 NOTE — ANESTHESIA PREPROCEDURE EVALUATION
Relevant Problems   No relevant active problems       Anesthetic History   No history of anesthetic complications            Review of Systems / Medical History  Patient summary reviewed and pertinent labs reviewed    Pulmonary  Within defined limits                 Neuro/Psych   Within defined limits           Cardiovascular    Hypertension          CAD    Exercise tolerance: <4 METS     GI/Hepatic/Renal         Renal disease: dialysis and ESRD       Endo/Other        Arthritis     Other Findings              Physical Exam    Airway  Mallampati: III  TM Distance: 4 - 6 cm  Neck ROM: decreased range of motion   Mouth opening: Diminished (comment)     Cardiovascular    Rhythm: regular  Rate: normal         Dental    Dentition: Full lower dentures and Full upper dentures     Pulmonary  Breath sounds clear to auscultation               Abdominal  GI exam deferred       Other Findings            Anesthetic Plan    ASA: 4  Anesthesia type: MAC            Anesthetic plan and risks discussed with: Patient

## 2021-08-05 NOTE — H&P
Surgery History and Physical    Subjective:      Cate Dotson is a 80 y.o.  male who presents with steal syndrome left arm. Patient Active Problem List    Diagnosis Date Noted    Polyp of ascending colon 10/01/2020    Postobstructive diuresis 09/30/2020    Hydronephrosis 09/29/2020    UTI (urinary tract infection) 09/29/2020    ESRD (end stage renal disease) ([facility] [address] 09/26/2020    Hyperkalemia 95/62/8922    Metabolic acidosis 77/76/4522    Secondary hyperparathyroidism, renal ([facility] [address] 09/26/2020    Anemia 09/26/2020    Acute renal failure (ARF) ([facility] [address] 09/25/2020    Decrease in appetite 09/25/2020    Elevated prostate specific antigen (PSA) 09/25/2020    Osteoarthrosis 09/25/2020    Shortness of breath 09/25/2020    Chronic renal failure 09/25/2020    CAD (coronary artery disease) 09/01/2016    Essential (primary) hypertension 09/01/2016    Troponin level elevated 09/01/2016    Traumatic rhabdomyolysis ([facility] [address] 08/29/2016     Past Medical History:   Diagnosis Date    Cancer [facility])     Prostate    Cardiac echocardiogram 08/29/2016    EF 60-65%. No WMA. Mild LVH. Indeterminate diastolic fx. RVSP 35 mmHg. No significant valvular heart disease.  Cardiac nuclear imaging test, abnormal 08/29/2016    Intermediate risk. Previous inferior infarction w/very mild fabiana-infarct ischemia. Inferior hypk. EF 68%. Nondiagnostic EKG on pharm stress test.  Pt's BP increased from 193/96 to 207/83 during study.  Carotid duplex 08/30/2016    Mild <50% bilateral ICA stenosis.       Chronic kidney disease     dialysis    Colonic mass     per Dr Omega Ramírez notes    Enlarged prostate     Hypertension       Past Surgical History:   Procedure Laterality Date    COLONOSCOPY N/A 10/1/2020    COLONOSCOPY, b'x, w tattoo w polypectomy performed by Elisha Cutler MD at [facility]  10/1/2020         COLONOSCOPY,EARL Guzman  10/1/2020         [facility]  [facility]         NH INSJ TUNNELED CVC W/O SUBQ PORT/ AGE 5 YR/> N/A 2020    INSERTION TUNNELED CENTRAL VENOUS CATHETER performed by [facility]undrea [facility]yes MD at [facility]    VASCULAR SURGERY PROCEDURE UNLIST      dialysis access- right neck      Social History     Tobacco Use    Smoking status: Never Smoker    Smokeless tobacco: Never Used   Substance Use Topics    Alcohol use: No      History reviewed. No pertinent family history. Prior to Admission medications    Medication Sig Start Date End Date Taking? Authorizing Provider   denosumab (XGEVA) soln injection 1.7 mL by SubCUTAneous route once for 1 dose. 21  Robb Davis NP   abiraterone (Zytiga) 250 mg tab Take four tablets by mouth daily on an empty stomach. Take one hour prior to food or two hours after food. Tablets should be swallowed whole with water. Do not crush or chew tablets. 21   Brandi Riggs MD   predniSONE (DELTASONE) 5 mg tablet Take 1 Tab by mouth two (2) times a day. 21   Brandi Riggs MD   mupirocin (BACTROBAN) 2 % ointment APPLY TO TOES 3 TIMES A DAY 21   Provider, Historical   bicalutamide (CASODEX) 50 mg tablet Take 1 Tab by mouth daily. 3/19/21   Radhika Arreola MD   B complex w-C no.20/folic acid (TRIPHROCAPS PO) Take 1 Cap by mouth daily. Provider, Historical   amLODIPine-atorvastatin (CADUET) 10-40 mg per tablet Take 1 Tab by mouth daily. 10 mg    Provider, Historical   furosemide (LASIX) 40 mg tablet Take 40 mg by mouth daily. Provider, Historical     No Known Allergies      Review of Systems:    A comprehensive review of systems was negative except for that written in the History of Present Illness.     Objective:     Patient Vitals for the past 8 hrs:   BP Temp Pulse Resp SpO2 Height Weight   21 0743 132/68 98.7 °F (37.1 °C) 70 20 100 % 5' 6\" (1.676 m) 72.6 kg (160 lb)       Temp (24hrs), Av.7 °F (37.1 °C), Min:98.7 °F (37.1 °C), Max:98.7 °F (37.1 °C)      Physical Exam:  LUNG: clear to auscultation bilaterally, HEART: S1, S2 normal, ABDOMEN: soft, non-tender. Bowel sounds normal. No masses,  no organomegaly    Labs:   Recent Results (from the past 24 hour(s))   CBC W/O DIFF    Collection Time: 08/04/21  3:00 PM   Result Value Ref Range    WBC 14.0 (H) 3.4 - 10.8 x10E3/uL    RBC 3.85 (L) 4.14 - 5.80 x10E6/uL    HGB 12.4 (L) 13.0 - 17.7 g/dL    HCT 36.0 (L) 37.5 - 51.0 %    MCV 94 79 - 97 fL    MCH 32.2 26.6 - 33.0 pg    MCHC 34.4 31.5 - 35.7 g/dL    RDW 12.0 11.6 - 15.4 %    PLATELET 348 663 - 700 L13N9/MW   METABOLIC PANEL, COMPREHENSIVE    Collection Time: 08/04/21  3:00 PM   Result Value Ref Range    Glucose 149 (H) 65 - 99 mg/dL    BUN 26 8 - 27 mg/dL    Creatinine 4.11 (H) 0.76 - 1.27 mg/dL    GFR est non-AA 12 (L) >59 mL/min/1.73    GFR est AA 14 (L) >59 mL/min/1.73    BUN/Creatinine ratio 6 (L) 10 - 24    Sodium 138 134 - 144 mmol/L    Potassium 3.6 3.5 - 5.2 mmol/L    Chloride 93 (L) 96 - 106 mmol/L    CO2 26 20 - 29 mmol/L    Calcium 9.6 8.6 - 10.2 mg/dL    Protein, total 7.2 6.0 - 8.5 g/dL    Albumin 4.4 3.6 - 4.6 g/dL    GLOBULIN, TOTAL 2.8 1.5 - 4.5 g/dL    A-G Ratio 1.6 1.2 - 2.2    Bilirubin, total <0.2 0.0 - 1.2 mg/dL    Alk.  phosphatase 128 (H) 48 - 121 IU/L    AST (SGOT) 20 0 - 40 IU/L    ALT (SGPT) 9 0 - 44 IU/L   VITAMIN D, 25 HYDROXY    Collection Time: 08/04/21  3:00 PM   Result Value Ref Range    VITAMIN D, 25-HYDROXY 36.5 30.0 - 100.0 ng/mL   SPECIMEN STATUS REPORT    Collection Time: 08/04/21  3:00 PM   Result Value Ref Range    SPECIMEN STATUS REPORT COMMENT        Data Review:    BMP:   Lab Results   Component Value Date/Time    Glucose 149 (H) 08/04/2021 03:00 PM    Sodium 138 08/04/2021 03:00 PM    Potassium 3.6 08/04/2021 03:00 PM    Chloride 93 (L) 08/04/2021 03:00 PM    CO2 26 08/04/2021 03:00 PM    BUN 26 08/04/2021 03:00 PM    Creatinine 4.11 (H) 08/04/2021 03:00 PM    Calcium 9.6 08/04/2021 03:00 PM       Assessment: Active Problems:    ESRD (end stage renal disease) ([facility] [address] (9/26/2020)        Plan:     Banding left arm graft    Signed By: Radha Tripp MD     August 5, 2021

## 2021-08-05 NOTE — ANESTHESIA POSTPROCEDURE EVALUATION
Procedure(s):  BANDING OF LEFT ARTERIO VENOUS GRAFT. MAC    Anesthesia Post Evaluation      Multimodal analgesia: multimodal analgesia used between 6 hours prior to anesthesia start to PACU discharge  Patient location during evaluation: bedside  Patient participation: complete - patient participated  Level of consciousness: awake  Pain management: adequate  Airway patency: patent  Anesthetic complications: no  Cardiovascular status: stable  Respiratory status: acceptable  Hydration status: acceptable  Post anesthesia nausea and vomiting:  controlled      INITIAL Post-op Vital signs:   Vitals Value Taken Time   /62 08/05/21 0930   Temp 36.1 °C (96.9 °F) 08/05/21 0913   Pulse 68 08/05/21 0930   Resp 12 08/05/21 0930   SpO2 100 % 08/05/21 0930   Vitals shown include unvalidated device data.

## 2021-08-05 NOTE — OP NOTES
Preoperative diagnosis: ESRD with steal syndrome left hand    Postoperative diagnosis: Same    Procedures performed:  #1  Banding revision of left arm AV graft      Cultures: None    Specimens: None    Drains: None    Estimated blood loss: Less than 50 mL    Assistants: None    Implants: Please see above    Complications: None    Anesthesia: Moderate conscious sedation    Indications for the procedure:  Leonora Schirmer is a 80 y.o. has numbness in both hands but it seems to be having progressive numbness in his left hand. Patient was given the appropriate risk and benefits of the procedure including but not limited to bleeding, infection, damage to adjacent structures, MI, stroke, death, loss of lower extremity, need for further surgery. Patient was understanding of all the risks and underwent a procedure. Operative findings:   #1  Banding procedure done. Patient tells me his hand feels improved    Procedure:  Patient was correctly identified in the precath area and taken to the Cath Lab in stable condition. Patient had pre-incision timeout prior to any incision. Patient was prepped and draped in the normal sterile fashion according to CDC guidelines aseptic technique. Localized the main incision at the arterial portion of the graft. Using 5-0 Prolene suture imbricated the inflow portion of the graft with 2 sutures. The graft remains patent with a nice thrill is a little more pulsatile with arterial portion. This indicates banding is effective. Patient is awake and tells me his hand feels improved. Irrigated and closed 3-0 Monocryl interrupted for subcu 4 Monocryl and Dermabond glue for skin.   Is transferred back to recovery in stable condition

## 2021-08-05 NOTE — DISCHARGE INSTRUCTIONS
DISCHARGE SUMMARY from Nurse    PATIENT INSTRUCTIONS:    After general anesthesia or intravenous sedation, for 24 hours or while taking prescription Narcotics:  · Limit your activities  · Do not drive and operate hazardous machinery  · Do not make important personal or business decisions  · Do  not drink alcoholic beverages  · If you have not urinated within 8 hours after discharge, please contact your surgeon on call. Report the following to your surgeon:  · Excessive pain, swelling, redness or odor of or around the surgical area  · Temperature over 100.5  · Nausea and vomiting lasting longer than 4 hours or if unable to take medications  · Any signs of decreased circulation or nerve impairment to extremity: change in color, persistent  numbness, tingling, coldness or increase pain  · Any questions        Pt may remove the dressing and shower in two days. Allow soap and water to run over the incision. No driving or operating heavy machinery while on narcotic pain medications. Please apply an ice pack to the operative site for 30 minutes 3 times daily to help reduce pain and swelling and the need for narcotic pain medication. No strenuous activity or contact sports for two weeks. No lifting greater than 15 lbs for 2 weeks.

## 2021-10-18 NOTE — Clinical Note
Status[de-identified] INPATIENT [101]   Type of Bed: Telemetry [19]   Cardiac Monitoring Required?: Yes   Inpatient Hospitalization Certified Necessary for the Following Reasons: 3.  Patient receiving treatment that can only be provided in an inpatient setting (further clarification in H&P documentation)   Admitting Diagnosis: Pneumonia [364874]   Admitting Physician: Gosia Ramirez [[id number]]   Attending Physician: Gosia Ramirez [[id number]]   Estimated Length of Stay: 3-4 Midnights   Discharge Plan[de-identified] Home with Office Follow-up

## 2021-10-19 PROBLEM — J18.9 PNEUMONIA: Status: ACTIVE | Noted: 2021-01-01

## 2021-10-19 NOTE — ED PROVIDER NOTES
Is a 11year-old male history of hypertension and ESRD (currently receiving dialysis Monday/Wednesday/Friday, last received today). Patient visited with primary complaint of nausea and vomiting large amount of coffee-ground emesis. Patient states he began to feel ill after receiving a strawberry milkshake at dialysis then immediately began vomiting upon returning home per EMS a large volume of blood was noted in patient's bed. Patient currently denies any active complaints is no longer nauseated denies any abdominal pain, lightheadedness, chest pain, or difficulty breathing. Patient reports no recent history of melena/hematochezia, previous episodes of hematemesis, known history of peptic ulcer disease, and also denies any current antiplatelet or anticoagulant medication use. Past Medical History:   Diagnosis Date    Cancer [facility])     Prostate    Cardiac echocardiogram 08/29/2016    EF 60-65%. No WMA. Mild LVH. Indeterminate diastolic fx. RVSP 35 mmHg. No significant valvular heart disease.  Cardiac nuclear imaging test, abnormal 08/29/2016    Intermediate risk. Previous inferior infarction w/very mild fabiana-infarct ischemia. Inferior hypk. EF 68%. Nondiagnostic EKG on pharm stress test.  Pt's BP increased from 193/96 to 207/83 during study.  Carotid duplex 08/30/2016    Mild <50% bilateral ICA stenosis.       Chronic kidney disease     dialysis    Colonic mass     per Dr Janes Mendoza notes    Enlarged prostate     Hypertension        Past Surgical History:   Procedure Laterality Date    COLONOSCOPY N/A 10/1/2020    COLONOSCOPY, b'x, w tattoo w polypectomy performed [facility]ar[facility]  10/1/2020         [facility]  10/1/2020         COLON[facility]CPY[facility]FLEX,[facility]/ [facility]  10/1/2020         NH INSJ TUNNELED CVC W/O SUBQ PORT/ AGE 5 YR/> N/A 9/28/2020    INSERTION TUNNELED CENTRAL VENOUS CATHETER performed by Jus Ro MD at [facility] CATH LAB    VASCULAR SURGERY PROCEDURE UNLIST      dialysis access- right neck         No family history on file. Social History     Socioeconomic History    Marital status:      Spouse name: Not on file    Number of children: Not on file    Years of education: Not on file    Highest education level: Not on file   Occupational History    Not on file   Tobacco Use    Smoking status: Never Smoker    Smokeless tobacco: Never Used   Vaping Use    Vaping Use: Never used   Substance and Sexual Activity    Alcohol use: No    Drug use: Never    Sexual activity: Not on file   Other Topics Concern    Not on file   Social History Narrative    Not on file     Social Determinants of Health     Financial Resource Strain:     Difficulty of Paying Living Expenses:    Food Insecurity:     Worried About Running Out of Food in the Last Year:     [address] in the Last Year:    Transportation Needs:     Lack of Transportation (Medical):  Lack of Transportation (Non-Medical):    Physical Activity:     Days of Exercise per Week:     Minutes of Exercise per Session:    Stress:     Feeling of Stress :    Social Connections:     Frequency of Communication with Friends and Family:     Frequency of Social Gatherings with Friends and Family:     Attends Congregation Services:     Active Member of Clubs or Organizations:     Attends Club or Organization Meetings:     Marital Status:    Intimate Partner Violence:     Fear of Current or Ex-Partner:     Emotionally Abused:     Physically Abused:     Sexually Abused: ALLERGIES: Patient has no known allergies. Review of Systems   Constitutional: Negative for chills and fever. HENT: Negative for rhinorrhea and sore throat. Eyes: Negative for discharge and redness. Respiratory: Negative for cough and shortness of breath. Cardiovascular: Negative for chest pain and leg swelling.    Gastrointestinal: Positive for nausea and vomiting. Negative for abdominal pain and diarrhea. Genitourinary: Negative for difficulty urinating and dysuria. Musculoskeletal: Negative for back pain and neck pain. Skin: Negative for rash and wound. Neurological: Negative for syncope, light-headedness and headaches. Vitals:    10/18/21 2017   BP: (!) 131/115   Pulse: (!) 114   Resp: 25   Temp: 98.5 °F (36.9 °C)   SpO2: 94%   Weight: 72.6 kg (160 lb)   Height: 5' 6\" (1.676 m)            Physical Exam  Constitutional:       General: He is not in acute distress. Appearance: He is not ill-appearing, toxic-appearing or diaphoretic. HENT:      Head: Normocephalic and atraumatic. Right Ear: External ear normal.      Left Ear: External ear normal.      Nose: No congestion or rhinorrhea. Mouth/Throat:      Mouth: Mucous membranes are moist.      Pharynx: No oropharyngeal exudate or posterior oropharyngeal erythema. Eyes:      General:         Right eye: No discharge. Left eye: No discharge. Pupils: Pupils are equal, round, and reactive to light. Neck:      Vascular: No carotid bruit. Cardiovascular:      Rate and Rhythm: Normal rate and regular rhythm. Heart sounds: No murmur heard. No friction rub. No gallop. Comments: Dialysis catheter to right anterior chest grossly contaminated with vomitus  Pulmonary:      Effort: Pulmonary effort is normal. No respiratory distress. Breath sounds: No stridor. No wheezing, rhonchi or rales. Abdominal:      General: Abdomen is flat. There is no distension. Tenderness: There is no abdominal tenderness. There is no right CVA tenderness, left CVA tenderness, guarding or rebound. Comments: Abdomen is soft, nontender with no significant distention. Musculoskeletal:         General: No swelling, tenderness, deformity or signs of injury. Cervical back: No rigidity or tenderness. Right lower leg: No edema. Left lower leg: No edema. Lymphadenopathy:      Cervical: No cervical adenopathy. Skin:     General: Skin is warm. Capillary Refill: Capillary refill takes less than 2 seconds. Coloration: Skin is not jaundiced or pale. Findings: No bruising, erythema, lesion or rash. Neurological:      General: No focal deficit present. Mental Status: He is alert and oriented to person, place, and time. Sensory: No sensory deficit. Motor: No weakness. Psychiatric:         Mood and Affect: Mood normal.          [facility]  ED Course as of Oct 19 0111   Tue Oct 19, 2021   0109 As discussed with hospitalist team, to be admitted to the care of Dr. Velazquez.     [JK]      ED Course User Index  [JK] Kathryn Gomez MD       Procedures

## 2021-10-19 NOTE — PROGRESS NOTES
ESRD patient,HD  q Mon,WEd & Friday admitted with Bilateral Multi lobar Pneumonia & Leucocytosis. Started on IV antibiotics. No need for any Dialysis today, loren arrange his Dialysis while in Hospital  ,will use Catheter, his AVG has problem.  ,will follow

## 2021-10-19 NOTE — PROGRESS NOTES
Progress Note         Patient: Ninfa Franco MRN: [id number]  CSN: [id number]    YOB: 1935  Age: 80 y.o. Sex: male    DOA: 10/18/2021 LOS:  LOS: 0 days                    Subjective:     Ninfa Franco is a 80 y.o. male with a PMHx of ESRD on HD, HTN, BPH with chronic indwelling michel catheter, colon CA, and prostate CA with widespread osseous metastatic disease who is admitted for sepsis secondary to multifocal pneumonia/aspiration pneumonia. Seen in the ED today  Initially sitting on bedside commode, had just had BM  Was helped in bed by myself and his wife  Alert and oriented x3  Has had persistent cough for some time  Denies any complaints of shortness of breath  No further nausea or vomiting      Objective:     Physical Exam:  Visit Vitals  BP (!) 120/51   Pulse 92   Temp 98.5 °F (36.9 °C)   Resp 19   Ht 5' 6\" (1.676 m)   Wt 72.6 kg (160 lb)   SpO2 94%   BMI 25.82 kg/m²        General:         Alert, cooperative, no acute distress    HEENT: NC, Atraumatic. Anicteric sclerae. Lungs: Scattered coarse rhonchi, right greater than left  Heart:  Regular  rhythm,  No murmur, No Rubs, No Gallops  Abdomen: Soft, Non distended, Non tender. +Bowel sounds, no HSM  Extremities: No c/c/e  Psych:   Not anxious or agitated. Neurologic:  Alert and oriented X 3. No acute neurological deficits. Intake and Output:  Current Shift:  No intake/output data recorded. Last three shifts:  No intake/output data recorded.     Labs: Results:       Chemistry Recent Labs     10/18/21  2035   *      K 3.9   CL 94*   CO2 31   BUN 33*   CREA 3.67*   CA 9.6   AGAP 11   BUCR 9*   *   TP 7.7   ALB 3.4   GLOB 4.3*   AGRAT 0.8      CBC w/Diff Recent Labs     10/18/21  2035   WBC 37.1*   RBC 4.55   HGB 14.3   HCT 42.6      GRANS 92*   LYMPH 4*   EOS 2      Cardiac Enzymes Recent Labs     10/18/21  2035   CPK 44   CKND1 CALCULATION NOT PERFORMED WHEN RESULT IS BELOW LINEAR LIMIT Coagulation Recent Labs     10/18/21  2035   PTP 11.8   INR 0.9       Lipid Panel Lab Results   Component Value Date/Time    Cholesterol, total 180 04/06/2021 02:03 PM    HDL Cholesterol 59 04/06/2021 02:03 PM    LDL, calculated 102.2 (H) 04/06/2021 02:03 PM    VLDL, calculated 18.8 04/06/2021 02:03 PM    Triglyceride 94 04/06/2021 02:03 PM    CHOL/HDL Ratio 3.1 04/06/2021 02:03 PM      BNP No results for input(s): BNPP in the last 72 hours. Liver Enzymes Recent Labs     10/18/21  2035   TP 7.7   ALB 3.4   *      Thyroid Studies Lab Results   Component Value Date/Time    TSH 6.31 (H) 09/25/2020 06:00 PM                Assessment and Plan:     Lauren Gaytan is a 80 y.o. male with a PMHx of ESRD on HD, HTN, BPH with chronic indwelling michel catheter, colon CA, and prostate CA with widespread osseous metastatic disease who is admitted for severe sepsis secondary to multifocal pneumonia/aspiration pneumonia. 1. Severe sepsisPOA, with tachycardia, tachypnea, leukocytosis and lactic acidosis  2. Multifocal pneumonia/aspiration pneumonia  3. ESRD on HD  4. HTN  5. BPH with chronic indwelling Michel catheter  6. Colon cancer  7. Prostate cancer with widespread osseous metastatic disease    Nephrology consulted  HD per nephrology  Continue IV ABXcefepime and vancomycin  Follow-up blood culture  Continue home Zytiga and prednisone  Continue other home medsamlodipine Nephrocaps, PhosLo  Melatonin nightly  Follow-up CBC CMP, phosphorus  Incentive spirometry  PT, OT      Case discussed with:  [x]Patient  [x]Family  []Nursing  []Case Management  DVT prophylaxis: SQH  Diet: Renal  Contact: Asia Brantley (wife) [phone]           Code Status: Full  Disposition: Continue current care, greater than 2 nights      H. Fabiana Deras,   10/19/2021       Dragon medical dictation software was used for portions of this report. Unintended errors may occur.

## 2021-10-19 NOTE — ED NOTES
Patient resting comfortably in bed, connected to monitor, patient reposition for comfort, will continue to monitor

## 2021-10-19 NOTE — ED TRIAGE NOTES
Pt arrived via EMS c/o vomiting blood this evening. PT states that he had dialysis today and drank a shake while there that upset his stomach.

## 2021-10-19 NOTE — PROGRESS NOTES
completed the initial Spiritual Assessment of the patient in bed 12 of the emergency room where the patient has now been for the last 14 hours. and offered Pastoral Care support to the patient., Patient has a Medical Power of  on file here. Patient does not have any Amish/cultural needs that will affect patients preferences in health care. Chaplains will continue to follow and will provide pastoral care on an as needed/requested basis.     [facility] Care Department  [phone]

## 2021-10-19 NOTE — ED NOTES
Pt medicated per order. Pt denies any pain at this time. Pt states that after vomiting PTA that he has no pain. Family at bedside and updated on plan of care.

## 2021-10-19 NOTE — H&P
History and Physical    Patient: Angela Gurrola               Sex: male          DOA: 10/18/2021       YOB: 1935      Age:  80 y.o.        LOS:  LOS: 0 days        HPI:     Angela Gurrola is a 80 y.o. male metastatic prostate cancer, colon cancer, atherosclerotic disease, hypertension, and ESRD on MWF hemodialysis who is now admitted for multi focal pneumonia. Per patient and his wife, he went to his hemodialysis session today (Monday) per usual.  He was given a strawberry protein shake at the time, which he drank quickly. His stomach did not feel right and when he got home he violently vomited all over his bed sheets. Patient's wife had picture on her phone of the vomit. It was dark brown and copious. Besides the protein shake, patient also had a grilled cheese before vomiting. No coffee ground appearance. Both wife and patient denied hematemesis or any bright red blood. EMS was called to the scene who described the vomit as dried blood appearance which prompted the ER to take an upper GI bleed approach to the patient. However, lab findings and CT scan demonstrated multifocal pneumonia. Patient no longer had any abdominal/upset stomach. Denies history of hematemesis and peptic ulcer disease and is not on any anticoagulants. He is not dizzy or lightheaded. Per wife, he has been coughing more with phlegm. ER Course:   -Pantoprazole 80 mg IV    Significant lab values: Lactic acid 3.17, white blood cell count 37.1, neutrophil: Lymphocyte ratio 92:4, hemoglobin 14.3 (up from 12.4 in August 2021), creatinine 3.67 (had dialysis today), BUN 33, potassium 3.9, albumin 3.4, alk phos 209 (up from 128 in August 2021), undetectable troponin, glucose 145    Past Medical History:   Diagnosis Date    Cancer [facility])     Prostate    Cardiac echocardiogram 08/29/2016    EF 60-65%. No WMA. Mild LVH. Indeterminate diastolic fx. RVSP 35 mmHg. No significant valvular heart disease.     Cardiac nuclear imaging test, abnormal 08/29/2016    Intermediate risk. Previous inferior infarction w/very mild fabiana-infarct ischemia. Inferior hypk. EF 68%. Nondiagnostic EKG on pharm stress test.  Pt's BP increased from 193/96 to 207/83 during study.  Carotid duplex 08/30/2016    Mild <50% bilateral ICA stenosis.  Chronic kidney disease     dialysis    Colonic mass     per Dr Arsalan Santiago notes    Enlarged prostate     Hypertension      Past Surgical History:   Procedure Laterality Date    COLONOSCOPY N/A 10/1/2020    COLONOSCOPY, b'x, w tattoo w polypectomy performed by Magui John MD at [address] [place] [address]  10/1/2020         [facility] [employer]  10/1/2020         COLONOSCPY,FLEX,W/ [facility]  10/1/2020         SC INSJ TUNNELED CVC W/O SUBQ PORT/ AGE 5 YR/> N/A 9/28/2020    INSERTION TUNNELED CENTRAL VENOUS CATHETER performed by Eilleen Rinne, MD at [facility] CATH LAB    VASCULAR SURGERY PROCEDURE UNLIST      dialysis access- right neck      History reviewed. No pertinent family history. Social History     Tobacco Use    Smoking status: Never Smoker    Smokeless tobacco: Never Used   Substance Use Topics    Alcohol use: No      Prior to Admission medications    Medication Sig Start Date End Date Taking? Authorizing Provider   amLODIPine (NORVASC) 10 mg tablet TAKE 1 TABLET BY MOUTH EVERY DAY 5/21/21   Provider, Historical   calcium acetate,phosphat bind, (PHOSLO) 667 mg cap  6/14/21   Provider, Historical   amoxicillin-clavulanate (AUGMENTIN) 875-125 mg per tablet Take 1 Tablet by mouth every twelve (12) hours. 8/11/21   Dennie Bos, MD   abiraterone (Zytiga) 250 mg tab Take four tablets by mouth daily on an empty stomach. Take one hour prior to food or two hours after food. Tablets should be swallowed whole with water. Do not crush or chew tablets.  5/4/21   Dennie Bos, MD   predniSONE (DELTASONE) 5 mg tablet Take 1 Tab by mouth two (2) times a day. 21   Darius Pardo MD   mupirocin (BACTROBAN) 2 % ointment APPLY TO TOES 3 TIMES A DAY 21   Provider, Historical   bicalutamide (CASODEX) 50 mg tablet Take 1 Tab by mouth daily. 3/19/21   Liam Adam MD   B complex w-C no.20/folic acid (TRIPHROCAPS PO) Take 1 Cap by mouth daily. Provider, Historical   furosemide (LASIX) 40 mg tablet Take 40 mg by mouth daily. Provider, Historical        No Known Allergies    Review of Systems:    Negative Unless BOLDED    Constitutional: Fever, chills,diaphoresis. HENT: Negative for congestion, rhinorrhea, sore throat and trouble swallowing. Eyes: Negative for visual disturbance. Respiratory: Cough,shortness of breath, wheezing. Cardiovascular: Chest pain, palpitations. Gastrointestinal: Abdominal pain, blood in stool, constipation, diarrhea, nausea and vomiting. Endocrine: Polyuria. Genitourinary: Difficulty urinating and dysuria. Musculoskeletal: Arthralgias, myalgias, and neck stiffness. Skin: Pallor, rash. Neurological: Dizziness, weakness, numbness and headaches. Hematological: Bruise/bleed easily   Psychiatric/Behavioral: Confusion, dysphoric mood, hallucinations  All other systems reviewed and are negative.     Physical Exam:      Vitals:    10/18/21 2130 10/18/21 2145 10/18/21 2230 10/18/21 2245   BP: (!) 126/54 121/60 (!) 126/54 (!) 121/56   Pulse: 97  97 97   Resp:    Temp:       SpO2:       Weight:       Height:          Temp (24hrs), Av.5 °F (36.9 °C), Min:98.5 °F (36.9 °C), Max:98.5 °F (36.9 °C)      General:   awake alert and oriented   Skin:   no rashes or skin lesions noted on limited exam,    HEENT:  Normocephalic, atraumatic, grossly PERRL, EOMI, no scleral icterus or pallor; no conjunctival hemmohage; facemask in place       Lungs:   Poor air movement over right lower lobe with crackles over right middle lobe and right upper lobe, good air movement throughout left lung fields, no respiratory distress at this time   Heart:  RRR, s1 and s2; no murmurs rubs or gallops; temporary hemodialysis catheter in place on right chest below clavicle, dressing is dirty from earlier vomit   Abdomen: soft, non-distended, active bowel sounds, nontender to palpation   Genitourinary:  deferred   Extremities:   Grossly full ROM of all large joints to the upper and lower extremities; muscle mass appropriate for age   Neurologic:  No gross focal sensory abnormalities; grossly 5/5 muscle strength to upper and lower extremities. Speech appropirate. Cranial nerves grossly intact   Psychiatric:   appropriate and interactive. Labs Reviewed:    Recent Results (from the past 24 hour(s))   CBC WITH AUTOMATED DIFF    Collection Time: 10/18/21  8:35 PM   Result Value Ref Range    WBC 37.1 (H) 4.6 - 13.2 K/uL    RBC 4.55 4.35 - 5.65 M/uL    HGB 14.3 13.0 - 16.0 g/dL    HCT 42.6 36.0 - 48.0 %    MCV 93.6 78.0 - 100.0 FL    MCH 31.4 24.0 - 34.0 PG    MCHC 33.6 31.0 - 37.0 g/dL    RDW 13.4 11.6 - 14.5 %    PLATELET 517 726 - 538 K/uL    MPV 10.1 9.2 - 11.8 FL    NEUTROPHILS 92 (H) 40 - 73 %    LYMPHOCYTES 4 (L) 21 - 52 %    MONOCYTES 2 (L) 3 - 10 %    EOSINOPHILS 2 0 - 5 %    BASOPHILS 0 0 - 2 %    ABS. NEUTROPHILS 34.2 (H) 1.8 - 8.0 K/UL    ABS. LYMPHOCYTES 1.5 0.9 - 3.6 K/UL    ABS. MONOCYTES 0.7 0.05 - 1.2 K/UL    ABS. EOSINOPHILS 0.7 (H) 0.0 - 0.4 K/UL    ABS.  BASOPHILS 0.0 0.0 - 0.1 K/UL    DF MANUAL      PLATELET COMMENTS ADEQUATE PLATELETS      RBC COMMENTS NORMOCYTIC, NORMOCHROMIC     CARDIAC PANEL,(CK, CKMB & TROPONIN)    Collection Time: 10/18/21  8:35 PM   Result Value Ref Range    CK - MB <1.0 <3.6 ng/ml    CK-MB Index  0.0 - 4.0 %     CALCULATION NOT PERFORMED WHEN RESULT IS BELOW LINEAR LIMIT    CK 44 39 - 308 U/L    Troponin-I, QT <0.02 0.0 - 9.687 NG/ML   METABOLIC PANEL, COMPREHENSIVE    Collection Time: 10/18/21  8:35 PM   Result Value Ref Range    Sodium 136 136 - 145 mmol/L    Potassium 3.9 3.5 - 5.5 mmol/L    Chloride 94 (L) 100 - 111 mmol/L    CO2 31 21 - 32 mmol/L    Anion gap 11 3.0 - 18 mmol/L    Glucose 145 (H) 74 - 99 mg/dL    BUN 33 (H) 7.0 - 18 MG/DL    Creatinine 3.67 (H) 0.6 - 1.3 MG/DL    BUN/Creatinine ratio 9 (L) 12 - 20      GFR est AA 19 (L) >60 ml/min/1.73m2    GFR est non-AA 16 (L) >60 ml/min/1.73m2    Calcium 9.6 8.5 - 10.1 MG/DL    Bilirubin, total 0.3 0.2 - 1.0 MG/DL    ALT (SGPT) 16 16 - 61 U/L    AST (SGOT) 28 10 - 38 U/L    Alk. phosphatase 209 (H) 45 - 117 U/L    Protein, total 7.7 6.4 - 8.2 g/dL    Albumin 3.4 3.4 - 5.0 g/dL    Globulin 4.3 (H) 2.0 - 4.0 g/dL    A-G Ratio 0.8 0.8 - 1.7     TYPE & SCREEN    Collection Time: 10/18/21  8:35 PM   Result Value Ref Range    Crossmatch Expiration 10/21/2021,2359     ABO/Rh(D) Derotha Agreste POSITIVE     Antibody screen NEG    PROTHROMBIN TIME + INR    Collection Time: 10/18/21  8:35 PM   Result Value Ref Range    Prothrombin time 11.8 11.5 - 15.2 sec    INR 0.9 0.8 - 1.2     MAGNESIUM    Collection Time: 10/18/21  8:35 PM   Result Value Ref Range    Magnesium 2.3 1.6 - 2.6 mg/dL   POC LACTIC ACID    Collection Time: 10/18/21  8:44 PM   Result Value Ref Range    Lactic Acid (POC) 3.17 (HH) 0.40 - 2.00 mmol/L        Imaging:  CT Results  (Last 48 hours)               10/18/21 2241  CT CHEST ABD PELV WO CONT Final result    Impression:      Multi-lobar pneumonia. Increasing widespread osseous metastatic disease. Moderate retained fecal material within the rectum with stercoral colitis. Ventral wall hernia containing small bowel without evidence of obstruction. Advanced atherosclerosis of the abdominal aorta with involvement of the SMA and   celiac axis. Would suggest follow-up PVL evaluation to assess the degree of   stenosis of the mesenteric vessels. Small hiatal hernia.                    Narrative:  EXAM: CT chest abdomen and pelvis without contrast       CLINICAL INDICATION/HISTORY:  Vomiting blood     > Additional: COMPARISON: Correlation with CT abdomen and pelvis, March 9, 2021       TECHNIQUE: Helical CT imaging of the chest, abdomen and pelvis was performed   without intravenous contrast. Multiplanar reformats were generated. One or more dose reduction techniques were used on this CT: automated exposure   control, adjustment of the mAs and/or kVp according to patient size, and   iterative reconstruction techniques. The specific techniques used on this CT   exam have been documented in the patient's electronic medical record. Digital   Imaging and Communications in Medicine (DICOM) format image data are available   to nonaffiliated external healthcare facilities or entities on a secure, media   free, reciprocally searchable basis with patient authorization for at least a   12-month period after this study. _______________       FINDINGS:       CHEST:       BASE OF NECK:  There is a right IJ dual-lumen dialysis catheter which terminates   in the right atrium. MEDIASTINUM:  No adenopathy or fluid. HEART:  Coronary artery disease. No cardiomegaly or pericardial effusion. AORTA:  Atherosclerosis without aneurysm. LUNGS AND PLEURA:  There are scattered nodular and confluent infiltrates within   the lower lobes bilaterally and within the right upper lobe and to a lesser   extent the left upper lobe. There is no pleural effusion. The central airway is   patent. CHEST WALL:  Unremarkable. LIVER, BILIARY: The liver has a normal noncontrast appearance. No biliary   dilatation. No hyperdense gallstones. PANCREAS: Normal appearing. SPLEEN: Normal appearing. ADRENALS: Normal appearing. KIDNEYS/URETERS/BLADDER: Bilateral renal atrophy with cortical cystic change. The bladder is partly decompressed around a Burris catheter. PELVIC ORGANS: No free fluid, adenopathy or mass. LYMPH NODES: No adenopathy.          GASTROINTESTINAL TRACT:Small hiatal hernia. Moderate retained fecal material in   the rectum with some perirectal stranding. No bowel obstruction or mural edema. The appendix appears normal.       PERITONEUM:  No free fluid or air. VASCULATURE: There is moderate atherosclerosis of the abdominal aorta with   calcifications at the origin of the celiac axis and the SMA. Focal   atherosclerotic calcification also present within the mid segment of the SMA (as   on axial, 66-67). BONES: Widespread osseous metastatic disease, increased from the comparison   study. SOFT TISSUES:  There is a ventral wall defect through which a small amount of   small bowel has extended period no evidence of incarceration or obstruction. OTHER:       _______________           10/18/21 2241  CT HEAD WO CONT Final result    Impression:      No acute intracranial findings. .        Moderately advanced small vessel ischemic change. Air-fluid levels within the maxillary sinuses can be associated with acute   sinusitis. Narrative:  EXAM: CT Head without Contrast       CLINICAL INDICATION/HISTORY: Unexplained vomiting       COMPARISON: June 27, 2018       TECHNIQUE:  Standard axial CT imaging of the head without contrast.         One or more dose reduction techniques were used on this CT: automated exposure   control, adjustment of the mAs and/or kVp according to patient size, and   iterative reconstruction techniques. The specific techniques used on this CT   exam have been documented in the patient's electronic medical record. Digital   Imaging and Communications in Medicine (DICOM) format image data are available   to nonaffiliated external healthcare facilities or entities on a secure, media   free, reciprocally searchable basis with patient authorization for at least a   12-month period after this study. FINDINGS:         Brain: Moderate confluent low-attenuation change within cerebral white matter.    No evidence of hemorrhage. No extra-axial fluid. No mass or mass effect. Gray-white differentiation is preserved. Sinuses and mastoids: Maxillary sinus air-fluid levels. Mastoid air cells are   clear. 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 It sounds like patient got upset stomach, vomited, and likely aspirated. Unsure if current white blood cell count is an aspiration pneumonitis or developing pneumonia. Patient was typed and crossed by the ER in the event that he does require blood. He was also given 80 mg of IV Protonix. PLAN:    -Continue broad-spectrum antibiotics  -Blood cultures  -Continue Zytiga 1000 mg daily and prednisone 5 mg daily  -Repeat lactic acid  -CBC with automated differential in the morning  -Procalcitonin  -Not ordering strep pneumo and Legionella antigen due to ESRD, Burris catheter reportedly emptied every morning  -Nephrology consult  -Continuing home amlodipine, B complex vitamins, and PhosLo  -Supplemental oxygen as needed to keep SPO2 above 88%    Activity: Bedrest with bedside commode  Diet: Renal diet  Antibiotics: Cefepime and vancomycin  DVT prophylaxis: Heparin 5000 units 3 times daily  CODE status: Full    Disposition: Remain inpatient for IV antibiotic therapy    Signed By: Marisol Eden MD   [facility]    October 19, 2021      Dragon voice recognition software was used for parts of this note. Unintended errors may have occurred.

## 2021-10-19 NOTE — ED NOTES
Patient complaining of heel spur pain in left foot. Pt not sure what he takes at home.   Dr Celso Pozo made aware

## 2021-10-20 PROBLEM — E88.09 HYPOALBUMINEMIA: Status: ACTIVE | Noted: 2021-01-01

## 2021-10-20 PROBLEM — D72.829 LEUCOCYTOSIS: Status: ACTIVE | Noted: 2021-01-01

## 2021-10-20 NOTE — ROUTINE PROCESS
Bedside and Verbal shift change report given to [address] [place] [address] (oncoming nurse) by Will Acosta (offgoing nurse). Report included the following information SBAR, Kardex, ED Summary, MAR, Recent Results and Cardiac Rhythm SR. Patient positioned in bed, bed alarm on  And call light in reach.

## 2021-10-20 NOTE — PROGRESS NOTES
Problem: Pressure Injury - Risk of  Goal: *Prevention of pressure injury  Description: Document Karthikeyan Scale and appropriate interventions in the flowsheet. Outcome: Progressing Towards Goal  Note: Pressure Injury Interventions:  Sensory Interventions: Assess changes in LOC, Avoid rigorous massage over bony prominences, Discuss PT/OT consult with provider, Check visual cues for pain, Keep linens dry and wrinkle-free, Turn and reposition approx. every two hours (pillows and wedges if needed)    Moisture Interventions: Absorbent underpads, Check for incontinence Q2 hours and as needed, Internal/External urinary devices    Activity Interventions: Increase time out of bed, Pressure redistribution bed/mattress(bed type), PT/OT evaluation    Mobility Interventions: HOB 30 degrees or less, Pressure redistribution bed/mattress (bed type), PT/OT evaluation, Turn and reposition approx. every two hours(pillow and wedges)    Nutrition Interventions: Document food/fluid/supplement intake                     Problem: Patient Education: Go to Patient Education Activity  Goal: Patient/Family Education  Outcome: Progressing Towards Goal     Problem: Falls - Risk of  Goal: *Absence of Falls  Description: Document Karma Correia Fall Risk and appropriate interventions in the flowsheet.   Outcome: Progressing Towards Goal  Note: Fall Risk Interventions:  Mobility Interventions: Assess mobility with egress test, Bed/chair exit alarm, Communicate number of staff needed for ambulation/transfer, OT consult for ADLs, Patient to call before getting OOB, PT Consult for mobility concerns, PT Consult for assist device competence    Mentation Interventions: Adequate sleep, hydration, pain control, Bed/chair exit alarm, Familiar objects from home, Increase mobility, Reorient patient    Medication Interventions: Evaluate medications/consider consulting pharmacy, Patient to call before getting OOB, Teach patient to arise slowly    Elimination Interventions: Call light in reach, Patient to call for help with toileting needs, Urinal in reach              Problem: Patient Education: Go to Patient Education Activity  Goal: Patient/Family Education  Outcome: Progressing Towards Goal     Problem: Patient Education: Go to Patient Education Activity  Goal: Patient/Family Education  Outcome: Progressing Towards Goal     Problem: Pneumonia: Day 1  Goal: Off Pathway (Use only if patient is Off Pathway)  Outcome: Progressing Towards Goal  Goal: Activity/Safety  Outcome: Progressing Towards Goal  Goal: Consults, if ordered  Outcome: Progressing Towards Goal  Goal: Diagnostic Test/Procedures  Outcome: Progressing Towards Goal  Goal: Nutrition/Diet  Outcome: Progressing Towards Goal  Goal: Medications  Outcome: Progressing Towards Goal  Goal: Respiratory  Outcome: Progressing Towards Goal  Goal: Treatments/Interventions/Procedures  Outcome: Progressing Towards Goal  Goal: Psychosocial  Outcome: Progressing Towards Goal  Goal: *Oxygen saturation within defined limits  Outcome: Progressing Towards Goal  Goal: *Influenza vaccine administered (October-March)  Outcome: Progressing Towards Goal  Goal: *Pneumoccocal vaccine administered  Outcome: Progressing Towards Goal  Goal: *Hemodynamically stable  Outcome: Progressing Towards Goal  Goal: *Demonstrates progressive activity  Outcome: Progressing Towards Goal  Goal: *Tolerating diet  Outcome: Progressing Towards Goal     Problem: Pneumonia: Day 2  Goal: Off Pathway (Use only if patient is Off Pathway)  Outcome: Progressing Towards Goal  Goal: Activity/Safety  Outcome: Progressing Towards Goal  Goal: Consults, if ordered  Outcome: Progressing Towards Goal  Goal: Diagnostic Test/Procedures  Outcome: Progressing Towards Goal  Goal: Nutrition/Diet  Outcome: Progressing Towards Goal  Goal: Discharge Planning  Outcome: Progressing Towards Goal  Goal: Medications  Outcome: Progressing Towards Goal  Goal: Respiratory  Outcome: Progressing Towards Goal  Goal: Treatments/Interventions/Procedures  Outcome: Progressing Towards Goal  Goal: Psychosocial  Outcome: Progressing Towards Goal  Goal: *Oxygen saturation within defined limits  Outcome: Progressing Towards Goal  Goal: *Hemodynamically stable  Outcome: Progressing Towards Goal  Goal: *Demonstrates progressive activity  Outcome: Progressing Towards Goal  Goal: *Tolerating diet  Outcome: Progressing Towards Goal  Goal: *Optimal pain control at patient's stated goal  Outcome: Progressing Towards Goal     Problem: Pneumonia: Day 3  Goal: Off Pathway (Use only if patient is Off Pathway)  Outcome: Progressing Towards Goal  Goal: Activity/Safety  Outcome: Progressing Towards Goal  Goal: Consults, if ordered  Outcome: Progressing Towards Goal  Goal: Diagnostic Test/Procedures  Outcome: Progressing Towards Goal  Goal: Nutrition/Diet  Outcome: Progressing Towards Goal  Goal: Discharge Planning  Outcome: Progressing Towards Goal  Goal: Medications  Outcome: Progressing Towards Goal  Goal: Respiratory  Outcome: Progressing Towards Goal  Goal: Treatments/Interventions/Procedures  Outcome: Progressing Towards Goal  Goal: Psychosocial  Outcome: Progressing Towards Goal  Goal: *Oxygen saturation within defined limits  Outcome: Progressing Towards Goal  Goal: *Hemodynamically stable  Outcome: Progressing Towards Goal  Goal: *Demonstrates progressive activity  Outcome: Progressing Towards Goal  Goal: *Tolerating diet  Outcome: Progressing Towards Goal  Goal: *Describes available resources and support systems  Outcome: Progressing Towards Goal  Goal: *Optimal pain control at patient's stated goal  Outcome: Progressing Towards Goal     Problem: Pneumonia: Day 4  Goal: Off Pathway (Use only if patient is Off Pathway)  Outcome: Progressing Towards Goal  Goal: Activity/Safety  Outcome: Progressing Towards Goal  Goal: Nutrition/Diet  Outcome: Progressing Towards Goal  Goal: Discharge Planning  Outcome: Progressing Towards Goal  Goal: Medications  Outcome: Progressing Towards Goal  Goal: Respiratory  Outcome: Progressing Towards Goal  Goal: Treatments/Interventions/Procedures  Outcome: Progressing Towards Goal  Goal: Psychosocial  Outcome: Progressing Towards Goal     Problem: Pneumonia: Discharge Outcomes  Goal: *Demonstrates progressive activity  Outcome: Progressing Towards Goal  Goal: *Describes follow-up/return visits to physicians  Outcome: Progressing Towards Goal  Goal: *Tolerating diet  Outcome: Progressing Towards Goal  Goal: *Verbalizes name, dosage, time, side effects, and number of days to continue medications  Outcome: Progressing Towards Goal  Goal: *Influenza immunization  Outcome: Progressing Towards Goal  Goal: *Pneumococcal immunization  Outcome: Progressing Towards Goal  Goal: *Respiratory status at baseline  Outcome: Progressing Towards Goal  Goal: *Vital signs within defined limits  Outcome: Progressing Towards Goal  Goal: *Describes available resources and support systems  Outcome: Progressing Towards Goal  Goal: *Optimal pain control at patient's stated goal  Outcome: Progressing Towards Goal

## 2021-10-20 NOTE — PROGRESS NOTES
Problem: Pressure Injury - Risk of  Goal: *Prevention of pressure injury  Description: Document Karthikeyan Scale and appropriate interventions in the flowsheet. Outcome: Progressing Towards Goal  Note: Pressure Injury Interventions:  Sensory Interventions: Assess changes in LOC    Moisture Interventions: Absorbent underpads    Activity Interventions: Pressure redistribution bed/mattress(bed type)    Mobility Interventions: Pressure redistribution bed/mattress (bed type)    Nutrition Interventions: Document food/fluid/supplement intake                     Problem: Falls - Risk of  Goal: *Absence of Falls  Description: Document Brett Fall Risk and appropriate interventions in the flowsheet.   Outcome: Progressing Towards Goal  Note: Fall Risk Interventions:  Mobility Interventions: Bed/chair exit alarm, Assess mobility with egress test, Patient to call before getting OOB    Mentation Interventions: Adequate sleep, hydration, pain control, Bed/chair exit alarm    Medication Interventions: Bed/chair exit alarm, Teach patient to arise slowly, Patient to call before getting OOB    Elimination Interventions: Bed/chair exit alarm, Call light in reach, Stay With Me (per policy)              Problem: Patient Education: Go to Patient Education Activity  Goal: Patient/Family Education  Outcome: Progressing Towards Goal

## 2021-10-20 NOTE — ROUTINE PROCESS
TRANSFER - IN REPORT:    Telephone report received from [facility] RN(name) on [facility]  being received from ED(unit) for routine progression of care      Report consisted of patients Situation, Background, Assessment and   Recommendations(SBAR). Information from the following report(s) SBAR, Kardex, Intake/Output, Recent Results and Cardiac Rhythm SR was reviewed with the receiving nurse. Opportunity for questions and clarification was provided. Assessment completed upon patients arrival to unit and care assumed.

## 2021-10-20 NOTE — ED NOTES
TRANSFER - OUT REPORT:    Verbal report given to [facility], RN(name) on Ananda Long  being transferred to [address] [place] [address](unit) for routine progression of care       Report consisted of patients Situation, Background, Assessment and   Recommendations(SBAR). Information from the following report(s) SBAR, Kardex, ED Summary and MAR was reviewed with the receiving nurse. Lines:   Peripheral IV 10/18/21 Right Forearm (Active)        Opportunity for questions and clarification was provided.       Patient transported with:   Registered Nurse

## 2021-10-20 NOTE — PROGRESS NOTES
Problem: Mobility Impaired (Adult and Pediatric)  Goal: *Acute Goals and Plan of Care (Insert Text)  Description: Physical Therapy Goals  Initiated 10/20/2021 and to be accomplished within  day(s)  1. Patient will move from supine to sit and sit to supine  in bed with modified independence. 2.  Patient will transfer from bed to chair and chair to bed with modified independence using the least restrictive device. 3.  Patient will perform sit to stand with modified independence. 4.  Patient will ambulate with modified independence for 100 feet with the least restrictive device. 5.  Patient will ascend/descend 2 stairs with handrail(s) with contact guard assist.    PLOF: Patient was ambulating with RW. His spouse assists him with bathing and dressing. He lives in single story home with 2 AJAY. Outcome: Progressing Towards Goal     PHYSICAL THERAPY EVALUATION    Patient: Ethan Rajput (78 y.o. male)  Date: 10/20/2021  Primary Diagnosis: Pneumonia [J18.9]        Precautions:   Fall      ASSESSMENT :  Based on the objective data described below, the patient presents with decreased strength, decreased activity tolerance, and gait deficits. Patient received supine in bed, alert, and agreeable to PT evaluation. Supine to sit transfer with CGA for objective assessment. He presents with left LE weakness compared to right and denies history of CVA. Patient reports left LE weakness is chronic and has noticed for ~2 years. He tends to drag left LE when ambulating in room using RW with CGA. Patient transferred to recliner and verbalizes understanding to use call bell for assistance. Patient will benefit from skilled intervention to address the above impairments.   Patient's rehabilitation potential is considered to be Good  Factors which may influence rehabilitation potential include:   []         None noted  []         Mental ability/status  [x]         Medical condition  [x]         Home/family situation and support systems  [x]         Safety awareness  []         Pain tolerance/management  []         Other:      PLAN :  Recommendations and Planned Interventions:   [x]           Bed Mobility Training             [x]    Neuromuscular Re-Education  [x]           Transfer Training                   []    Orthotic/Prosthetic Training  [x]           Gait Training                          []    Modalities  [x]           Therapeutic Exercises           []    Edema Management/Control  [x]           Therapeutic Activities            [x]    Family Training/Education  [x]           Patient Education  []           Other (comment):    Frequency/Duration: Patient will be followed by physical therapy 1-2 times per day/4-7 days per week to address goals. Discharge Recommendations: Home Health with family support   Further Equipment Recommendations for Discharge: rolling walker     SUBJECTIVE:   Patient stated My wife helps me a lot.     OBJECTIVE DATA SUMMARY:     Past Medical History:   Diagnosis Date    Cancer ([facility] [address]     Prostate    Cardiac echocardiogram 08/29/2016    EF 60-65%. No WMA. Mild LVH. Indeterminate diastolic fx. RVSP 35 mmHg. No significant valvular heart disease. Cardiac nuclear imaging test, abnormal 08/29/2016    Intermediate risk. Previous inferior infarction w/very mild fabiana-infarct ischemia. Inferior hypk. EF 68%. Nondiagnostic EKG on pharm stress test.  Pt's BP increased from 193/96 to 207/83 during study. Carotid duplex 08/30/2016    Mild <50% bilateral ICA stenosis.       Chronic kidney disease     dialysis    Colonic mass     per Dr Lrudes Stewart notes    Enlarged prostate     Hypertension      Past Surgical History:   Procedure Laterality Date    COLONOSCOPY N/A 10/1/2020    COLONOSCOPY, b'x, w tattoo w polypectomy performed by Chi Rico MD at [address]  10/1/2020         Lolisinez Carbajal  10/1/2020         COLONOSCPY,FLEX,W/ [facility]  10/1/2020 ND INSJ TUNNELED CVC W/O SUBQ PORT/ AGE 5 YR/> N/A 9/28/2020    INSERTION TUNNELED CENTRAL VENOUS CATHETER performed by Annie Fleming MD at [facility] CATH LAB    VASCULAR SURGERY PROCEDURE UNLIST      dialysis access- right neck     Barriers to Learning/Limitations: None  Compensate with: Verbal Cues  Home Situation:  Home Situation  Home Environment: Private residence  # Steps to Enter: 3  One/Two Story Residence: One story  Living Alone: No  Support Systems: Spouse/Significant Other  Patient Expects to be Discharged to[de-identified] [employer]  Current DME Used/Available at Home: [place] Behavior:  Neurologic State: Alert  Orientation Level: Oriented to person;Oriented to place;Oriented to situation;Disoriented to time  Cognition: Follows commands  Safety/Judgement: Fall prevention  Psychosocial  Patient Behaviors: Calm; Cooperative  Purposeful Interaction: Yes  Pt Identified Daily Priority: Clinical issues (comment)  Caritas Process: Establish trust;Teaching/learning; Attend basic human needs  Caring Interventions: Reassure; Therapeutic modalities; Other caring modalities  Reassure: Therapeutic listening; Informing;Quiet presence;Caring rounds  Therapeutic Modalities: Intentional therapeutic touch                 Strength:    Strength: Generally decreased, functional           Tone & Sensation:   Tone: Normal  Sensation: Intact           Range Of Motion:  AROM: Within functional limits              Functional Mobility:  Bed Mobility:  Supine to Sit: Contact guard assistance; Additional time        Transfers:  Sit to Stand: Minimum assistance;Contact guard assistance  Stand to Sit: Contact guard assistance             Balance:   Sitting: Impaired; With support  Sitting - Static: Good (unsupported)  Sitting - Dynamic: Fair (occasional)  Standing: Impaired; With support  Standing - Static: Fair ((+))  Standing - Dynamic : Fair       Ambulation/Gait Training:  Distance (ft): 25 Feet (ft)  Assistive Device: Dorita Jesus rolling  Ambulation - Level of Assistance: Contact guard assistance  Gait Abnormalities: Decreased step clearance; Foot drop  Step Length: Left shortened       Pain:  Pain level pre-treatment: 0/10   Pain level post-treatment: 0/10   Pain Intervention(s) : Medication (see MAR); Rest, Ice, Repositioning  Response to intervention: Nurse notified, See doc flow    Activity Tolerance:   Fair  Please refer to the flowsheet for vital signs taken during this treatment. After treatment:   [x]         Patient left in no apparent distress sitting up in chair  []         Patient left in no apparent distress in bed  [x]         Call bell left within reach  [x]         Nursing notified  []         Caregiver present  [x]         Chair alarm activated  []         SCDs applied    COMMUNICATION/EDUCATION:   [x]         Role of Physical Therapy in the acute care setting. [x]         Fall prevention education was provided and the patient/caregiver indicated understanding. [x]         Patient/family have participated as able in goal setting and plan of care. [x]         Patient/family agree to work toward stated goals and plan of care. []         Patient understands intent and goals of therapy, but is neutral about his/her participation. []         Patient is unable to participate in goal setting/plan of care: ongoing with therapy staff.  []         Other:     Thank you for this referral.  Royetta Saint, PT   Time Calculation: 27 mins      Eval Complexity: History: LOW Complexity : Zero comorbidities / personal factors that will impact the outcome / POCExam:LOW Complexity : 1-2 Standardized tests and measures addressing body structure, function, activity limitation and / or participation in recreation  Presentation: LOW Complexity : Stable, uncomplicated  Clinical Decision Making:Low Complexity    Overall Complexity:LOW

## 2021-10-20 NOTE — PROGRESS NOTES
New OT orders received and chart reviewed. Unable to see pt for OT evaluation at this time due to: Attempt x1 - pt's nurse is replacing michel. Attempt x2 - pt is getting ready to eat lunch. Pt's spouse requests pt to finish eating due to pt being hungry. Will follow up later as pt's schedule allows.  Thank you for this referral.    Maximilian Persaud MS, OTR/L

## 2021-10-20 NOTE — PROGRESS NOTES
Progress Note    Katheryn Adorno  80 y.o. Admit Date: 10/18/2021  Principal Problem:    Pneumonia (10/19/2021) POA: Unknown    Active Problems:    Essential (primary) hypertension (9/1/2016) POA: Yes      ESRD (end stage renal disease) ([facility] (9/26/2020) POA: Yes      Secondary hyperparathyroidism of renal origin ([facility] (9/26/2020) POA: Unknown      Hypoalbuminemia (10/20/2021) POA: Unknown      Leucocytosis (10/20/2021) POA: Unknown            Subjective:     Patient feels good, noSOB, no fever, tolerating  PO. Has Burris, scanty Urine Ouput. A comprehensive review of systems was negative except for that written in the History of Present Illness. Objective:     Visit Vitals  BP (!) 145/74 (BP 1 Location: Right upper arm, BP Patient Position: At rest)   Pulse 92   Temp 99.3 °F (37.4 °C)   Resp 20   Ht 5' 6\" (1.676 m)   Wt 72 kg (158 lb 12.8 oz)   SpO2 98%   BMI 25.63 kg/m²         Intake/Output Summary (Last 24 hours) at 10/20/2021 1052  Last data filed at 10/20/2021 0455  Gross per 24 hour   Intake    Output 200 ml   Net -200 ml       Current Facility-Administered Medications   Medication Dose Route Frequency Provider Last Rate Last Admin    doxercalciferoL (HECTOROL) 4 mcg/2 mL injection 0.5 mcg  0.5 mcg IntraVENous Q MON, WED & Franchesca Arlene Moss MD        ondansetron [facility]) injection 4 mg  4 mg IntraVENous Q8H PRN Donna Drummond DO        . PHARMACY TO SUBSTITUTE PER PROTOCOL (Reordered from: abiraterone (Zytiga) 250 mg tab)    Per Protocol Jovany Velazquez MD        amLODIPine [facility]) tablet 10 mg  10 mg Oral DAILY Jovany Velazquez MD  B complex-vitaminC-folic acid (NEPHROCAP) cap  1 Capsule Oral DAILY Jovany Velazquez MD   1 Capsule at 10/20/21 0849    calcium acetate(phosphat bind) (PHOSLO) capsule 667 mg  1 Capsule Oral TID WITH MEALS Jovany Velazquez MD   667 mg at 10/20/21 0848    predniSONE (DELTASONE) tablet 5 mg  5 mg Oral BID Jovany Velazquez MD   5 mg at 10/20/21 0849    sodium chloride (NS) flush 5-40 mL  5-40 mL IntraVENous Q8H Jovany Velazquez MD   10 mL at 10/20/21 1911    sodium chloride (NS) flush 5-40 mL  5-40 mL IntraVENous PRN Jovany Velazquez MD        acetaminophen (TYLENOL) tablet 650 mg  650 mg Oral Q6H PRN Jovany Velazquez MD   650 mg at 10/19/21 1049    Or    acetaminophen (TYLENOL) suppository 650 mg  650 mg Rectal Q6H PRN Jovany Velazquez MD        heparin (porcine) injection 5,000 Units  5,000 Units SubCUTAneous Q8H Jovany Velazquez MD   5,000 Units at 10/20/21 2783    melatonin tablet 5 mg  5 mg Oral QHS Radha Sloop, DO        lidocaine (XYLOCAINE) 5 % ointment   Topical DAILY PRN Radha Sloop, DO            Physical Exam:     Physical Exam:   General:  Alert, cooperative, no distress, appears stated age. Lungs:   Clear to auscultation bilaterally. Heart:  Regular rate and rhythm, S1, S2 normal, no murmur, click, rub or gallop. Abdomen:   Soft, non-tender. Bowel sounds normal. No masses,  No organomegaly. Extremities: Extremities normal, atraumatic, no cyanosis or edema, Has Right IJTDC, AVG on left arm has good thrill &Bruit   Skin: Skin color, texture, turgor normal. No rashes or lesions         Data Review:    CBC w/Diff    Recent Labs     10/20/21  0425 10/18/21  2035 10/18/21  2035   WBC 25.3*  --  37.1*   RBC 3.68*  --  4.55   HGB 11.6*  --  14.3   HCT 35.7*  --  42.6   MCV 97.0   < > 93.6   MCH 31.5   < > 31.4   MCHC 32.5   < > 33.6   RDW 13.4   < > 13.4    < > = values in this interval not displayed. Recent Labs     10/20/21  0425 10/18/21  2035 10/18/21  2035   MONOS 9  --  2*   EOS 0  --  2   BASOS 0   < > 0   RDW 13.4   < > 13.4    < > = values in this interval not displayed.         Comprehensive Metabolic Profile    Recent Labs     10/20/21  0425 10/18/21  2035   * 136   K 4.1 3.9   CL 96* 94*   CO2 30 31   BUN 49* 33*   CREA 5.80* 3.67*    Recent Labs     10/20/21  0425 10/18/21  2035   CA 8.3*  8.3* 9.6   PHOS 3.2  --    ALB 2.8* 3.4 TP 6.4 7.7   TBILI 0.4 0.3          IMPRESSION     Multi-lobar pneumonia.     Increasing widespread osseous metastatic disease.     Moderate retained fecal material within the rectum with stercoral colitis.     Ventral wall hernia containing small bowel without evidence of obstruction.     Advanced atherosclerosis of the abdominal aorta with involvement of the SMA and  celiac axis. Would suggest follow-up PVL evaluation to assess the degree of  stenosis of the mesenteric vessels. Blood culture results pending. Impression:       Active Hospital Problems    Diagnosis Date Noted    Hypoalbuminemia 10/20/2021    Leucocytosis 10/20/2021    Pneumonia 10/19/2021    ESRD (end stage renal disease) ([facility] [address] 09/26/2020    Secondary hyperparathyroidism of renal origin ([facility] [address] 09/26/2020    Essential (primary) hypertension 09/01/2016      WBC still high but started coming Down      Plan:     Continue IV antibioptics, follow WBC count&Blood Culture, Dialysis today,start Hectorol, No need for Retacit, H/H is quite high,get chest X-ray in AM,keep Urine out put record trough maddison Burris Nutritional support.       Chantell Candelaria MD

## 2021-10-21 NOTE — ROUTINE PROCESS
TRANSFER - IN REPORT:    Verbal report received from [facility] RN(name) on Madhu Gr  being received from hemodialysis unit(unit) for routine progression of care      Report consisted of patients Situation, Background, Assessment and   Recommendations(SBAR). Information from the following report(s) Procedure Summary was reviewed with the receiving nurse. Opportunity for questions and clarification was provided. Assessment will be completed upon patients arrival to unit and care will be assumed.

## 2021-10-21 NOTE — PROGRESS NOTES
Progress Note         Patient: Santiago Finley MRN: [id number]  CSN: [id number]    YOB: 1935  Age: 80 y.o. Sex: male    DOA: 10/18/2021 LOS:  LOS: 1 day                    Subjective:     Santiago Finley is a 80 y.o. male with a PMHx of ESRD on HD, HTN, BPH with chronic indwelling michel catheter, colon CA, and prostate CA with widespread osseous metastatic disease who is admitted for sepsis secondary to multifocal pneumonia/aspiration pneumonia. Seen in room today  Lying in bed, comfortable, NAD   Alert and oriented x3  Had some nausea and vomiting today which resolved after zofran   Cough improved  Denies any complaints of shortness of breath      Objective:     Physical Exam:  Visit Vitals  BP (!) 155/76   Pulse 99   Temp 98.6 °F (37 °C) (Axillary)   Resp 18   Ht 5' 6\" (1.676 m)   Wt 72 kg (158 lb 12.8 oz)   SpO2 96%   BMI 25.63 kg/m²        General:         Alert, cooperative, no acute distress    HEENT: NC, Atraumatic. Anicteric sclerae. Lungs: Scattered coarse rhonchi, right greater than left  Heart:  Regular  rhythm,  No murmur, No Rubs, No Gallops  Abdomen: Soft, Non distended, Non tender. +Bowel sounds, no HSM  Extremities: No c/c/e  Psych:   Not anxious or agitated. Neurologic:  Alert and oriented X 3. No acute neurological deficits.      Intake and Output:  Current Shift:  10/20 1901 - 10/21 0700  In: -   Out: 2000   Last three shifts:  10/19 0701 - 10/20 1900  In: -   Out: 440 [Urine:440]    Labs: Results:       Chemistry Recent Labs     10/20/21  0425 10/18/21  2035   GLU 78 145*   * 136   K 4.1 3.9   CL 96* 94*   CO2 30 31   BUN 49* 33*   CREA 5.80* 3.67*   CA 8.3*  8.3* 9.6   AGAP 7 11   BUCR 8* 9*   * 209*   TP 6.4 7.7   ALB 2.8* 3.4   GLOB 3.6 4.3*   AGRAT 0.8 0.8      CBC w/Diff Recent Labs     10/20/21  0425 10/18/21  2035   WBC 25.3* 37.1*   RBC 3.68* 4.55   HGB 11.6* 14.3   HCT 35.7* 42.6    309   GRANS 85* 92*   LYMPH 6* 4*   EOS 0 2      Cardiac Enzymes Recent Labs     10/18/21  2035   CPK 44   CKND1 CALCULATION NOT PERFORMED WHEN RESULT IS BELOW LINEAR LIMIT      Coagulation Recent Labs     10/18/21  2035   PTP 11.8   INR 0.9       Lipid Panel Lab Results   Component Value Date/Time    Cholesterol, total 180 04/06/2021 02:03 PM    HDL Cholesterol 59 04/06/2021 02:03 PM    LDL, calculated 102.2 (H) 04/06/2021 02:03 PM    VLDL, calculated 18.8 04/06/2021 02:03 PM    Triglyceride 94 04/06/2021 02:03 PM    CHOL/HDL Ratio 3.1 04/06/2021 02:03 PM      BNP No results for input(s): BNPP in the last 72 hours. Liver Enzymes Recent Labs     10/20/21  0425   TP 6.4   ALB 2.8*   *      Thyroid Studies Lab Results   Component Value Date/Time    TSH 6.31 (H) 09/25/2020 06:00 PM                Assessment and Plan:     Madhu Gr is a 80 y.o. male with a PMHx of ESRD on HD, HTN, BPH with chronic indwelling michel catheter, colon CA, and prostate CA with widespread osseous metastatic disease who is admitted for severe sepsis secondary to multifocal pneumonia/aspiration pneumonia. 1. Severe sepsisPOA, with tachycardia, tachypnea, leukocytosis and lactic acidosis  2. Multifocal pneumonia/aspiration pneumonia  3. ESRD on HD  4. HTN  5. BPH with chronic indwelling Michel catheter  6. Colon cancer  7. Prostate cancer with widespread osseous metastatic disease    Nephrology following  HD per nephrology  Continue IV ABXcefepime and vancomycin, will de-escalate when appropriate  Follow-up blood cultureNGTD  Continue home Zytiga and prednisone  Continue other home medsamlodipine, Nephrocaps, PhosLo  Melatonin nightly  Follow-up CBC, BMP  Incentive spirometry  PT, OTrecommending       Case discussed with:  [x]Patient  [x]Family  [x]Nursing  []Case Management  DVT prophylaxis: SQH  Diet: Renal  Contact: Kike Rahman (wife) [phone]           Code Status: Full  Disposition: Continue current care, likely home in the next 2-3 days      SENG Don DO  10/20/2021       Dragon medical dictation software was used for portions of this report. Unintended errors may occur.

## 2021-10-21 NOTE — DIALYSIS
ACUTE HEMODIALYSIS FLOW SHEET    HEMODIALYSIS ORDERS: Physician: Dr. Shane Estrada: Vince   Duration:  3 hr  BFR: 400   DFR: 800   Dialysate:  Temp 36   K+   2    Ca+  2.5   Na 138   Bicarb 35   Wt Readings from Last 1 Encounters:   10/20/21 72 kg (158 lb 12.8 oz)   [x] Patient Chart     [] Unable to Obtain     Dry weight/UF Goal: 2000 ml               Access:  Right IJ tunneled CVC     Heparin [x]  Bolus   1000 Units    [x] Hourly   500 Units    [] None      Catheter locking solution:  1:1000U Heparin   Pre BP:  144/75    Pulse:  85   Respirations: 18    Temperature:  98.3    Tx: NSS   ml/Bolus   [x] N/A   Labs: []  Pre  []  Post   [x] N/A   Additional Orders(medications, blood products, hypotension management): [x] Yes   [] No     [x]  [facility] Consent Verified     CATHETER ACCESS:  []N/A   [x]Right   []Left   [x]IJ     []Fem   []Chest wall   [] First use X-ray verified     [x]Tunneled    [] Non Tunneled   [x]No S/S infection  []Redness  []Drainage []Cultured []Swelling []Pain   [x]Medical Aseptic Prep Utilized   [x]Dressing Changed  [x] Biopatch  Date: 10/20/21   []Clotted   [x]Patent   Flows: [x]Good  []Poor  []Reversed   If access problem,  notified: []Yes    []N/A          GENERAL ASSESSMENT:    LOC:    [x] Alert   [x]Oriented:  [x] Person  [x] Place  [x]Time              [] Confused  [] Non-Verbal [] Lethargic  [] Obtunded                [] Sedated   [] Agitated  [] Restless    []  Non-responsive        CARDIAC: [x]Regular      [] Irregular   [] Pericardial Rub  [] JVD          []  Monitored  [] Bedside  [] Remotely monitored       LUNGS:   SaO2  %   [x] Clear  [] Coarse  [] Crackles  [] Wheezing                                        [x] Diminished     Location : []RLL   []LLL    [x]RUL  [x]ROBERT   Cough: []Productive  []Dry  [x]N/A   Respirations:  [x]Easy  []Labored   Therapy:  [x]RA  []NC L/min    Mask: []NRB  [] Venti    O2%                  []Ventilator  []Intubated  [] Trach  [] BiPaP     GI / ABDOMEN:                     [] Flat    [] Distended    [x] Soft    [] Firm   []  Obese                   [] Diarrhea  [x] Bowel Sounds  [] Nausea  [] Vomiting                   [] Fecal Management System  [] Incontinent of stool      / URINE ASSESSMENT:                   [] Voiding   [x] Oliguria  [] Anuria   []  Burris                  [] Incontinent of urine     SKIN:   [x] Warm  [] Hot  [] Cold   [] Cool   [x] Dry    [] Pale   [] Diaphoretic                  [] Flushed  [] Jaundiced  [] Cyanotic  [] Rash  [] Weeping     EDEMA: [] None  [x]Generalized  [] Pitting [] 1    [] 2    [] 3    [] 4                 [] Facial  [] Pedal  []  UE  [] LE     MOBILITY:  [x] Bed    [] Stretcher     PAIN:  [x] 0 []1  []2   []3   []4   []5   []6   []7   []8   []9   []10            Scale 0-10  Action/Follow Up:        All Vitals and Treatment Details on Attached [facility]: [facility]          Room # 208/01   [] 1st Time Acute      [] Stat       [x] Routine      [] Urgent     [x] Acute Room  []  Bedside  [] ICU/CCU  [] ER   Isolation Precautions:  [x] Dialysis    There are currently no Active Isolations       ALLERGIES:     No Known Allergies   Code Status:  Full Code     Hepatitis Status     Lab Results   Component Value Date/Time    Hepatitis B surface Ag <0.10 10/18/2021 08:39 PM    Hepatitis B surface Ab <3.10 (L) 10/18/2021 08:39 PM    Hepatitis B core, IgM Negative 09/27/2020 04:33 AM    Hepatitis C virus Ab 0.15 09/27/2020 04:33 AM        Current Labs:      Lab Results   Component Value Date/Time    WBC 25.3 (H) 10/20/2021 04:25 AM    HGB 11.6 (L) 10/20/2021 04:25 AM    HCT 35.7 (L) 10/20/2021 04:25 AM    PLATELET 243 83/28/1299 04:25 AM    MCV 97.0 10/20/2021 04:25 AM     Lab Results   Component Value Date/Time    Sodium 133 (L) 10/20/2021 04:25 AM    Potassium 4.1 10/20/2021 04:25 AM    Chloride 96 (L) 10/20/2021 04:25 AM    CO2 30 10/20/2021 04:25 AM    Anion gap 7 10/20/2021 04:25 AM    Glucose 78 10/20/2021 04:25 AM    BUN 49 (H) 10/20/2021 04:25 AM    Creatinine 5.80 (H) 10/20/2021 04:25 AM    BUN/Creatinine ratio 8 (L) 10/20/2021 04:25 AM    GFR est AA 11 (L) 10/20/2021 04:25 AM    GFR est non-AA 9 (L) 10/20/2021 04:25 AM    Calcium 8.3 (L) 10/20/2021 04:25 AM    Calcium 8.3 (L) 10/20/2021 04:25 AM          DIET:  DIET ADULT  DIET ADULT ORAL NUTRITION SUPPLEMENT      PRIMARY NURSE REPORT:   Pre Dialysis:  LANG Collazo     Time: 16:54      EDUCATION:    [x] Patient [] Other           Knowledge Basis: []None [x]Minimal [] Substantial [] Unable to assess at this time.    Barriers to learning  [x]N/A   [] Access Care     [] S&S of infection  [] Fluid Management  [] K+   [x] Procedural    []Albumin   [] Medications   [] Tx Options   [] Transplant   [] Diet   [] Other   Teaching Tools:  [x] Explain  [] Demo  [] Handouts [] Video  Patient response: [x] Verbalized understanding  [] Teach back  [] Return demonstration   [] Requires follow up      [x]Time Out/Safety Check  [x] Extracorporeal Circuit Tested for integrity       RO/HEMODIALYSIS MACHINE SAFETY CHECKS  Before each treatment:     Machine Number:                   [facility]                                    [x] Unit Machine # 5 with centralized RO                                                                                                             Alarm Test:  Pass time 16:48            [x] RO/Machine Log Complete    Machine Temp    36.0             Dialysate: pH  7.4    Conductivity: Meter 14.1     HD Machine  13.69      TCD: 14.1  Dialyzer Lot # [id number]     Blood Tubing Lot # [id number]    Saline Lot # [id number]     CHLORINE TESTING-Before each treatment and every 4 hours    Total Chlorine: [x] less than 0.1 ppm  Initial Time Check: 17:00       4 Hr/2nd Check Time: 21:00   (if greater than 0.1 ppm from Primary then every 30 minutes from Secondary)     TREATMENT INITIATION  with Dialysis Precautions:   [x] All Connections Secured [x] Saline Line Double Clamped   [x] Venous Parameters Set               [x] Arterial Parameters Set    [x] Prime Given 250ml NSS              [x]Air Foam Detector Engaged      Treatment Initiation Note:  17:25  Pt arrived to HD without any complaints. Pt A &O X 3, follows commands, no distress noted, time out done with pt. During Treatment Notes:  17:34  Right IJ tunneled CVC assessed no abnormalities noted, line patent with good flow. CVC accessed by HD tech without any difficulty, pt tolerated well. Vascular access visible with arterial and venous line connections intact. 17:45  Vascular access visible with arterial and venous line connections intact. 18:00  Pt resting with eyes closed. Vascular access visible with arterial and venous line connections intact. 18:30  Vascular access visible with arterial and venous line connections intact. 19:00  VSS, vascular access visible with arterial and venous line connections intact. 19:30  Hung Vanco 750mg IV over 1hr via Alaris pump. Vascular access visible with arterial and venous line connections intact. 20:00  Vascular access visible with arterial and venous line connections intact. 20:30  Vascular access visible with arterial and venous line connections intact. Medication Dose Volume Route Time [facility], Title   Vancomycin 750mg 250ml IV HD 19:30 Migdalia Bello RN     Post Assessment  Dialyzer Cleared:[] Good [] Fair  [] Poor  Blood processed:  65.2 L  Net UF Removed:  2000 Ml  Post /76  Pulse  99 Resp  18   Temp 98.6    Post Tx Vascular Access:    CVC Catheter:   Locking solution: Heparin 1:1000U  Arterial port 1.9 ml   Venous port 1.9 ml         Skin:[x] Warm  [x] Dry [] Diaphoretic             []Cool     [] Flushed  [] Pale            [] Cyanotic   Pain:  [x]0  []1 []2  []3 []4  []5  []6 []7 []8  []9  []10     Post Treatment Note:   20:40  HD completed at this time, pt tolerated well. Dressing clean, dry and intact. POST TREATMENT PRIMARY NURSE HANDOFF REPORT:   Post Dialysis:  MARK Lopez                Time:  20:54     Abbreviations: AVG-arterial venous graft, AVF-arterial venous fistula, IJ-Internal Jugular, Subcl-Subclavian, Fem-Femoral, Tx-treatment, AP/HR-apical heart rate, DFR-dialysate flow rate, BFR-blood flow rate, AP-arterial pressure, -venous pressure, UF-ultrafiltrate, TMP-transmembrane pressure, Kyle-Venous, Art-Arterial, RO-Reverse Osmosis

## 2021-10-21 NOTE — CONSULTS
[address]    Name:  Renzo Lucas  MR#:   [id number]  :  1935  ACCOUNT #:  [de-identified]  DATE OF SERVICE:  10/19/2021    REQUESTING PHYSICIAN:  Hospitalist vel, Javier Carpenter DO.    REASON FOR CONSULTATION:  Dialysis patient, admitted with pneumonia. HISTORY OF PRESENT ILLNESS:  This 59-year-old  male, who has history of ESRD and is on dialysis every Monday, Wednesday, and Friday under my care, came to the emergency room on 10/18/2021 as he was not feeling good and was having vomiting after back home from dialysis. Evaluation revealed multifocal pneumonia by CT scan. The patient is relatively slow, has a history of stroke, ambulates very slowly with a cane, and his wife is being sent to the dialysis unit. He had low protein and he was given protein supplementation, which he drank quickly, and afterwards, he complained that his stomach was not feeling right, and then, after dialysis, he was vomiting badly all over his bed. His wife got a picture of his vomitus and brought it to the emergency room, and by the note, it looks like it was dark brown and copious. The patient also had whale cheese before vomiting. No coffee-ground emesis. No melena. No hematemesis. The patient was not feeling good. EMS was called, and as per them, it was described that the vomitus was dried blood appearing and that prompted them to bring him to the ER. Further evaluation did reveal by the CT scan that the patient has multifocal pneumonia and was being admitted. The patient was also found to have a very high white cell count. No fever. No chills. The patient has a history of hypertension, prostate problem, and also has a history of multiple polyps in the colon.   The patient was supposed to be seen and evaluated by colorectal surgeon at the time of diagnosis of ESRD for possible colon surgery, but the patient had declined to do any further workup and colorectal surgeon also was alright given his advanced age and no further evaluation of his colon polyps was recommended. He gets dialysis through a dialysis catheter in the right IJ. He also has a left AV graft, which we used off and on, but sometimes he feels numbness and tingling and also feels cold in his fingers. For that reason, there was a concern for steal syndrome. He was evaluated by a vascular surgeon and again was cleared, but still the patient was not allowed to use that graft, and for that reason, he has continued to help using the dialysis catheter. PAST MEDICAL HISTORY:  Hypertension, prostate cancer as I mentioned, diastolic dysfunction, multiple polyps in the colon, ESRD. No diabetes. PAST SURGICAL HISTORY:  Beside the dialysis catheter and the AV graft, he had colonoscopy with polypectomy  and it had multiple polyps. Further workup was not done, as the patient did not want to do any more colon workup. SOCIAL HISTORY:  Never smoked. No drug. No alcohol. LIST OF MEDICATIONS BEFORE ADMISSION:  Norvasc 10 mg p.o. daily, PhosLo 667 mg one capsule three times daily. Also, he is on a medicine called Zytiga. I am not sure why he is getting that medicine. Recently, he was complaining of flaring up of his gout and taking prednisone. Casodex 50 mg tablet daily; vitamin B complex;  once a day; Lasix 40 mg tablet daily, which was discontinued earlier. ALLERGIES:  NO KNOWN DRUG ALLERGIES. REVIEW OF SYSTEMS:  GENERAL:  An elderly person, not acutely distressed looking. CONSTITUTIONAL:  No fever, no chills, and no diaphoresis when I saw the patient. RESPIRATORY:  Has cough and mild shortness of breath. No wheezing. No hemoptysis. CARDIOVASCULAR SYSTEM:  No chest pain. No palpitation. No leg swelling. GI:  Has feelings of indigestion, nausea, and vomiting before coming to the hospital.  No diarrhea or constipation. ENDOCRINE:  Still makes some urine. Mild polyuria.   :  Difficulty urinating and dysuria. NEUROLOGICAL:  No dizziness, no weakness, no numbness, no headaches. HEMATOLOGICAL:  Bleeds easily. PSYCHIATRIC AND BEHAVIORAL:  No confusion, no dysphoric mood, no hallucination. PHYSICAL EXAMINATION:  VITAL SIGNS:  On the day of admission, temperature was 98.5, heart rate 114 per minute, blood pressure 134/115 with a mean arterial pressure of 120, respirations 25, oxygen saturation 94% on room air. HEENT:  Oral mucosa is moist.  NECK:  Jugular veins are not distended. LUNGS:  Decreased breath sounds with possible bronchial breath sounds also. HEENT:  S1 and S2 without any gallop or murmur. ABDOMEN:  Soft. No palpable mass. EXTREMITIES:  Ankles:  Negative edema. On his left arm, AV graft has a good thrill and bruit. No clubbing. Has a right IJ tunneled dialysis catheter. RELEVANT LABORATORY DATA:  On the day of admission on 10/18/2021, WBC count was 37,100 with a hemoglobin of 14.3, hematocrit of 42.6, and platelets of 313,881. Neutrophils 92%, lymphocyte 4%, monocytes 2%. Chemistry on the day of admission:  Sodium 136, potassium 3.9, chloride 94, CO2 of 31, glucose of 145, blood urea nitrogen of 36, creatinine 3.67, magnesium 2.3. Total protein of 7.7 with an albumin of 3.4. Troponin less than 0.2. CPK 44 and this lab is after dialysis. COVID rapid test was done, which was not detected and the patient is fully vaccinated. IMAGING DATA:  No chest x-ray was done, but CT of the chest, abdomen, and pelvis was done without contrast and new findings were multilobar pneumonia and increasing  metastatic disease. Moderate retained fecal material within the rectum with rectal colitis. Ventral wall hernia and a small bowel without evidence of obstruction. Advanced atherosclerosis of the abdominal aorta and involvement of the superior mesenteric artery and celiac axis.   Small hiatal hernia.  nodular and confluent infiltrates within the lower lobes bilaterally and within the right upper lobe and to a lesser extent in the left upper lobe. There was no pleural effusion. .  Kidneys were bilaterally atrophic with cortical cystic changes. The bladder was partly decompressed around the Burris catheter. He has an indwelling Burris catheter and an enlarged prostate. IMPRESSION:  The patient has:  1. Multilobar pneumonia, cause of this pneumonia is not clear, whether aspiration pneumonia or not. The patient has high leukocytosis without fever. 2.  Hypertension. 3.  End-stage renal disease. 4.  Hypoalbuminemia. 5.  History of prostate cancer. 6.  History of colonic polyps. 7.  Nonfunctional arteriovenous graft. PLAN:  We will follow the cultures. Continue antibiotics  as  started. Follow the white cell count. Careful dialysis through the dialysis catheter. As the patient is complaining of gout-like symptoms, we have to check the uric acid also. Keep an eye about his breathing and aspiration precautions should be implemented. We will check the relevant labs in relation to his ESRD and secondary hyperparathyroidism. He is not anemic. No need for any Retacrit or Procrit on this admission until the hemoglobin drops. We will continue to maintain his dialysis schedule every Monday, Wednesday, and Friday, and give further recommendations as time comes.       MD JAMISON Whittington/[email or web address]  D:  10/20/2021 10:30  T:  10/20/2021 22:49  JOB #:  [id number]

## 2021-10-21 NOTE — PROGRESS NOTES
SLP Note:    New orders received. Pt being kept NPO due bloody vomit episode. Will address as appropriate. D/w RN and GI.       Thank you for this referral,  Eliza Morel M.S., [address] [facility]  Speech-Language Pathologist

## 2021-10-21 NOTE — ACP (ADVANCE CARE PLANNING)
Advance Care Planning     General Advance Care Planning (ACP) Conversation      Date of Conversation: 10/21/21  Conducted with: Patient with Decision Making Capacity    Healthcare Decision Maker:     Primary Decision Maker: Claudean So  and surgery update - Spouse - [phone]    Secondary Decision Maker: Pamella Quinones - [phone]  Click here to complete [employer] including selection of the Healthcare Decision Maker Relationship (ie \"Primary\")        Content/Action Overview:    Has ACP document(s) on file - reflects the patient's care preferences  Reviewed DNR/DNI and patient elects Full Code (Attempt Resuscitation)      Blade Barrera RN

## 2021-10-21 NOTE — PROGRESS NOTES
Problem: Self Care Deficits Care Plan (Adult)  Goal: *Acute Goals and Plan of Care (Insert Text)  Description: Occupational Therapy Goals  Initiated 10/21/2021 within 7 day(s). 1.  Patient will perform grooming with supervision/set-up standing at the sink for 2-4 minutes with Fair+ balance. 2.  Patient will perform upper body dressing with supervision/set-up. 3.  Patient will perform toilet transfers with supervision/set-up. 4.  Patient will perform all aspects of toileting with supervision/set-up. 5.  Patient will participate in upper extremity therapeutic exercise/activities with supervision/set-up for 8 minutes. 6.  Patient will utilize energy conservation techniques during functional activities with verbal cues. Prior Level of Function: Pt and pt's spouse report pt requires supervision for functional mobility with FWW, and receives assistance for bathing and dressing from his spouse. Outcome: Progressing Towards Goal  OCCUPATIONAL THERAPY EVALUATION    Patient: Selvin Ramos (13 y.o. male)  Date: 10/21/2021  Primary Diagnosis: Pneumonia [J18.9]        Precautions:   Fall      ASSESSMENT :  Pt presents in bed, just finished eating lunch, being fed by his spouse. Pt is agreeable to participate in OT evaluation with spouse present in rm. Based on the objective data described below, the patient presents with decreased endurance and functional activity tolerance, generalized weakness, decreased functional mobility, limiting his participation and independence with ADLs. Pt requires additional time to complete all ADL tasks and functional mobility with FWW. Pt maneuvered to the recliner simulating toilet txfr with FWW with CGA and Min VCs for safe sequencing. Pt noted decreased LUE strength as compared to R (4/5), per pt this is due to complications from dialysis catheter previously placed in LUE.  Pt educate don calling for assistance if needed to get up for any reasons, pt verbalized understanding. Chair alarm activated for pt's safety due to periods of confusion and decreased safety awareness. Patient will benefit from skilled intervention to address the above impairments. Patient's rehabilitation potential is considered to be Fair  Factors which may influence rehabilitation potential include:   []             None noted  [x]             Mental ability/status  []             Medical condition  []             Home/family situation and support systems  [x]             Safety awareness  []             Pain tolerance/management  []             Other:      PLAN :  Recommendations and Planned Interventions:  [x]               Self Care Training                  [x]      Therapeutic Activities  [x]               Functional Mobility Training   []      Cognitive Retraining  [x]               Therapeutic Exercises           [x]      Endurance Activities  [x]               Balance Training                    []      Neuromuscular Re-Education  []               Visual/Perceptual Training     [x]      Home Safety Training  [x]               Patient Education                   [x]      Family Training/Education  []               Other (comment):    Frequency/Duration: Patient will be followed by occupational therapy 1-2 times per day/4-7 days per week to address goals. Discharge Recommendations: Home Health with assistance from family  Further Equipment Recommendations for Discharge: bedside commode     SUBJECTIVE:   Patient stated Let me show you what I can do.     OBJECTIVE DATA SUMMARY:     Past Medical History:   Diagnosis Date    Cancer ([facility] [address]     Prostate    Cardiac echocardiogram 08/29/2016    EF 60-65%. No WMA. Mild LVH. Indeterminate diastolic fx. RVSP 35 mmHg. No significant valvular heart disease. Cardiac nuclear imaging test, abnormal 08/29/2016    Intermediate risk. Previous inferior infarction w/very mild fabiana-infarct ischemia. Inferior hypk. EF 68%.   Nondiagnostic EKG on pharm stress test.  Pt's BP increased from 193/96 to 207/83 during study. Carotid duplex 08/30/2016    Mild <50% bilateral ICA stenosis. Chronic kidney disease     dialysis    Colonic mass     per Dr Edilson Martinez notes    Enlarged prostate     Hypertension      Past Surgical History:   Procedure Laterality Date    COLONOSCOPY N/A 10/1/2020    COLONOSCOPY, b'x, w tattoo w polypectomy performed by Lamberto Rangel MD at [address]  10/1/2020         Henriquemariann Leahy  10/1/2020         [facility] [place]  10/1/2020         [place] INSJ TUNNELED CVC W/O SUBQ PORT/ AGE 5 YR/> N/A 9/28/2020    INSERTION TUNNELED CENTRAL VENOUS CATHETER performed by Ruel Booth MD at [facility] CATH LAB    VASCULAR SURGERY PROCEDURE UNLIST      dialysis access- right neck     Barriers to Learning/Limitations: None  Compensate with: visual, verbal, tactile, kinesthetic cues/model    Home Situation:   Home Situation  Home Environment: Private residence  # Steps to Enter: 2  One/Two Story Residence: One story  Living Alone: No  Support Systems: Spouse/Significant Other  Patient Expects to be Discharged to[de-identified] [employer]  Current DME Used/Available at Home: Ardia Feller or Shower Type: Tub/Shower combination  [x]  Right hand dominant   []  Left hand dominant    Cognitive/Behavioral Status:  Neurologic State: Alert  Orientation Level: Oriented to person;Oriented to place  Cognition: Follows commands  Safety/Judgement: Awareness of environment    Skin: visible skin intact  Edema: none noted    Vision/Perceptual:       Acuity: Impaired near vision     Coordination: BUE  Coordination: Generally decreased, functional  Fine Motor Skills-Upper: Right Intact; Left Impaired    Gross Motor Skills-Upper: Right Intact; Left Intact  Balance:  Sitting: Impaired  Sitting - Static: Good (unsupported)  Sitting - Dynamic: Fair (occasional)  Standing: Impaired; With support  Standing - Static: Fair (plus)  Standing - Dynamic : Fair    Strength: BUE  Strength: Generally decreased, functional   Tone & Sensation: BUE  Tone: Normal  Sensation: Intact   Range of Motion: BUE  AROM: Generally decreased, functional   Functional Mobility and Transfers for ADLs:  Bed Mobility:     Supine to Sit: Stand-by assistance; Additional time     Scooting: Contact guard assistance  Transfers:  Sit to Stand: Contact guard assistance  Stand to Sit: Contact guard assistance     Bed to Chair: Contact guard assistance   Toilet Transfer : Contact guard assistance    ADL Assessment:   Feeding: Setup  Oral Facial Hygiene/Grooming: Setup  Bathing: Moderate assistance  Upper Body Dressing: Minimum assistance  Lower Body Dressing: Maximum assistance  Toileting: Moderate assistance   ADL Intervention:     Cognitive Retraining  Safety/Judgement: Awareness of environment  Pain:  Pain level pre-treatment: 0/10   Pain level post-treatment: 0/10     Activity Tolerance:   Fair  Please refer to the flowsheet for vital signs taken during this treatment. After treatment:   [x] Patient left in no apparent distress sitting up in chair  [] Patient left in no apparent distress in bed  [x] Call bell left within reach  [x] Nursing notified  [x] Caregiver present  [x] Chair alarm activated    COMMUNICATION/EDUCATION:   [x] Role of Occupational Therapy in the acute care setting  [x] Home safety education was provided and the patient/caregiver indicated understanding. [x] Patient/family have participated as able in goal setting and plan of care. [x] Patient/family agree to work toward stated goals and plan of care. [] Patient understands intent and goals of therapy, but is neutral about his/her participation. [] Patient is unable to participate in goal setting and plan of care. Thank you for this referral.  Candida Clear, OTR/L  Time Calculation: 25 mins    Eval Complexity: History: LOW Complexity : Brief history review ;    Examination: LOW Complexity : 1-3 performance deficits relating to physical, cognitive , or psychosocial skils that result in activity limitations and / or participation restrictions ;    Decision Making:LOW Complexity : No comorbidities that affect functional and no verbal or physical assistance needed to complete eval tasks

## 2021-10-21 NOTE — PROGRESS NOTES
Pt with moderate-large amount of bloody vomit. Pt denies any vomiting since 2 a.m. and states he feels better. Pt abdomen appears german be distended, bladder scan performed, no urine retained. Pt kept npo, Dr. Mary Ospina notified, will continue to monitor.

## 2021-10-21 NOTE — PROGRESS NOTES
[facility] Hospitalist Group  Progress Note    Patient: Jr Knott Age: 80 y.o. : 1935 MR#: [id number] SSN: [id number]  Date/Time: 10/21/2021     C/C: Hematemesis/nausea vomiting      Subjective:   HPI : Patient with history of carcinoma prostate with multiple chest meta stasis, end-stage renal disease on hemodialysis, admitted with episode of vomiting which was brown-hematemesis. Patient's wife with patient also giving some history, vomiting was projectile, also associated with severe constipation, patient had relief this morning states that he is feeling better. GI consultation noted acute nausea vomiting          Review of Systems:  positive responses in bold type   Constitutional: Negative for fever, chills, diaphoresis and unexpected weight change. HENT: Negative for ear pain, congestion, sore throat, rhinorrhea, drooling, trouble swallowing, neck pain and tinnitus. Eyes: Negative for photophobia, pain, redness and visual disturbance. Respiratory: negative for shortness of breath, cough, choking, chest tightness, wheezing or stridor. Cardiovascular: Negative for chest pain, palpitations and leg swelling. Gastrointestinal: Negative for nausea, vomiting, abdominal pain, diarrhea, constipation, blood in stool, abdominal distention and anal bleeding. Genitourinary: Negative for dysuria, urgency, frequency, hematuria, flank pain and difficulty urinating. Musculoskeletal: Negative for back pain and arthralgias. Skin: Negative for color change, rash and wound. Neurological: Negative for dizziness, seizures, syncope, speech difficulty, light-headedness or headaches. Hematological: Does not bruise/bleed easily. Psychiatric/Behavioral: Negative for suicidal ideas, hallucinations, behavioral problems, self-injury or agitation     Assessment/Plan:     1.  Acute nausea vomiting  2 hematemesis x1  3 severe constipation  4 end-stage renal disease on hemodialysis  5 prostate CA with osseous metastasis  6 chronic indwelling Burris catheter  7 ventral hernia without obstruction or incarceration reducible  8 suspected aspiration pneumonia  9 bazqiximkw-zelrvdjemiki-dhvomu suspect  10 hypokalemia    Plan    -1 episode of hematemesis-we will monitor H&H GI consulted and reviewed, , acid suppression monitor H&H and conservative treatment for now Case discussed with GI  CT abdomen pelvis reviewed no acute pathology except some colitis.     -Constipation-patient had relief this morning with large bowel movement-I would start MiraLAX on regular basis. With relief of constipation also patient has persistent nausea or vomiting if not this could be the reason why he had nausea vomiting    Ventral hernia no further treatment at this point-     End-stage renal disease on hemodialysis    Bacteremia with GPC reordered-blood cultures, ID consult, continue vancomycin for now,   Chest x-ray on admission showing multiple hazy areas Covid test negative- , patient does have cough,               Time spent on direct patient care >30 mints     Complexity : High complex - due to multiple medical issues outlined above.       CODE Status : Full     Case discussed with:  [x]Patient  [x] Family  [x]Nursing  []Case Management     DVT Prophylaxis:  []Lovenox  [x]Hep SQ  []SCDs  []Coumadin   []On Heparin gtt    [] Eliquis [] Xarelto     Objective:   VS:   Visit Vitals  BP (!) 152/76 (BP 1 Location: Right upper arm, BP Patient Position: At rest)   Pulse 94   Temp 98.5 °F (36.9 °C)   Resp 18   Ht 5' 6\" (1.676 m)   Wt 73.3 kg (161 lb 8 oz)   SpO2 97%   BMI 26.07 kg/m²      Tmax/24hrs: Temp (24hrs), Av.5 °F (36.9 °C), Min:98.2 °F (36.8 °C), Max:98.7 °F (37.1 °C)  IOBRIEF    Intake/Output Summary (Last 24 hours) at 10/21/2021 0958  Last data filed at 10/21/2021 0305  Gross per 24 hour   Intake 575 ml   Output 2240 ml   Net -1665 ml       General:  Alert, cooperative, no acute distress   HEENT: No facial asymmetry, ARUNA Fausto, External ears - WNL    Cardiovascular: S1S2 - regular , No Murmur   Pulmonary: Equal expansion , No Use of accessory muscles , No Rales No Rhonchi    GI:  +BS in all four quadrants, soft, non-tender, Ventral hernia - Small reducible and nontender  Extremities:  No edema; 2+ dorsalis pedis pulses bilaterally  Neuro: Alert and oriented X 2.        Medications:   Current Facility-Administered Medications   Medication Dose Route Frequency    doxercalciferoL (HECTOROL) 4 mcg/2 mL injection 0.5 mcg  0.5 mcg IntraVENous Q MON, WED & FRI    ondansetron (ZOFRAN) injection 4 mg  4 mg IntraVENous Q8H PRN    VANCOMYCIN INFORMATION NOTE   Other Rx Dosing/Monitoring    cefepime (MAXIPIME) 1 g in sterile water (preservative free) 10 mL IV syringe  1 g IntraVENous Q24H    abiraterone (Zytiga) tab 1,000 mg (Patient Supplied)  1,000 mg Oral DAILY    amLODIPine (NORVASC) tablet 10 mg  10 mg Oral DAILY    B complex-vitaminC-folic acid (NEPHROCAP) cap  1 Capsule Oral DAILY    calcium acetate(phosphat bind) (PHOSLO) capsule 667 mg  1 Capsule Oral TID WITH MEALS    predniSONE (DELTASONE) tablet 5 mg  5 mg Oral BID    sodium chloride (NS) flush 5-40 mL  5-40 mL IntraVENous Q8H    sodium chloride (NS) flush 5-40 mL  5-40 mL IntraVENous PRN    acetaminophen (TYLENOL) tablet 650 mg  650 mg Oral Q6H PRN    Or    acetaminophen (TYLENOL) suppository 650 mg  650 mg Rectal Q6H PRN    heparin (porcine) injection 5,000 Units  5,000 Units SubCUTAneous Q8H    melatonin tablet 5 mg  5 mg Oral QHS    lidocaine (XYLOCAINE) 5 % ointment   Topical DAILY PRN       Labs:    Recent Labs     10/21/21  0417 10/20/21  0425 10/18/21  2035   WBC 29.6* 25.3* 37.1*   HGB 12.8* 11.6* 14.3   HCT 39.5 35.7* 42.6    244 309     Recent Labs     10/21/21  0417 10/20/21  0425 10/18/21  2035   * 133* 136   K 3.8 4.1 3.9   CL 95* 96* 94*   CO2 29 30 31   * 78 145*   BUN 26* 49* 33*   CREA 3.64* 5.80* 3.67*   CA 8.9 8.3* 8. 3* 9.6   MG  --   --  2.3   PHOS  --  3.2  --    ALB  --  2.8* 3.4   ALT  --  13* 16   INR  --   --  0.9         Disclaimer: Sections of this note are dictated utilizing voice recognition software, which may have resulted in some phonetic based errors in grammar and contents. Even though attempts were made to correct all the mistakes, some may have been missed, and remained in the body of the document. If questions arise, please contact our department.     Signed By: Farhat Molina MD     October 21, 2021

## 2021-10-21 NOTE — PROGRESS NOTES
Progress Note    Keily Angelo  80 y.o. Admit Date: 10/18/2021  Principal Problem:    Pneumonia (10/19/2021) POA: Unknown    Active Problems:    Essential (primary) hypertension (9/1/2016) POA: Yes      ESRD (end stage renal disease) ([facility] (9/26/2020) POA: Yes      Secondary hyperparathyroidism of renal origin ([facility] (9/26/2020) POA: Unknown      Hypoalbuminemia (10/20/2021) POA: Unknown      Leucocytosis (10/20/2021) POA: Unknown            Subjective:     Patient feels good now, had Vomiting again last night, No BM since admission, was Dialyzed yesterday. A comprehensive review of systems was negative except for that written in the History of Present Illness.     Objective:     Visit Vitals  BP (!) 148/72   Pulse 89   Temp 98.1 °F (36.7 °C)   Resp 18   Ht 5' 6\" (1.676 m)   Wt 73.3 kg (161 lb 8 oz)   SpO2 97%   BMI 26.07 kg/m²         Intake/Output Summary (Last 24 hours) at 10/21/2021 1723  Last data filed at 10/21/2021 0305  Gross per 24 hour   Intake 575 ml   Output 2000 ml   Net -1425 ml       Current Facility-Administered Medications   Medication Dose Route Frequency Provider Last Rate Last Admin    polyethylene glycol (MIRALAX) packet 17 g  17 g Oral DAILY Marzena Hammonds MD        doxercalciferoL (HECTOROL) 4 mcg/2 mL injection 0.5 mcg  0.5 mcg IntraVENous Q MON, WED & Joe Urena, Lisbeth Campos MD        ondansetron [facility]) injection 4 mg  4 mg IntraVENous Q8H PRN Luellen Box, DO   4 mg at 10/20/21 9627    VANCOMYCIN INFORMATION NOTE   Other Rx Dosing/Monitoring Luellen Box, DO        cefepime (MAXIPIME) 1 g in sterile water (preservative free) 10 mL IV syringe  1 g IntraVENous Q24H Luellen Box, DO   1 g at 10/20/21 2243    abiraterone (Zytiga) tab 1,000 mg (Patient Supplied)  1,000 mg Oral DAILY Jovany Velazquez MD   1,000 mg at 10/21/21 1343    amLODIPine (NORVASC) tablet 10 mg  10 mg Oral DAILY Jovany Velazquez MD   10 mg at 10/21/21 1342    B complex-vitaminC-folic acid (NEPHROCAP) cap  1 Capsule Oral DAILY Jovany Velazquez MD   1 Capsule at 10/21/21 1343    calcium acetate(phosphat bind) (PHOSLO) capsule 667 mg  1 Capsule Oral TID WITH MEALS Jovany Velazquez MD   667 mg at 10/21/21 1341    predniSONE (DELTASONE) tablet 5 mg  5 mg Oral BID Jovany Velazquez MD   5 mg at 10/20/21 1700    sodium chloride (NS) flush 5-40 mL  5-40 mL IntraVENous Q8H Jovany Velazquez MD   10 mL at 10/21/21 0551    sodium chloride (NS) flush 5-40 mL  5-40 mL IntraVENous PRN Jovany Velazquez MD        acetaminophen (TYLENOL) tablet 650 mg  650 mg Oral Q6H PRN Jovany Velazquez MD   650 mg at 10/19/21 1049    Or    acetaminophen (TYLENOL) suppository 650 mg  650 mg Rectal Q6H PRN Jovany Velazquez MD        heparin (porcine) injection 5,000 Units  5,000 Units SubCUTAneous Q8H Jovany Velazquez MD   5,000 Units at 10/21/21 1344    melatonin tablet 5 mg  5 mg Oral QHS Flonnie Necessary, DO   5 mg at 10/20/21 2243    lidocaine (XYLOCAINE) 5 % ointment   Topical DAILY PRN Flonnie Necessary, DO            Physical Exam:     Physical Exam:   General:  Alert, cooperative, no distress, appears stated age. Neck: Supple, symmetrical, trachea midline, no adenopathy, thyroid: no enlargement/tenderness/nodules, no carotid bruit and no JVD. Lungs:   Clear to auscultation bilaterally. Heart:  Regular rate and rhythm, S1, S2 normal, no murmur, click, rub or gallop. Abdomen:   Soft, non-tender. Bowel sounds normal. No masses,  No organomegaly. Mild distended abdomen   Extremities: Extremities normal, atraumatic, no cyanosis or edema, TDC in Right IJ.V   Skin: Skin color, texture, turgor normal. No rashes or lesions         Data Review:    CBC w/Diff    Recent Labs     10/21/21  1600 10/21/21  1133 10/21/21  0417 10/20/21  0425 10/20/21  0425 10/18/21  2035 10/18/21  2035   WBC  --   --  29.6*  --  25.3*  --  37.1*   RBC  --   --  4.12*  --  3.68*  --  4.55   HGB 12.0* 12.5* 12.8*   < > 11.6*   < > 14.3   HCT 36.7 38.6 39.5   < > 35.7*   < > 42.6   MCV  --   --  95.9   < > 97.0   < > 93.6   MCH  --   --  31.1   < > 31.5   < > 31.4   MCHC  --   --  32.4   < > 32.5   < > 33.6   RDW  --   --  13.2   < > 13.4   < > 13.4    < > = values in this interval not displayed. Recent Labs     10/21/21  0417 10/20/21  0425 10/20/21  0425 10/18/21  2035 10/18/21  2035   MONOS 1*  --  9  --  2*   EOS 2  --  0  --  2   BASOS 0   < > 0   < > 0   RDW 13.2   < > 13.4   < > 13.4    < > = values in this interval not displayed. Comprehensive Metabolic Profile    Recent Labs     10/21/21  0417 10/20/21  0425 10/18/21  2035   * 133* 136   K 3.8 4.1 3.9   CL 95* 96* 94*   CO2 29 30 31   BUN 26* 49* 33*   CREA 3.64* 5.80* 3.67*    Recent Labs     10/21/21  0417 10/20/21  0425 10/18/21  2035   CA 8.9 8.3*  8.3* 9.6   PHOS  --  3.2  --    ALB  --  2.8* 3.4   TP  --  6.4 7.7   TBILI  --  0.4 0.3            FINDINGS:     Heart/Mediastinum: Cardiomediastinal silhouette appears within normal limits. Normal caliber thoracic aorta.     Lungs/Pleural Spaces: Lungs are hypoinflated with diffuse increased interstitial  lung markings. Multifocal areas of hazy infiltrate with bibasilar prominence. No  definite pleural effusion or pneumothorax.     Soft Tissues: Soft tissues are unremarkable.     Bones: Diffuse osseous metastasis again noted.     Misc: Right-sided dual-lumen vascular catheter with termination at the level of  the right atrium, unchanged in position compared to prior. Multiple lines  project over the patient.     _______________     IMPRESSION     Diffuse increased interstitial lung markings with multiple focal areas of hazy  infiltrate.  Recommend continued follow-up until resolution.     Persistent diffuse osseous metastasis.     Alistair Chavira MD   R1 Radiology Resident    }        Impression:       Active Hospital Problems    Diagnosis Date Noted    Hypoalbuminemia 10/20/2021    Leucocytosis 10/20/2021    Pneumonia 10/19/2021    ESRD (end stage renal disease) ([facility] 09/26/2020    Secondary hyperparathyroidism of renal origin ([facility] 09/26/2020    Essential (primary) hypertension 09/01/2016            Plan:     Continue Antibiotics, agree with Miralax , watch for BM, dialysis tomorrow.       Chantell Candelaria MD

## 2021-10-21 NOTE — PROGRESS NOTES
Reason for Admission:  Pneumonia                     RUR Score:     14                Plan for utilizing home health:      No    PCP: First and Last name:  Michelle Trujillo MD     Name of Practice:    Are you a current patient: Yes/No: yes   Approximate date of last visit: \"It was in August or September. \"   Can you participate in a virtual visit with your PCP: Yes                    Current Advanced Directive/Advance Care Plan: Full Code      Healthcare Decision Maker:   Click here to complete [address] including selection of the Healthcare Decision Maker Relationship (ie \"Primary\")             Primary Decision Maker: Jaylen Archer  and surgery update - Spouse - [phone]    Secondary Decision Maker: Juanpablo Maruluigi - [phone]                  Transition of Care Plan:     Home with outpatient follow up. Initial assessment completed with pt and pt's wife at the bedside. Verified NOK, PCP, demographic, and insurance information. Prior to admission pt lived with his wife, Iván Thacker in a 1 story home with 4 steps to enter. Pt ambulated with a walker and required assistance with dressing. Wife denies any recent home health services and endorses a walker and wheelchair for DME in the home. Wife states that pt receives hemodialysis MWF. Wife states that pt went to a SNF \"some years ago. \"     Discussed with pt/wife the role of case management and the services provided. Wife declines any home health services at this time stating that between she and the rest of the family \"we got it. \"    Case management will continue to follow for transition needs.                        Travis Garcia, MSN, RN, ACM-RN     [phone]- pager  [phone]- main office

## 2021-10-21 NOTE — CONSULTS
[email or web address]  [phone]    GASTROENTEROLOGY CONSULT      Impression:   1. Vomiting with single episode of hematemesis  2. Prostate and Colon CA with widespread osseous mets  3. Severe sepsis POA  4. PNA  5. ESRD on HD  6. BPH with chronic indwelling Burris      Plan:     1. Supportive care as vomiting has resolved, if recurrent with hematemesis may consider EGD. Suspect M-W tear due to retching. 2. Monitor for recurrent bleeding  3. Trial of clears, can advance if tolerated  4. Antiemetics as needed  5. Medical management per primary team      Chief Complaint: Hematemesis      HPI:  Juarez Landry is a 80 y.o. male who I am being asked to see in consultation for an opinion regarding the above. He was admitted for severe sepsis due to multifocal PNA/aspiration on 10/18/2021. He began with nausea and vomiting yesterday afternoon until about 2 am this morning, last episode contained bright red blood. He states nausea has resolved and feels much better. He denies abdominal pain, melena or BRBPR. PMH:   Past Medical History:   Diagnosis Date    Cancer [facility])     Prostate    Cardiac echocardiogram 08/29/2016    EF 60-65%. No WMA. Mild LVH. Indeterminate diastolic fx. RVSP 35 mmHg. No significant valvular heart disease.  Cardiac nuclear imaging test, abnormal 08/29/2016    Intermediate risk. Previous inferior infarction w/very mild fabiana-infarct ischemia. Inferior hypk. EF 68%. Nondiagnostic EKG on pharm stress test.  Pt's BP increased from 193/96 to 207/83 during study.  Carotid duplex 08/30/2016    Mild <50% bilateral ICA stenosis.       Chronic kidney disease     dialysis    Colonic mass     per Dr Carlito Call notes    Enlarged prostate     Hypertension        PSH:   Past Surgical History:   Procedure Laterality Date    COLONOSCOPY N/A 10/1/2020    COLONOSCOPY, b'x, w tattoo w polypectomy performed [facility] Jamison [facility] at [facility]  10/1/2020         Apolonia Santamaria  10/1/2020         COLONOSCPY,FLEX,W/DIR SUBMUC INJECT  10/1/2020         NH INSJ TUNNELED CVC W/O SUBQ PORT/ AGE 5 YR/> N/A 9/28/2020    INSERTION TUNNELED CENTRAL VENOUS CATHETER performed by Armin Cobb MD at [facility] CATH LAB    VASCULAR SURGERY PROCEDURE UNLIST      dialysis access- right neck       Social HX:   Social History     Socioeconomic History    Marital status:      Spouse name: Not on file    Number of children: Not on file    Years of education: Not on file    Highest education level: Not on file   Occupational History    Not on file   Tobacco Use    Smoking status: Never Smoker    Smokeless tobacco: Never Used   Vaping Use    Vaping Use: Never used   Substance and Sexual Activity    Alcohol use: No    Drug use: Never    Sexual activity: Not on file   Other Topics Concern    Not on file   Social History Narrative    Not on file     Social Determinants of Health     Financial Resource Strain:     Difficulty of Paying Living Expenses:    Food Insecurity:     Worried About Running Out of Food in the Last Year:     Ran Out of Food in the Last Year:    Transportation Needs:     Lack of Transportation (Medical):  Lack of Transportation (Non-Medical):    Physical Activity:     Days of Exercise per Week:     Minutes of Exercise per Session:    Stress:     Feeling of Stress :    Social Connections:     Frequency of Communication with Friends and Family:     Frequency of Social Gatherings with Friends and Family:     Attends [employer] witness Services:     Active Member of Clubs or Organizations:     Attends Club or Organization Meetings:     Marital Status:    Intimate Partner Violence:     Fear of Current or Ex-Partner:     Emotionally Abused:     Physically Abused:     Sexually Abused:        FHX:   History reviewed. No pertinent family history.     Allergy:   No Known Allergies    Patient Active Problem List   Diagnosis Code    Traumatic rhabdomyolysis ([facility] T79. 6XXA    CAD (coronary artery disease) I25.10    Essential (primary) hypertension I10    Troponin level elevated R77.8    Acute renal failure (ARF) (HCC) N17.9    ESRD (end stage renal disease) ([facility] N18.6    Hyperkalemia N96.5    Metabolic acidosis K49.7    Secondary hyperparathyroidism of renal origin ([facility] N25.81    Anemia D64.9    Hydronephrosis N13.30    UTI (urinary tract infection) N39.0    Postobstructive diuresis R35.89    Polyp of ascending colon K63.5    Decrease in appetite R63.0    Elevated prostate specific antigen (PSA) R97.20    Osteoarthrosis M19.90    Shortness of breath R06.02    Chronic renal failure N18.9    Pneumonia J18.9    Hypoalbuminemia E88.09    Leucocytosis D72.829       Home Medications:     Medications Prior to Admission   Medication Sig    amLODIPine (NORVASC) 10 mg tablet TAKE 1 TABLET BY MOUTH EVERY DAY    calcium acetate,phosphat bind, (PHOSLO) 667 mg cap     amoxicillin-clavulanate (AUGMENTIN) 875-125 mg per tablet Take 1 Tablet by mouth every twelve (12) hours.  abiraterone (Zytiga) 250 mg tab Take four tablets by mouth daily on an empty stomach. Take one hour prior to food or two hours after food. Tablets should be swallowed whole with water. Do not crush or chew tablets.  predniSONE (DELTASONE) 5 mg tablet Take 1 Tab by mouth two (2) times a day.  mupirocin (BACTROBAN) 2 % ointment APPLY TO TOES 3 TIMES A DAY    bicalutamide (CASODEX) 50 mg tablet Take 1 Tab by mouth daily.  B complex w-C no.20/folic acid (TRIPHROCAPS PO) Take 1 Cap by mouth daily.  furosemide (LASIX) 40 mg tablet Take 40 mg by mouth daily. Review of Systems:     Constitutional: No fevers, chills, weight loss, fatigue. Skin: No rashes, pruritis, jaundice, ulcerations, erythema. HENT: No headaches, nosebleeds, sinus pressure, rhinorrhea, sore throat. Eyes: No visual changes, blurred vision, eye pain, photophobia, jaundice. Cardiovascular: No chest pain, heart palpitations. Respiratory: No cough, SOB, wheezing, chest discomfort, orthopnea. Gastrointestinal: Neg unless noted otherwise in H&P   Genitourinary: No dysuria, bleeding, discharge, pyuria. Musculoskeletal: No weakness, arthralgias, wasting. Endo: No sweats. Heme: No bruising, easy bleeding. Allergies: As noted. Neurological: Cranial nerves intact. Alert and oriented. Gait not assessed. Psychiatric:  No anxiety, depression, hallucinations. Visit Vitals  BP (!) 152/76 (BP 1 Location: Right upper arm, BP Patient Position: At rest)   Pulse 94   Temp 98.5 °F (36.9 °C)   Resp 18   Ht 5' 6\" (1.676 m)   Wt 73.3 kg (161 lb 8 oz)   SpO2 97%   BMI 26.07 kg/m²       Physical Assessment:     constitutional: appearance: well developed, well nourished, normal habitus, no deformities, in no acute distress. skin: inspection: no rashes, ulcers, icterus or other lesions; no clubbing or telangiectasias. palpation: no induration or subcutaneos nodules. eyes: inspection: normal conjunctivae and lids; no jaundice pupils: normal  ENMT: mouth: normal oral mucosa,lips and gums; good dentition. oropharynx: normal tongue, hard and soft palate; posterior pharynx without erithema, exudate or lesions. neck: thyroid: normal size, consistency and position; no masses or tenderness. respiratory: effort: normal chest excursion; no intercostal retraction or accessory muscle use. cardiovascular: abdominal aorta: normal size and position; no bruits. palpation: PMI of normal size and position; normal rhythm; no thrill or murmurs. abdominal: abdomen: mild distention; no tenderness or masses. hernias: no hernias appreciated. liver: normal size and consistency. spleen: not palpable. rectal: hemoccult/guaiac: not performed. musculoskeletal: grossly normal, gait not assessed, resting in bed   neurologic: cranial nerves: II-XII grossly normal. Pupils intact.    psychiatric: judgement/insight: within normal limits. memory: within normal limits for recent and remote events. mood and affect: no evidence of depression, anxiety or agitation. orientation: oriented to time, space and person. Basic Metabolic Profile   Recent Labs     10/21/21  0417 10/20/21  0425 10/20/21  0425 10/18/21  2035 10/18/21  2035   *   < > 133*   < > 136   K 3.8   < > 4.1   < > 3.9   CL 95*   < > 96*   < > 94*   CO2 29   < > 30   < > 31   BUN 26*   < > 49*   < > 33*   *   < > 78   < > 145*   CA 8.9   < > 8.3*  8.3*   < > 9.6   MG  --   --   --   --  2.3   PHOS  --   --  3.2  --   --     < > = values in this interval not displayed. CBC w/Diff    Recent Labs     10/21/21  1133 10/21/21  0417 10/21/21  0417   WBC  --   --  29.6*   RBC  --   --  4.12*   HGB 12.5*   < > 12.8*   HCT 38.6   < > 39.5   MCV  --   --  95.9   MCH  --   --  31.1   MCHC  --   --  32.4   RDW  --   --  13.2   PLT  --   --  293    < > = values in this interval not displayed. Recent Labs     10/21/21  0417   GRANS 89*   LYMPH 8*   EOS 2        Hepatic Function   Recent Labs     10/20/21  0425   ALB 2.8*   TP 6.4   TBILI 0.4   *        Coags   Recent Labs     10/18/21  2035   PTP 11.8   INR 0.9           CORNEL Weathers. [facility] [address]  Cell: [phone]  [email or web address]

## 2021-10-21 NOTE — PROGRESS NOTES
Pt arrived to unit from dialysis. Care assumed, assessment completed. Bed locked in low position, call bell in reach.

## 2021-10-22 NOTE — PROGRESS NOTES
[email or web address]  [phone]    Gastroenterology follow up-Progress note    Impression:  1. Vomiting resolved  2. Distended abdomen - ? Ileus, obstruction   3. Prostate and Colon CA with widespread osseous mets  4. Severe sepsis POA  5. PNA  6. ESRD on HD  7. BPH with chronic indwelling Burris    Plan:  1. ABD XR to r/o obstructive process  2. Monitor for recurrent vomiting and worsening abdominal pain  3. Antiemetics as needed  4. Medical management per primary team    Chief Complaint: Hematemesis, abd distention      Subjective:  Denies vomiting but complains of CP and distended abdomen    ROS: Denies any fevers, chills, rash. Eyes: conjunctiva normal, EOM normal   Neck: ROM normal, supple and trachea normal   Cardiovascular: heart normal, intact distal pulses, normal rate and regular rhythm   Pulmonary/Chest Wall: breath sounds normal and effort normal   Abdominal: Abdominal distention, hypoactive BSt, non-acute, mild diffuse ttp     Patient Active Problem List   Diagnosis Code    Traumatic rhabdomyolysis ([facility] T79. 6XXA    CAD (coronary artery disease) I25.10    Essential (primary) hypertension I10    Troponin level elevated R77.8    Acute renal failure (ARF) (HCC) N17.9    ESRD (end stage renal disease) ([facility] N18.6    Hyperkalemia P33.5    Metabolic acidosis Q62.1    Secondary hyperparathyroidism of renal origin ([facility] N25.81    Anemia D64.9    Hydronephrosis N13.30    UTI (urinary tract infection) N39.0    Postobstructive diuresis R35.89    Polyp of ascending colon K63.5    Decrease in appetite R63.0    Elevated prostate specific antigen (PSA) R97.20    Osteoarthrosis M19.90    Shortness of breath R06.02    Chronic renal failure N18.9    Pneumonia J18.9    Hypoalbuminemia E88.09    Leucocytosis D72.829         Visit Vitals  BP (!) 142/76   Pulse 88   Temp 98.2 °F (36.8 °C)   Resp 18   Ht 5' 6\" (1.676 m)   Wt 73.3 kg (161 lb 8 oz)   SpO2 97%   BMI 26.07 kg/m² Intake/Output Summary (Last 24 hours) at 10/22/2021 1533  Last data filed at 10/22/2021 0013  Gross per 24 hour   Intake 520 ml   Output    Net 520 ml       CBC w/Diff    Lab Results   Component Value Date/Time    WBC 23.6 (H) 10/22/2021 03:51 AM    RBC 3.41 (L) 10/22/2021 03:51 AM    HGB 10.8 (L) 10/22/2021 03:51 AM    HCT 32.8 (L) 10/22/2021 03:51 AM    MCV 96.2 10/22/2021 03:51 AM    MCH 31.7 10/22/2021 03:51 AM    MCHC 32.9 10/22/2021 03:51 AM    RDW 13.2 10/22/2021 03:51 AM     10/22/2021 03:51 AM    Lab Results   Component Value Date/Time    GRANS 88 (H) 10/22/2021 03:51 AM    LYMPH 5 (L) 10/22/2021 03:51 AM    EOS 1 10/22/2021 03:51 AM    BASOS 0 10/22/2021 03:51 AM      Basic Metabolic Profile   Recent Labs     10/22/21  0351   *   K 4.2   CL 93*   CO2 28   BUN 39*   CA 8.0*   PHOS 3.1        Hepatic Function    Lab Results   Component Value Date/Time    ALB 2.8 (L) 10/20/2021 04:25 AM    TP 6.4 10/20/2021 04:25 AM     (H) 10/20/2021 04:25 AM    No results found for: TBIL       Coags   No results for input(s): PTP, INR, APTT, INREXT in the last 72 hours. CORNEL Lam    [facility]. [email or web address]  Phone: [phone]  Pager: [phone]

## 2021-10-22 NOTE — PROGRESS NOTES
[facility]  [facility]    Patient: Ananda Long Age: 80 y.o. : 1935 MR#: [id number] SSN: [id number]  Date/Time: 10/22/2021     C/C: Hematemesis/nausea vomiting      Subjective:   HPI : Patient with history of carcinoma prostate with multiple bone meta stasis, end-stage renal disease on hemodialysis, admitted with episode of vomiting which was brown-hematemesis. Patient's wife with patient also giving some history, vomiting was projectile, also associated with severe constipation, patient had relief this morning states that he is feeling better. GI consultation noted acute nausea vomiting          Review of Systems:  Patient appears comfortable lying in bed, no distress no fever chills reported no vomiting/hematemesis reported. positive responses in bold type   Constitutional: Negative for fever, chills, diaphoresis and unexpected weight change. HENT: Negative for ear pain, congestion, sore throat, rhinorrhea, drooling, trouble swallowing, neck pain and tinnitus. Eyes: Negative for photophobia, pain, redness and visual disturbance. Respiratory: negative for shortness of breath, cough, choking, chest tightness, wheezing or stridor. Cardiovascular: Negative for chest pain, palpitations and leg swelling. Gastrointestinal: Negative for nausea, vomiting, abdominal pain, diarrhea, constipation, blood in stool, abdominal distention and anal bleeding. Genitourinary: Negative for dysuria, urgency, frequency, hematuria, flank pain and difficulty urinating. Musculoskeletal: Negative for back pain and arthralgias. Skin: Negative for color change, rash and wound. Neurological: Negative for dizziness, seizures, syncope, speech difficulty, light-headedness or headaches. Hematological: Does not bruise/bleed easily.    Psychiatric/Behavioral: Negative for suicidal ideas, hallucinations, behavioral problems, self-injury or agitation     Assessment/Plan: 1. Acute nausea vomiting  2 hematemesis x1  3 severe constipation  4 end-stage renal disease on hemodialysis  5 prostate CA with osseous metastasis  6 chronic indwelling Burris catheter  7 ventral hernia without obstruction or incarceration reducible  8 suspected aspiration pneumonia - multilobar pneumonia - On CT Chest   9 zmmtyqgxuh-uydjlyblithw-kqipyb suspect-cefepime and vancomycin  10 hypokalemia- replaced     Plan    - one  episode of hematemesis-we will monitor H&H -mild drop in H&H compared to admission-continue to monitor    - GI consulted and reviewed, , acid suppression monitor H&H and conservative treatment for now Case discussed with GI  CT abdomen pelvis reviewed no acute pathology except some stercoral colitis.     -Constipation-continue good bowel regimen    -CT chest confirms multilobar pneumonia along with leukocytosis lactic acidosis, continue vancomycin and cefepime, blood cultures from 10/19/2021  positive for gram-positive cocci in groups. Repeat cultures ordered. ID consulted. Follow ID recommendation. Ventral hernia no further treatment at this point-     End-stage renal disease on hemodialysis    Bacteremia with GPC reordered-blood cultures, ID consult, continue vancomycin for now,   Chest x-ray on admission showing multiple hazy areas Covid test negative- , patient does have cough,               Time spent on direct patient care >30 mints     Complexity : High complex - due to multiple medical issues outlined above.       CODE Status : Full     Case discussed with:  [x]Patient  [x] Family  [x]Nursing  []Case Management     DVT Prophylaxis:  []Lovenox  [x]Hep SQ  []SCDs  []Coumadin   []On Heparin gtt    [] Eliquis [] Xarelto     Objective:   VS:   Visit Vitals  /65   Pulse 82   Temp 98.2 °F (36.8 °C) (Oral)   Resp 18   Ht 5' 6\" (1.676 m)   Wt 73.3 kg (161 lb 8 oz)   SpO2 96%   BMI 26.07 kg/m²      Tmax/24hrs: Temp (24hrs), Av.2 °F (36.8 °C), Min:97.9 °F (36.6 °C), Max:98.9 °F (37.2 °C)  IOBRIEF    Intake/Output Summary (Last 24 hours) at 10/22/2021 1009  Last data filed at 10/22/2021 0013  Gross per 24 hour   Intake 520 ml   Output --   Net 520 ml       General:  Alert, cooperative, no acute distress   HEENT: No facial asymmetry, ARUNA Fausto, External ears - WNL    Cardiovascular: S1S2 - regular , No Murmur   Pulmonary: Equal expansion , No Use of accessory muscles , No Rales No Rhonchi    GI:  +BS in all four quadrants, soft, non-tender, Ventral hernia - Small reducible and nontender  Extremities:  No edema; 2+ dorsalis pedis pulses bilaterally  Neuro: Alert and oriented X 2.        Medications:   Current Facility-Administered Medications   Medication Dose Route Frequency    doxercalciferoL (HECTOROL) 4 mcg/2 mL injection 0.5 mcg  0.5 mcg IntraVENous DIALYSIS MON, WED & FRI    vancomycin (VANCOCIN) 750 mg in 0.9% sodium chloride 250 mL (VIAL-MATE)  750 mg IntraVENous DIALYSIS ONE TIME DOSE    polyethylene glycol (MIRALAX) packet 17 g  17 g Oral DAILY    ondansetron (ZOFRAN) injection 4 mg  4 mg IntraVENous Q8H PRN    VANCOMYCIN INFORMATION NOTE   Other Rx Dosing/Monitoring    cefepime (MAXIPIME) 1 g in sterile water (preservative free) 10 mL IV syringe  1 g IntraVENous Q24H    abiraterone (Zytiga) tab 1,000 mg (Patient Supplied)  1,000 mg Oral DAILY    amLODIPine (NORVASC) tablet 10 mg  10 mg Oral DAILY    B complex-vitaminC-folic acid (NEPHROCAP) cap  1 Capsule Oral DAILY    calcium acetate(phosphat bind) (PHOSLO) capsule 667 mg  1 Capsule Oral TID WITH MEALS    predniSONE (DELTASONE) tablet 5 mg  5 mg Oral BID    sodium chloride (NS) flush 5-40 mL  5-40 mL IntraVENous Q8H    sodium chloride (NS) flush 5-40 mL  5-40 mL IntraVENous PRN    acetaminophen (TYLENOL) tablet 650 mg  650 mg Oral Q6H PRN    Or    acetaminophen (TYLENOL) suppository 650 mg  650 mg Rectal Q6H PRN    heparin (porcine) injection 5,000 Units  5,000 Units SubCUTAneous Q8H    melatonin tablet 5 mg  5 mg Oral QHS lidocaine (XYLOCAINE) 5 % ointment   Topical DAILY PRN       Labs:    Recent Labs     10/22/21  0351 10/21/21  2251 10/21/21  1600 10/21/21  1133 10/21/21  0417 10/20/21  0425 10/20/21  0425   WBC 23.6*  --   --   --  29.6*  --  25.3*   HGB 10.8* 11.3* 12.0*   < > 12.8*   < > 11.6*   HCT 32.8* 34.5* 36.7   < > 39.5   < > 35.7*     --   --   --  293  --  244    < > = values in this interval not displayed. Recent Labs     10/22/21  0351 10/21/21  0417 10/20/21  0425   * 134* 133*   K 4.2 3.8 4.1   CL 93* 95* 96*   CO2 28 29 30   * 110* 78   BUN 39* 26* 49*   CREA 4.96* 3.64* 5.80*   CA 8.0* 8.9 8.3*  8.3*   PHOS 3.1  --  3.2   ALB  --   --  2.8*   ALT  --   --  13*         Disclaimer: Sections of this note are dictated utilizing voice recognition software, which may have resulted in some phonetic based errors in grammar and contents. Even though attempts were made to correct all the mistakes, some may have been missed, and remained in the body of the document. If questions arise, please contact our department.     Signed By: Ramon Ewing MD     October 22, 2021

## 2021-10-22 NOTE — PROGRESS NOTES
Problem: Dysphagia (Adult)  Goal: *Acute Goals and Plan of Care (Insert Text)  Description: Patient will:  1. Tolerate PO trials with 0 s/s     Rec:     Regular with thin liquid diet  Oral care TID  Meds per preference    Outcome: Progressing Towards Goal   SPEECH [address] EVALUATION AND DISCHARGE    Patient: Paradise Hernandez (58 y.o. male)  Date: 10/22/2021  Primary Diagnosis: Pneumonia [J18.9]        Precautions:  Fall  PLOF: Per H&P    ASSESSMENT :  Clinical beside swallow eval completed per MD orders. MONTSERRAT Rodriguez cleared pt to participate. Pt A&Ox3. Functional communication. Intelligibility >90%. Cognitive-linguistic function appears intact. OM examination revealed oral motor structures functional for mastication and deglutition. Presented with thin liquid, puree, and solid trials. Exhibited adequate bolus cohesion, manipulation and A-P transit. Further exhibited efficient swallow timing/reflex and hyolaryngeal excursion. Pt able to manipulate and clear with 0 clinical s/s aspiration and/or oropharyngeal dysphagia. Pt safe for  Regular solid, thin liquid diet. 0 formal ST needs for dysphagia indicated at this time. SLP educated pt on role of speech therapist in current setting with re: speech/swallow; verbalized comprehension. SLP available for re-evaluation if indicated by MD.     Thank you for this referral.   Shankar Ogden, SLP  MA, [id number] [facility], [place], operated by [facility]  Speech-Language Pathologist       PLAN :  Recommendations and Planned Interventions:  No formal ST needs ID'd for dysphagia. Eval only. Discharge Recommendations: None     SUBJECTIVE:   Patient stated I'm fine. OBJECTIVE:     Past Medical History:   Diagnosis Date    Cancer ([facility] [address]     Prostate    Cardiac echocardiogram 08/29/2016    EF 60-65%. No WMA. Mild LVH. Indeterminate diastolic fx. RVSP 35 mmHg. No significant valvular heart disease. Cardiac nuclear imaging test, abnormal 08/29/2016    Intermediate risk.   Previous inferior infarction w/very mild fabiana-infarct ischemia. Inferior hypk. EF 68%. Nondiagnostic EKG on pharm stress test.  Pt's BP increased from 193/96 to 207/83 during study. Carotid duplex 08/30/2016    Mild <50% bilateral ICA stenosis. Chronic kidney disease     dialysis    Colonic mass     per Dr Delia Vincent notes    Enlarged prostate     Hypertension      Past Surgical History:   Procedure Laterality Date    COLONOSCOPY N/A 10/1/2020    COLONOSCOPY, b'x, w tattoo w polypectomy performed by Jazz Pavon MD at [address] [facility]  10/1/2020         Nestor Esparza  10/1/2020         COLONOSCPY,FLEX,W/ [facility]  10/1/2020         PA INSJ TUNNELED CVC W/O SUBQ PORT/ AGE 5 YR/> N/A 9/28/2020    INSERTION TUNNELED CENTRAL VENOUS CATHETER performed by Cindy Sorensen MD at [facility] CATH LAB    VASCULAR SURGERY PROCEDURE UNLIST      dialysis access- right neck     Home Situation:   Home Situation  Home Environment: Private residence  # Steps to Enter: 2  One/Two Story Residence: One story  Living Alone: No  Support Systems: Spouse/Significant Other  Patient Expects to be Discharged to[de-identified] [employer]  Current DME Used/Available at Home: Diwanee Messenger or Shower Type: Tub/Shower combination    Cognitive and Communication Status:  Neurologic State: Alert  Orientation Level: Oriented to person, Oriented to place, Oriented to situation  Cognition: Appropriate decision making  Perception: Appears intact  Perseveration: No perseveration noted  Safety/Judgement: Awareness of environment  Oral Assessment:  Oral Assessment  Labial: No impairment  Dentition: Upper & lower dentures  Oral Hygiene: adequate  Lingual: No impairment  Velum: No impairment  Mandible: No impairment  P.O. Trials:  Patient Position: 60  Vocal quality prior to P.O.: No impairment  Consistency Presented: Puree; Solid; Thin liquid  How Presented: Cup/sip;Straw;Successive swallows     Bolus Acceptance: No impairment  Bolus Formation/Control: No impairment     Propulsion: No impairment  Oral Residue: None  Initiation of Swallow: No impairment  Laryngeal Elevation: Functional  Aspiration Signs/Symptoms: None  Pharyngeal Phase Characteristics: No impairment, issues, or problems   Effective Modifications: None  Cues for Modifications: None       Oral Phase Severity: No impairment  Pharyngeal Phase Severity : No impairment    PAIN:  Pain level pre-treatment: 0/10   Pain level post-treatment: 0/10   Pain Intervention(s): Medication (see MAR); Rest, Ice, Repositioning   Response to intervention: Nurse notified, See doc flow    After evaluation:   []            Patient left in no apparent distress sitting up in chair  [x]            Patient left in no apparent distress in bed  [x]            Call bell left within reach  [x]            Nursing notified  []            Family present  []            Caregiver present  []            Bed alarm activated      COMMUNICATION/EDUCATION:   [x]            Aspiration precautions; swallow safety; compensatory techniques. []            Patient/family have participated as able in goal setting and plan of care. []            Patient/family agree to work toward stated goals and plan of care. []            Patient understands intent and goals of therapy; neutral about participation. []            Patient unable to participate in goal setting/plan of care; educ ongoing with interdisciplinary staff  [x]         Posted safety precautions in patient's room.     Thank you for this referral.  Kasey Garnica, SLP  MA, CCC-SLP  Speech-Language Pathologist    Time Calculation: 11 mins

## 2021-10-22 NOTE — DIALYSIS
ACUTE HEMODIALYSIS FLOW SHEET    HEMODIALYSIS ORDERS: Physician: Dr. Gricel Muñoz: Vince   Duration: 3 hr   BFR: 400   DFR: 800   Dialysate:  Temp 36-37*C   K+  2    Ca+ 2.5   Na 138   Bicarb 35   Wt Readings from Last 1 Encounters:   10/21/21 73.3 kg (161 lb 8 oz)    Patient Chart [x]   Unable to Obtain []  Dry weight/UF Goal: 2000 ml    Heparin []  Bolus    Units    [] Hourly    Units    [x]None       Pre BP: 118/66  Pulse: 76  Respirations: 18 Temp: 98.2  [x] Oral  [] Ax  [] Esoph   Labs: []  Pre  []  Post:   [x] N/A   Additional Orders (medications, blood products, hypotension management): [] Yes   [x] No     [x]  [facility] Consent Verified     CATHETER ACCESS:  []N/A   [x]Right   []Left   []IJ   []Fem  [x]Chest wall  []TransHepatic   [] First use X-ray verified     [x]Tunnel    [] Non Tunneled   [x]No S/S infection  []Redness  []Drainage []Cultured []Swelling []Pain   [x]Medical Aseptic Prep Utilized   []Dressing Changed  [] Biopatch  Date: 10/22/21   []Clotted   [x]Patent   Flows: [x]Good  []Poor  []Reversed   If access problem,  notified: []Yes    [x]N/A        GRAFT/FISTULA ACCESS:   [x]N/A     []Right     []Left     []UE     []LE                   GENERAL ASSESSMENT:    LUNGS:  Resp Rate 18   [] Clear  [x] Coarse  [] Crackles  [] Wheezing  [] Diminished                                                           [] RLL   [] LLL  [x] RUL   [x] ROBERT            Respirations:  [x]Easy  []Labored  []N/A  Cough:  [x]Productive  []Dry  []N/A               Therapy:  [x]RA   [] Ventilated   [] Intubated   [] Trach            O2 Device:  [] NC   [] NRB  [] Trach Mask  [] BiPaP  Flow:   l/min                                                    CARDIAC: [] Regular      [] Irregular   [] Rhythm:          [] Monitored   [] Bedside   [] Remotely monitored       EDEMA: [x] None   []Generalized  [] Pitting [] 1+   [] 2 +   [] 3+    [] 4+        SKIN:   [] Hot     [] Cold    [x] Warm   [x] Dry    [] Diaphoretic                 [] Flushed  [] Jaundiced  [] Cyanotic  [] Pale      LOC:    [x] Alert      [x]Oriented:    [x] Person     [x] Place   [x]Time               [] Confused  [] Lethargic  [] Medicated  [] Non-responsive  [] Non-Verbal     GI / ABDOMEN:                     [] Flat    [] Distended    [x] Soft    [] Firm   []  Obese                   [] Diarrhea   [] FMS [x] Bowel Sounds  [] Nausea  [] Vomiting                   [] NGT  [] OGT  [] PEG  [] Tube Feedings @     mL/hr     / URINE ASSESSMENT:                   [] Voiding    [x] Oliguria  [] Anuria                     []  Burris   [] Incontinent  []  Incontinent Brief   []  PureWick     PAIN:  [x] 0 []1  []2   []3   []4   []5   []6   []7   []8   []9   []10                MOBILITY:  [x] Bed    [] Stretcher      All Vitals and Treatment Details on Attached [address]: [facility]          Room # 208/01    [x] Routine         [] 1st Time Acute/Chronic   [] Urgent      [] Stat            [x] Acute Room   []  Bedside    [] ICU/CCU     [] ER     Isolation Precautions:  [x] Dialysis    There are currently no Active Isolations     ALLERGIES:     No Known Allergies     Code Status:  Full Code     Hepatitis Status      Lab Results   Component Value Date/Time    Hepatitis B surface Ag <0.10 10/18/2021 08:39 PM    Hepatitis B surface Ab <3.10 (L) 10/18/2021 08:39 PM    Hepatitis B core, IgM Negative 09/27/2020 04:33 AM    Hepatitis C virus Ab 0.15 09/27/2020 04:33 AM        Current Labs:      Lab Results   Component Value Date/Time    WBC 23.6 (H) 10/22/2021 03:51 AM    HGB 10.8 (L) 10/22/2021 03:51 AM    HCT 32.8 (L) 10/22/2021 03:51 AM    PLATELET 837 43/81/8391 03:51 AM    MCV 96.2 10/22/2021 03:51 AM     Lab Results   Component Value Date/Time    Sodium 130 (L) 10/22/2021 03:51 AM    Potassium 4.2 10/22/2021 03:51 AM    Chloride 93 (L) 10/22/2021 03:51 AM    CO2 28 10/22/2021 03:51 AM    Anion gap 9 10/22/2021 03:51 AM    Glucose 104 (H) 10/22/2021 03:51 AM    BUN 39 (H) 10/22/2021 03:51 AM    Creatinine 4.96 (H) 10/22/2021 03:51 AM    BUN/Creatinine ratio 8 (L) 10/22/2021 03:51 AM    GFR est AA 14 (L) 10/22/2021 03:51 AM    GFR est non-AA 11 (L) 10/22/2021 03:51 AM    Calcium 8.0 (L) 10/22/2021 03:51 AM          DIET:  DIET ADULT  DIET ADULT ORAL NUTRITION SUPPLEMENT     PRIMARY NURSE REPORT:   Pre Dialysis: MIKHAIL Estrada RN    Time: 200      EDUCATION:    [x] Patient           Knowledge Basis: []None [x]Minimal [] Substantial [] Unknown  Barriers to learning  [x]None  [] Intubated/Trached/Ventilated  [] Sedated/Paralyzed   [] Access Care     [] S&S of infection  [] Fluid Management  [] K+   [x] Procedural    [] Medications   [] Tx Options   [] Transplant   [] Diet      Teaching Tools:  [x] Explain  [] Demo  [] Handouts [] Video  Patient response: [] Verbalized understanding   [] Requires follow up        [x] Time Out/Safety Check    [x] Extracorporeal Circuit Tested for integrity       RO/HEMODIALYSIS MACHINE SAFETY CHECKS  Before each treatment:        [address] [place] [address]                                     [x] Unit Machine # 3 with centralized RO                                                                                                                                Alarm Test:  Pass time 0900            [x] RO/Machine Log Complete    Machine Temp    36-37*C             Dialysate: pH  7.4    Conductivity: Meter 14.0    HD Machine  13.9     TCD: 13.8  Dialyzer Lot # [id number]     Blood Tubing Lot # [id number]     Saline Lot # [id number]     CHLORINE TESTING-Before each treatment and every 4 hours    Total Chlorine: [x] less than 0.1 ppm  Initial Time Check: 0900       4 Hr/2nd Check Time: N/A   (if greater than 0.1 ppm from Primary then every 30 minutes from Secondary)     TREATMENT INITIATION  with Dialysis Precautions:   [x] All Connections Secured              [x] Saline Line Double Clamped   [x] Venous Parameters Set               [x] Arterial Parameters Set    [x] Prime Given 250ml NSS              [x]Air Foam Detector Engaged        Treatment Initiation Note:  Pt arrived via bed. Pt is A&AX4, on room air. Pt denies any complaints at this time. Pt Right chest wall CVC is patent,dressing dry and intact and no S/S of infection noted. 0920--dialysis initiated without difficulty. During Treatment Notes:  0930  Face & Vascular access visible with art and carlie line connections intact. Pt tolerating dialysis. 0945  Face & Vascular access visible with art and carlie line connections intact. Pt tolerating dialysis. 1000  Face & Vascular access visible with art and carlie line connections intact. Pt tolerating dialysis. 1015  Face & Vascular access visible with art and carlie line connections intact. Pt tolerating dialysis. 1030  Face & Vascular access visible with art and carlie line connections intact. Pt tolerating dialysis. 1045  Face & Vascular access visible with art and carlie line connections intact. Pt tolerating dialysis. 1100  Face & Vascular access visible with art and carlie line connections intact. Pt tolerating dialysis. 1115  Face & Vascular access visible with art and carlie line connections intact. Pt tolerating dialysis. 1130  Face & Vascular access visible with art and carlie line connections intact. Pt tolerating dialysis. 1145  Face & Vascular access visible with art and carlie line connections intact. Pt tolerating dialysis. 1200  Face & Vascular access visible with art and carlie line connections intact. Pt tolerating dialysis. 1215  Face & Vascular access visible with art and carlie line connections intact. Pt tolerating dialysis. 1230  Face & Vascular access visible with art and carlie line connections intact. Pt tolerating dialysis. 1238  Dialysis treatment complete.        Medication    Dose    Volume Route      Time       [facility] Nurse   hectorol 0.5mcg 0.3ml HD 1000 Gerri Monsivais RN   vancomycin 750mg 250  Poly Pl, RN     Post Assessment  Dialyzer Cleared:   [] Good  [x] Fair  [] Poor  Blood processed:  50.0 L  UF Removed:  2000 Ml    Post BP: 148/77  Pulse: 91  Respirations: 18   Temp: 98.4  [x] Oral  [] Ax  [] Esophageal   Lungs: [] Clear                [x] No change from initial assessment   Post Tx Vascular Access: [x] N/A   Cardiac:  [] Regular   [] Irregular   Rhythm:  [] Monitored   [x] Not Monitored    CVC Catheter: [] N/A  Locking solution: Heparin 1:1000 U  Arterial port 1.6 ml   Venous port 1.6 ml   Edema:  [x] None  [] Generalized                     Skin:[x] Warm  [x] Dry [] Diaphoretic               [] Flushed  [] Pale [] Cyanotic Pain:  [x]0  []1-2  []3-4  []5-6   []7-8  []9-10         Post Treatment Note:   Pt tolerated dialysis well. Dialysis catheter intact, patent and heplocked as noted above.      POST TREATMENT PRIMARY NURSE HANDOFF REPORT:   Post Dialysis: Radha Purvis RN        Time:  1183       Abbreviations: AVG-arterial venous graft, AVF-arterial venous fistula, IJ-Internal Jugular, Subcl-Subclavian, Fem-Femoral, Tx-treatment, AP/HR-apical heart rate, VSS- Vital Signs Stable, CVC- Central Venous Catheter, DFR-dialysate flow rate, BFR-blood flow rate, AP-arterial pressure, -venous pressure, UF-ultrafiltrate, TMP-transmembrane pressure, Kyle-Venous, Art-Arterial, RO-Reverse Osmosis

## 2021-10-22 NOTE — CONSULTS
Infectious Disease Consultation Note        Reason: Sepsis, aspiration pneumonia, coagulase-negative staph bacteremia    Current abx Prior abx   Cefepime, vancomycin since 10/19/21      Lines:       Assessment :     80 y.o. male metastatic prostate cancer, colon cancer, atherosclerotic disease, hypertension, and ESRD on MWF hemodialysis admitted to [facility] on 10/18/21 for multi focal pneumonia.       Now with significant leukocytosis, coagulase-negative Staphylococcus bacteremia, multifocal infiltrates noted on CT chest, recent coffee-ground emesis    I agree that clinical presentation is concerning for sepsis. However after obtaining detailed history, exam, review of available labs/imaging studies. I believe patient's leukocytosis is likely leukemoid reaction to steroids, metastatic disease, recent GI bleed, probable aspiration pneumonia    Single positive blood culture for coagulase-negative Staphylococcus on 10/19 likely contaminant. We will repeat blood culture to verify    Vomiting, concern for GI bleed: GI follow-up appreciated. Plans for abdominal x-ray to rule out obstructive process to evaluate abdominal distention abdominal distention    ESRD: on HD    Recommendations:    1. Discontinue cefepime, vancomycin. Start piperacillin/tazobactam  2. Follow-up GI recommendations regarding abdominal distention, possible GI bleed  3. Repeat blood culture  4. Follow-up nephrology recommendations  5. Consider palliative care consult to address goals of care    Thank you for consultation request. Above plan was discussed in details with patient,  and dr Edwin Zarco. Please call me if any further questions or concerns. Will continue to participate in the care of this patient.   HPI:     80 y.o. male metastatic prostate cancer, colon cancer, atherosclerotic disease, hypertension, and ESRD on MWF hemodialysis admitted to [facility] on 10/18/21 for multi focal pneumonia.     He went to his hemodialysis session on Monday per usual. He was given a strawberry protein shake at the time, which he drank quickly. His stomach did not feel right and when he got home he violently vomited all over his bed sheets. It was dark brown and copious. Besides the protein shake, patient also had a grilled cheese before vomiting. No coffee ground appearance. EMS was called to the scene who described the vomit as dried blood appearance which prompted the ER to take an upper GI bleed approach to the patient. However, lab findings and CT scan demonstrated multifocal pneumonia. In the ED-Significant lab values: Lactic acid 3.17, white blood cell count 37.1, neutrophil: Lymphocyte ratio 92:4, hemoglobin 14.3 (up from 12.4 in August 2021), creatinine 3.67 (had dialysis today), BUN 33, potassium 3.9, albumin 3.4, alk phos 209 (up from 128 in August 2021), undetectable troponin, glucose 145. He was started on cefepime, vancomycin. Concern for sepsis. Blood cultures were obtained. GI consulted. No GI intervention recommended at this time since no active GI bleed noted. I have been consulted for further recommendations. Patient denies any subjective fever or chills. He complains of some abdominal discomfort. Denies any constipation/diarrhea. Denies increasing chest pain, shortness of breath. Patient is a poor historian and difficult to obtain history, detailed review of systems.         Past Medical History:   Diagnosis Date    Cancer [facility])     Prostate    Cardiac echocardiogram 08/29/2016    EF 60-65%. No WMA. Mild LVH. Indeterminate diastolic fx. RVSP 35 mmHg. No significant valvular heart disease.  Cardiac nuclear imaging test, abnormal 08/29/2016    Intermediate risk. Previous inferior infarction w/very mild fabiana-infarct ischemia. Inferior hypk. EF 68%. Nondiagnostic EKG on pharm stress test.  Pt's BP increased from 193/96 to 207/83 during study.  Carotid duplex 08/30/2016    Mild <50% bilateral ICA stenosis.       Chronic kidney disease     dialysis    Colonic mass     per Dr Edilson Martinez notes    Enlarged prostate     Hypertension        Past Surgical History:   Procedure Laterality Date    COLONOSCOPY N/A 10/1/2020    COLONOSCOPY, b'x, w tattoo w polypectomy performed by Lamberto Rangel MD at [address] [place] [address]  10/1/2020         Henrique Leahy  10/1/2020         COLONOSCPY,FLEX,W/ Al Sutton  10/1/2020         MT INSJ TUNNELED CVC W/O SUBQ PORT/ AGE 5 YR/> N/A 9/28/2020    INSERTION TUNNELED CENTRAL VENOUS CATHETER performed by Ruel Booth MD at [facility] CATH LAB    VASCULAR SURGERY PROCEDURE UNLIST      dialysis access- right neck       Current Discharge Medication List      CONTINUE these medications which have NOT CHANGED    Details   amLODIPine (NORVASC) 10 mg tablet TAKE 1 TABLET BY MOUTH EVERY DAY      calcium acetate,phosphat bind, (PHOSLO) 667 mg cap       amoxicillin-clavulanate (AUGMENTIN) 875-125 mg per tablet Take 1 Tablet by mouth every twelve (12) hours. Qty: 6 Tablet, Refills: 0      abiraterone (Zytiga) 250 mg tab Take four tablets by mouth daily on an empty stomach. Take one hour prior to food or two hours after food. Tablets should be swallowed whole with water. Do not crush or chew tablets. Qty: 120 Tab, Refills: 5    Associated Diagnoses: Malignant neoplasm of prostate ([address] Osseous metastasis (HCC)      predniSONE (DELTASONE) 5 mg tablet Take 1 Tab by mouth two (2) times a day. Qty: 60 Tab, Refills: 5    Associated Diagnoses: Malignant neoplasm of prostate ([address] Osseous metastasis (HCC)      mupirocin (BACTROBAN) 2 % ointment APPLY TO TOES 3 TIMES A DAY      bicalutamide (CASODEX) 50 mg tablet Take 1 Tab by mouth daily. Qty: 14 Tab, Refills: 0      B complex w-C no.20/folic acid (TRIPHROCAPS PO) Take 1 Cap by mouth daily. furosemide (LASIX) 40 mg tablet Take 40 mg by mouth daily.              Current Facility-Administered Medications   Medication Dose Route Frequency    polyethylene glycol (MIRALAX) packet 17 g  17 g Oral DAILY    doxercalciferoL (HECTOROL) 4 mcg/2 mL injection 0.5 mcg  0.5 mcg IntraVENous Q MON, WED & FRI    ondansetron (ZOFRAN) injection 4 mg  4 mg IntraVENous Q8H PRN    VANCOMYCIN INFORMATION NOTE   Other Rx Dosing/Monitoring    cefepime (MAXIPIME) 1 g in sterile water (preservative free) 10 mL IV syringe  1 g IntraVENous Q24H    abiraterone (Zytiga) tab 1,000 mg (Patient Supplied)  1,000 mg Oral DAILY    amLODIPine (NORVASC) tablet 10 mg  10 mg Oral DAILY    B complex-vitaminC-folic acid (NEPHROCAP) cap  1 Capsule Oral DAILY    calcium acetate(phosphat bind) (PHOSLO) capsule 667 mg  1 Capsule Oral TID WITH MEALS    predniSONE (DELTASONE) tablet 5 mg  5 mg Oral BID    sodium chloride (NS) flush 5-40 mL  5-40 mL IntraVENous Q8H    sodium chloride (NS) flush 5-40 mL  5-40 mL IntraVENous PRN    acetaminophen (TYLENOL) tablet 650 mg  650 mg Oral Q6H PRN    Or    acetaminophen (TYLENOL) suppository 650 mg  650 mg Rectal Q6H PRN    heparin (porcine) injection 5,000 Units  5,000 Units SubCUTAneous Q8H    melatonin tablet 5 mg  5 mg Oral QHS    lidocaine (XYLOCAINE) 5 % ointment   Topical DAILY PRN       Allergies: Patient has no known allergies. History reviewed. No pertinent family history.   Social History     Socioeconomic History    Marital status:      Spouse name: Not on file    Number of children: Not on file    Years of education: Not on file    Highest education level: Not on file   Occupational History    Not on file   Tobacco Use    Smoking status: Never Smoker    Smokeless tobacco: Never Used   Vaping Use    Vaping Use: Never used   Substance and Sexual Activity    Alcohol use: No    Drug use: Never    Sexual activity: Not on file   Other Topics Concern    Not on file   Social History Narrative    Not on file     Social Determinants of Health     Financial Resource Strain:     Difficulty of Paying Living Expenses:    Food Insecurity:     Worried About [address] in the Last Year:     [address] in the Last Year:    Transportation Needs:     Lack of Transportation (Medical):  Lack of Transportation (Non-Medical):    Physical Activity:     Days of Exercise per Week:     Minutes of Exercise per Session:    Stress:     Feeling of Stress :    Social Connections:     Frequency of Communication with Friends and Family:     Frequency of Social Gatherings with Friends and Family:     Attends [employer] Services:     Active Member of Clubs or Organizations:     Attends Club or Organization Meetings:     Marital Status:    Intimate Partner Violence:     Fear of Current or Ex-Partner:     Emotionally Abused:     Physically Abused:     Sexually Abused:      Social History     Tobacco Use   Smoking Status Never Smoker   Smokeless Tobacco Never Used        Temp (24hrs), Av.3 °F (36.8 °C), Min:97.9 °F (36.6 °C), Max:98.9 °F (37.2 °C)    Visit Vitals  /83 (BP 1 Location: Left upper arm, BP Patient Position: At rest;Lying)   Pulse 79   Temp 97.9 °F (36.6 °C)   Resp 16   Ht 5' 6\" (1.676 m)   Wt 73.3 kg (161 lb 8 oz)   SpO2 97%   BMI 26.07 kg/m²       ROS: Unable to obtain due to patient factors  Physical Exam:    General: Well developed, well nourished male laying on the bed AAOx3 in no acute distress. General:   awake alert and oriented   HEENT:  Normocephalic, atraumatic, EOMI, no scleral icterus or pallor; no conjunctival hemmohage;  nasal and oral mucous are moist and without evidence of lesions. Neck supple, no bruits. Lymph Nodes:   no cervical, axillary or inguinal adenopathy   Lungs:   non-labored, no audible wheezing   Heart:  RRR, s1 and s2;  no edema   Abdomen:  soft, distended, active bowel sounds, no hepatomegaly, no splenomegaly. Non-tender   Genitourinary:  deferred   Extremities:   no clubbing, cyanosis; no joint effusions or swelling;  Full ROM of all large joints to the upper and lower extremities; muscle mass appropriate for age   Neurologic:  No gross focal sensory abnormalities; 5/5 muscle strength to upper and lower extremities. Speech appropriate. Cranial nerves intact                        Skin:  No rash or ulcers noted   Back:  no spinal or paraspinal muscle tenderness or rigidity, no CVA tenderness     Psychiatric:  appropriate mood and affect         Labs: Results:   Chemistry Recent Labs     10/22/21  0351 10/21/21  0417 10/20/21  0425   * 110* 78   * 134* 133*   K 4.2 3.8 4.1   CL 93* 95* 96*   CO2 28 29 30   BUN 39* 26* 49*   CREA 4.96* 3.64* 5.80*   CA 8.0* 8.9 8.3*  8.3*   AGAP 9 10 7   BUCR 8* 7* 8*   AP  --   --  162*   TP  --   --  6.4   ALB  --   --  2.8*   GLOB  --   --  3.6   AGRAT  --   --  0.8      CBC w/Diff Recent Labs     10/22/21  0351 10/21/21  2251 10/21/21  1600 10/21/21  1133 10/21/21  0417 10/20/21  0425 10/20/21  0425   WBC 23.6*  --   --   --  29.6*  --  25.3*   RBC 3.41*  --   --   --  4.12*  --  3.68*   HGB 10.8* 11.3* 12.0*   < > 12.8*   < > 11.6*   HCT 32.8* 34.5* 36.7   < > 39.5   < > 35.7*     --   --   --  293  --  244   GRANS 88*  --   --   --  89*  --  85*   LYMPH 5*  --   --   --  8*  --  6*   EOS 1  --   --   --  2  --  0    < > = values in this interval not displayed. Microbiology Recent Labs     10/21/21  1736   CULT NO GROWTH AFTER 12 HOURS          RADIOLOGY:    All available imaging studies/reports in [facility] care for this admission were reviewed      Disclaimer: Sections of this note are dictated utilizing voice recognition software, which may have resulted in some phonetic based errors in grammar and contents. Even though attempts were made to correct all the mistakes, some may have been missed, and remained in the body of the document. If questions arise, please contact our department.     Dr. Colon , Infectious Disease Specialist  [phone]  October 22, 2021  7:11 AM

## 2021-10-23 NOTE — PROGRESS NOTES
Initiated 10/20/2021 and to be accomplished within  day(s)  1. Patient will move from supine to sit and sit to supine  in bed with modified independence. 2.  Patient will transfer from bed to chair and chair to bed with modified independence using the least restrictive device. 3.  Patient will perform sit to stand with modified independence. 4.  Patient will ambulate with modified independence for 100 feet with the least restrictive device. 5.  Patient will ascend/descend 2 stairs with handrail(s) with contact guard assist.    PLOF: Patient was ambulating with RW. His spouse assists him with bathing and dressing. He lives in single story home with 2 AJAY. PHYSICAL THERAPY TREATMENT    Patient: Dominik Linder (11 y.o. male)  Date: 10/23/2021  Diagnosis: Pneumonia [J18.9] Pneumonia      Precautions: Fall  PLOF: see above     ASSESSMENT:  Pt received in bed in NAD, agreeable, wife present for support. Pt requires min A at trunk to come to sitting EOB. Pt is able to stand with CGA and amb around room with RW and CGA for safety and steadying x 25 ft to turn and sit in chair, pt with chronic L sided weakness and hip drop in stance phase however no buckling noted. Pt positioned for comfort up in recliner wit all needs met and call bell within reach, will continue to follow per POC, recommend [facility] upon discharge. Progression toward goals:   [x]      Improving appropriately and progressing toward goals  []      Improving slowly and progressing toward goals  []      Not making progress toward goals and plan of care will be adjusted     PLAN:  Patient continues to benefit from skilled intervention to address the above impairments. Continue treatment per established plan of care. Discharge Recommendations:  Home Health  Further Equipment Recommendations for Discharge:  N/A     SUBJECTIVE:   Patient stated I will get up.     OBJECTIVE DATA SUMMARY:   Critical Behavior:  Neurologic State: Alert  Orientation Level: Oriented X4  Cognition: Follows commands  Safety/Judgement: Awareness of environment  Functional Mobility Training:  Bed Mobility:     Supine to Sit: Minimum assistance     Scooting: Contact guard assistance         Transfers:  Sit to Stand: Contact guard assistance  Stand to Sit: Contact guard assistance     Balance:  Sitting: Impaired  Sitting - Static: Good (unsupported)  Sitting - Dynamic: Fair (occasional)  Standing: Impaired; With support  Standing - Static: Fair  Standing - Dynamic : Fair      Ambulation/Gait Training:  Distance (ft): 25 Feet (ft)  Assistive Device: Walker, rolling  Ambulation - Level of Assistance: Contact guard assistance        Gait Abnormalities: Decreased step clearance; Foot drop     Step Length: Left shortened      Pain:  Pain level pre-treatment: 0/10  Pain level post-treatment: 0/10   Pain Intervention(s): Medication (see MAR); Rest, Ice, Repositioning   Response to intervention: Nurse notified    Activity Tolerance:   Good    Please refer to the flowsheet for vital signs taken during this treatment. After treatment:   [x] Patient left in no apparent distress sitting up in chair  [] Patient left in no apparent distress in bed  [x] Call bell left within reach  [x] Nursing notified  [x] Caregiver present- wife  [] Bed alarm activated  [] SCDs applied      COMMUNICATION/EDUCATION:   [x]         Role of Physical Therapy in the acute care setting. [x]         Fall prevention education was provided and the patient/caregiver indicated understanding. [x]         Patient/family have participated as able in working toward goals and plan of care. [x]         Patient/family agree to work toward stated goals and plan of care. []         Patient understands intent and goals of therapy, but is neutral about his/her participation.   []         Patient is unable to participate in stated goals/plan of care: ongoing with therapy staff.  []         Other:        Herberth Chamberlain   Time Calculation: 23 mins

## 2021-10-23 NOTE — ROUTINE PROCESS
Bedside shift change report given to Jovanni Gan LPN (oncoming nurse) by Karli Black (offgoing nurse). Report included the following information SBAR, Kardex, Intake/Output and MAR.

## 2021-10-23 NOTE — PROGRESS NOTES
0750 pt is nauseated and did not eat breakfast. Pt's wife says he did not eat any meal yesterday. Was reported at shift change that patient was vomitting on nightshift. Called Md to make aware and medication held due to nausea. 1100 patient continues to be nauseated and refuses to eat. 1 MD on floor seeing pt. NGT ode was placed and was told to hold off on putting in until GI has seen patient. 1513 called and given verbal order to place NGT. Pt is NPO.    1745 NGT placed and order placed for stat KUB. 1858 no results for KUB as of yet.

## 2021-10-23 NOTE — PROGRESS NOTES
[email or web address]  [phone]    Gastroenterology follow up-Progress note  Impression:  1. Vomiting  2. Distended abdomen - ? Ileus, obstruction; AXR shows distended stomach  3. Prostate and Colon CA with widespread osseous mets  4. Severe sepsis POA  5. PNA  6. ESRD on HD  7. BPH with chronic indwelling Burris     Plan:  1. Recommend NG tube to decompress. 2. Best supportive care. 3. Watch for bleeding    Chief Complaint: Hematemesis, abd distention    Subjective: drowsy this morning, still has distended abdomen. ROS: Denies any fevers, chills, rash. Eyes: conjunctiva normal, EOM normal   Neck: ROM normal, supple and trachea normal   Cardiovascular: heart normal, intact distal pulses, normal rate and regular rhythm   Pulmonary/Chest Wall: breath sounds normal and effort normal   Abdominal: appearance distended, bowel sounds normal and soft, non-acute, non-tender     Patient Active Problem List   Diagnosis Code    Traumatic rhabdomyolysis ([facility] T79. 6XXA    CAD (coronary artery disease) I25.10    Essential (primary) hypertension I10    Troponin level elevated R77.8    Acute renal failure (ARF) (HCC) N17.9    ESRD (end stage renal disease) ([facility] N18.6    Hyperkalemia Z92.0    Metabolic acidosis K76.4    Secondary hyperparathyroidism of renal origin ([facility] N25.81    Anemia D64.9    Hydronephrosis N13.30    UTI (urinary tract infection) N39.0    Postobstructive diuresis R35.89    Polyp of ascending colon K63.5    Decrease in appetite R63.0    Elevated prostate specific antigen (PSA) R97.20    Osteoarthrosis M19.90    Shortness of breath R06.02    Chronic renal failure N18.9    Pneumonia J18.9    Hypoalbuminemia E88.09    Leucocytosis D72.829         Visit Vitals  BP (!) 128/57 (BP 1 Location: Left upper arm, BP Patient Position: Sitting)   Pulse 98   Temp 99.7 °F (37.6 °C)   Resp 16   Ht 5' 6\" (1.676 m)   Wt 73.7 kg (162 lb 7 oz)   SpO2 95%   BMI 26.22 kg/m²           Intake/Output Summary (Last 24 hours) at 10/23/2021 1417  Last data filed at 10/23/2021 0442  Gross per 24 hour   Intake    Output 900 ml   Net -900 ml       CBC w/Diff    Lab Results   Component Value Date/Time    WBC 25.1 (H) 10/23/2021 12:26 AM    RBC 3.96 (L) 10/23/2021 12:26 AM    HGB 11.8 (L) 10/23/2021 09:46 AM    HCT 36.1 10/23/2021 09:46 AM    MCV 95.7 10/23/2021 12:26 AM    MCH 30.3 10/23/2021 12:26 AM    MCHC 31.7 10/23/2021 12:26 AM    RDW 12.8 10/23/2021 12:26 AM     10/23/2021 12:26 AM    Lab Results   Component Value Date/Time    GRANS 89 (H) 10/23/2021 12:26 AM    LYMPH 5 (L) 10/23/2021 12:26 AM    EOS 1 10/23/2021 12:26 AM    BASOS 1 10/23/2021 12:26 AM      Basic Metabolic Profile   Recent Labs     10/23/21  0026 10/22/21  0351 10/22/21  0351   *   < > 130*   K 3.6   < > 4.2   CL 93*   < > 93*   CO2 29   < > 28   BUN 24*   < > 39*   CA 8.4*   < > 8.0*   PHOS  --   --  3.1    < > = values in this interval not displayed. Hepatic Function    Lab Results   Component Value Date/Time    ALB 2.8 (L) 10/20/2021 04:25 AM    TP 6.4 10/20/2021 04:25 AM     (H) 10/20/2021 04:25 AM    No results found for: TBIL       Coags   No results for input(s): PTP, INR, APTT, INREXT in the last 72 hours. Joel Funk MD    [facility]. [email or web address]  Phone: [phone]  Pager: [phone]

## 2021-10-23 NOTE — PROGRESS NOTES
[facility]  Progress Note    Patient: Juarez Landry Age: 80 y.o. : 1935 MR#: [id number] SSN: [id number]  Date/Time: 10/23/2021     C/C: Hematemesis/nausea vomiting      Subjective:   HPI : Patient with history of carcinoma prostate with multiple bone meta stasis, end-stage renal disease on hemodialysis, admitted with episode of vomiting which was brown-hematemesis. Patient's wife with patient also giving some history, vomiting was projectile, also associated with severe constipation, patient had relief this morning states that he is feeling better. GI consultation noted for acute nausea vomiting. Patient had recurrence of symptoms on 10/22/2021 in p.m.,           Review of Systems:    Patient having recurrent vomiting last night per nursing which was dark brown, also abdominal distention poor appetite, patient did have small bowel movement    Patient's KUB x-rays - severe gastric distension     positive responses in bold type   Constitutional: Ill-appearing  HENT: Negative for ear pain, congestion, sore throat, rhinorrhea, drooling, trouble swallowing, neck pain and tinnitus. Eyes: Negative for photophobia, pain, redness and visual disturbance. Respiratory: negative for shortness of breath, cough, choking, chest tightness, wheezing or stridor. Cardiovascular: Negative for chest pain, palpitations and leg swelling. Gastrointestinal: Positive for nausea vomiting as mentioned above with abdominal distention and discomfort   genitourinary: Negative for dysuria, urgency, frequency, hematuria, flank pain and difficulty urinating. Burris catheter in place  Musculoskeletal: Negative for back pain and arthralgias. Skin: Negative for color change, rash and wound. Neurological: Negative for dizziness, seizures, syncope, speech difficulty, light-headedness or headaches. Hematological: Does not bruise/bleed easily.    Psychiatric/Behavioral: Negative for suicidal ideas, hallucinations, behavioral problems, self-injury or agitation     Assessment/Plan:     1. Acute nausea vomiting-reoccurrence last p.m.-10/22/2021  2 hematemesis x1  3 severe constipation  4 end-stage renal disease on hemodialysis  5 prostate CA with osseous metastasis  6 chronic indwelling Burris catheter  7 ventral hernia without obstruction or incarceration reducible  8 suspected aspiration pneumonia - multilobar pneumonia - On CT Chest   9 cklqohibze-cmdklcwqlxyb-hffwcf suspect-antibiotics now on Zosyn  10 hypokalemia- replaced   11 persistent leukocytosis- ? Secondary to bacteremia ? Secondary to steroids which she is taking chronically    Plan    -After initial episode of hemoptysis he was settled, again he is developing nausea vomiting with dark brown emesis, abdominal distention,       -GI consultation already done, they are aware of patient's abdominal distention and nausea vomiting. Continue PPI    -Constipation-continue good bowel regimen    -CT chest confirms multilobar pneumonia along with leukocytosis lactic acidosis, continue vancomycin and cefepime, blood cultures from 10/19/2021  positive for gram-positive cocci in groups. Repeat cultures so far are negative, ID note reviewed ID consult appreciated change antibiotic to Zosyn as recommended by ID. Ventral hernia no further treatment at this point-     End-stage renal disease on hemodialysis    DISPO: Currently patient has nausea vomiting abdominal distention secondary to gas in the bowel GI is following, once this gets better patient can be discharged home on p.o. antibiotic that will be decided by ID      Time spent on direct patient care >30 mints     Complexity : High complex - due to multiple medical issues outlined above.       CODE Status : Full     Case discussed with:  [x]Patient  [x] Family  [x]Nursing  []Case Management     DVT Prophylaxis:  []Lovenox  []Hep SQ  [x]SCDs/possible GI bleed[]Coumadin   []On Heparin gtt    [] Eliquis [] Xarelto     Objective:   VS:   Visit Vitals  BP (!) 128/57 (BP 1 Location: Left upper arm, BP Patient Position: Sitting)   Pulse 98   Temp 99.7 °F (37.6 °C)   Resp 16   Ht 5' 6\" (1.676 m)   Wt 73.7 kg (162 lb 7 oz)   SpO2 95%   BMI 26.22 kg/m²      Tmax/24hrs: Temp (24hrs), Av.6 °F (37 °C), Min:97.9 °F (36.6 °C), Max:99.7 °F (37.6 °C)  IOBRIEF    Intake/Output Summary (Last 24 hours) at 10/23/2021 1244  Last data filed at 10/23/2021 0442  Gross per 24 hour   Intake    Output 900 ml   Net -900 ml       General:  Alert, cooperative, no acute distress   HEENT: No facial asymmetry, ARUNA Fausto, External ears - WNL    Cardiovascular: S1S2 - regular , No Murmur   Pulmonary: Equal expansion , No Use of accessory muscles , No Rales No Rhonchi    GI:  +BS in all four quadrants, soft, non-tender, Ventral hernia - Small reducible and nontender  Extremities:  No edema; 2+ dorsalis pedis pulses bilaterally  Neuro: Alert and oriented X 2.        Medications:   Current Facility-Administered Medications   Medication Dose Route Frequency    bisacodyL (DULCOLAX) suppository 10 mg  10 mg Rectal NOW    doxercalciferoL (HECTOROL) 4 mcg/2 mL injection 0.5 mcg  0.5 mcg IntraVENous DIALYSIS MON, WED & FRI    piperacillin-tazobactam (ZOSYN) 2.25 g in 0.9% sodium chloride (MBP/ADV) 50 mL MBP  2.25 g IntraVENous Q8H    Piperacillin-tazobactam (ZOSYN) 0.75 gm Supplemental Dosing by Pharmacy   Other Rx Dosing/Monitoring    polyethylene glycol (MIRALAX) packet 17 g  17 g Oral DAILY    ondansetron (ZOFRAN) injection 4 mg  4 mg IntraVENous Q8H PRN    abiraterone (Zytiga) tab 1,000 mg (Patient Supplied)  1,000 mg Oral DAILY    amLODIPine (NORVASC) tablet 10 mg  10 mg Oral DAILY    B complex-vitaminC-folic acid (NEPHROCAP) cap  1 Capsule Oral DAILY    calcium acetate(phosphat bind) (PHOSLO) capsule 667 mg  1 Capsule Oral TID WITH MEALS    predniSONE (DELTASONE) tablet 5 mg  5 mg Oral BID    sodium chloride (NS) flush 5-40 mL  5-40 mL IntraVENous Q8H    sodium chloride (NS) flush 5-40 mL  5-40 mL IntraVENous PRN    acetaminophen (TYLENOL) tablet 650 mg  650 mg Oral Q6H PRN    Or    acetaminophen (TYLENOL) suppository 650 mg  650 mg Rectal Q6H PRN    melatonin tablet 5 mg  5 mg Oral QHS    lidocaine (XYLOCAINE) 5 % ointment   Topical DAILY PRN       Labs:    Recent Labs     10/23/21  0946 10/23/21  0026 10/22/21  2215 10/22/21  1611 10/22/21  0351 10/21/21  1133 10/21/21  0417   WBC  --  25.1*  --   --  23.6*  --  29.6*   HGB 11.8* 12.0* 12.4*   < > 10.8*   < > 12.8*   HCT 36.1 37.9 37.8   < > 32.8*   < > 39.5   PLT  --  329  --   --  295  --  293    < > = values in this interval not displayed. Recent Labs     10/23/21  0026 10/22/21  0351 10/21/21  0417   * 130* 134*   K 3.6 4.2 3.8   CL 93* 93* 95*   CO2 29 28 29   * 104* 110*   BUN 24* 39* 26*   CREA 3.88* 4.96* 3.64*   CA 8.4* 8.0* 8.9   PHOS  --  3.1  --          Disclaimer: Sections of this note are dictated utilizing voice recognition software, which may have resulted in some phonetic based errors in grammar and contents. Even though attempts were made to correct all the mistakes, some may have been missed, and remained in the body of the document. If questions arise, please contact our department.     Signed By: Demetrius Bird MD     October 23, 2021

## 2021-10-23 NOTE — PROGRESS NOTES
Progress Note    Bryce Polanco  80 y.o. Admit Date: 10/18/2021  Principal Problem:    Pneumonia (10/19/2021) POA: Unknown    Active Problems:    Essential (primary) hypertension (9/1/2016) POA: Yes      ESRD (end stage renal disease) ([facility] (9/26/2020) POA: Yes      Secondary hyperparathyroidism of renal origin ([facility] (9/26/2020) POA: Unknown      Hypoalbuminemia (10/20/2021) POA: Unknown      Leucocytosis (10/20/2021) POA: Unknown            Subjective:     Patient says feeling better , had vomiting last night again, no BM for last 2 days,was Dialyzed yesterday, No abdominal Pain , abdomen looks less distended. A comprehensive review of systems was negative except for that written in the History of Present Illness.     Objective:     Visit Vitals  BP (!) 128/57 (BP 1 Location: Left upper arm, BP Patient Position: Sitting)   Pulse 98   Temp 99.7 °F (37.6 °C)   Resp 16   Ht 5' 6\" (1.676 m)   Wt 73.7 kg (162 lb 7 oz)   SpO2 95%   BMI 26.22 kg/m²         Intake/Output Summary (Last 24 hours) at 10/23/2021 1506  Last data filed at 10/23/2021 0442  Gross per 24 hour   Intake    Output 900 ml   Net -900 ml       Current Facility-Administered Medications   Medication Dose Route Frequency Provider Last Rate Last Admin    doxercalciferoL (HECTOROL) 4 mcg/2 mL injection 0.5 mcg  0.5 mcg IntraVENous DIALYSIS NUNO JONES & Bill Okeefe MD   0.5 mcg at 10/22/21 1009    piperacillin-tazobactam (ZOSYN) 2.25 g in 0.9% sodium chloride (MBP/ADV) 50 mL MBP  2.25 g IntraVENous Q8H Radha Rogers  mL/hr at 10/23/21 1451 2.25 g at 10/23/21 1451    Piperacillin-tazobactam (ZOSYN) 0.75 gm Supplemental Dosing by Pharmacy   Other Rx Dosing/Monitoring Rae Salazar MD        polyethylene glycol (MIRALAX) packet 17 g  17 g Oral DAILY Judti Underwood MD   17 g at 10/21/21 1845    ondansetron (ZOFRAN) injection 4 mg  4 mg IntraVENous Q8H PRN Adriano Munoz DO   4 mg at 10/23/21 0331    abiraterone (Zytiga) tab 1,000 mg (Patient Supplied)  1,000 mg Oral DAILY Key Velazquez MD   1,000 mg at 10/21/21 1343    amLODIPine (NORVASC) tablet 10 mg  10 mg Oral DAILY Jovany Velazquez MD   10 mg at 10/21/21 1342    B complex-vitaminC-folic acid (NEPHROCAP) cap  1 Capsule Oral DAILY Jovany Velazquez MD   1 Capsule at 10/21/21 1343    calcium acetate(phosphat bind) (PHOSLO) capsule 667 mg  1 Capsule Oral TID WITH MEALS Jovany Velazquez MD   667 mg at 10/23/21 1451    predniSONE (DELTASONE) tablet 5 mg  5 mg Oral BID Jovany Velazquez MD   5 mg at 10/22/21 1821    sodium chloride (NS) flush 5-40 mL  5-40 mL IntraVENous Q8H Jovany Velazquez MD   40 mL at 10/23/21 1501    sodium chloride (NS) flush 5-40 mL  5-40 mL IntraVENous PRN Jovany Velazquez MD        acetaminophen (TYLENOL) tablet 650 mg  650 mg Oral Q6H PRN Jovany Velazquez MD   650 mg at 10/19/21 1049    Or    acetaminophen (TYLENOL) suppository 650 mg  650 mg Rectal Q6H PRN Jovany Velazquez MD        melatonin tablet 5 mg  5 mg Oral QHS Ripley Pack, DO   5 mg at 10/22/21 2209    lidocaine (XYLOCAINE) 5 % ointment   Topical DAILY PRN Ripley Pack, DO            Physical Exam:     Physical Exam:   General:  Alert, cooperative, no distress, appears stated age. Neck: Supple, symmetrical, trachea midline, no adenopathy, thyroid: no enlargement/tenderness/nodules, no carotid bruit and no JVD. Lungs:    breath sounds, mild crackles. Heart:  Regular rate and rhythm, S1, S2 normal, no murmur, click, rub or gallop. Abdomen:   Soft, non-tender. Bowel sounds normal. No masses,  No organomegaly. ,less distended than last 2 days. Extremities: Extremities normal, atraumatic, no cyanosis or edema,HD catheter is well dressed.    Skin: Skin color, texture, turgor normal. No rashes or lesions         Data Review:    CBC w/Diff    Recent Labs     10/23/21  0946 10/23/21  0026 10/22/21  2215 10/22/21  1611 10/22/21  0351 10/22/21  0351 10/21/21  1133 10/21/21  0417   WBC  -- 25.1*  --   --   --  23.6*  --  29.6*   RBC  --  3.96*  --   --   --  3.41*  --  4.12*   HGB 11.8* 12.0* 12.4*   < >  --  10.8*   < > 12.8*   HCT 36.1 37.9 37.8   < >  --  32.8*   < > 39.5   MCV  --  95.7  --   --    < > 96.2  --  95.9   MCH  --  30.3  --   --    < > 31.7  --  31.1   MCHC  --  31.7  --   --    < > 32.9  --  32.4   RDW  --  12.8  --   --    < > 13.2  --  13.2    < > = values in this interval not displayed. Recent Labs     10/23/21  0026 10/22/21  0351 10/22/21  0351 10/21/21  0417 10/21/21  0417   MONOS 4  --  4  --  1*   EOS 1  --  1  --  2   BASOS 1   < > 0   < > 0   RDW 12.8   < > 13.2   < > 13.2    < > = values in this interval not displayed. Comprehensive Metabolic Profile    Recent Labs     10/23/21  0026 10/22/21  0351 10/21/21  0417   * 130* 134*   K 3.6 4.2 3.8   CL 93* 93* 95*   CO2 29 28 29   BUN 24* 39* 26*   CREA 3.88* 4.96* 3.64*    Recent Labs     10/23/21  0026 10/22/21  0351 10/21/21  0417   CA 8.4* 8.0* 8.9   PHOS  --  3.1  --           Last blood culture so far Negative              Impression:       Active Hospital Problems    Diagnosis Date Noted    Hypoalbuminemia 10/20/2021    Leucocytosis 10/20/2021    Pneumonia 10/19/2021    ESRD (end stage renal disease) ([facility] 09/26/2020    Secondary hyperparathyroidism of renal origin ([facility] 09/26/2020    Essential (primary) hypertension 09/01/2016            Plan:     No Need for Dialysis Today, continue Antibiotics for Pneumonia. Recommend to Decompress abdominal distension by NG tube, May have Ileus. , no need for any Prednisone.       Jan Hoang MD

## 2021-10-23 NOTE — PROGRESS NOTES
0330 Patient had episode of vomiting that was dark brown. Zofran given. 0400 Patient cleaned and HD dressing changed as it was soiled.  Patient expressed relief after receiving Zofran and was in no apparent distress

## 2021-10-23 NOTE — PROGRESS NOTES
Progress Note    Madhu Friends  80 y.o. Admit Date: 10/18/2021  Principal Problem:    Pneumonia (10/19/2021) POA: Unknown    Active Problems:    Essential (primary) hypertension (9/1/2016) POA: Yes      ESRD (end stage renal disease) ([facility] (9/26/2020) POA: Yes      Secondary hyperparathyroidism of renal origin ([facility] (9/26/2020) POA: Unknown      Hypoalbuminemia (10/20/2021) POA: Unknown      Leucocytosis (10/20/2021) POA: Unknown            Subjective:     Earlier Seen during dialysis. Feels good , no Fever, no vomiting in last 24 hours,had BM      A comprehensive review of systems was negative except for that written in the History of Present Illness.     Objective:     Visit Vitals  /84 (BP 1 Location: Left upper arm, BP Patient Position: At rest)   Pulse 90   Temp 98.5 °F (36.9 °C)   Resp 18   Ht 5' 6\" (1.676 m)   Wt 73.7 kg (162 lb 7 oz)   SpO2 97%   BMI 26.22 kg/m²         Intake/Output Summary (Last 24 hours) at 10/22/2021 2013  Last data filed at 10/22/2021 1238  Gross per 24 hour   Intake 400 ml   Output 2000 ml   Net -1600 ml       Current Facility-Administered Medications   Medication Dose Route Frequency Provider Last Rate Last Admin    doxercalciferoL (HECTOROL) 4 mcg/2 mL injection 0.5 mcg  0.5 mcg IntraVENous DIALYSIS NUNO JONES & Marco Antonio Webber, Riana MD Yosef   0.5 mcg at 10/22/21 1009    piperacillin-tazobactam (ZOSYN) 2.25 g in 0.9% sodium chloride (MBP/ADV) 50 mL MBP  2.25 g IntraVENous Q8H Darline Rogers MD        Piperacillin-tazobactam (ZOSYN) 0.75 gm Supplemental Dosing by Pharmacy   Other Rx Dosing/Monitoring Pepe Owens MD        polyethylene glycol [facility]) packet 17 g  17 g Oral DAILY Chad Ho MD   17 g at 10/21/21 1845    ondansetron (ZOFRAN) injection 4 mg  4 mg IntraVENous Q8H PRN Elana Carr DO   4 mg at 10/20/21 4298    abiraterone (Zytiga) tab 1,000 mg (Patient Supplied)  1,000 mg Oral DAILY Jovany Velazquez MD   1,000 mg at 10/21/21 1343    amLODIPine (NORVASC) tablet 10 mg  10 mg Oral DAILY Jovany Velazquez MD   10 mg at 10/21/21 1342    B complex-vitaminC-folic acid (NEPHROCAP) cap  1 Capsule Oral DAILY Jovany Velazquez MD   1 Capsule at 10/21/21 1343    calcium acetate(phosphat bind) (PHOSLO) capsule 667 mg  1 Capsule Oral TID WITH MEALS Jovany Velazquez MD   667 mg at 10/22/21 1821    predniSONE (DELTASONE) tablet 5 mg  5 mg Oral BID Jovany Velazquez MD   5 mg at 10/22/21 1821    sodium chloride (NS) flush 5-40 mL  5-40 mL IntraVENous Q8H Jovany Velazquez MD   40 mL at 10/22/21 1639    sodium chloride (NS) flush 5-40 mL  5-40 mL IntraVENous PRN Jovany Velazquez MD        acetaminophen (TYLENOL) tablet 650 mg  650 mg Oral Q6H PRN Jovany Velazquez MD   650 mg at 10/19/21 1049    Or    acetaminophen (TYLENOL) suppository 650 mg  650 mg Rectal Q6H PRN Jovany Velazquez MD        heparin (porcine) injection 5,000 Units  5,000 Units SubCUTAneous Q8H Jovany Velazquez MD   5,000 Units at 10/22/21 1454    melatonin tablet 5 mg  5 mg Oral QHS Flonnie Necessary, DO   5 mg at 10/21/21 2056    lidocaine (XYLOCAINE) 5 % ointment   Topical DAILY PRN Flonnie Necessary, DO            Physical Exam:     Physical Exam:   General:  Alert, cooperative, no distress, appears stated age. Neck: Supple, symmetrical, trachea midline, no adenopathy, thyroid: no enlargement/tenderness/nodules, no carotid bruit and no JVD. Lungs:   Clear to auscultation bilaterally. Heart:  Regular rate and rhythm, S1, S2 normal, no murmur, click, rub or gallop. Abdomen:   Soft, non-tender. Bowel sounds normal. No masses,  No organomegaly.    Extremities: Extremities normal, atraumatic, no cyanosis or edema, Blood flow 350   Skin: Skin color, texture, turgor normal. No rashes or lesions         Data Review:    CBC w/Diff    Recent Labs     10/22/21  1611 10/22/21  0351 10/21/21  2251 10/21/21  1133 10/21/21  0417 10/21/21  0417 10/20/21  0425 10/20/21  0425   WBC  --  23.6*  --   --   --  29.6*  --  25.3* RBC  --  3.41*  --   --   --  4.12*  --  3.68*   HGB 12.7* 10.8* 11.3*   < >  --  12.8*   < > 11.6*   HCT 38.4 32.8* 34.5*   < >  --  39.5   < > 35.7*   MCV  --  96.2  --   --    < > 95.9   < > 97.0   MCH  --  31.7  --   --    < > 31.1   < > 31.5   MCHC  --  32.9  --   --    < > 32.4   < > 32.5   RDW  --  13.2  --   --    < > 13.2   < > 13.4    < > = values in this interval not displayed. Recent Labs     10/22/21  0351 10/21/21  0417 10/21/21  0417 10/20/21  0425 10/20/21  0425   MONOS 4  --  1*  --  9   EOS 1  --  2  --  0   BASOS 0   < > 0   < > 0   RDW 13.2   < > 13.2   < > 13.4    < > = values in this interval not displayed. Comprehensive Metabolic Profile    Recent Labs     10/22/21  0351 10/21/21  0417 10/20/21  0425   * 134* 133*   K 4.2 3.8 4.1   CL 93* 95* 96*   CO2 28 29 30   BUN 39* 26* 49*   CREA 4.96* 3.64* 5.80*    Recent Labs     10/22/21  0351 10/21/21  0417 10/20/21  0425   CA 8.0* 8.9 8.3*  8.3*   PHOS 3.1  --  3.2   ALB  --   --  2.8*   TP  --   --  6.4   TBILI  --   --  0.4           FINDINGS:     Heart/Mediastinum: Cardiomediastinal silhouette appears within normal limits. Normal caliber thoracic aorta.     Lungs/Pleural Spaces: Lungs are hypoinflated with diffuse increased interstitial  lung markings. Multifocal areas of hazy infiltrate with bibasilar prominence. No  definite pleural effusion or pneumothorax.     Soft Tissues: Soft tissues are unremarkable.     Bones: Diffuse osseous metastasis again noted.     Misc: Right-sided dual-lumen vascular catheter with termination at the level of  the right atrium, unchanged in position compared to prior. Multiple lines  project over the patient.     _______________     IMPRESSION     Diffuse increased interstitial lung markings with multiple focal areas of hazy  infiltrate.  Recommend continued follow-up until resolution.     Persistent diffuse osseous metastasis.     Anuel Warner MD                 Impression:       Active Hospital Problems    Diagnosis Date Noted    Hypoalbuminemia 10/20/2021    Leucocytosis 10/20/2021    Pneumonia 10/19/2021    ESRD (end stage renal disease) ([facility] 09/26/2020    Secondary hyperparathyroidism of renal origin ([facility] 09/26/2020    Essential (primary) hypertension 09/01/2016      Persistent Leucocytosis despite on Antibiotics, still on Prednisone not sure why, no definte h/o gout. Plan:      Will DC Prednisone, continue Antibiotics, next dialysis on next Monday      Caroline Larios MD

## 2021-10-24 NOTE — PROGRESS NOTES
Progress Note    Jr Knott  80 y.o. Admit Date: 10/18/2021  Principal Problem:    Pneumonia (10/19/2021) POA: Unknown    Active Problems:    Essential (primary) hypertension (9/1/2016) POA: Yes      ESRD (end stage renal disease) ([facility] (9/26/2020) POA: Yes      Secondary hyperparathyroidism of renal origin ([facility] (9/26/2020) POA: Unknown      Hypoalbuminemia (10/20/2021) POA: Unknown      Leucocytosis (10/20/2021) POA: Unknown            Subjective:     Patient feels good, no Abdominal pain, npw NPO, Has NG tube placement, NG suction is going on.,No Fever. .       A comprehensive review of systems was negative except for that written in the History of Present Illness.     Objective:     Visit Vitals  BP (!) 147/79 (BP 1 Location: Left upper arm)   Pulse 97   Temp 98.9 °F (37.2 °C)   Resp 18   Ht 5' 6\" (1.676 m)   Wt 71.8 kg (158 lb 4.8 oz)   SpO2 95%   BMI 25.55 kg/m²       No intake or output data in the 24 hours ending 10/24/21 1244    Current Facility-Administered Medications   Medication Dose Route Frequency Provider Last Rate Last Admin    doxercalciferoL (HECTOROL) 4 mcg/2 mL injection 0.5 mcg  0.5 mcg IntraVENous DIALYSIS MON, WED & Vesta SnufferYonis MD   0.5 mcg at 10/22/21 1009    piperacillin-tazobactam (ZOSYN) 2.25 g in 0.9% sodium chloride (MBP/ADV) 50 mL MBP  2.25 g IntraVENous Q8H Radha Rogers  mL/hr at 10/24/21 1227 2.25 g at 10/24/21 1227    Piperacillin-tazobactam (ZOSYN) 0.75 gm Supplemental Dosing by Pharmacy   Other Rx Dosing/Monitoring Malini Ramirez MD        polyethylene glycol (MIRALAX) packet 17 g  17 g Oral DAILY Subhash Gooden MD   17 g at 10/21/21 1845    ondansetron (ZOFRAN) injection 4 mg  4 mg IntraVENous Q8H PRN Jacinta Torre DO   4 mg at 10/24/21 0503    abiraterone (Zytiga) tab 1,000 mg (Patient Supplied)  1,000 mg Oral DAILY Jovany Velazquez MD   1,000 mg at 10/21/21 1343    amLODIPine (NORVASC) tablet 10 mg  10 mg Oral DAILY Jovany Velazquez MD   10 mg at 10/21/21 1342    B complex-vitaminC-folic acid (NEPHROCAP) cap  1 Capsule Oral DAILY Jovany Velazquez MD   1 Capsule at 10/21/21 1343    calcium acetate(phosphat bind) (PHOSLO) capsule 667 mg  1 Capsule Oral TID WITH MEALS Jovany Velazquez MD   667 mg at 10/23/21 1451    predniSONE (DELTASONE) tablet 5 mg  5 mg Oral BID Jovany Velazquez MD   5 mg at 10/22/21 1821    sodium chloride (NS) flush 5-40 mL  5-40 mL IntraVENous Q8H Jovany Velazquez MD   10 mL at 10/24/21 1228    sodium chloride (NS) flush 5-40 mL  5-40 mL IntraVENous PRN Jovany Velazquez MD        acetaminophen (TYLENOL) tablet 650 mg  650 mg Oral Q6H PRN Jovany Velazquez MD   650 mg at 10/19/21 1049    Or    acetaminophen (TYLENOL) suppository 650 mg  650 mg Rectal Q6H PRN Jovany Velazquez MD        melatonin tablet 5 mg  5 mg Oral QHS Luellen Box, DO   5 mg at 10/22/21 2209    lidocaine (XYLOCAINE) 5 % ointment   Topical DAILY PRN Luellen Box, DO            Physical Exam:     Physical Exam:   General:  Alert, cooperative, no distress, appears stated age. Neck: Supple, symmetrical, trachea midline, no adenopathy, thyroid: no enlargement/tenderness/nodules, no carotid bruit and no JVD. Lungs:   Clear to auscultation bilaterally. Heart:  Regular rate and rhythm, S1, S2 normal, no murmur, click, rub or gallop. Abdomen:   Soft, non-tender. Less distended Bowel sounds normal. No masses,  No organomegaly. Extremities: Extremities normal, atraumatic, no cyanosis or edema. HD catheter is well dressed.    Skin: Skin color, texture, turgor normal. No rashes or lesions         Data Review:    CBC w/Diff    Recent Labs     10/24/21  0103 10/23/21  0946 10/23/21  0026 10/23/21  0026 10/22/21  1611 10/22/21  0351   WBC 23.8*  --   --  25.1*  --  23.6*   RBC 3.75*  --   --  3.96*  --  3.41*   HGB 11.5* 11.8*  --  12.0*   < > 10.8*   HCT 35.8* 36.1  --  37.9   < > 32.8*   MCV 95.5  --    < > 95.7  --  96.2   MCH 30.7  --    < > 30.3  --  31.7   MCHC 32.1 --    < > 31.7  --  32.9   RDW 12.9  --    < > 12.8  --  13.2    < > = values in this interval not displayed. Recent Labs     10/24/21  0103 10/23/21  0026 10/23/21  0026 10/22/21  0351 10/22/21  0351   MONOS 5  --  4  --  4   EOS 4  --  1  --  1   BASOS 0   < > 1   < > 0   RDW 12.9   < > 12.8   < > 13.2    < > = values in this interval not displayed. Comprehensive Metabolic Profile    Recent Labs     10/24/21  0103 10/23/21  0026 10/22/21  0351   * 133* 130*   K 3.7 3.6 4.2   CL 94* 93* 93*   CO2 29 29 28   BUN 44* 24* 39*   CREA 5.83* 3.88* 4.96*    Recent Labs     10/24/21  0103 10/23/21  0026 10/22/21  0351   CA 7.9* 8.4* 8.0*   PHOS 3.5  --  3.1        Last Blood Culture Remains Negative for last 3 days. Impression:       Active Hospital Problems    Diagnosis Date Noted    Hypoalbuminemia 10/20/2021    Leucocytosis 10/20/2021    Pneumonia 10/19/2021    ESRD (end stage renal disease) ([facility] 09/26/2020    Secondary hyperparathyroidism of renal origin ([facility] 09/26/2020    Essential (primary) hypertension 09/01/2016            Plan:     Continue antibiotics,NG suction ,No need for any dialysis today, will Dialyze him tomorrow.       Hira Petit MD

## 2021-10-24 NOTE — ROUTINE PROCESS
Bedside and verbal report received from Brian (offgoing nurse). Report included the following information; SBAR, MAR, LABS, Intake/output, Kardex, and summary of care. Patient alert and have some discomfort. Patient splitting a lot of clear mucous up. Patient have a NGT inserted. 0100 am. Patient NGT needed to bed advance 15 cm more. (done). X ray ordered. 2:11 AM  Need to advance 20 cm. (done). Need to order x-ray. (done)    0315 am   Adequate Position of the NGT    Bedside and Verbal shift change report given to [address] [place] [address] Hilary (oncoming nurse) by Catarina Montes De Oca RN (offgoing nurse). Report included the following information SBAR, Kardex, Intake/Output, MAR and Recent Results. Patient alert in bed. Patient having a productive cough, bring up a lot of mucus.

## 2021-10-24 NOTE — PROGRESS NOTES
Paged Dr Kwok Seek in reference to the change in NG tube, ok to change NG tube from continuous to gravity and clamped. Order followed.

## 2021-10-24 NOTE — PROGRESS NOTES
[email or web address]  [phone]    Gastroenterology follow up-Progress note  Impression:  1. Vomiting-improved after NG tube placement. 2. Distended abdomen - ? Ileus, obstruction; AXR shows distended stomach-s/p NG placement with aspiration of ~ 300 cc. Feels much better now. 3. Prostate and Colon CA with widespread osseous mets  4. Severe sepsis POA  5. PNA  6. ESRD on HD  7. BPH with chronic indwelling Burris     Plan:  1. Recommend NG tube to decompress. 2. Start clears-if vomiting recurs, or if there is continued bleeding, consider EGD. Chief Complaint: Hematemesis, abd distention    Subjective: Feels much better after NG tube was placed    ROS: Denies any fevers, chills, rash. Eyes: conjunctiva normal, EOM normal   Neck: ROM normal, supple and trachea normal   Cardiovascular: heart normal, intact distal pulses, normal rate and regular rhythm   Pulmonary/Chest Wall: breath sounds normal and effort normal   Abdominal: appearance distended, bowel sounds normal and soft, non-acute, non-tender     Patient Active Problem List   Diagnosis Code    Traumatic rhabdomyolysis ([facility] T79. 6XXA    CAD (coronary artery disease) I25.10    Essential (primary) hypertension I10    Troponin level elevated R77.8    Acute renal failure (ARF) (HCC) N17.9    ESRD (end stage renal disease) ([facility] N18.6    Hyperkalemia C02.1    Metabolic acidosis X75.1    Secondary hyperparathyroidism of renal origin ([address] N25.81    Anemia D64.9    Hydronephrosis N13.30    UTI (urinary tract infection) N39.0    Postobstructive diuresis R35.89    Polyp of ascending colon K63.5    Decrease in appetite R63.0    Elevated prostate specific antigen (PSA) R97.20    Osteoarthrosis M19.90    Shortness of breath R06.02    Chronic renal failure N18.9    Pneumonia J18.9    Hypoalbuminemia E88.09    Leucocytosis D72.829         Visit Vitals  BP (!) 147/79 (BP 1 Location: Left upper arm)   Pulse 97   Temp 98.9 °F (37.2 °C) Resp 18   Ht 5' 6\" (1.676 m)   Wt 71.8 kg (158 lb 4.8 oz)   SpO2 95%   BMI 25.55 kg/m²         No intake or output data in the 24 hours ending 10/24/21 1221    CBC w/Diff    Lab Results   Component Value Date/Time    WBC 23.8 (H) 10/24/2021 01:03 AM    RBC 3.75 (L) 10/24/2021 01:03 AM    HGB 11.5 (L) 10/24/2021 01:03 AM    HCT 35.8 (L) 10/24/2021 01:03 AM    MCV 95.5 10/24/2021 01:03 AM    MCH 30.7 10/24/2021 01:03 AM    MCHC 32.1 10/24/2021 01:03 AM    RDW 12.9 10/24/2021 01:03 AM     10/24/2021 01:03 AM    Lab Results   Component Value Date/Time    GRANS 77 (H) 10/24/2021 01:03 AM    LYMPH 14 (L) 10/24/2021 01:03 AM    EOS 4 10/24/2021 01:03 AM    BASOS 0 10/24/2021 01:03 AM      Basic Metabolic Profile   Recent Labs     10/24/21  0103   *   K 3.7   CL 94*   CO2 29   BUN 44*   CA 7.9*   PHOS 3.5        Hepatic Function    Lab Results   Component Value Date/Time    ALB 2.8 (L) 10/20/2021 04:25 AM    TP 6.4 10/20/2021 04:25 AM     (H) 10/20/2021 04:25 AM    No results found for: TBIL       Coags   No results for input(s): PTP, INR, APTT, INREXT, INREXT in the last 72 hours. Barry Amin MD    [facility]. [email or web address]  Phone: [phone]  Pager: [phone]

## 2021-10-24 NOTE — PROGRESS NOTES
[facility] Hospitalist [facility]  Progress Note    Patient: Lauren Gaytan Age: 80 y.o. : 1935 MR#: [id number] SSN: [id number]  Date/Time: 10/24/2021     C/C: Hematemesis/nausea vomiting      Subjective:     Pt seen with wife at bedside. Per wife there has been a significant decrease in his abdominal distention. Patient also states he feels much better. No complaints. Reports he had a bowel movement yesterday, confirmed by nursing. Assessment/Plan:   HPI : Patient with history of carcinoma prostate with multiple bone meta stasis, end-stage renal disease on hemodialysis, admitted with episode of vomiting which was brown-hematemesis. Patient's wife with patient also giving some history, vomiting was projectile, also associated with severe constipation. Patient had recurrence of symptoms on 10/22/2021 in p.m.,   1. Acute nausea: Imaging on 10/22/KUB showed severe gaseous distention of the stomach and moderate gaseous distention of small and large bowel loops. -Ileus no clear finding of mechanical obstruction so far. GI following.      -Status post NG tube placement with significant relief in his symptoms and decreasing abdominal distention. Has been started on clear liquids by GI. Plans for upper endoscopy if patient with evidence of GI bleed or recurrence of his nausea vomiting noted. 2 hematemesis x1  3 severe constipation: Status post BM yesterday  4 end-stage renal disease on hemodialysis  5 prostate CA with osseous metastasis  6 chronic indwelling Burris catheter  7 ventral hernia without obstruction or incarceration reducible  8 suspected aspiration pneumonia - multilobar pneumonia - On CT Chest   9 pzvbvfjkjg-lneyyjclncyp-wghzye suspect-antibiotics now on Zosyn  10 hypokalemia- replaced   11 persistent leukocytosis- ? Secondary to bacteremia ?   Secondary to steroids which he is taking chronically    Plan    -Continue PPI    -Constipation-On miralax daily    -ABx per ID    -End-stage renal disease on hemodialysis    DISPO: Currently patient has nausea vomiting abdominal distention secondary to gas in the bowel GI is following, once this gets better patient can be discharged home on p.o. antibiotic that will be decided by ID               CODE Status : Full     Case discussed with:  [x]Patient  [x] Family  [x]Nursing  []Case Management     DVT Prophylaxis:  []Lovenox  []Hep SQ  [x]SCDs/possible GI bleed[]Coumadin   []On Heparin gtt    [] Eliquis [] Xarelto     Objective:   VS:   Visit Vitals  BP (!) 147/79 (BP 1 Location: Left upper arm)   Pulse 97   Temp 98.9 °F (37.2 °C)   Resp 18   Ht 5' 6\" (1.676 m)   Wt 71.8 kg (158 lb 4.8 oz)   SpO2 95%   BMI 25.55 kg/m²      Tmax/24hrs: Temp (24hrs), Av.5 °F (36.9 °C), Min:98.2 °F (36.8 °C), Max:98.9 °F (37.2 °C)  IOBRIEF  No intake or output data in the 24 hours ending 10/24/21 1529    General:  Alert, cooperative, no acute distress   HEENT: No facial asymmetry, ARUNA Fausto, External ears - WNL    Cardiovascular: S1S2 - regular , No Murmur   Pulmonary: Equal expansion , No Use of accessory muscles , No Rales No Rhonchi    GI:  +BS in all four quadrants, soft, non-tender, Ventral hernia - Small reducible and nontender  Extremities:  No edema;   Neuro: Alert        Medications:   Current Facility-Administered Medications   Medication Dose Route Frequency    doxercalciferoL (HECTOROL) 4 mcg/2 mL injection 0.5 mcg  0.5 mcg IntraVENous DIALYSIS MON, WED & FRI    piperacillin-tazobactam (ZOSYN) 2.25 g in 0.9% sodium chloride (MBP/ADV) 50 mL MBP  2.25 g IntraVENous Q8H    Piperacillin-tazobactam (ZOSYN) 0.75 gm Supplemental Dosing by Pharmacy   Other Rx Dosing/Monitoring    polyethylene glycol (MIRALAX) packet 17 g  17 g Oral DAILY    ondansetron (ZOFRAN) injection 4 mg  4 mg IntraVENous Q8H PRN    abiraterone (Zytiga) tab 1,000 mg (Patient Supplied)  1,000 mg Oral DAILY    amLODIPine (NORVASC) tablet 10 mg  10 mg Oral DAILY    B complex-vitaminC-folic acid (NEPHROCAP) cap  1 Capsule Oral DAILY    calcium acetate(phosphat bind) (PHOSLO) capsule 667 mg  1 Capsule Oral TID WITH MEALS    predniSONE (DELTASONE) tablet 5 mg  5 mg Oral BID    sodium chloride (NS) flush 5-40 mL  5-40 mL IntraVENous Q8H    sodium chloride (NS) flush 5-40 mL  5-40 mL IntraVENous PRN    acetaminophen (TYLENOL) tablet 650 mg  650 mg Oral Q6H PRN    Or    acetaminophen (TYLENOL) suppository 650 mg  650 mg Rectal Q6H PRN    melatonin tablet 5 mg  5 mg Oral QHS    lidocaine (XYLOCAINE) 5 % ointment   Topical DAILY PRN       Labs:    Recent Labs     10/24/21  0103 10/23/21  0946 10/23/21  0026 10/22/21  1611 10/22/21  0351   WBC 23.8*  --  25.1*  --  23.6*   HGB 11.5* 11.8* 12.0*   < > 10.8*   HCT 35.8* 36.1 37.9   < > 32.8*     --  329  --  295    < > = values in this interval not displayed. Recent Labs     10/24/21  0103 10/23/21  0026 10/22/21  0351   * 133* 130*   K 3.7 3.6 4.2   CL 94* 93* 93*   CO2 29 29 28   * 123* 104*   BUN 44* 24* 39*   CREA 5.83* 3.88* 4.96*   CA 7.9* 8.4* 8.0*   PHOS 3.5  --  3.1         Disclaimer: Sections of this note are dictated utilizing voice recognition software, which may have resulted in some phonetic based errors in grammar and contents. Even though attempts were made to correct all the mistakes, some may have been missed, and remained in the body of the document. If questions arise, please contact our department.     Signed By: Efrain Pillai MD     October 24, 2021

## 2021-10-24 NOTE — PROGRESS NOTES
[facility] bedside shift [facility]

## 2021-10-25 PROBLEM — R11.2 NAUSEA AND VOMITING: Status: ACTIVE | Noted: 2021-01-01

## 2021-10-25 NOTE — ROUTINE PROCESS
Bedside and verbal report received from [address] [place] [address] (offgoing nurse). Report included the following information; SBAR, MAR, LABS, Intake/output, Kardex, and summary of care. Patient resting quietly in bed. No noted distress at this time. Call bell and suction cath in reach.

## 2021-10-25 NOTE — PROGRESS NOTES
Infectious Disease progress Note        Reason: Sepsis, aspiration pneumonia, coagulase-negative staph bacteremia    Current abx Prior abx   Cefepime, vancomycin since 10/19/21-10/22  Zosyn since 10/22      Lines:       Assessment :     80 y.o. male metastatic prostate cancer, colon cancer, atherosclerotic disease, hypertension, and ESRD on MWF hemodialysis admitted to [facility] on 10/18/21 for multi focal pneumonia.       Now with significant leukocytosis, coagulase-negative Staphylococcus bacteremia, multifocal infiltrates noted on CT chest, recent coffee-ground emesis    I agree that clinical presentation is concerning for sepsis. However after obtaining detailed history, exam, review of available labs/imaging studies. I believe patient's leukocytosis is likely leukemoid reaction to steroids, metastatic disease, recent GI bleed, probable aspiration pneumonia    Single positive blood culture for coagulase-negative Staphylococcus on 10/19 likely contaminant. repeat blood culture 10/22: No growth    Vomiting, concern for GI bleed: GI follow-up appreciated. X-ray abdomen 10/22 -severe gaseous abdominal distention     ESRD: on HD    Clinically better. Improved mentation. Resolved vomiting. GI follow-up appreciated plans to move NG tube noted      Recommendations:    1. Continue piperacillin/tazobactam  2. Follow-up GI recommendations regarding abdominal distention, possible GI bleed  3. We will switch to oral antibiotics patient able to take p.o. diet  4. Follow-up nephrology recommendations  5. Follow-up CBC, clinically    Above plan was discussed in details with patient. Please call me if any further questions or concerns. Will continue to participate in the care of this patient. HPI:     Feels better. He got NG tube out. Patient denies any subjective fever or chills. He complains of some abdominal discomfort. Denies any constipation/diarrhea. Denies increasing chest pain, shortness of breath.       Past Medical History:   Diagnosis Date    Cancer [facility])     Prostate    Cardiac echocardiogram 08/29/2016    EF 60-65%. No WMA. Mild LVH. Indeterminate diastolic fx. RVSP 35 mmHg. No significant valvular heart disease.  Cardiac nuclear imaging test, abnormal 08/29/2016    Intermediate risk. Previous inferior infarction w/very mild fabiana-infarct ischemia. Inferior hypk. EF 68%. Nondiagnostic EKG on pharm stress test.  Pt's BP increased from 193/96 to 207/83 during study.  Carotid duplex 08/30/2016    Mild <50% bilateral ICA stenosis.  Chronic kidney disease     dialysis    Colonic mass     per Dr Chhaya Walker notes    Enlarged prostate     Hypertension        Past Surgical History:   Procedure Laterality Date    COLONOSCOPY N/A 10/1/2020    COLONOSCOPY, b'x, w tattoo w polypectomy performed by Jenifer [facility]eli [facility] at [facility]  10/1/2020         Orabel Finnns  10/1/2020         COLONOSCPY,FLEX,W/ [facility] [place]  10/1/2020         DC INSJ TUNNELED CVC W/O SUBQ PORT/ AGE 5 YR/> N/A 9/28/2020    INSERTION TUNNELED CENTRAL VENOUS CATHETER performed by Duncan Dai MD at [facility] CATH LAB    VASCULAR SURGERY PROCEDURE UNLIST      dialysis access- right neck       Current Discharge Medication List      CONTINUE these medications which have NOT CHANGED    Details   amLODIPine (NORVASC) 10 mg tablet TAKE 1 TABLET BY MOUTH EVERY DAY      calcium acetate,phosphat bind, (PHOSLO) 667 mg cap       amoxicillin-clavulanate (AUGMENTIN) 875-125 mg per tablet Take 1 Tablet by mouth every twelve (12) hours. Qty: 6 Tablet, Refills: 0      abiraterone (Zytiga) 250 mg tab Take four tablets by mouth daily on an empty stomach. Take one hour prior to food or two hours after food. Tablets should be swallowed whole with water. Do not crush or chew tablets. Qty: 120 Tab, Refills: 5    Associated Diagnoses: Malignant neoplasm of prostate ([address]  Osseous metastasis ([address] predniSONE (DELTASONE) 5 mg tablet Take 1 Tab by mouth two (2) times a day. Qty: 60 Tab, Refills: 5    Associated Diagnoses: Malignant neoplasm of prostate ([address] Osseous metastasis (HCC)      mupirocin (BACTROBAN) 2 % ointment APPLY TO TOES 3 TIMES A DAY      bicalutamide (CASODEX) 50 mg tablet Take 1 Tab by mouth daily. Qty: 14 Tab, Refills: 0      B complex w-C no.20/folic acid (TRIPHROCAPS PO) Take 1 Cap by mouth daily. furosemide (LASIX) 40 mg tablet Take 40 mg by mouth daily. Current Facility-Administered Medications   Medication Dose Route Frequency    doxercalciferoL (HECTOROL) 4 mcg/2 mL injection 0.5 mcg  0.5 mcg IntraVENous DIALYSIS MON, WED & FRI    piperacillin-tazobactam (ZOSYN) 2.25 g in 0.9% sodium chloride (MBP/ADV) 50 mL MBP  2.25 g IntraVENous Q8H    Piperacillin-tazobactam (ZOSYN) 0.75 gm Supplemental Dosing by Pharmacy   Other Rx Dosing/Monitoring    polyethylene glycol (MIRALAX) packet 17 g  17 g Oral DAILY    ondansetron (ZOFRAN) injection 4 mg  4 mg IntraVENous Q8H PRN    abiraterone (Zytiga) tab 1,000 mg (Patient Supplied)  1,000 mg Oral DAILY    amLODIPine (NORVASC) tablet 10 mg  10 mg Oral DAILY    B complex-vitaminC-folic acid (NEPHROCAP) cap  1 Capsule Oral DAILY    calcium acetate(phosphat bind) (PHOSLO) capsule 667 mg  1 Capsule Oral TID WITH MEALS    predniSONE (DELTASONE) tablet 5 mg  5 mg Oral BID    sodium chloride (NS) flush 5-40 mL  5-40 mL IntraVENous Q8H    sodium chloride (NS) flush 5-40 mL  5-40 mL IntraVENous PRN    acetaminophen (TYLENOL) tablet 650 mg  650 mg Oral Q6H PRN    Or    acetaminophen (TYLENOL) suppository 650 mg  650 mg Rectal Q6H PRN    melatonin tablet 5 mg  5 mg Oral QHS    lidocaine (XYLOCAINE) 5 % ointment   Topical DAILY PRN       Allergies: Patient has no known allergies. History reviewed. No pertinent family history.   Social History     Socioeconomic History    Marital status:      Spouse name: Not on file    Number of children: Not on file    Years of education: Not on file    Highest education level: Not on file   Occupational History    Not on file   Tobacco Use    Smoking status: Never Smoker    Smokeless tobacco: Never Used   Vaping Use    Vaping Use: Never used   Substance and Sexual Activity    Alcohol use: No    Drug use: Never    Sexual activity: Not on file   Other Topics Concern    Not on file   Social History Narrative    Not on file     Social Determinants of Health     Financial Resource Strain:     Difficulty of Paying Living Expenses:    Food Insecurity:     Worried About Running Out of Food in the Last Year:     [address] in the Last Year:    Transportation Needs:     Lack of Transportation (Medical):  Lack of Transportation (Non-Medical):    Physical Activity:     Days of Exercise per Week:     Minutes of Exercise per Session:    Stress:     Feeling of Stress :    Social Connections:     Frequency of Communication with Friends and Family:     Frequency of Social Gatherings with Friends and Family:     Attends [employer] Services:     Active Member of Clubs or Organizations:     Attends Club or Organization Meetings:     Marital Status:    Intimate Partner Violence:     Fear of Current or Ex-Partner:     Emotionally Abused:     Physically Abused:     Sexually Abused:      Social History     Tobacco Use   Smoking Status Never Smoker   Smokeless Tobacco Never Used        Temp (24hrs), Av.7 °F (37.1 °C), Min:98.2 °F (36.8 °C), Max:99.2 °F (37.3 °C)    Visit Vitals  /75 (BP 1 Location: Left upper arm, BP Patient Position: At rest)   Pulse 86   Temp 98.2 °F (36.8 °C)   Resp 18   Ht 5' 6\" (1.676 m)   Wt 71.8 kg (158 lb 4.8 oz)   SpO2 99%   BMI 25.55 kg/m²       ROS: Unable to obtain due to patient factors  Physical Exam:    General: Well developed, well nourished male laying on the bed AAOx3 in no acute distress.     General:   awake alert and oriented HEENT:  Normocephalic, atraumatic, EOMI, no scleral icterus or pallor; no conjunctival hemmohage;  nasal and oral mucous are moist and without evidence of lesions. Neck supple, no bruits. Lymph Nodes:   no cervical, axillary or inguinal adenopathy   Lungs:   non-labored, no audible wheezing   Heart:  RRR, s1 and s2;  no edema   Abdomen:  soft, distended, active bowel sounds, no hepatomegaly, no splenomegaly. Non-tender   Genitourinary:  deferred   Extremities:   no clubbing, cyanosis; no joint effusions or swelling; Full ROM of all large joints to the upper and lower extremities; muscle mass appropriate for age   Neurologic:  No gross focal sensory abnormalities; 5/5 muscle strength to upper and lower extremities. Speech appropriate. Cranial nerves intact                        Skin:  No rash or ulcers noted   Back:  no spinal or paraspinal muscle tenderness or rigidity, no CVA tenderness     Psychiatric:  appropriate mood and affect         Labs: Results:   Chemistry Recent Labs     10/25/21  0119 10/24/21  0103 10/23/21  0026   GLU 97 106* 123*   * 132* 133*   K 3.4* 3.7 3.6   CL 91* 94* 93*   CO2 31 29 29   BUN 59* 44* 24*   CREA 7.83* 5.83* 3.88*   CA 8.0* 7.9* 8.4*   AGAP 12 9 11   BUCR 8* 8* 6*      CBC w/Diff Recent Labs     10/25/21  0119 10/24/21  0103 10/23/21  0946 10/23/21  0026 10/23/21  0026   WBC 25.5* 23.8*  --   --  25.1*   RBC 3.65* 3.75*  --   --  3.96*   HGB 11.5* 11.5* 11.8*   < > 12.0*   HCT 35.1* 35.8* 36.1   < > 37.9    311  --   --  329   GRANS 83* 77*  --   --  89*   LYMPH 12* 14*  --   --  5*   EOS 0 4  --   --  1    < > = values in this interval not displayed.       Microbiology Recent Labs     10/22/21  2010   CULT NO GROWTH 3 DAYS          RADIOLOGY:    All available imaging studies/reports in [facility] for this admission were reviewed    High complexity decision making was performed during the evaluation of this patient at high risk for decompensation with multiple organ involvement         Disclaimer: Sections of this note are dictated utilizing voice recognition software, which may have resulted in some phonetic based errors in grammar and contents. Even though attempts were made to correct all the mistakes, some may have been missed, and remained in the body of the document. If questions arise, please contact our department.     Dr. Cheryl Calzada, Infectious Disease Specialist  [phone]  October 25, 2021  7:11 AM

## 2021-10-25 NOTE — ROUTINE PROCESS
[facility]

## 2021-10-25 NOTE — PROGRESS NOTES
Progress Note    Klever Dockery  80 y.o. Admit Date: 10/18/2021  Principal Problem:    Pneumonia (10/19/2021) POA: Unknown    Active Problems:    Essential (primary) hypertension (9/1/2016) POA: Yes      ESRD (end stage renal disease) ([facility] (9/26/2020) POA: Yes      Secondary hyperparathyroidism of renal origin ([facility] (9/26/2020) POA: Unknown      Hypoalbuminemia (10/20/2021) POA: Unknown      Leucocytosis (10/20/2021) POA: Unknown            Subjective:     Patient is on dialysis  & seen during dialysis, on NG Suction, Feels Dizzy as > 1 liter was taken out by UF rapidly  & BP dropped. Had to stop Uf, gAve 200 cc of NS, BP improved       A comprehensive review of systems was negative except for that written in the History of Present Illness.     Objective:     Visit Vitals  BP (!) 140/69   Pulse 91   Temp 97.5 °F (36.4 °C) (Oral)   Resp 18   Ht 5' 6\" (1.676 m)   Wt 71.2 kg (157 lb)   SpO2 94%   BMI 25.34 kg/m²         Intake/Output Summary (Last 24 hours) at 10/25/2021 1318  Last data filed at 10/25/2021 1149  Gross per 24 hour   Intake 280 ml   Output 1800 ml   Net -1520 ml       Current Facility-Administered Medications   Medication Dose Route Frequency Provider Last Rate Last Admin    heparin (porcine) 1,000 unit/mL injection             piperacillin-tazobactam (ZOSYN) 0.75 g in 0.9% sodium chloride 50 mL IVPB  0.75 g IntraVENous ONCE Rhonda Rogers MD        doxercalciferoL (HECTOROL) 4 mcg/2 mL injection 0.5 mcg  0.5 mcg IntraVENous DIALYSIS MON, WED & Danilo Sena MD   0.5 mcg at 10/25/21 1138    piperacillin-tazobactam (ZOSYN) 2.25 g in 0.9% sodium chloride (MBP/ADV) 50 mL MBP  2.25 g IntraVENous Q8H Sara CLEMENTE  mL/hr at 10/25/21 0543 2.25 g at 10/25/21 0543    Piperacillin-tazobactam (ZOSYN) 0.75 gm Supplemental Dosing by Pharmacy   Other Rx Dosing/Monitoring Sohan Wright MD        polyethylene glycol (MIRALAX) packet 17 g  17 g Oral DAILY Tamara Mancia MD   17 g at 10/21/21 1845    ondansetron (ZOFRAN) injection 4 mg  4 mg IntraVENous Q8H PRN Olevia Quiet, DO   4 mg at 10/24/21 0503    abiraterone (Zytiga) tab 1,000 mg (Patient Supplied)  1,000 mg Oral DAILY MayAmaya MD   1,000 mg at 10/25/21 0931    amLODIPine (NORVASC) tablet 10 mg  10 mg Oral DAILY Jovany Velazquez MD   10 mg at 10/21/21 1342    B complex-vitaminC-folic acid (NEPHROCAP) cap  1 Capsule Oral DAILY Jovany Velazquez MD   1 Capsule at 10/25/21 2867    calcium acetate(phosphat bind) (PHOSLO) capsule 667 mg  1 Capsule Oral TID WITH MEALS Jovany Velazquez MD   667 mg at 10/25/21 4835    predniSONE (DELTASONE) tablet 5 mg  5 mg Oral BID Jovany Velazquez MD   5 mg at 10/25/21 0060    sodium chloride (NS) flush 5-40 mL  5-40 mL IntraVENous Q8H Jovany Velazquez MD   10 mL at 10/25/21 0543    sodium chloride (NS) flush 5-40 mL  5-40 mL IntraVENous PRN Jovany Velazquez MD        acetaminophen (TYLENOL) tablet 650 mg  650 mg Oral Q6H PRN Jovany Velazquez MD   650 mg at 10/25/21 6003    Or    acetaminophen (TYLENOL) suppository 650 mg  650 mg Rectal Q6H PRN Jovany Velazquez MD        melatonin tablet 5 mg  5 mg Oral QHS Olevia Quiet, DO   5 mg at 10/24/21 2119    lidocaine (XYLOCAINE) 5 % ointment   Topical DAILY PRN Olevia Quiet, DO            Physical Exam:     Physical Exam:   General:  Alert, cooperative, no distress, appears stated age. Neck: Supple, symmetrical, trachea midline, no adenopathy, thyroid: no enlargement/tenderness/nodules, no carotid bruit and no JVD. Lungs:   Clear to auscultation bilaterally. Heart:  Regular rate and rhythm, S1, S2 normal, no murmur, click, rub or gallop. Abdomen:   Soft, non-tender. Bowel sounds normal. No masses,  No organomegaly.    Extremities: Extremities normal, atraumatic, no cyanosis or edema,HDcatheter Blood flow 350   Skin: Skin color, texture, turgor normal. No rashes or lesions         Data Review:    CBC w/Diff    Recent Labs     10/25/21  0115 10/24/21  0103 10/24/21  0103 10/23/21  0946 10/23/21  0026 10/23/21  0026   WBC 25.5*  --  23.8*  --   --  25.1*   RBC 3.65*  --  3.75*  --   --  3.96*   HGB 11.5*  --  11.5* 11.8*   < > 12.0*   HCT 35.1*  --  35.8* 36.1   < > 37.9   MCV 96.2   < > 95.5  --    < > 95.7   MCH 31.5   < > 30.7  --    < > 30.3   MCHC 32.8   < > 32.1  --    < > 31.7   RDW 12.7   < > 12.9  --    < > 12.8    < > = values in this interval not displayed. Recent Labs     10/25/21  0119 10/24/21  0103 10/24/21  0103 10/23/21  0026 10/23/21  0026   MONOS 5  --  5  --  4   EOS 0  --  4  --  1   BASOS 0   < > 0   < > 1   RDW 12.7   < > 12.9   < > 12.8    < > = values in this interval not displayed. Comprehensive Metabolic Profile    Recent Labs     10/25/21  0119 10/24/21  0103 10/23/21  0026   * 132* 133*   K 3.4* 3.7 3.6   CL 91* 94* 93*   CO2 31 29 29   BUN 59* 44* 24*   CREA 7.83* 5.83* 3.88*    Recent Labs     10/25/21  0119 10/24/21  0103 10/23/21  0026   CA 8.0* 7.9* 8.4*   PHOS 5.0* 3.5  --         On 3 K, 2.5  bath,                 Impression:       Active Hospital Problems    Diagnosis Date Noted    Hypoalbuminemia 10/20/2021    Leucocytosis 10/20/2021    Pneumonia 10/19/2021    ESRD (end stage renal disease) ([facility] 09/26/2020    Secondary hyperparathyroidism of renal origin ([facility] 09/26/2020    Essential (primary) hypertension 09/01/2016            Plan:     Decreased UF goal BP improved ,as he was not feeling good, had to stop Dialysis , will Dialyze on Wednesday woth lower UF goal, WBC count still high , not improving. Watc K ,may need replacement.       Jose L Chen MD

## 2021-10-25 NOTE — PROGRESS NOTES
OT attempted 2x. Pt JOY this am for dialysis and now spouse politely refusing for pt. Will continue to follow.

## 2021-10-25 NOTE — PROGRESS NOTES
Juan  [facility], [address]    Progress Note    Patient: Bossman Lomeli MRN: [id number]  SSN: [id number]    YOB: 1935  Age: 80 y.o. Sex: male      Admit Date: 10/18/2021    LOS: 6 days     Chief complaint:    Impression:     1. Nausea and vomiting , no definite obstruction on CT. Non specific , would pull ng and start clear liquids and see how he does. N/V could be due to worsening metastatic disease. 2. Renal failure  3. Worsening met prostate ca with extensive mets       Plan:     1. Pull ng   2. Clear liquids   3. zofran for nausea   4. egd if vomiting recurs       Subjective: The patient problems have improved with no further n/v. Minimal abd discomfrt . Would like tube removed. CT abd no obs seen. Objective:     Vitals:    10/25/21 1115 10/25/21 1130 10/25/21 1145 10/25/21 1149   BP: (!) 86/56 (!) 89/62 125/65 (!) 140/69   Pulse: 93 93 94 91   Resp:    18   Temp:    97.5 °F (36.4 °C)   TempSrc:    Oral   SpO2:       Weight:       Height:            Physical Exam:   GENERAL: alert, cooperative, no distress, appears stated age, ng in place  ABDOMEN: soft, non-tender.  Bowel sounds normal. No masses,  no organomegaly    Lab/Data Review:  BMP:   Lab Results   Component Value Date/Time     (L) 10/25/2021 01:19 AM    K 3.4 (L) 10/25/2021 01:19 AM    CL 91 (L) 10/25/2021 01:19 AM    CO2 31 10/25/2021 01:19 AM    AGAP 12 10/25/2021 01:19 AM    GLU 97 10/25/2021 01:19 AM    BUN 59 (H) 10/25/2021 01:19 AM    CREA 7.83 (H) 10/25/2021 01:19 AM    GFRAA 8 (L) 10/25/2021 01:19 AM    GFRNA 7 (L) 10/25/2021 01:19 AM     CMP:   Lab Results   Component Value Date/Time     (L) 10/25/2021 01:19 AM    K 3.4 (L) 10/25/2021 01:19 AM    CL 91 (L) 10/25/2021 01:19 AM    CO2 31 10/25/2021 01:19 AM    AGAP 12 10/25/2021 01:19 AM    GLU 97 10/25/2021 01:19 AM    BUN 59 (H) 10/25/2021 01:19 AM    CREA 7.83 (H) 10/25/2021 01:19 AM    GFRAA 8 (L) 10/25/2021 01:19 AM    GFRNA 7 (L) 10/25/2021 01:19 AM    CA 8.0 (L) 10/25/2021 01:19 AM    PHOS 5.0 (H) 10/25/2021 01:19 AM     CBC:   Lab Results   Component Value Date/Time    WBC 25.5 (H) 10/25/2021 01:19 AM    HGB 11.5 (L) 10/25/2021 01:19 AM    HCT 35.1 (L) 10/25/2021 01:19 AM     10/25/2021 01:19 AM     COAGS: No results found for: APTT, PTP, INR, INREXT  Liver Panel: No results found for: ALB, CBIL, TBIL, TP, GLOB, AGRAT, ASTPOC, ALTPOC, ALT, AP       Principal Problem:    Pneumonia (10/19/2021)    Active Problems:    Essential (primary) hypertension (9/1/2016)      ESRD (end stage renal disease) ([facility] (9/26/2020)      Secondary hyperparathyroidism of renal origin ([facility] (9/26/2020)      Hypoalbuminemia (10/20/2021)      Leucocytosis (10/20/2021)          Signed By: Rodrick Samuels MD     October 25, 2021

## 2021-10-26 NOTE — PROGRESS NOTES
[email or web address]  [phone]    Gastroenterology follow up-Progress note    Impression:  1. Nausea and vomiting , no definite obstruction on CT. NGT removed, tolerating PO. N/V could be due to worsening metastatic disease. 2. Renal failure    3. Worsening met prostate ca with extensive mets     4. Severe sepsis POA  5. PNA  6. ESRD on HD  7. BPH with chronic indwelling Burris    Plan:  1. Continue with advancing diet  2. Antiemetics as needed  3. Medical management per primary team    Will sign off-Thank you for this consultation and the opportunity to participate in the care of this patient. Please do not hesitate to call with any questions or concerns, or should event occur that may necessitate additional GI evaluation. Chief Complaint: Hematemesis, abd distention      Subjective:  Denies abdominal pain, eating well, tolerating PO    ROS: Denies any fevers, chills, rash. Eyes: conjunctiva normal, EOM normal   Neck: ROM normal, supple and trachea normal   Cardiovascular: heart normal, intact distal pulses, normal rate and regular rhythm   Pulmonary/Chest Wall: breath sounds normal and effort normal   Abdominal: Mild distention, bowel sounds normal and soft, non-acute, non-tender     Patient Active Problem List   Diagnosis Code    Traumatic rhabdomyolysis ([facility] T79. 6XXA    CAD (coronary artery disease) I25.10    Essential (primary) hypertension I10    Troponin level elevated R77.8    Acute renal failure (ARF) (HCC) N17.9    ESRD (end stage renal disease) ([facility] N18.6    Hyperkalemia C64.8    Metabolic acidosis Q88.6    Secondary hyperparathyroidism of renal origin ([address] N25.81    Anemia D64.9    Hydronephrosis N13.30    UTI (urinary tract infection) N39.0    Postobstructive diuresis R35.89    Polyp of ascending colon K63.5    Decrease in appetite R63.0    Elevated prostate specific antigen (PSA) R97.20    Osteoarthrosis M19.90    Shortness of breath R06.02    Chronic renal failure N18.9    Pneumonia J18.9    Hypoalbuminemia E88.09    Leucocytosis D72.829    Nausea and vomiting R11.2         Visit Vitals  BP (!) 132/47 (BP 1 Location: Left upper arm, BP Patient Position: At rest)   Pulse 80   Temp 98 °F (36.7 °C)   Resp 17   Ht 5' 6\" (1.676 m)   Wt 71.2 kg (157 lb)   SpO2 100%   BMI 25.34 kg/m²           Intake/Output Summary (Last 24 hours) at 10/26/2021 1225  Last data filed at 10/25/2021 2000  Gross per 24 hour   Intake 660 ml   Output 560 ml   Net 100 ml       CBC w/Diff    Lab Results   Component Value Date/Time    WBC 26.0 (H) 10/26/2021 01:04 AM    RBC 3.43 (L) 10/26/2021 01:04 AM    HGB 10.8 (L) 10/26/2021 01:04 AM    HCT 33.0 (L) 10/26/2021 01:04 AM    MCV 96.2 10/26/2021 01:04 AM    MCH 31.5 10/26/2021 01:04 AM    MCHC 32.7 10/26/2021 01:04 AM    RDW 12.7 10/26/2021 01:04 AM     10/26/2021 01:04 AM    Lab Results   Component Value Date/Time    GRANS 89 (H) 10/26/2021 01:04 AM    LYMPH 9 (L) 10/26/2021 01:04 AM    EOS 0 10/26/2021 01:04 AM    BASOS 0 10/26/2021 01:04 AM      Basic Metabolic Profile   Recent Labs     10/26/21  0104   *   K 3.7   CL 96*   CO2 27   BUN 47*   CA 7.1*   PHOS 5.0*        Hepatic Function    Lab Results   Component Value Date/Time    ALB 2.8 (L) 10/20/2021 04:25 AM    TP 6.4 10/20/2021 04:25 AM     (H) 10/20/2021 04:25 AM    No results found for: TBIL       Coags   No results for input(s): PTP, INR, APTT, INREXT in the last 72 hours. CORNEL Lazo    [facility]. [email or web address]  Phone: [phone]  Pager: [phone]

## 2021-10-26 NOTE — PROGRESS NOTES
[facility] Hospitalist Group  Progress Note    Patient: Angela Gurrola Age: 80 y.o. : 1935 MR#: [id number] SSN: [id number]  Date/Time: 10/25/2021     C/C: Hematemesis/nausea vomiting      Subjective:     Pt seen during HD. 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 Patient had recurrence of symptoms on 10/22/2021 in p.m.,     - Acute nausea: Imaging on 10/22/KUB showed severe gaseous distention of the stomach and moderate gaseous distention of small and large bowel loops. S/p NGT placement, now NGT dc'd per recommendations by GI. -Ileus no clear finding of mechanical obstruction so far. GI following. S/p NGT       2 hematemesis x1    3 severe constipation  4 end-stage renal disease on hemodialysis  5 prostate CA with osseous metastasis  6 chronic indwelling Burris catheter  7 ventral hernia without obstruction or incarceration reducible  8 suspected aspiration pneumonia - multilobar pneumonia - On CT Chest   9 wdslejfxwe-hzxrcwbvritf-lfyhsv suspect,now on Zosyn  10 hypokalemia- replaced   11 persistent leukocytosis- ? Secondary to bacteremia ?   Secondary to steroids which he is taking chronically    Plan  -Upper endoscopy if w/ recurrent n/v    -Continue PPI    -Constipation-On miralax daily    -ABx per ID    -End-on hemodialysis    DISPO: Currently patient has nausea vomiting abdominal distention secondary to gas in the bowel GI is following, once this gets better patient can be discharged home on p.o. antibiotic that will be decided by ID               CODE Status : Full     Case discussed with:  [x]Patient  [x] Family  [x]Nursing  []Case Management     DVT Prophylaxis:  []Lovenox  []Hep SQ [x]SCDs/possible GI bleed[]Coumadin   []On Heparin gtt    [] Eliquis [] Xarelto     Objective:   VS:   Visit Vitals  /72 (BP 1 Location: Left upper arm, BP Patient Position: At rest;Reclining)   Pulse 90   Temp 98.5 °F (36.9 °C)   Resp 16   Ht 5' 6\" (1.676 m)   Wt 71.2 kg (157 lb)   SpO2 97%   BMI 25.34 kg/m²      Tmax/24hrs: Temp (24hrs), Av.3 °F (36.8 °C), Min:97.5 °F (36.4 °C), Max:99 °F (37.2 °C)  IOBRIEF    Intake/Output Summary (Last 24 hours) at 10/25/2021 2339  Last data filed at 10/25/2021 1740  Gross per 24 hour   Intake 440 ml   Output 780 ml   Net -340 ml       General:  Alert, cooperative, no acute distress   HEENT: No facial asymmetry, ARUNA Fausto, External ears - WNL    Cardiovascular: S1S2 - regular , No Murmur   Pulmonary: Equal expansion , No Use of accessory muscles , No Rales No Rhonchi    GI:  +BS in all four quadrants, soft, non-tender, Ventral hernia - Small reducible and nontender  Extremities:  No edema;   Neuro: Alert        Medications:   Current Facility-Administered Medications   Medication Dose Route Frequency    doxercalciferoL (HECTOROL) 4 mcg/2 mL injection 0.5 mcg  0.5 mcg IntraVENous DIALYSIS MON, WED & FRI    piperacillin-tazobactam (ZOSYN) 2.25 g in 0.9% sodium chloride (MBP/ADV) 50 mL MBP  2.25 g IntraVENous Q8H    Piperacillin-tazobactam (ZOSYN) 0.75 gm Supplemental Dosing by Pharmacy   Other Rx Dosing/Monitoring    polyethylene glycol (MIRALAX) packet 17 g  17 g Oral DAILY    ondansetron (ZOFRAN) injection 4 mg  4 mg IntraVENous Q8H PRN    abiraterone (Zytiga) tab 1,000 mg (Patient Supplied)  1,000 mg Oral DAILY    amLODIPine (NORVASC) tablet 10 mg  10 mg Oral DAILY    B complex-vitaminC-folic acid (NEPHROCAP) cap  1 Capsule Oral DAILY    [Held by provider] calcium acetate(phosphat bind) (PHOSLO) capsule 667 mg  1 Capsule Oral TID WITH MEALS    predniSONE (DELTASONE) tablet 5 mg  5 mg Oral BID    sodium chloride (NS) flush 5-40 mL  5-40 mL IntraVENous Q8H    sodium chloride (NS) flush 5-40 mL  5-40 mL IntraVENous PRN    acetaminophen (TYLENOL) tablet 650 mg  650 mg Oral Q6H PRN    Or    acetaminophen (TYLENOL) suppository 650 mg  650 mg Rectal Q6H PRN    melatonin tablet 5 mg  5 mg Oral QHS    lidocaine (XYLOCAINE) 5 % ointment   Topical DAILY PRN       Labs:    Recent Labs     10/25/21  0119 10/24/21  0103 10/23/21  0946 10/23/21  0026 10/23/21  0026   WBC 25.5* 23.8*  --   --  25.1*   HGB 11.5* 11.5* 11.8*   < > 12.0*   HCT 35.1* 35.8* 36.1   < > 37.9    311  --   --  329    < > = values in this interval not displayed. Recent Labs     10/25/21  0119 10/24/21  0103 10/23/21  0026   * 132* 133*   K 3.4* 3.7 3.6   CL 91* 94* 93*   CO2 31 29 29   GLU 97 106* 123*   BUN 59* 44* 24*   CREA 7.83* 5.83* 3.88*   CA 8.0* 7.9* 8.4*   PHOS 5.0* 3.5  --          Disclaimer: Sections of this note are dictated utilizing voice recognition software, which may have resulted in some phonetic based errors in grammar and contents. Even though attempts were made to correct all the mistakes, some may have been missed, and remained in the body of the document. If questions arise, please contact our department.     Signed By: Bee Castillo MD     October 25, 2021

## 2021-10-26 NOTE — PROGRESS NOTES
New OT order received and chart reviewed. Patient evaluated and currently on skilled OT caseload. Will acknowledge the order.   Thank you for the referral.  Amaris Ott MS OTR/L

## 2021-10-26 NOTE — PROGRESS NOTES
Problem: Self Care Deficits Care Plan (Adult)  Goal: *Acute Goals and Plan of Care (Insert Text)  Description: Occupational Therapy Goals  Initiated 10/21/2021 within 7 day(s). 1.  Patient will perform grooming with supervision/set-up standing at the sink for 2-4 minutes with Fair+ balance. 2.  Patient will perform upper body dressing with supervision/set-up. 3.  Patient will perform toilet transfers with supervision/set-up. 4.  Patient will perform all aspects of toileting with supervision/set-up. 5.  Patient will participate in upper extremity therapeutic exercise/activities with supervision/set-up for 8 minutes. 6.  Patient will utilize energy conservation techniques during functional activities with verbal cues. Prior Level of Function: Pt and pt's spouse report pt requires supervision for functional mobility with FWW, and receives assistance for bathing and dressing from his spouse. Outcome: Progressing Towards Goal   OCCUPATIONAL THERAPY TREATMENT    Patient: Angela Gurrola (27 y.o. male)  Date: 10/26/2021  Diagnosis: Pneumonia [J18.9] Pneumonia       Precautions: Fall  PLOF: Pt and pt's spouse report pt requires supervision for functional mobility with FWW, and receives assistance for bathing and dressing from his spouse. Chart, occupational therapy assessment, plan of care, and goals were reviewed. ASSESSMENT:  PT co tx to maximize pt safety and participation. Pt presented supine in bed upon therapist arrival and agreeable for tx. Pt transitioned to EOB from supine with SBA requiring increased time. Pt performed UB bathing and facial hygiene with S/U while sitting EOB, no LOB noted. STS transfer CGA with RW and pivoted to [address] towards R. Pt required MIN A for fabiana area hygiene while in stance for thoroughness. Once toileting routine completed pt performed functional room and hallway mobility ~ 40 ft CGA with RW to improve overall functional activity tolerance needed for self cares.  Pt required mod cueing for cervical and trunk extension to promote upright posture. Pt returned to sitting in reclining chair at end of session. He was left with B LE's elevated, chair level active, and all needs left within reach. RN made aware of pt's participation and position. Progression toward goals:  []          Improving appropriately and progressing toward goals  [x]          Improving slowly and progressing toward goals  []          Not making progress toward goals and plan of care will be adjusted     PLAN:  Patient continues to benefit from skilled intervention to address the above impairments. Continue treatment per established plan of care. Discharge Recommendations:  Rehab vs HH with 24/7 Supervision. Further Equipment Recommendations for Discharge:  RW     SUBJECTIVE:   Patient stated  I will sit in the chair. \"     OBJECTIVE DATA SUMMARY:   Cognitive/Behavioral Status:  Neurologic State: Alert  Orientation Level: Oriented X4  Cognition: Appropriate decision making  Safety/Judgement: Awareness of environment    Functional Mobility and Transfers for ADLs:   Bed Mobility:     Supine to Sit: Contact guard assistance     Scooting: Contact guard assistance   Transfers:  Sit to Stand: Contact guard assistance  Stand to Sit: Contact guard assistance  Stand Pivot Transfers: Contact guard assistance      Balance:  Sitting: Intact  Sitting - Static: Good (unsupported)  Sitting - Dynamic: Good (unsupported)  Standing: Impaired; With support  Standing - Static: Good  Standing - Dynamic : Fair    ADL Intervention:     Upper Body Bathing  Bathing Assistance: Set-up  Position Performed: Seated edge of bed         Upper Body [address]: Stand-by assistance         Toileting  Toileting Assistance: Minimum assistance  Bowel Hygiene: Minimum assistance (for thoroughness)  Adaptive Equipment: Walker    Cognitive Retraining  Safety/Judgement: Awareness of environment    Pain:  Pain level pre-treatment: 0/10   Pain level post-treatment: 0/10    Activity Tolerance:    Fair   Please refer to the flowsheet for vital signs taken during this treatment. After treatment:   [x]  Patient left in no apparent distress sitting up in chair  []  Patient left in no apparent distress in bed  [x]  Call bell left within reach  [x]  Nursing notified  []  Caregiver present  [x]  Chair alarm activated    COMMUNICATION/EDUCATION:   [x] Role of Occupational Therapy in the acute care setting  [] Home safety education was provided and the patient/caregiver indicated understanding. [x] Patient/family have participated as able in working towards goals and plan of care. [x] Patient/family agree to work toward stated goals and plan of care. [] Patient understands intent and goals of therapy, but is neutral about his/her participation. [] Patient is unable to participate in goal setting and plan of care.       Thank you for this referral.  LISA Barcenas  Time Calculation: 24 mins

## 2021-10-26 NOTE — PROGRESS NOTES
Comprehensive Nutrition Assessment    Type and Reason for Visit: Initial, RD nutrition re-screen/LOS    Nutrition Recommendations/Plan:  - Monitor GI symptoms and readiness for continued diet advancement. - Change nutritional supplements to Magic Cup, TID.  - Continue renal multivitamin. Nutrition Assessment:  Hx of carcinoma prostate with multiple bone mets, admitted with episode of emesis, brown hematemesis associated with severe constipation. NGT placed 10/24 for decompression and allowed clear liquids. NGT removed yesterday as pt with +flatus and large BM. Reports excellent po intake of clear liquids breakfast this morning and diet advanced to full liquids for dinner meal tonight. Malnutrition Assessment:  Malnutrition Status: At risk for malnutrition (specify) (emesis PTA, metastatic cancer, liquid diet)      Nutrition History and Allergies: PMHx- metastatic prostate & colon cancer, atherosclerotic disease, HTN & ESRD on HD. Reports good appetite PTA with episode of coffee ground emesis which pt attributes to consumption of protein shake at HD treatment. Denies changes in wt hx reporting UBW of 160#. Wt hx per chart 168# (7/16/21), 160# (8/11/21) & 157# (10/25/21), 11#, 6.5% wt loss x 3 months. NKFA    Estimated Daily Nutrient Needs:  Energy (kcal): 8276-7014; Weight Used for Energy Requirements: Current (71 kg)  Protein (g): ; Weight Used for Protein Requirements: Current (1-1.5)  Fluid (ml/day): 750-1500; Method Used for Fluid Requirements: Standard renal     Nutrition Related Findings:  BM 10/26- large. KUB 10/22- severe gaseous distention of the stomach and moderate gaseous distention of small and large bowel loops. NGT for decompression removed 10/25. Meds: Nephrocap, phoslo (held), steroid therapy & miralax held due to loose stools. HD planned tomorrow. Wounds:    None       Current Nutrition Therapies:  ADULT ORAL NUTRITION SUPPLEMENT AM Snack;  Renal Supplement  ADULT DIET Full Liquid    Anthropometric Measures:  · Height:  5' 6\" (167.6 cm)  · Current Body Wt:  71.2 kg (156 lb 15.5 oz)   · Admission Body Wt:  160 lb    · Usual Body Wt:  72.6 kg (160 lb)     · Ideal Body Wt:  142 lbs:  110.5 %   · BMI Category: Overweight (BMI 25.0-29. 9)       Nutrition Diagnosis:   · Predicted inadequate energy intake related to altered GI function, increased demand for energy/nutrients as evidenced by dialysis (liquid diet s/p NGT for decompression removed 10/25)    Nutrition Interventions:   Food and/or Nutrient Delivery: Continue current diet, Vitamin supplement, Modify oral nutrition supplement  Nutrition Education and Counseling: No recommendations at this time, Education not indicated  Coordination of Nutrition Care: Continue to monitor while inpatient (pt discussed with RN)    Goals:  PO nutrition intake will meet >75% of patient estimated nutritional needs within the next 7 days.        Nutrition Monitoring and Evaluation:   Behavioral-Environmental Outcomes: None identified  Food/Nutrient Intake Outcomes: Diet advancement/tolerance, Food and nutrient intake, Supplement intake, Vitamin/mineral intake  Physical Signs/Symptoms Outcomes: Biochemical data, GI status, Nausea/vomiting, Meal time behavior, Nutrition focused physical findings    Discharge Planning:    Continue oral nutrition supplement, Continue current diet     Electronically signed by Pavel Mesa RD on 10/26/2021 at 2:06 PM    Contact: [phone]

## 2021-10-26 NOTE — PROGRESS NOTES
NEW PT orders received and chart reviewed. Pt currently on caseload. Will acknowledge new orders and continue to follow.      Thank you for this referral   Bennie Lemon PT DPT

## 2021-10-26 NOTE — PROGRESS NOTES
Problem: Mobility Impaired (Adult and Pediatric)  Goal: *Acute Goals and Plan of Care (Insert Text)  Description: Physical Therapy Goals  Initiated 10/20/2021, re-evaluated 10/26/21 and goals adjusted and to be accomplished within  day(s)  1. Patient will move from supine to sit and sit to supine  in bed with modified independence. 2.  Patient will transfer from bed to chair and chair to bed with modified independence using the least restrictive device. 3.  Patient will perform sit to stand with modified independence. 4.  Patient will ambulate with supervision for 100 feet with the least restrictive device. 5.  Patient will ascend/descend 2 stairs with handrail(s) with contact guard assist.    PLOF: Patient was ambulating with RW. His spouse assists him with bathing and dressing. He lives in single story home with 2 AJAY. Outcome: Progressing Towards Goal    PHYSICAL THERAPY RE-EVALUATION    Patient: Wendy Miller (11 y.o. male)  Date: 10/26/2021  Primary Diagnosis: Pneumonia [J18.9]        Precautions:  Fall    ASSESSMENT :  Pt cleared to participate in PT session, pt received semi-reclined in bed and agreeable to therapy session. Completing with OT to maximize safety and mobility. Based on the objective data described below, the patient presents with decreased endurance, decreased strength, decreased balance reactions, gait deviations, and decreased independence in functional mobility. Pt completing bed mobility with SBA and increased time, Sitting EOB with good balance, standing with CGA-Yaya. Pt requesting to toilet, BSC brought to R side. CGA for walking SPT to bedside commode. Pt with preference for standing with trunk flexion. Ambulating with slow gait speed x40 feet in liang. Pt returned to sitting in recliner, chair alarm activated. Pt positioned for comfort and educated to call for assist before getting up, pt verbalized understanding. Pt left with all needs met and call bell in reach.  RN notified of position and participation. Patient will benefit from skilled intervention to address the above impairments. Patient's rehabilitation potential is considered to be Fair  Factors which may influence rehabilitation potential include:   [x]         None noted  []         Mental ability/status  []         Medical condition  []         Home/family situation and support systems  []         Safety awareness  []         Pain tolerance/management  []         Other:      PLAN :  Recommendations and Planned Interventions:   [x]           Bed Mobility Training             []    Neuromuscular Re-Education  [x]           Transfer Training                   []    Orthotic/Prosthetic Training  [x]           Gait Training                          []    Modalities  [x]           Therapeutic Exercises           []    Edema Management/Control  [x]           Therapeutic Activities            [x]    Family Training/Education  [x]           Patient Education  []           Other (comment):    Frequency/Duration: Patient will be followed by physical therapy 1-2 times per day/4-7 days per week to address goals. Discharge Recommendations: Rehab vs HH with 24/7 supervision/assist  Further Equipment Recommendations for Discharge: rolling walker     SUBJECTIVE:   Patient stated You can bring the commode over.     OBJECTIVE DATA SUMMARY:   Hospital course since last seen and reason for re-evaluation: Pt due for re-eval, pt continues to need external assist for mobility, demonstrating gait deviations and limited endurance. Pt would continue to benefit from skilled PT to continue to progress towards goals. Past Medical History:   Diagnosis Date    Cancer [facility])     Prostate    Cardiac echocardiogram 08/29/2016    EF 60-65%. No WMA. Mild LVH. Indeterminate diastolic fx. RVSP 35 mmHg. No significant valvular heart disease. Cardiac nuclear imaging test, abnormal 08/29/2016    Intermediate risk.   Previous inferior infarction w/very mild fabiana-infarct ischemia. Inferior hypk. EF 68%. Nondiagnostic EKG on pharm stress test.  Pt's BP increased from 193/96 to 207/83 during study. Carotid duplex 08/30/2016    Mild <50% bilateral ICA stenosis. Chronic kidney disease     dialysis    Colonic mass     per Dr Grecia Sanchez notes    Enlarged prostate     Hypertension      Past Surgical History:   Procedure Laterality Date    COLONOSCOPY N/A 10/1/2020    COLONOSCOPY, b'x, w tattoo w polypectomy performed by Arturo Jon MD at [email or web address] [facility]  10/1/2020         [facility]  10/1/2020         COLONOSCPY,FLEX,W/ [facility]  10/1/2020         MS INSJ TUNNELED CVC W/O SUBQ PORT/ AGE 5 YR/> N/A 9/28/2020    INSERTION TUNNELED CENTRAL VENOUS CATHETER performed by Lorenzo Garcia MD at [facility] CATH LAB    VASCULAR SURGERY PROCEDURE UNLIST      dialysis access- right neck     Barriers to Learning/Limitations: None  Compensate with: N/A  Home Situation:   Home Situation  Home Environment: Private residence  # Steps to Enter: 2  One/Two Story Residence: One story  Living Alone: No  Support Systems: Spouse/Significant Other  Patient Expects to be Discharged to[de-identified] [employer]  Current DME Used/Available at Home: Walker  Tub or Shower Type: Tub/Shower combination  Critical Behavior:  Neurologic State: Alert  Orientation Level: Oriented X4  Cognition: Appropriate decision making  Safety/Judgement: Awareness of environment  Psychosocial  Patient Behaviors: Calm; Cooperative  Other Caring Modalities:  (bed bath)    Strength:    Strength: Generally decreased, functional    Tone & Sensation:   Tone: Normal    Sensation: Intact    Range Of Motion:  AROM: Within functional limits    Posture:  Posture (WDL): Exceptions to WDL  Posture Assessment:  Forward head;Trunk flexion  Functional Mobility:  Bed Mobility:     Supine to Sit: Contact guard assistance     Scooting: Contact guard assistance  Transfers:  Sit to Stand: Contact guard assistance  Stand to Sit: Contact guard assistance  Stand Pivot Transfers: Contact guard assistance       Balance:   Sitting: Intact  Sitting - Static: Good (unsupported)  Sitting - Dynamic: Good (unsupported)  Standing: Impaired; With support  Standing - Static: Good  Standing - Dynamic : Fair    Ambulation/Gait Training:  Distance (ft): 40 Feet (ft)  Assistive Device: Walker, rolling  Ambulation - Level of Assistance: Contact guard assistance    Gait Abnormalities: Antalgic;Decreased step clearance    Step Length: Left shortened    Pain:  Pain level pre-treatment: 0/10   Pain level post-treatment: 0/10       Activity Tolerance:   Improving tolerance     Please refer to the flowsheet for vital signs taken during this treatment. After treatment:   []         Patient left in no apparent distress sitting up in chair  [x]         Patient left in no apparent distress in bed  [x]         Call bell left within reach  [x]         Nursing notified  []         Caregiver present  []         Bed alarm activated  []         SCDs applied    COMMUNICATION/EDUCATION:   [x]         Role of Physical Therapy in the acute care setting. [x]         Fall prevention education was provided and the patient/caregiver indicated understanding. [x]         Patient/family have participated as able in goal setting and plan of care. [x]         Patient/family agree to work toward stated goals and plan of care. []         Patient understands intent and goals of therapy, but is neutral about his/her participation. []         Patient is unable to participate in goal setting/plan of care: ongoing with therapy staff.  []         Other:     Thank you for this referral.  Ofelia Fenton, PT   Time Calculation: 24 mins

## 2021-10-26 NOTE — PROGRESS NOTES
Infectious Disease progress Note        Reason: Sepsis, aspiration pneumonia, coagulase-negative staph bacteremia    Current abx Prior abx   Cefepime, vancomycin since 10/19/21-10/22  Zosyn since 10/22      Lines:       Assessment :     80 y.o. male metastatic prostate cancer, colon cancer, atherosclerotic disease, hypertension, and ESRD on MWF hemodialysis admitted to [facility] on 10/18/21 for multi focal pneumonia.       Now with significant leukocytosis, coagulase-negative Staphylococcus bacteremia, multifocal infiltrates noted on CT chest, recent coffee-ground emesis    I agree that clinical presentation is concerning for sepsis. However after obtaining detailed history, exam, review of available labs/imaging studies. I believe patient's leukocytosis is likely leukemoid reaction to steroids, metastatic disease, recent GI bleed, probable aspiration pneumonia     persistent leukocytosis despite broad-spectrum antibiotics, currently with improvement argues against infectious etiology of leukocytosis        Single positive blood culture for coagulase-negative Staphylococcus on 10/19 likely contaminant. repeat blood culture 10/22: No growth    Vomiting, concern for GI bleed: GI follow-up appreciated. X-ray abdomen 10/22 -severe gaseous abdominal distention     ESRD: on HD    Clinically better. Improved mentation. Resolved vomiting. GI follow-up appreciated plans to move NG tube noted      Recommendations:    1. Continue piperacillin/tazobactam  2. Follow-up GI recommendations regarding abdominal distention, possible GI bleed  3. We will switch to oral levofloxacin, Augmentin till 10/29/2021 if patient able to tolerate p.o. diet  4. Follow-up nephrology recommendations  5. Follow-up CBC, clinically    Above plan was discussed in details with patient. Please call me if any further questions or concerns. Will continue to participate in the care of this patient. HPI:     Feels better.   Tolerated breakfast patient denies any subjective fever or chills. He complains of some abdominal discomfort. Denies any constipation/diarrhea. Denies increasing chest pain, shortness of breath. Current Discharge Medication List      CONTINUE these medications which have NOT CHANGED    Details   amLODIPine (NORVASC) 10 mg tablet TAKE 1 TABLET BY MOUTH EVERY DAY      calcium acetate,phosphat bind, (PHOSLO) 667 mg cap       amoxicillin-clavulanate (AUGMENTIN) 875-125 mg per tablet Take 1 Tablet by mouth every twelve (12) hours. Qty: 6 Tablet, Refills: 0      abiraterone (Zytiga) 250 mg tab Take four tablets by mouth daily on an empty stomach. Take one hour prior to food or two hours after food. Tablets should be swallowed whole with water. Do not crush or chew tablets. Qty: 120 Tab, Refills: 5    Associated Diagnoses: Malignant neoplasm of prostate ([facility] Osseous metastasis (HCC)      predniSONE (DELTASONE) 5 mg tablet Take 1 Tab by mouth two (2) times a day. Qty: 60 Tab, Refills: 5    Associated Diagnoses: Malignant neoplasm of prostate ([facility] Osseous metastasis (HCC)      mupirocin (BACTROBAN) 2 % ointment APPLY TO TOES 3 TIMES A DAY      bicalutamide (CASODEX) 50 mg tablet Take 1 Tab by mouth daily. Qty: 14 Tab, Refills: 0      B complex w-C no.20/folic acid (TRIPHROCAPS PO) Take 1 Cap by mouth daily. furosemide (LASIX) 40 mg tablet Take 40 mg by mouth daily.              Current Facility-Administered Medications   Medication Dose Route Frequency    doxercalciferoL (HECTOROL) 4 mcg/2 mL injection 0.5 mcg  0.5 mcg IntraVENous DIALYSIS MON, WED & FRI    piperacillin-tazobactam (ZOSYN) 2.25 g in 0.9% sodium chloride (MBP/ADV) 50 mL MBP  2.25 g IntraVENous Q8H    Piperacillin-tazobactam (ZOSYN) 0.75 gm Supplemental Dosing by Pharmacy   Other Rx Dosing/Monitoring    polyethylene glycol (MIRALAX) packet 17 g  17 g Oral DAILY    ondansetron (ZOFRAN) injection 4 mg  4 mg IntraVENous Q8H PRN    abiraterone (Zytiga) tab 1,000 mg (Patient Supplied)  1,000 mg Oral DAILY    amLODIPine (NORVASC) tablet 10 mg  10 mg Oral DAILY    B complex-vitaminC-folic acid (NEPHROCAP) cap  1 Capsule Oral DAILY    [Held by provider] calcium acetate(phosphat bind) (PHOSLO) capsule 667 mg  1 Capsule Oral TID WITH MEALS    predniSONE (DELTASONE) tablet 5 mg  5 mg Oral BID    sodium chloride (NS) flush 5-40 mL  5-40 mL IntraVENous Q8H    sodium chloride (NS) flush 5-40 mL  5-40 mL IntraVENous PRN    acetaminophen (TYLENOL) tablet 650 mg  650 mg Oral Q6H PRN    Or    acetaminophen (TYLENOL) suppository 650 mg  650 mg Rectal Q6H PRN    melatonin tablet 5 mg  5 mg Oral QHS    lidocaine (XYLOCAINE) 5 % ointment   Topical DAILY PRN       Allergies: Patient has no known allergies. History reviewed. No pertinent family history. Social History     Socioeconomic History    Marital status:      Spouse name: Not on file    Number of children: Not on file    Years of education: Not on file    Highest education level: Not on file   Occupational History    Not on file   Tobacco Use    Smoking status: Never Smoker    Smokeless tobacco: Never Used   Vaping Use    Vaping Use: Never used   Substance and Sexual Activity    Alcohol use: No    Drug use: Never    Sexual activity: Not on file   Other Topics Concern    Not on file   Social History Narrative    Not on file     Social Determinants of Health     Financial Resource Strain:     Difficulty of Paying Living Expenses:    Food Insecurity:     Worried About Running Out of Food in the Last Year:     [address] in the Last Year:    Transportation Needs:     Lack of Transportation (Medical):      Lack of Transportation (Non-Medical):    Physical Activity:     Days of Exercise per Week:     Minutes of Exercise per Session:    Stress:     Feeling of Stress :    Social Connections:     Frequency of Communication with Friends and Family:     Frequency of Social Gatherings with Friends and Family:     Attends Jewish Services:     Active Member of Clubs or Organizations:     Attends Club or Organization Meetings:     Marital Status:    Intimate Partner Violence:     Fear of Current or Ex-Partner:     Emotionally Abused:     Physically Abused:     Sexually Abused:      Social History     Tobacco Use   Smoking Status Never Smoker   Smokeless Tobacco Never Used        Temp (24hrs), Av.3 °F (36.8 °C), Min:97.5 °F (36.4 °C), Max:99 °F (37.2 °C)    Visit Vitals  BP (!) 147/77 (BP 1 Location: Left upper arm, BP Patient Position: At rest)   Pulse 96   Temp 98.1 °F (36.7 °C)   Resp 16   Ht 5' 6\" (1.676 m)   Wt 71.2 kg (157 lb)   SpO2 96%   BMI 25.34 kg/m²       ROS: Unable to obtain due to patient factors  Physical Exam:    General: Well developed, well nourished male laying on the bed AAOx3 in no acute distress. General:   awake alert and oriented   HEENT:  Normocephalic, atraumatic, EOMI, no scleral icterus or pallor; no conjunctival hemmohage;  nasal and oral mucous are moist and without evidence of lesions. Neck supple, no bruits. Lymph Nodes:   no cervical, axillary or inguinal adenopathy   Lungs:   non-labored, no audible wheezing   Heart:  RRR, s1 and s2;  no edema   Abdomen:  soft, distended, active bowel sounds, no hepatomegaly, no splenomegaly. Non-tender   Genitourinary:  deferred   Extremities:   no clubbing, cyanosis; no joint effusions or swelling; Full ROM of all large joints to the upper and lower extremities; muscle mass appropriate for age   Neurologic:  No gross focal sensory abnormalities; 5/5 muscle strength to upper and lower extremities. Speech appropriate.  Cranial nerves intact                        Skin:  No rash or ulcers noted   Back:  no spinal or paraspinal muscle tenderness or rigidity, no CVA tenderness     Psychiatric:  appropriate mood and affect         Labs: Results:   Chemistry Recent Labs     10/26/21  0104 10/25/21  0119 10/24/21  0103   * 97 106*   * 134* 132*   K 3.7 3.4* 3.7   CL 96* 91* 94*   CO2 27 31 29   BUN 47* 59* 44*   CREA 6.46* 7.83* 5.83*   CA 7.1* 8.0* 7.9*   AGAP 12 12 9   BUCR 7* 8* 8*      CBC w/Diff Recent Labs     10/26/21  0104 10/25/21  0119 10/24/21  0103   WBC 26.0* 25.5* 23.8*   RBC 3.43* 3.65* 3.75*   HGB 10.8* 11.5* 11.5*   HCT 33.0* 35.1* 35.8*    321 311   GRANS 89* 83* 77*   LYMPH 9* 12* 14*   EOS 0 0 4      Microbiology No results for input(s): CULT in the last 72 hours. RADIOLOGY:    All available imaging studies/reports in [facility] care for this admission were reviewed        Disclaimer: Sections of this note are dictated utilizing voice recognition software, which may have resulted in some phonetic based errors in grammar and contents. Even though attempts were made to correct all the mistakes, some may have been missed, and remained in the body of the document. If questions arise, please contact our department.     Dr. Hipolito Pemberton, Infectious Disease Specialist  [phone]  October 26, 2021  7:11 AM

## 2021-10-26 NOTE — PROGRESS NOTES
Progress Note    Samantha March 80 y.o. Admit Date: 10/18/2021  Principal Problem:    Pneumonia (10/19/2021) POA: Unknown    Active Problems:    Essential (primary) hypertension (9/1/2016) POA: Yes      ESRD (end stage renal disease) ([facility] (9/26/2020) POA: Yes      Secondary hyperparathyroidism of renal origin ([facility] (9/26/2020) POA: Unknown      Hypoalbuminemia (10/20/2021) POA: Unknown      Leucocytosis (10/20/2021) POA: Unknown      Nausea and vomiting (10/25/2021) POA: Unknown            Subjective:     Patient feels good today, NG tube is taken out, no more Abdominal Distension, eating his food & is  Hungry. A comprehensive review of systems was negative except for that written in the History of Present Illness.     Objective:     Visit Vitals  /74   Pulse 81   Temp 98.6 °F (37 °C)   Resp 20   Ht 5' 6\" (1.676 m)   Wt 71.2 kg (157 lb)   SpO2 99%   BMI 25.34 kg/m²         Intake/Output Summary (Last 24 hours) at 10/26/2021 1805  Last data filed at 10/25/2021 2000  Gross per 24 hour   Intake 400 ml   Output 280 ml   Net 120 ml       Current Facility-Administered Medications   Medication Dose Route Frequency Provider Last Rate Last Admin    albuterol-ipratropium (DUO-NEB) 2.5 MG-0.5 MG/3 ML  3 mL Nebulization Q6H RT Indiana Rogers MD        melatonin tablet 5 mg  5 mg Oral QHS PRN Matthew Payne MD        pantoprazole (PROTONIX) tablet 40 mg  40 mg Oral ACB&D Matthew Payne MD        doxercalciferoL (HECTOROL) 4 mcg/2 mL injection 0.5 mcg  0.5 mcg IntraVENous DIALYSIS NUNO JONES & Demario Baker MD   0.5 mcg at 10/25/21 1138    piperacillin-tazobactam (ZOSYN) 2.25 g in 0.9% sodium chloride (MBP/ADV) 50 mL MBP  2.25 g IntraVENous Q8H Maryuri CLEMENTE  mL/hr at 10/26/21 0613 2.25 g at 10/26/21 0613    Piperacillin-tazobactam (ZOSYN) 0.75 gm Supplemental Dosing by Pharmacy   Other Rx Dosing/Monitoring Rashi Del Valle MD        polyethylene glycol (MIRALAX) packet 17 g  17 g Oral DAILY Kirill Galindo MD   17 g at 10/21/21 1845    ondansetron (ZOFRAN) injection 4 mg  4 mg IntraVENous Q8H PRN Donna Drummond DO   4 mg at 10/24/21 0503    abiraterone (Zytiga) tab 1,000 mg (Patient Supplied)  1,000 mg Oral DAILY Jovany Velazquez MD   1,000 mg at 10/26/21 1049    amLODIPine (NORVASC) tablet 10 mg  10 mg Oral DAILY Jovany Velazquez MD   10 mg at 10/26/21 1048    B complex-vitaminC-folic acid (NEPHROCAP) cap  1 Capsule Oral DAILY MayJovany MD   1 Capsule at 10/26/21 1047    calcium acetate(phosphat bind) (PHOSLO) capsule 667 mg  1 Capsule Oral TID WITH MEALS Jovany Velazquez MD   667 mg at 10/25/21 7054    predniSONE (DELTASONE) tablet 5 mg  5 mg Oral BID Jovany Velazquez MD   5 mg at 10/26/21 1047    sodium chloride (NS) flush 5-40 mL  5-40 mL IntraVENous Q8H Jovany Velazquez MD   10 mL at 10/26/21 9079    sodium chloride (NS) flush 5-40 mL  5-40 mL IntraVENous PRN Jovany Velazquez MD        acetaminophen (TYLENOL) tablet 650 mg  650 mg Oral Q6H PRN Jovany Velazquez MD   650 mg at 10/26/21 1055    Or    acetaminophen (TYLENOL) suppository 650 mg  650 mg Rectal Q6H PRN Jovany Velazquez MD        lidocaine (XYLOCAINE) 5 % ointment   Topical DAILY PRN Donna Drummond DO            Physical Exam:     Physical Exam:   General:  Alert, cooperative, no distress, appears stated age. Neck: Supple, symmetrical, trachea midline, no adenopathy, thyroid: no enlargement/tenderness/nodules, no carotid bruit and no JVD. Lungs:   Clear to auscultation bilaterally. Heart:  Regular rate and rhythm, S1, S2 normal, no murmur, click, rub or gallop. Abdomen:   Soft, non-tender. Bowel sounds normal. No masses,  No organomegaly. Extremities: Extremities normal, atraumatic, no cyanosis or edema,HD catheter is well dressed. AVF has good thrill & bruit , does now allow to use.    Skin: Skin color, texture, turgor normal. No rashes or lesions         Data Review:    CBC w/Diff    Recent Labs     10/26/21  0104 10/25/21  0119 10/25/21  0119 10/24/21  0103 10/24/21  0103   WBC 26.0*  --  25.5*  --  23.8*   RBC 3.43*  --  3.65*  --  3.75*   HGB 10.8*  --  11.5*  --  11.5*   HCT 33.0*  --  35.1*  --  35.8*   MCV 96.2   < > 96.2   < > 95.5   MCH 31.5   < > 31.5   < > 30.7   MCHC 32.7   < > 32.8   < > 32.1   RDW 12.7   < > 12.7   < > 12.9    < > = values in this interval not displayed. Recent Labs     10/26/21  0104 10/25/21  0119 10/25/21  0119 10/24/21  0103 10/24/21  0103   MONOS 2*  --  5  --  5   EOS 0  --  0  --  4   BASOS 0   < > 0   < > 0   RDW 12.7   < > 12.7   < > 12.9    < > = values in this interval not displayed. Comprehensive Metabolic Profile    Recent Labs     10/26/21  0104 10/25/21  0119 10/24/21  0103   * 134* 132*   K 3.7 3.4* 3.7   CL 96* 91* 94*   CO2 27 31 29   BUN 47* 59* 44*   CREA 6.46* 7.83* 5.83*    Recent Labs     10/26/21  0104 10/25/21  0119 10/24/21  0103   CA 7.1* 8.0* 7.9*   PHOS 5.0* 5.0* 3.5                        Impression:       Active Hospital Problems    Diagnosis Date Noted    Nausea and vomiting 10/25/2021    Hypoalbuminemia 10/20/2021    Leucocytosis 10/20/2021    Pneumonia 10/19/2021    ESRD (end stage renal disease) ([facility] 09/26/2020    Secondary hyperparathyroidism of renal origin ([facility] 09/26/2020    Essential (primary) hypertension 09/01/2016      WBC count is high still, probably Leukemoid reaction frmo his  Mets      Plan:     Advance diet as tolerated, Dialysis tomorrow,will send for Duplex scan of AVF & consult Dr. Raciel Trotter ,as the patient is not going to his office for [address] [place] [address] for AVF evaluation.       Christine Tiwari MD

## 2021-10-26 NOTE — PROGRESS NOTES
[facility]  [facility]    Patient: Bossman Lomeli Age: 80 y.o. : 1935 MR#: [id number] SSN: [id number]  Date/Time: 10/26/2021     Subjective:     Patient stated he feels fine and he is fine with the liquid diet without any nausea vomiting or abdominal pain. Passing gas but no bowel movement. Denies any chest pain or shortness of breath or cough. Denies any headaches or dizziness. He mentioned that he has been on dialysis for last 5 years and also has a Burris catheter. He also mentioned he prefers going home upon discharge from here. Objective:   VS:   Visit Vitals  /62 (BP 1 Location: Left upper arm, BP Patient Position: At rest)   Pulse 98   Temp 97.4 °F (36.3 °C)   Resp 16   Ht 5' 6\" (1.676 m)   Wt 71.2 kg (157 lb)   SpO2 100%   BMI 25.34 kg/m²      Tmax/24hrs: Temp (24hrs), Av °F (36.7 °C), Min:97.4 °F (36.3 °C), Max:98.6 °F (37 °C)  IOBRIEF    Intake/Output Summary (Last 24 hours) at 10/26/2021 1147  Last data filed at 10/25/2021 2000  Gross per 24 hour   Intake 660 ml   Output 1060 ml   Net -400 ml       General:  Alert, cooperative, no acute distress   HEENT: No facial asymmetry, ARUNA Fausto, External ears - WNL    Cardiovascular: S1S2 - regular , No Murmur   Pulmonary: Diminished breath sound bibasilar without wheezes or rhonchi. GI:  +BS in all four quadrants, soft, non-tender, Ventral hernia - Small reducible and nontender  : Burris catheter in situ, no discharge from penis  Extremities:  No edema. No tenderness or edema of bilateral knees  Neuro: Alert, moves all extremities well. No visible tremors    Assessment/Plan:   HPI : Patient with history of carcinoma prostate with multiple bone meta stasis, end-stage renal disease on hemodialysis, admitted with episode of vomiting which was brown-hematemesis also associated with severe constipation.   Patient had recurrence of symptoms on 10/22/2021 in p.m.,     1. Acute nausea due to severe gaseous distention of the stomach and moderate gaseous distention of small and large bowel loops. Clinically better. S/p NGT placement  2. Hematemesis x1, no recurrence  3. Severe constipation  4. End-stage renal disease on hemodialysis  5. Metastatic prostate CA with osseous metastasis  6. Chronic urinary retention and chronic indwelling Burris catheter  7. Ventral hernia without obstruction or incarceration reducible  8. Suspected aspiration pneumonia - multilobar pneumonia - On CT Chest, clinically improving  9. Coag negative staph bacteremia, most likely contamination - resolved  10. Hypokalemia    11. Persistent leukocytosis could be leukemoid reaction from #5    Plan. Continue Zosyn until patient is here and then will change patient to Augmentin and Levaquin until October 29, 2021  Add duo nebs and incentive spirometry  PT and OT to follow this patient  Discussed with nephrology service and they will be arranging dialysis tomorrow morning  Advance diet and monitor this patient  Add PPI  If patient's symptoms reappear, GI may perform endoscopy and I will also order mesenteric Doppler  Continue monitoring WBC, patient is afebrile  Continue Burris catheter and follow-up with urologist as an outpatient. Patient's urologist is Dr. Emerson Milner. Discussed with patient's wife over the phone. She verbalized understanding about discharge plan. She does not think patient will be ready to be discharged tomorrow but she is open to accept him at home on October 28, 2021. Anticipated date of discharge: October 27 or October 20, 2021 based on his symptoms and PT and OT recommendations.        Case discussed with:  [x]Patient  [x] Family  [x]Nursing  []Case Management     DVT Prophylaxis:  []Lovenox  []Hep SQ  [x]SCDs/possible GI bleed[]Coumadin   []On Heparin gtt    [] Eliquis [] Xarelto     Total time to take care of this patient was 35 minutes and more than 50% of time was spent counseling and coordinating care. Medications:   Current Facility-Administered Medications   Medication Dose Route Frequency    doxercalciferoL (HECTOROL) 4 mcg/2 mL injection 0.5 mcg  0.5 mcg IntraVENous DIALYSIS MON, WED & FRI    piperacillin-tazobactam (ZOSYN) 2.25 g in 0.9% sodium chloride (MBP/ADV) 50 mL MBP  2.25 g IntraVENous Q8H    Piperacillin-tazobactam (ZOSYN) 0.75 gm Supplemental Dosing by Pharmacy   Other Rx Dosing/Monitoring    polyethylene glycol (MIRALAX) packet 17 g  17 g Oral DAILY    ondansetron (ZOFRAN) injection 4 mg  4 mg IntraVENous Q8H PRN    abiraterone (Zytiga) tab 1,000 mg (Patient Supplied)  1,000 mg Oral DAILY    amLODIPine (NORVASC) tablet 10 mg  10 mg Oral DAILY    B complex-vitaminC-folic acid (NEPHROCAP) cap  1 Capsule Oral DAILY    [Held by provider] calcium acetate(phosphat bind) (PHOSLO) capsule 667 mg  1 Capsule Oral TID WITH MEALS    predniSONE (DELTASONE) tablet 5 mg  5 mg Oral BID    sodium chloride (NS) flush 5-40 mL  5-40 mL IntraVENous Q8H    sodium chloride (NS) flush 5-40 mL  5-40 mL IntraVENous PRN    acetaminophen (TYLENOL) tablet 650 mg  650 mg Oral Q6H PRN    Or    acetaminophen (TYLENOL) suppository 650 mg  650 mg Rectal Q6H PRN    melatonin tablet 5 mg  5 mg Oral QHS    lidocaine (XYLOCAINE) 5 % ointment   Topical DAILY PRN       Labs:    Recent Labs     10/26/21  0104 10/25/21  0119 10/24/21  0103   WBC 26.0* 25.5* 23.8*   HGB 10.8* 11.5* 11.5*   HCT 33.0* 35.1* 35.8*    321 311     Recent Labs     10/26/21  0104 10/25/21  0119 10/24/21  0103   * 134* 132*   K 3.7 3.4* 3.7   CL 96* 91* 94*   CO2 27 31 29   * 97 106*   BUN 47* 59* 44*   CREA 6.46* 7.83* 5.83*   CA 7.1* 8.0* 7.9*   PHOS 5.0* 5.0* 3.5     Disclaimer: Sections of this note are dictated using utilizing voice recognition software, which may have resulted in some phonetic based errors in grammar and contents.  Even though attempts were made to correct all the mistakes, some may have been missed, and remained in the body of the document. If questions arise, please contact our department.       Signed By: Seema Hopper MD     October 26, 2021

## 2021-10-27 NOTE — DISCHARGE SUMMARY
[address]  Face to Face Encounter    Patients Name: Diana Pulido    YOB: 1935    Primary Diagnosis:     1. Acute nausea due to severe gaseous distention of the stomach and moderate gaseous distention of small and large bowel loops. Clinically better. S/p NGT placement  2. Hematemesis x1, no recurrence  3. Severe constipation  4. End-stage renal disease on hemodialysis  5. Metastatic prostate CA with osseous metastasis  6. Chronic urinary retention and chronic indwelling Burris catheter  7. Ventral hernia without obstruction or incarceration reducible  8. Suspected aspiration pneumonia - multilobar pneumonia - On CT Chest, clinically improving  9. Coag negative staph bacteremia, most likely contamination - resolved  10. Hypokalemia    11. Persistent leukocytosis could be leukemoid reaction from #5    Date of Face to Face:   10/27/2021                                    Face to Face Encounter findings are related to primary reason for home care:   yes    1. I certify that the patient needs intermittent skilled nursing care, physical therapy and/or speech therapy. I will not be following this patient in the Community and Dr. Augustus Garcia MD  will be responsible for signing the [address] of Care. 2. Initial Orders for Care: Must be completed only if Face to Face MD will not be signing the [address] Skilled Nursing, Physical Therapy and Occupational Therapy    3. I certify that this patient is homebound for the following reason(s): Requires considerable and taxing effort to leave the home  and Only leaves the home for medical reasons or Orthodoxy services and are infrequent and of short duration for other reasons     4. I certify that this patient is under my care and that I, or a nurse practitioner or [phone] working with me, had a Face-to-Face Encounter that meets the physician Face-to-Face Encounter requirements.   Document the physical findings from the Face-to-Face Encounter that support the need for skilled services: Has new medications that requires skilled nursing teaching and monitoring for understanding and compliance  and Has new finding of weakness and altered mobility that requires skilled physical/occupational and/or speech therapy services for evaluation and interventions.      Patricia Lund MD  10/27/2021

## 2021-10-27 NOTE — DISCHARGE SUMMARY
Physician Discharge Summary       Patient: Manny Wright MRN: [id number]  SSN: [id number]    YOB: 1935  Age: 80 y.o. Sex: male    PCP: Michelle Trujillo MD    Allergies: Patient has no known allergies. Admit date: 10/18/2021  Admitting Provider: Marisol Eden MD    Discharge date: 10/27/2021  Discharging Provider: Ana M Dhaliwal MD    * Admission Diagnoses: Pneumonia [J18.9]    * Discharge Diagnoses:      1. Acute nausea due to severe gaseous distention of the stomach and moderate gaseous distention of small and large bowel loops. Clinically better. 2. Hematemesis x1, no recurrence  3. Severe constipation, resolved  4. End-stage renal disease on hemodialysis  5. Metastatic prostate CA with osseous metastasis  6. Chronic urinary retention and chronic indwelling Burris catheter  7. Ventral hernia without obstruction or incarceration reducible  8. Suspected aspiration pneumonia - multilobar pneumonia - On CT Chest, clinically improving  9. Coag negative staph bacteremia, most likely contamination - resolved  10. Hypokalemia    11. Persistent leukocytosis could be leukemoid reaction from 5    [facility] Course: Patient presented to hospital on October 18, 2021 with complaint of nausea and vomiting. Please refer hospital admission H&P for further detail. Patient was found out to have multifocal pneumonia most likely aspiration pneumonia. Patient was started on antibiotics. Patient had CT chest done that showed multifocal infiltrates. Patient was also found out to have coag negative Staphylococcus on October 19 most likely contaminant as repeat blood culture from October 22, 2021 remained negative. Patient received IV antibiotic until he was here and will be discharged home on Augmentin and Levaquin until October 29, 2021. Due to nausea and vomiting, it was believed due to severe constipation, ileus. Patient also had an episode of hematemesis x1. GI was consulted. Patient's hemoglobin remained stable. Patient initially required NG tube for ileus management. NG tube was removed and patient was given challenge with diet with improvement in his symptoms. His constipation also got better with MiraLAX. He had actually loose bowel movement. MiraLAX will be stopped and advised wife to buy it over-the-counter if needed. Patient has ventral hernia without obstruction. Patient had hypokalemia. Patient was resumed on dialysis for ESRD. Patient has metastatic osseous disease from prostate cancer. Patient was tolerating dialysis through AV graft and his tunneled dialysis catheter will be removed by Dr. Gavi Carter in his office after 2 weeks from now. Patient has persistent leukocytosis without fever most likely from leukemoid reaction which is downtrending and can be followed as an outpatient. PT and OT recommended skilled nursing placement versus home health care. Wife wants patient to come home and will be discharged home on the following medications today. * Procedures: None  * No surgery found *      Consults: ID, Nephrology and Vascular Surgery    Significant Diagnostic Studies: CT chest abdomen pelvis, CT head, chest x-ray, KUBs    Discharge Exam:  Please refer to my progress note from October 27, 2021 for further detail    * Discharge Condition: improved  * Disposition: Home    Discharge Medications:  Current Discharge Medication List      START taking these medications    Details   amoxicillin-clavulanate (AUGMENTIN) 500-125 mg per tablet Take 1 Tablet by mouth every twelve (12) hours for 6 doses. Qty: 6 Tablet, Refills: 0  Start date: 10/27/2021, End date: 10/30/2021      levoFLOXacin (LEVAQUIN) 500 mg tablet Take 1 Tablet by mouth every fourty-eight (48) hours for 2 doses. Qty: 2 Tablet, Refills: 0  Start date: 10/27/2021, End date: 10/30/2021      pantoprazole (PROTONIX) 40 mg tablet Take 1 Tablet by mouth Before breakfast and dinner.   Qty: 60 Tablet, Refills: 0  Start date: 10/27/2021         CONTINUE these medications which have NOT CHANGED    Details   amLODIPine (NORVASC) 10 mg tablet TAKE 1 TABLET BY MOUTH EVERY DAY      calcium acetate,phosphat bind, (PHOSLO) 667 mg cap       abiraterone (Zytiga) 250 mg tab Take four tablets by mouth daily on an empty stomach. Take one hour prior to food or two hours after food. Tablets should be swallowed whole with water. Do not crush or chew tablets. Qty: 120 Tab, Refills: 5    Associated Diagnoses: Malignant neoplasm of prostate ([address] Osseous metastasis (HCC)      predniSONE (DELTASONE) 5 mg tablet Take 1 Tab by mouth two (2) times a day. Qty: 60 Tab, Refills: 5    Associated Diagnoses: Malignant neoplasm of prostate ([address] Osseous metastasis (HCC)      mupirocin (BACTROBAN) 2 % ointment APPLY TO TOES 3 TIMES A DAY      B complex w-C no.20/folic acid (TRIPHROCAPS PO) Take 1 Cap by mouth daily. STOP taking these medications       amoxicillin-clavulanate (AUGMENTIN) 875-125 mg per tablet Comments:   Reason for Stopping:         bicalutamide (CASODEX) 50 mg tablet Comments:   Reason for Stopping:         furosemide (LASIX) 40 mg tablet Comments:   Reason for Stopping:               * Follow-up Care/Patient Instructions: Activity: PT/OT per Home Health  Diet: Cardiac Diet and Renal Diet  Wound Care: None needed    Follow-up Information     Follow up With Specialties Details Why Contact Info    Hilliard Buerger, MD Family Medicine On 11/4/2021 November 4, 2021 @ 2:30 pm with Dr. Walker Manuel. [address] [address] [address]  Your preferred agency, Chosen to continue managing health care needs [place] [address]  [address] [facility] [address]    Ozzie Rodriguez MD Gastroenterology On 11/5/2021 November 5, 2021 @ 11:00 with Dr. Nanci Forde.  [facility]  [phone]          Follow-up Appointments   Procedures    FOLLOW UP VISIT Appointment in: Other (Specify) With PCP in 5 days With dr. Salas Brown [GI] in 2 weeks With dr. Kristyn Bob in 2 weeks to remove [facility]     With PCP in 5 days  With dr. Salas Brown [GI] in 2 weeks  With dr. Kristyn Bob in 2 weeks to remove [facility]     Standing Status:   Standing     Number of Occurrences:   1     Order Specific Question:   Appointment in     Answer: Other (Specify)     Total time of discharge greater than 35 minutes    Disclaimer: Sections of this note are dictated using utilizing voice recognition software, which may have resulted in some phonetic based errors in grammar and contents. Even though attempts were made to correct all the mistakes, some may have been missed, and remained in the body of the document. If questions arise, please contact our department.       Signed:  Ozzy Madrid MD  10/27/2021  3:59 PM

## 2021-10-27 NOTE — HOME CARE
Received [facility] referral for PT,OT ; Discharge order noted for today ; spoke to patient's wife Caroline Crespo) , Verified demographics,explained [facility] services and answered all questions , wife states patient already has RW,per 's note ,wife declined order for [facility]; wife states patient takes HD on M-W-F at 8:30am ; [facility] referral processed to [facility] central intake. MATIAS ALFARO.

## 2021-10-27 NOTE — PROGRESS NOTES
Received report on pt.from off going RN. Resting quietly in bed on rounds. Denies c/o pain or SOB at this time. HOB elevated. Call bell at side. Bed alarm on. No acute distress noted. Will cont to monitor for any changes in status. 1955 Bedside and Verbal shift change report given to Verónica Lockett (oncoming nurse) by Ace Adan RN (offgoing nurse). Report given with BRANDY, Wade and MAR.

## 2021-10-27 NOTE — PROGRESS NOTES
Infectious Disease progress Note  Chart review      Reason: Sepsis, aspiration pneumonia, coagulase-negative staph bacteremia    Current abx Prior abx   Cefepime, vancomycin since 10/19/21-10/22  Zosyn since 10/22      Lines:       Assessment :     80 y.o. male metastatic prostate cancer, colon cancer, atherosclerotic disease, hypertension, and ESRD on MWF hemodialysis admitted to [facility] on 10/18/21 for multi focal pneumonia. Now with significant leukocytosis, coagulase-negative Staphylococcus bacteremia, multifocal infiltrates noted on CT chest, recent coffee-ground emesis    I agree that clinical presentation is concerning for sepsis. However after obtaining detailed history, exam, review of available labs/imaging studies. I believe patient's leukocytosis is likely leukemoid reaction to steroids, metastatic disease, recent GI bleed, probable aspiration pneumonia     persistent leukocytosis despite broad-spectrum antibiotics, currently with improvement argues against infectious etiology of leukocytosis        Single positive blood culture for coagulase-negative Staphylococcus on 10/19 likely contaminant. repeat blood culture 10/22: No growth    Vomiting, concern for GI bleed: GI follow-up appreciated. X-ray abdomen 10/22 -severe gaseous abdominal distention     ESRD: on HD    NG tube removed. Afebrile. Hemodynamically stable. Recommendations:    1. Discontinue piperacillin/tazobactam  2. Follow-up GI recommendations regarding abdominal distention, possible GI bleed  3.   switch to oral levofloxacin, Augmentin till 10/29/2021   4. Follow-up nephrology recommendations  5.   Follow-up CBC, clinically    Above plan was discussed in details with dr. Javid parsons  Time spent > 10 min    HPI:          Current Discharge Medication List        CONTINUE these medications which have NOT CHANGED    Details   amLODIPine (NORVASC) 10 mg tablet TAKE 1 TABLET BY MOUTH EVERY DAY      calcium acetate,phosphat bind, (PHOSLO) 667 mg cap       amoxicillin-clavulanate (AUGMENTIN) 875-125 mg per tablet Take 1 Tablet by mouth every twelve (12) hours. Qty: 6 Tablet, Refills: 0      abiraterone (Zytiga) 250 mg tab Take four tablets by mouth daily on an empty stomach. Take one hour prior to food or two hours after food. Tablets should be swallowed whole with water. Do not crush or chew tablets. Qty: 120 Tab, Refills: 5    Associated Diagnoses: Malignant neoplasm of prostate ([facility] [address] Osseous metastasis (HCC)      predniSONE (DELTASONE) 5 mg tablet Take 1 Tab by mouth two (2) times a day. Qty: 60 Tab, Refills: 5    Associated Diagnoses: Malignant neoplasm of prostate ([facility] [address] Osseous metastasis (HCC)      mupirocin (BACTROBAN) 2 % ointment APPLY TO TOES 3 TIMES A DAY      bicalutamide (CASODEX) 50 mg tablet Take 1 Tab by mouth daily. Qty: 14 Tab, Refills: 0      B complex w-C no.20/folic acid (TRIPHROCAPS PO) Take 1 Cap by mouth daily. furosemide (LASIX) 40 mg tablet Take 40 mg by mouth daily.              Current Facility-Administered Medications   Medication Dose Route Frequency    albuterol-ipratropium (DUO-NEB) 2.5 MG-0.5 MG/3 ML  3 mL Nebulization Q6H RT    melatonin tablet 5 mg  5 mg Oral QHS PRN    pantoprazole (PROTONIX) tablet 40 mg  40 mg Oral ACB&D    doxercalciferoL (HECTOROL) 4 mcg/2 mL injection 0.5 mcg  0.5 mcg IntraVENous DIALYSIS MON, WED & FRI    piperacillin-tazobactam (ZOSYN) 2.25 g in 0.9% sodium chloride (MBP/ADV) 50 mL MBP  2.25 g IntraVENous Q8H    Piperacillin-tazobactam (ZOSYN) 0.75 gm Supplemental Dosing by Pharmacy   Other Rx Dosing/Monitoring    polyethylene glycol (MIRALAX) packet 17 g  17 g Oral DAILY    ondansetron (ZOFRAN) injection 4 mg  4 mg IntraVENous Q8H PRN    abiraterone (Zytiga) tab 1,000 mg (Patient Supplied)  1,000 mg Oral DAILY    amLODIPine (NORVASC) tablet 10 mg  10 mg Oral DAILY    B complex-vitaminC-folic acid (NEPHROCAP) cap  1 Capsule Oral DAILY    calcium acetate(phosphat bind) (PHOSLO) capsule 667 mg  1 Capsule Oral TID WITH MEALS    predniSONE (DELTASONE) tablet 5 mg  5 mg Oral BID    sodium chloride (NS) flush 5-40 mL  5-40 mL IntraVENous Q8H    sodium chloride (NS) flush 5-40 mL  5-40 mL IntraVENous PRN    acetaminophen (TYLENOL) tablet 650 mg  650 mg Oral Q6H PRN    Or    acetaminophen (TYLENOL) suppository 650 mg  650 mg Rectal Q6H PRN    lidocaine (XYLOCAINE) 5 % ointment   Topical DAILY PRN       Allergies: Patient has no known allergies. History reviewed. No pertinent family history. Social History     Socioeconomic History    Marital status:      Spouse name: Not on file    Number of children: Not on file    Years of education: Not on file    Highest education level: Not on file   Occupational History    Not on file   Tobacco Use    Smoking status: Never Smoker    Smokeless tobacco: Never Used   Vaping Use    Vaping Use: Never used   Substance and Sexual Activity    Alcohol use: No    Drug use: Never    Sexual activity: Not on file   Other Topics Concern    Not on file   Social History Narrative    Not on file     Social Determinants of Health     Financial Resource Strain:     Difficulty of Paying Living Expenses:    Food Insecurity:     Worried About [facility] in the Last Year:     [facility] in the Last Year:    Transportation Needs:     Lack of Transportation (Medical):     Lack of Transportation (Non-Medical):    Physical Activity:     Days of Exercise per Week:     Minutes of Exercise per Session:    Stress:     Feeling of Stress :    Social Connections:     Frequency of Communication with Friends and Family:     Frequency of Social Gatherings with Friends and Family:     Attends [employer] Services:      Active Member of Clubs or Organizations:     Attends Club or Organization Meetings:     Marital Status:    Intimate Partner Violence:     Fear of Current or Ex-Partner:     Emotionally Abused:     Physically Abused:     Sexually Abused:      Social History     Tobacco Use   Smoking Status Never Smoker   Smokeless Tobacco Never Used        Temp (24hrs), Av.1 °F (36.7 °C), Min:97.4 °F (36.3 °C), Max:98.6 °F (37 °C)    Visit Vitals  BP (!) 149/69 (BP 1 Location: Left upper arm, BP Patient Position: At rest)   Pulse 88   Temp 98.2 °F (36.8 °C)   Resp 18   Ht 5' 6\" (1.676 m)   Wt 71.2 kg (157 lb)   SpO2 100%   BMI 25.34 kg/m²       ROS:   Physical Exam:        Labs: Results:   Chemistry Recent Labs     10/27/21  0328 10/26/21  0104 10/25/21  0119   * 110* 97   * 135* 134*   K 3.3* 3.7 3.4*    96* 91*   CO2 23 27 31   BUN 56* 47* 59*   CREA 7.63* 6.46* 7.83*   CA 7.1* 7.1* 8.0*   AGAP 11 12 12   BUCR 7* 7* 8*      CBC w/Diff Recent Labs     10/27/21  0328 10/26/21  0104 10/25/21  0119   WBC 23.3* 26.0* 25.5*   RBC 3.29* 3.43* 3.65*   HGB 10.6* 10.8* 11.5*   HCT 31.6* 33.0* 35.1*    335 321   GRANS 90* 89* 83*   LYMPH 4* 9* 12*   EOS 0 0 0      Microbiology No results for input(s): CULT in the last 72 hours. RADIOLOGY:    All available imaging studies/reports in [facility] for this admission were reviewed        Disclaimer: Sections of this note are dictated utilizing voice recognition software, which may have resulted in some phonetic based errors in grammar and contents. Even though attempts were made to correct all the mistakes, some may have been missed, and remained in the body of the document. If questions arise, please contact our department.     Dr. Carolyn Sadler, Infectious Disease Specialist  [phone]  2021  7:11 AM

## 2021-10-27 NOTE — PROGRESS NOTES
Patient wife stated  will make appointment with Dr. Jessica Lau after patient has successfully has dialysis  with graft/fistula  MD Maribel Cuevas informed

## 2021-10-27 NOTE — PROGRESS NOTES
Left arm graft is patent with nice thrill. No pain in his hand his steal symptoms have completely resolved. Still currently using his tunneled dialysis catheter for dialysis. He is okay from my standpoint to start using the graft for hemodialysis.   Once successful using this he can have catheter removed

## 2021-10-27 NOTE — DIALYSIS
ACUTE HEMODIALYSIS FLOW SHEET    HEMODIALYSIS ORDERS: Physician: Dr. Blackburn Amend: Vince   Duration: 3 hr   BFR: 400   DFR: 800   Dialysate:  Temp 36-37*C   K+  3    Ca+ 2.5   Na 138   Bicarb 35   Wt Readings from Last 1 Encounters:   10/25/21 71.2 kg (157 lb)    Patient Chart [x]   Unable to Obtain []  Dry weight/UF Goal: 2000 ml    Heparin []  Bolus    Units    [] Hourly    Units    [x]None       Pre BP: 136/60  Pulse: 82  Respirations: 18 Temp: 98.2  [x] Oral  [] Ax  [] Esoph   Labs: []  Pre  []  Post:   [x] N/A   Additional Orders (medications, blood products, hypotension management): [] Yes   [x] No     [x]  [facility] Consent Verified     CATHETER ACCESS:  []N/A   [x]Right   []Left   []IJ   []Fem  [x]Chest wall  []TransHepatic   [] First use X-ray verified     [x]Tunnel    [] Non Tunneled   [x]No S/S infection  []Redness  []Drainage []Cultured []Swelling []Pain   [x]Medical Aseptic Prep Utilized   []Dressing Changed  [] Biopatch  Date:    []Clotted   [x]Patent   Flows: [x]Good  []Poor  []Reversed   If access problem,  notified: []Yes    [x]N/A        GRAFT/FISTULA ACCESS:   []N/A     []Right     [x]Left     [x]UE     []LE   []AVG   [x]AVF       [x]Medical Aseptic Prep Utilized   [x]No S/S infection  []Redness  []Drainage [] Cultured  [] Swelling  [] Pain  Bruit:   [x] Strong    [] Weak       Thrill :   [x] Strong    [] Weak     Needle Gauge: 15   Length: 1 inch   If access problem,  notified: []Yes     [x]N/A          GENERAL ASSESSMENT:    LUNGS:  Resp Rate 18   [] Clear  [x] Coarse  [] Crackles  [] Wheezing  [] Diminished                                                           [x] RLL   [x] LLL  [x] RUL   [x] ROBERT            Respirations:  []Easy  []Labored  []N/A  Cough:  []Productive  []Dry  []N/A               Therapy:  [x]RA   [] Ventilated   [] Intubated   [] Trach            O2 Device:  [] NC   [] NRB  [] Trach Mask  [] BiPaP  Flow:   l/min CARDIAC: [] Regular      [] Irregular   [] Rhythm:          [] Monitored   [] Bedside   [x] Remotely monitored       EDEMA: [x] None   []Generalized  [] Pitting [] 1+   [] 2 +   [] 3+    [] 4+        SKIN:   [] Hot     [] Cold    [x] Warm   [x] Dry    [] Diaphoretic                 [] Flushed  [] Jaundiced  [] Cyanotic  [] Pale      LOC:    [x] Alert      [x]Oriented:    [x] Person     [x] Place   [x]Time               [] Confused  [] Lethargic  [] Medicated  [] Non-responsive  [] Non-Verbal     GI / ABDOMEN:                     [] Flat    [] Distended    [x] Soft    [] Firm   []  Obese                   [] Diarrhea   [] FMS [x] Bowel Sounds  [] Nausea  [] Vomiting                   [] NGT  [] OGT  [] PEG  [] Tube Feedings @     mL/hr     / URINE ASSESSMENT:                   [] Voiding    [] Oliguria  [] Anuria                     [x]  Burris   [] Incontinent  []  Incontinent Brief   []  PureWick     PAIN:  [x] 0 []1  []2   []3   []4   []5   []6   []7   []8   []9   []10                MOBILITY:  [x] Bed    [] Stretcher      All Vitals and Treatment Details on Attached [address]: [facility]          Room # 208/01    [x] Routine         [] 1st Time Acute/Chronic   [] Urgent      [] Stat            [x] Acute Room   []  Bedside    [] ICU/CCU     [] ER     Isolation Precautions:  [x] Dialysis    There are currently no Active Isolations     ALLERGIES:     No Known Allergies     Code Status:  Full Code     Hepatitis Status      Lab Results   Component Value Date/Time    Hepatitis B surface Ag <0.10 10/18/2021 08:39 PM    Hepatitis B surface Ab <3.10 (L) 10/18/2021 08:39 PM    Hepatitis B core, IgM Negative 09/27/2020 04:33 AM    Hepatitis C virus Ab 0.15 09/27/2020 04:33 AM        Current Labs:      Lab Results   Component Value Date/Time    WBC 23.3 (H) 10/27/2021 03:28 AM    HGB 10.6 (L) 10/27/2021 03:28 AM    HCT 31.6 (L) 10/27/2021 03:28 AM    PLATELET 759 62/31/0335 03:28 AM    MCV 96.0 10/27/2021 03:28 AM     Lab Results   Component Value Date/Time    Sodium 134 (L) 10/27/2021 03:28 AM    Potassium 3.3 (L) 10/27/2021 03:28 AM    Chloride 100 10/27/2021 03:28 AM    CO2 23 10/27/2021 03:28 AM    Anion gap 11 10/27/2021 03:28 AM    Glucose 113 (H) 10/27/2021 03:28 AM    BUN 56 (H) 10/27/2021 03:28 AM    Creatinine 7.63 (H) 10/27/2021 03:28 AM    BUN/Creatinine ratio 7 (L) 10/27/2021 03:28 AM    GFR est AA 8 (L) 10/27/2021 03:28 AM    GFR est non-AA 7 (L) 10/27/2021 03:28 AM    Calcium 7.1 (L) 10/27/2021 03:28 AM          DIET:  DIET ADULT ORAL NUTRITION SUPPLEMENT  DIET ADULT     PRIMARY NURSE REPORT:   Pre Dialysis: MIKHAIL Bustamante RN    Time: 0800      EDUCATION:    [x] Patient           Knowledge Basis: []None [x]Minimal [] Substantial [] Unknown  Barriers to learning  [x]None  [] Intubated/Trached/Ventilated  [] Sedated/Paralyzed   [] Access Care     [] S&S of infection  [] Fluid Management  [] K+   [x] Procedural    [] Medications   [] Tx Options   [] Transplant   [] Diet      Teaching Tools:  [x] Explain  [] Demo  [] Handouts [] Video  Patient response: [x] Verbalized understanding   [] Requires follow up        [x] Time Out/Safety Check    [x] Extracorporeal Circuit Tested for integrity       RO/HEMODIALYSIS MACHINE SAFETY CHECKS  Before each treatment:        [address] [place] [address]                                     [x] Unit Machine # 9 with centralized RO                                                                                                                              Alarm Test:  Pass time 8157            [x] RO/Machine Log Complete    Machine Temp    36-37*C             Dialysate: pH  7.4    Conductivity: Meter 14.0    HD Machine  14.0     TCD: 13.9  Dialyzer Lot # [id number]     Blood Tubing Lot # [id number]     Saline Lot # [id number]     CHLORINE TESTING-Before each treatment and every 4 hours    Total Chlorine: [x] less than 0.1 ppm  Initial Time Check: 0900       4 Hr/2nd Check Time: N/A (if greater than 0.1 ppm from Primary then every 30 minutes from Secondary)     TREATMENT INITIATION  with Dialysis Precautions:   [x] All Connections Secured              [x] Saline Line Double Clamped   [x] Venous Parameters Set               [x] Arterial Parameters Set    [x] Prime Given 250ml NSS              [x]Air Foam Detector Engaged        Treatment Initiation Note:  0936--Pt arrived in dialyis room via bed. Pt is A&Ax4., on room air. Pt denies any pain. Pt R chest CVC patent, dressing dry and intact and no S/S of infection noted. 0941--HD initiated without difficulty. During Treatment Notes:  1000  Face & Vascular access visible with art and carlie line connections intact. Pt tolerating dialysis. 1015  Face & Vascular access visible with art and carlie line connections intact. Pt tolerating dialysis. 1030  Face & Vascular access visible with art and carlie line connections intact. Pt tolerating dialysis. 1045  Face & Vascular access visible with art and carlie line connections intact. Pt tolerating dialysis. 1100  Face & Vascular access visible with art and carlie line connections intact. Pt tolerating dialysis. 1115  Face & Vascular access visible with art and carlie line connections intact. Pt tolerating dialysis. 1130  Face & Vascular access visible with art and carlie line connections intact. Pt tolerating dialysis. 1145  Face & Vascular access visible with art and carlie line connections intact. Pt tolerating dialysis. 1200  Face & Vascular access visible with art and carlie line connections intact. Pt tolerating dialysis. 1215  Face & Vascular access visible with art and carlie line connections intact. Pt tolerating dialysis. 1230  Face & Vascular access visible with art and carlie line connections intact. Pt tolerating dialysis. 1245  Face & Vascular access visible with art and carlie line connections intact. Pt tolerating dialysis. 1258  Dialysis treatment complete.  1:5hr into Pt Tx, Orders was received from DR Stanton Harrison to cannulate Pt left AVF and continue TX through his fistula. Pt was cannulate without difficulty and dialyzed the remaining time. Medication    Dose    Volume Route      Time       [facility] Nurse   hectorol 0.5mcg 0.3ml HD 1005 Riley Cole RN     Post Assessment  Dialyzer Cleared:   [] Good  [x] Fair  [] Poor  Blood processed:  58.1 L  UF Removed:  2000 Ml    Post BP: 136/66  Pulse: 84  Respirations: 18   Temp: 98.2  [x] Oral  [] Ax  [] Esophageal   Lungs: [] Clear                [] No change from initial assessment   Post Tx Vascular Access: [x] N/A  AVF/AVG: Bleeding stopped with  Arterial Pressure for 10 min   Venous Pressure for 10 min      Cardiac:  [] Regular   [] Irregular   Rhythm:  [] Monitored   [] Not Monitored    CVC Catheter: [] N/A  Locking solution: Heparin 1:1000 U  Arterial port 1.9 ml   Venous port 1.9 ml   Edema:  [x] None  [] Generalized                     Skin:[x] Warm  [x] Dry [] Diaphoretic               [] Flushed  [] Pale [] Cyanotic Pain:  [x]0  []1-2  []3-4  []5-6   []7-8  []9-10         Post Treatment Note:   Pt tolerated dialysis well. Arterial and venous needles removed and pressure applied until bleeding stopped. Dry dressings applied. Bruit/Thrill present above and below dressings. POST TREATMENT PRIMARY NURSE HANDOFF REPORT:   Post Dialysis: LANG Akers RN        Time:  1181       Abbreviations: AVG-arterial venous graft, AVF-arterial venous fistula, IJ-Internal Jugular, Subcl-Subclavian, Fem-Femoral, Tx-treatment, AP/HR-apical heart rate, VSS- Vital Signs Stable, CVC- Central Venous Catheter, DFR-dialysate flow rate, BFR-blood flow rate, AP-arterial pressure, -venous pressure, UF-ultrafiltrate, TMP-transmembrane pressure, Kyle-Venous, Art-Arterial, RO-Reverse Osmosis

## 2021-10-27 NOTE — DISCHARGE INSTRUCTIONS
Patient Education        Pneumonia: Care Instructions  Overview     Pneumonia is an infection of the lungs. Most cases are caused by infections from bacteria or viruses. Pneumonia may be mild or very severe. If it is caused by bacteria, you will be treated with antibiotics. It may take a few weeks to a few months to recover fully from pneumonia, depending on how sick you were and whether your overall health is good. Follow-up care is a key part of your treatment and safety. Be sure to make and go to all appointments, and call your doctor if you are having problems. It's also a good idea to know your test results and keep a list of the medicines you take. How can you care for yourself at home? · Take your antibiotics exactly as directed. Do not stop taking the medicine just because you are feeling better. You need to take the full course of antibiotics. · Take your medicines exactly as prescribed. Call your doctor if you think you are having a problem with your medicine. · Get plenty of rest and sleep. You may feel weak and tired for a while, but your energy level will improve with time. · To prevent dehydration, drink plenty of fluids. Choose water and other clear liquids. If you have kidney, heart, or liver disease and have to limit fluids, talk with your doctor before you increase the amount of fluids you drink. · Take care of your cough so you can rest. A cough that brings up mucus from your lungs is common with pneumonia. It is one way your body gets rid of the infection. But if coughing keeps you from resting or causes severe fatigue and chest-wall pain, talk to your doctor. Your doctor may suggest that you take a medicine to reduce the cough. · Use a vaporizer or humidifier to add moisture to your bedroom. Follow the directions for cleaning the machine. · Do not smoke or allow others to smoke around you. Smoke will make your cough last longer.  If you need help quitting, talk to your doctor about stop-smoking programs and medicines. These can increase your chances of quitting for good. · Take an over-the-counter pain medicine, such as acetaminophen (Tylenol), ibuprofen (Advil, Motrin), or naproxen (Aleve). Read and follow all instructions on the label. · Do not take two or more pain medicines at the same time unless the doctor told you to. Many pain medicines have acetaminophen, which is Tylenol. Too much acetaminophen (Tylenol) can be harmful. · If you were given a spirometer to measure how well your lungs are working, use it as instructed. This can help your doctor tell how your recovery is going. · To prevent pneumonia in the future, talk to your doctor about getting a flu vaccine (once a year) and a pneumococcal vaccine (one time only for most people). When should you call for help? Call 911 anytime you think you may need emergency care. For example, call if:    · You have severe trouble breathing. Call your doctor now or seek immediate medical care if:    · You cough up dark brown or bloody mucus (sputum).     · You have new or worse trouble breathing.     · You are dizzy or lightheaded, or you feel like you may faint. Watch closely for changes in your health, and be sure to contact your doctor if:    · You have a new or higher fever.     · You are coughing more deeply or more often.     · You are not getting better after 2 days (48 hours).     · You do not get better as expected. Where can you learn more? Go to http://www.[employer].com/  Enter D336 in the search box to learn more about \"Pneumonia: Care Instructions. \"  Current as of: July 6, 2021               Content Version: 13.0  © 7019-5484 Healthwise, Incorporated. Care instructions adapted under license by [employer] (which disclaims liability or warranty for this information).  If you have questions about a medical condition or this instruction, always ask your healthcare professional. [employer] disclaims any warranty or liability for your use of this information. Patient Education        End-Stage Renal Disease: Care Instructions  Overview     End-stage renal (or kidney) disease happens when your kidneys can no longer do their jobs. They can't remove waste from your blood. And they aren't able to balance your body's fluids and chemicals. This stage of the disease usually occurs after you have chronic kidney disease for years. Now the kidneys work so poorly that you need dialysis or a kidney transplant to live. Dialysis is a treatment to help filter waste from your blood. A transplant is surgery to give you a healthy kidney from another person. Follow-up care is a key part of your treatment and safety. Be sure to make and go to all appointments, and call your doctor if you are having problems. It's also a good idea to know your test results and keep a list of the medicines you take. How can you care for yourself at home? · Be safe with medicines. Take your medicines exactly as prescribed. Call your doctor if you have any problems with your medicine. Make sure your doctor knows all of the medicines, vitamins, herbal products, and supplements you take. · Do not take anti-inflammatory medicines such as ibuprofen and naproxen. They can make chronic kidney disease worse. · If you have diabetes, do your best to keep your blood sugar in your target range. You may do this by taking medicine, eating healthy food, and exercising. · Do not smoke or use other tobacco products and limit alcohol. · Follow a diet plan that is easy on your kidneys. A dietitian can help you create an eating plan with the right amounts of salt (sodium), potassium, and protein. You may also need to limit how much fluid you drink each day. · Be active every day that you can, in any way that you can. Work with your doctor to decide what level of activity is right for you.   · If you have an advance directive, let your doctor know. It lets your doctor and loved ones know your health care wishes if you become unable to speak for yourself. When should you call for help? Call 911 anytime you think you may need emergency care. For example, call if:    · You passed out (lost consciousness). Call your doctor now or seek immediate medical care if:    · You have new or worse nausea and vomiting.     · You have much less urine than normal, or you have no urine.     · You are feeling confused or cannot think clearly.     · You have new or more blood in your urine.     · You have new swelling.     · You are dizzy or lightheaded, or feel like you may faint. Watch closely for changes in your health, and be sure to contact your doctor if you have any problems. Where can you learn more? Go to http://www.gray.com/  Enter C122 in the search box to learn more about \"End-Stage Renal Disease: Care Instructions. \"  Current as of: December 17, 2020               Content Version: 13.0  © 2006-2021 Healthwise, Incorporated. Care instructions adapted under license by [employer] (which disclaims liability or warranty for this information). If you have questions about a medical condition or this instruction, always ask your healthcare professional. [address] any warranty or liability for your use of this information.

## 2021-10-27 NOTE — PROGRESS NOTES
Progress Note    Wendy Miller  80 y.o. Admit Date: 10/18/2021  Principal Problem:    Pneumonia (10/19/2021) POA: Unknown    Active Problems:    Essential (primary) hypertension (9/1/2016) POA: Yes      ESRD (end stage renal disease) ([facility] (9/26/2020) POA: Yes      Secondary hyperparathyroidism of renal origin ([facility] (9/26/2020) POA: Unknown      Hypoalbuminemia (10/20/2021) POA: Unknown      Leucocytosis (10/20/2021) POA: Unknown      Nausea and vomiting (10/25/2021) POA: Unknown            Subjective:     Patient seen during dialysis, feels good, no Chest pain, no SOB, no more Vomiting, Was using his HD catheter as he was  Not allowing to cannulate for long time. Reviewed Dr Cuauhtemoc Beaver note from this morning. He has Cleared to use AVF  & arrange Catheter removal after criteria fulfills. Started  Using AVF with 16 gauge needle & blood Low 300 & worked  Fine without any Problem. .       A comprehensive review of systems was negative except for that written in the History of Present Illness.     Objective:     Visit Vitals  /66   Pulse 84   Temp 98 °F (36.7 °C) (Oral)   Resp 18   Ht 5' 6\" (1.676 m)   Wt 71.2 kg (157 lb)   SpO2 100%   BMI 25.34 kg/m²         Intake/Output Summary (Last 24 hours) at 10/27/2021 1517  Last data filed at 10/27/2021 1258  Gross per 24 hour   Intake    Output 2000 ml   Net -2000 ml       Current Facility-Administered Medications   Medication Dose Route Frequency Provider Last Rate Last Admin    polyethylene glycol (MIRALAX) packet 17 g  17 g Oral DAILY PRN Rosaura River MD        levoFLOXacin [facility]) tablet 500 mg  500 mg Oral Q48H Dennis Rogers MD   500 mg at 10/27/21 1500    amoxicillin-clavulanate (AUGMENTIN) 500-125 mg per tablet 1 Tablet  1 Tablet Oral Q24H Rosaura River MD        amoxicillin-clavulanate (AUGMENTIN) 500-125 mg per tablet 1 Tablet  1 Tablet Oral DIALYSIS MON, WED & Ninae Lilian Aragon MD        albuterol-ipratropium (DUO-NEB) 2.5 MG-0.5 MG/3 ML  3 mL Nebulization Q6H RT Giovanna Suarez MD   3 mL at 10/27/21 1502    melatonin tablet 5 mg  5 mg Oral QHS PRN Giovanna Suarez MD   5 mg at 10/26/21 2257    pantoprazole (PROTONIX) tablet 40 mg  40 mg Oral ACB&D Giovanna Suarez MD   40 mg at 10/27/21 0730    doxercalciferoL (HECTOROL) 4 mcg/2 mL injection 0.5 mcg  0.5 mcg IntraVENous DIALYSIS NUNO JONES & Armond Milner MD   0.5 mcg at 10/27/21 1005    ondansetron (ZOFRAN) injection 4 mg  4 mg IntraVENous Q8H PRN Merlinda Smock, DO   4 mg at 10/26/21 2257    abiraterone (Zytiga) tab 1,000 mg (Patient Supplied)  1,000 mg Oral DAILY Jovany Velazquez MD   1,000 mg at 10/27/21 0900    amLODIPine (NORVASC) tablet 10 mg  10 mg Oral DAILY Jovany Velazquez MD   10 mg at 10/27/21 0845    B complex-vitaminC-folic acid (NEPHROCAP) cap  1 Capsule Oral DAILY Jovany Velazquez MD   1 Capsule at 10/27/21 0845    calcium acetate(phosphat bind) (PHOSLO) capsule 667 mg  1 Capsule Oral TID WITH MEALS Jovany Velazquez MD   667 mg at 10/27/21 1454    predniSONE (DELTASONE) tablet 5 mg  5 mg Oral BID Xiao Velazquez MD   5 mg at 10/27/21 0845    sodium chloride (NS) flush 5-40 mL  5-40 mL IntraVENous Q8H Jovany Velazquez MD   40 mL at 10/27/21 1501    sodium chloride (NS) flush 5-40 mL  5-40 mL IntraVENous PRN Jovany Velazquez MD        acetaminophen (TYLENOL) tablet 650 mg  650 mg Oral Q6H PRN Jovany Velazquez MD   650 mg at 10/26/21 2235    Or    acetaminophen (TYLENOL) suppository 650 mg  650 mg Rectal Q6H PRN Jovany Velazquez MD        lidocaine (XYLOCAINE) 5 % ointment   Topical DAILY PRN Merlinda Smock, DO            Physical Exam:     Physical Exam:   General:  Alert, cooperative, no distress, appears stated age. Neck: Supple, symmetrical, trachea midline, no adenopathy, thyroid: no enlargement/tenderness/nodules, no carotid bruit and no JVD. Lungs:   Clear to auscultation bilaterally.    Heart:  Regular rate and rhythm, S1, S2 normal, no murmur, click, rub or gallop. Abdomen:   Soft, non-tender. Bowel sounds normal. No masses,  No organomegaly. Extremities: Extremities normal, atraumatic, no cyanosis or edema, Left arm AVF has good thrill & Bruit. Skin: Skin color, texture, turgor normal. No rashes or lesions         Data Review:    CBC w/Diff    Recent Labs     10/27/21  0328 10/26/21  0104 10/26/21  0104 10/25/21  0119 10/25/21  0119   WBC 23.3*  --  26.0*  --  25.5*   RBC 3.29*  --  3.43*  --  3.65*   HGB 10.6*  --  10.8*  --  11.5*   HCT 31.6*  --  33.0*  --  35.1*   MCV 96.0   < > 96.2   < > 96.2   MCH 32.2   < > 31.5   < > 31.5   MCHC 33.5   < > 32.7   < > 32.8   RDW 12.6   < > 12.7   < > 12.7    < > = values in this interval not displayed. Recent Labs     10/27/21  0328 10/26/21  0104 10/26/21  0104 10/25/21  0119 10/25/21  0119   MONOS 4  --  2*  --  5   EOS 0  --  0  --  0   BASOS 0   < > 0   < > 0   RDW 12.6   < > 12.7   < > 12.7    < > = values in this interval not displayed. Comprehensive Metabolic Profile    Recent Labs     10/27/21  0328 10/26/21  0104 10/25/21  0119   * 135* 134*   K 3.3* 3.7 3.4*    96* 91*   CO2 23 27 31   BUN 56* 47* 59*   CREA 7.63* 6.46* 7.83*    Recent Labs     10/27/21  0328 10/26/21  0104 10/25/21  0119   CA 7.1* 7.1* 8.0*   PHOS 4.5 5.0* 5.0*                On 3 K ,2.5 ca bath. Impression:       Active Hospital Problems    Diagnosis Date Noted    Nausea and vomiting 10/25/2021    Hypoalbuminemia 10/20/2021    Leucocytosis 10/20/2021    Pneumonia 10/19/2021    ESRD (end stage renal disease) ([facility] 09/26/2020    Secondary hyperparathyroidism of renal origin ([facility] 09/26/2020    Essential (primary) hypertension 09/01/2016      AVF functioning , has Successful cannulation & worked well. .  WBC count finally started coming down. Off Prednisone      [facility] [address] as ordered.  Continue OP dialysis  As scheduled q Mon,Wed & Friday, patient is agreeable to cannulate AVF, noitfied  OP Dialysis Unit Charge Nurse to USE  AVF, not to use catheter. When criteria fulfills arrange Removal of HD catheter as  OP with Dr. Larissa Samaniego. Discussed with Dr. Alysa Hobbs  patient's wife.       Naomie Bradford MD

## 2021-10-27 NOTE — PROGRESS NOTES
[facility]  [facility]    Patient: Adan Lima Age: 80 y.o. : 1935 MR#: [id number] SSN: [id number]  Date/Time: 10/27/2021     Subjective:     Patient was seen twice. First time I saw him in dialysis room. Second time I saw him in his room with his wife. Patient feels much better. Tolerating diet without any nausea vomiting. No chest pain or stomach pain. No headaches or dizziness. He worked with PT and OT and prefers going home. His wife is in agreement with it. Objective:   VS:   Visit Vitals  /66   Pulse 86   Temp 98.2 °F (36.8 °C) (Oral)   Resp 18   Ht 5' 6\" (1.676 m)   Wt 71.2 kg (157 lb)   SpO2 100%   BMI 25.34 kg/m²      Tmax/24hrs: Temp (24hrs), Av.1 °F (36.7 °C), Min:97.7 °F (36.5 °C), Max:98.6 °F (37 °C)  IOBRIEF  No intake or output data in the 24 hours ending 10/27/21 1128    General:  Alert, cooperative, no acute distress   Cardiovascular: S1S2 - regular , No Murmur   Pulmonary: clear breath sound bibasilar without wheezes or rhonchi. .  Right TDC catheter is in situ  GI:  +BS in all four quadrants, soft, non-tender, Ventral hernia - Small reducible and nontender  : Burris catheter in situ, no discharge from penis  Extremities:  No edema. Neuro: Alert, moves all extremities well. No visible tremors    Assessment/Plan:   HPI : Patient with history of carcinoma prostate with multiple bone meta stasis, end-stage renal disease on hemodialysis, admitted with episode of vomiting which was brown-hematemesis also associated with severe constipation. Patient had recurrence of symptoms on 10/22/2021 in p.m.     1. Acute nausea due to severe gaseous distention of the stomach and moderate gaseous distention of small and large bowel loops. Clinically better. S/p NGT placement  2. Hematemesis x1, no recurrence  3. Severe constipation  4. End-stage renal disease on hemodialysis  5. Metastatic prostate CA with osseous metastasis  6. Chronic urinary retention and chronic indwelling Burris catheter  7. Ventral hernia without obstruction or incarceration reducible  8. Suspected aspiration pneumonia - multilobar pneumonia - On CT Chest, clinically improving  9. Coag negative staph bacteremia, most likely contamination - resolved  10. Hypokalemia    11. Persistent leukocytosis could be leukemoid reaction from #5    Plan. Continue Augmentin and Levaquin until October 29, 2021  Discussed with Dr. Jeet Damian who wants to remove [facility] as patient's graft worked without any issue today. Spoke to Dr. Mohamud Hinojosa who will remove this [facility] in his office in 2 weeks. Discussed with patient and wife about it and they will go to his office to get it removed  PT and OT input noted. Wife wants patient coming home with home health care  Continue dialysis outpatient  Continue PPI twice daily  Monitor CBC as an outpatient  Continue Burris catheter and follow-up with urologist as an outpatient. Patient's urologist is Dr. Gaetano Olguin. Discharge patient home today      Case discussed with:  [x]Patient  [x] Family  [x]Nursing  []Case Management     DVT Prophylaxis:  []Lovenox  []Hep SQ  [x]SCDs/possible GI bleed[]Coumadin   []On Heparin gtt    [] Eliquis [] Xarelto     Total time to take care of this patient was 30 minutes and more than 50% of time was spent counseling and coordinating care.        Medications:   Current Facility-Administered Medications   Medication Dose Route Frequency    albuterol-ipratropium (DUO-NEB) 2.5 MG-0.5 MG/3 ML  3 mL Nebulization Q6H RT    melatonin tablet 5 mg  5 mg Oral QHS PRN    pantoprazole (PROTONIX) tablet 40 mg  40 mg Oral ACB&D    doxercalciferoL (HECTOROL) 4 mcg/2 mL injection 0.5 mcg  0.5 mcg IntraVENous DIALYSIS MON, WED & FRI    piperacillin-tazobactam (ZOSYN) 2.25 g in 0.9% sodium chloride (MBP/ADV) 50 mL MBP  2.25 g IntraVENous Q8H    Piperacillin-tazobactam (ZOSYN) 0.75 gm Supplemental Dosing by Pharmacy   Other Rx Dosing/Monitoring    polyethylene glycol (MIRALAX) packet 17 g  17 g Oral DAILY    ondansetron (ZOFRAN) injection 4 mg  4 mg IntraVENous Q8H PRN    abiraterone (Zytiga) tab 1,000 mg (Patient Supplied)  1,000 mg Oral DAILY    amLODIPine (NORVASC) tablet 10 mg  10 mg Oral DAILY    B complex-vitaminC-folic acid (NEPHROCAP) cap  1 Capsule Oral DAILY    calcium acetate(phosphat bind) (PHOSLO) capsule 667 mg  1 Capsule Oral TID WITH MEALS    predniSONE (DELTASONE) tablet 5 mg  5 mg Oral BID    sodium chloride (NS) flush 5-40 mL  5-40 mL IntraVENous Q8H    sodium chloride (NS) flush 5-40 mL  5-40 mL IntraVENous PRN    acetaminophen (TYLENOL) tablet 650 mg  650 mg Oral Q6H PRN    Or    acetaminophen (TYLENOL) suppository 650 mg  650 mg Rectal Q6H PRN    lidocaine (XYLOCAINE) 5 % ointment   Topical DAILY PRN       Labs:    Recent Labs     10/27/21  0328 10/26/21  0104 10/25/21  0119   WBC 23.3* 26.0* 25.5*   HGB 10.6* 10.8* 11.5*   HCT 31.6* 33.0* 35.1*    335 321     Recent Labs     10/27/21  0328 10/26/21  0104 10/25/21  0119   * 135* 134*   K 3.3* 3.7 3.4*    96* 91*   CO2 23 27 31   * 110* 97   BUN 56* 47* 59*   CREA 7.63* 6.46* 7.83*   CA 7.1* 7.1* 8.0*   PHOS 4.5 5.0* 5.0*     Disclaimer: Sections of this note are dictated using utilizing voice recognition software, which may have resulted in some phonetic based errors in grammar and contents. Even though attempts were made to correct all the mistakes, some may have been missed, and remained in the body of the document. If questions arise, please contact our department.       Signed By: Christian De Jesus MD     October 27, 2021

## 2021-10-27 NOTE — PROGRESS NOTES
Discharge planning    Discharge order noted for today. Spoke with Grayling Duane, spouse concerning discharge plan. Freedom of choice obtained for any home health agency that can accommodate. Home health referral placed in que for [facility] agency and spoke with Vandana Olmstead. Patient and spouse and are agreeable to the transition plan today. Transport has been arranged with spouse. Patient's  home health  orders have been forwarded to  [facility] home health  agency via [employer]. Updated bedside RN, Gordon,  to the transition plan. Discharge information has been documented on the AVS.     Spoke with Mrs. Rubi Seay concerning bedside commode order. She stated \" I changed my mind. I don't want the commode. \"       LEAH DavilaN, RN  Pager # [phone]  Care Manager

## 2021-10-28 NOTE — HOME HEALTH
Skilled services/Home bound verification:     Skilled Reason for admission/summary of clinical condition:  HPI:    Ninfa Franco is a 80 y.o. male metastatic prostate cancer, colon cancer, atherosclerotic disease, hypertension, and ESRD on MWF hemodialysis who is now admitted for multi focal pneumonia. Per patient and his wife, he went to his hemodialysis session today (Monday) per usual.  He was given a strawberry protein shake at the time, which he drank quickly. His stomach did not feel right and when he got home he violent ly vomited all over his bed sheets. Patient's wife had picture on her phone of the vomit. It was dark brown and copious. Besides the protein shake, patient also had a grilled cheese before vomiting. No coffee ground appearance. Both wife and patient denied hematemesis or any bright red blood. EMS was called to the scene who described the vomit as dried blood appearance which prompted the ER to take an upper GI bleed  approach to the patient. However, lab findings and CT scan demonstrated multifocal pneumonia. Patient no longer had any abdominal/upset stomach. Denies history of hematemesis and peptic ulcer disease and is not on any anticoagulants. He is not dizzy or lightheaded. Per wife, he has been coughing more with phlegm. .  This patient is homebound for the following reasons Requires considerable and taxing effort to leave the home . Caregiver: spouse. Caregiver assists with IADL's. Medications reconciled and all medications are available in the home this visit. The following education was provided regarding medications, medication interactions, and look alike medications (specify): [place] Stands Medications  are effective at this time.       High risk medication teaching regarding anticoagulants, hyperglycemic agents or opiod narcotics performed (specify) none,    Dr. Reza Seo  notified of any discrepancies/medication interactions has severe interaction on   LEVAQUIN and . CALCIUM ACETATE  CLINICAL EFFECTS: Simultaneous administration or administration of products containing aluminum, calcium, iron, lanthanum, magnesium, and/or zinc close to the administration time of an oral quinolone may result in decreased absorption and clinical effectiveness of the quinolone. Home health supplies by type and quantity ordered/delivered this visit include: none    Patient education provided this visit to include: HEP, fall prevention, dc planning. Patient/caregiver degree of understanding:good    Home exercise program/Homework provided: patient educated with HEP including seated hip flex, knee extension, hip abduction, hip adduction, ankle PF and DF and ball squeeze x 20 reps x 3 sets daily to improve MMT on BLE to facilitate with improved bed mobility, transfers and gait with AD. patient also educated with deep breathing exercises to be done daily x 10 reps x 3 sets to prevent SOB during activity. Patient educated with fall prevention technique by decluttering space, proper use of AD and footwear    Pt/Caregiver instructed on plan of care and are agreeable to plan of care at this time. Physician Dr. Gissell Ponce notified of patient admission to home health and plan of care including anticipated frequency of 1w1 2w4 1w1 for PT    Discharge planning discussed with patient and caregiver. Discharge planning as follows: dc to family with MD supervision when all goals are met. Pt/Caregiver did verbalize understanding of discharge planning. Next MD appointment TBD Patient/caregiver encouraged/instructed to keep appointment as lack of follow through with physician appointment could result in discontinuation of home care services for non-compliance. PMHx: [facility])        Prostate    Cardiac echocardiogram 08/29/2016      EF 60-65%. No WMA. Mild LVH. Indeterminate diastolic fx. RVSP 35 mmHg. No significant valvular heart disease.     Cardiac nuclear imaging test, abnormal 08/29/2016      Intermediate risk. Previous inferior infarction w/very mild fabiana-infarct ischemia. Inferior hypk. EF 68%. Nondiagnostic EKG on pharm stress test.  Pt's BP increased from 193/96 to 207/83 during study.  Carotid duplex 08/30/2016      Mild <50% bilateral ICA stenosis.  Chronic kidney disease        dialysis    Colonic mass        per Dr Jey To notes    Enlarged prostate      Hypertension   S: chief complaint: pain on L foot with pain scale 5/10 that is managed by rest and tylenol as needed. Patient also complained of difficulty with functional mobility and gait   O:Patients Goals= Be able to go back to PLOF  Wound/Incision: location, description, drainage: has dialysis port on L arm and R side of chest   PLOF: Lives in a1 level house with spouse, has 4 steps to main floor, prior to referral, he was using RW for mobility and needed assist from wife with ADL's and IADL's  STRENGTH R hip flexor, extensor hip abductors, adductors 3+/5, L hip flexor, extensor, hip abductors adductors 3-/5, R knee flexor and extensor 3+/5, L knee flexor and extensor 3-/5  FTSTS 57 seconds   BALANCE  Tinetti 10/28 high fall risk due to reduced strength on BLE, gait deviation and decreased efficiency of balance reactions. Patient demonstrated poor use of hip and ankle strategy during standing balance activity. Patient requires support from AD at all times for safety and stability. GAIT Patient was able to ambulate with RW x 15 feet with Min A x 1 with noted slow shuffling gait, forward stooped posture with noted decreased step length and stride length on B feet.  Patient was educated to maintain upright posture and to maintain clear mobility pathway as well as heel to toe gait pattern to prevent LOB and falls   BED MOBILITY Patient needed Min A x1 with bed mobility   TRANSFERS Patient needed Mod A x 1 with bed, chair adn toilet transfers   ADDITIONAL NOTES: patient to followup with MD with regards to michel care   A: PT evaluation completed with the presence of spouse  who is the primary CG, POC established, Med rec done, HEP established  P:Home Safety eval/recommendations: Home health physical therapy initial evaluation performed. Patient demonstrates decreased strength, balance, and endurance which increases patient's overall fall risk and burden of care. Patient would benefit from home health physical therapy to improve balance, strength, and endurance which would decrease fall risk and allow patient to return to prior level of function once all functional goals or full rehab potential is met. patient educated with HEP including seated hip flex, knee extension, hip abduction, hip adduction, ankle PF and DF and ball squeeze x 20 reps x 3 sets daily to improve MMT on BLE to facilitate with improved bed mobility, transfers and gait with AD. patient also educated with deep breathing exercises to be done daily x 10 reps x 3 sets to prevent SOB during activity.  Patient educated with fall prevention technique by decluttering space, proper use of AD and footwear

## 2021-10-28 NOTE — Clinical Note
patient will be seen 1w1 2w4 1w1 for PT to improve overall functional mobility by graded therapeutic exercises, therapeutic activities, gait training, balance training and patient/caregiver education for safety at home.   Thank you for your referral

## 2021-11-03 NOTE — HOME HEALTH
[facility] [place] [facility]  Total Joint Discharge Summary      Patient ID:  Rashmi Mcgee  [id number]  68 y.o.  1941    Admit date: 7/5/2018  Discharge date and time: 7-6-18  Admitting Physician: Claudius Litten, MD  Surgeon: Same  Admission Diagnoses: Unilateral primary osteoarthritis, right knee [M17.11]  Discharge Diagnoses: Principal Problem: Total knee replacement status, right (7/6/2018)    Active Problems:    COPD (chronic obstructive pulmonary disease) ([facility] [address] (8/13/2009)      Osteoarthritis (7/5/2018)                                Perioperative Antibiotics: Ancef 1 to 2 mg was given depending on patient's weight. If allergic to Ancef or due to other indications, patient was given Vancomycin. Hospital Medications given:   [unfilled]  [unfilled]  [unfilled]    Discharge Medications given:  Current Discharge Medication List      START taking these medications    Details   oxyCODONE IR (ROXICODONE) 5 mg immediate release tablet Take 1-2 Tabs by mouth every three (3) hours as needed. Max Daily Amount: 80 mg.  Qty: 60 Tab, Refills: 0    Associated Diagnoses: Total knee replacement status, right         CONTINUE these medications which have CHANGED    Details   aspirin delayed-release 81 mg tablet Take 1 Tab by mouth every twelve (12) hours every twelve (12) hours for 30 days. Qty: 60 Tab, Refills: 0         CONTINUE these medications which have NOT CHANGED    Details   lisinopril (PRINIVIL, ZESTRIL) 10 mg tablet Take 10 mg by mouth nightly. Indications: hypertension      amLODIPine (NORVASC) 5 mg tablet Take 5 mg by mouth nightly. Indications: hypertension      FLUoxetine (PROZAC) 40 mg capsule Take 40 mg by mouth nightly. Indications: ANXIETY WITH DEPRESSION      cholecalciferol (VITAMIN D3) 1,000 unit tablet Take 5,000 Units by mouth daily. tiotropium bromide (SPIRIVA RESPIMAT) 2.5 mcg/actuation inhaler Take 1 Puff by inhalation daily.  Take / use AM day of surgery  per anesthesia SUBJECTIVE: Spouse states yesterday after HD pt was unable to get up the stairs yesterday, and had to have her son help him up. Pt states he has no pain today, and feels fine. Caregiver involvement: Spouse present today assisting w/ meals, medications, and ADLs. Medications reconciled and all medications are available in the home this visit. The following education was provided regarding medications, medication interactions, and look a like medications: continue meds as prescribed. .  Medications  are effective at this time. Home health supplies by type and quantity ordered/delivered this visit include:     OBJECTIVES:  See Care Plan Interventions    ASSESSMENT/Progress toward goals:  Pt continues to demonstrates slow shuffling gait, forward stooped posture with noted decreased step length and stride length on B feet, despite constant vcs to maintain upright posture. Transfer mechanics improved from mod A to min A/cga post verbal cues on technique to anterior weight shift/scooting to front of chair, however note occasional LLE neglect secondary to knee pain, which increases difficulty w/ transfers resulting in unequal weight shifting to push up into standing. Pt complained of Leg length discrepancy and wearing shoe lift in L shoe, however per pt presentation, pt demonstrates R lateral lean, dragging RLE to advance forward which increases his risk for falls.  HHPT medically necessary to improve balance, strength, and endurance which would decrease fall risk and allow patient to return to prior level of function once all functional goals or full rehab potential is met    Continued need for the following skills: [facility] Physical Therapy    PLAN: Continue progressing HEP provide updated copy, and review progress towards Tinetti balance/gait assessment, and FTSTS test.     The following discharge planning was discussed with the pt/caregiver: HHPT frequency w/ planned sup on 11/28  DC on  11/29 to self, family, and under MD supervision when goals met, or max benefits achieved. - pt/cg verbalized understanding. protocols. cyanocobalamin (VITAMIN B12) 1,000 mcg/mL injection 1,000 mcg by IntraMUSCular route every seven (7) days. albuterol sulfate (PROAIR RESPICLICK IN) Take 2 Puffs by inhalation daily as needed. Take / use AM day of surgery  per anesthesia protocols if needed         STOP taking these medications       HYDROcodone-acetaminophen (NORCO) 7.5-325 mg per tablet Comments:   Reason for Stopping:         celecoxib (CELEBREX) 100 mg capsule Comments:   Reason for Stopping:                Additional DVT Prophylaxis:  JOSUÉ Hose,Plexi-Pulse    Postoperative transfusions:   none  Post Op complications: none    Hemoglobin at discharge:   Lab Results   Component Value Date/Time    HGB 9.9 (L) 07/06/2018 04:33 AM       Wound appears to be healing without any evidence of infection. Physical Therapy started on the day following surgery and progressed to independent ambulation with the aid of a walker. At the time of discharge, able to go up and down stairs and had understanding of precautions needed following surgery.       PT/OT:            Assistive Device: Walker (comment)                Discharged to: home    Discharge instructions:  -Rx pain medication given  - Anticoagulate with: Ecotrin 81 mg PO BID x 4 weeks  -Resume pre hospital diet             -Resume home medications per medical continuation form     -Ambulate with walker, appropriate total joint protocol  -Follow up in office as scheduled       Signed:  YUNG Gilbert  7/6/2018  7:25 AM

## 2021-11-08 NOTE — HOME HEALTH
SUBJECTIVE: \"I feel pretty good today, just a bit tired. \"    Caregiver involvement: Spouse present today assisting w/ meals, medications, and ADLs. Medications reconciled and all medications are available in the home this visit. The following education was provided regarding medications, medication interactions, and look a like medications: continue meds as prescribed. .  Medications  are effective at this time. Home health supplies by type and quantity ordered/delivered this visit include:       OBJECTIVES:  See Care Plan Interventions         ASSESSMENT/Progress toward goals:  Pt demonstrates 4 point improvement on Tinetti balance/gait assessment and is progressing towards goal of 16/28, however is considered at increased risk for falls w/ significant gait deficits and dynamic balance/foot ankle, hip balance strategies. Pt was able to complete FTSTS test w/ BUE support in 33 seconds which is progress towards goal of 25 seconds compared to 57 seconds at IE. Recommend pt have handrails installed at the back entrance steps to garage to improve mobility and decrease fall risk. Pt continues to demonstrates R side neglect w/ transfers w/ decreased weight bearing, however improved post verbal cues. HHPT medically necessary to address decreased strength, gait mobility, transfers, HEP progression, reduce fall risk, and restore (I) w/ ADLs/IADLs. Continued need for the following skills: HH Physical Therapy       PLAN: Continue progressing HEP, functional transfers, and strengthening next visit. The following discharge planning was discussed with the pt/caregiver: HHPT frequency w/ planned sup on 11/28  DC on  11/29 to self, family, and under MD supervision when goals met, or max benefits achieved. - pt/cg verbalized understanding.

## 2021-11-18 NOTE — HOME HEALTH
Patient is progressing well with HHPT. At reassessment, pt presents with the following clinical findings:     SUBJECTIVE: Patient denies fall since evaluation. Patient c/o throbbing pain on L foot with highest pain level of 3/10 and least pain level of 0/10 for the past 24 hours. Patient reported she/he last took 2  tab of 500mg this morning  Patient verbalized understanding of pain management techniques such as taking pain medication as prescribed by MD, application of cold packs on painful area x 20 mins, positioning and relaxation. CAREGIVER ASSISTANCE: Primary caregiver is available daily and is able to assist with bathing, dressing, walking, turn in bed, bathroom, meal prep and setup, medication management, grocery shopping, household chores and transportation to MD appointment  MEDICATIONS RECONCILED AND UPDATED: no changes in medications at this time  MMT: presents with :  R hip flex 4/5   R hip abd 4/5   R hip add 4/5   R hip ext 4/5  R knee flex 4/5  R knee ext 4/5    R ankle DF 4/5  L hip flex 4-/5  L hip abd 4-/5  L hip ext 4-/5    L hip add 4-/5  L knee flex 3+/5  L knee ext 4-/5  L ankle DF 4-/5    FTST supervision assistance d/t weakness   compare to STRENGTH R hip flexor, extensor hip abductors, adductors 3+/5, L hip flexor, extensor, hip abductors adductors 3-/5, R knee flexor and extensor 3+/5, L knee flexor and extensor 3-/5 FTSTS 57 seconds  during initial evaluation. BALANCE: 14/28 on Tinetti balance and gait test  compare to 10/28 during initial evaluation/reassessment visit. Patient verbalized understanding of fall prevention techniques such as monitor medication that may alter mental status,  keeping walking pathways clean and clear of clutter and throw rugs, keeping night lights on for ability to see and easily, good visibility for safe transitioning thresholds and proper use and good compliance of using AD.     ENDURANCE: Patient verbalized understanding of energy conservation techniques such as rest, slow down, pacing and complete tasks in manageable steps without cueing. BED MOBILITY: requires minimal to moderate assistance in bed mobility d/t weakness and height of bed (too high) with cues compare to Min A x1 during initial evaluation/reassessment visit. TRANSFERS requires supervision to touching assistance in transfers to and from bed, chair, toilet  using FWW with cues for safel maneuvering and distance to FWW compare to  mod A x 1 during initial evaluation/reassessment visit. GAIT: able to ambulat 100ft inside home using FWW with supervision assistance . He ambulates uneven stride/step length, decreased L foot clearance, decreased L hip/knee flexion in pre/midswing phase of gait compare to able to ambulate with RW x 15 feet with Min A x 1 with noted slow shuffling gait, forward stooped posture with noted decreased step length and stride length on B feet during initial evaluation/reassessment visit. STAIRS: requires moderate assistance to ascend and descend 4 outside steps with railing, non-reciprocal pattern compare to NA during initial evaluation/reassessment visit. PATIENT/CAREGIVER EDUCATION THIS VISIT: fall prevention techniques. HEP: Patient verbalized understanding of HEP and demonstrates poor compliance. Emphasized to patient importance of good compliance to HEP to promote functional carry-over. Patient verbalized understanding. HEP consists of: Seated heel/toe raises, LAQ x 5 seconds, sitting knee flexion marching, seated chair push ups mod (I) w/ BUE support. 1 set 10xea. Pt performed 1 set 10xea w/ demonstration for correct technique. Pt demonstrates significant L iliopsoas strength   ASSESSMENT: Patient was able to tolerate PT session this visit and was able to show improvement with bed mobility, transfers and gait as evidenced by being able to demonstrates increased level of independence with bed mobility, transfers and household ambulation using FWW.    PLAN: Patient will continue to benefit from HHPT for  graded therapeutic exercises, balance training, bed mobility training, transfers training, HEP ed, education on energy conservation techniques, gait/steps management training and d/c planning. DISCHARGE PLANNING DISCUSSED: Care coordination done with PTA regarding progress made towards goal, POC and d/c plans. D/c to self and family under MD supervision when all goals are met. Anticipated last day of therapy on 11/29/21. Patient verbalized agreement.

## 2021-12-08 NOTE — HOME HEALTH
Unobserved d/c warranted d/t unable to reached/locate patient, based on most recent reassessment visit on 11/18/21, patient presents with the following clinical findings:   MMT: presents with : R hip flex 4/5   R hip abd 4/5   R hip add 4/5   R hip ext 4/5  R knee flex 4/5  R knee ext 4/5   R ankle DF 4/5  L hip flex 4-/5  L hip abd 4-/5  L hip ext 4-/5   L hip add 4-/5  L knee flex 3+/5  L knee ext 4-/5  L ankle DF 4-/5   FTST supervision assistance d/t weakness   BALANCE: 14/28 on Tinetti balance and gait test. Patient verbalized understanding of fall prevention techniques such as monitor medication that may alter mental status, keeping walking pathways clean and clear of clutter and throw rugs, keeping night lights on for ability to see and easily, good visibility for safe transitioning thresholds and proper use and good compliance of using AD. ENDURANCE: Patient verbalized understanding of energy conservation techniques such as rest, slow down, pacing and complete tasks in manageable steps without cueing. BED MOBILITY: requires minimal to moderate assistance in bed mobility d/t weakness and height of bed (too high) with cues. TRANSFERS requires supervision to touching assistance in transfers to and from bed, chair, toilet using FWW with cues for safely maneuvering and distance to [address]. GAIT: able to ambulation 100ft inside home using FWW with supervision assistance . He ambulates uneven stride/step length, decreased L foot clearance, decreased L hip/knee flexion in pre/midswing phase of gait. STAIRS: requires moderate assistance to ascend and descend 4 outside steps with railing, non-reciprocal pattern. PATIENT/CAREGIVER EDUCATION THIS VISIT: fall prevention techniques. HEP: Patient verbalized understanding of HEP and demonstrates poor compliance. Emphasized to patient importance of good compliance to HEP to promote functional carry-over. Patient verbalized understanding.   HEP consists of: Seated heel/toe raises, LAQ x 5 seconds, sitting knee flexion marching, seated chair push ups mod (I) w/ BUE support. 1 set 10xea. Pt performed 1 set 10xea w/ demonstration for correct technique.  Pt demonstrates significant L iliopsoas strength

## 2021-12-30 NOTE — ANESTHESIA PREPROCEDURE EVALUATION
Relevant Problems   RESPIRATORY SYSTEM   (+) Pneumonia   (+) Shortness of breath      CARDIOVASCULAR   (+) CAD (coronary artery disease)   (+) Essential (primary) hypertension      RENAL FAILURE   (+) Acute renal failure (ARF) (HCC)   (+) Chronic renal failure   (+) ESRD (end stage renal disease) (HCC)   (+) Hydronephrosis      HEMATOLOGY   (+) Anemia       Anesthetic History   No history of anesthetic complications            Review of Systems / Medical History  Patient summary reviewed and pertinent labs reviewed    Pulmonary  Within defined limits                 Neuro/Psych   Within defined limits           Cardiovascular    Hypertension          CAD    Exercise tolerance: <4 METS     GI/Hepatic/Renal         Renal disease: dialysis and ESRD       Endo/Other        Arthritis     Other Findings              Physical Exam    Airway  Mallampati: III  TM Distance: 4 - 6 cm  Neck ROM: decreased range of motion   Mouth opening: Diminished (comment)     Cardiovascular    Rhythm: regular  Rate: normal         Dental    Dentition: Full lower dentures and Full upper dentures     Pulmonary  Breath sounds clear to auscultation               Abdominal  GI exam deferred       Other Findings            Anesthetic Plan    ASA: 4  Anesthesia type: MAC and regional      Post-op pain plan if not by surgeon: peripheral nerve block single      Anesthetic plan and risks discussed with: Patient

## 2021-12-30 NOTE — ANESTHESIA POSTPROCEDURE EVALUATION
Procedure(s):  LEFT ARM ARTERIO VENOUS GRAFT THROMBECTOMY / BALLOON ANGIOPLASTY. MAC, regional    Anesthesia Post Evaluation      Multimodal analgesia: multimodal analgesia used between 6 hours prior to anesthesia start to PACU discharge  Patient location during evaluation: bedside  Patient participation: complete - patient participated  Level of consciousness: awake  Pain management: adequate  Airway patency: patent  Anesthetic complications: no  Cardiovascular status: stable  Respiratory status: acceptable  Hydration status: acceptable  Post anesthesia nausea and vomiting:  controlled  Final Post Anesthesia Temperature Assessment:  Normothermia (36.0-37.5 degrees C)      INITIAL Post-op Vital signs:   Vitals Value Taken Time   /52 12/30/21 1715   Temp 36.3 °C (97.4 °F) 12/30/21 1647   Pulse 78 12/30/21 1719   Resp 19 12/30/21 1719   SpO2 100 % 12/30/21 1720   Vitals shown include unvalidated device data.

## 2021-12-30 NOTE — H&P
Surgery History and Physical    Subjective:      Ann Burnett is a 80 y.o.  male who presents with clotted AV graft left arm. Last dialysis Monday. Noted to be a clotted graft on Wednesday. .    Patient Active Problem List    Diagnosis Date Noted    Nausea and vomiting 10/25/2021    Hypoalbuminemia 10/20/2021    Leucocytosis 10/20/2021    Pneumonia 10/19/2021    Polyp of ascending colon 10/01/2020    Postobstructive diuresis 09/30/2020    Hydronephrosis 09/29/2020    UTI (urinary tract infection) 09/29/2020    ESRD (end stage renal disease) ([facility] [address] 09/26/2020    Hyperkalemia 65/67/9590    Metabolic acidosis 17/18/8451    Secondary hyperparathyroidism of renal origin ([facility] [address] 09/26/2020    Anemia 09/26/2020    Acute renal failure (ARF) ([facility] [address] 09/25/2020    Decrease in appetite 09/25/2020    Elevated prostate specific antigen (PSA) 09/25/2020    Osteoarthrosis 09/25/2020    Shortness of breath 09/25/2020    Chronic renal failure 09/25/2020    CAD (coronary artery disease) 09/01/2016    Essential (primary) hypertension 09/01/2016    Troponin level elevated 09/01/2016    Traumatic rhabdomyolysis ([facility] [address] 08/29/2016     Past Medical History:   Diagnosis Date    Cancer [facility])     Prostate    Cardiac echocardiogram 08/29/2016    EF 60-65%. No WMA. Mild LVH. Indeterminate diastolic fx. RVSP 35 mmHg. No significant valvular heart disease.  Cardiac nuclear imaging test, abnormal 08/29/2016    Intermediate risk. Previous inferior infarction w/very mild fabiana-infarct ischemia. Inferior hypk. EF 68%. Nondiagnostic EKG on pharm stress test.  Pt's BP increased from 193/96 to 207/83 during study.  Carotid duplex 08/30/2016    Mild <50% bilateral ICA stenosis.       Chronic kidney disease     on HD MWF ad [facility]    Colonic mass     per Dr Gopi Johnson notes    Enlarged prostate     Hypertension       Past Surgical History:   Procedure Laterality Date    COLONOSCOPY N/A 10/1/2020 COLONOSCOPY, b'x, w tattoo w polypectomy performed [facility]st [facility]  10/1/2020         Nancy Zurita  10/1/2020         [facility]         LA INSJ TUNNELED CVC W/O SUBQ PORT/ AGE 5 YR/> N/A 9/28/2020    INSERTION TUNNELED CENTRAL VENOUS CATHETER performed by Meri Meek MD at [facility] CATH LAB    VASCULAR SURGERY PROCEDURE UNLIST      dialysis access- right neck      Social History     Tobacco Use    Smoking status: Never Smoker    Smokeless tobacco: Never Used   Substance Use Topics    Alcohol use: No      History reviewed. No pertinent family history. Prior to Admission medications    Medication Sig Start Date End Date Taking? Authorizing Provider   predniSONE (DELTASONE) 5 mg tablet TAKE 1 TABLET BY MOUTH 2 TIMES A DAY 12/14/21  Yes Prema Perez MD   acetaminophen (TYLENOL) 500 mg tablet Take 500 mg by mouth every six (6) hours as needed for Pain. Yes Provider, Historical   pantoprazole (PROTONIX) 40 mg tablet Take 1 Tablet by mouth Before breakfast and dinner. 10/27/21  Yes Stu Barreto MD   amLODIPine (NORVASC) 10 mg tablet TAKE 1 TABLET BY MOUTH EVERY DAY 5/21/21  Yes Provider, Historical   calcium acetate,phosphat bind, (PHOSLO) 667 mg cap Take 1 Capsule by mouth daily. 6/14/21  Yes Provider, Historical   abiraterone (Zytiga) 250 mg tab Take four tablets by mouth daily on an empty stomach. Take one hour prior to food or two hours after food. Tablets should be swallowed whole with water. Do not crush or chew tablets. 5/4/21  Yes Prema Perez MD   B complex w-C no.20/folic acid (TRIPHROCAPS PO) Take 1 Cap by mouth daily. Yes Provider, Historical   abiraterone 250 mg tab TAKE 4 TABLETS BY MOUTH 1 TIME A DAY ON AN EMPTY STOMACH. TAKE ONE HOUR PRIOR TO FOOD OR TWO HOURS AFTER FOOD. SWALLOW TABLETS WHOLE WITH WATER AND DO NOT CRUSH OR CHEW TABLETS.   Patient not taking: Reported on 2021   Prema Perez MD     No Known Allergies      Review of Systems:    A comprehensive review of systems was negative except for that written in the History of Present Illness. Objective:     Patient Vitals for the past 8 hrs:   BP Temp Pulse Resp SpO2 Height Weight   21 1235 135/73 98.7 °F (37.1 °C) 68 20 99 % 5' 6\" (1.676 m) 70.3 kg (155 lb)       Temp (24hrs), Av.7 °F (37.1 °C), Min:98.7 °F (37.1 °C), Max:98.7 °F (37.1 °C)      Physical Exam:  LUNG: clear to auscultation bilaterally, HEART: S1, S2 normal, ABDOMEN: soft, non-tender. Bowel sounds normal. No masses,  no organomegaly left arm AV graft not painful to touch not red no signs of infection.   No palpable thrill    Labs:   Recent Results (from the past 24 hour(s))   METABOLIC PANEL, BASIC    Collection Time: 21 12:31 PM   Result Value Ref Range    Sodium 138 136 - 145 mmol/L    Potassium 4.0 3.5 - 5.5 mmol/L    Chloride 103 100 - 111 mmol/L    CO2 28 21 - 32 mmol/L    Anion gap 7 3.0 - 18 mmol/L    Glucose 94 74 - 99 mg/dL    BUN 71 (H) 7.0 - 18 MG/DL    Creatinine 5.96 (H) 0.6 - 1.3 MG/DL    BUN/Creatinine ratio 12 12 - 20      GFR est AA 11 (L) >60 ml/min/1.73m2    GFR est non-AA 9 (L) >60 ml/min/1.73m2    Calcium 9.5 8.5 - 10.1 MG/DL   CBC W/O DIFF    Collection Time: 21 12:31 PM   Result Value Ref Range    WBC 16.7 (H) 4.6 - 13.2 K/uL    RBC 3.29 (L) 4.35 - 5.65 M/uL    HGB 10.1 (L) 13.0 - 16.0 g/dL    HCT 31.5 (L) 36.0 - 48.0 %    MCV 95.7 78.0 - 100.0 FL    MCH 30.7 24.0 - 34.0 PG    MCHC 32.1 31.0 - 37.0 g/dL    RDW 13.1 11.6 - 14.5 %    PLATELET 236 198 - 920 K/uL    MPV 10.4 9.2 - 11.8 FL    NRBC 0.0 0  WBC    ABSOLUTE NRBC 0.00 0.00 - 0.01 K/uL   PROTHROMBIN TIME + INR    Collection Time: 21 12:31 PM   Result Value Ref Range    Prothrombin time 12.2 11.5 - 15.2 sec    INR 0.9 0.8 - 1.2         Data Review:    BMP:   Lab Results   Component Value Date/Time    Glucose 94 12/30/2021 12:31 PM    Sodium 138 12/30/2021 12:31 PM    Potassium 4.0 12/30/2021 12:31 PM    Chloride 103 12/30/2021 12:31 PM    CO2 28 12/30/2021 12:31 PM    BUN 71 (H) 12/30/2021 12:31 PM    Creatinine 5.96 (H) 12/30/2021 12:31 PM    Calcium 9.5 12/30/2021 12:31 PM       Assessment:     Active Problems:    ESRD (end stage renal disease) ([facility] [address] (9/26/2020)        Plan:      Thrombectomy left arm graft    Signed By: Obi Staples MD     December 30, 2021

## 2021-12-30 NOTE — OP NOTES
Preoperative diagnosis: Clotted AV graft left arm, ESRD    Postoperative diagnosis: Same    Procedures performed:  #1  Thrombectomy left arm AV graft  #2  Shuntogram with radiographic interpretation   #3  Balloon angioplasty of graft vein anastomosis and axillary vein left side with associated angiogram interpretation    Cultures: None    Specimens: None    Drains: None    Estimated blood loss: Less than 50 mL    Assistants: None    Implants: Please see above    Complications: None    Anesthesia: Moderate conscious sedation    Indications for the procedure:  Jessica Clemons is a 80 y.o. came in with clotted left arm graft. Patient was given the appropriate risk and benefits of the procedure including but not limited to bleeding, infection, damage to adjacent structures, MI, stroke, death, loss of lower extremity, need for further surgery. Patient was understanding of all the risks and underwent a procedure. Operative findings:   #1  Acute thrombus left arm graft prior banding notable graft easily thrombectomized. Two outflow stenosis notable both ballooned with seven forty balloon. Okay to use graft for dialysis    Procedure:  Patient was correctly identified in the pre operative area taken to the operating room in stable condition. Patient had pre-incision timeout prior to any incision. Patient was prepped and draped in the normal sterile fashion according to CDC guidelines aseptic technique. Localized and cutdown on the arterial portion of the graft. Placed a vessel loop and made a graftotomy eleven blade scalpel and Bradford scissors. Fresh thrombus was seen I thrombectomized it with a four Western Ninfa Opal both the venous and arterial and had good visualization of the arterial plug. There is a prior banding procedure notable. Closed and repaired the site with a 5-0 Prolene suture.   Performed a shuntogram through a four Western Ninfa sheath the inflow was clear the brachial artery was patent without thrombus the banding was noted the body the graft was thrombus free. There was an outflow stenosis and stenosis at the axillary subclavian vein. Across these lesions with a eighteen wire and then ballooned them both at the seven forty balloon with 1 minute hold times. There was no residual stenosis near 100% patent after angioplasty. I pulled the sheath and the wire repaired the site with a 6-0 Prolene there was a palpable thrill in the graft. I irrigated and closed 3-0 Monocryl for subcu 4 Monocryl and Dermabond glue for skin.   He was transferred to recovery in stable condition with a patent graft

## 2021-12-30 NOTE — DISCHARGE INSTRUCTIONS
Patient Education        Peripheral Artery Angioplasty: What to Expect at Home  Your Recovery  Peripheral artery angioplasty (say \"qzq-HQOQ-cc-sharonda TONG-ter-nati WILKINSWJH-dep-ty-plass-lashaun\") is a procedure that widens narrowed arteries in the pelvis or legs. Your doctor used a tube called a catheter to find narrowed arteries in your pelvis or legs and then widened them. Your groin or leg may have a bruise or a small lump where the catheter was put in your groin. The area may feel sore for a day or two after the procedure. You can do light activities around the house but nothing strenuous for several days. After surgery, blood may flow better throughout your leg. This can decrease leg pain, numbness, and cramping. This care sheet gives you a general idea about how long it will take for you to recover. But each person recovers at a different pace. Follow the steps below to feel better as quickly as possible. How can you care for yourself at home? Activity    · Do not do strenuous exercise and do not lift anything heavy until your doctor says it is okay. This may be for a day or two. You can walk around the house and do light activity, such as cooking.     · Go back to regular exercise when your doctor says it is okay. Walking is a good choice.     · If you work, you will probably need to take 1 or 2 days off. It depends on the type of work you do and how you feel. Diet    · You can eat your normal diet.     · Drink plenty of fluids (unless your doctor tells you not to). Medicines    · Your doctor will tell you if and when you can restart your medicines. He or she will also give you instructions about taking any new medicines.     · If you take aspirin or some other blood thinner, ask your doctor if and when to start taking it again. Make sure that you understand exactly what your doctor wants you to do.     · Be safe with medicines. Take your medicines exactly as prescribed.  Call your doctor if you think you are having a problem with your medicine.     · Your doctor may prescribe a blood thinner when you go home. This helps prevent blood clots. Be sure you get instructions about how to take your medicine safely. Blood thinners can cause serious bleeding problems. Care of the catheter site    · Keep a bandage over the spot where the catheter was inserted for the first day, or for as long as your doctor recommends.     · Put ice or a cold pack on the area for 10 to 20 minutes at a time to help with soreness or swelling. Do this every few hours. Put a thin cloth between the ice and your skin.     · You may shower 24 to 48 hours after the procedure, if your doctor okays it. Pat the incision dry.     · Do not soak the catheter site until it is healed. Don't take a bath for 1 week, or until your doctor tells you it is okay.     · Watch for bleeding from the site. A small amount of blood (up to the size of a quarter) on the bandage can be normal.     · If you are bleeding, lie down and press on the area for 15 minutes to try to make it stop. If the bleeding does not stop, call your doctor or seek immediate medical care. Follow-up care is a key part of your treatment and safety. Be sure to make and go to all appointments, and call your doctor if you are having problems. It's also a good idea to know your test results and keep a list of the medicines you take. When should you call for help? Call 911 anytime you think you may need emergency care. For example, call if:    · You passed out (lost consciousness).     · You have severe trouble breathing.     · You have sudden chest pain and shortness of breath, or you cough up blood.     · Your groin is very swollen and you have a lump that is getting bigger under your skin where the catheter was put in.    Call your doctor now or seek immediate medical care if:    · You have severe pain in your leg, or it becomes cold, pale, blue, tingly, or numb.     · You are bleeding from the area where the catheter was put in your artery.     · You have a fast-growing, painful lump at the catheter site.     · You have signs of infection, such as:  ? Increased pain, swelling, warmth, or redness of the skin. ? Red streaks leading from the area. ? Pus draining from the area. ? A fever. Watch closely for changes in your health, and be sure to contact your doctor if you have any problems. Where can you learn more? Go to http://www.gray.com/  Enter B505 in the search box to learn more about \"Peripheral Artery Angioplasty: What to Expect at Home. \"  Current as of: April 29, 2021               Content Version: 13.0  © 2006-2021 [employer] Care instructions adapted under license by [email or web address] (which disclaims liability or warranty for this information). If you have questions about a medical condition or this instruction, always ask your healthcare professional. [place] [address] any warranty or liability for your use of this information. DISCHARGE SUMMARY from Nurse    PATIENT INSTRUCTIONS:    After general anesthesia or intravenous sedation, for 24 hours or while taking prescription Narcotics:  · Limit your activities  · Do not drive and operate hazardous machinery  · Do not make important personal or business decisions  · Do  not drink alcoholic beverages  · If you have not urinated within 8 hours after discharge, please contact your surgeon on call.     Report the following to your surgeon:  · Excessive pain, swelling, redness or odor of or around the surgical area  · Temperature over 100.5  · Nausea and vomiting lasting longer than 4 hours or if unable to take medications  · Any signs of decreased circulation or nerve impairment to extremity: change in color, persistent  numbness, tingling, coldness or increase pain  · Any questions    What to do at Home:  Recommended activity: Activity as tolerated,     These are general instructions for a healthy lifestyle:    No smoking/ No tobacco products/ Avoid exposure to second hand smoke  Surgeon General's Warning:  Quitting smoking now greatly reduces serious risk to your health. Obesity, smoking, and sedentary lifestyle greatly increases your risk for illness    A healthy diet, regular physical exercise & weight monitoring are important for maintaining a healthy lifestyle    You may be retaining fluid if you have a history of heart failure or if you experience any of the following symptoms:  Weight gain of 3 pounds or more overnight or 5 pounds in a week, increased swelling in our hands or feet or shortness of breath while lying flat in bed. Please call your doctor as soon as you notice any of these symptoms; do not wait until your next office visit. The discharge information has been reviewed with the patient and spouse. The patient and spouse verbalized understanding. Discharge medications reviewed with the patient and spouse and appropriate educational materials and side effects teaching were provided. ___________________________________________________________________________________________________________________________________     Patient armband removed and shredded    Patient Education        Hemodialysis Access Surgery: What to Expect at Home  Your Recovery  Hemodialysis is a way to remove wastes from the blood when your kidneys can no longer do the job. It's not a cure, but it can help you live longer and feel better. It's a lifesaving treatment when you have kidney failure. Hemodialysis is often called dialysis. Your doctor created a place (called an access) in your arm for your blood to flow in and out of your body during your dialysis sessions. Your arm will probably be bruised and swollen. It may hurt. The cut (incision) may bleed. The pain and bleeding will get better over several days. You will probably need only over-the-counter pain medicine.  You can reduce swelling by propping up your arm on 1 or 2 pillows and keeping your elbow straight. You will have stitches. These may dissolve on their own, or your doctor will tell you when to come in to have them removed. You should also be able to return to work in a few days. You may feel some coolness or numbness in your hand. These feelings usually go away in a few weeks. Your doctor may suggest squeezing a soft object. This will strengthen your access and may make hemodialysis faster and easier. You should always be able to feel blood rushing through the fistula or graft. It feels like a slight vibration when you put your fingers on the skin over the fistula or graft. This feeling is called a thrill or a pulse. This care sheet gives you a general idea about how long it will take for you to recover. But each person recovers at a different pace. Follow the steps below to get better as quickly as possible. How can you care for yourself at home? Activity    · Rest when you feel tired. Getting enough sleep will help you recover. Do not lie on or sleep on the arm with the access.     · Avoid activities such as washing windows or gardening that put stress on the arm with the access.     · You may use your arm, but do not lift anything that weighs more than about 15 pounds. This may include a child, heavy grocery bags, a heavy briefcase or backpack, cat litter or dog food bags, or a vacuum .     · You can shower, but keep the access dry for the first 2 days. Cover the area with a plastic bag to keep it dry.     · Do not soak or scrub the incision until it has healed.     · Wear an arm guard to protect the area if you play sports or work with your arms.     · You may drive when your doctor says it is okay. This is usually in 1 to 2 days.     · Most people are able to return to work about 1 or 2 days after surgery. Diet    · Follow an eating plan that is good for your kidneys.  A registered dietitian can help you make a meal plan that is right for you. You may need to limit protein, salt, fluids, and certain foods. Medicines    · Your doctor will tell you if and when you can restart your medicines. You will also be given instructions about taking any new medicines.     · If you take aspirin or some other blood thinner, ask your doctor if and when to start taking it again. Make sure that you understand exactly what your doctor wants you to do.     · Take pain medicines exactly as directed. ? If the doctor gave you a prescription medicine for pain, take it as prescribed. ? If you are not taking a prescription pain medicine, ask your doctor if you can take acetaminophen (Tylenol). Do not take ibuprofen (Advil, Motrin) or naproxen (Aleve), or similar medicines, unless your doctor tells you to. They may make chronic kidney disease worse. ? Do not take two or more pain medicines at the same time unless the doctor told you to. Many pain medicines have acetaminophen, which is Tylenol. Too much acetaminophen (Tylenol) can be harmful.     · If you think your pain medicine is making you sick to your stomach:  ? Take your medicine after meals (unless your doctor has told you not to). ? Ask your doctor for a different pain medicine.     · If your doctor prescribed antibiotics, take them as directed. Do not stop taking them just because you feel better. You need to take the full course of antibiotics. Incision care    · Keep the area dry for 2 days. After 2 days, wash the area with soap and water every day, and always before dialysis.     · Do not soak or scrub the incision until it has healed.     · If you have a bandage, change it every day or as your doctor recommends. Your doctor will tell you when you can remove it. Exercise    · Squeeze a soft ball or other object as your doctor tells you. This will help blood flow through the access and help prevent blood clots.    Elevation    · Prop up the sore arm on a pillow anytime you sit or lie down during the next 3 days. Try to keep it above the level of your heart. This will help reduce swelling. Other instructions    · Every day, check your access for a pulse or thrill in the fistula or graft area. A thrill is a vibration. To feel a pulse or thrill, place the first two fingers of your hand over the access.     · Do not bump your arm.     · Do not wear tight clothing, jewelry, or anything else that may squeeze the access.     · Use your other arm to have blood drawn or blood pressure taken.     · Do not put cream or lotion on or near the access.     · Make sure all doctors you deal with know that you have a vascular access. Follow-up care is a key part of your treatment and safety. Be sure to make and go to all appointments, and call your doctor if you are having problems. It's also a good idea to know your test results and keep a list of the medicines you take. When should you call for help? Call 911 anytime you think you may need emergency care. For example, call if:    · You passed out (lost consciousness).     · You have chest pain, are short of breath, or cough up blood. Call your doctor now or seek immediate medical care if:    · Your hand or arm is cold or dark-colored.     · You have no pulse in your access.     · You have nausea or you vomit for more than four hours.     · You have pain that does not get better after you take pain medicine.     · You have loose stitches, or your incision comes open.     · You are bleeding from the incision.     · You have signs of infection, such as:  ? Increased pain, swelling, warmth, or redness. ? Red streaks leading from the area. ? Pus draining from the area. ? A fever.     · You have signs of a blood clot in your leg (called a deep vein thrombosis), such as:  ? Pain in your calf, back of the knee, thigh, or groin. ? Redness or swelling in your leg.    Watch closely for changes in your health, and be sure to contact your doctor if you have any problems. Where can you learn more? Go to http://www.gray.com/  Enter P616 in the search box to learn more about \"Hemodialysis Access Surgery: What to Expect at Home. \"  Current as of: December 17, 2020               Content Version: 13.0  © 3021-1870 [employer]. Care instructions adapted under license by [employer] (which disclaims liability or warranty for this information). If you have questions about a medical condition or this instruction, always ask your healthcare professional. [address] any warranty or liability for your use of this information.

## 2021-12-30 NOTE — PERIOP NOTES
[facility] care of pt from [facility] with pt lying on stretcher quietly. Denies pain or discomforts. Will monitor.

## 2021-12-30 NOTE — ANESTHESIA PROCEDURE NOTES
Peripheral Block    Start time: 12/30/2021 2:01 PM  End time: 12/30/2021 2:07 PM  Performed by: Nicole Muro MD  Authorized by: Nicole Muro MD       Pre-procedure: Indications: at surgeon's request, post-op pain management and procedure for pain    Preanesthetic Checklist: patient identified, risks and benefits discussed, site marked, timeout performed, anesthesia consent given and patient being monitored      Block Type:   Block Type:  Brachial plexus and supraclavicular  Laterality:  Left  Monitoring:  Standard ASA monitoring, continuous pulse ox, frequent vital sign checks, oxygen, responsive to questions and heart rate  Injection Technique:  Single shot  Procedures: ultrasound guided    Prep: chlorhexidine    Needle Type:  Stimuplex  Needle Gauge:  22 G  Needle Localization:  Ultrasound guidance  Medication Injected:  Ropivacaine (PF) (NAROPIN)(0.5%) 5 mg/mL injection, 20 mL  midazolam (VERSED) injection, 2 mg    Assessment:  Number of attempts:  1  Injection Assessment:  No intravascular symptoms, negative aspiration for blood, local visualized surrounding nerve on ultrasound, ultrasound image on chart, no paresthesia and incremental injection every 5 mL  Patient tolerance:  Patient tolerated the procedure well with no immediate complications  Location:  [facility]    Patient given 2 mg IV Versed  for sedation.     12/30/2021     2:07 PM     Dev Leos MD

## 2022-01-01 ENCOUNTER — HOME CARE VISIT (OUTPATIENT)
Dept: SCHEDULING | Facility: HOME HEALTH | Age: 87
End: 2022-01-01
Payer: MEDICARE

## 2022-01-01 ENCOUNTER — HOSPITAL ENCOUNTER (EMERGENCY)
Age: 87
Discharge: HOME | End: 2022-04-21
Attending: EMERGENCY MEDICINE
Payer: MEDICARE

## 2022-01-01 ENCOUNTER — APPOINTMENT (OUTPATIENT)
Dept: CT IMAGING | Age: 87
DRG: 698 | End: 2022-01-01
Attending: STUDENT IN AN ORGANIZED HEALTH CARE EDUCATION/TRAINING PROGRAM
Payer: MEDICARE

## 2022-01-01 ENCOUNTER — HOSPITAL ENCOUNTER (INPATIENT)
Age: 87
LOS: 8 days | Discharge: HOME | DRG: 698 | End: 2022-02-21
Attending: STUDENT IN AN ORGANIZED HEALTH CARE EDUCATION/TRAINING PROGRAM | Admitting: FAMILY MEDICINE
Payer: MEDICARE

## 2022-01-01 ENCOUNTER — HOSPICE ADMISSION (OUTPATIENT)
Dept: HOSPICE | Facility: HOSPICE | Age: 87
End: 2022-01-01
Payer: MEDICARE

## 2022-01-01 ENCOUNTER — HOME CARE VISIT (OUTPATIENT)
Dept: HOSPICE | Facility: HOSPICE | Age: 87
End: 2022-01-01
Payer: MEDICARE

## 2022-01-01 ENCOUNTER — HOSPITAL ENCOUNTER (EMERGENCY)
Age: 87
Discharge: HOME | End: 2022-03-18
Attending: STUDENT IN AN ORGANIZED HEALTH CARE EDUCATION/TRAINING PROGRAM
Payer: MEDICARE

## 2022-01-01 ENCOUNTER — HOSPITAL ENCOUNTER (EMERGENCY)
Age: 87
Discharge: HOME | End: 2022-04-24
Attending: STUDENT IN AN ORGANIZED HEALTH CARE EDUCATION/TRAINING PROGRAM
Payer: MEDICARE

## 2022-01-01 ENCOUNTER — APPOINTMENT (OUTPATIENT)
Dept: GENERAL RADIOLOGY | Age: 87
DRG: 698 | End: 2022-01-01
Attending: NURSE PRACTITIONER
Payer: MEDICARE

## 2022-01-01 ENCOUNTER — APPOINTMENT (OUTPATIENT)
Dept: MRI IMAGING | Age: 87
DRG: 698 | End: 2022-01-01
Attending: NURSE PRACTITIONER
Payer: MEDICARE

## 2022-01-01 ENCOUNTER — APPOINTMENT (OUTPATIENT)
Dept: CT IMAGING | Age: 87
DRG: 698 | End: 2022-01-01
Attending: PHYSICIAN ASSISTANT
Payer: MEDICARE

## 2022-01-01 ENCOUNTER — APPOINTMENT (OUTPATIENT)
Dept: VASCULAR SURGERY | Age: 87
DRG: 698 | End: 2022-01-01
Attending: PHYSICIAN ASSISTANT
Payer: MEDICARE

## 2022-01-01 ENCOUNTER — PATIENT OUTREACH (OUTPATIENT)
Dept: CASE MANAGEMENT | Age: 87
End: 2022-01-01

## 2022-01-01 ENCOUNTER — HOSPITAL ENCOUNTER (INPATIENT)
Age: 87
LOS: 3 days | Discharge: HOSPICE | DRG: 698 | End: 2022-06-05
Attending: STUDENT IN AN ORGANIZED HEALTH CARE EDUCATION/TRAINING PROGRAM | Admitting: INTERNAL MEDICINE
Payer: MEDICARE

## 2022-01-01 ENCOUNTER — APPOINTMENT (OUTPATIENT)
Dept: GENERAL RADIOLOGY | Age: 87
DRG: 698 | End: 2022-01-01
Attending: STUDENT IN AN ORGANIZED HEALTH CARE EDUCATION/TRAINING PROGRAM
Payer: MEDICARE

## 2022-01-01 VITALS — DIASTOLIC BLOOD PRESSURE: 68 MMHG | HEART RATE: 66 BPM | SYSTOLIC BLOOD PRESSURE: 108 MMHG | OXYGEN SATURATION: 90 %

## 2022-01-01 VITALS
BODY MASS INDEX: 26.03 KG/M2 | OXYGEN SATURATION: 98 % | SYSTOLIC BLOOD PRESSURE: 150 MMHG | HEART RATE: 64 BPM | TEMPERATURE: 98 F | RESPIRATION RATE: 16 BRPM | WEIGHT: 162 LBS | DIASTOLIC BLOOD PRESSURE: 69 MMHG | HEIGHT: 66 IN

## 2022-01-01 VITALS
OXYGEN SATURATION: 98 % | RESPIRATION RATE: 17 BRPM | BODY MASS INDEX: 25.23 KG/M2 | HEIGHT: 66 IN | WEIGHT: 157 LBS | SYSTOLIC BLOOD PRESSURE: 115 MMHG | TEMPERATURE: 98.4 F | DIASTOLIC BLOOD PRESSURE: 65 MMHG | HEART RATE: 109 BPM

## 2022-01-01 VITALS
HEART RATE: 89 BPM | RESPIRATION RATE: 16 BRPM | OXYGEN SATURATION: 99 % | DIASTOLIC BLOOD PRESSURE: 73 MMHG | TEMPERATURE: 98.8 F | SYSTOLIC BLOOD PRESSURE: 161 MMHG

## 2022-01-01 VITALS
TEMPERATURE: 98.6 F | OXYGEN SATURATION: 97 % | DIASTOLIC BLOOD PRESSURE: 70 MMHG | SYSTOLIC BLOOD PRESSURE: 150 MMHG | RESPIRATION RATE: 20 BRPM | HEART RATE: 98 BPM

## 2022-01-01 VITALS
TEMPERATURE: 98.1 F | SYSTOLIC BLOOD PRESSURE: 128 MMHG | DIASTOLIC BLOOD PRESSURE: 58 MMHG | HEART RATE: 94 BPM | RESPIRATION RATE: 16 BRPM | OXYGEN SATURATION: 99 %

## 2022-01-01 VITALS
RESPIRATION RATE: 18 BRPM | BODY MASS INDEX: 26.03 KG/M2 | WEIGHT: 162 LBS | SYSTOLIC BLOOD PRESSURE: 121 MMHG | DIASTOLIC BLOOD PRESSURE: 67 MMHG | OXYGEN SATURATION: 99 % | HEART RATE: 95 BPM | TEMPERATURE: 98.5 F | HEIGHT: 66 IN

## 2022-01-01 DIAGNOSIS — T83.89XA CALCIFICATION OF INDWELLING FOLEY CATHETER, INITIAL ENCOUNTER (HCC): ICD-10-CM

## 2022-01-01 DIAGNOSIS — Z71.89 GOALS OF CARE, COUNSELING/DISCUSSION: ICD-10-CM

## 2022-01-01 DIAGNOSIS — N18.6 ESRD (END STAGE RENAL DISEASE) ON DIALYSIS (HCC): ICD-10-CM

## 2022-01-01 DIAGNOSIS — A41.9 SEPTIC SHOCK (HCC): ICD-10-CM

## 2022-01-01 DIAGNOSIS — Z51.5 HOSPICE CARE: Primary | ICD-10-CM

## 2022-01-01 DIAGNOSIS — N32.89 BLADDER SPASM: Primary | ICD-10-CM

## 2022-01-01 DIAGNOSIS — R65.21 SEPTIC SHOCK (HCC): ICD-10-CM

## 2022-01-01 DIAGNOSIS — R41.82 ALTERED MENTAL STATUS, UNSPECIFIED ALTERED MENTAL STATUS TYPE: ICD-10-CM

## 2022-01-01 DIAGNOSIS — N30.00 ACUTE CYSTITIS WITHOUT HEMATURIA: ICD-10-CM

## 2022-01-01 DIAGNOSIS — T83.9XXA PROBLEM WITH FOLEY CATHETER, INITIAL ENCOUNTER (HCC): Primary | ICD-10-CM

## 2022-01-01 DIAGNOSIS — Z99.2 ESRD (END STAGE RENAL DISEASE) ON DIALYSIS (HCC): ICD-10-CM

## 2022-01-01 DIAGNOSIS — N39.0 URINARY TRACT INFECTION ASSOCIATED WITH INDWELLING URETHRAL CATHETER, INITIAL ENCOUNTER (HCC): ICD-10-CM

## 2022-01-01 DIAGNOSIS — R31.0 GROSS HEMATURIA: ICD-10-CM

## 2022-01-01 DIAGNOSIS — D72.829 LEUKOCYTOSIS, UNSPECIFIED TYPE: ICD-10-CM

## 2022-01-01 DIAGNOSIS — C61 PROSTATE CANCER (HCC): ICD-10-CM

## 2022-01-01 DIAGNOSIS — N18.6 ESRD (END STAGE RENAL DISEASE) (HCC): ICD-10-CM

## 2022-01-01 DIAGNOSIS — R53.81 DEBILITY: ICD-10-CM

## 2022-01-01 DIAGNOSIS — T83.9XXA PROBLEM WITH URINARY CATHETER (HCC): Primary | ICD-10-CM

## 2022-01-01 DIAGNOSIS — R09.89 SUSPECTED CEREBROVASCULAR ACCIDENT (CVA): Primary | ICD-10-CM

## 2022-01-01 DIAGNOSIS — Z97.8 INDWELLING FOLEY CATHETER PRESENT: ICD-10-CM

## 2022-01-01 DIAGNOSIS — N30.01 ACUTE CYSTITIS WITH HEMATURIA: ICD-10-CM

## 2022-01-01 DIAGNOSIS — C61 PROSTATE CANCER METASTATIC TO MULTIPLE SITES (HCC): ICD-10-CM

## 2022-01-01 DIAGNOSIS — G93.40 ENCEPHALOPATHY: ICD-10-CM

## 2022-01-01 DIAGNOSIS — T83.511A URINARY TRACT INFECTION ASSOCIATED WITH INDWELLING URETHRAL CATHETER, INITIAL ENCOUNTER (HCC): ICD-10-CM

## 2022-01-01 LAB
ABO + RH BLD: NORMAL
ALBUMIN SERPL-MCNC: 1.9 G/DL (ref 3.4–5)
ALBUMIN SERPL-MCNC: 2 G/DL (ref 3.4–5)
ALBUMIN SERPL-MCNC: 2.3 G/DL (ref 3.4–5)
ALBUMIN SERPL-MCNC: 2.4 G/DL (ref 3.4–5)
ALBUMIN SERPL-MCNC: 2.7 G/DL (ref 3.4–5)
ALBUMIN/GLOB SERPL: 0.3 {RATIO} (ref 0.8–1.7)
ALBUMIN/GLOB SERPL: 0.5 {RATIO} (ref 0.8–1.7)
ALBUMIN/GLOB SERPL: 0.6 {RATIO} (ref 0.8–1.7)
ALBUMIN/GLOB SERPL: 0.7 {RATIO} (ref 0.8–1.7)
ALP SERPL-CCNC: 142 U/L (ref 45–117)
ALP SERPL-CCNC: 166 U/L (ref 45–117)
ALP SERPL-CCNC: 239 U/L (ref 45–117)
ALP SERPL-CCNC: 408 U/L (ref 45–117)
ALT SERPL-CCNC: 10 U/L (ref 16–61)
ALT SERPL-CCNC: 16 U/L (ref 16–61)
ALT SERPL-CCNC: 7 U/L (ref 16–61)
ALT SERPL-CCNC: 9 U/L (ref 16–61)
AMMONIA PLAS-SCNC: 34 UMOL/L (ref 11–32)
AMMONIA PLAS-SCNC: <10 UMOL/L (ref 11–32)
ANION GAP SERPL CALC-SCNC: 10 MMOL/L (ref 3–18)
ANION GAP SERPL CALC-SCNC: 12 MMOL/L (ref 3–18)
ANION GAP SERPL CALC-SCNC: 12 MMOL/L (ref 3–18)
ANION GAP SERPL CALC-SCNC: 5 MMOL/L (ref 3–18)
ANION GAP SERPL CALC-SCNC: 6 MMOL/L (ref 3–18)
ANION GAP SERPL CALC-SCNC: 8 MMOL/L (ref 3–18)
APPEARANCE UR: ABNORMAL
APTT PPP: 31.2 SEC (ref 23–36.4)
ARTERIAL PATENCY WRIST A: POSITIVE
AST SERPL-CCNC: 17 U/L (ref 10–38)
AST SERPL-CCNC: 22 U/L (ref 10–38)
AST SERPL-CCNC: 6 U/L (ref 10–38)
AST SERPL-CCNC: 69 U/L (ref 10–38)
ATRIAL RATE: 117 BPM
ATRIAL RATE: 86 BPM
ATRIAL RATE: 87 BPM
BACTERIA SPEC CULT: ABNORMAL
BACTERIA SPEC CULT: NORMAL
BACTERIA URNS QL MICRO: ABNORMAL /HPF
BASE EXCESS BLD CALC-SCNC: 5.9 MMOL/L
BASOPHILS # BLD: 0 K/UL (ref 0–0.1)
BASOPHILS # BLD: 0.1 K/UL (ref 0–0.1)
BASOPHILS # BLD: 0.2 K/UL (ref 0–0.1)
BASOPHILS # BLD: 0.3 K/UL (ref 0–0.1)
BASOPHILS NFR BLD: 0 % (ref 0–2)
BASOPHILS NFR BLD: 1 % (ref 0–2)
BDY SITE: ABNORMAL
BILIRUB SERPL-MCNC: 0.2 MG/DL (ref 0.2–1)
BILIRUB SERPL-MCNC: 0.3 MG/DL (ref 0.2–1)
BILIRUB SERPL-MCNC: 0.4 MG/DL (ref 0.2–1)
BILIRUB SERPL-MCNC: 0.7 MG/DL (ref 0.2–1)
BILIRUB UR QL: ABNORMAL
BILIRUB UR QL: ABNORMAL
BILIRUB UR QL: NEGATIVE
BLOOD GROUP ANTIBODIES SERPL: NORMAL
BUN SERPL-MCNC: 18 MG/DL (ref 7–18)
BUN SERPL-MCNC: 19 MG/DL (ref 7–18)
BUN SERPL-MCNC: 19 MG/DL (ref 7–18)
BUN SERPL-MCNC: 28 MG/DL (ref 7–18)
BUN SERPL-MCNC: 29 MG/DL (ref 7–18)
BUN SERPL-MCNC: 35 MG/DL (ref 7–18)
BUN SERPL-MCNC: 39 MG/DL (ref 7–18)
BUN SERPL-MCNC: 41 MG/DL (ref 7–18)
BUN SERPL-MCNC: 41 MG/DL (ref 7–18)
BUN SERPL-MCNC: 43 MG/DL (ref 7–18)
BUN/CREAT SERPL: 4 (ref 12–20)
BUN/CREAT SERPL: 4 (ref 12–20)
BUN/CREAT SERPL: 5 (ref 12–20)
BUN/CREAT SERPL: 7 (ref 12–20)
BUN/CREAT SERPL: 8 (ref 12–20)
BUN/CREAT SERPL: 9 (ref 12–20)
BUN/CREAT SERPL: 9 (ref 12–20)
CALCIUM SERPL-MCNC: 10.2 MG/DL (ref 8.5–10.1)
CALCIUM SERPL-MCNC: 10.7 MG/DL (ref 8.5–10.1)
CALCIUM SERPL-MCNC: 10.8 MG/DL (ref 8.5–10.1)
CALCIUM SERPL-MCNC: 12.5 MG/DL (ref 8.5–10.1)
CALCIUM SERPL-MCNC: 7.6 MG/DL (ref 8.5–10.1)
CALCIUM SERPL-MCNC: 7.7 MG/DL (ref 8.5–10.1)
CALCIUM SERPL-MCNC: 7.7 MG/DL (ref 8.5–10.1)
CALCIUM SERPL-MCNC: 8 MG/DL (ref 8.5–10.1)
CALCIUM SERPL-MCNC: 8 MG/DL (ref 8.5–10.1)
CALCIUM SERPL-MCNC: 8.1 MG/DL (ref 8.5–10.1)
CALCIUM SERPL-MCNC: 8.6 MG/DL (ref 8.5–10.1)
CALCIUM SERPL-MCNC: 8.8 MG/DL (ref 8.5–10.1)
CALCULATED P AXIS, ECG09: 27 DEGREES
CALCULATED P AXIS, ECG09: 60 DEGREES
CALCULATED P AXIS, ECG09: 63 DEGREES
CALCULATED R AXIS, ECG10: -35 DEGREES
CALCULATED R AXIS, ECG10: -36 DEGREES
CALCULATED R AXIS, ECG10: -60 DEGREES
CALCULATED T AXIS, ECG11: 103 DEGREES
CALCULATED T AXIS, ECG11: 108 DEGREES
CALCULATED T AXIS, ECG11: 108 DEGREES
CC UR VC: ABNORMAL
CHLORIDE SERPL-SCNC: 100 MMOL/L (ref 100–111)
CHLORIDE SERPL-SCNC: 100 MMOL/L (ref 100–111)
CHLORIDE SERPL-SCNC: 101 MMOL/L (ref 100–111)
CHLORIDE SERPL-SCNC: 102 MMOL/L (ref 100–111)
CHLORIDE SERPL-SCNC: 102 MMOL/L (ref 100–111)
CHLORIDE SERPL-SCNC: 106 MMOL/L (ref 100–111)
CHLORIDE SERPL-SCNC: 107 MMOL/L (ref 100–111)
CHLORIDE SERPL-SCNC: 96 MMOL/L (ref 100–111)
CHLORIDE SERPL-SCNC: 99 MMOL/L (ref 100–111)
CHLORIDE SERPL-SCNC: 99 MMOL/L (ref 100–111)
CHOLEST SERPL-MCNC: 150 MG/DL
CO2 SERPL-SCNC: 24 MMOL/L (ref 21–32)
CO2 SERPL-SCNC: 25 MMOL/L (ref 21–32)
CO2 SERPL-SCNC: 26 MMOL/L (ref 21–32)
CO2 SERPL-SCNC: 28 MMOL/L (ref 21–32)
CO2 SERPL-SCNC: 30 MMOL/L (ref 21–32)
CO2 SERPL-SCNC: 32 MMOL/L (ref 21–32)
COLOR UR: ABNORMAL
COVID-19 RAPID TEST, COVR: NOT DETECTED
CREAT SERPL-MCNC: 2.37 MG/DL (ref 0.6–1.3)
CREAT SERPL-MCNC: 4.24 MG/DL (ref 0.6–1.3)
CREAT SERPL-MCNC: 4.33 MG/DL (ref 0.6–1.3)
CREAT SERPL-MCNC: 4.51 MG/DL (ref 0.6–1.3)
CREAT SERPL-MCNC: 4.69 MG/DL (ref 0.6–1.3)
CREAT SERPL-MCNC: 4.74 MG/DL (ref 0.6–1.3)
CREAT SERPL-MCNC: 4.96 MG/DL (ref 0.6–1.3)
CREAT SERPL-MCNC: 5.14 MG/DL (ref 0.6–1.3)
CREAT SERPL-MCNC: 5.17 MG/DL (ref 0.6–1.3)
CREAT SERPL-MCNC: 5.98 MG/DL (ref 0.6–1.3)
DIAGNOSIS, 93000: NORMAL
DIFFERENTIAL METHOD BLD: ABNORMAL
EOSINOPHIL # BLD: 0 K/UL (ref 0–0.4)
EOSINOPHIL # BLD: 0 K/UL (ref 0–0.4)
EOSINOPHIL # BLD: 0.3 K/UL (ref 0–0.4)
EOSINOPHIL # BLD: 0.5 K/UL (ref 0–0.4)
EOSINOPHIL # BLD: 0.8 K/UL (ref 0–0.4)
EOSINOPHIL # BLD: 1 K/UL (ref 0–0.4)
EOSINOPHIL # BLD: 1.3 K/UL (ref 0–0.4)
EOSINOPHIL # BLD: 1.3 K/UL (ref 0–0.4)
EOSINOPHIL # BLD: 1.4 K/UL (ref 0–0.4)
EOSINOPHIL # BLD: 1.7 K/UL (ref 0–0.4)
EOSINOPHIL # BLD: 2.1 K/UL (ref 0–0.4)
EOSINOPHIL NFR BLD: 0 % (ref 0–5)
EOSINOPHIL NFR BLD: 0 % (ref 0–5)
EOSINOPHIL NFR BLD: 1 % (ref 0–5)
EOSINOPHIL NFR BLD: 11 % (ref 0–5)
EOSINOPHIL NFR BLD: 2 % (ref 0–5)
EOSINOPHIL NFR BLD: 3 % (ref 0–5)
EOSINOPHIL NFR BLD: 4 % (ref 0–5)
EOSINOPHIL NFR BLD: 6 % (ref 0–5)
EOSINOPHIL NFR BLD: 7 % (ref 0–5)
EPITH CASTS URNS QL MICRO: ABNORMAL /LPF (ref 0–5)
EPITH CASTS URNS QL MICRO: ABNORMAL /LPF (ref 0–5)
EPITH CASTS URNS QL MICRO: NEGATIVE /LPF (ref 0–5)
ERYTHROCYTE [DISTWIDTH] IN BLOOD BY AUTOMATED COUNT: 12.5 % (ref 11.6–14.5)
ERYTHROCYTE [DISTWIDTH] IN BLOOD BY AUTOMATED COUNT: 12.6 % (ref 11.6–14.5)
ERYTHROCYTE [DISTWIDTH] IN BLOOD BY AUTOMATED COUNT: 12.7 % (ref 11.6–14.5)
ERYTHROCYTE [DISTWIDTH] IN BLOOD BY AUTOMATED COUNT: 12.7 % (ref 11.6–14.5)
ERYTHROCYTE [DISTWIDTH] IN BLOOD BY AUTOMATED COUNT: 12.8 % (ref 11.6–14.5)
ERYTHROCYTE [DISTWIDTH] IN BLOOD BY AUTOMATED COUNT: 14 % (ref 11.6–14.5)
ERYTHROCYTE [DISTWIDTH] IN BLOOD BY AUTOMATED COUNT: 15.1 % (ref 11.6–14.5)
ERYTHROCYTE [DISTWIDTH] IN BLOOD BY AUTOMATED COUNT: 15.3 % (ref 11.6–14.5)
EST. AVERAGE GLUCOSE BLD GHB EST-MCNC: 97 MG/DL
ETHANOL SERPL-MCNC: <3 MG/DL (ref 0–3)
FOLATE SERPL-MCNC: >20 NG/ML (ref 3.1–17.5)
GAS FLOW.O2 O2 DELIVERY SYS: ABNORMAL L/MIN
GLOBULIN SER CALC-MCNC: 4 G/DL (ref 2–4)
GLOBULIN SER CALC-MCNC: 4 G/DL (ref 2–4)
GLOBULIN SER CALC-MCNC: 4.3 G/DL (ref 2–4)
GLOBULIN SER CALC-MCNC: 6 G/DL (ref 2–4)
GLUCOSE BLD STRIP.AUTO-MCNC: 100 MG/DL (ref 70–110)
GLUCOSE BLD STRIP.AUTO-MCNC: 107 MG/DL (ref 70–110)
GLUCOSE BLD STRIP.AUTO-MCNC: 111 MG/DL (ref 70–110)
GLUCOSE BLD STRIP.AUTO-MCNC: 113 MG/DL (ref 70–110)
GLUCOSE BLD STRIP.AUTO-MCNC: 115 MG/DL (ref 70–110)
GLUCOSE BLD STRIP.AUTO-MCNC: 118 MG/DL (ref 70–110)
GLUCOSE BLD STRIP.AUTO-MCNC: 140 MG/DL (ref 70–110)
GLUCOSE BLD STRIP.AUTO-MCNC: 60 MG/DL (ref 70–110)
GLUCOSE BLD STRIP.AUTO-MCNC: 74 MG/DL (ref 70–110)
GLUCOSE BLD STRIP.AUTO-MCNC: 97 MG/DL (ref 70–110)
GLUCOSE BLD STRIP.AUTO-MCNC: 98 MG/DL (ref 70–110)
GLUCOSE BLD STRIP.AUTO-MCNC: 99 MG/DL (ref 70–110)
GLUCOSE SERPL-MCNC: 107 MG/DL (ref 74–99)
GLUCOSE SERPL-MCNC: 263 MG/DL (ref 74–99)
GLUCOSE SERPL-MCNC: 47 MG/DL (ref 74–99)
GLUCOSE SERPL-MCNC: 71 MG/DL (ref 74–99)
GLUCOSE SERPL-MCNC: 77 MG/DL (ref 74–99)
GLUCOSE SERPL-MCNC: 81 MG/DL (ref 74–99)
GLUCOSE SERPL-MCNC: 91 MG/DL (ref 74–99)
GLUCOSE SERPL-MCNC: 91 MG/DL (ref 74–99)
GLUCOSE SERPL-MCNC: 95 MG/DL (ref 74–99)
GLUCOSE SERPL-MCNC: 96 MG/DL (ref 74–99)
GLUCOSE UR STRIP.AUTO-MCNC: NEGATIVE MG/DL
HBA1C MFR BLD: 5 % (ref 4.2–5.6)
HBV SURFACE AB SER QL IA: NEGATIVE
HBV SURFACE AB SER QL IA: NEGATIVE
HBV SURFACE AB SERPL IA-ACNC: <3.1 MIU/ML
HBV SURFACE AB SERPL IA-ACNC: <3.1 MIU/ML
HBV SURFACE AG SER QL: <0.1 INDEX
HBV SURFACE AG SER QL: <0.1 INDEX
HBV SURFACE AG SER QL: NEGATIVE
HBV SURFACE AG SER QL: NEGATIVE
HCO3 BLD-SCNC: 29.2 MMOL/L (ref 22–26)
HCT VFR BLD AUTO: 25.9 % (ref 36–48)
HCT VFR BLD AUTO: 26.1 % (ref 36–48)
HCT VFR BLD AUTO: 26.6 % (ref 36–48)
HCT VFR BLD AUTO: 27.8 % (ref 36–48)
HCT VFR BLD AUTO: 27.9 % (ref 36–48)
HCT VFR BLD AUTO: 29.7 % (ref 36–48)
HCT VFR BLD AUTO: 30 % (ref 36–48)
HCT VFR BLD AUTO: 30.5 % (ref 36–48)
HCT VFR BLD AUTO: 31.7 % (ref 36–48)
HCT VFR BLD AUTO: 32.6 % (ref 36–48)
HCT VFR BLD AUTO: 42.3 % (ref 36–48)
HDLC SERPL-MCNC: 43 MG/DL (ref 40–60)
HDLC SERPL: 3.5 {RATIO} (ref 0–5)
HEP BS AB COMMENT,HBSAC: ABNORMAL
HEP BS AB COMMENT,HBSAC: ABNORMAL
HGB BLD-MCNC: 10 G/DL (ref 13–16)
HGB BLD-MCNC: 10.1 G/DL (ref 13–16)
HGB BLD-MCNC: 13.2 G/DL (ref 13–16)
HGB BLD-MCNC: 8.3 G/DL (ref 13–16)
HGB BLD-MCNC: 8.5 G/DL (ref 13–16)
HGB BLD-MCNC: 8.5 G/DL (ref 13–16)
HGB BLD-MCNC: 8.8 G/DL (ref 13–16)
HGB BLD-MCNC: 8.9 G/DL (ref 13–16)
HGB BLD-MCNC: 9.3 G/DL (ref 13–16)
HGB BLD-MCNC: 9.5 G/DL (ref 13–16)
HGB BLD-MCNC: 9.6 G/DL (ref 13–16)
HGB UR QL STRIP: ABNORMAL
IMM GRANULOCYTES # BLD AUTO: 0 K/UL
IMM GRANULOCYTES # BLD AUTO: 0 K/UL (ref 0–0.04)
IMM GRANULOCYTES # BLD AUTO: 0.2 K/UL (ref 0–0.04)
IMM GRANULOCYTES # BLD AUTO: 0.3 K/UL (ref 0–0.04)
IMM GRANULOCYTES NFR BLD AUTO: 0 %
IMM GRANULOCYTES NFR BLD AUTO: 0 % (ref 0–0.5)
IMM GRANULOCYTES NFR BLD AUTO: 1 % (ref 0–0.5)
IMM GRANULOCYTES NFR BLD AUTO: 2 % (ref 0–0.5)
INR PPP: 0.9 (ref 0.8–1.2)
INR PPP: 1 (ref 0.8–1.2)
KETONES UR QL STRIP.AUTO: ABNORMAL MG/DL
KETONES UR QL STRIP.AUTO: NEGATIVE MG/DL
KETONES UR QL STRIP.AUTO: NEGATIVE MG/DL
LACTATE BLD-SCNC: 0.58 MMOL/L (ref 0.4–2)
LACTATE BLD-SCNC: 0.73 MMOL/L (ref 0.4–2)
LACTATE BLD-SCNC: 7.72 MMOL/L (ref 0.4–2)
LACTATE BLD-SCNC: 9.96 MMOL/L (ref 0.4–2)
LACTATE SERPL-SCNC: 1.3 MMOL/L (ref 0.4–2)
LACTATE SERPL-SCNC: 2.3 MMOL/L (ref 0.4–2)
LACTATE SERPL-SCNC: 2.7 MMOL/L (ref 0.4–2)
LDLC SERPL CALC-MCNC: 79.2 MG/DL (ref 0–100)
LEUKOCYTE ESTERASE UR QL STRIP.AUTO: ABNORMAL
LIPASE SERPL-CCNC: 23 U/L (ref 73–393)
LIPID PROFILE,FLP: NORMAL
LYMPHOCYTES # BLD: 1.3 K/UL (ref 0.9–3.6)
LYMPHOCYTES # BLD: 1.6 K/UL (ref 0.9–3.6)
LYMPHOCYTES # BLD: 2.1 K/UL (ref 0.9–3.6)
LYMPHOCYTES # BLD: 2.1 K/UL (ref 0.9–3.6)
LYMPHOCYTES # BLD: 2.2 K/UL (ref 0.9–3.6)
LYMPHOCYTES # BLD: 2.3 K/UL (ref 0.9–3.6)
LYMPHOCYTES # BLD: 2.3 K/UL (ref 0.9–3.6)
LYMPHOCYTES # BLD: 2.4 K/UL (ref 0.9–3.6)
LYMPHOCYTES # BLD: 2.7 K/UL (ref 0.9–3.6)
LYMPHOCYTES # BLD: 3 K/UL (ref 0.9–3.6)
LYMPHOCYTES # BLD: 3.1 K/UL (ref 0.9–3.6)
LYMPHOCYTES NFR BLD: 10 % (ref 21–52)
LYMPHOCYTES NFR BLD: 11 % (ref 21–52)
LYMPHOCYTES NFR BLD: 13 % (ref 21–52)
LYMPHOCYTES NFR BLD: 3 % (ref 21–52)
LYMPHOCYTES NFR BLD: 5 % (ref 21–52)
LYMPHOCYTES NFR BLD: 6 % (ref 21–52)
LYMPHOCYTES NFR BLD: 7 % (ref 21–52)
LYMPHOCYTES NFR BLD: 8 % (ref 21–52)
LYMPHOCYTES NFR BLD: 9 % (ref 21–52)
MAGNESIUM SERPL-MCNC: 2.1 MG/DL (ref 1.6–2.6)
MAGNESIUM SERPL-MCNC: 2.5 MG/DL (ref 1.6–2.6)
MAGNESIUM SERPL-MCNC: 2.5 MG/DL (ref 1.6–2.6)
MCH RBC QN AUTO: 29.1 PG (ref 24–34)
MCH RBC QN AUTO: 29.3 PG (ref 24–34)
MCH RBC QN AUTO: 30 PG (ref 24–34)
MCH RBC QN AUTO: 30.1 PG (ref 24–34)
MCH RBC QN AUTO: 30.4 PG (ref 24–34)
MCH RBC QN AUTO: 30.4 PG (ref 24–34)
MCH RBC QN AUTO: 30.8 PG (ref 24–34)
MCH RBC QN AUTO: 31 PG (ref 24–34)
MCH RBC QN AUTO: 31.2 PG (ref 24–34)
MCH RBC QN AUTO: 31.3 PG (ref 24–34)
MCH RBC QN AUTO: 31.5 PG (ref 24–34)
MCHC RBC AUTO-ENTMCNC: 30.7 G/DL (ref 31–37)
MCHC RBC AUTO-ENTMCNC: 31.2 G/DL (ref 31–37)
MCHC RBC AUTO-ENTMCNC: 31.2 G/DL (ref 31–37)
MCHC RBC AUTO-ENTMCNC: 31.3 G/DL (ref 31–37)
MCHC RBC AUTO-ENTMCNC: 31.5 G/DL (ref 31–37)
MCHC RBC AUTO-ENTMCNC: 31.5 G/DL (ref 31–37)
MCHC RBC AUTO-ENTMCNC: 31.7 G/DL (ref 31–37)
MCHC RBC AUTO-ENTMCNC: 31.9 G/DL (ref 31–37)
MCHC RBC AUTO-ENTMCNC: 32 G/DL (ref 31–37)
MCHC RBC AUTO-ENTMCNC: 32.6 G/DL (ref 31–37)
MCHC RBC AUTO-ENTMCNC: 32.8 G/DL (ref 31–37)
MCV RBC AUTO: 93.3 FL (ref 78–100)
MCV RBC AUTO: 94 FL (ref 78–100)
MCV RBC AUTO: 94.1 FL (ref 78–100)
MCV RBC AUTO: 95.2 FL (ref 78–100)
MCV RBC AUTO: 96.5 FL (ref 78–100)
MCV RBC AUTO: 96.7 FL (ref 78–100)
MCV RBC AUTO: 97.1 FL (ref 78–100)
MCV RBC AUTO: 97.5 FL (ref 78–100)
MCV RBC AUTO: 97.6 FL (ref 78–100)
MCV RBC AUTO: 98 FL (ref 78–100)
MCV RBC AUTO: 98.2 FL (ref 78–100)
METAMYELOCYTES NFR BLD MANUAL: 1 %
MONOCYTES # BLD: 0 K/UL (ref 0.05–1.2)
MONOCYTES # BLD: 0.7 K/UL (ref 0.05–1.2)
MONOCYTES # BLD: 1.1 K/UL (ref 0.05–1.2)
MONOCYTES # BLD: 1.3 K/UL (ref 0.05–1.2)
MONOCYTES # BLD: 1.4 K/UL (ref 0.05–1.2)
MONOCYTES # BLD: 1.4 K/UL (ref 0.05–1.2)
MONOCYTES # BLD: 1.5 K/UL (ref 0.05–1.2)
MONOCYTES # BLD: 1.5 K/UL (ref 0.05–1.2)
MONOCYTES # BLD: 1.8 K/UL (ref 0.05–1.2)
MONOCYTES # BLD: 1.9 K/UL (ref 0.05–1.2)
MONOCYTES # BLD: 1.9 K/UL (ref 0.05–1.2)
MONOCYTES NFR BLD: 0 % (ref 3–10)
MONOCYTES NFR BLD: 10 % (ref 3–10)
MONOCYTES NFR BLD: 3 % (ref 3–10)
MONOCYTES NFR BLD: 3 % (ref 3–10)
MONOCYTES NFR BLD: 4 % (ref 3–10)
MONOCYTES NFR BLD: 5 % (ref 3–10)
MONOCYTES NFR BLD: 6 % (ref 3–10)
MONOCYTES NFR BLD: 6 % (ref 3–10)
MONOCYTES NFR BLD: 7 % (ref 3–10)
MONOCYTES NFR BLD: 7 % (ref 3–10)
MONOCYTES NFR BLD: 8 % (ref 3–10)
NEUTS BAND NFR BLD MANUAL: 1 %
NEUTS BAND NFR BLD MANUAL: 1 % (ref 0–5)
NEUTS BAND NFR BLD MANUAL: 6 % (ref 0–5)
NEUTS SEG # BLD: 13.6 K/UL (ref 1.8–8)
NEUTS SEG # BLD: 13.6 K/UL (ref 1.8–8)
NEUTS SEG # BLD: 13.8 K/UL (ref 1.8–8)
NEUTS SEG # BLD: 16.5 K/UL (ref 1.8–8)
NEUTS SEG # BLD: 17.4 K/UL (ref 1.8–8)
NEUTS SEG # BLD: 20.2 K/UL (ref 1.8–8)
NEUTS SEG # BLD: 22.8 K/UL (ref 1.8–8)
NEUTS SEG # BLD: 22.9 K/UL (ref 1.8–8)
NEUTS SEG # BLD: 25.1 K/UL (ref 1.8–8)
NEUTS SEG # BLD: 54.4 K/UL (ref 1.8–8)
NEUTS SEG # BLD: 74 K/UL (ref 1.8–8)
NEUTS SEG NFR BLD: 72 % (ref 40–73)
NEUTS SEG NFR BLD: 74 % (ref 40–73)
NEUTS SEG NFR BLD: 76 % (ref 40–73)
NEUTS SEG NFR BLD: 81 % (ref 40–73)
NEUTS SEG NFR BLD: 84 % (ref 40–73)
NEUTS SEG NFR BLD: 84 % (ref 40–73)
NEUTS SEG NFR BLD: 85 % (ref 40–73)
NEUTS SEG NFR BLD: 90 % (ref 40–73)
NEUTS SEG NFR BLD: 91 % (ref 40–73)
NITRITE UR QL STRIP.AUTO: NEGATIVE
NRBC # BLD: 0 K/UL (ref 0–0.01)
NRBC # BLD: 0.02 K/UL (ref 0–0.01)
NRBC # BLD: 0.03 K/UL (ref 0–0.01)
NRBC BLD-RTO: 0 PER 100 WBC
NRBC BLD-RTO: 0.1 PER 100 WBC
O2/TOTAL GAS SETTING VFR VENT: 32 %
P-R INTERVAL, ECG05: 154 MS
P-R INTERVAL, ECG05: 204 MS
P-R INTERVAL, ECG05: 210 MS
PCO2 BLD: 36.5 MMHG (ref 35–45)
PH BLD: 7.51 [PH] (ref 7.35–7.45)
PH UR STRIP: 8 [PH] (ref 5–8)
PH UR STRIP: 8.5 [PH] (ref 5–8)
PH UR STRIP: 8.5 [PH] (ref 5–8)
PHOSPHATE SERPL-MCNC: 3.1 MG/DL (ref 2.5–4.9)
PHOSPHATE SERPL-MCNC: 3.3 MG/DL (ref 2.5–4.9)
PHOSPHATE SERPL-MCNC: 4.1 MG/DL (ref 2.5–4.9)
PHOSPHATE SERPL-MCNC: 4.2 MG/DL (ref 2.5–4.9)
PHOSPHATE SERPL-MCNC: 4.3 MG/DL (ref 2.5–4.9)
PHOSPHATE SERPL-MCNC: 5 MG/DL (ref 2.5–4.9)
PLATELET # BLD AUTO: 312 K/UL (ref 135–420)
PLATELET # BLD AUTO: 313 K/UL (ref 135–420)
PLATELET # BLD AUTO: 322 K/UL (ref 135–420)
PLATELET # BLD AUTO: 322 K/UL (ref 135–420)
PLATELET # BLD AUTO: 334 K/UL (ref 135–420)
PLATELET # BLD AUTO: 342 K/UL (ref 135–420)
PLATELET # BLD AUTO: 348 K/UL (ref 135–420)
PLATELET # BLD AUTO: 349 K/UL (ref 135–420)
PLATELET # BLD AUTO: 358 K/UL (ref 135–420)
PLATELET # BLD AUTO: 373 K/UL (ref 135–420)
PLATELET # BLD AUTO: 503 K/UL (ref 135–420)
PLATELET COMMENTS,PCOM: ABNORMAL
PMV BLD AUTO: 10.1 FL (ref 9.2–11.8)
PMV BLD AUTO: 10.1 FL (ref 9.2–11.8)
PMV BLD AUTO: 10.2 FL (ref 9.2–11.8)
PMV BLD AUTO: 10.4 FL (ref 9.2–11.8)
PMV BLD AUTO: 10.5 FL (ref 9.2–11.8)
PMV BLD AUTO: 10.5 FL (ref 9.2–11.8)
PMV BLD AUTO: 10.6 FL (ref 9.2–11.8)
PMV BLD AUTO: 10.9 FL (ref 9.2–11.8)
PMV BLD AUTO: 11.5 FL (ref 9.2–11.8)
PO2 BLD: 70 MMHG (ref 80–100)
POTASSIUM SERPL-SCNC: 2.9 MMOL/L (ref 3.5–5.5)
POTASSIUM SERPL-SCNC: 3 MMOL/L (ref 3.5–5.5)
POTASSIUM SERPL-SCNC: 3 MMOL/L (ref 3.5–5.5)
POTASSIUM SERPL-SCNC: 3.1 MMOL/L (ref 3.5–5.5)
POTASSIUM SERPL-SCNC: 3.5 MMOL/L (ref 3.5–5.5)
POTASSIUM SERPL-SCNC: 3.7 MMOL/L (ref 3.5–5.5)
POTASSIUM SERPL-SCNC: 3.9 MMOL/L (ref 3.5–5.5)
POTASSIUM SERPL-SCNC: 5.7 MMOL/L (ref 3.5–5.5)
PROT SERPL-MCNC: 6 G/DL (ref 6.4–8.2)
PROT SERPL-MCNC: 6.7 G/DL (ref 6.4–8.2)
PROT SERPL-MCNC: 6.7 G/DL (ref 6.4–8.2)
PROT SERPL-MCNC: 7.9 G/DL (ref 6.4–8.2)
PROT UR STRIP-MCNC: 300 MG/DL
PROT UR STRIP-MCNC: >300 MG/DL
PROT UR STRIP-MCNC: >300 MG/DL
PROTHROMBIN TIME: 12.5 SEC (ref 11.5–15.2)
PROTHROMBIN TIME: 14 SEC (ref 11.5–15.2)
PSA FREE MFR SERPL: 41.7 %
PSA FREE SERPL-MCNC: 1 NG/ML
PSA SERPL-MCNC: 2.4 NG/ML (ref 0–4)
PTH-INTACT SERPL-MCNC: 27.1 PG/ML (ref 18.4–88)
PTH-INTACT SERPL-MCNC: 342.9 PG/ML (ref 18.4–88)
Q-T INTERVAL, ECG07: 358 MS
Q-T INTERVAL, ECG07: 418 MS
Q-T INTERVAL, ECG07: 420 MS
QRS DURATION, ECG06: 100 MS
QRS DURATION, ECG06: 112 MS
QRS DURATION, ECG06: 116 MS
QTC CALCULATION (BEZET), ECG08: 499 MS
QTC CALCULATION (BEZET), ECG08: 500 MS
QTC CALCULATION (BEZET), ECG08: 505 MS
RBC # BLD AUTO: 2.7 M/UL (ref 4.35–5.65)
RBC # BLD AUTO: 2.72 M/UL (ref 4.35–5.65)
RBC # BLD AUTO: 2.85 M/UL (ref 4.35–5.65)
RBC # BLD AUTO: 2.85 M/UL (ref 4.35–5.65)
RBC # BLD AUTO: 2.86 M/UL (ref 4.35–5.65)
RBC # BLD AUTO: 3.06 M/UL (ref 4.35–5.65)
RBC # BLD AUTO: 3.06 M/UL (ref 4.35–5.65)
RBC # BLD AUTO: 3.16 M/UL (ref 4.35–5.65)
RBC # BLD AUTO: 3.32 M/UL (ref 4.35–5.65)
RBC # BLD AUTO: 3.37 M/UL (ref 4.35–5.65)
RBC # BLD AUTO: 4.5 M/UL (ref 4.35–5.65)
RBC #/AREA URNS HPF: ABNORMAL /HPF (ref 0–5)
RBC MORPH BLD: ABNORMAL
SAO2 % BLD: 95.4 % (ref 92–97)
SERVICE CMNT-IMP: ABNORMAL
SERVICE CMNT-IMP: NORMAL
SODIUM SERPL-SCNC: 135 MMOL/L (ref 136–145)
SODIUM SERPL-SCNC: 136 MMOL/L (ref 136–145)
SODIUM SERPL-SCNC: 137 MMOL/L (ref 136–145)
SODIUM SERPL-SCNC: 137 MMOL/L (ref 136–145)
SODIUM SERPL-SCNC: 138 MMOL/L (ref 136–145)
SODIUM SERPL-SCNC: 138 MMOL/L (ref 136–145)
SODIUM SERPL-SCNC: 139 MMOL/L (ref 136–145)
SODIUM SERPL-SCNC: 140 MMOL/L (ref 136–145)
SODIUM SERPL-SCNC: 141 MMOL/L (ref 136–145)
SODIUM SERPL-SCNC: 141 MMOL/L (ref 136–145)
SOURCE, COVRS: NORMAL
SP GR UR REFRACTOMETRY: 1.02 (ref 1–1.03)
SPECIMEN EXP DATE BLD: NORMAL
SPECIMEN TYPE: ABNORMAL
TOTAL RESP. RATE, ITRR: 30
TRIGL SERPL-MCNC: 139 MG/DL (ref ?–150)
TROPONIN-HIGH SENSITIVITY: 13 NG/L (ref 0–78)
TSH SERPL DL<=0.05 MIU/L-ACNC: 2.96 UIU/ML (ref 0.36–3.74)
UROBILINOGEN UR QL STRIP.AUTO: 0.2 EU/DL (ref 0.2–1)
VANCOMYCIN SERPL-MCNC: 16.8 UG/ML (ref 5–40)
VANCOMYCIN SERPL-MCNC: 21.6 UG/ML (ref 5–40)
VENTRICULAR RATE, ECG03: 117 BPM
VENTRICULAR RATE, ECG03: 86 BPM
VENTRICULAR RATE, ECG03: 87 BPM
VIT B12 SERPL-MCNC: >2000 PG/ML (ref 211–911)
VLDLC SERPL CALC-MCNC: 27.8 MG/DL
WBC # BLD AUTO: 18.8 K/UL (ref 4.6–13.2)
WBC # BLD AUTO: 18.9 K/UL (ref 4.6–13.2)
WBC # BLD AUTO: 19.1 K/UL (ref 4.6–13.2)
WBC # BLD AUTO: 21.7 K/UL (ref 4.6–13.2)
WBC # BLD AUTO: 23.6 K/UL (ref 4.6–13.2)
WBC # BLD AUTO: 24.9 K/UL (ref 4.6–13.2)
WBC # BLD AUTO: 26.9 K/UL (ref 4.6–13.2)
WBC # BLD AUTO: 27.1 K/UL (ref 4.6–13.2)
WBC # BLD AUTO: 29.6 K/UL (ref 4.6–13.2)
WBC # BLD AUTO: 59.2 K/UL (ref 4.6–13.2)
WBC # BLD AUTO: 77.1 K/UL (ref 4.6–13.2)
WBC MORPH BLD: ABNORMAL
WBC URNS QL MICRO: ABNORMAL /HPF (ref 0–4)
WBC URNS QL MICRO: ABNORMAL /HPF (ref 0–4)
WBC URNS QL MICRO: ABNORMAL /HPF (ref 0–5)

## 2022-01-01 PROCEDURE — 2709999900 HC NON-CHARGEABLE SUPPLY

## 2022-01-01 PROCEDURE — 74011250636 HC RX REV CODE- 250/636: Performed by: INTERNAL MEDICINE

## 2022-01-01 PROCEDURE — 81001 URINALYSIS AUTO W/SCOPE: CPT

## 2022-01-01 PROCEDURE — 80053 COMPREHEN METABOLIC PANEL: CPT

## 2022-01-01 PROCEDURE — 36415 COLL VENOUS BLD VENIPUNCTURE: CPT

## 2022-01-01 PROCEDURE — 99233 SBSQ HOSP IP/OBS HIGH 50: CPT | Performed by: NURSE PRACTITIONER

## 2022-01-01 PROCEDURE — 74011250636 HC RX REV CODE- 250/636: Performed by: HOSPITALIST

## 2022-01-01 PROCEDURE — 85730 THROMBOPLASTIN TIME PARTIAL: CPT

## 2022-01-01 PROCEDURE — 83605 ASSAY OF LACTIC ACID: CPT

## 2022-01-01 PROCEDURE — 80061 LIPID PANEL: CPT

## 2022-01-01 PROCEDURE — 74011000258 HC RX REV CODE- 258: Performed by: INTERNAL MEDICINE

## 2022-01-01 PROCEDURE — 96374 THER/PROPH/DIAG INJ IV PUSH: CPT

## 2022-01-01 PROCEDURE — 82077 ASSAY SPEC XCP UR&BREATH IA: CPT

## 2022-01-01 PROCEDURE — 86706 HEP B SURFACE ANTIBODY: CPT

## 2022-01-01 PROCEDURE — 84100 ASSAY OF PHOSPHORUS: CPT

## 2022-01-01 PROCEDURE — 85025 COMPLETE CBC W/AUTO DIFF WBC: CPT

## 2022-01-01 PROCEDURE — 65660000000 HC RM CCU STEPDOWN

## 2022-01-01 PROCEDURE — 87077 CULTURE AEROBIC IDENTIFY: CPT

## 2022-01-01 PROCEDURE — 74011250636 HC RX REV CODE- 250/636: Performed by: PHYSICIAN ASSISTANT

## 2022-01-01 PROCEDURE — 93005 ELECTROCARDIOGRAM TRACING: CPT

## 2022-01-01 PROCEDURE — 92610 EVALUATE SWALLOWING FUNCTION: CPT

## 2022-01-01 PROCEDURE — 96365 THER/PROPH/DIAG IV INF INIT: CPT

## 2022-01-01 PROCEDURE — 99232 SBSQ HOSP IP/OBS MODERATE 35: CPT | Performed by: HOSPITALIST

## 2022-01-01 PROCEDURE — C9113 INJ PANTOPRAZOLE SODIUM, VIA: HCPCS | Performed by: STUDENT IN AN ORGANIZED HEALTH CARE EDUCATION/TRAINING PROGRAM

## 2022-01-01 PROCEDURE — 90935 HEMODIALYSIS ONE EVALUATION: CPT

## 2022-01-01 PROCEDURE — 87635 SARS-COV-2 COVID-19 AMP PRB: CPT

## 2022-01-01 PROCEDURE — 74011000258 HC RX REV CODE- 258: Performed by: PHYSICIAN ASSISTANT

## 2022-01-01 PROCEDURE — 74011250637 HC RX REV CODE- 250/637: Performed by: STUDENT IN AN ORGANIZED HEALTH CARE EDUCATION/TRAINING PROGRAM

## 2022-01-01 PROCEDURE — 87186 SC STD MICRODIL/AGAR DIL: CPT

## 2022-01-01 PROCEDURE — 74011250636 HC RX REV CODE- 250/636: Performed by: STUDENT IN AN ORGANIZED HEALTH CARE EDUCATION/TRAINING PROGRAM

## 2022-01-01 PROCEDURE — 99285 EMERGENCY DEPT VISIT HI MDM: CPT

## 2022-01-01 PROCEDURE — 99221 1ST HOSP IP/OBS SF/LOW 40: CPT | Performed by: NURSE PRACTITIONER

## 2022-01-01 PROCEDURE — 71045 X-RAY EXAM CHEST 1 VIEW: CPT

## 2022-01-01 PROCEDURE — 74011000250 HC RX REV CODE- 250: Performed by: PHYSICIAN ASSISTANT

## 2022-01-01 PROCEDURE — 74011250637 HC RX REV CODE- 250/637: Performed by: HOSPITALIST

## 2022-01-01 PROCEDURE — 92526 ORAL FUNCTION THERAPY: CPT

## 2022-01-01 PROCEDURE — 74011250637 HC RX REV CODE- 250/637: Performed by: UROLOGY

## 2022-01-01 PROCEDURE — 85610 PROTHROMBIN TIME: CPT

## 2022-01-01 PROCEDURE — 84443 ASSAY THYROID STIM HORMONE: CPT

## 2022-01-01 PROCEDURE — 0651 HSPC ROUTINE HOME CARE

## 2022-01-01 PROCEDURE — 94640 AIRWAY INHALATION TREATMENT: CPT

## 2022-01-01 PROCEDURE — 65270000029 HC RM PRIVATE

## 2022-01-01 PROCEDURE — 99223 1ST HOSP IP/OBS HIGH 75: CPT | Performed by: INTERNAL MEDICINE

## 2022-01-01 PROCEDURE — 80202 ASSAY OF VANCOMYCIN: CPT

## 2022-01-01 PROCEDURE — 77030040392 HC DRSG OPTIFOAM MDII -A

## 2022-01-01 PROCEDURE — 99283 EMERGENCY DEPT VISIT LOW MDM: CPT

## 2022-01-01 PROCEDURE — 80048 BASIC METABOLIC PNL TOTAL CA: CPT

## 2022-01-01 PROCEDURE — APPSS30 APP SPLIT SHARED TIME 16-30 MINUTES: Performed by: PHYSICIAN ASSISTANT

## 2022-01-01 PROCEDURE — 70450 CT HEAD/BRAIN W/O DYE: CPT

## 2022-01-01 PROCEDURE — 77030040393 HC DRSG OPTIFOAM GENT MDII -B

## 2022-01-01 PROCEDURE — 84154 ASSAY OF PSA FREE: CPT

## 2022-01-01 PROCEDURE — 97162 PT EVAL MOD COMPLEX 30 MIN: CPT

## 2022-01-01 PROCEDURE — 82140 ASSAY OF AMMONIA: CPT

## 2022-01-01 PROCEDURE — 77030012865 HC BG URIN LEG MDII -A

## 2022-01-01 PROCEDURE — 74011000636 HC RX REV CODE- 636: Performed by: STUDENT IN AN ORGANIZED HEALTH CARE EDUCATION/TRAINING PROGRAM

## 2022-01-01 PROCEDURE — 80069 RENAL FUNCTION PANEL: CPT

## 2022-01-01 PROCEDURE — 87086 URINE CULTURE/COLONY COUNT: CPT

## 2022-01-01 PROCEDURE — APPSS60 APP SPLIT SHARED TIME 46-60 MINUTES: Performed by: NURSE PRACTITIONER

## 2022-01-01 PROCEDURE — 77030037878 HC DRSG MEPILEX >48IN BORD MOLN -B

## 2022-01-01 PROCEDURE — 94762 N-INVAS EAR/PLS OXIMTRY CONT: CPT

## 2022-01-01 PROCEDURE — 82803 BLOOD GASES ANY COMBINATION: CPT

## 2022-01-01 PROCEDURE — 36600 WITHDRAWAL OF ARTERIAL BLOOD: CPT

## 2022-01-01 PROCEDURE — 99223 1ST HOSP IP/OBS HIGH 75: CPT | Performed by: FAMILY MEDICINE

## 2022-01-01 PROCEDURE — 83735 ASSAY OF MAGNESIUM: CPT

## 2022-01-01 PROCEDURE — 74178 CT ABD&PLV WO CNTR FLWD CNTR: CPT

## 2022-01-01 PROCEDURE — 82962 GLUCOSE BLOOD TEST: CPT

## 2022-01-01 PROCEDURE — 87040 BLOOD CULTURE FOR BACTERIA: CPT

## 2022-01-01 PROCEDURE — 74011000258 HC RX REV CODE- 258: Performed by: STUDENT IN AN ORGANIZED HEALTH CARE EDUCATION/TRAINING PROGRAM

## 2022-01-01 PROCEDURE — 84484 ASSAY OF TROPONIN QUANT: CPT

## 2022-01-01 PROCEDURE — 83690 ASSAY OF LIPASE: CPT

## 2022-01-01 PROCEDURE — 97535 SELF CARE MNGMENT TRAINING: CPT

## 2022-01-01 PROCEDURE — 70551 MRI BRAIN STEM W/O DYE: CPT

## 2022-01-01 PROCEDURE — 70496 CT ANGIOGRAPHY HEAD: CPT

## 2022-01-01 PROCEDURE — 74011250637 HC RX REV CODE- 250/637: Performed by: NURSE PRACTITIONER

## 2022-01-01 PROCEDURE — 71046 X-RAY EXAM CHEST 2 VIEWS: CPT

## 2022-01-01 PROCEDURE — 74011250637 HC RX REV CODE- 250/637: Performed by: INTERNAL MEDICINE

## 2022-01-01 PROCEDURE — 74011000636 HC RX REV CODE- 636: Performed by: HOSPITALIST

## 2022-01-01 PROCEDURE — 83970 ASSAY OF PARATHORMONE: CPT

## 2022-01-01 PROCEDURE — 99233 SBSQ HOSP IP/OBS HIGH 50: CPT | Performed by: STUDENT IN AN ORGANIZED HEALTH CARE EDUCATION/TRAINING PROGRAM

## 2022-01-01 PROCEDURE — 97530 THERAPEUTIC ACTIVITIES: CPT

## 2022-01-01 PROCEDURE — 99233 SBSQ HOSP IP/OBS HIGH 50: CPT | Performed by: PSYCHIATRY & NEUROLOGY

## 2022-01-01 PROCEDURE — G0299 HHS/HOSPICE OF RN EA 15 MIN: HCPCS

## 2022-01-01 PROCEDURE — 99024 POSTOP FOLLOW-UP VISIT: CPT | Performed by: NURSE PRACTITIONER

## 2022-01-01 PROCEDURE — 51702 INSERT TEMP BLADDER CATH: CPT

## 2022-01-01 PROCEDURE — 82607 VITAMIN B-12: CPT

## 2022-01-01 PROCEDURE — 99291 CRITICAL CARE FIRST HOUR: CPT | Performed by: INTERNAL MEDICINE

## 2022-01-01 PROCEDURE — 87340 HEPATITIS B SURFACE AG IA: CPT

## 2022-01-01 PROCEDURE — 99238 HOSP IP/OBS DSCHRG MGMT 30/<: CPT | Performed by: FAMILY MEDICINE

## 2022-01-01 PROCEDURE — 74011000250 HC RX REV CODE- 250: Performed by: STUDENT IN AN ORGANIZED HEALTH CARE EDUCATION/TRAINING PROGRAM

## 2022-01-01 PROCEDURE — 71260 CT THORAX DX C+: CPT

## 2022-01-01 PROCEDURE — 87147 CULTURE TYPE IMMUNOLOGIC: CPT

## 2022-01-01 PROCEDURE — 96368 THER/DIAG CONCURRENT INF: CPT

## 2022-01-01 PROCEDURE — 97166 OT EVAL MOD COMPLEX 45 MIN: CPT

## 2022-01-01 PROCEDURE — 3331090004 HSPC SERVICE INTENSITY ADD-ON

## 2022-01-01 PROCEDURE — 83036 HEMOGLOBIN GLYCOSYLATED A1C: CPT

## 2022-01-01 PROCEDURE — 97116 GAIT TRAINING THERAPY: CPT

## 2022-01-01 PROCEDURE — G0155 HHCP-SVS OF CSW,EA 15 MIN: HCPCS

## 2022-01-01 PROCEDURE — APPSS45 APP SPLIT SHARED TIME 31-45 MINUTES: Performed by: PHYSICIAN ASSISTANT

## 2022-01-01 PROCEDURE — HOSPICE MEDICATION HC HH HOSPICE MEDICATION

## 2022-01-01 PROCEDURE — 3336500001 HSPC ELECTION

## 2022-01-01 PROCEDURE — 86900 BLOOD TYPING SEROLOGIC ABO: CPT

## 2022-01-01 PROCEDURE — 97110 THERAPEUTIC EXERCISES: CPT

## 2022-01-01 PROCEDURE — 99232 SBSQ HOSP IP/OBS MODERATE 35: CPT | Performed by: INTERNAL MEDICINE

## 2022-01-01 PROCEDURE — G0156 HHCP-SVS OF AIDE,EA 15 MIN: HCPCS

## 2022-01-01 PROCEDURE — 99239 HOSP IP/OBS DSCHRG MGMT >30: CPT | Performed by: HOSPITALIST

## 2022-01-01 PROCEDURE — 99284 EMERGENCY DEPT VISIT MOD MDM: CPT

## 2022-01-01 PROCEDURE — 76450000000

## 2022-01-01 PROCEDURE — 96375 TX/PRO/DX INJ NEW DRUG ADDON: CPT

## 2022-01-01 PROCEDURE — 65270000046 HC RM TELEMETRY

## 2022-01-01 PROCEDURE — 74011000258 HC RX REV CODE- 258: Performed by: HOSPITALIST

## 2022-01-01 PROCEDURE — 93970 EXTREMITY STUDY: CPT

## 2022-01-01 PROCEDURE — P9045 ALBUMIN (HUMAN), 5%, 250 ML: HCPCS | Performed by: PHYSICIAN ASSISTANT

## 2022-01-01 RX ORDER — TAMSULOSIN HYDROCHLORIDE 0.4 MG/1
0.4 CAPSULE ORAL DAILY
Qty: 15 CAPSULE | Refills: 0 | Status: SHIPPED | OUTPATIENT
Start: 2022-01-01 | End: 2022-01-01

## 2022-01-01 RX ORDER — ALBUTEROL SULFATE 0.83 MG/ML
5 SOLUTION RESPIRATORY (INHALATION)
Status: COMPLETED | OUTPATIENT
Start: 2022-01-01 | End: 2022-01-01

## 2022-01-01 RX ORDER — LORAZEPAM 2 MG/ML
0.5 CONCENTRATE ORAL
Status: DISCONTINUED | OUTPATIENT
Start: 2022-01-01 | End: 2022-01-01 | Stop reason: HOSPADM

## 2022-01-01 RX ORDER — HEPARIN SODIUM 5000 [USP'U]/ML
5000 INJECTION, SOLUTION INTRAVENOUS; SUBCUTANEOUS EVERY 8 HOURS
Status: DISCONTINUED | OUTPATIENT
Start: 2022-01-01 | End: 2022-01-01 | Stop reason: HOSPADM

## 2022-01-01 RX ORDER — VANCOMYCIN 1.75 GRAM/500 ML IN 0.9 % SODIUM CHLORIDE INTRAVENOUS
1750 ONCE
Status: COMPLETED | OUTPATIENT
Start: 2022-01-01 | End: 2022-01-01

## 2022-01-01 RX ORDER — TAMSULOSIN HYDROCHLORIDE 0.4 MG/1
0.4 CAPSULE ORAL ONCE
Status: COMPLETED | OUTPATIENT
Start: 2022-01-01 | End: 2022-01-01

## 2022-01-01 RX ORDER — HYOSCYAMINE SULFATE 0.12 MG/1
0.12 TABLET SUBLINGUAL
Qty: 30 TABLET | Refills: 0 | Status: SHIPPED | OUTPATIENT
Start: 2022-01-01 | End: 2022-01-01 | Stop reason: ALTCHOICE

## 2022-01-01 RX ORDER — HYDROCORTISONE SODIUM SUCCINATE 100 MG/2ML
50 INJECTION, POWDER, FOR SOLUTION INTRAMUSCULAR; INTRAVENOUS EVERY 24 HOURS
Status: DISCONTINUED | OUTPATIENT
Start: 2022-01-01 | End: 2022-01-01

## 2022-01-01 RX ORDER — HEPARIN SODIUM 5000 [USP'U]/ML
5000 INJECTION, SOLUTION INTRAVENOUS; SUBCUTANEOUS EVERY 8 HOURS
Status: DISCONTINUED | OUTPATIENT
Start: 2022-01-01 | End: 2022-01-01

## 2022-01-01 RX ORDER — MORPHINE SULFATE 20 MG/ML
5 SOLUTION ORAL
Status: DISCONTINUED | OUTPATIENT
Start: 2022-01-01 | End: 2022-01-01 | Stop reason: HOSPADM

## 2022-01-01 RX ORDER — ASPIRIN 325 MG
325 TABLET ORAL DAILY
Status: DISCONTINUED | OUTPATIENT
Start: 2022-01-01 | End: 2022-01-01

## 2022-01-01 RX ORDER — GUAIFENESIN 100 MG/5ML
81 LIQUID (ML) ORAL DAILY
Status: DISCONTINUED | OUTPATIENT
Start: 2022-01-01 | End: 2022-01-01 | Stop reason: HOSPADM

## 2022-01-01 RX ORDER — DEXTROSE MONOHYDRATE 100 MG/ML
20 INJECTION, SOLUTION INTRAVENOUS CONTINUOUS
Status: DISCONTINUED | OUTPATIENT
Start: 2022-01-01 | End: 2022-01-01

## 2022-01-01 RX ORDER — ACETAMINOPHEN 650 MG/1
650 SUPPOSITORY RECTAL
Status: DISCONTINUED | OUTPATIENT
Start: 2022-01-01 | End: 2022-01-01 | Stop reason: HOSPADM

## 2022-01-01 RX ORDER — LABETALOL HCL 20 MG/4 ML
5 SYRINGE (ML) INTRAVENOUS
Status: DISCONTINUED | OUTPATIENT
Start: 2022-01-01 | End: 2022-01-01 | Stop reason: HOSPADM

## 2022-01-01 RX ORDER — HYOSCYAMINE SULFATE 0.12 MG/1
0.12 TABLET SUBLINGUAL
Status: DISCONTINUED | OUTPATIENT
Start: 2022-01-01 | End: 2022-01-01 | Stop reason: HOSPADM

## 2022-01-01 RX ORDER — SODIUM CHLORIDE 0.9 % (FLUSH) 0.9 %
5-10 SYRINGE (ML) INJECTION AS NEEDED
Status: DISCONTINUED | OUTPATIENT
Start: 2022-01-01 | End: 2022-01-01 | Stop reason: HOSPADM

## 2022-01-01 RX ORDER — LEVOFLOXACIN 750 MG/1
750 TABLET ORAL DAILY
Qty: 5 TABLET | Refills: 0 | Status: SHIPPED | OUTPATIENT
Start: 2022-01-01 | End: 2022-01-01

## 2022-01-01 RX ORDER — ACETAMINOPHEN 325 MG/1
650 TABLET ORAL
Status: DISCONTINUED | OUTPATIENT
Start: 2022-01-01 | End: 2022-01-01 | Stop reason: HOSPADM

## 2022-01-01 RX ORDER — DEXTROSE MONOHYDRATE 100 MG/ML
0-250 INJECTION, SOLUTION INTRAVENOUS AS NEEDED
Status: DISCONTINUED | OUTPATIENT
Start: 2022-01-01 | End: 2022-01-01

## 2022-01-01 RX ORDER — FACIAL-BODY WIPES
10 EACH TOPICAL DAILY PRN
Status: DISCONTINUED | OUTPATIENT
Start: 2022-01-01 | End: 2022-01-01 | Stop reason: HOSPADM

## 2022-01-01 RX ORDER — NYSTATIN 100000 [USP'U]/G
POWDER TOPICAL 2 TIMES DAILY
Status: DISCONTINUED | OUTPATIENT
Start: 2022-01-01 | End: 2022-01-01 | Stop reason: HOSPADM

## 2022-01-01 RX ORDER — TROSPIUM CHLORIDE 20 MG/1
20 TABLET, FILM COATED ORAL
Qty: 30 TABLET | Refills: 0 | Status: SHIPPED | OUTPATIENT
Start: 2022-01-01 | End: 2022-01-01 | Stop reason: SDUPTHER

## 2022-01-01 RX ORDER — LEVOFLOXACIN 5 MG/ML
500 INJECTION, SOLUTION INTRAVENOUS
Status: DISCONTINUED | OUTPATIENT
Start: 2022-01-01 | End: 2022-01-01

## 2022-01-01 RX ORDER — DOXERCALCIFEROL 4 UG/2ML
1 INJECTION INTRAVENOUS
Status: DISCONTINUED | OUTPATIENT
Start: 2022-01-01 | End: 2022-01-01 | Stop reason: HOSPADM

## 2022-01-01 RX ORDER — NYSTATIN 100000 [USP'U]/G
POWDER TOPICAL 2 TIMES DAILY
Qty: 1 EACH | Refills: 0 | Status: SHIPPED | OUTPATIENT
Start: 2022-01-01 | End: 2022-01-01

## 2022-01-01 RX ORDER — LORAZEPAM 2 MG/ML
0.5 CONCENTRATE ORAL
Qty: 30 ML | Refills: 0 | Status: SHIPPED | OUTPATIENT
Start: 2022-01-01

## 2022-01-01 RX ORDER — POTASSIUM CHLORIDE 20 MEQ/1
40 TABLET, EXTENDED RELEASE ORAL
Status: COMPLETED | OUTPATIENT
Start: 2022-01-01 | End: 2022-01-01

## 2022-01-01 RX ORDER — AMOXICILLIN AND CLAVULANATE POTASSIUM 500; 125 MG/1; MG/1
1 TABLET, FILM COATED ORAL EVERY 24 HOURS
Qty: 7 TABLET | Refills: 0 | Status: SHIPPED | OUTPATIENT
Start: 2022-01-01 | End: 2022-01-01

## 2022-01-01 RX ORDER — AMOXICILLIN AND CLAVULANATE POTASSIUM 500; 125 MG/1; MG/1
1 TABLET, FILM COATED ORAL EVERY 24 HOURS
Status: DISCONTINUED | OUTPATIENT
Start: 2022-01-01 | End: 2022-01-01 | Stop reason: HOSPADM

## 2022-01-01 RX ORDER — POTASSIUM CHLORIDE 20 MEQ/1
20 TABLET, EXTENDED RELEASE ORAL
Status: COMPLETED | OUTPATIENT
Start: 2022-01-01 | End: 2022-01-01

## 2022-01-01 RX ORDER — ABIRATERONE ACETATE 250 MG/1
1000 TABLET ORAL DAILY
Status: DISCONTINUED | OUTPATIENT
Start: 2022-01-01 | End: 2022-01-01 | Stop reason: HOSPADM

## 2022-01-01 RX ORDER — LEVOFLOXACIN 5 MG/ML
750 INJECTION, SOLUTION INTRAVENOUS EVERY 24 HOURS
Status: DISCONTINUED | OUTPATIENT
Start: 2022-01-01 | End: 2022-01-01

## 2022-01-01 RX ORDER — INSULIN LISPRO 100 [IU]/ML
INJECTION, SOLUTION INTRAVENOUS; SUBCUTANEOUS EVERY 6 HOURS
Status: DISCONTINUED | OUTPATIENT
Start: 2022-01-01 | End: 2022-01-01

## 2022-01-01 RX ORDER — ASPIRIN 300 MG/1
300 SUPPOSITORY RECTAL DAILY
Status: DISCONTINUED | OUTPATIENT
Start: 2022-01-01 | End: 2022-01-01

## 2022-01-01 RX ORDER — OXYCODONE AND ACETAMINOPHEN 5; 325 MG/1; MG/1
1 TABLET ORAL
Status: DISCONTINUED | OUTPATIENT
Start: 2022-01-01 | End: 2022-01-01 | Stop reason: HOSPADM

## 2022-01-01 RX ORDER — ALBUMIN HUMAN 50 G/1000ML
25 SOLUTION INTRAVENOUS ONCE
Status: COMPLETED | OUTPATIENT
Start: 2022-01-01 | End: 2022-01-01

## 2022-01-01 RX ORDER — TROSPIUM CHLORIDE 20 MG/1
20 TABLET, FILM COATED ORAL
Status: DISCONTINUED | OUTPATIENT
Start: 2022-01-01 | End: 2022-01-01 | Stop reason: HOSPADM

## 2022-01-01 RX ORDER — MORPHINE SULFATE 20 MG/ML
5 SOLUTION ORAL
Qty: 30 ML | Refills: 0 | Status: SHIPPED | OUTPATIENT
Start: 2022-01-01 | End: 2022-01-01 | Stop reason: ALTCHOICE

## 2022-01-01 RX ORDER — ACETAMINOPHEN 650 MG/1
650 SUPPOSITORY RECTAL
Status: DISCONTINUED | OUTPATIENT
Start: 2022-01-01 | End: 2022-01-01

## 2022-01-01 RX ORDER — ONDANSETRON 2 MG/ML
4 INJECTION INTRAMUSCULAR; INTRAVENOUS
Status: DISCONTINUED | OUTPATIENT
Start: 2022-01-01 | End: 2022-01-01 | Stop reason: HOSPADM

## 2022-01-01 RX ORDER — SODIUM CHLORIDE 900 MG/100ML
INJECTION INTRAVENOUS
Status: DISPENSED
Start: 2022-01-01 | End: 2022-01-01

## 2022-01-01 RX ORDER — LEVOFLOXACIN 5 MG/ML
750 INJECTION, SOLUTION INTRAVENOUS ONCE
Status: COMPLETED | OUTPATIENT
Start: 2022-01-01 | End: 2022-01-01

## 2022-01-01 RX ORDER — MAGNESIUM SULFATE 100 %
4 CRYSTALS MISCELLANEOUS AS NEEDED
Status: DISCONTINUED | OUTPATIENT
Start: 2022-01-01 | End: 2022-01-01

## 2022-01-01 RX ORDER — DEXTROSE MONOHYDRATE 100 MG/ML
250 INJECTION, SOLUTION INTRAVENOUS ONCE
Status: DISPENSED | OUTPATIENT
Start: 2022-01-01 | End: 2022-01-01

## 2022-01-01 RX ADMIN — NYSTATIN: 100000 POWDER TOPICAL at 18:02

## 2022-01-01 RX ADMIN — DEXTROSE MONOHYDRATE 20 ML/HR: 100 INJECTION, SOLUTION INTRAVENOUS at 00:26

## 2022-01-01 RX ADMIN — PIPERACILLIN AND TAZOBACTAM 4.5 G: 4; .5 INJECTION, POWDER, FOR SOLUTION INTRAVENOUS at 11:25

## 2022-01-01 RX ADMIN — TROSPIUM CHLORIDE 20 MG: 20 TABLET, FILM COATED ORAL at 18:04

## 2022-01-01 RX ADMIN — PIPERACILLIN AND TAZOBACTAM 4.5 G: 4; .5 INJECTION, POWDER, FOR SOLUTION INTRAVENOUS at 21:15

## 2022-01-01 RX ADMIN — POTASSIUM CHLORIDE 20 MEQ: 1500 TABLET, EXTENDED RELEASE ORAL at 13:36

## 2022-01-01 RX ADMIN — HEPARIN SODIUM 5000 UNITS: 5000 INJECTION INTRAVENOUS; SUBCUTANEOUS at 19:08

## 2022-01-01 RX ADMIN — POTASSIUM CHLORIDE 40 MEQ: 1500 TABLET, EXTENDED RELEASE ORAL at 03:04

## 2022-01-01 RX ADMIN — ALBUTEROL SULFATE 5 MG: 2.5 SOLUTION RESPIRATORY (INHALATION) at 05:40

## 2022-01-01 RX ADMIN — WATER 1 G: 1 INJECTION INTRAMUSCULAR; INTRAVENOUS; SUBCUTANEOUS at 21:28

## 2022-01-01 RX ADMIN — ASPIRIN 81 MG 81 MG: 81 TABLET ORAL at 08:12

## 2022-01-01 RX ADMIN — ABIRATERONE ACETATE 1000 MG: 250 TABLET ORAL at 08:10

## 2022-01-01 RX ADMIN — OXYCODONE HYDROCHLORIDE AND ACETAMINOPHEN 1 TABLET: 5; 325 TABLET ORAL at 09:34

## 2022-01-01 RX ADMIN — NYSTATIN: 100000 POWDER TOPICAL at 09:36

## 2022-01-01 RX ADMIN — ASPIRIN 81 MG 81 MG: 81 TABLET ORAL at 08:10

## 2022-01-01 RX ADMIN — EPOETIN ALFA-EPBX 4000 UNITS: 4000 INJECTION, SOLUTION INTRAVENOUS; SUBCUTANEOUS at 23:15

## 2022-01-01 RX ADMIN — SODIUM CHLORIDE 500 ML: 900 INJECTION, SOLUTION INTRAVENOUS at 05:03

## 2022-01-01 RX ADMIN — TROSPIUM CHLORIDE 20 MG: 20 TABLET, FILM COATED ORAL at 15:57

## 2022-01-01 RX ADMIN — DEXTROSE MONOHYDRATE 20 ML/HR: 100 INJECTION, SOLUTION INTRAVENOUS at 07:43

## 2022-01-01 RX ADMIN — HEPARIN SODIUM 5000 UNITS: 5000 INJECTION INTRAVENOUS; SUBCUTANEOUS at 11:38

## 2022-01-01 RX ADMIN — HEPARIN SODIUM 5000 UNITS: 5000 INJECTION INTRAVENOUS; SUBCUTANEOUS at 04:17

## 2022-01-01 RX ADMIN — HEPARIN SODIUM 5000 UNITS: 5000 INJECTION INTRAVENOUS; SUBCUTANEOUS at 03:27

## 2022-01-01 RX ADMIN — SODIUM CHLORIDE, PRESERVATIVE FREE 40 MG: 5 INJECTION INTRAVENOUS at 17:13

## 2022-01-01 RX ADMIN — SODIUM CHLORIDE, SODIUM GLUCONATE, SODIUM ACETATE, POTASSIUM CHLORIDE AND MAGNESIUM CHLORIDE 1000 ML: 526; 502; 368; 37; 30 INJECTION, SOLUTION INTRAVENOUS at 11:06

## 2022-01-01 RX ADMIN — ABIRATERONE ACETATE 1000 MG: 250 TABLET ORAL at 08:34

## 2022-01-01 RX ADMIN — DOXERCALCIFEROL 1 MCG: 4 INJECTION, SOLUTION INTRAVENOUS at 08:06

## 2022-01-01 RX ADMIN — ACETAMINOPHEN 650 MG: 325 TABLET ORAL at 21:30

## 2022-01-01 RX ADMIN — NYSTATIN: 100000 POWDER TOPICAL at 13:38

## 2022-01-01 RX ADMIN — Medication 1 CAPSULE: at 08:10

## 2022-01-01 RX ADMIN — HEPARIN SODIUM 5000 UNITS: 5000 INJECTION INTRAVENOUS; SUBCUTANEOUS at 11:44

## 2022-01-01 RX ADMIN — PIPERACILLIN AND TAZOBACTAM 4.5 G: 4; .5 INJECTION, POWDER, FOR SOLUTION INTRAVENOUS at 23:16

## 2022-01-01 RX ADMIN — PIPERACILLIN AND TAZOBACTAM 4.5 G: 4; .5 INJECTION, POWDER, FOR SOLUTION INTRAVENOUS at 08:09

## 2022-01-01 RX ADMIN — ABIRATERONE ACETATE 1000 MG: 250 TABLET ORAL at 08:13

## 2022-01-01 RX ADMIN — ASPIRIN 300 MG: 300 SUPPOSITORY RECTAL at 09:06

## 2022-01-01 RX ADMIN — EPOETIN ALFA-EPBX 4000 UNITS: 4000 INJECTION, SOLUTION INTRAVENOUS; SUBCUTANEOUS at 21:22

## 2022-01-01 RX ADMIN — IOPAMIDOL 100 ML: 755 INJECTION, SOLUTION INTRAVENOUS at 09:23

## 2022-01-01 RX ADMIN — VANCOMYCIN HYDROCHLORIDE 500 MG: 500 INJECTION, POWDER, LYOPHILIZED, FOR SOLUTION INTRAVENOUS at 17:33

## 2022-01-01 RX ADMIN — PIPERACILLIN AND TAZOBACTAM 4.5 G: 4; .5 INJECTION, POWDER, FOR SOLUTION INTRAVENOUS at 20:47

## 2022-01-01 RX ADMIN — PIPERACILLIN AND TAZOBACTAM 4.5 G: 4; .5 INJECTION, POWDER, FOR SOLUTION INTRAVENOUS at 09:28

## 2022-01-01 RX ADMIN — Medication 1 CAPSULE: at 08:05

## 2022-01-01 RX ADMIN — SODIUM CHLORIDE 3 G: 900 INJECTION INTRAVENOUS at 08:12

## 2022-01-01 RX ADMIN — ALBUMIN (HUMAN) 25 G: 12.5 INJECTION, SOLUTION INTRAVENOUS at 11:05

## 2022-01-01 RX ADMIN — IOPAMIDOL 80 ML: 612 INJECTION, SOLUTION INTRAVENOUS at 14:48

## 2022-01-01 RX ADMIN — ASPIRIN 300 MG: 300 SUPPOSITORY RECTAL at 11:26

## 2022-01-01 RX ADMIN — HEPARIN SODIUM 5000 UNITS: 5000 INJECTION INTRAVENOUS; SUBCUTANEOUS at 03:16

## 2022-01-01 RX ADMIN — HEPARIN SODIUM 5000 UNITS: 5000 INJECTION INTRAVENOUS; SUBCUTANEOUS at 12:39

## 2022-01-01 RX ADMIN — PIPERACILLIN SODIUM AND TAZOBACTAM SODIUM 3.38 G: 3; .375 INJECTION, POWDER, LYOPHILIZED, FOR SOLUTION INTRAVENOUS at 12:46

## 2022-01-01 RX ADMIN — PIPERACILLIN AND TAZOBACTAM 4.5 G: 4; .5 INJECTION, POWDER, FOR SOLUTION INTRAVENOUS at 08:33

## 2022-01-01 RX ADMIN — OXYCODONE HYDROCHLORIDE AND ACETAMINOPHEN 1 TABLET: 5; 325 TABLET ORAL at 19:05

## 2022-01-01 RX ADMIN — NYSTATIN: 100000 POWDER TOPICAL at 08:10

## 2022-01-01 RX ADMIN — LEVOFLOXACIN 750 MG: 5 INJECTION, SOLUTION INTRAVENOUS at 11:26

## 2022-01-01 RX ADMIN — DOXERCALCIFEROL 1 MCG: 4 INJECTION, SOLUTION INTRAVENOUS at 10:47

## 2022-01-01 RX ADMIN — NYSTATIN: 100000 POWDER TOPICAL at 17:46

## 2022-01-01 RX ADMIN — AMOXICILLIN AND CLAVULANATE POTASSIUM 1 TABLET: 500; 125 TABLET, FILM COATED ORAL at 08:05

## 2022-01-01 RX ADMIN — NYSTATIN: 100000 POWDER TOPICAL at 18:14

## 2022-01-01 RX ADMIN — HYDROCORTISONE SODIUM SUCCINATE 50 MG: 100 INJECTION, POWDER, FOR SOLUTION INTRAMUSCULAR; INTRAVENOUS at 11:06

## 2022-01-01 RX ADMIN — ACETAMINOPHEN 650 MG: 325 TABLET ORAL at 15:14

## 2022-01-01 RX ADMIN — PIPERACILLIN SODIUM AND TAZOBACTAM SODIUM 3.38 G: 3; .375 INJECTION, POWDER, LYOPHILIZED, FOR SOLUTION INTRAVENOUS at 01:30

## 2022-01-01 RX ADMIN — SODIUM CHLORIDE, PRESERVATIVE FREE 40 MG: 5 INJECTION INTRAVENOUS at 04:37

## 2022-01-01 RX ADMIN — HEPARIN SODIUM 5000 UNITS: 5000 INJECTION INTRAVENOUS; SUBCUTANEOUS at 20:32

## 2022-01-01 RX ADMIN — OXYCODONE HYDROCHLORIDE AND ACETAMINOPHEN 1 TABLET: 5; 325 TABLET ORAL at 23:59

## 2022-01-01 RX ADMIN — EPOETIN ALFA-EPBX 4000 UNITS: 4000 INJECTION, SOLUTION INTRAVENOUS; SUBCUTANEOUS at 20:38

## 2022-01-01 RX ADMIN — IOPAMIDOL 80 ML: 755 INJECTION, SOLUTION INTRAVENOUS at 04:29

## 2022-01-01 RX ADMIN — ABIRATERONE ACETATE 1000 MG: 250 TABLET ORAL at 08:16

## 2022-01-01 RX ADMIN — TAMSULOSIN HYDROCHLORIDE 0.4 MG: 0.4 CAPSULE ORAL at 13:21

## 2022-01-01 RX ADMIN — HEPARIN SODIUM 5000 UNITS: 5000 INJECTION INTRAVENOUS; SUBCUTANEOUS at 03:57

## 2022-01-01 RX ADMIN — OXYCODONE HYDROCHLORIDE AND ACETAMINOPHEN 1 TABLET: 5; 325 TABLET ORAL at 18:04

## 2022-01-01 RX ADMIN — HEPARIN SODIUM 5000 UNITS: 5000 INJECTION INTRAVENOUS; SUBCUTANEOUS at 11:06

## 2022-01-01 RX ADMIN — HEPARIN SODIUM 5000 UNITS: 5000 INJECTION INTRAVENOUS; SUBCUTANEOUS at 19:05

## 2022-01-01 RX ADMIN — HEPARIN SODIUM 5000 UNITS: 5000 INJECTION INTRAVENOUS; SUBCUTANEOUS at 11:50

## 2022-01-01 RX ADMIN — HEPARIN SODIUM 5000 UNITS: 5000 INJECTION INTRAVENOUS; SUBCUTANEOUS at 04:30

## 2022-01-01 RX ADMIN — HYDROCORTISONE SODIUM SUCCINATE 50 MG: 100 INJECTION, POWDER, FOR SOLUTION INTRAMUSCULAR; INTRAVENOUS at 11:50

## 2022-01-01 RX ADMIN — HEPARIN SODIUM 5000 UNITS: 5000 INJECTION INTRAVENOUS; SUBCUTANEOUS at 21:23

## 2022-01-01 RX ADMIN — ACETAMINOPHEN 650 MG: 325 TABLET ORAL at 21:26

## 2022-01-01 RX ADMIN — VANCOMYCIN HYDROCHLORIDE 1750 MG: 10 INJECTION, POWDER, LYOPHILIZED, FOR SOLUTION INTRAVENOUS at 05:39

## 2022-01-01 RX ADMIN — ASPIRIN 300 MG: 300 SUPPOSITORY RECTAL at 09:49

## 2022-01-01 RX ADMIN — ACETAMINOPHEN 650 MG: 325 TABLET ORAL at 15:37

## 2022-01-01 RX ADMIN — SODIUM CHLORIDE, PRESERVATIVE FREE 40 MG: 5 INJECTION INTRAVENOUS at 03:57

## 2022-01-01 RX ADMIN — TROSPIUM CHLORIDE 20 MG: 20 TABLET, FILM COATED ORAL at 17:03

## 2022-01-01 RX ADMIN — ASPIRIN 300 MG: 300 SUPPOSITORY RECTAL at 08:33

## 2022-01-01 RX ADMIN — PIPERACILLIN AND TAZOBACTAM 4.5 G: 4; .5 INJECTION, POWDER, FOR SOLUTION INTRAVENOUS at 04:37

## 2022-01-01 RX ADMIN — HEPARIN SODIUM 5000 UNITS: 5000 INJECTION INTRAVENOUS; SUBCUTANEOUS at 17:27

## 2022-01-01 RX ADMIN — ABIRATERONE ACETATE 1000 MG: 250 TABLET ORAL at 08:09

## 2022-01-01 RX ADMIN — OXYCODONE HYDROCHLORIDE AND ACETAMINOPHEN 1 TABLET: 5; 325 TABLET ORAL at 17:46

## 2022-01-01 RX ADMIN — ACETAMINOPHEN 650 MG: 325 TABLET ORAL at 09:48

## 2022-01-01 RX ADMIN — ACETAMINOPHEN 650 MG: 650 SUPPOSITORY RECTAL at 15:25

## 2022-01-01 RX ADMIN — HEPARIN SODIUM 5000 UNITS: 5000 INJECTION INTRAVENOUS; SUBCUTANEOUS at 19:44

## 2022-01-01 RX ADMIN — HEPARIN SODIUM 5000 UNITS: 5000 INJECTION INTRAVENOUS; SUBCUTANEOUS at 04:07

## 2022-01-01 RX ADMIN — ABIRATERONE ACETATE 1000 MG: 250 TABLET ORAL at 09:36

## 2022-01-01 RX ADMIN — HEPARIN SODIUM 5000 UNITS: 5000 INJECTION INTRAVENOUS; SUBCUTANEOUS at 18:33

## 2022-01-01 RX ADMIN — DEXTROSE MONOHYDRATE 250 ML: 100 INJECTION, SOLUTION INTRAVENOUS at 04:26

## 2022-01-01 RX ADMIN — AMOXICILLIN AND CLAVULANATE POTASSIUM 1 TABLET: 500; 125 TABLET, FILM COATED ORAL at 09:34

## 2022-01-01 RX ADMIN — HEPARIN SODIUM 5000 UNITS: 5000 INJECTION INTRAVENOUS; SUBCUTANEOUS at 19:51

## 2022-01-01 RX ADMIN — Medication 1 CAPSULE: at 08:08

## 2022-01-01 RX ADMIN — MORPHINE SULFATE 5 MG: 10 SOLUTION ORAL at 12:00

## 2022-01-01 RX ADMIN — HEPARIN SODIUM 5000 UNITS: 5000 INJECTION INTRAVENOUS; SUBCUTANEOUS at 20:49

## 2022-01-01 RX ADMIN — VANCOMYCIN HYDROCHLORIDE 1750 MG: 10 INJECTION, POWDER, LYOPHILIZED, FOR SOLUTION INTRAVENOUS at 16:59

## 2022-02-13 PROBLEM — R41.82 AMS (ALTERED MENTAL STATUS): Status: ACTIVE | Noted: 2022-01-01

## 2022-02-13 NOTE — ED PROVIDER NOTES
EMERGENCY DEPARTMENT HISTORY AND PHYSICAL EXAM    8:58 AM    Date: 2/13/2022  Patient Name: Bisi Walker    History of Presenting Illness     Chief Complaint   Patient presents with    Altered mental status       History Provided By: Patient  Location/Duration/Severity/Modifying factors   HPI  Bisi Walker is a 80 y.o. male with past medical history of ESRD on HD, hypertension presenting for code stroke. Patient was in his usual state of health last night when he went to bed at 1030. Family checked on him this morning and said that he seemed confused. He is normally alert and oriented x4, but today knows who he is and that he is in the hospital but cannot seem to answer other questions appropriately. The called EMS who noted a right-sided facial droop and the patient was transported to the hospital.  This time the patient himself has no acute medical complaints. Denies any numbness or tingling, severe headache, moving his extremities well. No nausea or vomiting, fevers or other illness. EMS notes that he has cloudy urine in his indwelling Burris catheter.     PCP: Heraclio Claudio MD    Current Facility-Administered Medications   Medication Dose Route Frequency Provider Last Rate Last Admin    aspirin (ASA) suppository 300 mg  300 mg Rectal DAILY Araceli Desai MD   300 mg at 02/13/22 1126    sodium chloride (NS) flush 5-10 mL  5-10 mL IntraVENous PRN Araceli Desai MD        piperacillin-tazobactam (ZOSYN) 4.5 g in 0.9% sodium chloride (MBP/ADV) 100 mL MBP  4.5 g IntraVENous Q12H Araceli Desai MD        vancomycin (VANCOCIN) 1750 mg in  ml infusion  1,750 mg IntraVENous ONCE Araceli Desai MD        VANCOMYCIN INFORMATION NOTE   Other Rx Dosing/Monitoring Araceli Desai MD        [START ON 2/15/2022] levoFLOXacin (LEVAQUIN) 500 mg in D5W IVPB  500 mg IntraVENous Q48H Araceli Desai MD         Current Outpatient Medications   Medication Sig Dispense Refill    abiraterone 250 mg tab TAKE 4 TABLETS BY MOUTH 1 TIME A DAY ON AN EMPTY STOMACH. TAKE ONE HOUR PRIOR TO FOOD OR TWO HOURS AFTER FOOD. SWALLOW TABLETS WHOLE WITH WATER AND DO NOT CRUSH OR CHEW TABLETS. (Patient not taking: Reported on 12/29/2021) 120 Tablet 4    predniSONE (DELTASONE) 5 mg tablet TAKE 1 TABLET BY MOUTH 2 TIMES A DAY 60 Tablet 4    acetaminophen (TYLENOL) 500 mg tablet Take 500 mg by mouth every six (6) hours as needed for Pain.  pantoprazole (PROTONIX) 40 mg tablet Take 1 Tablet by mouth Before breakfast and dinner. 60 Tablet 0    amLODIPine (NORVASC) 10 mg tablet TAKE 1 TABLET BY MOUTH EVERY DAY      calcium acetate,phosphat bind, (PHOSLO) 667 mg cap Take 1 Capsule by mouth daily.  abiraterone (Zytiga) 250 mg tab Take four tablets by mouth daily on an empty stomach. Take one hour prior to food or two hours after food. Tablets should be swallowed whole with water. Do not crush or chew tablets. 120 Tab 5    B complex w-C no.20/folic acid (TRIPHROCAPS PO) Take 1 Cap by mouth daily. Past History     Past Medical History:  Past Medical History:   Diagnosis Date    Cancer [facility])     Prostate    Cardiac echocardiogram 08/29/2016    EF 60-65%. No WMA. Mild LVH. Indeterminate diastolic fx. RVSP 35 mmHg. No significant valvular heart disease.  Cardiac nuclear imaging test, abnormal 08/29/2016    Intermediate risk. Previous inferior infarction w/very mild fabiana-infarct ischemia. Inferior hypk. EF 68%. Nondiagnostic EKG on pharm stress test.  Pt's BP increased from 193/96 to 207/83 during study.  Carotid duplex 08/30/2016    Mild <50% bilateral ICA stenosis.       Chronic kidney disease     on HD MWF ad [facility]    Colonic mass     per Dr Ita Delacruz notes    Enlarged prostate     Hypertension        Past Surgical History:  Past Surgical History:   Procedure Laterality Date    COLONOSCOPY N/A 10/1/2020    COLONOSCOPY, b'x, w tattoo w polypectomy performed by Umang Golden MD at [facility]    COLONOSCOPY,DIAGNOSTIC  10/1/2020         COLONOSCOPY,EARL ULLOA,SNARE  10/1/2020         COLONOSCPY,FLEX,W/DIR SUBMUC INJECT  10/1/2020         [place] INSJ TUNNELED CVC W/O SUBQ PORT/ AGE 5 YR/> N/A 9/28/2020    INSERTION TUNNELED CENTRAL VENOUS CATHETER performed by Avelina Holstein, MD at [facility] CATH LAB    VASCULAR SURGERY PROCEDURE UNLIST      dialysis access- right neck       Family History:  No family history on file. Social History:  Social History     Tobacco Use    Smoking status: Never Smoker    Smokeless tobacco: Never Used   Vaping Use    Vaping Use: Never used   Substance Use Topics    Alcohol use: No    Drug use: Never       Allergies:  No Known Allergies    I reviewed and confirmed the above information with patient and updated as necessary. Review of Systems     Review of Systems   Unable to perform ROS: Mental status change       Physical Exam     Visit Vitals  /67   Pulse 85   Temp 98.5 °F (36.9 °C)   Resp 20   Ht 5' 6\" (1.676 m)   Wt 70.3 kg (155 lb)   SpO2 100%   BMI 25.02 kg/m²       Physical Exam  Vitals and nursing note reviewed. Constitutional:       Appearance: Normal appearance. He is not ill-appearing. Comments: Adult male resting in a hospital stretcher, nondistressed. HENT:      Mouth/Throat:      Mouth: Mucous membranes are moist.   Eyes:      Pupils: Pupils are equal, round, and reactive to light. Cardiovascular:      Rate and Rhythm: Normal rate and regular rhythm. Pulses: Normal pulses. Heart sounds: Normal heart sounds. Pulmonary:      Effort: Pulmonary effort is normal.      Breath sounds: Normal breath sounds. Abdominal:      General: Abdomen is flat. Tenderness: There is no abdominal tenderness. Musculoskeletal:         General: No swelling. Normal range of motion. Cervical back: Normal range of motion. Skin:     General: Skin is warm. Neurological:      Mental Status: He is alert.       Cranial Nerves: Cranial nerve deficit (Facial droop on the right side) present. Sensory: No sensory deficit. Motor: No weakness. Coordination: Coordination normal.      Comments: Alert oriented to self and that he is in a hospital, otherwise answering questions inappropriately         Diagnostic Study Results     Labs -  Recent Results (from the past 24 hour(s))   CBC WITH AUTOMATED DIFF    Collection Time: 02/13/22  9:11 AM   Result Value Ref Range    WBC 26.9 (H) 4.6 - 13.2 K/uL    RBC 3.32 (L) 4.35 - 5.65 M/uL    HGB 10.0 (L) 13.0 - 16.0 g/dL    HCT 32.6 (L) 36.0 - 48.0 %    MCV 98.2 78.0 - 100.0 FL    MCH 30.1 24.0 - 34.0 PG    MCHC 30.7 (L) 31.0 - 37.0 g/dL    RDW 12.5 11.6 - 14.5 %    PLATELET 561 968 - 016 K/uL    MPV 10.2 9.2 - 11.8 FL    NRBC 0.0 0  WBC    ABSOLUTE NRBC 0.00 0.00 - 0.01 K/uL    NEUTROPHILS 84 (H) 40 - 73 %    BAND NEUTROPHILS 1 %    LYMPHOCYTES 9 (L) 21 - 52 %    MONOCYTES 4 3 - 10 %    EOSINOPHILS 2 0 - 5 %    BASOPHILS 0 0 - 2 %    IMMATURE GRANULOCYTES 0 0.0 - 0.5 %    ABS. NEUTROPHILS 22.9 (H) 1.8 - 8.0 K/UL    ABS. LYMPHOCYTES 2.4 0.9 - 3.6 K/UL    ABS. MONOCYTES 1.1 0.05 - 1.2 K/UL    ABS. EOSINOPHILS 0.5 (H) 0.0 - 0.4 K/UL    ABS. BASOPHILS 0.0 0.0 - 0.1 K/UL    ABS. IMM.  GRANS. 0.0 0.00 - 0.04 K/UL    DF MANUAL      PLATELET COMMENTS ADEQUATE PLATELETS      RBC COMMENTS HYPOCHROMIA  1+       METABOLIC PANEL, BASIC    Collection Time: 02/13/22  9:11 AM   Result Value Ref Range    Sodium 140 136 - 145 mmol/L    Potassium 3.7 3.5 - 5.5 mmol/L    Chloride 102 100 - 111 mmol/L    CO2 30 21 - 32 mmol/L    Anion gap 8 3.0 - 18 mmol/L    Glucose 96 74 - 99 mg/dL    BUN 41 (H) 7.0 - 18 MG/DL    Creatinine 5.14 (H) 0.6 - 1.3 MG/DL    BUN/Creatinine ratio 8 (L) 12 - 20      GFR est AA 13 (L) >60 ml/min/1.73m2    GFR est non-AA 11 (L) >60 ml/min/1.73m2    Calcium 8.8 8.5 - 10.1 MG/DL   PROTHROMBIN TIME + INR    Collection Time: 02/13/22  9:11 AM   Result Value Ref Range    Prothrombin time 12.5 11.5 - 15.2 sec    INR 0.9 0.8 - 1.2     TROPONIN-HIGH SENSITIVITY    Collection Time: 22  9:11 AM   Result Value Ref Range    Troponin-High Sensitivity 13 0 - 78 ng/L   ETHYL ALCOHOL    Collection Time: 22  9:11 AM   Result Value Ref Range    ALCOHOL(ETHYL),SERUM <3 0 - 3 MG/DL   GLUCOSE, POC    Collection Time: 22  9:51 AM   Result Value Ref Range    Glucose (POC) 99 70 - 110 mg/dL   EKG, 12 LEAD, INITIAL    Collection Time: 22  9:55 AM   Result Value Ref Range    Ventricular Rate 86 BPM    Atrial Rate 86 BPM    P-R Interval 204 ms    QRS Duration 116 ms    Q-T Interval 418 ms    QTC Calculation (Bezet) 500 ms    Calculated P Axis 60 degrees    Calculated R Axis -36 degrees    Calculated T Axis 103 degrees    Diagnosis       Sinus rhythm with occasional premature ventricular complexes  Left axis deviation  Incomplete right bundle branch block  Minimal voltage criteria for LVH, may be normal variant ( R in aVL )  Septal infarct , age undetermined  Prolonged QT  Abnormal ECG  When compared with ECG of 22-OCT-2021 15:16,  premature ventricular complexes are now present  Septal infarct is now present  Confirmed by Óscar Alvarez ([id number]) on 2022 10:11:09 AM     URINALYSIS W/ RFLX MICROSCOPIC    Collection Time: 22 11:00 AM   Result Value Ref Range    Color RED      Appearance TURBID      Specific gravity 1.017 1.005 - 1.030      pH (UA) 8.5 (H) 5.0 - 8.0      Protein 300 (A) NEG mg/dL    Glucose Negative NEG mg/dL    Ketone Negative NEG mg/dL    Bilirubin Negative NEG      Blood LARGE (A) NEG      Urobilinogen 0.2 0.2 - 1.0 EU/dL    Nitrites Negative NEG      Leukocyte Esterase LARGE (A) NEG     URINE MICROSCOPIC ONLY    Collection Time: 22 11:00 AM   Result Value Ref Range    WBC 5 to 10 0 - 5 /hpf    RBC TOO NUMEROUS TO COUNT 0 - 5 /hpf    Epithelial cells Negative 0 - 5 /lpf    Bacteria FEW (A) NEG /hpf         Radiologic Studies -   CTA HEAD NECK W CONT         CT HEAD WO CONT   Final Result   No visualized acute ischemia or hemorrhage. Stable senescent changes. Mild sinus disease. Findings discussed with Dr. Shorty Kumar at [id number] on 2/13/2022 with read back. Medical Decision Making   I am the first provider for this patient. I reviewed the vital signs, available nursing notes, past medical history, past surgical history, family history and social history. Vital Signs-Reviewed the patient's vital signs. EKG: Nondiagnostic for ischemia    Records Reviewed: Nursing Notes and Old Medical Records (Time of Review: 8:58 AM)    Provider Notes (Medical Decision Making):   MDM  80year-old male here with right-sided facial droop and confusion, new onset, consider CT VA, TIA, UTI, hypoglycemia, metabolic derangement, dehydration, others    ED Course: Progress Notes, Reevaluation, and Consults:  Patient arrives afebrile, hemodynamically normal   Resting comfortably, nondistressed  Right-sided facial droop noted, alert oriented x2 which is not his baseline    Patient with acute change in his symptoms in the last 24 hours, will call a code stroke. We will also obtain a CTA, patient not a TPA candidate given this is a wake-up stroke. In addition to a stroke work-up we will also obtain altered mental status/infection work-up. 8:59 AM  Discussed with Teleneuro, he will see patient and make recommendations, appreciate his assistance. 9:18 AM  Discussed with radiology, no acute changes on dry CT head. 9:44 AM  Discussed with Dr. Alliosn Running with tele-neurology, he sees stenosis in the posterior inferior M2, no obvious obstruction. He agrees that patient NIH stroke scale was very low, not a TPA candidate or a candidate for any interventional procedures. 9:45 AM  Received a phone call from radiology, reviewing the CTA of the head and neck.  They see the same stenosis versus a tiny occlusion in the posterior inferior M2 of the right MCA, but there is flow distally. 9:50 AM discussed with Dr. Mary Connelly regarding the above CTA results, patient does not need to be transferred to a neuro IR facility as this is not a candidate for any interventional procedure such as a thrombectomy. The occlusion is too distal and his NIH stroke score is too low to merit any procedures, the theoretical risks are much higher than any potential benefit. 10:17 AM  Patient's lab work is now returning noted to have a leukocytosis of 26.9. Concern for underlying infection is now greater for the overall presentation. Will start on broad-spectrum antibiotics, call a code sepsis and obtain sepsis labs and cultures. We will replace Burris catheter prior to obtaining a urine sample    Despite having evidence of a severe infection, a standard fluid resuscitation of 30 mL/kg would harm the patient because the patient has End-stage chronic renal disease of stage IV/V . Therefore,  ordering a witholding bolus of fluids instead to replace 30 mL/kg. Creatinine at baseline for end-stage renal, no severe metabolic derangements. UA with evidence of urine infection, sent for culture. This is after a change in Burris catheter. Discussed with Dr. Jacob Landis, agrees to admit for further stroke work-up as well as continued antibiotic therapy. Appreciate his assistance                   Procedures    Critical Care Time: 45 minutes of critical care time for code stroke. Diagnosis     Clinical Impression:   1. Suspected cerebrovascular accident (CVA)    2. Altered mental status, unspecified altered mental status type    3. Acute cystitis with hematuria    4. Indwelling Burris catheter present    5. Leukocytosis, unspecified type        Disposition: Admit    Follow-up Information    None          Patient's Medications   Start Taking    No medications on file   Continue Taking    ABIRATERONE (ZYTIGA) 250 MG TAB    Take four tablets by mouth daily on an empty stomach.   Take one hour prior to food or two hours after food. Tablets should be swallowed whole with water. Do not crush or chew tablets. ABIRATERONE 250 MG TAB    TAKE 4 TABLETS BY MOUTH 1 TIME A DAY ON AN EMPTY STOMACH. TAKE ONE HOUR PRIOR TO FOOD OR TWO HOURS AFTER FOOD. SWALLOW TABLETS WHOLE WITH WATER AND DO NOT CRUSH OR CHEW TABLETS. ACETAMINOPHEN (TYLENOL) 500 MG TABLET    Take 500 mg by mouth every six (6) hours as needed for Pain. AMLODIPINE (NORVASC) 10 MG TABLET    TAKE 1 TABLET BY MOUTH EVERY DAY    B COMPLEX W-C NO.20/FOLIC ACID (TRIPHROCAPS PO)    Take 1 Cap by mouth daily. CALCIUM ACETATE,PHOSPHAT BIND, (PHOSLO) 667 MG CAP    Take 1 Capsule by mouth daily. PANTOPRAZOLE (PROTONIX) 40 MG TABLET    Take 1 Tablet by mouth Before breakfast and dinner. PREDNISONE (DELTASONE) 5 MG TABLET    TAKE 1 TABLET BY MOUTH 2 TIMES A DAY   These Medications have changed    No medications on file   Stop Taking    No medications on file       Joel Lundberg MD   Emergency Medicine   February 13, 2022, 8:58 AM     This note is dictated utilizing Dragon voice recognition software. Unfortunately this leads to occasional typographical errors using the voice recognition. I apologize in advance if the situation occurs. If questions occur please do not hesitate to contact me directly.     Adiel Toro MD

## 2022-02-13 NOTE — Clinical Note
Status[de-identified] INPATIENT [101]   Type of Bed: Telemetry [19]   Cardiac Monitoring Required?: Yes   Inpatient Hospitalization Certified Necessary for the Following Reasons: 3.  Patient receiving treatment that can only be provided in an inpatient setting (further clarification in H&P documentation)   Admitting Diagnosis: AMS (altered mental status) [2030800]   Admitting Physician: Ting Fong [[id number]]   Attending Physician: Ting Fong [[id number]]   Estimated Length of Stay: 3-4 Midnights   Discharge Plan[de-identified] Home with Office Follow-up

## 2022-02-13 NOTE — ED NOTES
New brief, barrier cream, sacral mepilex placed on pt    Michel removed and new urinary bladder michel placed.

## 2022-02-13 NOTE — CONSULTS
Neurology Consult    Patient: Rita Pruett MRN: [id number]  SSN: [id number]    YOB: 1935  Age: 80 y.o. Sex: male      HPI:  Rita Pruett is a 80 y.o. male, with history of HTN, ESRD, prostate cancer with chronic michel, now admitted with dysarthria, facial droop for which Neurology is consulted. History per patient, chart review, bedside RN:  Noted several days of progressive confusion, increased tremulousness. Upon awakening this morning, noted to have worsened mental status and facial droop, prompting family to call EMS. Tele-Stroke completed in ED; NIHSS 2 (facial palsy, dys). CTH without contrast did not show acute hypodensity; +SVID. CTA head/neck with basilar stenosis, area of stenosis in inferior right M2, distal left M1, bilateral PCAs, and cervical ICA origins bilaterally. No tPA given LSW. Plan for aspirin, clopidogrel and MRI for further evaluation. This afternoon, patient remains in ED with AMS - unable to answer basic orientation questions. Reports reason for admission related to right hip pain, otherwise denies complaints. No recent missed dialysis per family. Ongoing treatment for prostate cancer with zytiga and prednisone. Also with colonic mass without planned treatment as patient defers colectomy. Past Medical History:   Diagnosis Date    Cancer [facility])     Prostate    Cardiac echocardiogram 08/29/2016    EF 60-65%. No WMA. Mild LVH. Indeterminate diastolic fx. RVSP 35 mmHg. No significant valvular heart disease.  Cardiac nuclear imaging test, abnormal 08/29/2016    Intermediate risk. Previous inferior infarction w/very mild fabiana-infarct ischemia. Inferior hypk. EF 68%. Nondiagnostic EKG on pharm stress test.  Pt's BP increased from 193/96 to 207/83 during study.  Carotid duplex 08/30/2016    Mild <50% bilateral ICA stenosis.       Chronic kidney disease     on HD MWF ad [facility]    Colonic mass     per Dr Virginia He notes    Enlarged prostate     Hypertension      Past Surgical History:   Procedure Laterality Date    COLONOSCOPY N/A 10/1/2020    COLONOSCOPY, b'x, w tattoo w polypectomy performed by Flory Mcdaniels MD at [address]  10/1/2020         Kamar Rang  10/1/2020         COLONOSCPY,FLEX,W/ [facility] [place]  10/1/2020         [place] INSJ TUNNELED CVC W/O SUBQ PORT/ AGE 5 YR/> N/A 9/28/2020    INSERTION TUNNELED CENTRAL VENOUS CATHETER performed by Giovanna Gomez MD at [facility] CATH LAB   [address]      dialysis access- right neck      No family history on file. Social History     Tobacco Use    Smoking status: Never Smoker    Smokeless tobacco: Never Used   Substance Use Topics    Alcohol use: No      Current Facility-Administered Medications   Medication Dose Route Frequency Provider Last Rate Last Admin    aspirin (ASA) suppository 300 mg  300 mg Rectal DAILY Lainey Parr MD        sodium chloride (NS) flush 5-10 mL  5-10 mL IntraVENous PRN Lainey Parr MD        piperacillin-tazobactam (ZOSYN) 4.5 g in 0.9% sodium chloride (MBP/ADV) 100 mL MBP  4.5 g IntraVENous ONCE Lainey Parr MD        Followed by   Isabel Garcia piperacillin-tazobactam (ZOSYN) 4.5 g in 0.9% sodium chloride (MBP/ADV) 100 mL MBP  4.5 g IntraVENous Q12H Lainey Parr MD        vancomycin (VANCOCIN) 1750 mg in  ml infusion  1,750 mg IntraVENous ONCE Lainey Parr MD        VANCOMYCIN INFORMATION NOTE   Other Rx Dosing/Monitoring Lainey Parr MD        levoFLOXacin (LEVAQUIN) 750 mg in D5W IVPB  750 mg IntraVENous Otto Kapoor MD        Followed by   Vernerclint Putnam ON 2/15/2022] levoFLOXacin (LEVAQUIN) 500 mg in D5W IVPB  500 mg IntraVENous Q48H Lainey Parr MD         Current Outpatient Medications   Medication Sig Dispense Refill    abiraterone 250 mg tab TAKE 4 TABLETS BY MOUTH 1 TIME A DAY ON AN EMPTY STOMACH.  TAKE ONE HOUR PRIOR TO FOOD OR TWO HOURS AFTER FOOD. SWALLOW TABLETS WHOLE WITH WATER AND DO NOT CRUSH OR CHEW TABLETS. (Patient not taking: Reported on 12/29/2021) 120 Tablet 4    predniSONE (DELTASONE) 5 mg tablet TAKE 1 TABLET BY MOUTH 2 TIMES A DAY 60 Tablet 4    acetaminophen (TYLENOL) 500 mg tablet Take 500 mg by mouth every six (6) hours as needed for Pain.  pantoprazole (PROTONIX) 40 mg tablet Take 1 Tablet by mouth Before breakfast and dinner. 60 Tablet 0    amLODIPine (NORVASC) 10 mg tablet TAKE 1 TABLET BY MOUTH EVERY DAY      calcium acetate,phosphat bind, (PHOSLO) 667 mg cap Take 1 Capsule by mouth daily.  abiraterone (Zytiga) 250 mg tab Take four tablets by mouth daily on an empty stomach. Take one hour prior to food or two hours after food. Tablets should be swallowed whole with water. Do not crush or chew tablets. 120 Tab 5    B complex w-C no.20/folic acid (TRIPHROCAPS PO) Take 1 Cap by mouth daily. No Known Allergies    Review of Systems: Limited per patient's MS    Objective:     Vitals:    02/13/22 0929   BP: (!) 142/57   Pulse: 86   Resp: 12   Temp: 98.5 °F (36.9 °C)   SpO2: 100%   Weight: 70.3 kg (155 lb)   Height: 5' 6\" (1.676 m)        Physical Exam:  General: elderly male, awake, alert, no acute distress  HEENT: MM, non-icteric  Pulm: non-labored on room air  Ext: no edema    Neuro:  MS: Awake, alert requiring repeat prompting for commands/questions. Language limited to short phrases; impaired naming, intact repetition. Difficulty with complex commands. Oriented to self, but unable to name month or age. CN: PERRL. EOMI without nystagmus. VF full to blink to threat. Facial sensation intact to LT. Facial muscles with right facial droop. Hearing intact to loud voice. Tongue protrudes midline. MOTOR: Normal bulk and tone. Intermittent diffuse negative myoclonus without rhythmicity. No drift in BUEs. UE strength: Grossly 4+/5 deltoids, biceps, triceps, and hand  bilaterally.   LE strength: Grossly 4+/5 iliopsoas, hamstrings, quadriceps, tibialis anterior, gastrocnemius bilaterally; mild asymmetry in left lower extremity. Reflexes: 1+ biceps, brachioradialis, patella and absent achilles. No clonus. Sensory: Intact to LT; unclear if extinction present  Coordination: Unable to fully assess 2/2 poor command following  Gait: Not assessed    Relevant Labs/studies:  CT HEAD WO CONT    Result Date: 2/13/2022  No visualized acute ischemia or hemorrhage. Stable senescent changes. Mild sinus disease. Findings discussed with Dr. Rosemary Martin at 2144 on 2/13/2022 with read back. Assessment & Plan:   80year old male with history of HTN, ESRD on iHD, prostate cancer, presenting with increased confusion of right facial droop, outside of tPA window without LVO with multiple areas of intracranial and extracranial stenosis, including distal left M1. Neurologic examination notable for impaired language function, attention, and orientation, right facial droop, diffuse negative myoclonus and mild generalized weakness. Potential for new stroke causing focal deficits, however, concern as well for toxic/metabolic/infectious etiology given encephalopathy and myoclonus in patient with multiple medical comorbidities and likely low cognitive reserve/tolerance for derangement. Notably, labs with leukocytosis to 26, BUN/Cr 41/5. 14.     -Infection management as per primary team; started on broad spectrum antibiotics in the ED  -Would complete additional testing with B12/folate, NH3, TSH given AMS  -Aspirin 300mg SD daily; change to 81mg daily when able to take PO  -Given ongoing hematuria, defer dual therapy with clopidogrel  -MRI brain without contrast, pending   -Check LDL and A1c   -TTE with/without bubble  -Telemetry while inpatient; holter at discharge if unrevealing  -Goal normotension after 24H of permissive HTN  -High intensity statin, LDL goal < 70  -BG control, A1c goal < 7  -PT/OT and Speech evaluation  -Neurochecks/NIHSS per unit routine  -Please contact with questions, concerns    Signed By: Tiffanie Harman MD   Neurologist    February 13, 2022

## 2022-02-13 NOTE — ED TRIAGE NOTES
Per EMS, Family called due to patient waking up with slight facial droop and confused. LNW was 1030 last night.

## 2022-02-13 NOTE — PROGRESS NOTES
All events reviewed, ESRD patient with Prostate cancer & Mets admitted with possible new stroke. Depending on his clinical situation will decide about dialysis,no acute need for urgent dialysis, Was last dialyzed on Friday.

## 2022-02-13 NOTE — H&P
Hospitalist Admission History and Physical    NAME:  Ritesh Jimenez   :   1935   MRN:   [id number]     PCP:  Oscar Partida MD  Date/Time:  2022 2:38 PM    Subjective:   CHIEF COMPLAINT: Per family with altered mental status    HISTORY OF PRESENT ILLNESS:     Patrizia Nichole is a 80 y.o.  male with PMH of end-stage renal disease, prostate cancer with metastases, hypertension, CAD, chronic indwelling Burris catheter, anemia, who presents with altered mental status. Per family patient was in his normal state of health last night with the exception that he is just been moving a little bit slower. Today when they went to check on him he was not his normal self, family noticed he was chewing his pills which is out of character for him, he is usually alert and oriented x4 but his only able to state where he has currently. There is also some question of some right-sided facial droop prompting presentation to ED. When asking patient he denies any sort of discomfort, denies weakness or other concerns. Patient Active Problem List   Diagnosis Code    Traumatic rhabdomyolysis ([facility] [address] T79. 6XXA    CAD (coronary artery disease) I25.10    Essential (primary) hypertension I10    Troponin level elevated R77.8    Acute renal failure (ARF) (HCC) N17.9    ESRD (end stage renal disease) ([address] N18.6    Hyperkalemia T18.4    Metabolic acidosis H92.3    Secondary hyperparathyroidism of renal origin ([facility] [address] N25.81    Anemia D64.9    Hydronephrosis N13.30    UTI (urinary tract infection) N39.0    Postobstructive diuresis R35.89    Polyp of ascending colon K63.5    Decrease in appetite R63.0    Elevated prostate specific antigen (PSA) R97.20    Osteoarthrosis M19.90    Shortness of breath R06.02    Chronic renal failure N18.9    Pneumonia J18.9    Hypoalbuminemia E88.09    Leucocytosis D72.829    Nausea and vomiting R11.2    AMS (altered mental status) R41.82       Past Medical History: Diagnosis Date    Cancer [facility])     Prostate    Cardiac echocardiogram 08/29/2016    EF 60-65%. No WMA. Mild LVH. Indeterminate diastolic fx. RVSP 35 mmHg. No significant valvular heart disease.  Cardiac nuclear imaging test, abnormal 08/29/2016    Intermediate risk. Previous inferior infarction w/very mild fabiana-infarct ischemia. Inferior hypk. EF 68%. Nondiagnostic EKG on pharm stress test.  Pt's BP increased from 193/96 to 207/83 during study.  Carotid duplex 08/30/2016    Mild <50% bilateral ICA stenosis.  Chronic kidney disease     on HD MWF ad [facility]    Colonic mass     per Dr Emelyn Luu notes    Enlarged prostate     Hypertension        Past Surgical History:   Procedure Laterality Date    COLONOSCOPY N/A 10/1/2020    COLONOSCOPY, b'x, w tattoo w polypectomy performed by Akosua Alva MD at [facility]  10/1/2020         Betha Lou  10/1/2020         COLONOSCPY,FLEX,W/ [facility]  10/1/2020         [place] INSJ TUNNELED CVC W/O SUBQ PORT/ AGE 5 YR/> N/A 9/28/2020    INSERTION TUNNELED CENTRAL VENOUS CATHETER performed by Sherleen Cranker, MD at [facility] C[facility]    VASCULAR SURGERY PROCEDURE UNLIST      dialysis access- right neck       Social History     Tobacco Use    Smoking status: Never Smoker    Smokeless tobacco: Never Used   Substance Use Topics    Alcohol use: No        History reviewed. No pertinent family history. No Known Allergies     Prior to Admission Medications   Prescriptions Last Dose Informant Patient Reported? Taking? B complex w-C no.20/folic acid (TRIPHROCAPS PO)   Yes No   Sig: Take 1 Cap by mouth daily. abiraterone (Zytiga) 250 mg tab   No No   Sig: Take four tablets by mouth daily on an empty stomach. Take one hour prior to food or two hours after food. Tablets should be swallowed whole with water. Do not crush or chew tablets.    abiraterone 250 mg tab   No No   Sig: TAKE 4 TABLETS BY MOUTH 1 TIME A DAY ON AN EMPTY STOMACH. TAKE ONE HOUR PRIOR TO FOOD OR TWO HOURS AFTER FOOD. SWALLOW TABLETS WHOLE WITH WATER AND DO NOT CRUSH OR CHEW TABLETS. Patient not taking: Reported on 2021   acetaminophen (TYLENOL) 500 mg tablet   Yes No   Sig: Take 500 mg by mouth every six (6) hours as needed for Pain. amLODIPine (NORVASC) 10 mg tablet   Yes No   Sig: TAKE 1 TABLET BY MOUTH EVERY DAY   calcium acetate,phosphat bind, (PHOSLO) 667 mg cap   Yes No   Sig: Take 1 Capsule by mouth daily. pantoprazole (PROTONIX) 40 mg tablet   No No   Sig: Take 1 Tablet by mouth Before breakfast and dinner. predniSONE (DELTASONE) 5 mg tablet   No No   Sig: TAKE 1 TABLET BY MOUTH 2 TIMES A DAY      Facility-Administered Medications: None     REVIEW OF SYSTEMS:     [x] Unable to obtain  ROS due to  [x]mental status change  []sedated   []intubated   []Total of 12 systems reviewed as follows: No complaints by patient including no pain, no shortness of breath or other discomfort. Objective:   VITALS:    Visit Vitals  /67   Pulse 85   Temp 98.5 °F (36.9 °C)   Resp 20   Ht 5' 6\" (1.676 m)   Wt 70.3 kg (155 lb)   SpO2 100%   BMI 25.02 kg/m²     Temp (24hrs), Av.5 °F (36.9 °C), Min:98.5 °F (36.9 °C), Max:98.5 °F (36.9 °C)    PHYSICAL EXAM:   General:          Alert, in NAD  HEENT:          Sclera anicteric. Conjunctiva pink. Mucous membranes                          Moist, no ear or nasal discharge; right sided facial droop present  Neck:               Supple. Trachea midline. No accessory muscle use. CV:                  Regular rate and rhythm. S1S2+  Lungs:             Clear to auscultation bilaterally. No Wheezing or Rhonchi. No rales. Abdomen:        Soft, non-tender. Not distended. Bowel sounds normal; Burris catheter in place draining blood-tinged urine  Extremities:     No cyanosis. No edema. Pulses 2+ b/l  Neurologic:      Alert and oriented X person and place only not to time or situation. Follows commands, strength appears grossly equal.  Skin:                Warm and dry. No rashes. LAB DATA REVIEWED:    No components found for: [place]  Recent Labs     02/13/22  0911      K 3.7      CO2 30   BUN 41*   CREA 5.14*   GLU 96   CA 8.8   WBC 26.9*   HGB 10.0*   HCT 32.6*        IMAGING RESULTS:     [x]  I have personally reviewed the actual   [x]CXR  [x]CT scan    Assessment/Plan:      Active Problems:    AMS (altered mental status) (2/13/2022)       ___________________________________________________  PLAN:    1. Acute metabolic encephalopathy -likely in setting of #2/#3, TSH, ammonia, B12 folate normal.   Chest x-ray ? Opacities will check PA and lateral tomorrow, check rapid Covid /droplet plus for now  2. Acute urinary tract infection with hematuria-continue Vanco and Zosyn for now, discontinue Levaquin in setting of #5, follow blood cultures x 2 and leukocytosis  3. Right-sided facial droop concerning for CVA -neurology consult appreciated, continue rectal ASA -> transition to ASA 81 mg when able to take p.o., defer Plavix in setting of hematuria. Await MRI brain (ordered), echo, permissive hypertension, cardiac monitoring. SLP consult (failed bedside in ED). Check A1c and lipid panel in a.m. Statin when able to swallow goal LDL <70  4. End-stage renal disease on hemodialysis -nephrology consulted, normal dialysis days MWF  5. Borderline QTc prolongation -hold rate prolonging medications, recheck EKG in a.m. 6. Prostate cancer with metastasis to bone -on abiraterone / prednisone as outpatient, hold as NPO  7. Chronic urinary retention in setting of tumor bulk from #6 -follows with [facility] [place]. Goals of care -continue full CODE STATUS in discussion with wife via phone call.     Plan of care was discussed with wife Liane Landau at number [phone]    Consults called / follow up - neurology, nephrology    Prophylaxis:  []Lovenox  []Coumadin  []Hep SQ [x]SCDs  []H2B/PPI    Discussed Code Status:    [x]Full Code      []DNR     ___________________________________________________  Admitting Provider: Esteban Fletcher NP

## 2022-02-13 NOTE — ED NOTES
Wife called into ED and reports pt decreased appetite for one week, increased lower extremity for three days with new left hip pain, difficulty swallowing medications this morning, chewing medications, not following commands, and vomited this morning. Pt baseline left leg weakness and ambulates with a walker.

## 2022-02-14 NOTE — ED NOTES
Pt awake and alert with periodic confusion. No complaints of pain. Bloody urine w/ michel in place. Pt on continuous monitor.  Waiting on bed

## 2022-02-14 NOTE — PROGRESS NOTES
Admitted with possible stroke, looks at his base line mental status,seen in ER & now started Dialysis

## 2022-02-14 NOTE — PROGRESS NOTES
[facility] Hospitalist Group  Progress Note    Patient: Ann Burnett Age: 80 y.o. : 1935 MR#: [id number] SSN: [id number]  Date/Time: 2022     Subjective:     Patient seen and evaluated, lying in bed, no acute distress. 14-year-old -American male with a past medical history of end-stage renal disease on hemodialysis, prostate cancer with mets, hypertension, CAD, chronic indwelling Burris, anemia of chronic disease presents to the emergency room with altered mental status. ER evaluationpatient noted to have a UTI, started on broad-spectrum IV antibiotics. Concern for right-sided facial droop, neurology consulted and recommended MRI for further evaluation. MRI brain negative for acute CVA. Assessment/Plan:     1. Acute metabolic encephalopathylikely secondary to UTIcontinue IV antibiotics. 2. UTI with hematuriacontinue IV antibiotics, await urine culture reports. 3. Right-sided facial droop concerning for CVAMRI brain negative for acute CVA. 4. ESRD on HDnephrology on board continue hemodialysis per schedule  5. History of prostate cancer with metson abiraterone / prednisone as out pt   6. 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   DVT prophylaxisHeparin  Full code                Sachin Yip MD  22      Case discussed with:  []Patient  []Family  [x]Nursing  []Case Management  DVT Prophylaxis:  []Lovenox  [x]Hep SQ  []SCDs  []Coumadin   []On Heparin gtt    Objective:   VS:   Visit Vitals  /82   Pulse 95   Temp 97.3 °F (36.3 °C) (Skin)   Resp 18   Ht 5' 6\" (1.676 m)   Wt 70.3 kg (155 lb)   SpO2 99%   BMI 25.02 kg/m²      Tmax/24hrs: Temp (24hrs), Av.8 °F (36.6 °C), Min:97.3 °F (36.3 °C), Max:98.5 °F (36.9 °C)  IOBRIEF    Intake/Output Summary (Last 24 hours) at 2022 1619  Last data filed at 2022 1515  Gross per 24 hour   Intake  Output 1700 ml   Net -1700 ml       General:  Awake , lying in bed   Pulmonary:  CTA Bilaterally. No Wheezing/Rhonchi/Rales. Cardiovascular: Regular rate and Rhythm. GI:  Soft, Non distended, Non tender. + Bowel sounds. Extremities:  No edema, cyanosis, clubbing. No calf tenderness. Neurologic: unable to assess 2y to AMS   Additional:    Medications:   Current Facility-Administered Medications   Medication Dose Route Frequency    0.9% sodium chloride (MBP/ADV) infusion        doxercalciferoL (HECTOROL) 4 mcg/2 mL injection 1 mcg  1 mcg IntraVENous DIALYSIS MON, WED & FRI    epoetin brina-epbx (RETACRIT) injection 4,000 Units  4,000 Units SubCUTAneous Q MON, WED & FRI    heparin (porcine) injection 5,000 Units  5,000 Units SubCUTAneous Q8H    vancomycin (VANCOCIN) 500 mg in 0.9% sodium chloride (MBP/ADV) 100 mL MBP  500 mg IntraVENous DIALYSIS ONCE    aspirin (ASA) suppository 300 mg  300 mg Rectal DAILY    sodium chloride (NS) flush 5-10 mL  5-10 mL IntraVENous PRN    piperacillin-tazobactam (ZOSYN) 4.5 g in 0.9% sodium chloride (MBP/ADV) 100 mL MBP  4.5 g IntraVENous Q12H    VANCOMYCIN INFORMATION NOTE   Other Rx Dosing/Monitoring    labetaloL (NORMODYNE;TRANDATE) 20 mg/4 mL (5 mg/mL) injection 5 mg  5 mg IntraVENous Q10MIN PRN    acetaminophen (TYLENOL) suppository 650 mg  650 mg Rectal Q6H PRN     Current Outpatient Medications   Medication Sig    abiraterone 250 mg tab TAKE 4 TABLETS BY MOUTH 1 TIME A DAY ON AN EMPTY STOMACH. TAKE ONE HOUR PRIOR TO FOOD OR TWO HOURS AFTER FOOD. SWALLOW TABLETS WHOLE WITH WATER AND DO NOT CRUSH OR CHEW TABLETS. (Patient not taking: Reported on 12/29/2021)    predniSONE (DELTASONE) 5 mg tablet TAKE 1 TABLET BY MOUTH 2 TIMES A DAY    acetaminophen (TYLENOL) 500 mg tablet Take 500 mg by mouth every six (6) hours as needed for Pain.  pantoprazole (PROTONIX) 40 mg tablet Take 1 Tablet by mouth Before breakfast and dinner.     amLODIPine (NORVASC) 10 mg tablet TAKE 1 TABLET BY MOUTH EVERY DAY    calcium acetate,phosphat bind, (PHOSLO) 667 mg cap Take 1 Capsule by mouth daily.  abiraterone (Zytiga) 250 mg tab Take four tablets by mouth daily on an empty stomach. Take one hour prior to food or two hours after food. Tablets should be swallowed whole with water. Do not crush or chew tablets.  B complex w-C no.20/folic acid (TRIPHROCAPS PO) Take 1 Cap by mouth daily. Imaging:   XR Results (most recent):  Results from Hospital Encounter encounter on 02/13/22    XR CHEST PA LAT    Narrative  EXAM: Chest Radiographs    INDICATION:  ? opacities on portable, r/o pna    TECHNIQUE: PA and lateral views of the chest    COMPARISON: 2/13/2022, 10/21/2021, 11/13/2020    FINDINGS: No pneumothorax identified. There is a rounded opacity overlying the  right posterior fifth and sixth intercostal space. This is likely in the right  upper lobe anteriorly. Subtle groundglass changes are noted bilaterally. No  effusions appreciated. The cardiac silhouette is normal. The pulmonary  vascularity is unremarkable. Degenerative changes of the spine. Degenerative  changes of the shoulders are noted. Impression  1. Ovoid density in the right upper lobe likely near the anterior segment. Additional groundglass opacities. Consider further evaluation with CT. CT Results (most recent):  Results from Hospital Encounter encounter on 02/13/22    CTA HEAD NECK W CONT    Narrative  EXAM: CTA BRAIN/NECK  CPT CODE: 44752/60977    CLINICAL INDICATION/HISTORY: Left-sided facial droop. Code stroke    COMPARISON: None. TECHNIQUE: Helical CT scan of the brain/neck was performed at 1.25 mm intervals  during rapid IV bolus contrast administration. These data were reconstructed at  0.625 mm intervals for vascular analysis. The data was also reviewed at 2.5 mm  intervals for accompanying soft tissue analysis.   Following this, image  reconstruction performed separately for the brain and neck on an independent  workstation with 3-D, maximum intensity projection (MIP) and shaded-volumetric  rendering of the major vessels was reviewed. Contrast: Isovue-370 - 100 mL IV;  uneventful administration. FINDINGS:    CTA BRAIN SOFT TISSUE ANALYSIS: Ventricular system, basilar cisterns and sulci  are appropriate for patient's stated age. No areas of mass effect, edema,  intra-/extra-axial hemorrhage or abnormal calcifications noted. Calvarium and  skull base are unremarkable. Extracranial bony and soft tissues are  unremarkable. CTA BRAIN VASCULAR ANALYSIS: Anterior circulation anatomy is balanced. Atherosclerotic plaque in both carotid size and may result in at least mild  bilateral stenosis. There is a possible focal occlusion or very high-grade  stenosis at the proximal posteroinferior M2 branch of the right MCA. Relatively  normal flow enhancement is seen in the more distal vessels suggesting possible  collateral augmentation. Cisternal vessels are widely patent. Runoff vessels  otherwise appear normal.    The posterior fossa, the right vertebral artery is dominant. There is some  focal atherosclerotic plaque in the proximal intradural segment. There is  probably little significant stenosis on the right but there may be a focal  stenosis in the small vessel on the left. The more distal vessel is patent to  the basilar junction. There is a focal short segment stenosis in the distal  basilar artery just proximal cerebellar artery origins of about 70% diameter. There is normal origin of the major vertebrobasilar vessels except that the P1  segment of the right PCA is not well seen. The P2 portion is supplied from the  right ICA via a prominent right posterior communicating artery segment. There is a significant short segment stenosis in the proximal P2 portion of the  right PCA.   The distal vessel is relatively normal.  In the left PCA, there are  several focal stenoses, one in the distal P1 segment and two tandem stenoses in  the proximal P2 segment. The more distal posterior cerebral circulation is  unremarkable, bilaterally. CTA NECK SOFT TISSUE ANALYSIS: The mucosal surfaces of the aerodigestive tract  are unremarkable. The salivary glands are normal.  The thyroid gland is  unremarkable. Images through the root of the neck, extending to the superior  mediastinum, and lung apices, are clear. There is no adenopathy seen. Normal  vascular enhancement is identified. No osseous abnormalities are seen except  for multilevel spondylitic degenerative change in the cervical spine may result  in some significant spinal stenosis particularly at C4-5 and C5-6 where there is  moderate canal stenosis particularly at C5-6. CTA NECK VASCULAR ANALYSIS: The great vessels have a normal arrangement from the  aorta with widely patent ostia. Atherosclerotic plaque is seen at the origin of  both cervical internal carotid arteries resulting in mild-to-moderate, about 40%  diameter, origin stenosis. The common carotid arteries, cervical common carotid  bifurcations, and cervical internal carotid arteries are otherwise widely  patent, bilaterally. The vertebral arteries are widely patent with normal  subclavian origins. The right vertebral artery is dominant. NASCET criteria  were used. Impression  CTA Brain:  There is a possible focal occlusion or very high grade stenosis in  the proximal posterior inferior M2 branch of the right MCA. The more distal  vessels are patent suggesting some collateral augmentation. The remainder of  the anterior circulation is unremarkable except for mild atherosclerosis in the  carotid siphons with possible mild stenosis. In the posterior fossa, there is a dominant right vertebral artery there is some  focal atherosclerotic plaque in the proximal intradural segment with at least  mild stenosis.     There is a significant short segment fusiform 70% diameter stenosis in the  distal basilar artery just proximal to the superior cerebellar artery origins. There is persistent fetal origin of the right PCA from the right ICA via a  prominent posterior communicating artery segment. There is a short segment significant stenosis in the proximal P2 portion of the  right PCA; the distal vessels is grossly normal.  There are several focal  stenoses in the left PCA including one in the P1 segment and two in the proximal  P2 segment. The distal vessel is well seen. No significant parenchymal abnormalities seen. CTA Neck:  There is mild-to-moderate focal stenosis of about 40% diameter at the  origin of both cervical ICAs as result of atherosclerotic plaque at the  bifurcation. The remainder of the neck circulation is widely patent. The right  vertebral artery is dominant. No soft tissue abnormalities identified. The following preliminary report was provided by Dr. Annie Pollock at the time of  the study.      ===================  Note: [facility] maintains that all CT scans at their facilities  are performed by using dose optimization technique as appropriate to a performed  examination, to include automated exposure control, adjustment of the mAs and/or  kVp according to patient size (including appropriate matching for site specific  examinations) or use of iterative reconstruction technique. Preliminary Report:    Possible focal occlusion vs. very high grade stenosis prox. post-inf M2 branch R  MCA. Flow in distal vessels suggests possible collateral augmentation. Additional focal stenoses in distal M1 L MCA, prox P2 R PCA and P1 L PCA. Mild-to-moderate stenosis both ICA origins. Reena Quintero M.D.      09/25/20    ECHO ADULT COMPLETE 09/26/2020 9/26/2020    Interpretation Summary  · LV: Calculated LVEF is 50%. Visually measured ejection fraction. Normal cavity size. Mildly increased wall thickness. Globally reduced systolic function.  Low normal systolic function. Mild (grade 1) left ventricular diastolic dysfunction. · MV: Mild mitral valve regurgitation is present. Signed by: Ab Galeana DO on 9/26/2020 12:20 PM       Labs:    Recent Results (from the past 48 hour(s))   CBC WITH AUTOMATED DIFF    Collection Time: 02/13/22  9:11 AM   Result Value Ref Range    WBC 26.9 (H) 4.6 - 13.2 K/uL    RBC 3.32 (L) 4.35 - 5.65 M/uL    HGB 10.0 (L) 13.0 - 16.0 g/dL    HCT 32.6 (L) 36.0 - 48.0 %    MCV 98.2 78.0 - 100.0 FL    MCH 30.1 24.0 - 34.0 PG    MCHC 30.7 (L) 31.0 - 37.0 g/dL    RDW 12.5 11.6 - 14.5 %    PLATELET 973 939 - 156 K/uL    MPV 10.2 9.2 - 11.8 FL    NRBC 0.0 0  WBC    ABSOLUTE NRBC 0.00 0.00 - 0.01 K/uL    NEUTROPHILS 84 (H) 40 - 73 %    BAND NEUTROPHILS 1 %    LYMPHOCYTES 9 (L) 21 - 52 %    MONOCYTES 4 3 - 10 %    EOSINOPHILS 2 0 - 5 %    BASOPHILS 0 0 - 2 %    IMMATURE GRANULOCYTES 0 0.0 - 0.5 %    ABS. NEUTROPHILS 22.9 (H) 1.8 - 8.0 K/UL    ABS. LYMPHOCYTES 2.4 0.9 - 3.6 K/UL    ABS. MONOCYTES 1.1 0.05 - 1.2 K/UL    ABS. EOSINOPHILS 0.5 (H) 0.0 - 0.4 K/UL    ABS. BASOPHILS 0.0 0.0 - 0.1 K/UL    ABS. IMM.  GRANS. 0.0 0.00 - 0.04 K/UL    DF MANUAL      PLATELET COMMENTS ADEQUATE PLATELETS      RBC COMMENTS HYPOCHROMIA  1+       METABOLIC PANEL, BASIC    Collection Time: 02/13/22  9:11 AM   Result Value Ref Range    Sodium 140 136 - 145 mmol/L    Potassium 3.7 3.5 - 5.5 mmol/L    Chloride 102 100 - 111 mmol/L    CO2 30 21 - 32 mmol/L    Anion gap 8 3.0 - 18 mmol/L    Glucose 96 74 - 99 mg/dL    BUN 41 (H) 7.0 - 18 MG/DL    Creatinine 5.14 (H) 0.6 - 1.3 MG/DL    BUN/Creatinine ratio 8 (L) 12 - 20      GFR est AA 13 (L) >60 ml/min/1.73m2    GFR est non-AA 11 (L) >60 ml/min/1.73m2    Calcium 8.8 8.5 - 10.1 MG/DL   PROTHROMBIN TIME + INR    Collection Time: 02/13/22  9:11 AM   Result Value Ref Range    Prothrombin time 12.5 11.5 - 15.2 sec    INR 0.9 0.8 - 1.2     TROPONIN-HIGH SENSITIVITY    Collection Time: 02/13/22  9:11 AM   Result Value Ref Range    Troponin-High Sensitivity 13 0 - 78 ng/L   ETHYL ALCOHOL    Collection Time: 02/13/22  9:11 AM   Result Value Ref Range    ALCOHOL(ETHYL),SERUM <3 0 - 3 MG/DL   GLUCOSE, POC    Collection Time: 02/13/22  9:51 AM   Result Value Ref Range    Glucose (POC) 99 70 - 110 mg/dL   EKG, 12 LEAD, INITIAL    Collection Time: 02/13/22  9:55 AM   Result Value Ref Range    Ventricular Rate 86 BPM    Atrial Rate 86 BPM    P-R Interval 204 ms    QRS Duration 116 ms    Q-T Interval 418 ms    QTC Calculation (Bezet) 500 ms    Calculated P Axis 60 degrees    Calculated R Axis -36 degrees    Calculated T Axis 103 degrees    Diagnosis       Sinus rhythm with occasional premature ventricular complexes  Left axis deviation  Incomplete right bundle branch block  Minimal voltage criteria for LVH, may be normal variant ( R in aVL )  Septal infarct , age undetermined  Prolonged QT  Abnormal ECG  When compared with ECG of 22-OCT-2021 15:16,  premature ventricular complexes are now present  Septal infarct is now present  Confirmed by Jimmy Sanders ([id number]) on 2/13/2022 10:11:09 AM     EKG, 12 LEAD, SUBSEQUENT    Collection Time: 02/13/22  9:56 AM   Result Value Ref Range    Ventricular Rate 87 BPM    Atrial Rate 87 BPM    P-R Interval 210 ms    QRS Duration 112 ms    Q-T Interval 420 ms    QTC Calculation (Bezet) 505 ms    Calculated P Axis 63 degrees    Calculated R Axis -35 degrees    Calculated T Axis 108 degrees    Diagnosis       Sinus rhythm with 1st degree AV block with premature atrial complexes with   aberrant conduction  Left axis deviation    Minimal voltage criteria for LVH, may be normal variant ( R in aVL )  Septal infarct (cited on or before 25-SEP-2020)  Prolonged QT  Abnormal ECG  When compared with ECG of 13-FEB-2022 09:55,  premature ventricular complexes are no longer present  aberrant conduction is now present  Confirmed by Jimmy Sanders ([phone]) on 2/13/2022 10:11:25 AM  Also confirmed by Jimmy Sanders ([id number]),  Meron Parnell ([id number])  on   2/14/2022 3:03:51 PM     URINALYSIS W/ RFLX MICROSCOPIC    Collection Time: 02/13/22 11:00 AM   Result Value Ref Range    Color RED      Appearance TURBID      Specific gravity 1.017 1.005 - 1.030      pH (UA) 8.5 (H) 5.0 - 8.0      Protein 300 (A) NEG mg/dL    Glucose Negative NEG mg/dL    Ketone Negative NEG mg/dL    Bilirubin Negative NEG      Blood LARGE (A) NEG      Urobilinogen 0.2 0.2 - 1.0 EU/dL    Nitrites Negative NEG      Leukocyte Esterase LARGE (A) NEG     CULTURE, URINE    Collection Time: 02/13/22 11:00 AM    Specimen: Cath Urine   Result Value Ref Range    Special Requests: NEW GONZALEZ     [place] Count >100,000  COLONIES/mL        Culture result: POSSIBLE ENTEROCOCCUS SPECIES (A)     URINE MICROSCOPIC ONLY    Collection Time: 02/13/22 11:00 AM   Result Value Ref Range    WBC 5 to 10 0 - 5 /hpf    RBC TOO NUMEROUS TO COUNT 0 - 5 /hpf    Epithelial cells Negative 0 - 5 /lpf    Bacteria FEW (A) NEG /hpf   VITAMIN B12 & FOLATE    Collection Time: 02/13/22 11:19 AM   Result Value Ref Range    Vitamin B12 >2,000 (H) 211 - 911 pg/mL    Folate >20.0 (H) 3.10 - 17.50 ng/mL   TSH 3RD GENERATION    Collection Time: 02/13/22 11:19 AM   Result Value Ref Range    TSH 2.96 0.36 - 3.74 uIU/mL   CULTURE, BLOOD    Collection Time: 02/13/22 11:45 AM    Specimen: Blood   Result Value Ref Range    Special Requests: NO SPECIAL REQUESTS      Culture result: NO GROWTH AFTER 18 HOURS     CULTURE, BLOOD    Collection Time: 02/13/22 12:00 PM    Specimen: Blood   Result Value Ref Range    Special Requests: NO SPECIAL REQUESTS      Culture result: NO GROWTH AFTER 18 HOURS     AMMONIA    Collection Time: 02/13/22  2:10 PM   Result Value Ref Range    Ammonia <10 (L) 11 - 32 UMOL/L   POC LACTIC ACID    Collection Time: 02/13/22  4:19 PM   Result Value Ref Range    Lactic Acid (POC) 0.73 0.40 - 2.00 mmol/L   COVID-19 RAPID TEST    Collection Time: 02/13/22  5:10 PM   Result Value Ref Range Specimen source Nasopharyngeal      COVID-19 rapid test Not detected NOTD     VANCOMYCIN, RANDOM    Collection Time: 02/14/22  5:14 AM   Result Value Ref Range    Vancomycin, random 21.6 5.0 - 40.0 UG/ML   CBC WITH AUTOMATED DIFF    Collection Time: 02/14/22  5:14 AM   Result Value Ref Range    WBC 27.1 (H) 4.6 - 13.2 K/uL    RBC 2.85 (L) 4.35 - 5.65 M/uL    HGB 8.9 (L) 13.0 - 16.0 g/dL    HCT 27.8 (L) 36.0 - 48.0 %    MCV 97.5 78.0 - 100.0 FL    MCH 31.2 24.0 - 34.0 PG    MCHC 32.0 31.0 - 37.0 g/dL    RDW 12.6 11.6 - 14.5 %    PLATELET 158 831 - 916 K/uL    MPV 10.2 9.2 - 11.8 FL    NRBC 0.0 0  WBC    ABSOLUTE NRBC 0.00 0.00 - 0.01 K/uL    NEUTROPHILS 84 (H) 40 - 73 %    LYMPHOCYTES 6 (L) 21 - 52 %    MONOCYTES 7 3 - 10 %    EOSINOPHILS 3 0 - 5 %    BASOPHILS 0 0 - 2 %    IMMATURE GRANULOCYTES 0 %    ABS. NEUTROPHILS 22.8 (H) 1.8 - 8.0 K/UL    ABS. LYMPHOCYTES 1.6 0.9 - 3.6 K/UL    ABS. MONOCYTES 1.9 (H) 0.05 - 1.2 K/UL    ABS. EOSINOPHILS 0.8 (H) 0.0 - 0.4 K/UL    ABS. BASOPHILS 0.0 0.0 - 0.1 K/UL    ABS. IMM. GRANS. 0.0 K/UL    DF MANUAL      PLATELET COMMENTS ADEQUATE PLATELETS      RBC COMMENTS NORMOCYTIC, NORMOCHROMIC     METABOLIC PANEL, COMPREHENSIVE    Collection Time: 02/14/22  5:14 AM   Result Value Ref Range    Sodium 141 136 - 145 mmol/L    Potassium 3.7 3.5 - 5.5 mmol/L    Chloride 107 100 - 111 mmol/L    CO2 24 21 - 32 mmol/L    Anion gap 10 3.0 - 18 mmol/L    Glucose 71 (L) 74 - 99 mg/dL    BUN 41 (H) 7.0 - 18 MG/DL    Creatinine 5.17 (H) 0.6 - 1.3 MG/DL    BUN/Creatinine ratio 8 (L) 12 - 20      GFR est AA 13 (L) >60 ml/min/1.73m2    GFR est non-AA 11 (L) >60 ml/min/1.73m2    Calcium 8.1 (L) 8.5 - 10.1 MG/DL    Bilirubin, total 0.3 0.2 - 1.0 MG/DL    ALT (SGPT) 7 (L) 16 - 61 U/L    AST (SGOT) 6 (L) 10 - 38 U/L    Alk.  phosphatase 142 (H) 45 - 117 U/L    Protein, total 6.7 6.4 - 8.2 g/dL    Albumin 2.7 (L) 3.4 - 5.0 g/dL    Globulin 4.0 2.0 - 4.0 g/dL    A-G Ratio 0.7 (L) 0.8 - 1.7 PHOSPHORUS    Collection Time: 02/14/22  5:14 AM   Result Value Ref Range    Phosphorus 3.3 2.5 - 4.9 MG/DL   PTH INTACT    Collection Time: 02/14/22  5:14 AM   Result Value Ref Range    Calcium 8.0 (L) 8.5 - 10.1 MG/DL    PTH, Intact 342.9 (H) 18.4 - 88.0 pg/mL   LIPID PANEL    Collection Time: 02/14/22  5:14 AM   Result Value Ref Range    LIPID PROFILE          Cholesterol, total 150 <200 MG/DL    Triglyceride 139 <150 MG/DL    HDL Cholesterol 43 40 - 60 MG/DL    LDL, calculated 79.2 0 - 100 MG/DL    VLDL, calculated 27.8 MG/DL    CHOL/HDL Ratio 3.5 0 - 5.0     HEMOGLOBIN A1C WITH EAG    Collection Time: 02/14/22  5:14 AM   Result Value Ref Range    Hemoglobin A1c 5.0 4.2 - 5.6 %    Est. average glucose 97 mg/dL       Signed By: Leroy Marsh MD     February 14, 2022      I spent 25 minutes with the patient in face-to-face consultation, of which greater than 50% was spent in counseling and coordination of care as described above    Disclaimer: Sections of this note are dictated using utilizing voice recognition software. Minor typographical errors may be present. If questions arise, please do not hesitate to contact me or call our department.

## 2022-02-14 NOTE — PROGRESS NOTES
Problem: Self Care Deficits Care Plan (Adult)  Goal: *Acute Goals and Plan of Care (Insert Text)  Description: Occupational Therapy Goals  Initiated 2/14/2022 within 7 day(s). 1.  Patient will perform bed mobility with supervision/set-up in prep for ADL routine. 2. Patient will perform toilet transfers with supervision/set-up using RW. 3.  Patient will perform all aspects of toileting with supervision/set-up. 4.  Patient will participate in upper extremity therapeutic exercise/activities with supervision/set-up for 8 minutes. 5.  Patient will utilize energy conservation techniques during functional activities with min verbal cues. Prior Level of Function: per spouse's spouse: she provided total assist with bathing and dressing at bed level, pt able to perform toilet transfer using RW with supv     Outcome: Progressing Towards Goal   OCCUPATIONAL THERAPY EVALUATION    Patient: Addison Sanchez (89 y.o. male)  Date: 2/14/2022  Primary Diagnosis: AMS (altered mental status) [R41.82]        Precautions:   Fall  PLOF: per spouse's spouse: she provided total assist with bathing and dressing at bed level, pt able to perform toilet transfer using RW with supv    ASSESSMENT :  Nursing/RN cleared for pt to participate in OT evaluation and tx session. Patient presents lying semi-reclined in bed, A & O x  self and place (additional time to process), re-orientated to situation and time. Bed mobility: supine <-> sit edge of bed w/ max A x 2. STS w/ Min A x 2, side stepping towards left with max vc's for correct hand placement on RW and feet placement. Patient resting comfortably sitting upright on ED stretcher, Supv/set up for beverage in bed, call bell within reach . Spouse present and provided accurate PLOF. Pt verbalized understanding and provided return demonstration to utilize for assist e.g. functional transfers in order to prevent falls.      Patient will benefit from skilled intervention to address the above impairments. Patient's rehabilitation potential is considered to be Good  Factors which may influence rehabilitation potential include:   []             None noted  []             Mental ability/status  [x]             Medical condition  []             Home/family situation and support systems  []             Safety awareness  []             Pain tolerance/management  []             Other:      PLAN :  Recommendations and Planned Interventions:   [x]               Self Care Training                  [x]      Therapeutic Activities  [x]               Functional Mobility Training   [x]      Cognitive Retraining  [x]               Therapeutic Exercises           [x]      Endurance Activities  [x]               Balance Training                    [x]      Neuromuscular Re-Education  []               Visual/Perceptual Training     [x]      Home Safety Training  [x]               Patient Education                   [x]      Family Training/Education  []               Other (comment):    Frequency/Duration: Patient will be followed by occupational therapy 3 times a week to address goals. Discharge Recommendations: Home Health with 24/7 supv, continued assist from spouse e.g. ADLs  Further Equipment Recommendations for Discharge: patient has all recommended DME       SUBJECTIVE:   Patient stated My wife helps me get bathed and dressed.     OBJECTIVE DATA SUMMARY:     Past Medical History:   Diagnosis Date    Cancer ([facility] [address]     Prostate    Cardiac echocardiogram 08/29/2016    EF 60-65%. No WMA. Mild LVH. Indeterminate diastolic fx. RVSP 35 mmHg. No significant valvular heart disease. Cardiac nuclear imaging test, abnormal 08/29/2016    Intermediate risk. Previous inferior infarction w/very mild fabiana-infarct ischemia. Inferior hypk. EF 68%. Nondiagnostic EKG on pharm stress test.  Pt's BP increased from 193/96 to 207/83 during study. Carotid duplex 08/30/2016    Mild <50% bilateral ICA stenosis.       Chronic kidney disease     on HD MWF ad [facility]    Colonic mass     per Dr Yue Patiño notes    Enlarged prostate     Hypertension      Past Surgical History:   Procedure Laterality Date    COLONOSCOPY N/A 10/1/2020    COLONOSCOPY, b'x, w tattoo w polypectomy performed by Kelly Hurst MD at [facility]  10/1/2020         Nancy Yudith  10/1/2020         COLONOSCPY,FLEX,W/ [place]  10/1/2020         AR INSJ TUNNELED CVC W/O SUBQ PORT/ AGE 5 YR/> N/A 9/28/2020    INSERTION TUNNELED CENTRAL VENOUS CATHETER performed by Meri Meek MD at [facility] CATH LAB    VASCULAR SURGERY PROCEDURE UNLIST      dialysis access- right neck     Barriers to Learning/Limitations: yes;  cognitive, sensory deficits-vision/hearing/speech, and altered mental status (i.e.Sedation, Confusion)  Compensate with: visual, verbal, tactile, kinesthetic cues/model    Home Situation:   Home Situation  Home Environment: Private residence  # Steps to Enter: 2  One/Two Story Residence: One story  Living Alone: No  Support Systems: Spouse/Significant Other  Patient Expects to be Discharged to[de-identified] Home  Current DME Used/Available at Home: [employer], rolling,Com[employer], bedside  Tub or Shower Type: Other (comment) (spongebathed bed level with spouse assisting)  [x]  Right hand dominant   []  Left hand dominant    Cognitive/Behavioral Status:  Neurologic State: Alert  Orientation Level: Oriented to person;Oriented to place  Cognition: Follows commands;Decreased attention/concentration  Safety/Judgement: Fall prevention    Skin: appears intact  Edema: none noted    Vision/Perceptual:          Corrective Lenses: Glasses    Coordination: BUE  Coordination: Generally decreased, functional  Fine Motor Skills-Upper: Left Intact; Right Intact    Gross Motor Skills-Upper: Left Intact; Right Intact    Balance:  Sitting: Impaired  Sitting - Static: Fair (occasional)  Sitting - Dynamic: Fair (occasional)  Standing: Impaired; With support  Standing - Static: Fair (-)  Standing - Dynamic : Fair (-)    Strength: BUE  Strength: Generally decreased, functional     Tone & Sensation: BUE  Tone: Normal     Range of Motion: BUE  AROM: Within functional limits     Functional Mobility and Transfers for ADLs:  Bed Mobility:     Supine to Sit: Maximum assistance;Assist x2  Sit to Supine: Maximum assistance;Assist x2     Transfers:  Sit to Stand: Minimum assistance;Assist x2  Stand to Sit: Minimum assistance;Assist x2     ADL Assessment:   Feeding: Supervision;Setup    Oral Facial Hygiene/Grooming: Stand-by assistance    Bathing: Maximum assistance    Upper Body Dressing: Maximum assistance    Lower Body Dressing: Total assistance    Toileting: Maximum assistance     ADL Intervention:  Feeding  Drink to Mouth: Supervision;Set-up    Grooming  Position Performed: Long sitting on bed  Washing Face: Stand-by assistance  Lower Body Dressing Assistance  Socks: Total assistance (dependent)  Position Performed: Seated edge of bed    Cognitive Retraining  Safety/Judgement: Fall prevention    Pain:  Pain level pre-treatment: 0/10   Pain level post-treatment: 0/10     Activity Tolerance:   poor  Please refer to the flowsheet for vital signs taken during this treatment. After treatment:   [] Patient left in no apparent distress sitting up in chair  [x] Patient left in no apparent distress in bed  [x] Call bell left within reach  [x] Nursing notified  [x] Caregiver/spouse present  [] Bed alarm activated    COMMUNICATION/EDUCATION:   [x] Role of Occupational Therapy in the acute care setting  [x] Home safety education was provided and the patient/caregiver indicated understanding. [x] Patient/family have participated as able in goal setting and plan of care. [x] Patient/family agree to work toward stated goals and plan of care. [] Patient understands intent and goals of therapy, but is neutral about his/her participation.   [] Patient is unable to participate in goal setting and plan of care. Thank you for this referral.  Josee Shane  Time Calculation: 27 mins    Eval Complexity: History: MEDIUM Complexity : Expanded review of history including physical, cognitive and psychosocial  history ; Examination: MEDIUM Complexity : 3-5 performance deficits relating to physical, cognitive , or psychosocial skils that result in activity limitations and / or participation restrictions; Decision Making:MEDIUM Complexity : Patient may present with comorbidities that affect occupational performnce.  Miniml to moderate modification of tasks or assistance (eg, physical or verbal ) with assesment(s) is necessary to enable patient to complete evaluation

## 2022-02-14 NOTE — DIALYSIS
[facility]        ACUTE HEMODIALYSIS FLOW SHEET      HEMODIALYSIS ORDERS: Physician: Gilbert Rod     Dialyzer: revaclear   Duration: 3 hr  BFR: 400   DFR: 800   Dialysate:  Temp 36-37*C  K+   2    Ca+  2.5 Na 138 Bicarb 35   Weight:  70.3 kg    Patient Chart [x]     Unable to Obtain []     Dry weight/UF Goal: 1000   Access: LUE AVF  Needle Gauge: 15    Heparin []  Bolus      Units    [] Hourly       Units    [x]None       Pre BP:   155/72    Pulse:     91       Respirations: 18  Temperature:   97.5   Labs: Pre        Post:         [x] N/A   Additional Orders(medications, blood products, hypotension management):       [x] N/A     [x] Darryl Consent Verified     CATHETER ACCESS: [x]N/A     GRAFT/FISTULA ACCESS:  []N/A     []Right     [x]Left     [x]UE     []LE   []AVG   [x]AVF        []Buttonhole    [x]Medical Aseptic Prep Utilized   [x]No S/S infection  []Redness  []Drainage []Cultured []Swelling []Pain    Bruit:   [x] Strong    [] Weak       Thrill :   [x] Strong    [] Weak       Needle Gauge: 15   Length: 1   If access problem,  notified: []Yes     [x]N/A     Please describe access if present and not used:                            GENERAL ASSESSMENT:      LUNGS:  Rate 18 SaO2 %        [] N/A    [x] Clear  [] Coarse  [] Crackles  [] Wheezing        [] Diminished     Location : []RLL   []LLL    []RUL  []ROBERT     Cough: []Productive  []Dry  [x]N/A   Respirations:  [x]Easy  []Labored     Therapy:   [x]RA  []NC  l/min    Mask: []NRB []Venti       O2%                  []Ventilator  []Intubated  [] Trach  [] BiPaP     CARDIAC: [x]Regular      [] Irregular   [] Pericardial Rub  [] JVD        []  Monitored  [] Bedside  [] Remotely monitored [x] N/A     EDEMA: [] None   [x]Generalized  [] Pitting [] 1    [] 2    [] 3    [] 4                 [] Facial  [] Pedal  []  UE  [] LE     SKIN:   [x] Warm   [] Hot     [] Cold   [x] Dry     [] Pale   [] Diaphoretic                  [] Flushed  [] Jaundiced  [] Cyanotic  [] Rash  [] Weeping     LOC:    [x] Alert      [x]Oriented:    [x] Person     [x] Place  [x]Time               [] Confused  [] Lethargic  [] Medicated  [] Non-responsive     GI / ABDOMEN:  [] Flat    [] Distended    [x] Soft    [] Firm   []  Obese                             [] Diarrhea  [x] Bowel Sounds  [] Nausea  [] Vomiting       / URINE ASSESSMENT:[] Voiding   [x] Oliguria  [] Anuria   []  Burris     [] Incontinent    []  Incontinent Brief      []  Bathroom Privileges       PAIN: [x] 0 []1  []2   []3   []4   []5   []6   []7   []8   []9   []10              Scale 0-10  Action/Follow Up:      MOBILITY:  [] Amb    [] Amb/Assist    [] Bed    [] Wheelchair  [x] Stretcher      All Vitals and Treatment Details on Attached [address]: [facility]          Room # ER10/10      [] 1st Time Acute  [] Stat  [x] Routine  [] Urgent     [x] Acute Room  []  Bedside  [] ICU/CCU  [] ER   Isolation Precautions:   There are currently no Active Isolations      Special Considerations:         [] Blood Consent Verified [x]N/A      ALLERGIES:   No Known Allergies            Code Status:Full Code        Hepatitis Status:                        Lab Results   Component Value Date/Time    Hepatitis B surface Ag <0.10 10/18/2021 08:39 PM    Hepatitis B surface Ab <3.10 (L) 10/18/2021 08:39 PM    Hepatitis B core, IgM Negative 09/27/2020 04:33 AM    Hepatitis C virus Ab 0.15 09/27/2020 04:33 AM                     Current Labs:   Lab Results   Component Value Date/Time    Sodium 141 02/14/2022 05:14 AM    Potassium 3.7 02/14/2022 05:14 AM    Chloride 107 02/14/2022 05:14 AM    CO2 24 02/14/2022 05:14 AM    Anion gap 10 02/14/2022 05:14 AM    Glucose 71 (L) 02/14/2022 05:14 AM    BUN 41 (H) 02/14/2022 05:14 AM    Creatinine 5.17 (H) 02/14/2022 05:14 AM    BUN/Creatinine ratio 8 (L) 02/14/2022 05:14 AM    GFR est AA 13 (L) 02/14/2022 05:14 AM    GFR est non-AA 11 (L) 02/14/2022 05:14 AM    Calcium 8.1 (L) 02/14/2022 05:14 AM    Calcium 8.0 (L) 02/14/2022 05:14 AM      Lab Results   Component Value Date/Time    WBC 27.1 (H) 02/14/2022 05:14 AM    HGB 8.9 (L) 02/14/2022 05:14 AM    HCT 27.8 (L) 02/14/2022 05:14 AM    PLATELET 556 28/84/0443 05:14 AM    MCV 97.5 02/14/2022 05:14 AM                                                                                     DIET:   DIET ADULT       PRIMARY NURSE REPORT: First initial/Last name/Title      Pre Dialysis:   Annette Pfeiffer RN      Time: 1130      EDUCATION:    [x] Patient [] Other         Knowledge Basis: []None [x]Minimal [] Substantial   Barriers to learning  [x]N/A   [] Access Care     [] S&S of infection     [] Fluid Management     []K+     [x]Procedural    []Albumin     [] Medications     [] Tx Options     [] Transplant     [] Diet     [] Other   Teaching Tools:  [x] Explain  [x] Demo  [] Handouts [] Video  Patient response:  [x] Verbalized understanding  [] Teach back  [] Return demonstration [] Requires follow up   Inappropriate due to:            [x]Time Out/Safety Check  [x]Extracorporeal Circuit Tested for integrity       RO/HEMODIALYSIS MACHINE SAFETY CHECKS  Before each treatment:       Machine Number:                   [facility]                                                                   [x] Unit Machine # 3 with centralized RO                                                                     Alarm Test:  Pass time 1049               [x] RO/Machine Log Complete      Temp   36             Dialysate: pH  7.6 Conductivity: Meter   13.9     HD Machine   13.8                  TCD: 14.0  Dialyzer Lot # [id number]            Blood Tubing Lot # [id number]          Saline Lot #  [id number]     CHLORINE TESTING-Before each treatment and every 4 hours    Total Chlorine: [x] less than 0.1 ppm  Time: 0900 4 Hr/2nd Check Time: 1300   (if greater than 0.1 ppm from Primary then every 30 minutes from Secondary)     TREATMENT INITIATION  with Dialysis Precautions:   [x] All Connections Secured [x] Saline Line Double Clamped   [x] Venous Parameters Set                  [x] Arterial Parameters Set    [x] Prime Given 250ml                          [x]Air Foam Detector Engaged      Treatment Initiation Note:  Pt arrived to HD unit from ED via stretcher. A&O to self only; in NAD on RA.  ANDREA AVF assessed with strong bruit and thrill. HD initiated without difficulty with 15g x2. During Treatment Notes:  5541 Face and access in view with connections secure. 1230 Face and access in view with connections secure. 1245 Face and access in view with connections secure. 1300 Face and access in view with connections secure. 1315 Face and access in view with connections secure. 1330 Face and access in view with connections secure. 1345 Face and access in view with connections secure. 1400 Face and access in view with connections secure. 1415 Face and access in view with connections secure. 1430 Face and access in view with connections secure. 1445 Face and access in view with connections secure. 1500 Face and access in view with connections secure. 1511 Treatment complete. Medication Dose Volume Route Time [facility] name Title   none     DELORES Alfaro RN                   Post Assessment:   Dialyzer Cleared: [] Good [x] Fair  [] Poor  Blood processed:  63.9 L  UF Removed  1500 mL  POst BP:   163/93       Pulse: 93        Respirations: 18  Temperature: 97.3 Lungs:     [x] Clear      [] Course         [] Crackles    [] Wheezing         [] Diminished   Post Tx Vascular Access:   AVF/AVG: Bleeding stopped   Art 10 min. Kyle. 10 Min    Cardiac:   [x] Regular   [] Irregular   [] Monitor  [x] N/A           Catheter:    N/A    Skin:   Pain:   [x] Warm  [x] Dry [] Diaphoretic    [] Flushed    [] Pale [] Cyanotic [x]0  []1  []2   []3  []4   []5   []6   []7   []8   []9   []10     Post Treatment Note:  Pt tolerated treatment well. Net 1 L UF removed.      POST TREATMENT PRIMARY NURSE HANDOFF REPORT: First initial/Last name/Title         Post Dialysis: Jin Dsouza RN      Time:  0944     Abbreviations: AVG-arterial venous graft, AVF-arterial venous fistula, IJ-Internal Jugular, Subcl-Subclavian, Fem-Femoral, Tx-treatment, AP/HR-apical heart rate, DFR-dialysate flow rate, BFR-blood flow rate, AP-arterial pressure, -venous pressure, UF-ultrafiltrate, TMP-transmembrane pressure, Kyle-Venous, Art-Arterial, RO-Reverse Osmosis

## 2022-02-14 NOTE — PROGRESS NOTES
ARU/IPR REFERRAL CONTACT NOTE  [address] [place] [address] Physical Rehabilitation    RE:  Taran Blair  Referral received to review this patient's case for admission to [address] [place] [address] for Physical Rehabilitation. Current status reviewed.  When appropriate, will need PT/OT evaluation/treatment on this patient to complete the pre-admission evaluation.  Will continue to follow. Thank you for this referral.  Should you have any questions please do not hesitate to call. Sincerely,  Zach Steve.  [address] [place] [address] Physical Rehabilitation  [phone]

## 2022-02-14 NOTE — PROGRESS NOTES
Problem: Mobility Impaired (Adult and Pediatric)  Goal: *Acute Goals and Plan of Care (Insert Text)  Description: Physical Therapy Goals  Initiated 2/14/2022 and to be accomplished within 7 day(s)  1. Patient will move from supine to sit and sit to supine in bed with supervision/set-up. 2.  Patient will transfer from bed to chair and chair to bed with supervision/set-up using the least restrictive device. 3.  Patient will perform sit to stand with supervision/set-up. 4.  Patient will ambulate with supervision/set-up for 50 feet with the least restrictive device. 5.  Patient will ascend/descend 2 stairs with handrail(s) with minimal assistance/contact guard assist.    PLOF: Amb with ww in home. Wife supervises. One story home with 2 steps to enter. Outcome: Progressing Towards Goal   PHYSICAL THERAPY EVALUATION    Patient: Jackie Barrera (18 y.o. male)  Date: 2/14/2022  Primary Diagnosis: AMS (altered mental status) [R41.82]  Precautions: Fall  ASSESSMENT :  Evaluated in ED 10. Oriented to self, place, and month. Cues to redirect attention to current question. Max A x2 for supine to sit. Seated edge of stretcher with good seated balance. Min A x2 for sit to stand to ww. Amb 3ft with ww and min A. Shuffled steps. Returned to seated edge of stretcher. Completed multiple more sit to stand for positioning. Max A x2 for sit to supine. Seated on stretcher with head elevated. Spouse at bedside; provides assist for mobility and ADLs at home. Educated on need for RN assistance with mobility; verbalized understanding. Call bell in reach. Patient will benefit from skilled intervention to address the above impairments.   Patient's rehabilitation potential is considered to be Fair  Factors which may influence rehabilitation potential include:   []         None noted  []         Mental ability/status  [x]         Medical condition  []         Home/family situation and support systems  []         Safety awareness  []         Pain tolerance/management  []         Other:      PLAN :  Recommendations and Planned Interventions:   [x]           Bed Mobility Training             [x]    Neuromuscular Re-Education  [x]           Transfer Training                   []    Orthotic/Prosthetic Training  [x]           Gait Training                          []    Modalities  [x]           Therapeutic Exercises           []    Edema Management/Control  [x]           Therapeutic Activities            [x]    Family Training/Education  [x]           Patient Education  []           Other (comment):    Frequency/Duration: Patient will be followed by physical therapy 3-5 times a week to address goals. Discharge Recommendations: Home Health with 24/7 assist  Further Equipment Recommendations for Discharge: rolling walker and wheelchair; has     SUBJECTIVE:   Patient stated [facility].    OBJECTIVE DATA SUMMARY:     Past Medical History:   Diagnosis Date    Cancer [facility])     Prostate    Cardiac echocardiogram 08/29/2016    EF 60-65%. No WMA. Mild LVH. Indeterminate diastolic fx. RVSP 35 mmHg. No significant valvular heart disease.  Cardiac nuclear imaging test, abnormal 08/29/2016    Intermediate risk. Previous inferior infarction w/very mild fabiana-infarct ischemia. Inferior hypk. EF 68%. Nondiagnostic EKG on pharm stress test.  Pt's BP increased from 193/96 to 207/83 during study.  Carotid duplex 08/30/2016    Mild <50% bilateral ICA stenosis.       Chronic kidney disease     on HD MWF ad Praveena HBV    Colonic mass     per Dr Creston Mcardle notes    Enlarged prostate     Hypertension      Past Surgical History:   Procedure Laterality Date    COLONOSCOPY N/A 10/1/2020    COLONOSCOPY, b'x, w tattoo w polypectomy performed by Marcio Moran MD at [address]   10/1/2020         COLONOSCOPY,EARL Logan  10/1/2020         COLONOSCPY,FLEX,W/ [address] INJECT  10/1/2020         [id number] TUNNELED CVC W/O SUBQ PORT/ AGE 5 YR/> N/A 9/28/2020    INSERTION TUNNELED CENTRAL VENOUS CATHETER performed by Debby Ignacio MD at [facility] CATH LAB    VASCULAR SURGERY PROCEDURE UNLIST      dialysis access- right neck     Barriers to Learning/Limitations: yes;  cognitive, sensory deficits-vision/hearing/speech and altered mental status (i.e.Sedation, Confusion)  Compensate with: Visual Cues, Verbal Cues, Tactile Cues and Kinesthetic Cues    Home Situation:  Home Situation  Home Environment: Private residence  # Steps to Enter: 2  One/Two Story Residence: One story  Living Alone: No  Support Systems: Spouse/Significant Other  Patient Expects to be Discharged to[de-identified] Home  Current DME Used/Available at Home: [employer], rolling,Commode, bedside  Tub or Shower Type: Other (comment) (spongebathed bed level with spouse assisting)    Critical Behavior:  Neurologic State: Alert  Orientation Level: Oriented to person;Oriented to place  Cognition: Follows commands;Decreased attention/concentration  Safety/Judgement: Fall prevention  Psychosocial  Patient Behaviors: Calm; Cooperative  Family  Behaviors: Calm; Cooperative;Supportive    Strength:    Manual Muscle Testing (LE)         R     L    Hip Flexion:   4/5 4/5  Knee EXT:   4/5 4/5  Knee FLEX:   4/5 4/5  Ankle DF:   4/5 4/5  _________________________________________________   Range Of Motion:  BLE AROM WFL   Functional Mobility:  Bed Mobility:  Supine to Sit: Maximum assistance;Assist x2  Sit to Supine: Maximum assistance;Assist x2  Transfers:  Sit to Stand: Minimum assistance;Assist x2  Stand to Sit: Minimum assistance;Assist x2  Balance:   Sitting: Impaired  Sitting - Static: Fair (occasional)  Sitting - Dynamic: Fair (occasional)  Standing: Impaired; With support  Standing - Static: Fair (-)  Standing - Dynamic : Fair (-)  Ambulation/Gait Training:  Distance (ft): 3 Feet (ft)   Assistive Device: Walker, rolling  Ambulation - Level of Assistance: Minimal assistance    Neuro Re-education:  Seated balance 8 minutes  Standing balance 3 minutes  Therapeutic Exercises:   Sit to stand x4-5  Pain:  Pain level pre-treatment: 0/10   Pain level post-treatment: 0/10     Activity Tolerance:   Fair    After treatment:   []         Patient left in no apparent distress sitting up in chair  [x]         Patient left in no apparent distress on stretcher  [x]         Call bell left within reach  [x]         Nursing notified  []         Caregiver present  []         Bed alarm activated  []         SCDs applied    COMMUNICATION/EDUCATION:   [x]         Role of physical therapy and plan of care in the acute care setting. [x]         Fall prevention education was provided and the patient/caregiver indicated understanding. [x]         Patient/family have participated as able in goal setting and plan of care. []         Patient/family agree to work toward stated goals and plan of care. []         Patient understands intent and goals of therapy, but is neutral about his/her participation. []         Patient is unable to participate in goal setting/plan of care: ongoing with therapy staff.     Thank you for this referral.  Harish Yung, PT   Time Calculation: 22 mins    Eval Complexity: History: MEDIUM  Complexity : 1-2 comorbidities / personal factors will impact the outcome/ POC Exam:MEDIUM Complexity : 3 Standardized tests and measures addressing body structure, function, activity limitation and / or participation in recreation  Presentation: MEDIUM Complexity : Evolving with changing characteristics  Clinical Decision Making:Medium Complexity   Clinical judgement; ROM, MMT, functional mobility Overall Complexity:MEDIUM

## 2022-02-14 NOTE — PROGRESS NOTES
completed the initial Spiritual Assessment of the patient in bed 10 of the emergency room, and offered Pastoral Care support to the patient. There is an advance directive on file here. Patient is in good spirits and has been her many times. Patient does not have any Congregation/cultural needs that will affect patients preferences in health care. Chaplains will continue to follow and will provide pastoral care on an as needed/requested basis.     [facility] Care Department  [phone]

## 2022-02-14 NOTE — ED NOTES
Bedside shift change report given to Qing Jimenez (oncoming nurse) by Pedro Pedersen (offgoing nurse). Report included the following information SBAR, Kardex, ED Summary, Procedure Summary, Intake/Output, MAR, Recent Results, Alarm Parameters  and Quality Measures.

## 2022-02-14 NOTE — PROGRESS NOTES
Problem: Dysphagia (Adult)  Goal: *Acute Goals and Plan of Care (Insert Text)  Description: Patient will:  1. Tolerate PO trials with 0 s/s overt distress in 4/5 trials  2. Utilize compensatory swallow strategies/maneuvers (decrease bite/sip, size/rate, alt. liq/sol) with min cues in 4/5 trials    Recommend:   Easy to chew diet with thin liquids   Meds as tolerated   Aspiration precautions  HOB >45 degrees during all intake and for at least 30 min after intake  Small bites/sips, Feed slowly, alternating bites/sips   Oral care three times daily     Outcome: Progressing Towards Goal    SPEECH LANGUAGE PATHOLOGY BEDSIDE SWALLOW EVALUATION/TREATMENT    Patient: Cece Chacko (89 y.o. male)  Date: 2/14/2022  Primary Diagnosis: AMS (altered mental status) [R41.82]  Precautions: Aspiration      PLOF: As per H&P    ASSESSMENT :  Based on the objective data described below, the patient presents with mild oral dysphagia. Speech and voice within functional limits and word finding intact during conversation. Pt alert and oriented to person and place, following commands and reporting \"I eat just fine\". Lower dentures not in place at this time (pt reporting at home). Oral [facility] exam unremarkable otherwise. Pt tolerating consecutive self-fed swallows of thin liquid + straw with timely swallow initiation, adequate laryngeal elevation to palpation and no overt s/sx aspiration. Demo prolonged mastication of regular solids with mild lingual spread post swallow but reporting \"I don't think I can chew everything without my bottom teeth\". Recommend easy to chew diet with thin liquids with meds per pt preference. TREATMENT :  Skilled therapy initiated; Educated pt on aspiration precautions and importance of compensatory swallow techniques to decrease aspiration risk (decrease rate of intake & sip/bite size, upright @HOB for all po intake and ~30 minutes after po); verbalized comprehension.   Will follow x1-2 visits to ensure continued diet tolerance. D/w pt and nursing. Patient will benefit from skilled intervention to address the above impairments. Patient's rehabilitation potential is considered to be Excellent  Factors which may influence rehabilitation potential include:   []            None noted  []            Mental ability/status  [x]            Medical condition  []            Home/family situation and support systems  []            Safety awareness  []            Pain tolerance/management  []            Other:      PLAN :  Recommendations and Planned Interventions:  As above   Frequency/Duration: Patient will be followed by speech-language pathology x1-2 visits to address goals. Discharge Recommendations: To Be Determined     SUBJECTIVE:   Patient stated I'll never forget you    OBJECTIVE:     Past Medical History:   Diagnosis Date    Cancer [facility])     Prostate    Cardiac echocardiogram 08/29/2016    EF 60-65%. No WMA. Mild LVH. Indeterminate diastolic fx. RVSP 35 mmHg. No significant valvular heart disease.  Cardiac nuclear imaging test, abnormal 08/29/2016    Intermediate risk. Previous inferior infarction w/very mild fabiana-infarct ischemia. Inferior hypk. EF 68%. Nondiagnostic EKG on pharm stress test.  Pt's BP increased from 193/96 to 207/83 during study.  Carotid duplex 08/30/2016    Mild <50% bilateral ICA stenosis.       Chronic kidney disease     on HD MWF ad Praveena HBV    Colonic mass     per Dr Yue Patiño notes    Enlarged prostate     Hypertension      Past Surgical History:   Procedure Laterality Date    COLONOSCOPY N/A 10/1/2020    COLONOSCOPY, b'x, w tattoo w polypectomy performed by Kelly Hurst MD at [address]  10/1/2020         Nancy Zurita  10/1/2020         COLONOSCPY,FLEX,W/ [facility]  10/1/2020         [place] INSJ TUNNELED CVC W/O SUBQ PORT/ AGE 5 YR/> N/A 9/28/2020    INSERTION TUNNELED CENTRAL VENOUS CATHETER performed by Millicent Acosta MD at [facility] CATH LAB    VASCULAR SURGERY PROCEDURE UNLIST      dialysis access- right neck     Prior Level of Function/Home Situation: Home  Diet prior to admission: Regular/thin liquids   Current Diet:  NPO; recommend easy to chew with thin liquids    Cognitive and Communication Status:  Neurologic State: Alert  Orientation Level: Oriented to person,Oriented to place  Cognition: Follows commands  Oral Assessment:  Oral Assessment  Labial: No impairment  Dentition: Natural  Oral Hygiene: Good  Lingual: No impairment  Velum: No impairment  Mandible: No impairment  P.O. Trials:  Patient Position: [facility] at [facility]  Vocal quality prior to P.O.: Low volume  Consistency Presented: Thin liquid; Solid;Mechanical soft  How Presented: Self-fed/presented;Cup/sip;Spoon;Straw;Successive swallows     Bolus Acceptance: No impairment  Bolus Formation/Control: Impaired  Type of Impairment: Mastication;Delayed  Propulsion: Delayed (# of seconds)  Oral Residue: Lingual;10-50% of bolus  Initiation of Swallow: No impairment  Laryngeal Elevation: Functional  Aspiration Signs/Symptoms: None  Pharyngeal Phase Characteristics: No impairment, issues, or problems   Effective Modifications: Small sips and bites; Alternate liquids/solids  Cues for Modifications: Minimal  Oral Phase Severity: Mild  Pharyngeal Phase Severity : No impairment    PAIN:  Start of Eval: not reported  End of Eval: not reported      After treatment:   []            Patient left in no apparent distress sitting up in chair  [x]            Patient left in no apparent distress in bed  [x]            Call bell left within reach  [x]            Nursing notified  []            Family present  []            Caregiver present  []            Bed alarm activated    COMMUNICATION/EDUCATION:   [x]            Aspiration precautions; swallow safety; compensatory techniques. [x]            Patient/family have participated as able in goal setting and plan of care.   [] Patient/family agree to work toward stated goals and plan of care. []            Patient understands intent and goals of therapy; neutral about participation. []            Patient unable to participate in goal setting/plan of care; educ ongoing with interdisciplinary staff  []         Posted safety precautions in patient's room.     Thank you for this referral,  Eitan Wadlen M.S., [id number] [facility]  Speech-Language Pathologist

## 2022-02-14 NOTE — PROGRESS NOTES
Neurology Consult    Patient: Pavel Brandon MRN: [id number]  SSN: [id number]    YOB: 1935  Age: 80 y.o. Sex: male      HPI:  Pavel Brandon is a 80 y.o. male, with history of HTN, ESRD, prostate cancer with chronic michel, now admitted with dysarthria, facial droop for which Neurology is consulted. History per patient, chart review, bedside RN:  Noted several days of progressive confusion, increased tremulousness. Upon awakening this morning, noted to have worsened mental status and facial droop, prompting family to call EMS. Tele-Stroke completed in ED; NIHSS 2 (facial palsy, dys). CTH without contrast did not show acute hypodensity; +SVID. CTA head/neck with basilar stenosis, area of stenosis in inferior right M2, distal left M1, bilateral PCAs, and cervical ICA origins bilaterally. No tPA given LSW. Plan for aspirin, clopidogrel and MRI for further evaluation. On initial neurologic evaluation, unable to answer basic orientation questions. Reports reason for admission related to right hip pain, otherwise denies complaints. No recent missed dialysis per family. Ongoing treatment for prostate cancer with zytiga and prednisone. Also with colonic mass without planned treatment as patient defers colectomy. This morning, he got his dialysis and was at baseline per notes in chart. No stroke on MRI brain. Past Medical History:   Diagnosis Date    Cancer [facility])     Prostate    Cardiac echocardiogram 08/29/2016    EF 60-65%. No WMA. Mild LVH. Indeterminate diastolic fx. RVSP 35 mmHg. No significant valvular heart disease.  Cardiac nuclear imaging test, abnormal 08/29/2016    Intermediate risk. Previous inferior infarction w/very mild fabiana-infarct ischemia. Inferior hypk. EF 68%. Nondiagnostic EKG on pharm stress test.  Pt's BP increased from 193/96 to 207/83 during study.  Carotid duplex 08/30/2016    Mild <50% bilateral ICA stenosis.       Chronic kidney disease     on HD MWF ad Praveena HBV    Colonic mass     per Dr Rojelio Saldivar notes    Enlarged prostate     Hypertension      Past Surgical History:   Procedure Laterality Date    COLONOSCOPY N/A 10/1/2020    COLONOSCOPY, b'x, w tattoo w polypectomy performed by Matthew Franklin MD at [facility]  10/1/2020         [facility]  10/1/2020         COLONOSCPY,FLEX,W/ [facility] [place]  10/1/2020         [place] INSJ TUNNELED CVC W/O SUBQ PORT/ AGE 5 YR/> N/A 9/28/2020    INSERTION TUNNELED CENTRAL VENOUS CATHETER performed by Avelina Holstein, MD at [facility] CATH LAB    VASCULAR SURGERY PROCEDURE UNLIST      dialysis access- right neck      History reviewed. No pertinent family history.   Social History     Tobacco Use    Smoking status: Never Smoker    Smokeless tobacco: Never Used   Substance Use Topics    Alcohol use: No      Current Facility-Administered Medications   Medication Dose Route Frequency Provider Last Rate Last Admin    0.9% sodium chloride (MBP/ADV) infusion             doxercalciferoL (HECTOROL) 4 mcg/2 mL injection 1 mcg  1 mcg IntraVENous DIALYSIS MON, WED & Abigail Jara MD        epoetin brina-epbx (RETACRIT) injection 4,000 Units  4,000 Units SubCUTAneous Q MON, WED & Abigail Jara MD        heparin (porcine) injection 5,000 Units  5,000 Units SubCUTAneous Q8H Janeth Kerr MD        vancomycin (VANCOCIN) 500 mg in 0.9% sodium chloride (MBP/ADV) 100 mL MBP  500 mg IntraVENous DIALYSIS Morgan Dunlap MD        aspirin (ASA) suppository 300 mg  300 mg Rectal DAILY Amaya Barrios MD   300 mg at 02/14/22 0906    sodium chloride (NS) flush 5-10 mL  5-10 mL IntraVENous PRN Amaya Barrios MD        piperacillin-tazobactam (ZOSYN) 4.5 g in 0.9% sodium chloride (MBP/ADV) 100 mL MBP  4.5 g IntraVENous Q12H Amaya Barrios MD 25 mL/hr at 02/14/22 0809 4.5 g at 02/14/22 0809    VANCOMYCIN INFORMATION NOTE   Other Rx Dosing/Monitoring David Wallace Sheri Leyva MD        labetaloL (NORMODYNE;TRANDATE) 20 mg/4 mL (5 mg/mL) injection 5 mg  5 mg IntraVENous Q10MIN PRN Cristina Shah NP        acetaminophen (TYLENOL) suppository 650 mg  650 mg Rectal Q6H PRN Cristina Shah NP         Current Outpatient Medications   Medication Sig Dispense Refill    abiraterone 250 mg tab TAKE 4 TABLETS BY MOUTH 1 TIME A DAY ON AN EMPTY STOMACH. TAKE ONE HOUR PRIOR TO FOOD OR TWO HOURS AFTER FOOD. SWALLOW TABLETS WHOLE WITH WATER AND DO NOT CRUSH OR CHEW TABLETS. (Patient not taking: Reported on 12/29/2021) 120 Tablet 4    predniSONE (DELTASONE) 5 mg tablet TAKE 1 TABLET BY MOUTH 2 TIMES A DAY 60 Tablet 4    acetaminophen (TYLENOL) 500 mg tablet Take 500 mg by mouth every six (6) hours as needed for Pain.  pantoprazole (PROTONIX) 40 mg tablet Take 1 Tablet by mouth Before breakfast and dinner. 60 Tablet 0    amLODIPine (NORVASC) 10 mg tablet TAKE 1 TABLET BY MOUTH EVERY DAY      calcium acetate,phosphat bind, (PHOSLO) 667 mg cap Take 1 Capsule by mouth daily.  abiraterone (Zytiga) 250 mg tab Take four tablets by mouth daily on an empty stomach. Take one hour prior to food or two hours after food. Tablets should be swallowed whole with water. Do not crush or chew tablets. 120 Tab 5    B complex w-C no.20/folic acid (TRIPHROCAPS PO) Take 1 Cap by mouth daily.           No Known Allergies    Review of Systems: Limited per patient's MS    Objective:     Vitals:    02/14/22 1300 02/14/22 1315 02/14/22 1330 02/14/22 1345   BP: (!) 159/80 (!) 148/64 (!) 168/64 139/82   Pulse: 84 74 (!) 55 96   Resp:       Temp:       TempSrc:       SpO2:       Weight:       Height:            Physical Exam:  General: elderly male, awake, alert, no acute distress  HEENT: MM, non-icteric  Pulm: non-labored on room air  Ext: no edema    Neuro:  MS: Awake, alert, keeps asking when he can leave, easily distracted by passing people, oriented to self but not place or situation or time  CN: EOMI without nystagmus, no gaze preference, VF full to blink to threat. Facial sensation intact to LT. Mild NLFF on R, but unclear if this is baseline. Hearing intact to voice. MOTOR: Normal bulk and tone. No drift in BUEs. 5/5  and 4/5 triceps bl, 5/5 at ankle  Reflexes: no clonus  Sensory: Intact to LT; unclear if extinction present  Coordination: no dysmetria noted on observation of movements  Gait: Not assessed    Relevant Labs/studies:  XR CHEST SNGL V    Result Date: 2/13/2022  Question minimal hazy airspace opacities which may be acute and/or chronic. Correlate clinically. Follow-up can be obtained. Additional details as discussed. XR CHEST PA LAT    Result Date: 2/14/2022  1. Ovoid density in the right upper lobe likely near the anterior segment. Additional groundglass opacities. Consider further evaluation with CT.    MRI BRAIN WO CONT    Result Date: 2/14/2022  1. No acute intracranial hemorrhage or territorial infarct. No mass effect. 2. Moderate extent of chronic ischemic findings. The bilateral frontotemporal encephalomalacia and also be seen as a pattern with remote posttraumatic sequelae. 3. Concordant preliminary interpretation was submitted 2/14/2022 at 0155 hours. Assessment & Plan:   80year old male with history of HTN, ESRD on iHD, prostate cancer, presenting with increased confusion of right facial droop, outside of tPA window without LVO with multiple areas of intracranial and extracranial stenosis, including distal left M1. Initial neurologic examination notable for impaired language function, attention, and orientation, right facial droop, diffuse negative myoclonus and mild generalized weakness, now at baseline and no stroke on MRI    I suspect that his facial droop and encephalopathy were secondary to his UTI. He has bifrontal encephalomalacia, and any metabolic disturbance could bring out focal deficits due to poor brain reserve.      He does have ICA as well as intracranial atherosclerosis, so I would recommend that he continue ASA 81mg and atorvastatin 40mg for goal LDL<70 daily for primary stroke prevention.    - Goal normotension, normoglycemia  - Neurochecks per unit routine  - Will sign off at this time, please contact with questions    Signed By: Mike Herrmann MD   Neurologist    February 14, 2022

## 2022-02-14 NOTE — ED NOTES
Pt in good spirits, complaining of 0/10 pain. Periodic confusion. Pt on monitor w/ no signs on distress. All IV lines flushed and appropriate to stay in place at this time.

## 2022-02-15 NOTE — PROGRESS NOTES
Physician Progress Note      Melisa Daley  CSN #:                  [id number]  :                       1935  ADMIT DATE:       2022 8:53 AM  DISCH DATE:  RESPONDING  PROVIDER #:        Viraj Balbuena MD [facility] MD          QUERY TEXT:    Pt admitted with UTI and metabolic encephalopathy. Pt noted to have a chronic indwelling urinary catheter, elevated WBCs, and tachycardia. Michel catheter was exchanged in the ER. If possible, please document in the progress notes and discharge summary if you are evaluating and/or treating any of the following:  [Sepsis and UTI due to chronic indwelling urinary catheter::Sepsis and UTI is due to the chronic indwelling urinary catheter.]]    The medical record reflects the following:  Risk Factors: PMH: ESRD; prostate Ca with mets  Clinical Indicators: WBC 26.9, 27.1, 24.9; tachycardia; metabolic encephalopathy, UTI    ER-Michel removed and new urinary bladder michel placed. Treatment: Vanco; Zosyn; blood and urine Cx; VS; michel catheter changed    Thank you,  Arslan Parrish RN/EYAL Tate@"[employer].[facility]  Options provided:  -- UTI due to chronic indwelling urinary catheter  -- Other - I will add my own diagnosis  -- Disagree - Not applicable / Not valid  -- Disagree - Clinically unable to determine / Unknown  -- Refer to Clinical Documentation Reviewer    PROVIDER RESPONSE TEXT:    UTI is due to the chronic indwelling urinary catheter.     Query created by: Ameya German on 2/15/2022 10:31 AM      Electronically signed by:  Viraj SIERRANEVIRAJ [facility] MD 2/15/2022 3:47 PM

## 2022-02-15 NOTE — PROGRESS NOTES
[facility] Hospitalist Group  Progress Note    Patient: Addison Sanchez Age: 80 y.o. : 1935 MR#: [id number] SSN: [id number]  Date/Time: 2/15/2022     Subjective:     Patient seen and evaluated, lying in bed, no acute distress. 51-year-old -American male with a past medical history of end-stage renal disease on hemodialysis, prostate cancer with mets, hypertension, CAD, chronic indwelling Burris, anemia of chronic disease presents to the emergency room with altered mental status. ER evaluationpatient noted to have a UTI, started on broad-spectrum IV antibiotics. Concern for right-sided facial droop, neurology consulted and recommended MRI for further evaluation. MRI brain negative for acute CVA. Assessment/Plan:     1. Acute metabolic encephalopathylikely secondary to UTIcontinue IV antibiotics. 2. UTI with hematuriacontinue IV antibiotics, await urine culture reports. Urine culture growing Enterococcus faecalis, sensitivities to follow. 3. Leukocytosis likely secondary to #2  4. Right-sided facial droop concerning for CVAMRI brain negative for acute CVA. 5. ESRD on HDnephrology on board continue hemodialysis per schedule  6. History of prostate cancer with metson abiraterone / prednisone as out pt, restarted home medications. 7. 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   DVT prophylaxisHeparin  Full code    PT and OT eval, recommending rehab          Derrek Delgadillo MD  02/15/22      Case discussed with:  []Patient  []Family  [x]Nursing  []Case Management  DVT Prophylaxis:  []Lovenox  [x]Hep SQ  []SCDs  []Coumadin   []On Heparin gtt    Objective:   VS:   Visit Vitals  BP (!) 146/66 (BP 1 Location: Right upper arm, BP Patient Position: At rest)   Pulse 90   Temp 98.5 °F (36.9 °C)   Resp 22   Ht 5' 6\" (1.676 m)   Wt 69.4 kg (153 lb 1.6 oz)   SpO2 99%   BMI 24.71 kg/m² Tmax/24hrs: Temp (24hrs), Av.9 °F (37.2 °C), Min:98.5 °F (36.9 °C), Max:99.5 °F (37.5 °C)  IOBRIEF    Intake/Output Summary (Last 24 hours) at 2/15/2022 1638  Last data filed at 2/15/2022 1518  Gross per 24 hour   Intake 310 ml   Output 500 ml   Net -190 ml       General:  Awake , lying in bed   Pulmonary:  CTA Bilaterally. No Wheezing/Rhonchi/Rales. Cardiovascular: Regular rate and Rhythm. GI:  Soft, Non distended, Non tender. + Bowel sounds. Extremities:  No edema, cyanosis, clubbing. No calf tenderness. Neurologic: Alert and awake, lying in bed, able to move all extremities. Additional:    Medications:   Current Facility-Administered Medications   Medication Dose Route Frequency    [START ON 2022] abiraterone tab 1,000 mg (Patient Supplied)  1,000 mg Oral DAILY    doxercalciferoL (HECTOROL) 4 mcg/2 mL injection 1 mcg  1 mcg IntraVENous DIALYSIS MON, WED & FRI    epoetin brina-epbx (RETACRIT) injection 4,000 Units  4,000 Units SubCUTAneous Q MON, WED & FRI    heparin (porcine) injection 5,000 Units  5,000 Units SubCUTAneous Q8H    aspirin (ASA) suppository 300 mg  300 mg Rectal DAILY    sodium chloride (NS) flush 5-10 mL  5-10 mL IntraVENous PRN    piperacillin-tazobactam (ZOSYN) 4.5 g in 0.9% sodium chloride (MBP/ADV) 100 mL MBP  4.5 g IntraVENous Q12H    VANCOMYCIN INFORMATION NOTE   Other Rx Dosing/Monitoring    labetaloL (NORMODYNE;TRANDATE) 20 mg/4 mL (5 mg/mL) injection 5 mg  5 mg IntraVENous Q10MIN PRN    acetaminophen (TYLENOL) suppository 650 mg  650 mg Rectal Q6H PRN       Imaging:   XR Results (most recent):  Results from Hospital Encounter encounter on 22    XR CHEST PA LAT    Narrative  EXAM: Chest Radiographs    INDICATION:  ? opacities on portable, r/o pna    TECHNIQUE: PA and lateral views of the chest    COMPARISON: 2022, 10/21/2021, 2020    FINDINGS: No pneumothorax identified.   There is a rounded opacity overlying the  right posterior fifth and sixth intercostal space. This is likely in the right  upper lobe anteriorly. Subtle groundglass changes are noted bilaterally. No  effusions appreciated. The cardiac silhouette is normal. The pulmonary  vascularity is unremarkable. Degenerative changes of the spine. Degenerative  changes of the shoulders are noted. Impression  1. Ovoid density in the right upper lobe likely near the anterior segment. Additional groundglass opacities. Consider further evaluation with CT. CT Results (most recent):  Results from Hospital Encounter encounter on 02/13/22    CTA HEAD NECK W CONT    Narrative  EXAM: CTA BRAIN/NECK  CPT CODE: 77806/09282    CLINICAL INDICATION/HISTORY: Left-sided facial droop. Code stroke    COMPARISON: None. TECHNIQUE: Helical CT scan of the brain/neck was performed at 1.25 mm intervals  during rapid IV bolus contrast administration. These data were reconstructed at  0.625 mm intervals for vascular analysis. The data was also reviewed at 2.5 mm  intervals for accompanying soft tissue analysis. Following this, image  reconstruction performed separately for the brain and neck on an independent  workstation with 3-D, maximum intensity projection (MIP) and shaded-volumetric  rendering of the major vessels was reviewed. Contrast: Isovue-370 - 100 mL IV;  uneventful administration. FINDINGS:    CTA BRAIN SOFT TISSUE ANALYSIS: Ventricular system, basilar cisterns and sulci  are appropriate for patient's stated age. No areas of mass effect, edema,  intra-/extra-axial hemorrhage or abnormal calcifications noted. Calvarium and  skull base are unremarkable. Extracranial bony and soft tissues are  unremarkable. CTA BRAIN VASCULAR ANALYSIS: Anterior circulation anatomy is balanced. Atherosclerotic plaque in both carotid size and may result in at least mild  bilateral stenosis.   There is a possible focal occlusion or very high-grade  stenosis at the proximal posteroinferior M2 branch of the right MCA.  Relatively  normal flow enhancement is seen in the more distal vessels suggesting possible  collateral augmentation. Cisternal vessels are widely patent. Runoff vessels  otherwise appear normal.    The posterior fossa, the right vertebral artery is dominant. There is some  focal atherosclerotic plaque in the proximal intradural segment. There is  probably little significant stenosis on the right but there may be a focal  stenosis in the small vessel on the left. The more distal vessel is patent to  the basilar junction. There is a focal short segment stenosis in the distal  basilar artery just proximal cerebellar artery origins of about 70% diameter. There is normal origin of the major vertebrobasilar vessels except that the P1  segment of the right PCA is not well seen. The P2 portion is supplied from the  right ICA via a prominent right posterior communicating artery segment. There is a significant short segment stenosis in the proximal P2 portion of the  right PCA. The distal vessel is relatively normal.  In the left PCA, there are  several focal stenoses, one in the distal P1 segment and two tandem stenoses in  the proximal P2 segment. The more distal posterior cerebral circulation is  unremarkable, bilaterally. CTA NECK SOFT TISSUE ANALYSIS: The mucosal surfaces of the aerodigestive tract  are unremarkable. The salivary glands are normal.  The thyroid gland is  unremarkable. Images through the root of the neck, extending to the superior  mediastinum, and lung apices, are clear. There is no adenopathy seen. Normal  vascular enhancement is identified. No osseous abnormalities are seen except  for multilevel spondylitic degenerative change in the cervical spine may result  in some significant spinal stenosis particularly at C4-5 and C5-6 where there is  moderate canal stenosis particularly at C5-6.     CTA NECK VASCULAR ANALYSIS: The great vessels have a normal arrangement from the  aorta with widely patent ostia. Atherosclerotic plaque is seen at the origin of  both cervical internal carotid arteries resulting in mild-to-moderate, about 40%  diameter, origin stenosis. The common carotid arteries, cervical common carotid  bifurcations, and cervical internal carotid arteries are otherwise widely  patent, bilaterally. The vertebral arteries are widely patent with normal  subclavian origins. The right vertebral artery is dominant. NASCET criteria  were used. Impression  CTA Brain:  There is a possible focal occlusion or very high grade stenosis in  the proximal posterior inferior M2 branch of the right MCA. The more distal  vessels are patent suggesting some collateral augmentation. The remainder of  the anterior circulation is unremarkable except for mild atherosclerosis in the  carotid siphons with possible mild stenosis. In the posterior fossa, there is a dominant right vertebral artery there is some  focal atherosclerotic plaque in the proximal intradural segment with at least  mild stenosis. There is a significant short segment fusiform 70% diameter stenosis in the  distal basilar artery just proximal to the superior cerebellar artery origins. There is persistent fetal origin of the right PCA from the right ICA via a  prominent posterior communicating artery segment. There is a short segment significant stenosis in the proximal P2 portion of the  right PCA; the distal vessels is grossly normal.  There are several focal  stenoses in the left PCA including one in the P1 segment and two in the proximal  P2 segment. The distal vessel is well seen. No significant parenchymal abnormalities seen. CTA Neck:  There is mild-to-moderate focal stenosis of about 40% diameter at the  origin of both cervical ICAs as result of atherosclerotic plaque at the  bifurcation. The remainder of the neck circulation is widely patent. The right  vertebral artery is dominant.   No soft tissue abnormalities identified. The following preliminary report was provided by Dr. Triston Ruiz at the time of  the study.      ===================  Note: [facility] maintains that all CT scans at their facilities  are performed by using dose optimization technique as appropriate to a performed  examination, to include automated exposure control, adjustment of the mAs and/or  kVp according to patient size (including appropriate matching for site specific  examinations) or use of iterative reconstruction technique. Preliminary Report:    Possible focal occlusion vs. very high grade stenosis prox. post-inf M2 branch R  MCA. Flow in distal vessels suggests possible collateral augmentation. Additional focal stenoses in distal M1 L MCA, prox P2 R PCA and P1 L PCA. Mild-to-moderate stenosis both ICA origins. Lluvia Lea M.D.      09/25/20    ECHO ADULT COMPLETE 09/26/2020 9/26/2020    Interpretation Summary  · LV: Calculated LVEF is 50%. Visually measured ejection fraction. Normal cavity size. Mildly increased wall thickness. Globally reduced systolic function. Low normal systolic function. Mild (grade 1) left ventricular diastolic dysfunction. · MV: Mild mitral valve regurgitation is present.     Signed by: Alesha Ocasio DO on 9/26/2020 12:20 PM       Labs:    Recent Results (from the past 48 hour(s))   COVID-19 RAPID TEST    Collection Time: 02/13/22  5:10 PM   Result Value Ref Range    Specimen source Nasopharyngeal      COVID-19 rapid test Not detected NOTD     VANCOMYCIN, RANDOM    Collection Time: 02/14/22  5:14 AM   Result Value Ref Range    Vancomycin, random 21.6 5.0 - 40.0 UG/ML   CBC WITH AUTOMATED DIFF    Collection Time: 02/14/22  5:14 AM   Result Value Ref Range    WBC 27.1 (H) 4.6 - 13.2 K/uL    RBC 2.85 (L) 4.35 - 5.65 M/uL    HGB 8.9 (L) 13.0 - 16.0 g/dL    HCT 27.8 (L) 36.0 - 48.0 %    MCV 97.5 78.0 - 100.0 FL    MCH 31.2 24.0 - 34.0 PG    MCHC 32.0 31.0 - 37.0 g/dL    RDW 12.6 11.6 - 14.5 %    PLATELET 089 037 - 823 K/uL    MPV 10.2 9.2 - 11.8 FL    NRBC 0.0 0  WBC    ABSOLUTE NRBC 0.00 0.00 - 0.01 K/uL    NEUTROPHILS 84 (H) 40 - 73 %    LYMPHOCYTES 6 (L) 21 - 52 %    MONOCYTES 7 3 - 10 %    EOSINOPHILS 3 0 - 5 %    BASOPHILS 0 0 - 2 %    IMMATURE GRANULOCYTES 0 %    ABS. NEUTROPHILS 22.8 (H) 1.8 - 8.0 K/UL    ABS. LYMPHOCYTES 1.6 0.9 - 3.6 K/UL    ABS. MONOCYTES 1.9 (H) 0.05 - 1.2 K/UL    ABS. EOSINOPHILS 0.8 (H) 0.0 - 0.4 K/UL    ABS. BASOPHILS 0.0 0.0 - 0.1 K/UL    ABS. IMM. GRANS. 0.0 K/UL    DF MANUAL      PLATELET COMMENTS ADEQUATE PLATELETS      RBC COMMENTS NORMOCYTIC, NORMOCHROMIC     METABOLIC PANEL, COMPREHENSIVE    Collection Time: 02/14/22  5:14 AM   Result Value Ref Range    Sodium 141 136 - 145 mmol/L    Potassium 3.7 3.5 - 5.5 mmol/L    Chloride 107 100 - 111 mmol/L    CO2 24 21 - 32 mmol/L    Anion gap 10 3.0 - 18 mmol/L    Glucose 71 (L) 74 - 99 mg/dL    BUN 41 (H) 7.0 - 18 MG/DL    Creatinine 5.17 (H) 0.6 - 1.3 MG/DL    BUN/Creatinine ratio 8 (L) 12 - 20      GFR est AA 13 (L) >60 ml/min/1.73m2    GFR est non-AA 11 (L) >60 ml/min/1.73m2    Calcium 8.1 (L) 8.5 - 10.1 MG/DL    Bilirubin, total 0.3 0.2 - 1.0 MG/DL    ALT (SGPT) 7 (L) 16 - 61 U/L    AST (SGOT) 6 (L) 10 - 38 U/L    Alk.  phosphatase 142 (H) 45 - 117 U/L    Protein, total 6.7 6.4 - 8.2 g/dL    Albumin 2.7 (L) 3.4 - 5.0 g/dL    Globulin 4.0 2.0 - 4.0 g/dL    A-G Ratio 0.7 (L) 0.8 - 1.7     PHOSPHORUS    Collection Time: 02/14/22  5:14 AM   Result Value Ref Range    Phosphorus 3.3 2.5 - 4.9 MG/DL   PTH INTACT    Collection Time: 02/14/22  5:14 AM   Result Value Ref Range    Calcium 8.0 (L) 8.5 - 10.1 MG/DL    PTH, Intact 342.9 (H) 18.4 - 88.0 pg/mL   LIPID PANEL    Collection Time: 02/14/22  5:14 AM   Result Value Ref Range    LIPID PROFILE          Cholesterol, total 150 <200 MG/DL    Triglyceride 139 <150 MG/DL    HDL Cholesterol 43 40 - 60 MG/DL    LDL, calculated 79.2 0 - 100 MG/DL    VLDL, calculated 27.8 MG/DL    CHOL/HDL Ratio 3.5 0 - 5.0     HEMOGLOBIN A1C WITH EAG    Collection Time: 02/14/22  5:14 AM   Result Value Ref Range    Hemoglobin A1c 5.0 4.2 - 5.6 %    Est. average glucose 97 mg/dL   GLUCOSE, POC    Collection Time: 02/14/22  8:29 PM   Result Value Ref Range    Glucose (POC) 113 (H) 70 - 110 mg/dL   CBC WITH AUTOMATED DIFF    Collection Time: 02/15/22 12:37 AM   Result Value Ref Range    WBC 24.9 (H) 4.6 - 13.2 K/uL    RBC 3.16 (L) 4.35 - 5.65 M/uL    HGB 9.6 (L) 13.0 - 16.0 g/dL    HCT 30.5 (L) 36.0 - 48.0 %    MCV 96.5 78.0 - 100.0 FL    MCH 30.4 24.0 - 34.0 PG    MCHC 31.5 31.0 - 37.0 g/dL    RDW 12.6 11.6 - 14.5 %    PLATELET 483 422 - 967 K/uL    MPV 10.1 9.2 - 11.8 FL    NRBC 0.0 0  WBC    ABSOLUTE NRBC 0.00 0.00 - 0.01 K/uL    NEUTROPHILS 81 (H) 40 - 73 %    LYMPHOCYTES 11 (L) 21 - 52 %    MONOCYTES 3 3 - 10 %    EOSINOPHILS 4 0 - 5 %    BASOPHILS 1 0 - 2 %    IMMATURE GRANULOCYTES 0 0.0 - 0.5 %    ABS. NEUTROPHILS 20.2 (H) 1.8 - 8.0 K/UL    ABS. LYMPHOCYTES 2.7 0.9 - 3.6 K/UL    ABS. MONOCYTES 0.7 0.05 - 1.2 K/UL    ABS. EOSINOPHILS 1.0 (H) 0.0 - 0.4 K/UL    ABS. BASOPHILS 0.3 (H) 0.0 - 0.1 K/UL    ABS. IMM. GRANS. 0.0 0.00 - 0.04 K/UL    DF MANUAL      PLATELET COMMENTS ADEQUATE PLATELETS      RBC COMMENTS NORMOCYTIC, NORMOCHROMIC     HEP B SURFACE AG    Collection Time: 02/15/22 12:57 PM   Result Value Ref Range    Hepatitis B surface Ag <0.10 <1.00 Index    Hep B surface Ag Interp. Negative NEG     HEP B SURFACE AB    Collection Time: 02/15/22 12:57 PM   Result Value Ref Range    Hepatitis B surface Ab <3.10 (L) >10.0 mIU/mL    Hep B surface Ab Interp. Negative (A) POS      Hep B surface Ab comment        Samples with a  value of 10 mIU/mL or greater are considered positive (protective immunity) in accordance with the CDC guidelines.        Signed By: Derrek Delgadillo MD     February 15, 2022      I spent 25 minutes with the patient in face-to-face consultation, of which greater than 50% was spent in counseling and coordination of care as described above    Disclaimer: Sections of this note are dictated using utilizing voice recognition software. Minor typographical errors may be present. If questions arise, please do not hesitate to contact me or call our department.

## 2022-02-15 NOTE — PROGRESS NOTES
Progress Note    Jackie Barrera  80 y.o. Admit Date: 2/13/2022  Active Problems:    Essential (primary) hypertension (9/1/2016) POA: Yes      ESRD (end stage renal disease) ([facility] [address] (9/26/2020) POA: Yes      Secondary hyperparathyroidism of renal origin ([facility] (9/26/2020) POA: Yes      Anemia (9/26/2020) POA: Yes      Hydronephrosis (9/29/2020) POA: Yes      Leucocytosis (10/20/2021) POA: Yes      AMS (altered mental status) (2/13/2022) POA: Unknown            Subjective:     Patient seen earlier in ER & then again during dialysis, no new complain       A comprehensive review of systems was negative except for that written in the History of Present Illness.     Objective:     Visit Vitals  BP (!) 152/83   Pulse (!) 107   Temp 98.5 °F (36.9 °C)   Resp 21   Ht 5' 6\" (1.676 m)   Wt 70.3 kg (155 lb)   SpO2 100%   BMI 25.02 kg/m²         Intake/Output Summary (Last 24 hours) at 2/14/2022 1957  Last data filed at 2/14/2022 1515  Gross per 24 hour   Intake    Output 1700 ml   Net -1700 ml       Current Facility-Administered Medications   Medication Dose Route Frequency Provider Last Rate Last Admin    0.9% sodium chloride (MBP/ADV) infusion             doxercalciferoL (HECTOROL) 4 mcg/2 mL injection 1 mcg  1 mcg IntraVENous DIALYSIS NUNO JONES & Julee Caldera MD        epoetin brina-epbx (RETACRIT) injection 4,000 Units  4,000 Units SubCUTAneous Q MON, WED & Julee Caldera MD        heparin (porcine) injection 5,000 Units  5,000 Units SubCUTAneous Q8H Masha Orantes MD   5,000 Units at 02/14/22 1908    aspirin (ASA) suppository 300 mg  300 mg Rectal DAILY Bernadette Bello MD   300 mg at 02/14/22 0906    sodium chloride (NS) flush 5-10 mL  5-10 mL IntraVENous PRN Bernadette Bello MD        piperacillin-tazobactam (ZOSYN) 4.5 g in 0.9% sodium chloride (MBP/ADV) 100 mL MBP  4.5 g IntraVENous Q12H Bernadette Bello MD 25 mL/hr at 02/14/22 0809 4.5 g at 02/14/22 0809    VANCOMYCIN INFORMATION NOTE   Other Rx Dosing/Monitoring Donell Yuan MD        labetaloL (NORMODYNE;TRANDATE) 20 mg/4 mL (5 mg/mL) injection 5 mg  5 mg IntraVENous Q10MIN PRN Alyssa Mesa NP        acetaminophen (TYLENOL) suppository 650 mg  650 mg Rectal Q6H PRN Alyssa Mesa NP         Current Outpatient Medications   Medication Sig Dispense Refill    abiraterone 250 mg tab TAKE 4 TABLETS BY MOUTH 1 TIME A DAY ON AN EMPTY STOMACH. TAKE ONE HOUR PRIOR TO FOOD OR TWO HOURS AFTER FOOD. SWALLOW TABLETS WHOLE WITH WATER AND DO NOT CRUSH OR CHEW TABLETS. (Patient not taking: Reported on 12/29/2021) 120 Tablet 4    predniSONE (DELTASONE) 5 mg tablet TAKE 1 TABLET BY MOUTH 2 TIMES A DAY 60 Tablet 4    acetaminophen (TYLENOL) 500 mg tablet Take 500 mg by mouth every six (6) hours as needed for Pain.  pantoprazole (PROTONIX) 40 mg tablet Take 1 Tablet by mouth Before breakfast and dinner. 60 Tablet 0    amLODIPine (NORVASC) 10 mg tablet TAKE 1 TABLET BY MOUTH EVERY DAY      calcium acetate,phosphat bind, (PHOSLO) 667 mg cap Take 1 Capsule by mouth daily.  abiraterone (Zytiga) 250 mg tab Take four tablets by mouth daily on an empty stomach. Take one hour prior to food or two hours after food. Tablets should be swallowed whole with water. Do not crush or chew tablets. 120 Tab 5    B complex w-C no.20/folic acid (TRIPHROCAPS PO) Take 1 Cap by mouth daily. Physical Exam:     Physical Exam:   General:  Alert, cooperative, no distress, appears stated age. Neck: Supple, symmetrical, trachea midline, no adenopathy, thyroid: no enlargement/tenderness/nodules, no carotid bruit and no JVD. Lungs:   Clear to auscultation bilaterally. Heart:  Regular rate and rhythm, S1, S2 normal, no murmur, click, rub or gallop. Abdomen:   Soft, non-tender. Bowel sounds normal. No masses,  No organomegaly.    Extremities: Extremities normal, atraumatic, no cyanosis or edema, AVG no issue, tolerating blood flow 350   Skin: Skin color, texture, turgor normal. No rashes or lesions         Data Review:    CBC w/Diff    Recent Labs     02/14/22 0514 02/13/22 0911 02/13/22 0911   WBC 27.1*  --  26.9*   RBC 2.85*  --  3.32*   HGB 8.9*  --  10.0*   HCT 27.8*  --  32.6*   MCV 97.5   < > 98.2   MCH 31.2   < > 30.1   MCHC 32.0   < > 30.7*   RDW 12.6   < > 12.5    < > = values in this interval not displayed. Recent Labs     02/14/22 0514 02/13/22 0911 02/13/22 0911   BANDS  --   --  1   MONOS 7  --  4   EOS 3  --  2   BASOS 0   < > 0   RDW 12.6   < > 12.5    < > = values in this interval not displayed. Comprehensive Metabolic Profile    Recent Labs     02/14/22 0514 02/13/22 0911    140   K 3.7 3.7    102   CO2 24 30   BUN 41* 41*   CREA 5.17* 5.14*    Recent Labs     02/14/22 0514 02/13/22 0911   CA 8.0*  8.1* 8.8   PHOS 3.3  --    ALB 2.7*  --    TP 6.7  --    TBILI 0.3  --                         Impression:       Active Hospital Problems    Diagnosis Date Noted    AMS (altered mental status) 02/13/2022    Leucocytosis 10/20/2021    Hydronephrosis 09/29/2020    ESRD (end stage renal disease) ([facility] [address] 09/26/2020    Anemia 09/26/2020    Secondary hyperparathyroidism of renal origin ([facility] [address] 09/26/2020    Essential (primary) hypertension 09/01/2016            Plan:      Tolerating HD with blood flow 350, on 3 K 2.5 ca bath, BP is ok, has chronicaly elevated WBC , possibly Leukemoid reaction, careful Retacrit, OT/PT,follow Neurologist's recommendations      Michelle Aragon MD

## 2022-02-15 NOTE — PROGRESS NOTES
Problem: Self Care Deficits Care Plan (Adult)  Goal: *Acute Goals and Plan of Care (Insert Text)  Description: Occupational Therapy Goals  Initiated 2/14/2022 within 7 day(s). 1.  Patient will perform bed mobility with supervision/set-up in prep for ADL routine. 2. Patient will perform toilet transfers with supervision/set-up using RW. 3.  Patient will perform all aspects of toileting with supervision/set-up. 4.  Patient will participate in upper extremity therapeutic exercise/activities with supervision/set-up for 8 minutes. 5.  Patient will utilize energy conservation techniques during functional activities with min verbal cues. Prior Level of Function: per spouse's spouse: she provided total assist with bathing and dressing at bed level, pt able to perform toilet transfer using RW with supv     Outcome: Progressing Towards Goal   OCCUPATIONAL THERAPY TREATMENT    Patient: Cony Avalos (30 y.o. male)  Date: 2/15/2022  Diagnosis: AMS (altered mental status) [R41.82] <principal problem not specified>       Precautions: Fall    Chart, occupational therapy assessment, plan of care, and goals were reviewed. ASSESSMENT:  Pt is pleasant and cooperative. Supportive spouse at bedside. Pt co-treated w/PT to maximize safety w/OOB activity. Pt motivated for OOB activity. Educated on importance OOB to maintain activity tolerance and overall strength/endurance. Reviewed importance of hand placement w/functional transfers. Poor dynamic standing balance requires Max assist w/toileting ADL. Generalized weakness requires increase time and assist w/bed mobility. Educated pt on use of side rails to increase independence.    Progression toward goals:  []          Improving appropriately and progressing toward goals  [x]          Improving slowly and progressing toward goals  []          Not making progress toward goals and plan of care will be adjusted     PLAN:  Patient continues to benefit from skilled intervention to address the above impairments. Continue treatment per established plan of care. Discharge Recommendations:  Rehab  Further Equipment Recommendations for Discharge:  bedside commode     SUBJECTIVE:   Patient stated I have no pain at all. Praise God.     OBJECTIVE DATA SUMMARY:   Cognitive/Behavioral Status:  Neurologic State: Alert  Orientation Level: Oriented to person,Oriented to place  Cognition: Follows commands  Safety/Judgement: Fall prevention    Functional Mobility and Transfers for ADLs:   Bed Mobility:  Supine to Sit: Additional time; Moderate assistance;Bed Modified  Sit to Supine: Moderate assistance (assist w/BLE)     Transfers:  Sit to Stand: Minimum assistance;Assist x2   Bathroom Mobility: Moderate assistance (w/RW)    Balance:  Sitting: Impaired; Without support  Standing: Impaired; With support    ADL Intervention:  Grooming  Position Performed: Seated edge of bed  Washing Face: Set-up  Washing Hands: Set-up    Toileting  Toileting Assistance: Maximum assistance  Clothing Management: Maximum assistance    Pain:  Pain level pre-treatment: 0/10   Pain level post-treatment: 0/10    Activity Tolerance:    Good    Please refer to the flowsheet for vital signs taken during this treatment. After treatment:   []  Patient left in no apparent distress sitting up in chair  [x]  Patient left in no apparent distress in bed  [x]  Call bell left within reach  []  Nursing notified  []  Caregiver present  []  Bed alarm activated    COMMUNICATION/EDUCATION:   [] Role of Occupational Therapy in the acute care setting  [] Home safety education was provided and the patient/caregiver indicated understanding. [] Patient/family have participated as able in working towards goals and plan of care. [x] Patient/family agree to work toward stated goals and plan of care. [] Patient understands intent and goals of therapy, but is neutral about his/her participation.   [] Patient is unable to participate in goal setting and plan of care.       Thank you for this referral.  LISA Espinosa  Time Calculation: 23 mins

## 2022-02-15 NOTE — ED NOTES
Pt is very pleasant however, is  becoming more confused as the night progresses. Currently not trying to get out of bed on his own. On continuous monitoring.  Ate 100% of dinner

## 2022-02-15 NOTE — PROGRESS NOTES
Problem: Dysphagia (Adult)  Goal: *Acute Goals and Plan of Care (Insert Text)  Description: Patient will:  1. Tolerate PO trials with 0 s/s overt distress in 4/5 trials  2. Utilize compensatory swallow strategies/maneuvers (decrease bite/sip, size/rate, alt. liq/sol) with min cues in 4/5 trials    Recommend:   Easy to chew diet with thin liquids   Meds as tolerated   Aspiration precautions  HOB >45 degrees during all intake and for at least 30 min after intake  Small bites/sips, Feed slowly, alternating bites/sips   Oral care three times daily     Outcome: Progressing Towards Goal     SPEECH LANGUAGE PATHOLOGY DYSPHAGIA TREATMENT    Patient: Eddie Lawson (46 y.o. male)  Date: 2/15/2022  Diagnosis: AMS (altered mental status) [R41.82] <principal problem not specified>       Precautions: aspiration; Fall  PLOF:as per H&P     ASSESSMENT:  Patient seen on this date for dysphagia management. Patient A&Ox4. Breakfast tray noted at bedside untouched. Patient repositioned to 39 at [facility]. PO trials: easy to chew and thin liquids via straw. No overt s/sx of aspiration noted for all consistencies. Patient demonstrated very elongated mastication due to lack of lower teeth. RN reported patient's wife is on her to way and will possibly bring dentures. Patient reported having no issues masticating with dentures. Adequate bolus manipulation and laryngeal elevation upon palpation noted. Recommend patient remain on easy to chew diet with thin liquids. Straw ok. Meds per patient preference. Progression toward goals:  [x]         Improving appropriately and progressing toward goals  []         Improving slowly and progressing toward goals  []         Not making progress toward goals and plan of care will be adjusted     PLAN:  Recommendations and Planned Interventions:  See above  Patient continues to benefit from skilled intervention to address the above impairments. Continue treatment per established plan of care.   Discharge Recommendations: To Be Determined     SUBJECTIVE:   Patient stated I need my dentures. OBJECTIVE:   Cognitive and Communication Status:  Neurologic State: Alert  Orientation Level: Disoriented X4  Cognition: Follows commands  Perception: Appears intact  Perseveration: No perseveration noted  Safety/Judgement: Fall prevention  Dysphagia Treatment:  Oral Assessment:  Oral Assessment  Labial: No impairment  Dentition: Natural  Oral Hygiene: Good  Lingual: No impairment  Velum: No impairment  Mandible: No impairment  P.O. Trials:   Patient Position: 45 at [facility]   Vocal quality prior to P.O.: Low volume   Consistency Presented: Thin liquid,Solid   How Presented: Self-fed/presented,Straw   Bolus Acceptance: No impairment   Bolus Formation/Control: Impaired   Type of Impairment: Mastication,Delayed   Propulsion: Delayed (# of seconds)   Oral Residue: 10-50% of bolus,Lingual   Initiation of Swallow: No impairment   Laryngeal Elevation: Functional   Aspiration Signs/Symptoms: None   Pharyngeal Phase Characteristics: No impairment, issues, or problems    Effective Modifications: Alternate liquids/solids,Small sips and bites   Cues for Modifications: Minimal   Oral Phase Severity: Mild   Pharyngeal Phase Severity : No impairment     PAIN:  Pain level pre-treatment: 0/10   Pain level post-treatment: 0/10       After treatment:   []              Patient left in no apparent distress sitting up in chair  [x]              Patient left in no apparent distress in bed  [x]              Call bell left within reach  [x]              Nursing notified  []              Family present  []              Caregiver present  []              Bed alarm activated      COMMUNICATION/EDUCATION:   [x] Aspiration precautions; swallow safety; compensatory techniques  []        Patient unable to participate in education; education ongoing with staff  [x]  Posted safety precautions in patient's room.   [] Oral-motor/laryngeal strengthening exercises      Tate Young, SLP-CFY

## 2022-02-15 NOTE — CONSULTS
[address] [facility]    Name:  Milady Espinal  MR#:   [id number]  :  1935  ACCOUNT #:  [de-identified]  DATE OF SERVICE:  2022    Consult was requested by Carmen Amaya MD    REASON FOR CONSULTATION:  Altered mental status in dialysis patient. HISTORY OF PRESENT ILLNESS:  This is an 55-year-old [facility] [employer] male who comes to dialysis unit every Monday, Wednesday, Friday  and brings by his wife,, He  was presented to the emergency room with altered mental status. He is ambulatory and easily walks with a walking 4-prong cane. He has history of ESRD secondary to hypertension, usually gets dialysis every Monday, Wednesday, Friday. His dialysis access is AV graft, and the catheter has been taken out recently. and despite all his significant medical problems, he tolerates dialysis well, and the patient and the family want to continue to care of his dialysis as the patient adamantly wants to continue dialysis, it makes him feel good. 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 At this time, the patient came to the emergency room with altered mental status. As per the family, the patient was in his normal state of health with the exception that he has just been moving a little bit slower than his usual moment and at some time today when the family checked him, he was not in his normal self, noted he was chewing his pills, which is out of character for him and usually most of the time, he is alert, awake, and oriented x4, but he is only able to state where he is currently. Also, there is some questionable right-sided facial droop, prompting presentation to the emergency room.     When the patient was in the emergency room, and asked about all these symptoms, the patient declined any problem at all. Denies any weakness or other concerns. A neuro consultation was done, and they recommended some imaging studies including MRI and wanted to start aspirin , followed by oral aspirin when his condition improves. His medical problems include hypertension, history of TIA, ESRD, hypokalemia, metabolic acidosis, secondary hyperparathyroidism,  anemia, urosepsis, polyp of the ascending colon as I mentioned, elevated PSA, prostate cancer with metastasis to the bones, post obstructive diuresis and hematuria. PAST MEDICAL HISTORY:  Cancer of the prostate. Echocardiogram shows ejection fraction around 60-65%, no wall motion abnormality, mild LVH. Carotid duplex, mild 50% bilateral internal carotid artery stenosis. PAST SURGICAL HISTORY:  Colonoscopy with biopsy, tunneled dialysis catheter AV graft. SOCIAL HISTORY:  Never smoked. No drug or alcohol. LIST OF MEDICATIONS:  1. Nephrocaps once a day. 2.  Zytiga one tablet 4 times by mouth on an empty stomach one hour prior to food or two hours after food; tablet should be swallowed with water. Do not crush or chew the tablet. 3.  Tylenol 500 mg every 6 hours p.r.n. for pain. 4.  Norvasc 10 mg p.o. daily. 5.  Protonix 40 mg tablet daily. 6.  Prednisone 5 mg two times daily. REVIEW OF SYSTEMS:  Not able to be performed given his mental status. PHYSICAL EXAMINATION:  VITAL SIGNS:  On admission, temperature 98.5, heart rate 85 per minute, blood pressure 118/87, weight is 70.3 kg, oxygen saturation 100%, BMI 25.02 kg. HEENT:  Oral mucosa moist.  NECK:  Jugular venous pressure not distended. LUNGS:  Clear to auscultation. HEART:  S1, S2 without any gallop or murmur. Regular rate and rhythm. ABDOMEN:  Soft. Nontender. Not distended. Bowel sounds normal.  :  Bladder not distended. Burris catheter in place, draining blood-tinged urine. EXTREMITIES:  Ankles, negative edema.   NEUROLOGIC: Alert and awake to person and place . Rico Mcdonnell SKIN:  Warm and dry, and his AV graft on the left arm has good thrill and bruit. LABORATORY DATA:  On admission :  WBC 26.9 with a hemoglobin of 10.3; hematocrit of 33.6; platelets of 197,709. Chemistry:  Sodium 140, potassium 3.7, chloride 102, CO2 30, glucose of 96, blood urea nitrogen 41, creatinine of 5.14, calcium of 8.8. High-sensitive troponin is 13. DIAGNOSTIC DATA:  CT of the head without contrast and the patient with  No acute ischemia or hemorrhage  changes, mild sinus disease. MRI of the brain without contrast was done, shows no acute intracranial hemorrhage or territorial infarct, no mass effect, moderate extent of chronic ischemic findings, bilateral frontotemporal encephalomalacia and also is seen as a pattern within remote posttraumatic sequelae and important preliminary interpretation was submitted. Chest x-ray was done, a low density in the right upper lobe likely near the anterior segment, additional ground glass opacities. IMPRESSION:  1. End-stage renal disease patient. 2.  Change in mental status, slightly. 3.  Prostate cancer with metastasis. 4.  Leukocytosis. 5.  Possible transient ischemic attack. PLAN:  Follow neurologist's recommendations and also rule out urosepsis given that pyuria and hematuria ,Leucocytosis could be Leukemoid  Reaction. Fall precautions should be implemented. Aspiration precaution  should be continued, aspirin should be continued, and we will continue to dialyze as the patient and family want to continue. Further recommendations will be given based on the hospital course.       MD JAMISON [email or web address]  D:  02/14/2022 22:16  T:  02/15/2022 4:46  JOB #:  [id number]

## 2022-02-15 NOTE — PROGRESS NOTES
Problem: Mobility Impaired (Adult and Pediatric)  Goal: *Acute Goals and Plan of Care (Insert Text)  Description: Physical Therapy Goals  Initiated 2/14/2022 and to be accomplished within 7 day(s)  1. Patient will move from supine to sit and sit to supine in bed with supervision/set-up. 2.  Patient will transfer from bed to chair and chair to bed with supervision/set-up using the least restrictive device. 3.  Patient will perform sit to stand with supervision/set-up. 4.  Patient will ambulate with supervision/set-up for 50 feet with the least restrictive device. 5.  Patient will ascend/descend 2 stairs with handrail(s) with minimal assistance/contact guard assist.    PLOF: Amb with ww in home. Wife supervises. One story home with 2 steps to enter. Outcome: Progressing Towards Goal    PHYSICAL THERAPY TREATMENT    Patient: Anahi Grier (39 y.o. male)  Date: 2/15/2022  Diagnosis: AMS (altered mental status) [R41.82] <principal problem not specified>       Precautions: Fall    ASSESSMENT:  Patient seen with OT to maximize safety with mobility. Patient is motivated to get OOB. Mod A for supine to sit transfer. Verbal cues to scoot forward to place feet on the ground. Pull to stand at [facility] with min A x2. He transfers to [facility] with increased verbal cues for safety awareness and for management of RW. Patient has posterior weight shift in standing, needing mod A for balance to ambulate around the bed. Patient returns to supine and left comfortably resting. Call bell in reach, bed alarm activated, and spouse present. Progression toward goals:   []      Improving appropriately and progressing toward goals  [x]      Improving slowly and progressing toward goals  []      Not making progress toward goals and plan of care will be adjusted     PLAN:  Patient continues to benefit from skilled intervention to address the above impairments. Continue treatment per established plan of care.   Discharge Recommendations:  Rehab  Further Equipment Recommendations for Discharge:  bedside commode, shower chair, rolling walker, and wheelchair      SUBJECTIVE:   Patient stated I want to get out of this bed. Kim Anam    OBJECTIVE DATA SUMMARY:   Critical Behavior:  Neurologic State: Alert  Orientation Level: Oriented to person,Oriented to place  Cognition: Follows commands  Safety/Judgement: Fall prevention  Functional Mobility Training:  Bed Mobility:     Supine to Sit: Additional time; Moderate assistance;Bed Modified  Sit to Supine: Moderate assistance (assist w/BLE)            Transfers:  Sit to Stand: Minimum assistance;Assist x2                 Balance:  Sitting: Impaired; Without support  Standing: Impaired; With support         Ambulation/Gait Training:  Distance (ft): 15 Feet (ft)  Assistive Device: Walker, rolling  Ambulation - Level of Assistance: Moderate assistance  Gait Abnormalities: Decreased step clearance  Base of Support: Center of gravity altered                        Pain:  Pain level pre-treatment: 0/10  Pain level post-treatment: 0/10   Pain Intervention(s): Medication (see MAR); Rest, Ice, Repositioning\   Response to intervention: Nurse notified, See doc flow    Activity Tolerance:   Fair  Please refer to the flowsheet for vital signs taken during this treatment. After treatment:   [] Patient left in no apparent distress sitting up in chair  [x] Patient left in no apparent distress in bed  [x] Call bell left within reach  [x] Nursing notified  [] Caregiver present  [x] Bed alarm activated  [] SCDs applied      COMMUNICATION/EDUCATION:   []         Role of Physical Therapy in the acute care setting. [x]         Fall prevention education was provided and the patient/caregiver indicated understanding. [x]         Patient/family have participated as able in working toward goals and plan of care. [x]         Patient/family agree to work toward stated goals and plan of care.   []         Patient understands intent and goals of therapy, but is neutral about his/her participation.   []         Patient is unable to participate in stated goals/plan of care: ongoing with therapy staff.  []         Other:        Estrella Odonnell PT   Time Calculation: 23 mins

## 2022-02-15 NOTE — PROGRESS NOTES
Reason for Admission:  AMS (altered mental status) [R41.82]                 RUR Score:    16            Plan for utilizing home health:    yes                      Likelihood of Readmission:   Moderate                         Do you (patient/family) have any concerns for transition/discharge?  no    Transition of Care Plan:       Initial assessment completed with patient and spouse/SO. Cognitive status of patient: only aware of  place, person and situation. Face sheet information confirmed:  yes. The patient designates his wife to participate in his discharge plan and to receive any needed information. This patient lives in a single family home with patient and wife. Patient is able to navigate steps as needed. Prior to hospitalization, patient was considered to be independent with ADLs/IADLS : yes . Patient has a current ACP document on file: yes      Healthcare Decision Maker:   Primary Decision Maker: Leslye Hirsch  and surgery update - Spouse - [phone]    Secondary Decision Maker: Song Irizarryshaw - [phone]    Click here to complete [address] including selection of the Healthcare Decision Maker Relationship (ie \"Primary\")    The patient and wife will be available to transport patient home upon discharge. The patient already has Walker, W/C,  available in the home. Patient is not currently active with home health. Patient has not stayed in a skilled nursing facility or rehab. This patient is on dialysis :yes    If yes, hemodialysis patient and receives treatment on Monday/Wednesday/Friday at [facility] [id number]  Chair time is 10am. Pt is transported to/from dialysis by his wife. List of available Home Health agencies were provided and reviewed with the patient prior to discharge. Freedom of choice signed: yes, for any available agency. Currently, the discharge plan is Home with  [facility].     The patient states that he can obtain his medications from the pharmacy, and take his medications as directed. Patient's current insurance is Medicare      Care Management Interventions  PCP Verified by CM: Yes  Mode of Transport at Discharge: Self  Transition of Care Consult (CM Consult): Home Health  Physical Therapy Consult: Yes  Occupational Therapy Consult: Yes  Speech Therapy Consult: Yes  Support Systems: Spouse/Significant Other  Confirm Follow Up Transport: Family  The Plan for Transition of Care is Related to the Following Treatment Goals : Home with home health  Freedom of Choice List was Provided with Basic Dialogue that Supports the Patient's Individualized Plan of Care/Goals, Treatment Preferences and Shares the Quality Data Associated with the Providers?: Yes  Discharge Location  Patient Expects to be Discharged to[de-identified] Home with home health    Patient states he has had the COVID vaccine as well as the booster.     Christa Anderson RN

## 2022-02-15 NOTE — PROGRESS NOTES
Progress Note    Jessica Clemons  80 y.o. Admit Date: 2/13/2022  Active Problems:    Essential (primary) hypertension (9/1/2016) POA: Yes      ESRD (end stage renal disease) ([facility] (9/26/2020) POA: Yes      Secondary hyperparathyroidism of renal origin ([facility] (9/26/2020) POA: Yes      Anemia (9/26/2020) POA: Yes      Hydronephrosis (9/29/2020) POA: Yes      UTI (urinary tract infection) (9/29/2020) POA: Yes      Leucocytosis (10/20/2021) POA: Yes      AMS (altered mental status) (2/13/2022) POA: Unknown            Subjective:     Patient feelsthe same, no new  Complain,at his base line mental status,       A comprehensive review of systems was negative except for that written in the History of Present Illness.     Objective:     Visit Vitals  BP (!) 145/71 (BP 1 Location: Right arm, BP Patient Position: At rest)   Pulse 88   Temp 99.1 °F (37.3 °C)   Resp 21   Ht 5' 6\" (1.676 m)   Wt 71.7 kg (158 lb)   SpO2 98%   BMI 25.50 kg/m²         Intake/Output Summary (Last 24 hours) at 2/15/2022 1115  Last data filed at 2/15/2022 0910  Gross per 24 hour   Intake 0 ml   Output 1500 ml   Net -1500 ml       Current Facility-Administered Medications   Medication Dose Route Frequency Provider Last Rate Last Admin    doxercalciferoL (HECTOROL) 4 mcg/2 mL injection 1 mcg  1 mcg IntraVENous DIALYSIS MON, WED & Bradley Dyer MD        epoetin brina-epbx (RETACRIT) injection 4,000 Units  4,000 Units SubCUTAneous Q MON, WED & Zulma Blake MD   4,000 Units at 02/14/22 2315    heparin (porcine) injection 5,000 Units  5,000 Units SubCUTAneous Q8H Abbi Person MD   5,000 Units at 02/14/22 1908    aspirin (ASA) suppository 300 mg  300 mg Rectal DAILY Anmol Borja MD   300 mg at 02/15/22 0949    sodium chloride (NS) flush 5-10 mL  5-10 mL IntraVENous PRN Anmol Borja MD        piperacillin-tazobactam (ZOSYN) 4.5 g in 0.9% sodium chloride (MBP/ADV) 100 mL MBP  4.5 g IntraVENous Q12H Anmol Borja MD 25 mL/hr at 02/15/22 0928 4.5 g at 02/15/22 0485    VANCOMYCIN INFORMATION NOTE   Other Rx Dosing/Monitoring Charlee Mauro MD        labetaloL (NORMODYNE;TRANDATE) 20 mg/4 mL (5 mg/mL) injection 5 mg  5 mg IntraVENous Q10MIN PRN Sebastian Cochran NP        acetaminophen (TYLENOL) suppository 650 mg  650 mg Rectal Q6H PRN Sebastian Cochran, NP            Physical Exam:     Physical Exam:   General:  Alert, cooperative, no distress, appears stated age. Neck: Supple, symmetrical, trachea midline, no adenopathy, thyroid: no enlargement/tenderness/nodules, no carotid bruit and no JVD. Lungs:   Clear to auscultation bilaterally. Heart:  Regular rate and rhythm, S1, S2 normal, no murmur, click, rub or gallop. Abdomen:   Soft, non-tender. Bowel sounds normal. No masses,  No organomegaly. Extremities: Extremities normal, atraumatic, no cyanosis or edema, AVG has good thrill & bruit         Data Review:    CBC w/Diff    Recent Labs     02/15/22  0037 02/14/22  0514 02/14/22  0514 02/13/22  0911 02/13/22  0911   WBC 24.9*  --  27.1*  --  26.9*   RBC 3.16*  --  2.85*  --  3.32*   HGB 9.6*  --  8.9*  --  10.0*   HCT 30.5*  --  27.8*  --  32.6*   MCV 96.5   < > 97.5   < > 98.2   MCH 30.4   < > 31.2   < > 30.1   MCHC 31.5   < > 32.0   < > 30.7*   RDW 12.6   < > 12.6   < > 12.5    < > = values in this interval not displayed. Recent Labs     02/15/22  0037 02/14/22  0514 02/14/22  0514 02/13/22  0911 02/13/22  0911   BANDS  --   --   --   --  1   MONOS 3  --  7  --  4   EOS 4  --  3  --  2   BASOS 1   < > 0   < > 0   RDW 12.6   < > 12.6   < > 12.5    < > = values in this interval not displayed.         Comprehensive Metabolic Profile    Recent Labs     02/14/22  0514 02/13/22  0911    140   K 3.7 3.7    102   CO2 24 30   BUN 41* 41*   CREA 5.17* 5.14*    Recent Labs     02/14/22  0514 02/13/22  0911   CA 8.0*  8.1* 8.8   PHOS 3.3  --    ALB 2.7*  --    TP 6.7  --    TBILI 0.3  --          2/13/22 1100 Resulting Agency    Special Requests:  [address]   [place] Count   >100,000   COLONIES/mL  [address] [place] [address] [address]   Culture result:   POSSIBLE ENTEROCOCCUS SPECIES Abnormal                       Impression:       Active Hospital Problems    Diagnosis Date Noted    AMS (altered mental status) 02/13/2022    Leucocytosis 10/20/2021    Hydronephrosis 09/29/2020    UTI (urinary tract infection) 09/29/2020    ESRD (end stage renal disease) ([facility] 09/26/2020    Anemia 09/26/2020    Secondary hyperparathyroidism of renal origin ([facility] 09/26/2020    Essential (primary) hypertension 09/01/2016            Plan:     Continue Vancomycin, may need to change Burris, Get Opinion of ID, Dialysis tomorrow.       Zamzam Devlin MD

## 2022-02-15 NOTE — ROUTINE PROCESS
TRANSFER - IN REPORT:    Verbal report received from [facility] RN(name) on Enos Osler  being received from ED(unit) for routine progression of care      Report consisted of patients Situation, Background, Assessment and   Recommendations(SBAR). Information from the following report(s) Recent Results was reviewed with the receiving nurse. Opportunity for questions and clarification was provided. Assessment completed upon patients arrival to unit and care assumed.        ASKED FOR A BLOOD SUGAR PRIOR TO ARRIVAL      Primary Nurse Jai Ivey RN and ***, RN performed a dual skin assessment on this patient {S SKIN ASSESMENT:52259}  Karthikeyan score is {KARTHIKEYAN SCALE:84789}

## 2022-02-16 PROBLEM — C67.9 BLADDER CANCER (HCC): Status: ACTIVE | Noted: 2022-01-01

## 2022-02-16 NOTE — PROGRESS NOTES
Patients wife has declined ARU. States she wants patient to come home with home health. Discharge pending ID consult.     Epi Moran RN

## 2022-02-16 NOTE — ROUTINE PROCESS
Bedside and Verbal shift change report given to Werner Marrero RN (oncoming nurse) by Kalen Goldman RN   (offgoing nurse). Report included the following information SBAR, Kardex, Intake/Output, MAR and Recent Results.

## 2022-02-16 NOTE — DIALYSIS
ACUTE HEMODIALYSIS TREATMENT    HEMODIALYSIS ORDERS: Physician: Dr. Kathrine Maxwell     Dialyzer: Revaclear   Duration: 3 hr   BFR: 400   DFR: 800   Dialysate:  Temp 36-37*C   K+  2    Ca+ 2.5   Na 138   Bicarb 35   Wt Readings from Last 1 Encounters:   02/15/22 69.4 kg (153 lb 1.6 oz)    Patient Chart [x]   Unable to Obtain []  Dry weight/UF Goal: 1000 ml    Heparin []  Bolus    Units    [] Hourly    Units    [x]None       Pre BP:   139/60   Pulse:  88   Respirations: 18   Temp:  97.3  [] Oral [] Axillary [] Esophageal   Labs: []  Pre  []  Post:   [x] N/A   Additional Orders(medications, blood products, hypotension management): [] Yes   [] No     [x]  [facility] Consent Verified     GRAFT/FISTULA ACCESS:   []N/A     []Right     [x]Left     [x]UE     []LE   []AVG   [x]AVF       [x]Medical Aseptic Prep Utilized   [x]No S/S infection  []Redness  []Drainage [] Cultured  [] Swelling  [] Pain  Bruit:   [x] Strong    [] Weak       Thrill :   [x] Strong    [] Weak     Needle Gauge: 15   Length: 1 inch   If access problem,  notified: []Yes     [x]N/A          GENERAL ASSESSMENT:    LUNGS:  Resp Rate 18   [x] Clear  [] Coarse  [] Crackles  [] Wheezing  [] Diminished         Respirations:  [x]Easy  []Labored  []N/A  Cough:  []Productive  []Dry  [x]N/A      Therapy:  [x]RA  O2 Type:  []NC  Mask: []  NRB    [] BiPaP  Flow:  l/min                   [] Ventilated  [] Intubated  [] Trach     CARDIAC: [x] Regular      [] Irregular   [] Rhythm:          [] Monitored   [] Bedside   [] Remotely monitored     EDEMA: [] None   [x]Generalized  [] Pitting [] 1+   [] 2 +   [] 3+    [] 4+  [] Pedal    SKIN:   [x] Warm  [] Hot     [] Cold   [x] Dry     [] Pale   [] Diaphoretic                  [] Flushed  [] Jaundiced  [] Cyanotic     LOC:    [x] Alert      [x]Oriented:    [x] Person     [x] Place  [x]Time               [] Confused  [] Lethargic  [] Medicated  [] Non-responsive  [] Non Verbal   GI / ABDOMEN:                     [] Flat [] Distended    [x] Soft    [] Firm   []  Obese                   [] Diarrhea   [] FMS [x] Bowel Sounds  [] Nausea  [] Vomiting                   [] NGT  [] OGT    [] PEG  [] Tube Feedings @     / URINE ASSESSMENT:                   [] Voiding  []  Burris  [x] Oliguria  [] Anuria                     [] Incontinent  []  Incontinent Brief   []  PureWick     PAIN:  [x] 0 []1  []2   []3   []4   []5   []6   []7   []8   []9   []10                MOBILITY:  [x] Bed    [] Stretcher      All Vitals and Treatment Details on Attached [address]: [facility]          Room # 613/11    [x] Routine         [] 1st Time Acute/Chronic   [] Urgent      [] Stat            [x] Acute Room   []  Bedside    [] ICU/CCU     [] ER   Isolation Precautions:  [x] Dialysis    There are currently no Active Isolations     ALLERGIES:     No Known Allergies   Code Status:  Full Code     Hepatitis Status      Lab Results   Component Value Date/Time    Hepatitis B surface Ag <0.10 02/15/2022 12:57 PM    Hepatitis B surface Ab <3.10 (L) 02/15/2022 12:57 PM    Hepatitis B core, IgM Negative 09/27/2020 04:33 AM    Hepatitis C virus Ab 0.15 09/27/2020 04:33 AM        Current Labs:      Lab Results   Component Value Date/Time    WBC 23.6 (H) 02/16/2022 12:39 AM    HGB 9.5 (L) 02/16/2022 12:39 AM    HCT 30.0 (L) 02/16/2022 12:39 AM    PLATELET 158 28/01/1046 12:39 AM    MCV 98.0 02/16/2022 12:39 AM     Lab Results   Component Value Date/Time    Sodium 141 02/16/2022 12:39 AM    Potassium 3.1 (L) 02/16/2022 12:39 AM    Chloride 106 02/16/2022 12:39 AM    CO2 25 02/16/2022 12:39 AM    Anion gap 10 02/16/2022 12:39 AM    Glucose 91 02/16/2022 12:39 AM    BUN 28 (H) 02/16/2022 12:39 AM    Creatinine 4.24 (H) 02/16/2022 12:39 AM    BUN/Creatinine ratio 7 (L) 02/16/2022 12:39 AM    GFR est AA 16 (L) 02/16/2022 12:39 AM    GFR est non-AA 13 (L) 02/16/2022 12:39 AM    Calcium 7.6 (L) 02/16/2022 12:39 AM          DIET:  DIET ADULT     PRIMARY NURSE REPORT:   Pre Dialysis: Erick Hastings RN    Time: 1380      EDUCATION:    [x] Patient           Knowledge Basis: [x]None []Minimal [] Substantial [] Unknown  Barriers to learning  []N/A  [] Intubated/Trached/Ventilated  [] Sedated/Paralyzed   [] Access Care     [] S&S of infection  [] Fluid Management  [] K+   [x] Procedural    [] Medications   [] Tx Options   [] Transplant   [] Diet      Teaching Tools:  [x] Explain  [] Demo  [] Handouts [] Video  Patient response: [] Verbalized understanding   [] Requires follow up        [x] Time Out/Safety Check    [x] Extracorporeal Circuit Tested for integrity       RO/HEMODIALYSIS MACHINE SAFETY CHECKS  Before each treatment:        [address] [place] [address] Dr                                    [x] Unit Machine # 9 with centralized RO                                    [] Portable Machine #1/RO serial # [id number]                                  [] Portable Machine #2/RO serial # [id number]                                  [] Portable Machine #4/RO serial # [id number]                                  [] Portable Machine #10/RO serial #  [id number]                                                                                                         Alarm Test:  Pass time 1000            [x] RO/Machine Log Complete    Machine Temp    36-37*C             Dialysate: pH 7.4    Conductivity: Meter 13.8   HD Machine  14     TCD: 13.9  Dialyzer Lot # [id number]     Blood Tubing Lot # [id number]     Saline Lot # [id number]     CHLORINE TESTING-Before each treatment and every 4 hours    Total Chlorine: [x] less than 0.1 ppm  Initial Time Check: 0900       4 Hr/2nd Check Time: 1300   (if greater than 0.1 ppm from Primary then every 30 minutes from Secondary)     TREATMENT INITIATION  with Dialysis Precautions:   [x] All Connections Secured              [x] Saline Line Double Clamped   [x] Venous Parameters Set               [x] Arterial Parameters Set    [x] Prime Given 250ml NSS              [x]Air Foam Detector Engaged Treatment Initiation Note:  Received  Patient in the unit, assessed and stable for treatment. LUE AVF Accessed with no signs or symptoms of complication. Treatment initiated      During Treatment Notes:  6020  Vascular access visible with arterial and venous line connections intact. Pt resting comfortably. 1045  Vascular access visible with arterial and venous line connections intact. Pt resting comfortably. 1100  Vascular access visible with arterial and venous line connections intact. Pt resting comfortably. 1115  Vascular access visible with arterial and venous line connections intact. Pt resting comfortably. 1145  Vascular access visible with arterial and venous line connections intact. Pt resting comfortably. 1200  Vascular access visible with arterial and venous line connections intact. Pt resting comfortably. 1215  Vascular access visible with arterial and venous line connections intact. Pt resting comfortably. 1230  Vascular access visible with arterial and venous line connections intact. Pt resting comfortably. 1245  Vascular access visible with arterial and venous line connections intact. Pt resting comfortably. 1300  Vascular access visible with arterial and venous line connections intact. Pt resting comfortably. 1310  Dialysis treatment complete.        Medication Dose Volume Route Time Darryl Nurse, Title      MONTSERRAT Zavala RN      HD  Deborah Damon RN     Post Assessment  Dialyzer Cleared:   [] Good  [] Fair  [] Poor  Blood processed:  65.4 L  UF Removed:  1000 Ml    Post /68   Pulse  95 Resp  18  Temp 97.8 Lungs: [x] Clear [] Course  [] Crackles                 []  Wheezing   [] Diminished   Post Tx Vascular Access: [] N/A  AVF/AVG: Bleeding stopped with  Arterial Pressure for 10 min   Venous Pressure for 10 min      Cardiac :[x] Regular   [] Irregular   Rhythm:  [] Monitored   [] Not Monitored    CVC Catheter: [x] N/A Edema:  [] None  [x] Generalized                     Skin:[x] Warm  [x] Dry [] Diaphoretic               [] Flushed  [] Pale [] Cyanotic Pain:  [x]0  []1 []2  []3 []4  []5  []6  []7 []8    []9  []10     Post Treatment Note:    Patient completed and  Tolerated 3 hrs of hemo dialysis. 1000 ml of  Fluid removed. Arterial and venous needles removed and pressure applied until bleeding stopped. Dry dressings applied. Bruit/Thrill present above and below dressings.       POST TREATMENT PRIMARY NURSE HANDOFF REPORT:   Post Dialysis: Jesus Joyce RN               Time:  1330       Abbreviations: AVG-arterial venous graft, AVF-arterial venous fistula, IJ-Internal Jugular, Subcl-Subclavian, Fem-Femoral, Tx-treatment, AP/HR-apical heart rate, VSS- Vital Signs Stable, CVC- Central Venous Catheter, DFR-dialysate flow rate, BFR-blood flow rate, AP-arterial pressure, -venous pressure, UF-ultrafiltrate, TMP-transmembrane pressure, Kyle-Venous, Art-Arterial, RO-Reverse Osmosis

## 2022-02-16 NOTE — ROUTINE PROCESS
Bedside and Verbal shift change report given to Donell Gallegos RN (oncoming nurse) by YOVANA Baptiste RN (offgoing nurse). Report included the following information SBAR, Kardex, MAR and Recent Results.

## 2022-02-16 NOTE — PROGRESS NOTES
ARU/IPR REFERRAL CONTACT NOTE  [id number] [place] [address] for Physical Rehabilitation    RE: Cony Avalos   Thank you for the opportunity to review this patient's case for admission to [address] [facility] for Physical Rehabilitation. Based on our pre-admission screening:     [x ] Our Team/Medical Director is following this case. Comments: Spoke with patients wife re: ARU. After changing her mind multiple times she has now declined ARU. She prefers to take him home under her care. I notified Dr Win Arango and Christa Anderson RN  re: this. Will sign off at this time. Again, Thank you for this referral. Should you have any questions please do not hesitate to call. Sincerely,  Azra Garcia. Matti Rios, [address]   Matti Rios RN  Admissions [facility] for Physical Rehabilitation  [phone]

## 2022-02-16 NOTE — PROGRESS NOTES
[facility] Hospitalist Group  Progress Note    Patient: Juan C Colon Age: 80 y.o. : 1935 MR#: [id number] SSN: [id number]  Date/Time: 2022     Subjective:     Patient seen and evaluated, lying in bed, no acute distress. 59-year-old -American male with a past medical history of end-stage renal disease on hemodialysis, prostate cancer with mets, hypertension, CAD, chronic indwelling Burris, anemia of chronic disease presents to the emergency room with altered mental status. ER evaluationpatient noted to have a UTI, started on broad-spectrum IV antibiotics. Concern for right-sided facial droop, neurology consulted and recommended MRI for further evaluation. MRI brain negative for acute CVA. ID consulted for persistent leukocytosis. Assessment/Plan:     1. Acute metabolic encephalopathylikely secondary to UTIcontinue IV antibiotics. 2. UTI with hematuriacontinue IV antibiotics, await urine culture reports. Urine culture growing Enterococcus faecalis, sensitivities to follow. 3. Leukocytosis likely underlying cancer. 4. Hematuriaper patient this is new, consulted urology since patient has underlying prostate cancer and is following up with them. 5. Right-sided facial droop concerning for CVAMRI brain negative for acute CVA. 6. ESRD on HDnephrology on board continue hemodialysis per schedule  7. History of prostate cancer with metson abiraterone / prednisone as out pt, restarted home medications. 8. 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 DVT prophylaxisHeparin  Full code    PT and OT eval, recommending rehab but patient's wife would like to take him home.   Continue current treatment plan, will follow          Kasi Knott MD  22      Case discussed with:  []Patient  []Family  [x]Nursing  []Case Management  DVT Prophylaxis:  []Lovenox  [x]Hep SQ []SCDs  []Coumadin   []On Heparin gtt    Objective:   VS:   Visit Vitals  BP (!) 158/68   Pulse 95   Temp 97.8 °F (36.6 °C)   Resp 18   Ht 5' 6\" (1.676 m)   Wt 69.4 kg (153 lb 1.6 oz)   SpO2 99%   BMI 24.71 kg/m²      Tmax/24hrs: Temp (24hrs), Av.5 °F (36.9 °C), Min:97.3 °F (36.3 °C), Max:99.2 °F (37.3 °C)  IOBRIEF    Intake/Output Summary (Last 24 hours) at 2022 1526  Last data filed at 2022 1321  Gross per 24 hour   Intake    Output 1650 ml   Net -1650 ml       General:  Awake , lying in bed   Pulmonary:  CTA Bilaterally. No Wheezing/Rhonchi/Rales. Cardiovascular: Regular rate and Rhythm. GI:  Soft, Non distended, Non tender. + Bowel sounds. Extremities:  No edema, cyanosis, clubbing. No calf tenderness. Neurologic: Alert and awake, lying in bed, able to move all extremities.   Additional:    Medications:   Current Facility-Administered Medications   Medication Dose Route Frequency    [START ON 2022] ampicillin-sulbactam (UNASYN) 3 g in 0.9% sodium chloride (MBP/ADV) 100 mL MBP  3 g IntraVENous Q24H    abiraterone tab 1,000 mg (Patient Supplied)  1,000 mg Oral DAILY    doxercalciferoL (HECTOROL) 4 mcg/2 mL injection 1 mcg  1 mcg IntraVENous DIALYSIS MON, WED & FRI    epoetin brina-epbx (RETACRIT) injection 4,000 Units  4,000 Units SubCUTAneous Q MON, WED & FRI    heparin (porcine) injection 5,000 Units  5,000 Units SubCUTAneous Q8H    aspirin (ASA) suppository 300 mg  300 mg Rectal DAILY    sodium chloride (NS) flush 5-10 mL  5-10 mL IntraVENous PRN    labetaloL (NORMODYNE;TRANDATE) 20 mg/4 mL (5 mg/mL) injection 5 mg  5 mg IntraVENous Q10MIN PRN    acetaminophen (TYLENOL) suppository 650 mg  650 mg Rectal Q6H PRN       Imaging:   XR Results (most recent):  Results from Hospital Encounter encounter on 22    XR CHEST PA LAT    Narrative  EXAM: Chest Radiographs    INDICATION:  ? opacities on portable, r/o pna    TECHNIQUE: PA and lateral views of the chest    COMPARISON: 2/13/2022, 10/21/2021, 11/13/2020    FINDINGS: No pneumothorax identified. There is a rounded opacity overlying the  right posterior fifth and sixth intercostal space. This is likely in the right  upper lobe anteriorly. Subtle groundglass changes are noted bilaterally. No  effusions appreciated. The cardiac silhouette is normal. The pulmonary  vascularity is unremarkable. Degenerative changes of the spine. Degenerative  changes of the shoulders are noted. Impression  1. Ovoid density in the right upper lobe likely near the anterior segment. Additional groundglass opacities. Consider further evaluation with CT. CT Results (most recent):  Results from Hospital Encounter encounter on 02/13/22    CTA HEAD NECK W CONT    Narrative  EXAM: CTA BRAIN/NECK  CPT CODE: 14574/74753    CLINICAL INDICATION/HISTORY: Left-sided facial droop. Code stroke    COMPARISON: None. TECHNIQUE: Helical CT scan of the brain/neck was performed at 1.25 mm intervals  during rapid IV bolus contrast administration. These data were reconstructed at  0.625 mm intervals for vascular analysis. The data was also reviewed at 2.5 mm  intervals for accompanying soft tissue analysis. Following this, image  reconstruction performed separately for the brain and neck on an independent  workstation with 3-D, maximum intensity projection (MIP) and shaded-volumetric  rendering of the major vessels was reviewed. Contrast: Isovue-370 - 100 mL IV;  uneventful administration. FINDINGS:    CTA BRAIN SOFT TISSUE ANALYSIS: Ventricular system, basilar cisterns and sulci  are appropriate for patient's stated age. No areas of mass effect, edema,  intra-/extra-axial hemorrhage or abnormal calcifications noted. Calvarium and  skull base are unremarkable. Extracranial bony and soft tissues are  unremarkable. CTA BRAIN VASCULAR ANALYSIS: Anterior circulation anatomy is balanced.   Atherosclerotic plaque in both carotid size and may result in at least mild  bilateral stenosis. There is a possible focal occlusion or very high-grade  stenosis at the proximal posteroinferior M2 branch of the right MCA. Relatively  normal flow enhancement is seen in the more distal vessels suggesting possible  collateral augmentation. Cisternal vessels are widely patent. Runoff vessels  otherwise appear normal.    The posterior fossa, the right vertebral artery is dominant. There is some  focal atherosclerotic plaque in the proximal intradural segment. There is  probably little significant stenosis on the right but there may be a focal  stenosis in the small vessel on the left. The more distal vessel is patent to  the basilar junction. There is a focal short segment stenosis in the distal  basilar artery just proximal cerebellar artery origins of about 70% diameter. There is normal origin of the major vertebrobasilar vessels except that the P1  segment of the right PCA is not well seen. The P2 portion is supplied from the  right ICA via a prominent right posterior communicating artery segment. There is a significant short segment stenosis in the proximal P2 portion of the  right PCA. The distal vessel is relatively normal.  In the left PCA, there are  several focal stenoses, one in the distal P1 segment and two tandem stenoses in  the proximal P2 segment. The more distal posterior cerebral circulation is  unremarkable, bilaterally. CTA NECK SOFT TISSUE ANALYSIS: The mucosal surfaces of the aerodigestive tract  are unremarkable. The salivary glands are normal.  The thyroid gland is  unremarkable. Images through the root of the neck, extending to the superior  mediastinum, and lung apices, are clear. There is no adenopathy seen. Normal  vascular enhancement is identified.   No osseous abnormalities are seen except  for multilevel spondylitic degenerative change in the cervical spine may result  in some significant spinal stenosis particularly at C4-5 and C5-6 where there is  moderate canal stenosis particularly at C5-6. CTA NECK VASCULAR ANALYSIS: The great vessels have a normal arrangement from the  aorta with widely patent ostia. Atherosclerotic plaque is seen at the origin of  both cervical internal carotid arteries resulting in mild-to-moderate, about 40%  diameter, origin stenosis. The common carotid arteries, cervical common carotid  bifurcations, and cervical internal carotid arteries are otherwise widely  patent, bilaterally. The vertebral arteries are widely patent with normal  subclavian origins. The right vertebral artery is dominant. NASCET criteria  were used. Impression  CTA Brain:  There is a possible focal occlusion or very high grade stenosis in  the proximal posterior inferior M2 branch of the right MCA. The more distal  vessels are patent suggesting some collateral augmentation. The remainder of  the anterior circulation is unremarkable except for mild atherosclerosis in the  carotid siphons with possible mild stenosis. In the posterior fossa, there is a dominant right vertebral artery there is some  focal atherosclerotic plaque in the proximal intradural segment with at least  mild stenosis. There is a significant short segment fusiform 70% diameter stenosis in the  distal basilar artery just proximal to the superior cerebellar artery origins. There is persistent fetal origin of the right PCA from the right ICA via a  prominent posterior communicating artery segment. There is a short segment significant stenosis in the proximal P2 portion of the  right PCA; the distal vessels is grossly normal.  There are several focal  stenoses in the left PCA including one in the P1 segment and two in the proximal  P2 segment. The distal vessel is well seen. No significant parenchymal abnormalities seen.     CTA Neck:  There is mild-to-moderate focal stenosis of about 40% diameter at the  origin of both cervical ICAs as result of atherosclerotic plaque at the  bifurcation. The remainder of the neck circulation is widely patent. The right  vertebral artery is dominant. No soft tissue abnormalities identified. The following preliminary report was provided by Dr. Adan Medina at the time of  the study.      ===================  Note: [facility] maintains that all CT scans at their facilities  are performed by using dose optimization technique as appropriate to a performed  examination, to include automated exposure control, adjustment of the mAs and/or  kVp according to patient size (including appropriate matching for site specific  examinations) or use of iterative reconstruction technique. Preliminary Report:    Possible focal occlusion vs. very high grade stenosis prox. post-inf M2 branch R  MCA. Flow in distal vessels suggests possible collateral augmentation. Additional focal stenoses in distal M1 L MCA, prox P2 R PCA and P1 L PCA. Mild-to-moderate stenosis both ICA origins. Karthik Castro M.D.      09/25/20    ECHO ADULT COMPLETE 09/26/2020 9/26/2020    Interpretation Summary  · LV: Calculated LVEF is 50%. Visually measured ejection fraction. Normal cavity size. Mildly increased wall thickness. Globally reduced systolic function. Low normal systolic function. Mild (grade 1) left ventricular diastolic dysfunction. · MV: Mild mitral valve regurgitation is present.     Signed by: Amie Ramirez DO on 9/26/2020 12:20 PM       Labs:    Recent Results (from the past 48 hour(s))   GLUCOSE, POC    Collection Time: 02/14/22  8:29 PM   Result Value Ref Range    Glucose (POC) 113 (H) 70 - 110 mg/dL   CBC WITH AUTOMATED DIFF    Collection Time: 02/15/22 12:37 AM   Result Value Ref Range    WBC 24.9 (H) 4.6 - 13.2 K/uL    RBC 3.16 (L) 4.35 - 5.65 M/uL    HGB 9.6 (L) 13.0 - 16.0 g/dL    HCT 30.5 (L) 36.0 - 48.0 %    MCV 96.5 78.0 - 100.0 FL    MCH 30.4 24.0 - 34.0 PG    MCHC 31.5 31.0 - 37.0 g/dL    RDW 12.6 11.6 - 14.5 %    PLATELET 376 930 - 358 K/uL    MPV 10.1 9.2 - 11.8 FL    NRBC 0.0 0  WBC    ABSOLUTE NRBC 0.00 0.00 - 0.01 K/uL    NEUTROPHILS 81 (H) 40 - 73 %    LYMPHOCYTES 11 (L) 21 - 52 %    MONOCYTES 3 3 - 10 %    EOSINOPHILS 4 0 - 5 %    BASOPHILS 1 0 - 2 %    IMMATURE GRANULOCYTES 0 0.0 - 0.5 %    ABS. NEUTROPHILS 20.2 (H) 1.8 - 8.0 K/UL    ABS. LYMPHOCYTES 2.7 0.9 - 3.6 K/UL    ABS. MONOCYTES 0.7 0.05 - 1.2 K/UL    ABS. EOSINOPHILS 1.0 (H) 0.0 - 0.4 K/UL    ABS. BASOPHILS 0.3 (H) 0.0 - 0.1 K/UL    ABS. IMM. GRANS. 0.0 0.00 - 0.04 K/UL    DF MANUAL      PLATELET COMMENTS ADEQUATE PLATELETS      RBC COMMENTS NORMOCYTIC, NORMOCHROMIC     HEP B SURFACE AG    Collection Time: 02/15/22 12:57 PM   Result Value Ref Range    Hepatitis B surface Ag <0.10 <1.00 Index    Hep B surface Ag Interp. Negative NEG     HEP B SURFACE AB    Collection Time: 02/15/22 12:57 PM   Result Value Ref Range    Hepatitis B surface Ab <3.10 (L) >10.0 mIU/mL    Hep B surface Ab Interp. Negative (A) POS      Hep B surface Ab comment        Samples with a  value of 10 mIU/mL or greater are considered positive (protective immunity) in accordance with the CDC guidelines. VANCOMYCIN, RANDOM    Collection Time: 02/16/22 12:39 AM   Result Value Ref Range    Vancomycin, random 16.8 5.0 - 40.0 UG/ML   CBC WITH AUTOMATED DIFF    Collection Time: 02/16/22 12:39 AM   Result Value Ref Range    WBC 23.6 (H) 4.6 - 13.2 K/uL    RBC 3.06 (L) 4.35 - 5.65 M/uL    HGB 9.5 (L) 13.0 - 16.0 g/dL    HCT 30.0 (L) 36.0 - 48.0 %    MCV 98.0 78.0 - 100.0 FL    MCH 31.0 24.0 - 34.0 PG    MCHC 31.7 31.0 - 37.0 g/dL    RDW 12.7 11.6 - 14.5 %    PLATELET 938 944 - 792 K/uL    MPV 10.1 9.2 - 11.8 FL    NRBC 0.0 0  WBC    ABSOLUTE NRBC 0.00 0.00 - 0.01 K/uL    NEUTROPHILS 74 (H) 40 - 73 %    LYMPHOCYTES 13 (L) 21 - 52 %    MONOCYTES 6 3 - 10 %    EOSINOPHILS 7 (H) 0 - 5 %    BASOPHILS 0 0 - 2 %    IMMATURE GRANULOCYTES 0 0.0 - 0.5 %    ABS.  NEUTROPHILS 17.4 (H) 1.8 - 8.0 K/UL    ABS. LYMPHOCYTES 3.1 0.9 - 3.6 K/UL    ABS. MONOCYTES 1.4 (H) 0.05 - 1.2 K/UL    ABS. EOSINOPHILS 1.7 (H) 0.0 - 0.4 K/UL    ABS. BASOPHILS 0.0 0.0 - 0.1 K/UL    ABS. IMM. GRANS. 0.0 0.00 - 0.04 K/UL    DF MANUAL      PLATELET COMMENTS ADEQUATE PLATELETS      RBC COMMENTS NORMOCYTIC, NORMOCHROMIC     METABOLIC PANEL, BASIC    Collection Time: 02/16/22 12:39 AM   Result Value Ref Range    Sodium 141 136 - 145 mmol/L    Potassium 3.1 (L) 3.5 - 5.5 mmol/L    Chloride 106 100 - 111 mmol/L    CO2 25 21 - 32 mmol/L    Anion gap 10 3.0 - 18 mmol/L    Glucose 91 74 - 99 mg/dL    BUN 28 (H) 7.0 - 18 MG/DL    Creatinine 4.24 (H) 0.6 - 1.3 MG/DL    BUN/Creatinine ratio 7 (L) 12 - 20      GFR est AA 16 (L) >60 ml/min/1.73m2    GFR est non-AA 13 (L) >60 ml/min/1.73m2    Calcium 7.6 (L) 8.5 - 10.1 MG/DL   PHOSPHORUS    Collection Time: 02/16/22 12:39 AM   Result Value Ref Range    Phosphorus 3.1 2.5 - 4.9 MG/DL       Signed By: Shirley Deal MD     February 16, 2022      I spent 25 minutes with the patient in face-to-face consultation, of which greater than 50% was spent in counseling and coordination of care as described above    Disclaimer: Sections of this note are dictated using utilizing voice recognition software. Minor typographical errors may be present. If questions arise, please do not hesitate to contact me or call our department.

## 2022-02-16 NOTE — PROGRESS NOTES
Problem: Dysphagia (Adult)  Goal: *Acute Goals and Plan of Care (Insert Text)  Description:     Patient will:  1. Tolerate PO trials with 0 s/s overt distress in 4/5 trials  2. Utilize compensatory swallow strategies/maneuvers (decrease bite/sip, size/rate, alt. liq/sol) with min cues in 4/5 trials    Recommend:   Easy to chew diet with thin liquids   Meds as tolerated   Aspiration precautions  HOB >45 degrees during all intake and for at least 30 min after intake  Small bites/sips, Feed slowly, alternating bites/sips   Oral care three times daily     Outcome: Progressing Towards Goal     SPEECH LANGUAGE PATHOLOGY DYSPHAGIA TREATMENT    Patient: Verner Soda (88 y.o. male)  Date: 2/16/2022  Diagnosis: AMS (altered mental status) [R41.82] <principal problem not specified>       Precautions: aspiration Fall  PLOF: As per H&P      ASSESSMENT:  Pt was seen at bedside for follow up dysphagia management. Pt tolerated reg solid, puree, and thin liquids +/- straw consecutive swallows without any overt s/sx of aspiration. Mastication was appropriate with timely a-p transit. Positive oral clearance observed across all trials. Pt safe for initiation of reg solid diet with thin liquids; however, pt's wife reported that she would like the pt to remain on easy to chew as the meat is \"too tough. \" Further rec aspiration precautions, oral care TID, and meds as tolerated. ST will continue to follow x 1-2 visits to ensure safety of diet tolerance. Progression toward goals:  [x]         Improving appropriately and progressing toward goals  []         Improving slowly and progressing toward goals  []         Not making progress toward goals and plan of care will be adjusted     PLAN:  Recommendations and Planned Interventions: See above  Patient continues to benefit from skilled intervention to address the above impairments. Continue treatment per established plan of care.   Discharge Recommendations:  None     SUBJECTIVE: Patient stated Whatever the boss says.  - referring to wife    OBJECTIVE:   Cognitive and Communication Status:  Neurologic State: Alert  Orientation Level: Oriented to person,Oriented to place  Cognition: Follows commands  Perception: Appears intact  Perseveration: No perseveration noted  Safety/Judgement: Fall prevention     Dysphagia Treatment: see above    PAIN:  Start of Tx: Pt c/o 101/10 heel pain  End of Tx: Pt c/o 10/10 heel pain     After treatment:   []              Patient left in no apparent distress sitting up in chair  [x]              Patient left in no apparent distress in bed  [x]              Call bell left within reach  [x]              Nursing notified  []              Family present  []              Caregiver present  []              Bed alarm activated    COMMUNICATION/EDUCATION:   [x] Aspiration precautions; swallow safety; compensatory techniques  [x]        Patient/family able to participate in training and education   []  Patient unable to participate in training and education, education ongoing with staff   [] Patient understands goals and intent of therapy; neutral about participation     Thank you for this referral.    Laura Rojas M.S. CCC-SLP/L  Speech-Language Pathologist

## 2022-02-16 NOTE — PROGRESS NOTES
PT treatment attempted at 1235. Pt currently off floor at dialysis. Will follow up.      Thank you for this referral.   Margarita Person PT DPT

## 2022-02-16 NOTE — PROGRESS NOTES
Progress Note    Josef Osler  80 y.o. Admit Date: 2/13/2022  Active Problems:    Essential (primary) hypertension (9/1/2016) POA: Yes      ESRD (end stage renal disease) ([facility] (9/26/2020) POA: Yes      Secondary hyperparathyroidism of renal origin ([facility] (9/26/2020) POA: Yes      Anemia (9/26/2020) POA: Yes      Hydronephrosis (9/29/2020) POA: Yes      UTI (urinary tract infection) (9/29/2020) POA: Yes      Leucocytosis (10/20/2021) POA: Yes      AMS (altered mental status) (2/13/2022) POA: Unknown      Bladder cancer ([facility] (2/16/2022) POA: Unknown            Subjective:    Seen Earlier & again during Dialysis, he does not offer any complain,says doing good, has blood stained Urine in Urinary bag. Urine Culture grew Enterococcal fecalis,was on Vancomycin, Now on Unasyn. ID will see. Urology's not noted       A comprehensive review of systems was negative except for that written in the History of Present Illness.     Objective:     Visit Vitals  BP (!) 158/68   Pulse 95   Temp 97.8 °F (36.6 °C)   Resp 18   Ht 5' 6\" (1.676 m)   Wt 69.4 kg (153 lb 1.6 oz)   SpO2 99%   BMI 24.71 kg/m²         Intake/Output Summary (Last 24 hours) at 2/16/2022 1633  Last data filed at 2/16/2022 1321  Gross per 24 hour   Intake    Output 1650 ml   Net -1650 ml       Current Facility-Administered Medications   Medication Dose Route Frequency Provider Last Rate Last Admin    [START ON 2/17/2022] ampicillin-sulbactam (UNASYN) 3 g in 0.9% sodium chloride (MBP/ADV) 100 mL MBP  3 g IntraVENous Q24H Justino Duffy MD        abiraterone tab 1,000 mg (Patient Supplied)  1,000 mg Oral DAILY Georgette Maxwell MD   1,000 mg at 02/16/22 0834    doxercalciferoL (HECTOROL) 4 mcg/2 mL injection 1 mcg  1 mcg IntraVENous DIALYSIS MON, WED & Desean Perales MD        epoetin brina-epbx (RETACRIT) injection 4,000 Units  4,000 Units SubCUTAneous Q MON, WED & Desean Perales MD   4,000 Units at 02/14/22 6230    heparin (porcine) injection 5,000 Units  5,000 Units SubCUTAneous Q8H Michell Gupta MD   5,000 Units at 02/16/22 0430    aspirin (ASA) suppository 300 mg  300 mg Rectal DAILY Herman Rg MD   300 mg at 02/16/22 0033    sodium chloride (NS) flush 5-10 mL  5-10 mL IntraVENous PRN Herman Rg MD        labetaloL (NORMODYNE;TRANDATE) 20 mg/4 mL (5 mg/mL) injection 5 mg  5 mg IntraVENous Q10MIN PRN Susan Sibley NP        acetaminophen (TYLENOL) suppository 650 mg  650 mg Rectal Q6H PRN Susan Sibley NP   650 mg at 02/16/22 1525        Physical Exam:     Physical Exam:   General:  Alert, cooperative, no distress, appears stated age. Neck: Supple, symmetrical, trachea midline, no adenopathy, thyroid: no enlargement/tenderness/nodules, no carotid bruit and no JVD. Lungs:   Clear to auscultation bilaterally. Heart:  Regular rate and rhythm, S1, S2 normal, no murmur, click, rub or gallop. Abdomen:   Soft, non-tender. Bowel sounds normal. No masses,  No organomegaly. Extremities: Extremities normal, atraumatic, no cyanosis or edema, AVF is tolerating Blood flow 350   Skin: Skin color, texture, turgor normal. No rashes or lesions         Data Review:    CBC w/Diff    Recent Labs     02/16/22  0039 02/15/22  0037 02/15/22  0037 02/14/22  0514 02/14/22  0514   WBC 23.6*  --  24.9*  --  27.1*   RBC 3.06*  --  3.16*  --  2.85*   HGB 9.5*  --  9.6*  --  8.9*   HCT 30.0*  --  30.5*  --  27.8*   MCV 98.0   < > 96.5   < > 97.5   MCH 31.0   < > 30.4   < > 31.2   MCHC 31.7   < > 31.5   < > 32.0   RDW 12.7   < > 12.6   < > 12.6    < > = values in this interval not displayed. Recent Labs     02/16/22  0039 02/15/22  0037 02/15/22  0037 02/14/22  0514 02/14/22  0514   MONOS 6  --  3  --  7   EOS 7*  --  4  --  3   BASOS 0   < > 1   < > 0   RDW 12.7   < > 12.6   < > 12.6    < > = values in this interval not displayed.         Comprehensive Metabolic Profile    Recent Labs     02/16/22 0039 02/14/22 0514    141   K 3.1* 3.7    107   CO2 25 24   BUN 28* 41*   CREA 4.24* 5.17*    Recent Labs     02/16/22  0039 02/14/22  0514   CA 7.6* 8.0*  8.1*   PHOS 3.1 3.3   ALB  --  2.7*   TP  --  6.7   TBILI  --  0.3                        Impression:       Active Hospital Problems    Diagnosis Date Noted    Bladder cancer ([facility] 02/16/2022    AMS (altered mental status) 02/13/2022    Leucocytosis 10/20/2021    Hydronephrosis 09/29/2020    UTI (urinary tract infection) 09/29/2020    ESRD (end stage renal disease) ([facility] 09/26/2020    Anemia 09/26/2020    Secondary hyperparathyroidism of renal origin ([facility] 09/26/2020    Essential (primary) hypertension 09/01/2016            Plan:     ON 3 K, 2.5 Ca bath, not Using any Heparin. . Retacrit as ordered. .  Patient Has Prostate cancer, Mets to Bones,liver, Possible Colon cancer, also has Bladder Cancer, recurrent Cystitis, UTI, on antibiotics, tolerates Dialysis well  & wants to continue Dialysis.       Nazario Sanchez MD

## 2022-02-16 NOTE — PROGRESS NOTES
[address] [facility] Pharmacokinetic Monitoring Service - Vancomycin    Consulting Provider: Dr. Andrea Contreras  Indication: Sepsis   Target Concentration: Pre-Dialysis Concentration 15-20 mg/L  Day of Therapy: 4  Additional Antimicrobials: Zosyn    Pertinent Laboratory Values: Wt Readings from Last 1 Encounters:   02/15/22 69.4 kg (153 lb 1.6 oz)     Temp Readings from Last 1 Encounters:   02/16/22 99 °F (37.2 °C)     No components found for: PROCAL  Recent Labs     02/16/22  0039 02/15/22  0037   WBC 23.6* 24.9*       Pertinent Cultures:  Culture Date Source Results   2/13 2/13 Blood    Urine No growth x 3 days    E Faecalis    MRSA Nasal Swab: N/A. Non-respiratory infection. Assessment:  Date/Time Current Dose Concentration Dialysis Session   2/16 750 mg  --- MWF     Plan:  1. Concentration-guided dosing due to renal impairment and intermittent hemodialysis. 1. Current dosing regimen - by levels with hemodialysis. 2. Dose of Vancomycin 750 mg x 1 to be infused over last 60 minutes of hemodialysis today. 3. Vancomycin concentration ordered for 2/18/22 @ 0400, prior to HD session. 4. Pharmacy will continue to monitor patient and adjust therapy as indicated. Thank you for the consult,  Aaron Downing.  Jackie Snow  2/16/2022 8:20 AM

## 2022-02-16 NOTE — CONSULTS
Infectious Disease Consultation Note        Reason: sepsis    Current abx Prior abx   Zosyn, vancomycin since 2/16/22      Lines:       Assessment :    80 y.o.  male with PMH of end-stage renal disease, prostate cancer with metastases, hypertension, CAD, chronic indwelling Burris catheter, anemia, who presented to [facility] on 2/13/22 with altered mental status.        Now with significant leukocytosis, hematuria, positive urine culture, altered mental status on admission     I agree that clinical presentation is concerning for sepsis. However after obtaining detailed history, exam, review of available labs/imaging studies, clinical suspicion of sepsis is low. I believe patient's leukocytosis is likely leukemoid reaction to  metastatic disease, hemorrhagic cyst        persistent leukocytosis despite broad-spectrum antibiotics, clinical improvement argues against infectious etiology of leukocytosis    Hemorrhagic cystitis -urine culture 2/13 : Greater than 2000 because of Enterococcus suggestive of Enterococcus cystitis. Persistent hematuria despite appropriate antibiotics could be secondary to cystitis versus underlying prostate cancer. ESRD: on HD        Recommendations:     1. Discontinue piperacillin/tazobactam, vancomycin. Start IV ampicillin/sulbactam  2. Obtain urology consult for persistent hematuria  3. Follow-up nephrology recommendations  4. We will switch to oral antibiotics once cleared for discharge by urology      Thank you for consultation request. Above plan was discussed in details with patient,  and primary team. Please call me if any further questions or concerns. Will continue to participate in the care of this patient. HPI:    80 y.o.  male with PMH of end-stage renal disease, prostate cancer with metastases, hypertension, CAD, chronic indwelling Burris catheter, anemia, who presented to [facility] on 2/13/22 with altered mental status.     I obtained history by talking to patient, review of records. Patient states that he had noted blood in the urine for couple days prior to admission. He denies any subjective fever or chills at home. Apparently patient was noted to have altered mental status for couple days prior to admission. Hence brought to emergency room on 2/13/2022. Patient was noted to have significant leukocytosis. Started on broad-spectrum antibiotics. Patient continues to have persistent leukocytosis. Urine culture positive for Enterococcus. I have been consulted for further recommendations. Patient states that he feels fine. Denies any abdominal pain/flank pain. Denies subjective fever, chills. States that he noted blood in the urine couple days prior to admission. His Burris catheter has been changed since admission per him. Past Medical History:   Diagnosis Date    Cancer [facility])     Prostate    Cardiac echocardiogram 08/29/2016    EF 60-65%. No WMA. Mild LVH. Indeterminate diastolic fx. RVSP 35 mmHg. No significant valvular heart disease.  Cardiac nuclear imaging test, abnormal 08/29/2016    Intermediate risk. Previous inferior infarction w/very mild fabiana-infarct ischemia. Inferior hypk. EF 68%. Nondiagnostic EKG on pharm stress test.  Pt's BP increased from 193/96 to 207/83 during study.  Carotid duplex 08/30/2016    Mild <50% bilateral ICA stenosis.       Chronic kidney disease     on HD MWF ad [facility]    Colonic mass     per Dr Yue Patiño notes    Enlarged prostate     Hypertension        Past Surgical History:   Procedure Laterality Date    COLONOSCOPY N/A 10/1/2020    COLONOSCOPY, b'x, w tattoo w polypectomy performed by Kelly Hurst MD at [facility]  10/1/2020         Nancy Zurita  10/1/2020         COLONOSCPY,FLEX,W/ [facility]  10/1/2020         SD INSJ TUNNELED CVC W/O SUBQ PORT/ AGE 5 YR/> N/A 9/28/2020    INSERTION TUNNELED CENTRAL VENOUS CATHETER performed by Avelina Holstein, MD at [facility]   Susana Millard UNLIST      dialysis access- right neck       Current Discharge Medication List      CONTINUE these medications which have NOT CHANGED    Details   predniSONE (DELTASONE) 5 mg tablet TAKE 1 TABLET BY MOUTH 2 TIMES A DAY  Qty: 60 Tablet, Refills: 4    Associated Diagnoses: Malignant neoplasm of prostate ([facility] [address] Osseous metastasis ([facility] [address]      ! ! abiraterone 250 mg tab TAKE 4 TABLETS BY MOUTH 1 TIME A DAY ON AN EMPTY STOMACH. TAKE ONE HOUR PRIOR TO FOOD OR TWO HOURS AFTER FOOD. SWALLOW TABLETS WHOLE WITH WATER AND DO NOT CRUSH OR CHEW TABLETS. Qty: 120 Tablet, Refills: 4      acetaminophen (TYLENOL) 500 mg tablet Take 500 mg by mouth every six (6) hours as needed for Pain.      pantoprazole (PROTONIX) 40 mg tablet Take 1 Tablet by mouth Before breakfast and dinner. Qty: 60 Tablet, Refills: 0      amLODIPine (NORVASC) 10 mg tablet TAKE 1 TABLET BY MOUTH EVERY DAY      calcium acetate,phosphat bind, (PHOSLO) 667 mg cap Take 1 Capsule by mouth daily. !! abiraterone (Zytiga) 250 mg tab Take four tablets by mouth daily on an empty stomach. Take one hour prior to food or two hours after food. Tablets should be swallowed whole with water. Do not crush or chew tablets. Qty: 120 Tab, Refills: 5    Associated Diagnoses: Malignant neoplasm of prostate ([facility] [address] Osseous metastasis ([facility])      B complex w-C no.20/folic acid (TRIPHROCAPS PO) Take 1 Cap by mouth daily. !! - Potential duplicate medications found. Please discuss with provider.           Current Facility-Administered Medications   Medication Dose Route Frequency    vancomycin (VANCOCIN) 750 mg in 0.9% sodium chloride 250 mL (VIAL-MATE)  750 mg IntraVENous DIALYSIS ONE TIME DOSE    abiraterone tab 1,000 mg (Patient Supplied)  1,000 mg Oral DAILY    doxercalciferoL (HECTOROL) 4 mcg/2 mL injection 1 mcg  1 mcg IntraVENous DIALYSIS MON, WED & FRI    epoetin brina-epbx (RETACRIT) injection 4,000 Units  4,000 Units SubCUTAneous Q MON, WED & FRI    heparin (porcine) injection 5,000 Units  5,000 Units SubCUTAneous Q8H    aspirin (ASA) suppository 300 mg  300 mg Rectal DAILY    sodium chloride (NS) flush 5-10 mL  5-10 mL IntraVENous PRN    piperacillin-tazobactam (ZOSYN) 4.5 g in 0.9% sodium chloride (MBP/ADV) 100 mL MBP  4.5 g IntraVENous Q12H    VANCOMYCIN INFORMATION NOTE   Other Rx Dosing/Monitoring    labetaloL (NORMODYNE;TRANDATE) 20 mg/4 mL (5 mg/mL) injection 5 mg  5 mg IntraVENous Q10MIN PRN    acetaminophen (TYLENOL) suppository 650 mg  650 mg Rectal Q6H PRN       Allergies: Patient has no known allergies. History reviewed. No pertinent family history. Social History     Socioeconomic History    Marital status:      Spouse name: Not on file    Number of children: Not on file    Years of education: Not on file    Highest education level: Not on file   Occupational History    Not on file   Tobacco Use    Smoking status: Never Smoker    Smokeless tobacco: Never Used   Vaping Use    Vaping Use: Never used   Substance and Sexual Activity    Alcohol use: No    Drug use: Never    Sexual activity: Not on file   Other Topics Concern    Not on file   Social History Narrative    Not on file     Social Determinants of Health     Financial Resource Strain:     Difficulty of Paying Living Expenses: Not on file   Food Insecurity:     Worried About Running Out of Food in the Last Year: Not on file    Kaylynn of Food in the Last Year: Not on file   Transportation Needs:     Lack of Transportation (Medical): Not on file    Lack of Transportation (Non-Medical):  Not on file   Physical Activity:     Days of Exercise per Week: Not on file    Minutes of Exercise per Session: Not on file   Stress:     Feeling of Stress : Not on file   Social Connections:     Frequency of Communication with Friends and Family: Not on file    Frequency of Social Gatherings with Friends and Family: Not on file    Attends Congregation Services: Not on file    Active Member of Clubs or Organizations: Not on file    Attends Club or Organization Meetings: Not on file    Marital Status: Not on file   Intimate Partner Violence:     Fear of Current or Ex-Partner: Not on file    Emotionally Abused: Not on file    Physically Abused: Not on file    Sexually Abused: Not on file   Housing Stability:     Unable to Pay for Housing in the Last Year: Not on file    Number of [place] in the Last Year: Not on file    Unstable Housing in the Last Year: Not on file     Social History     Tobacco Use   Smoking Status Never Smoker   Smokeless Tobacco Never Used        Temp (24hrs), Av.5 °F (36.9 °C), Min:97.3 °F (36.3 °C), Max:99.2 °F (37.3 °C)    Visit Vitals  BP (!) 158/68   Pulse 95   Temp 97.8 °F (36.6 °C)   Resp 18   Ht 5' 6\" (1.676 m)   Wt 69.4 kg (153 lb 1.6 oz)   SpO2 99%   BMI 24.71 kg/m²       ROS: 12 point ROS obtained in details. Pertinent positives as mentioned in HPI,   otherwise negative    Physical Exam:    General:          Alert, in NAD  HEENT:          Sclera anicteric.  Conjunctiva pink. Mucous membranes                          Moist, no ear or nasal discharge; right sided facial droop present  Neck:               Supple.  Trachea midline.  No accessory muscle use. CV:                  Regular rate and rhythm. S1S2+  Lungs:             Clear to auscultation bilaterally.  No Wheezing or Rhonchi. No rales. Abdomen:        Soft, non-tender. Not distended.  Bowel sounds normal; Burris catheter in place draining blood-tinged urine  Extremities:     No cyanosis.  No edema. LUE fistula without erythema  Neurologic:      Alert and oriented X person and place only not to time or situation.  Follows commands, strength appears grossly equal.  Skin:                Warm and dry.  No rashes.      Labs: Results:   Chemistry Recent Labs     22  0039 22  0514   GLU 91 71*  141   K 3.1* 3.7    107   CO2 25 24   BUN 28* 41*   CREA 4.24* 5.17*   CA 7.6* 8.0*  8.1*   AGAP 10 10   BUCR 7* 8*   AP  --  142*   TP  --  6.7   ALB  --  2.7*   GLOB  --  4.0   AGRAT  --  0.7*      CBC w/Diff Recent Labs     02/16/22  0039 02/15/22  0037 02/14/22  0514   WBC 23.6* 24.9* 27.1*   RBC 3.06* 3.16* 2.85*   HGB 9.5* 9.6* 8.9*   HCT 30.0* 30.5* 27.8*    358 312   GRANS 74* 81* 84*   LYMPH 13* 11* 6*   EOS 7* 4 3      Microbiology No results for input(s): CULT in the last 72 hours. RADIOLOGY:    All available imaging studies/reports in [facility] care for this admission were reviewed      Disclaimer: Sections of this note are dictated utilizing voice recognition software, which may have resulted in some phonetic based errors in grammar and contents. Even though attempts were made to correct all the mistakes, some may have been missed, and remained in the body of the document. If questions arise, please contact our department.     Dr. Ileana Potter, Infectious Disease Specialist  [phone]  February 16, 2022  2:40 PM

## 2022-02-17 NOTE — PROGRESS NOTES
Problem: Dysphagia (Adult)  Goal: *Acute Goals and Plan of Care (Insert Text)  Description: Patient will:  1. Tolerate PO trials with 0 s/s overt distress in 4/5 trials  2. Utilize compensatory swallow strategies/maneuvers (decrease bite/sip, size/rate, alt. liq/sol) with min cues in 4/5 trials    Recommend:   Regular diet with thin liquids   Meds as tolerated   Aspiration precautions  HOB >45 degrees during all intake and for at least 30 min after intake  Small bites/sips, Feed slowly, alternating bites/sips   Oral care three times daily     Outcome: Resolved/Met   [id number] [facility] TREATMENT & DISCHARGE    Patient: Ritesh Jimenez (58 y.o. male)  Date: 2/17/2022  Diagnosis: AMS (altered mental status) [R41.82] <principal problem not specified>       Precautions: Aspiration; Fall  PLOF: as per H&P    ASSESSMENT:  Patient seen on this date. Wife at bedside. Patient 45 degrees in chair. Patient A&Ox4. PO trials consumed: thin via straw, easy to chew, and regular. Oral dysphagia appears resolved with denture placement. Exhibited adequate bolus cohesion, manipulation and A-P transit. Further exhibited adequate swallow timing/reflex and hyolaryngeal excursion. Pt able to manipulate and clear with 0 clinical s/s aspiration and/or oropharyngeal dysphagia. Pt safe for regular solid, thin liquid diet. 0 further ST needs for dysphagia indicated at this time. SLP educated pt on role of speech therapist in current setting with re: speech/swallow; verbalized comprehension. SLP available for re-evaluation if indicated by MD. Results d/w RN. Progression toward goals:  [x]         Improving appropriately - goals met/approximated  []         Not making progress/Not appropriate - will d/c POC     PLAN:  Recommendations and Planned Interventions:  Maximum therapeutic gains met; safest, least restrictive diet achieved. D/C ST intervention at this time. Discharge Recommendations:   To Be Determined SUBJECTIVE:   Patient stated i'm fine. OBJECTIVE:   Cognitive and Communication Status:  Neurologic State: Alert  Orientation Level: Oriented to person,Oriented to place  Cognition: Follows commands,Recognition of people/places  Perception: Appears intact  Perseveration: No perseveration noted  Safety/Judgement: Fall prevention  Dysphagia Treatment:  Oral Assessment:  Oral Assessment  Labial: No impairment  Dentition: Natural  Oral Hygiene: Good  Lingual: No impairment  Velum: No impairment  Mandible: No impairment  P.O. Trials:   Patient Position: [facility] at [facility]   Vocal quality prior to P.O.: Low volume   Consistency Presented: Thin liquid,Solid   How Presented: Self-fed/presented,Straw   Bolus Acceptance: No impairment   Bolus Formation/Control: No impairment   Type of Impairment: Mastication,Delayed   Propulsion: No impairment   Oral Residue: None   Initiation of Swallow: No impairment   Laryngeal Elevation: Functional   Aspiration Signs/Symptoms: None   Pharyngeal Phase Characteristics: No impairment, issues, or problems    Effective Modifications: None   Cues for Modifications: None   Oral Phase Severity: No impairment   Pharyngeal Phase Severity : No impairment    PAIN:  Pain level pre-treatment: 0/10   Pain level post-treatment: 0/10       After treatment:   [x]              Patient left in no apparent distress sitting up in chair  []              Patient left in no apparent distress in bed  [x]              Call bell left within reach  [x]              Nursing notified  [x]              Caregiver present  []              Bed alarm activated      COMMUNICATION/EDUCATION:   [x] Aspiration precautions; swallow safety; compensatory techniques  []        Patient unable to participate in education; education ongoing with staff  []  Posted safety precautions in patient's room.   [] Oral-motor/laryngeal strengthening exercises    Thank you for this referral,  Tate Young, SLP-CFY

## 2022-02-17 NOTE — PROGRESS NOTES
Problem: Mobility Impaired (Adult and Pediatric)  Goal: *Acute Goals and Plan of Care (Insert Text)  Description: Physical Therapy Goals  Initiated 2/14/2022 and to be accomplished within 7 day(s)  1. Patient will move from supine to sit and sit to supine in bed with supervision/set-up. 2.  Patient will transfer from bed to chair and chair to bed with supervision/set-up using the least restrictive device. 3.  Patient will perform sit to stand with supervision/set-up. 4.  Patient will ambulate with supervision/set-up for 50 feet with the least restrictive device. 5.  Patient will ascend/descend 2 stairs with handrail(s) with minimal assistance/contact guard assist.    PLOF: Amb with ww in home. Wife supervises. One story home with 2 steps to enter. Outcome: Progressing Towards Goal    PHYSICAL THERAPY TREATMENT    Patient: Cyndi Dillon (72 y.o. male)  Date: 2/17/2022  Diagnosis: AMS (altered mental status) [R41.82] <principal problem not specified>       Precautions: Fall      ASSESSMENT:  Patient demonstrates improved ability to transfer into standing today. Min A sit  to stand transfer to RW. He ambulates to the restroom and stands up at the sink with UE's propped for balance. He continues to present with forward flexed, kyphotic posture. Safety cues to back up all the way to the chair and cues to reach arms back to control descent. Patient sitting up in the chair with call bell in reach and wife at bedside. Progression toward goals:   []      Improving appropriately and progressing toward goals  [x]      Improving slowly and progressing toward goals  []      Not making progress toward goals and plan of care will be adjusted     PLAN:  Patient continues to benefit from skilled intervention to address the above impairments. Continue treatment per established plan of care.   Discharge Recommendations:  Rehab  Further Equipment Recommendations for Discharge:  bedside commode, shower chair, and rolling walker     SUBJECTIVE:   Patient stated Jeet Oneil said I can go home tomorrow.     OBJECTIVE DATA SUMMARY:   Critical Behavior:  Neurologic State: Alert  Orientation Level: Oriented to person,Oriented to place  Cognition: Follows commands,Recognition of people/places  Safety/Judgement: Fall prevention  Functional Mobility Training:  Bed Mobility:     Supine to Sit: Minimum assistance               Transfers:  Sit to Stand: Minimum assistance  Stand to Sit: Minimum assistance                   Balance:  Sitting: Intact  Standing: Impaired; With support  Standing - Static: Fair  Standing - Dynamic : Fair               Ambulation/Gait Training:  Distance (ft): 10 Feet (ft)  Assistive Device: Walker, rolling  Ambulation - Level of Assistance: Minimal assistance        Gait Abnormalities: Decreased step clearance        Base of Support: Narrowed; Center of gravity altered     Speed/Hanny: Slow;Shuffled  Step Length: Right shortened;Left shortened                               Pain:  Pain level pre-treatment: 0/10  Pain level post-treatment: 0/10   Pain Intervention(s): Medication (see MAR); Rest, Ice, Repositioning   Response to intervention: Nurse notified, See doc flow    Activity Tolerance:   Fair  Please refer to the flowsheet for vital signs taken during this treatment. After treatment:   [x] Patient left in no apparent distress sitting up in chair  [] Patient left in no apparent distress in bed  [x] Call bell left within reach  [x] Nursing notified  [] Caregiver present  [] Bed alarm activated  [] SCDs applied      COMMUNICATION/EDUCATION:   []         Role of Physical Therapy in the acute care setting. [x]         Fall prevention education was provided and the patient/caregiver indicated understanding. [x]         Patient/family have participated as able in working toward goals and plan of care. [x]         Patient/family agree to work toward stated goals and plan of care.   []         Patient understands intent and goals of therapy, but is neutral about his/her participation.   []         Patient is unable to participate in stated goals/plan of care: ongoing with therapy staff.  []         Other:        Gian Booth, PT   Time Calculation: 17 mins

## 2022-02-17 NOTE — PROGRESS NOTES
2225 [address]ris irrigated with 120ml sterile water per orders    0630 [address]ris irrigated with 120ml sterile water. Tolerated fairly. Incontinence care provided. Patient : Margret Lance Age: 23 year old Sex: female   MRN: [id number] Encounter Date: 8/23/2021      History     Chief Complaint   Patient presents with   • Abdominal Pain   • Vaginal Bleeding     HPI    23-year-old female reports emergency department complaint of sudden onset left lower quadrant pain vaginal bleeding that began this morning.  Reports that she has had no similar symptoms in the past.  Denies dysuria or urinary complaints.  Denies constipation or diarrhea.  Reports 2 episodes of emesis secondary to her pain this morning.  Reports that she is a type 1 diabetic and her blood sugar was 166 this morning.  She denies any recent dysuria or urinary complaints.  Reports last menstrual period was 2 months ago and she regularly has a menstrual period every 3 months.  She does not feel as though her current symptoms are similar to menstrual cramps.  She describes the sensation as a sharp stabbing pain in the left pelvic region.    Allergies   Allergen Reactions   • Unknown Other (See Comments)     Patient states that she does not do well with steroid         There are no discharge medications for this patient.      No past medical history on file.    No past surgical history on file.    No family history on file.    Social History     Tobacco Use   • Smoking status: Not on file   Substance Use Topics   • Alcohol use: Not on file   • Drug use: Not on file       No existing history information found.  No existing history information found.    Review of Systems   Constitutional: Negative.  Negative for activity change, appetite change, chills, diaphoresis, fatigue, fever and unexpected weight change.   Respiratory: Negative for cough, chest tightness and shortness of breath.    Cardiovascular: Negative.    Gastrointestinal: Positive for abdominal pain, nausea and vomiting. Negative for abdominal distention and diarrhea.   Genitourinary: Positive for vaginal bleeding. Negative for dysuria, flank pain, frequency and  hematuria.   Musculoskeletal: Negative for back pain, gait problem, neck pain and neck stiffness.   Neurological: Negative for dizziness, syncope, weakness, light-headedness, numbness and headaches.   All other systems reviewed and are negative.      Physical Exam     ED Triage Vitals [08/23/21 0908]   ED Triage Vitals Group      Temp 97.6 °F (36.4 °C)      Heart Rate 79      Resp 18      /74      SpO2 98 %      EtCO2 mmHg       Height 5' 3\" (1.6 m)      Weight 193 lb 12.6 oz (87.9 kg)      Weight Scale Used Standing scale      BMI (Calculated) 34.33      IBW/kg (Calculated) 52.4       Physical Exam  Vitals and nursing note reviewed.   Constitutional:       General: She is not in acute distress.     Appearance: Normal appearance. She is well-developed. She is not ill-appearing, toxic-appearing or diaphoretic.   HENT:      Head: Normocephalic and atraumatic.   Cardiovascular:      Rate and Rhythm: Normal rate and regular rhythm.      Heart sounds: Normal heart sounds. No murmur heard.     Pulmonary:      Effort: Pulmonary effort is normal. No respiratory distress.      Breath sounds: Normal breath sounds. No wheezing.   Abdominal:      General: Bowel sounds are normal. There is no distension.      Palpations: Abdomen is soft. There is no mass.      Tenderness: There is no abdominal tenderness. There is no right CVA tenderness, left CVA tenderness, guarding or rebound.      Hernia: No hernia is present.      Comments: Reporting left lower quadrant abdominal pain however no significant tenderness appreciated on my examination.  No rebound tenderness.  No guarding.   Genitourinary:     General: Normal vulva.      Labia:         Right: No rash or lesion.         Left: No rash or lesion.       Vagina: Normal. No signs of injury. No vaginal discharge, erythema or bleeding.      Cervix: Cervical bleeding present. No discharge, friability, lesion, erythema or eversion.      Uterus: Normal.    Skin:     General: Skin is  warm.   Neurological:      Mental Status: She is alert.   Psychiatric:         Behavior: Behavior normal.         ED Course     Procedures    Lab Results     Results for orders placed or performed during the hospital encounter of 08/23/21   Urinalysis With Microscopy & Culture If Indicated   Result Value Ref Range    COLOR, URINALYSIS Yellow     APPEARANCE, URINALYSIS Cloudy     GLUCOSE, URINALYSIS >1000 (A) Negative mg/dL    BILIRUBIN, URINALYSIS Negative Negative    KETONES, URINALYSIS 40  (A) Negative mg/dL    SPECIFIC GRAVITY, URINALYSIS >1.030 (H) 1.005 - 1.030    OCCULT BLOOD, URINALYSIS Large (A) Negative    PH, URINALYSIS 5.5 5.0 - 7.0    PROTEIN, URINALYSIS 30  (A) Negative mg/dL    UROBILINOGEN, URINALYSIS 0.2 0.2, 1.0 mg/dL    NITRITE, URINALYSIS Negative Negative    LEUKOCYTE ESTERASE, URINALYSIS Negative Negative    SQUAMOUS EPITHELIAL, URINALYSIS None Seen None Seen, 1 to 5 /hpf    ERYTHROCYTES, URINALYSIS 26 to 100 (A) None Seen, 1 to 2 /hpf    LEUKOCYTES, URINALYSIS None Seen None Seen, 1 to 5 /hpf    BACTERIA, URINALYSIS Few (A) None Seen /hpf    HYALINE CASTS, URINALYSIS None Seen None Seen, 1 to 5 /lpf    AMORPHOUS MATERIAL Present     MUCUS Present    Comprehensive Metabolic Panel   Result Value Ref Range    Fasting Status      Sodium 140 135 - 145 mmol/L    Potassium 3.3 (L) 3.4 - 5.1 mmol/L    Chloride 104 98 - 107 mmol/L    Carbon Dioxide 24 21 - 32 mmol/L    Anion Gap 15 10 - 20 mmol/L    Glucose 78 65 - 99 mg/dL    BUN 9 6 - 20 mg/dL    Creatinine 0.46 (L) 0.51 - 0.95 mg/dL    Glomerular Filtration Rate >90 >90 mL/min/1.73m2    BUN/ Creatinine Ratio 20 7 - 25    Calcium 9.1 8.4 - 10.2 mg/dL    Bilirubin, Total 0.3 0.2 - 1.0 mg/dL    GOT/AST 23 <=37 Units/L    GPT/ALT 38 <64 Units/L    Alkaline Phosphatase 116 45 - 117 Units/L    Albumin 3.3 (L) 3.6 - 5.1 g/dL    Protein, Total 7.7 6.4 - 8.2 g/dL    Globulin 4.4 (H) 2.0 - 4.0 g/dL    A/G Ratio 0.8 (L) 1.0 - 2.4   CBC with Automated  Differential (performable only)   Result Value Ref Range    WBC 8.3 4.2 - 11.0 K/mcL    RBC 4.40 4.00 - 5.20 mil/mcL    HGB 13.7 12.0 - 15.5 g/dL    HCT 39.3 36.0 - 46.5 %    MCV 89.3 78.0 - 100.0 fl    MCH 31.1 26.0 - 34.0 pg    MCHC 34.9 32.0 - 36.5 g/dL    RDW-CV 12.3 11.0 - 15.0 %    RDW-SD 40.5 39.0 - 50.0 fL     140 - 450 K/mcL    NRBC 0 <=0 /100 WBC    Neutrophil, Percent 57 %    Lymphocytes, Percent 34 %    Mono, Percent 6 %    Eosinophils, Percent 1 %    Basophils, Percent 1 %    Immature Granulocytes 1 %    Absolute Neutrophils 4.9 1.8 - 7.7 K/mcL    Absolute Lymphocytes 2.8 1.0 - 4.8 K/mcL    Absolute Monocytes 0.5 0.3 - 0.9 K/mcL    Absolute Eosinophils  0.1 0.0 - 0.5 K/mcL    Absolute Basophils 0.0 0.0 - 0.3 K/mcL    Absolute Immmature Granulocytes 0.1 0.0 - 0.2 K/mcL   WET MOUNT   Result Value Ref Range    CLUE CELLS, WET MOUNT None Seen None Seen    TRICHOMONAS, WET MOUNT None Seen None Seen    YEAST, WET MOUNT None Seen None Seen   HCG POC   Result Value Ref Range    HCG, URINE - POINT OF CARE Negative Negative           Radiology Results     Imaging Results          US Pelvis Non OB and Duplex Complete (Final result)  Result time 08/23/21 10:16:46   Procedure changed from US Pelvis Complete Non OB     Final result                 Impression:    IMPRESSION:   1. No focal abnormalities of the uterus or endometrium.  2. Normal imaging and color flow in the bilateral ovaries.               Narrative:    EXAM: US PELVIS NON-OB TRANSABD AND TRANSVAG AND DUPLEX COMPLETE    HISTORY: left pelvic pain, vaginal bleed    COMPARISON: None.    TECHNIQUE: Real-time transabdominal and endovaginal scanning was performed  by the sonography technologist. Static and color Doppler images are  reviewed.    FINDINGS:     The uterus is anteverted and measures 7.2 x 4.3 x 2.9 cm. No myometrial or  endometrial abnormalities are identified.     The endometrial stripe measures 2.4 mm.     RIGHT OVARY: The right ovary  measures 2.4 x 2.3 x 1.7 cm. There is normal  color flow and venous/arterial waveforms.    LEFT OVARY: The left ovary measures 2.6 x 2.6 x 1.9 cm. There is normal  color flow and arterial/venous waveforms.    There is no free fluid.                                 ED Medication Orders (From admission, onward)    Ordered Start     Status Ordering Provider    08/23/21 0922 08/23/21 0923  sodium chloride (NORMAL SALINE) 0.9 % bolus 500 mL  ONCE      Last MAR action: Completed BRITTANY NEW BRISEIDA    08/23/21 0922 08/23/21 0923  ondansetron (ZOFRAN) injection 4 mg  ONCE      Last MAR action: Given BRITTANY NEW BRISEIDA    08/23/21 0922 08/23/21 0923  morphine injection 4 mg  ONCE      Last MAR action: Given BRITTANY NEW    Is well-appearing 23-year-old female reporting to the emergency department with history and symptoms as dictated above.  She had relatively reassuring physical examination today.  Her chief complaint is left lower quadrant abdominal pain vaginal bleeding that began this morning.  Does not believe that this is her normal menstrual cycle.    Based on patient's history and evaluation plan for basic labs and a pelvic ultrasound non OB complete with focus on the left pelvic structures.    Considered nephrolithiasis however in the presence of vaginal bleeding with negative CVA tenderness and no reported flank pain will begin with ultrasound.  Urinalysis however did show large hematuria.    Complete pelvic ultrasound did not show any significant findings are concerned see radiologist report as dictated above.  These findings were related the patient.  Patient verbalizes additional concern for possible STD.  Reports that she has had a history of chlamydia in the past.  I did perform a pelvic exam on this patient her cervical os was closed with a mild amount of blood noted from the cervical os and no other abnormalities noted on my examination.  I discussed findings with the patient.  All  laboratory studies were reviewed.  I recommended that the patient follow-up with gyn and primary care soon as possible.  Recommended Tylenol and ibuprofen for symptomatic pain relief.  Criteria for return to the emergency department discussed.  All questions were answered prior to discharge.          Follow Up:  Alma Steve MD  [address]  [facility] [address] [phone]      As needed    [facility]  [address] [place] [address] [phone]    If symptoms worsen     Patient was instructed to return to the ED immediately if symptoms worsen or any new unusual symptoms arise. All questions and concerns were addressed.  Patient understands their diagnosis and is comfortable and agreeable with treatment plan as dictated above.      Treatment:  There are no discharge medications for this patient.        Closure:  The patient understands that this is a provisional diagnosis. Provisional diagnosis can and do change. The diagnosis that you are discharged with today is based on the symptoms with which you presented today. Disease processes can and do change with time.  If any new symptoms occur or worsen, you should seek immediate attention for re-evaluation      Clinical Impression     ED Diagnosis   1. Pelvic pain in female         Disposition        Discharge 8/23/2021 10:57 AM  Margret Lance discharge to home/self care.                         Lavon Martin PA-C  08/23/21 1243

## 2022-02-17 NOTE — PROGRESS NOTES
Problem: Patient Education: Go to Patient Education Activity  Goal: Patient/Family Education  Outcome: Progressing Towards Goal     Problem: Patient Education: Go to Patient Education Activity  Goal: Patient/Family Education  Outcome: Progressing Towards Goal     Problem: Falls - Risk of  Goal: *Absence of Falls  Description: Document Nisha Gavin Fall Risk and appropriate interventions in the flowsheet. Outcome: Progressing Towards Goal  Note: Fall Risk Interventions:  Mobility Interventions: Patient to call before getting OOB    Mentation Interventions: Door open when patient unattended    Medication Interventions: Patient to call before getting OOB    Elimination Interventions: Patient to call for help with toileting needs              Problem: Patient Education: Go to Patient Education Activity  Goal: Patient/Family Education  Outcome: Progressing Towards Goal     Problem: Pressure Injury - Risk of  Goal: *Prevention of pressure injury  Description: Document Karthikeyan Scale and appropriate interventions in the flowsheet.   Outcome: Not Progressing Towards Goal  Variance Patient Condition  Note: Pressure Injury Interventions:  Sensory Interventions: Assess changes in LOC    Moisture Interventions: Absorbent underpads    Activity Interventions: Pressure redistribution bed/mattress(bed type)    Mobility Interventions: Pressure redistribution bed/mattress (bed type)    Nutrition Interventions: Document food/fluid/supplement intake    Friction and Shear Interventions: Apply protective barrier, creams and emollients,HOB 30 degrees or less,Minimize layers                Problem: Pain  Goal: *Control of Pain  Outcome: Progressing Towards Goal

## 2022-02-17 NOTE — PROGRESS NOTES
[facility] Hospitalist Group  Progress Note    Patient: Eddie Lawson Age: 80 y.o. : 1935 MR#: [id number] SSN: [id number]  Date/Time: 2022     Subjective:     Patient seen and evaluated, lying in bed, no acute distress. 59-year-old -American male with a past medical history of end-stage renal disease on hemodialysis, prostate cancer with mets, hypertension, CAD, chronic indwelling Burris, anemia of chronic disease presents to the emergency room with altered mental status. ER evaluationpatient noted to have a UTI, started on broad-spectrum IV antibiotics. Concern for right-sided facial droop, neurology consulted and recommended MRI for further evaluation. MRI brain negative for acute CVA. ID consulted for persistent leukocytosis. Assessment/Plan:     1. Acute metabolic encephalopathylikely secondary to UTIcontinue IV antibiotics. 2. UTI with hematuriacontinue IV antibiotics, await urine culture reports. Urine culture growing Enterococcus faecalis, sensitivities reviewed. 3. Leukocytosis likely underlying cancer. 4. Hematuriaper patient this is new, consulted urology since patient has underlying prostate cancer and is following up with them. CT chest abdomen pelvis with contrast ordered, currently pending  5. Right-sided facial droop concerning for CVAMRI brain negative for acute CVA. 6. ESRD on HDnephrology on board continue hemodialysis per schedule  7. History of prostate cancer with metson abiraterone / prednisone as out pt, restarted home medications. 8. 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 DVT prophylaxisHeparin  Full code    PT and OT eval, recommending rehab but patient's wife would like to take him home. Continue current treatment plan, will follow.            Leroy Mckeon MD  22      Case discussed with:  []Patient  []Family [x]Nursing  []Case Management  DVT Prophylaxis:  []Lovenox  [x]Hep SQ  []SCDs  []Coumadin   []On Heparin gtt    Objective:   VS:   Visit Vitals  /70 (BP 1 Location: Right arm, BP Patient Position: Prone;Sitting)   Pulse 85   Temp 98.6 °F (37 °C)   Resp 20   Ht 5' 6\" (1.676 m)   Wt 69.4 kg (153 lb 1.6 oz)   SpO2 94%   BMI 24.71 kg/m²      Tmax/24hrs: Temp (24hrs), Av.6 °F (37 °C), Min:98.1 °F (36.7 °C), Max:99.1 °F (37.3 °C)  IOBRIEF    Intake/Output Summary (Last 24 hours) at 2022 1330  Last data filed at 2022 9139  Gross per 24 hour   Intake 340 ml   Output 550 ml   Net -210 ml       General:  Awake , lying in bed   Pulmonary:  CTA Bilaterally. No Wheezing/Rhonchi/Rales. Cardiovascular: Regular rate and Rhythm. GI:  Soft, Non distended, Non tender. + Bowel sounds. Extremities:  No edema, cyanosis, clubbing. No calf tenderness. Neurologic: Alert and awake, lying in bed, able to move all extremities.   Additional:    Medications:   Current Facility-Administered Medications   Medication Dose Route Frequency    ampicillin-sulbactam (UNASYN) 3 g in 0.9% sodium chloride (MBP/ADV) 100 mL MBP  3 g IntraVENous Q24H    acetaminophen (TYLENOL) tablet 650 mg  650 mg Oral Q6H PRN    aspirin chewable tablet 81 mg  81 mg Oral DAILY    abiraterone tab 1,000 mg (Patient Supplied)  1,000 mg Oral DAILY    doxercalciferoL (HECTOROL) 4 mcg/2 mL injection 1 mcg  1 mcg IntraVENous DIALYSIS MON, WED & FRI    epoetin brina-epbx (RETACRIT) injection 4,000 Units  4,000 Units SubCUTAneous Q MON, WED & FRI    heparin (porcine) injection 5,000 Units  5,000 Units SubCUTAneous Q8H    sodium chloride (NS) flush 5-10 mL  5-10 mL IntraVENous PRN    labetaloL (NORMODYNE;TRANDATE) 20 mg/4 mL (5 mg/mL) injection 5 mg  5 mg IntraVENous Q10MIN PRN       Imaging:   XR Results (most recent):  Results from Hospital Encounter encounter on 22    XR CHEST PA LAT    Narrative  EXAM: Chest Radiographs    INDICATION:  ? opacities on portable, r/o pna    TECHNIQUE: PA and lateral views of the chest    COMPARISON: 2/13/2022, 10/21/2021, 11/13/2020    FINDINGS: No pneumothorax identified. There is a rounded opacity overlying the  right posterior fifth and sixth intercostal space. This is likely in the right  upper lobe anteriorly. Subtle groundglass changes are noted bilaterally. No  effusions appreciated. The cardiac silhouette is normal. The pulmonary  vascularity is unremarkable. Degenerative changes of the spine. Degenerative  changes of the shoulders are noted. Impression  1. Ovoid density in the right upper lobe likely near the anterior segment. Additional groundglass opacities. Consider further evaluation with CT. CT Results (most recent):  Results from Hospital Encounter encounter on 02/13/22    CTA HEAD NECK W CONT    Narrative  EXAM: CTA BRAIN/NECK  CPT CODE: 53462/22759    CLINICAL INDICATION/HISTORY: Left-sided facial droop. Code stroke    COMPARISON: None. TECHNIQUE: Helical CT scan of the brain/neck was performed at 1.25 mm intervals  during rapid IV bolus contrast administration. These data were reconstructed at  0.625 mm intervals for vascular analysis. The data was also reviewed at 2.5 mm  intervals for accompanying soft tissue analysis. Following this, image  reconstruction performed separately for the brain and neck on an independent  workstation with 3-D, maximum intensity projection (MIP) and shaded-volumetric  rendering of the major vessels was reviewed. Contrast: Isovue-370 - 100 mL IV;  uneventful administration. FINDINGS:    CTA BRAIN SOFT TISSUE ANALYSIS: Ventricular system, basilar cisterns and sulci  are appropriate for patient's stated age. No areas of mass effect, edema,  intra-/extra-axial hemorrhage or abnormal calcifications noted. Calvarium and  skull base are unremarkable. Extracranial bony and soft tissues are  unremarkable.     CTA BRAIN VASCULAR ANALYSIS: Anterior circulation anatomy is balanced. Atherosclerotic plaque in both carotid size and may result in at least mild  bilateral stenosis. There is a possible focal occlusion or very high-grade  stenosis at the proximal posteroinferior M2 branch of the right MCA. Relatively  normal flow enhancement is seen in the more distal vessels suggesting possible  collateral augmentation. Cisternal vessels are widely patent. Runoff vessels  otherwise appear normal.    The posterior fossa, the right vertebral artery is dominant. There is some  focal atherosclerotic plaque in the proximal intradural segment. There is  probably little significant stenosis on the right but there may be a focal  stenosis in the small vessel on the left. The more distal vessel is patent to  the basilar junction. There is a focal short segment stenosis in the distal  basilar artery just proximal cerebellar artery origins of about 70% diameter. There is normal origin of the major vertebrobasilar vessels except that the P1  segment of the right PCA is not well seen. The P2 portion is supplied from the  right ICA via a prominent right posterior communicating artery segment. There is a significant short segment stenosis in the proximal P2 portion of the  right PCA. The distal vessel is relatively normal.  In the left PCA, there are  several focal stenoses, one in the distal P1 segment and two tandem stenoses in  the proximal P2 segment. The more distal posterior cerebral circulation is  unremarkable, bilaterally. CTA NECK SOFT TISSUE ANALYSIS: The mucosal surfaces of the aerodigestive tract  are unremarkable. The salivary glands are normal.  The thyroid gland is  unremarkable. Images through the root of the neck, extending to the superior  mediastinum, and lung apices, are clear. There is no adenopathy seen. Normal  vascular enhancement is identified.   No osseous abnormalities are seen except  for multilevel spondylitic degenerative change in the cervical spine may result  in some significant spinal stenosis particularly at C4-5 and C5-6 where there is  moderate canal stenosis particularly at C5-6. CTA NECK VASCULAR ANALYSIS: The great vessels have a normal arrangement from the  aorta with widely patent ostia. Atherosclerotic plaque is seen at the origin of  both cervical internal carotid arteries resulting in mild-to-moderate, about 40%  diameter, origin stenosis. The common carotid arteries, cervical common carotid  bifurcations, and cervical internal carotid arteries are otherwise widely  patent, bilaterally. The vertebral arteries are widely patent with normal  subclavian origins. The right vertebral artery is dominant. NASCET criteria  were used. Impression  CTA Brain:  There is a possible focal occlusion or very high grade stenosis in  the proximal posterior inferior M2 branch of the right MCA. The more distal  vessels are patent suggesting some collateral augmentation. The remainder of  the anterior circulation is unremarkable except for mild atherosclerosis in the  carotid siphons with possible mild stenosis. In the posterior fossa, there is a dominant right vertebral artery there is some  focal atherosclerotic plaque in the proximal intradural segment with at least  mild stenosis. There is a significant short segment fusiform 70% diameter stenosis in the  distal basilar artery just proximal to the superior cerebellar artery origins. There is persistent fetal origin of the right PCA from the right ICA via a  prominent posterior communicating artery segment. There is a short segment significant stenosis in the proximal P2 portion of the  right PCA; the distal vessels is grossly normal.  There are several focal  stenoses in the left PCA including one in the P1 segment and two in the proximal  P2 segment. The distal vessel is well seen. No significant parenchymal abnormalities seen.     CTA Neck:  There is mild-to-moderate focal stenosis of about 40% diameter at the  origin of both cervical ICAs as result of atherosclerotic plaque at the  bifurcation. The remainder of the neck circulation is widely patent. The right  vertebral artery is dominant. No soft tissue abnormalities identified. The following preliminary report was provided by Dr. Anthony Webber at the time of  the study.      ===================  Note: [facility] maintains that all CT scans at their facilities  are performed by using dose optimization technique as appropriate to a performed  examination, to include automated exposure control, adjustment of the mAs and/or  kVp according to patient size (including appropriate matching for site specific  examinations) or use of iterative reconstruction technique. Preliminary Report:    Possible focal occlusion vs. very high grade stenosis prox. post-inf M2 branch R  MCA. Flow in distal vessels suggests possible collateral augmentation. Additional focal stenoses in distal M1 L MCA, prox P2 R PCA and P1 L PCA. Mild-to-moderate stenosis both ICA origins. Mt Bernstein M.D.      09/25/20    ECHO ADULT COMPLETE 09/26/2020 9/26/2020    Interpretation Summary  · LV: Calculated LVEF is 50%. Visually measured ejection fraction. Normal cavity size. Mildly increased wall thickness. Globally reduced systolic function. Low normal systolic function. Mild (grade 1) left ventricular diastolic dysfunction. · MV: Mild mitral valve regurgitation is present.     Signed by: Gisela Quintero DO on 9/26/2020 12:20 PM       Labs:    Recent Results (from the past 48 hour(s))   VANCOMYCIN, RANDOM    Collection Time: 02/16/22 12:39 AM   Result Value Ref Range    Vancomycin, random 16.8 5.0 - 40.0 UG/ML   CBC WITH AUTOMATED DIFF    Collection Time: 02/16/22 12:39 AM   Result Value Ref Range    WBC 23.6 (H) 4.6 - 13.2 K/uL    RBC 3.06 (L) 4.35 - 5.65 M/uL    HGB 9.5 (L) 13.0 - 16.0 g/dL    HCT 30.0 (L) 36.0 - 48.0 %    MCV 98.0 78.0 - 100.0 FL    MCH 31.0 24.0 - 34.0 PG    MCHC 31.7 31.0 - 37.0 g/dL    RDW 12.7 11.6 - 14.5 %    PLATELET 879 669 - 490 K/uL    MPV 10.1 9.2 - 11.8 FL    NRBC 0.0 0  WBC    ABSOLUTE NRBC 0.00 0.00 - 0.01 K/uL    NEUTROPHILS 74 (H) 40 - 73 %    LYMPHOCYTES 13 (L) 21 - 52 %    MONOCYTES 6 3 - 10 %    EOSINOPHILS 7 (H) 0 - 5 %    BASOPHILS 0 0 - 2 %    IMMATURE GRANULOCYTES 0 0.0 - 0.5 %    ABS. NEUTROPHILS 17.4 (H) 1.8 - 8.0 K/UL    ABS. LYMPHOCYTES 3.1 0.9 - 3.6 K/UL    ABS. MONOCYTES 1.4 (H) 0.05 - 1.2 K/UL    ABS. EOSINOPHILS 1.7 (H) 0.0 - 0.4 K/UL    ABS. BASOPHILS 0.0 0.0 - 0.1 K/UL    ABS. IMM.  GRANS. 0.0 0.00 - 0.04 K/UL    DF MANUAL      PLATELET COMMENTS ADEQUATE PLATELETS      RBC COMMENTS NORMOCYTIC, NORMOCHROMIC     METABOLIC PANEL, BASIC    Collection Time: 02/16/22 12:39 AM   Result Value Ref Range    Sodium 141 136 - 145 mmol/L    Potassium 3.1 (L) 3.5 - 5.5 mmol/L    Chloride 106 100 - 111 mmol/L    CO2 25 21 - 32 mmol/L    Anion gap 10 3.0 - 18 mmol/L    Glucose 91 74 - 99 mg/dL    BUN 28 (H) 7.0 - 18 MG/DL    Creatinine 4.24 (H) 0.6 - 1.3 MG/DL    BUN/Creatinine ratio 7 (L) 12 - 20      GFR est AA 16 (L) >60 ml/min/1.73m2    GFR est non-AA 13 (L) >60 ml/min/1.73m2    Calcium 7.6 (L) 8.5 - 10.1 MG/DL   PHOSPHORUS    Collection Time: 02/16/22 12:39 AM   Result Value Ref Range    Phosphorus 3.1 2.5 - 4.9 MG/DL   CBC WITH AUTOMATED DIFF    Collection Time: 02/17/22  8:35 AM   Result Value Ref Range    WBC 21.7 (H) 4.6 - 13.2 K/uL    RBC 3.06 (L) 4.35 - 5.65 M/uL    HGB 9.3 (L) 13.0 - 16.0 g/dL    HCT 29.7 (L) 36.0 - 48.0 %    MCV 97.1 78.0 - 100.0 FL    MCH 30.4 24.0 - 34.0 PG    MCHC 31.3 31.0 - 37.0 g/dL    RDW 12.8 11.6 - 14.5 %    PLATELET 624 507 - 620 K/uL    MPV 10.4 9.2 - 11.8 FL    NRBC 0.0 0  WBC    ABSOLUTE NRBC 0.00 0.00 - 0.01 K/uL    NEUTROPHILS 76 (H) 40 - 73 %    LYMPHOCYTES 10 (L) 21 - 52 %    MONOCYTES 6 3 - 10 %    EOSINOPHILS 6 (H) 0 - 5 % BASOPHILS 1 0 - 2 %    IMMATURE GRANULOCYTES 1 (H) 0.0 - 0.5 %    ABS. NEUTROPHILS 16.5 (H) 1.8 - 8.0 K/UL    ABS. LYMPHOCYTES 2.2 0.9 - 3.6 K/UL    ABS. MONOCYTES 1.4 (H) 0.05 - 1.2 K/UL    ABS. EOSINOPHILS 1.3 (H) 0.0 - 0.4 K/UL    ABS. BASOPHILS 0.1 0.0 - 0.1 K/UL    ABS. IMM. GRANS. 0.3 (H) 0.00 - 0.04 K/UL    DF AUTOMATED         Signed By: Albert Street MD     February 17, 2022      I spent 25 minutes with the patient in face-to-face consultation, of which greater than 50% was spent in counseling and coordination of care as described above    Disclaimer: Sections of this note are dictated using utilizing voice recognition software. Minor typographical errors may be present. If questions arise, please do not hesitate to contact me or call our department.

## 2022-02-17 NOTE — ROUTINE PROCESS
0720-- Bedside, Verbal and Written shift change report received by Delia Espitia (oncoming nurse) by Marline(offgoing nurse). Report included the following information SBAR, Kardex, Intake/Output, MAR and Recent Results. 0820-AM  medications administered, pt tolerated with ease, will continue to monitor. 1230- Shift reassessment, pt condition unchanged, will continue to monitor. 1600-  Shift reassessment, pt condition unchanged, will continue to monitor. 1915- Bedside, Verbal and Written shift change report given to [place] (oncoming nurse) by Delia Espitia (offgoing nurse). Report included the following information SBAR, Kardex, Intake/Output, MAR and Recent Results. Skin assessment completed.

## 2022-02-17 NOTE — PROGRESS NOTES
Problem: Falls - Risk of  Goal: *Absence of Falls  Description: Document [place] Fall Risk and appropriate interventions in the flowsheet. Outcome: Progressing Towards Goal  Note: Fall Risk Interventions:  Mobility Interventions: Bed/chair exit alarm,Patient to call before getting OOB,PT Consult for mobility concerns,Strengthening exercises (ROM-active/passive),Utilize walker, cane, or other assistive device    Mentation Interventions: Adequate sleep, hydration, pain control,Bed/chair exit alarm,Door open when patient unattended,Increase mobility,More frequent rounding,Reorient patient,Room close to nurse's station,Update white board,Toileting rounds    Medication Interventions: Bed/chair exit alarm,Evaluate medications/consider consulting pharmacy,Teach patient to arise slowly,Patient to call before getting OOB    Elimination Interventions: Bed/chair exit alarm,Call light in reach,Patient to call for help with toileting needs,Toileting schedule/hourly rounds              Problem: Patient Education: Go to Patient Education Activity  Goal: Patient/Family Education  Outcome: Progressing Towards Goal     Problem: Pressure Injury - Risk of  Goal: *Prevention of pressure injury  Description: Document Karthikeyan Scale and appropriate interventions in the flowsheet. Outcome: Progressing Towards Goal  Note: Pressure Injury Interventions:  Sensory Interventions: Keep linens dry and wrinkle-free,Float heels,Maintain/enhance activity level,Minimize linen layers,Pressure redistribution bed/mattress (bed type),Turn and reposition approx.  every two hours (pillows and wedges if needed),Use 30-degree side-lying position    Moisture Interventions: Absorbent underpads,Apply protective barrier, creams and emollients,Check for incontinence Q2 hours and as needed,Internal/External urinary devices,Maintain skin hydration (lotion/cream),Minimize layers    Activity Interventions: Increase time out of bed,Pressure redistribution bed/mattress(bed type),PT/OT evaluation    Mobility Interventions: Float heels,HOB 30 degrees or less,Pressure redistribution bed/mattress (bed type),Turn and reposition approx.  every two hours(pillow and wedges)    Nutrition Interventions: Document food/fluid/supplement intake    Friction and Shear Interventions: Apply protective barrier, creams and emollients,HOB 30 degrees or less,Minimize layers                Problem: Patient Education: Go to Patient Education Activity  Goal: Patient/Family Education  Outcome: Progressing Towards Goal

## 2022-02-17 NOTE — ROUTINE PROCESS
Bedside and Verbal shift change report given to MONTSERRAT MEDINA (oncoming nurse) by YOVANA Baptiste RN (offgoing nurse). Report included the following information SBAR, Kardex, MAR and Recent Results.

## 2022-02-17 NOTE — PROGRESS NOTES
Problem: Self Care Deficits Care Plan (Adult)  Goal: *Acute Goals and Plan of Care (Insert Text)  Description: Occupational Therapy Goals  Initiated 2/14/2022 within 7 day(s). 1.  Patient will perform bed mobility with supervision/set-up in prep for ADL routine. 2. Patient will perform toilet transfers with supervision/set-up using RW. 3.  Patient will perform all aspects of toileting with supervision/set-up. 4.  Patient will participate in upper extremity therapeutic exercise/activities with supervision/set-up for 8 minutes. 5.  Patient will utilize energy conservation techniques during functional activities with min verbal cues. Prior Level of Function: per spouse's spouse: she provided total assist with bathing and dressing at bed level, pt able to perform toilet transfer using RW with supv     Outcome: Progressing Towards Goal   OCCUPATIONAL THERAPY TREATMENT    Patient: Harley Simons (89 y.o. male)  Date: 2/17/2022  Diagnosis: AMS (altered mental status) [R41.82] <principal problem not specified>       Precautions: Fall  PLOF: per spouse's spouse: she provided total assist with bathing and dressing at bed level, pt able to perform toilet transfer using RW with supv      Chart, occupational therapy assessment, plan of care, and goals were reviewed. ASSESSMENT:  PT co tx to maximize pt safety and participation. Pt presented supine in bed upon therapist arrival and agreeable for present. Pt performed functional bed mobility MIN A w/ vc's for proper hand placement. STS transfer MIN A with RW and maneuvered into BR ~ 10 ft with MIN A utilizing RW. Pt stood at sink side ~ 3 mins to perform hand hygiene task w/ CGA 2/2 decreased dyn standing balance. Pt demos kyphotic posture with poor eccentric control with stand to sit transition. Pt was left sitting up in chair at end of session with spouse present and all needs left within reach.    Progression toward goals:  []          Improving appropriately and progressing toward goals  [x]          Improving slowly and progressing toward goals  []          Not making progress toward goals and plan of care will be adjusted     PLAN:  Patient continues to benefit from skilled intervention to address the above impairments. Continue treatment per established plan of care. Discharge Recommendations: Rehab   Further Equipment Recommendations for Discharge:  FABIO, [facility], Shower chair      SUBJECTIVE:   Patient stated  I have already been cleaned up. \"     OBJECTIVE DATA SUMMARY:   Cognitive/Behavioral Status:  Neurologic State: Alert  Orientation Level: Oriented to person,Oriented to place  Cognition: Follows commands,Recognition of people/places  Safety/Judgement: Fall prevention    Functional Mobility and Transfers for ADLs:   Bed Mobility:     Supine to Sit: Minimum assistance         Transfers:  Sit to Stand: Minimum assistance  Stand to Sit: Minimum assistance       Balance:  Sitting: Intact  Standing: Impaired; With support  Standing - Static: Fair  Standing - Dynamic : Fair    ADL Intervention:       Grooming  Position Performed: Standing  Washing Hands: Contact guard assistance    Pain:  Pain level pre-treatment: 0/10   Pain level post-treatment: 0/10    Activity Tolerance:    Fair   Please refer to the flowsheet for vital signs taken during this treatment. After treatment:   [x]  Patient left in no apparent distress sitting up in chair  []  Patient left in no apparent distress in bed  [x]  Call bell left within reach  [x]  Nursing notified  [x]  Spouse present  []  Bed alarm activated    COMMUNICATION/EDUCATION:   [x] Role of Occupational Therapy in the acute care setting  [] Home safety education was provided and the patient/caregiver indicated understanding. [x] Patient/family have participated as able in working towards goals and plan of care. [x] Patient/family agree to work toward stated goals and plan of care.   [] Patient understands intent and goals of therapy, but is neutral about his/her participation. [] Patient is unable to participate in goal setting and plan of care.       Thank you for this referral.  LISA Amezcua  Time Calculation: 14 mins

## 2022-02-17 NOTE — PROGRESS NOTES
Urology Progress Note        Assessment/Plan:     Patient Active Problem List   Diagnosis Code    Traumatic rhabdomyolysis ([facility] T79. 6XXA    CAD (coronary artery disease) I25.10    Essential (primary) hypertension I10    Troponin level elevated R77.8    Acute renal failure (ARF) ([facility]) N17.9    ESRD (end stage renal disease) ([facility] N18.6    Hyperkalemia K54.6    Metabolic acidosis W73.3    Secondary hyperparathyroidism of renal origin ([facility] N25.81    Anemia D64.9    Hydronephrosis N13.30    UTI (urinary tract infection) N39.0    Postobstructive diuresis R35.89    Polyp of ascending colon K63.5    Decrease in appetite R63.0    Elevated prostate specific antigen (PSA) R97.20    Osteoarthrosis M19.90    Shortness of breath R06.02    Chronic renal failure N18.9    Pneumonia J18.9    Hypoalbuminemia E88.09    Leucocytosis D72.829    Nausea and vomiting R11.2    AMS (altered mental status) R41.82    Bladder cancer ([facility]) C67.9          ASSESSMENT:   Gross Hematuria, H/o Gross Hematuria  Large bladder Tumor              S/p Cysto on 1/12/22 with Dr. Latrell Gomez              Findings: Diana Justice, sessile posterior bladder wall    tumor              Hgb 7.2<5.0<3.8>8.5<25     Complicated UTI  Leukocytosis              WBC 21.7<23.6<24.9<27.1>26.9                  UCX >100K E. Faecalis. Resistant to Tetracycline,   Intermediate to Cipro     H/o Chronic Burris for Urinary retention- exchanged on 2/13/22        H/o Clinical stage [id number] Stanley Grade 4+5 Adenocarcinoma of the Prostate, disease in 4/6 cores, prostate volume of 80 cm3, Pre-biopsy PSA= 689.0ng/mL, diagnosed via cysto, bilateral RPG, TURBT, prostate bx by Dr. Latrell Gomez in 3/21.                Primary Therapy:                    Zytiga 1000mg + Prednisone 5mg daily (5/21);  Decreased to Zytiga 750 mg + Prednisone 5 mg daily (9/21)   PSA Jabier:                              1.18 (8/21)      ADT Start:                               3/21 utilizing Eligard 45mg + Casodex 50mg ()  Jabier after ADT:                      1.18 (8/21)      CRCP:                                     N/a   M1a:                                        N/a   M1b:                                        3/21 (BS - Diffuse osseous metastatic disease)  M1c:                                        3/21 (CT AP - Hepatic metastasis; this could be of prostate or colon origin); 5/21 (MRI -No evidence for liver lesions to correspond with CT from 3/21)      M1b First Line Therapy:          Zytiga 1000mg + Prednisone 5mg daily (5/21); decreased dose to Zytiga 750 mg 2/2 fatigue (9/21)   PSA Jabier after 1st line:                                 0.61 (11/21)    Radiographic Progression after 1st line:        Bone scan pending     Last PSA:                                1.32 (1/12/22)     Current Disease Status:  Symptomatic Androgen Sensitive M1b CaP  Current Treatment Plan: ADT utilizing Eligard 45mg q6 months; Zytiga 750 mg (decreased dose 2/2 patient's fatigue) + Prednisone 5mg     H/o Ascending Colonic Mass     ESRD on HD     Admitted for Acute Metabolic Encephalopathy  Right Side Facial droop     PLAN:    Appreciate overall management per medicine. Follow CX, continue IV Unasyn  Maintain chronic michel- light red with small clots. Please flush catheter with 120 cc sterile water TID, PRN decreased UOP/clots. Per nursing staff no issues with this. Hold all AC if able- on ASA and SQH  Monitor H&H- stable. Order CT/A/P to evaluate for clot in bladder, mets- not done yet  Will follow closely.     Follow up arranged? NO      Juliet Rubio PA-C  [facility]  Available MHaroldo Garcia  Pager: [phone]       Subjective:     Daily Progress Note: 2/17/2022 1:41 PM    Patricia Sharp is doing good. He denies any pain. Hgb stable. Urine remains light red with small clots in tubing.  cc for past 24 hrs.     Objective:     Visit Vitals  /70 (BP 1 Location: Right arm, BP Patient Position: Prone;Sitting)   Pulse 85   Temp 98.6 °F (37 °C)   Resp 20   Ht 5' 6\" (1.676 m)   Wt 69.4 kg (153 lb 1.6 oz)   SpO2 94%   BMI 24.71 kg/m²        Temp (24hrs), Av.6 °F (37 °C), Min:98.1 °F (36.7 °C), Max:99.1 °F (37.3 °C)      Intake and Output:  02/15 1901 -  0700  In: 240   Out: 2200 [Urine:1200]  701 - 1900  In: 100 [I.V.:100]  Out: -     PHYSICAL EXAMINATION:   Visit Vitals  /70 (BP 1 Location: Right arm, BP Patient Position: Prone;Sitting)   Pulse 85   Temp 98.6 °F (37 °C)   Resp 20   Ht 5' 6\" (1.676 m)   Wt 69.4 kg (153 lb 1.6 oz)   SpO2 94%   BMI 24.71 kg/m²       Constitutional: Well developed, well nourished male. No acute distress. HEENT: Normocephalic, Atraumatic, EOM's intact   CV:  no edema  Respiratory: No respiratory distress or difficulties breathing   Abdomen:  soft and non tender    Male:    SCROTUM:  No scrotal rash or lesions noticed. Normal bilateral testes and epididymis. PENIS: Urethral meatus normal in location and size. No urethral discharge. UnCircumsized. 16 fr michel in place with light red urine with small clots in tubing. Skin: No evidence of jaundice. Normal color  Neuro/Psych:  Alert and oriented. Affect appropriate. Dysarthric         Lab/Data Review: All lab results for the last 24 hours reviewed. Labs:     Labs: Results:   Chemistry    Recent Labs     22  003   GLU 91      K 3.1*      CO2 25   BUN 28*   CREA 4.24*   CA 7.6*   AGAP 10   BUCR 7*      CBC w/Diff Recent Labs     22  0835 229 02/15/22  0037   WBC 21.7* 23.6* 24.9*   RBC 3.06* 3.06* 3.16*   HGB 9.3* 9.5* 9.6*   HCT 29.7* 30.0* 30.5*    322 358   GRANS 76* 74* 81*   LYMPH 10* 13* 11*   EOS 6* 7* 4      Cultures No results for input(s): CULT in the last 72 hours.   All Micro Results     Procedure Component Value Units Date/Time    CULTURE, BLOOD [[id number]] Collected: 22 1145    Order Status: Completed Specimen: Blood Updated: 02/17/22 0656     Special Requests: NO SPECIAL REQUESTS        Culture result: NO GROWTH 4 DAYS       CULTURE, BLOOD [[id number]] Collected: 02/13/22 1200    Order Status: Completed Specimen: Blood Updated: 02/17/22 0656     Special Requests: NO SPECIAL REQUESTS        Culture result: NO GROWTH 4 DAYS       CULTURE, URINE [[id number]]  (Abnormal)  (Susceptibility) Collected: 02/13/22 1100    Order Status: Completed Specimen: Cath Urine Updated: 02/16/22 1401     Special Requests: NEW GONZALEZ     [place] Count --        >100,000  COLONIES/mL       Culture result:       ENTEROCOCCUS FAECALIS (PREDOMINATING)                  MIXED UROGENITAL RENUKA ISOLATED          COVID-19 RAPID TEST [[id number]] Collected: 02/13/22 1710    Order Status: Completed Specimen: Nasopharyngeal Updated: 02/13/22 1833     Specimen source Nasopharyngeal        COVID-19 rapid test Not detected        Comment: Rapid Abbott ID Now       Rapid NAAT:  The specimen is NEGATIVE for SARS-CoV-2, the novel coronavirus associated with COVID-19. Negative results should be treated as presumptive and, if inconsistent with clinical signs and symptoms or necessary for patient management, should be tested with an alternative molecular assay. Negative results do not preclude SARS-CoV-2 infection and should not be used as the sole basis for patient management decisions. This test has been authorized by the FDA under an Emergency Use Authorization (EUA) for use by authorized laboratories.    Fact sheet for Healthcare Providers: ConventionUpdate.co.nz  Fact sheet for Patients: ConventionUpdate.co.nz       Methodology: Isothermal Nucleic Acid Amplification                 Urinalysis Color   Date Value Ref Range Status   02/13/2022 RED   Final     Appearance   Date Value Ref Range Status   02/13/2022 TURBID   Final     Specific gravity   Date Value Ref Range Status   02/13/2022 1.017 1.005 - 1.030   Final     pH (UA)   Date Value Ref Range Status   02/13/2022 8.5 (H) 5.0 - 8.0   Final     Protein   Date Value Ref Range Status   02/13/2022 300 (A) NEG mg/dL Final     Ketone   Date Value Ref Range Status   02/13/2022 Negative NEG mg/dL Final     Bilirubin   Date Value Ref Range Status   02/13/2022 Negative NEG   Final     Blood   Date Value Ref Range Status   02/13/2022 LARGE (A) NEG   Final     Urobilinogen   Date Value Ref Range Status   02/13/2022 0.2 0.2 - 1.0 EU/dL Final     Nitrites   Date Value Ref Range Status   02/13/2022 Negative NEG   Final     Leukocyte Esterase   Date Value Ref Range Status   02/13/2022 LARGE (A) NEG   Final     Potassium   Date Value Ref Range Status   02/16/2022 3.1 (L) 3.5 - 5.5 mmol/L Final     Creatinine   Date Value Ref Range Status   02/16/2022 4.24 (H) 0.6 - 1.3 MG/DL Final     BUN   Date Value Ref Range Status   02/16/2022 28 (H) 7.0 - 18 MG/DL Final     Prostate Specific Ag   Date Value Ref Range Status   01/12/2022 1.32 0.00 - 4.00 ng/mL Final     Comment:     (Methodology: Roche ECLIA)           PSA No results for input(s): PSA in the last 72 hours.    Coagulation Lab Results   Component Value Date/Time    Prothrombin time 12.5 02/13/2022 09:11 AM    Prothrombin time 12.2 12/30/2021 12:31 PM    INR 0.9 02/13/2022 09:11 AM    INR 0.9 12/30/2021 12:31 PM    aPTT 29.1 11/20/2018 02:10 PM    aPTT 63.5 (H) 08/30/2016 05:15 PM

## 2022-02-17 NOTE — PROGRESS NOTES
Infectious Disease progress Note        Reason: sepsis    Current abx Prior abx     Ampicillin/sulbactam since 2/17 Zosyn, vancomycin  2/16/22     Lines:       Assessment :    80 y.o.  male with PMH of end-stage renal disease, prostate cancer with metastases, hypertension, CAD, chronic indwelling Burris catheter, anemia, who presented to [facility] on 2/13/22 with altered mental status.        Now with significant leukocytosis, hematuria, positive urine culture, altered mental status on admission     I agree that clinical presentation is concerning for sepsis. However after obtaining detailed history, exam, review of available labs/imaging studies, clinical suspicion of sepsis is low. I believe patient's leukocytosis is likely leukemoid reaction to  metastatic disease, hemorrhagic cyst        persistent leukocytosis despite broad-spectrum antibiotics, clinical improvement argues against infectious etiology of leukocytosis    Hemorrhagic cystitis -urine culture 2/13 : Greater than 2000 because of Enterococcus suggestive of Enterococcus cystitis. Persistent hematuria despite appropriate antibiotics could be secondary to cystitis versus underlying prostate cancer. Urology follow-up appreciated    ESRD: on HD     Improving leukocytosis    Recommendations:     1. Continue  IV ampicillin/sulbactam  2. Follow-up urology recommendations  for persistent hematuria  3. Follow-up nephrology recommendations  4. We will switch to oral Augmentin till 2/29/2022 once cleared for discharge by urology       Above plan was discussed in details with patient,  and primary team. Please call me if any further questions or concerns. Will continue to participate in the care of this patient. HPI:        Patient states that he feels fine. Denies any abdominal pain/flank pain. Denies subjective fever, chills.       Past Medical History:   Diagnosis Date    Cancer [facility])     Prostate    Cardiac echocardiogram 08/29/2016 EF 60-65%. No WMA. Mild LVH. Indeterminate diastolic fx. RVSP 35 mmHg. No significant valvular heart disease.  Cardiac nuclear imaging test, abnormal 08/29/2016    Intermediate risk. Previous inferior infarction w/very mild fabiana-infarct ischemia. Inferior hypk. EF 68%. Nondiagnostic EKG on pharm stress test.  Pt's BP increased from 193/96 to 207/83 during study.  Carotid duplex 08/30/2016    Mild <50% bilateral ICA stenosis.  Chronic kidney disease     on HD MWF ad [facility]    Colonic mass     per Dr Pack Blade notes    Enlarged prostate     Hypertension        Past Surgical History:   Procedure Laterality Date    COLONOSCOPY N/A 10/1/2020    COLONOSCOPY, b'x, w tattoo w polypectomy performed by Flory Mcdaniels MD at [address]  10/1/2020         [facility]         COLONOSCPY,FLEX,W/ [facility] [place]  10/1/2020         VA INSJ TUNNELED CVC W/O SUBQ PORT/ AGE 5 YR/> N/A 9/28/2020    INSERTION TUNNELED CENTRAL VENOUS CATHETER performed by Giovanna Gomez MD at [facility] CATH LAB   [place] [id number] UNLIST      dialysis access- right neck       Current Discharge Medication List      CONTINUE these medications which have NOT CHANGED    Details   predniSONE (DELTASONE) 5 mg tablet TAKE 1 TABLET BY MOUTH 2 TIMES A DAY  Qty: 60 Tablet, Refills: 4    Associated Diagnoses: Malignant neoplasm of prostate ([facility] Osseous metastasis ([facility]      ! ! abiraterone 250 mg tab TAKE 4 TABLETS BY MOUTH 1 TIME A DAY ON AN EMPTY STOMACH. TAKE ONE HOUR PRIOR TO FOOD OR TWO HOURS AFTER FOOD. SWALLOW TABLETS WHOLE WITH WATER AND DO NOT CRUSH OR CHEW TABLETS. Qty: 120 Tablet, Refills: 4      acetaminophen (TYLENOL) 500 mg tablet Take 500 mg by mouth every six (6) hours as needed for Pain.      pantoprazole (PROTONIX) 40 mg tablet Take 1 Tablet by mouth Before breakfast and dinner.   Qty: 60 Tablet, Refills: 0      amLODIPine (NORVASC) 10 mg tablet TAKE 1 TABLET BY MOUTH EVERY DAY      calcium acetate,phosphat bind, (PHOSLO) 667 mg cap Take 1 Capsule by mouth daily. !! abiraterone (Zytiga) 250 mg tab Take four tablets by mouth daily on an empty stomach. Take one hour prior to food or two hours after food. Tablets should be swallowed whole with water. Do not crush or chew tablets. Qty: 120 Tab, Refills: 5    Associated Diagnoses: Malignant neoplasm of prostate ([address] Osseous metastasis ([facility])      B complex w-C no.20/folic acid (TRIPHROCAPS PO) Take 1 Cap by mouth daily. !! - Potential duplicate medications found. Please discuss with provider. Current Facility-Administered Medications   Medication Dose Route Frequency    ampicillin-sulbactam (UNASYN) 3 g in 0.9% sodium chloride (MBP/ADV) 100 mL MBP  3 g IntraVENous Q24H    acetaminophen (TYLENOL) tablet 650 mg  650 mg Oral Q6H PRN    aspirin chewable tablet 81 mg  81 mg Oral DAILY    abiraterone tab 1,000 mg (Patient Supplied)  1,000 mg Oral DAILY    doxercalciferoL (HECTOROL) 4 mcg/2 mL injection 1 mcg  1 mcg IntraVENous DIALYSIS MON, WED & FRI    epoetin brina-epbx (RETACRIT) injection 4,000 Units  4,000 Units SubCUTAneous Q MON, WED & FRI    heparin (porcine) injection 5,000 Units  5,000 Units SubCUTAneous Q8H    sodium chloride (NS) flush 5-10 mL  5-10 mL IntraVENous PRN    labetaloL (NORMODYNE;TRANDATE) 20 mg/4 mL (5 mg/mL) injection 5 mg  5 mg IntraVENous Q10MIN PRN       Allergies: Patient has no known allergies. History reviewed. No pertinent family history.   Social History     Socioeconomic History    Marital status:      Spouse name: Not on file    Number of children: Not on file    Years of education: Not on file    Highest education level: Not on file   Occupational History    Not on file   Tobacco Use    Smoking status: Never Smoker    Smokeless tobacco: Never Used   Vaping Use    Vaping Use: Never used   Substance and Sexual Activity    Alcohol use: No    Drug use: Never    Sexual activity: Not on file   Other Topics Concern    Not on file   Social History Narrative    Not on file     Social Determinants of Health     Financial Resource Strain:     Difficulty of Paying Living Expenses: Not on file   Food Insecurity:     Worried About Running Out of Food in the Last Year: Not on file    Kaylynn of Food in the Last Year: Not on file   Transportation Needs:     Lack of Transportation (Medical): Not on file    Lack of Transportation (Non-Medical): Not on file   Physical Activity:     Days of Exercise per Week: Not on file    Minutes of Exercise per Session: Not on file   Stress:     Feeling of Stress : Not on file   Social Connections:     Frequency of Communication with Friends and Family: Not on file    Frequency of Social Gatherings with Friends and Family: Not on file    Attends Synagogue Services: Not on file    Active Member of [address] [place] [address] or Organizations: Not on file    Attends Club or Organization Meetings: Not on file    Marital Status: Not on file   Intimate Partner Violence:     Fear of Current or Ex-Partner: Not on file    Emotionally Abused: Not on file    Physically Abused: Not on file    Sexually Abused: Not on file   Housing Stability:     Unable to Pay for Housing in the Last Year: Not on file    Number of [place] in the Last Year: Not on file    Unstable Housing in the Last Year: Not on file     Social History     Tobacco Use   Smoking Status Never Smoker   Smokeless Tobacco Never Used        Temp (24hrs), Av.2 °F (36.8 °C), Min:97.3 °F (36.3 °C), Max:99.1 °F (37.3 °C)    Visit Vitals  /74 (BP 1 Location: Right upper arm, BP Patient Position: Lying left side; At rest)   Pulse 98   Temp 98.5 °F (36.9 °C)   Resp 18   Ht 5' 6\" (1.676 m)   Wt 69.4 kg (153 lb 1.6 oz)   SpO2 98%   BMI 24.71 kg/m²       ROS: 12 point ROS obtained in details.  Pertinent positives as mentioned in HPI,   otherwise negative    Physical Exam:    General:          Alert, in NAD  HEENT:          Sclera anicteric.  Conjunctiva pink. Mucous membranes                          Moist, no ear or nasal discharge; right sided facial droop present  Neck:               Supple.  Trachea midline.  No accessory muscle use. CV:                  Regular rate and rhythm. S1S2+  Lungs:             Clear to auscultation bilaterally.  No Wheezing or Rhonchi. No rales. Abdomen:        Soft, non-tender. Not distended.  Bowel sounds normal; Burris catheter in place draining blood-tinged urine  Extremities:     No cyanosis.  No edema. LUE fistula without erythema  Neurologic:      Alert and oriented X person and place only not to time or situation.  Follows commands, strength appears grossly equal.  Skin:                Warm and dry.  No rashes. Labs: Results:   Chemistry Recent Labs     02/16/22 0039   GLU 91      K 3.1*      CO2 25   BUN 28*   CREA 4.24*   CA 7.6*   AGAP 10   BUCR 7*      CBC w/Diff Recent Labs     02/16/22  0039 02/15/22  0037   WBC 23.6* 24.9*   RBC 3.06* 3.16*   HGB 9.5* 9.6*   HCT 30.0* 30.5*    358   GRANS 74* 81*   LYMPH 13* 11*   EOS 7* 4      Microbiology No results for input(s): CULT in the last 72 hours. RADIOLOGY:    All available imaging studies/reports in [facility] for this admission were reviewed      Disclaimer: Sections of this note are dictated utilizing voice recognition software, which may have resulted in some phonetic based errors in grammar and contents. Even though attempts were made to correct all the mistakes, some may have been missed, and remained in the body of the document. If questions arise, please contact our department.     Dr. Tan Galloway, Infectious Disease Specialist  [phone]  February 17, 2022  2:40 PM

## 2022-02-18 NOTE — DIALYSIS
DARRYL        ACUTE HEMODIALYSIS FLOW SHEET      HEMODIALYSIS ORDERS: Physician: Destiny Sheffield     Dialyzer: revaclear   Duration: 3 hr  BFR: 400   DFR: 800   Dialysate:  Temp 36-37*C  K+   3    Ca+  3 Na 138 Bicarb 35   Weight:  72.6 kg    Patient Chart [x]     Unable to Obtain []     Dry weight/UF Goal: 1000   Access: LUE AVF  Needle Gauge: 15    Heparin []  Bolus      Units    [] Hourly       Units    [x]None       Pre BP:   160/71    Pulse:     91       Respirations: 18  Temperature:   98.3 oral   Labs: Pre        Post:         [x] N/A   Additional Orders(medications, blood products, hypotension management):  hectorol     [x] Darryl Consent Verified     CATHETER ACCESS: [x]N/A     GRAFT/FISTULA ACCESS:  []N/A     []Right     [x]Left     [x]UE     []LE   []AVG   [x]AVF        []Buttonhole    [x]Medical Aseptic Prep Utilized   [x]No S/S infection  []Redness  []Drainage []Cultured []Swelling []Pain    Bruit:   [x] Strong    [] Weak       Thrill :   [x] Strong    [] Weak       Needle Gauge: 15   Length: 1   If access problem,  notified: []Yes     [x]N/A          Please describe access if present and not used:                            GENERAL ASSESSMENT:      LUNGS:  Rate 18 SaO2 %        [] N/A    [x] Clear  [] Coarse  [] Crackles  [] Wheezing        [] Diminished     Location : []RLL   []LLL    []RUL  []ROBERT     Cough: []Productive  []Dry  [x]N/A   Respirations:  [x]Easy  []Labored     Therapy:   [x]RA  []NC  l/min    Mask: []NRB []Venti       O2%                  []Ventilator  []Intubated  [] Trach  [] BiPaP     CARDIAC: [x]Regular      [] Irregular   [] Pericardial Rub  [] JVD        []  Monitored  [] Bedside  [] Remotely monitored [x] N/A     EDEMA: [] None   [x]Generalized  [] Pitting [] 1    [] 2    [] 3    [] 4                 [] Facial  [] Pedal  []  UE  [] LE     SKIN:   [x] Warm   [] Hot     [] Cold   [x] Dry     [] Pale   [] Diaphoretic                  [] Flushed  [] Jaundiced  [] Cyanotic  [] Rash  [] Weeping     LOC:    [x] Alert      [x]Oriented:    [x] Person     [x] Place  []Time               [] Confused  [] Lethargic  [] Medicated  [] Non-responsive     GI / ABDOMEN:  [] Flat    [] Distended    [x] Soft    [] Firm   []  Obese                             [] Diarrhea  [x] Bowel Sounds  [] Nausea  [] Vomiting       / URINE ASSESSMENT:[] Voiding   [x] Oliguria  [] Anuria   []  Burris     [] Incontinent    []  Incontinent Brief      []  Bathroom Privileges       PAIN: [x] 0 []1  []2   []3   []4   []5   []6   []7   []8   []9   []10              Scale 0-10  Action/Follow Up:      MOBILITY:  [] Amb    [] Amb/Assist    [x] Bed    [] Wheelchair  [] Stretcher      All Vitals and Treatment Details on Attached [address]: [facility]          Room # 403/01      [] 1st Time Acute  [] Stat  [x] Routine  [] Urgent     [x] Acute Room  []  Bedside  [] ICU/CCU  [] ER   Isolation Precautions:   There are currently no Active Isolations      Special Considerations:         [] Blood Consent Verified [x]N/A      ALLERGIES:   No Known Allergies            Code Status:Full Code        Hepatitis Status:                       Lab Results   Component Value Date/Time    Hepatitis B surface Ag <0.10 02/15/2022 12:57 PM    Hepatitis B surface Ab <3.10 (L) 02/15/2022 12:57 PM    Hepatitis B core, IgM Negative 09/27/2020 04:33 AM    Hepatitis C virus Ab 0.15 09/27/2020 04:33 AM                     Current Labs:   Lab Results   Component Value Date/Time    Sodium 141 02/16/2022 12:39 AM    Potassium 3.1 (L) 02/16/2022 12:39 AM    Chloride 106 02/16/2022 12:39 AM    CO2 25 02/16/2022 12:39 AM    Anion gap 10 02/16/2022 12:39 AM    Glucose 91 02/16/2022 12:39 AM    BUN 28 (H) 02/16/2022 12:39 AM    Creatinine 4.24 (H) 02/16/2022 12:39 AM    BUN/Creatinine ratio 7 (L) 02/16/2022 12:39 AM    GFR est AA 16 (L) 02/16/2022 12:39 AM    GFR est non-AA 13 (L) 02/16/2022 12:39 AM    Calcium 7.6 (L) 02/16/2022 12:39 AM      Lab Results   Component Value Date/Time    WBC 21.7 (H) 02/17/2022 08:35 AM    HGB 9.3 (L) 02/17/2022 08:35 AM    HCT 29.7 (L) 02/17/2022 08:35 AM    PLATELET 131 44/24/4225 08:35 AM    MCV 97.1 02/17/2022 08:35 AM                                                                                     DIET:   DIET ADULT       PRIMARY NURSE REPORT: First initial/Last name/Title      Pre Dialysis: Patrica Mc RN     Time: 56      EDUCATION:    [x] Patient [] Other         Knowledge Basis: []None [x]Minimal [] Substantial   Barriers to learning  [x]N/A   [] Access Care     [] S&S of infection     [] Fluid Management     []K+     [x]Procedural    []Albumin     [] Medications     [] Tx Options     [] Transplant     [] Diet     [] Other   Teaching Tools:  [x] Explain  [x] Demo  [] Handouts [] Video  Patient response:  [x] Verbalized understanding  [] Teach back  [] Return demonstration [] Requires follow up   Inappropriate due to:            [x]Time Out/Safety Check  [x]Extracorporeal Circuit Tested for integrity       RO/HEMODIALYSIS MACHINE SAFETY CHECKS  Before each treatment:       Machine Number:                   [facility]                                                                    [x] Unit Machine # 7 with centralized RO                                                                     Alarm Test:  Pass time 0910               [x] RO/Machine Log Complete      Temp   36             Dialysate: pH  7.4 Conductivity: Meter   13.9     HD Machine   13.7                  TCD: 13.9  Dialyzer Lot # [id number]            Blood Tubing Lot # [id number]          Saline Lot #  [id number]     CHLORINE TESTING-Before each treatment and every 4 hours    Total Chlorine: [x] less than 0.1 ppm  Time: 0900 4 Hr/2nd Check Time: 1300   (if greater than 0.1 ppm from Primary then every 30 minutes from Secondary)     TREATMENT INITIATION  with Dialysis Precautions:   [x] All Connections Secured                 [x] Saline Line Double Clamped   [x] Venous Parameters Set                  [x] Arterial Parameters Set    [x] Prime Given 250ml                          [x]Air Foam Detector Engaged      Treatment Initiation Note:  Pt arrived to HD unit via bed. A&Ox3 with confusion to time. Resp even/unlabored on RA.  LUE AVF assessed with strong bruit and thrill. HD initiated without difficulty with 15g x2. During Treatment Notes:  0930 Face and access in view with connections secure. 0945 Face and access in view with connections secure. 1000 Face and access in view with connections secure. 1015 Face and access in view with connections secure. 1030 Face and access in view with connections secure. 1045 Face and access in view with connections secure. 1100 Face and access in view with connections secure. 1115 Face and access in view with connections secure. 1130 Face and access in view with connections secure. 1145 Face and access in view with connections secure. 1200 Face and access in view with connections secure. 1215 Face and access in view with connections secure. 1230 Face and access in view with connections secure. 1241 Treatment complete. Medication Dose Volume Route Time [facility] name Title   tylenol 650 mg 2 tabs PO 0948 P Dominic MARIANO   hectorol 1 mcg 0.5 ml dialysis 1047 P Dominic MARIANO                   Post Assessment:   Dialyzer Cleared: [] Good [x] Fair  [] Poor  Blood processed:  68.4 L  UF Removed  1500 mL  POst BP:   145/57       Pulse: 91        Respirations: 18  Temperature: 97.7 Lungs:     [x] Clear      [] Course         [] Crackles    [] Wheezing         [] Diminished   Post Tx Vascular Access:   AVF/AVG: Bleeding stopped   Art 10 min. Kyle.  10 Min    Cardiac:   [x] Regular   [] Irregular   [] Monitor  [x] N/A           Catheter:     N/A    Skin:   Pain:   [x] Warm  [x] Dry [] Diaphoretic    [] Flushed    [] Pale [] Cyanotic [x]0  []1  []2   []3  []4   []5   []6   []7   []8   []9   []10     Post Treatment Note:  Pt tolerated treatment well. Net 1 L UF removed.      POST TREATMENT PRIMARY NURSE HANDOFF REPORT:     First initial/Last name/Title         Post Dialysis: Heaven Puckett RN      Time:  1321     Abbreviations: AVG-arterial venous graft, AVF-arterial venous fistula, IJ-Internal Jugular, Subcl-Subclavian, Fem-Femoral, Tx-treatment, AP/HR-apical heart rate, DFR-dialysate flow rate, BFR-blood flow rate, AP-arterial pressure, -venous pressure, UF-ultrafiltrate, TMP-transmembrane pressure, Kyle-Venous, Art-Arterial, RO-Reverse Osmosis

## 2022-02-18 NOTE — PROGRESS NOTES
Problem: Patient Education: Go to Patient Education Activity  Goal: Patient/Family Education  Outcome: Progressing Towards Goal     Problem: Patient Education: Go to Patient Education Activity  Goal: Patient/Family Education  Outcome: Progressing Towards Goal     Problem: Patient Education: Go to Patient Education Activity  Goal: Patient/Family Education  Outcome: Progressing Towards Goal     Problem: Falls - Risk of  Goal: *Absence of Falls  Description: Document Elvin Mart Fall Risk and appropriate interventions in the flowsheet.   Outcome: Progressing Towards Goal  Note: Fall Risk Interventions:  Mobility Interventions: Patient to call before getting OOB    Mentation Interventions: Door open when patient unattended    Medication Interventions: Patient to call before getting OOB    Elimination Interventions: Patient to call for help with toileting needs

## 2022-02-18 NOTE — PROGRESS NOTES
1905 Bedside and Verbal shift change  Received from [place], RN (outgoing nurse), to MARK Puckett (oncoming)  Pt. Is AOX 3. IV sl, Pt. denies  pain at this time. Report included the following information SBAR, Kardex, Procedure Summary, Intake/Output, MAR, Recent Lab Results, and  Cardiac Rhythm @ NSR. Will resume care and monitor Pt. Condition. Pt. Educated on call bell when in need of help and assistance. Pt. verbalized understanding. Bed alarm on.     2000 Pt. Head to toe Assessment Done and documented. 58023P. In pain at this time to BLE. Given tylenol. 2230  Pt. Given michel care, michel irrigated with sterile H2O, Pt. Able to tolerate 80 ml at this time. Lexis care given. 0000  Pt. Resting comfortably in bed.    0200  Pt. Denies discomfort. 0400  Pt. Not in distress, denies pain. 0600 Pt. Able to rest and sleep well throughout the shift. Verbal and bedside Shift changed report given to [facility], RN (oncoming RN) on Pt. Condition. Report consisted of patients Situation, History, Activities, intake/output,  Background, Assessment and Recommendations(SBAR). Information from the following report(s) Kardex, order Summary, Lab results and MAR was reviewed with the receiving nurse. Opportunity for questions and clarification was provided.

## 2022-02-18 NOTE — PROGRESS NOTES
Urology Progress Note        Assessment/Plan:     Patient Active Problem List   Diagnosis Code    Traumatic rhabdomyolysis ([facility] [address] T79. 6XXA    CAD (coronary artery disease) I25.10    Essential (primary) hypertension I10    Troponin level elevated R77.8    Acute renal failure (ARF) ([facility]) N17.9    ESRD (end stage renal disease) ([facility]) N18.6    Hyperkalemia Y50.4    Metabolic acidosis J64.8    Secondary hyperparathyroidism of renal origin ([facility]) N25.81    Anemia D64.9    Hydronephrosis N13.30    UTI (urinary tract infection) N39.0    Postobstructive diuresis R35.89    Polyp of ascending colon K63.5    Decrease in appetite R63.0    Elevated prostate specific antigen (PSA) R97.20    Osteoarthrosis M19.90    Shortness of breath R06.02    Chronic renal failure N18.9    Pneumonia J18.9    Hypoalbuminemia E88.09    Leucocytosis D72.829    Nausea and vomiting R11.2    AMS (altered mental status) R41.82    Bladder cancer ([facility]) C67.9          ASSESSMENT:   Gross Hematuria, H/o Gross Hematuria  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 Luis Youngblood              Findings: Impression: large, sessile posterior bladder wall    tumor              Hgb 5.6<6.2<6.8>4.9<83     Complicated UTI  Leukocytosis              WBC 21.7<23.6<24.9<27.1>26.9                  UCX >100K E. Faecalis. 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 Luis Youngblood in 3/21.     Last PSA:                                1.32 (1/12/22)     Current Disease Status:  Symptomatic Androgen Sensitive M1b CaP  Current Treatment Plan: ADT utilizing Eligard 45mg q6 months; Zytiga 750 mg (decreased dose 2/2 patient's fatigue) + Prednisone 5mg H/o Ascending Colonic Mass- enlarged, now 5.5 x 7.3 x 4.3 cm    Bony Mets to Spine, Ribs, Sternum, Pelvic Bone     ESRD on HD     Admitted for Acute Metabolic Encephalopathy  Right Side Facial droop     PLAN:    Appreciate overall management per medicine. CT scan reviewed, shows enlarging bladder tumor, large colon mass, osseous mets. Palliative care consulted, wife and patient would like FULL aggressive medical management. Recommend GI and GS consult for colonic mass. Will plan for possible cysto, TURBT on Monday with Dr. Shirley Valiente. Keep NPO after MN on Sunday. Hold all AC at least 48 hours before   Covid test 2/13 Not detected  Follow CX, continue IV Unasyn  Will order Repeat PSA. Maintain chronic michel- pink tinged urine with some debris. Please flush catheter with 120 cc sterile water TID, PRN decreased UOP/clots. Per nursing staff no issues with this. Hold all AC if able- on ASA and SQH  Monitor H&H- stable. Will follow closely. Follow up arranged? NO      Sherine Matamoros PA-C  [facility]  Available M-Fri, [facility]  Pager: [phone]     I have independently seen and examined the patient. I agree with the history, physical, assessment and plan as documented above. Additionally, I have addended the note above (or below) as needed in order to reflect my additional findings. Will see again Sunday to determine if appropriate for OR Monday. Requires care coordination among services with baljinder discussion of goals of care  Discussed with patient TURBT potentially very involved, morbid procedure in this case. Key Bloom MD, FACS  , Urology  THE [facility]  [facility] [place] [place]      Subjective:     Daily Progress Note: 2/18/2022 1:41 PM    Verner Soda is doing OK. He is S/p HD with pink tinged urine, with some debris. He says that he was not aware of colon tumor.     Objective:     Visit Vitals  BP (!) 161/59 Pulse 82   Temp 98.3 °F (36.8 °C) (Oral)   Resp 18   Ht 5' 6\" (1.676 m)   Wt 72.6 kg (160 lb)   SpO2 100%   BMI 25.82 kg/m²        Temp (24hrs), Av.9 °F (37.2 °C), Min:98.3 °F (36.8 °C), Max:99.6 °F (37.6 °C)      Intake and Output:   1901 -  0700  In: 7435 [P.O.:550; I.V.:100]  Out: 850 [Urine:850]  No intake/output data recorded. PHYSICAL EXAMINATION:   Visit Vitals  BP (!) 161/59   Pulse 82   Temp 98.3 °F (36.8 °C) (Oral)   Resp 18   Ht 5' 6\" (1.676 m)   Wt 72.6 kg (160 lb)   SpO2 100%   BMI 25.82 kg/m²       Constitutional: Well developed, well nourished male. No acute distress. HEENT: Normocephalic, Atraumatic, EOM's intact   CV:  no edema  Respiratory: No respiratory distress or difficulties breathing   Abdomen:  soft and non tender    Male:    SCROTUM:  No scrotal rash or lesions noticed. Normal bilateral testes and epididymis. PENIS: Urethral meatus normal in location and size. No urethral discharge. UnCircumsized. 16 fr michel in place with pink tinged urine with some debris. Skin: No evidence of jaundice. Normal color  Neuro/Psych:  Alert and oriented. Affect appropriate. Dysarthric         Lab/Data Review: All lab results for the last 24 hours reviewed. CT C/A/P 22: IMPRESSION  1. The large solid mass in the dome of the bladder is interval enlarged and  more cystic in appearance since the prior study, suggestive of tumor necrosis. 2.  Large solid mass in the hepatic flexure of the colon is also interval  enlarged with significant lumen narrowing and short intussusception. No  obstruction. This is more concerned for primary malignancy. Homogeneous  appearing rectum could represent intraluminal fluid but potential mass cannot be  excluded. Recommend barium enema for better evaluation. 3.  Atrophic kidneys, especially on the left. Small cortical cyst in midpole  right kidney. 4.  Extensive osseous metastasis appear grossly unchanged.   5.  Layering gallbladder sludge. 6.  Nonobstructive umbilical hernia. Thank you for this referral.      Labs:     Labs: Results:   Chemistry    Recent Labs     02/16/22  0039   GLU 91      K 3.1*      CO2 25   BUN 28*   CREA 4.24*   CA 7.6*   AGAP 10   BUCR 7*      CBC w/Diff Recent Labs     02/17/22  0835 02/16/22  0039   WBC 21.7* 23.6*   RBC 3.06* 3.06*   HGB 9.3* 9.5*   HCT 29.7* 30.0*    322   GRANS 76* 74*   LYMPH 10* 13*   EOS 6* 7*      Cultures No results for input(s): CULT in the last 72 hours. All Micro Results       Procedure Component Value Units Date/Time    CULTURE, BLOOD [[id number]] Collected: 02/13/22 1200    Order Status: Completed Specimen: Blood Updated: 02/18/22 0729     Special Requests: NO SPECIAL REQUESTS        Culture result: NO GROWTH 5 DAYS       CULTURE, BLOOD [[id number]] Collected: 02/13/22 1145    Order Status: Completed Specimen: Blood Updated: 02/18/22 0728     Special Requests: NO SPECIAL REQUESTS        Culture result: NO GROWTH 5 DAYS       CULTURE, URINE [[id number]]  (Abnormal)  (Susceptibility) Collected: 02/13/22 1100    Order Status: Completed Specimen: Cath Urine Updated: 02/16/22 1401     Special Requests: NEW GONZALEZ     [place] Count --        >100,000  COLONIES/mL       Culture result:       ENTEROCOCCUS FAECALIS (PREDOMINATING)                  MIXED UROGENITAL RENUKA ISOLATED          COVID-19 RAPID TEST [[id number]] Collected: 02/13/22 1710    Order Status: Completed Specimen: Nasopharyngeal Updated: 02/13/22 1833     Specimen source Nasopharyngeal        COVID-19 rapid test Not detected        Comment: Rapid Abbott ID Now       Rapid NAAT:  The specimen is NEGATIVE for SARS-CoV-2, the novel coronavirus associated with COVID-19. Negative results should be treated as presumptive and, if inconsistent with clinical signs and symptoms or necessary for patient management, should be tested with an alternative molecular assay.   Negative results do not preclude SARS-CoV-2 infection and should not be used as the sole basis for patient management decisions. This test has been authorized by the FDA under an Emergency Use Authorization (EUA) for use by authorized laboratories. Fact sheet for Healthcare Providers: ConventionUpdate.co.nz  Fact sheet for Patients: ConventionUpdate.co.nz       Methodology: Isothermal Nucleic Acid Amplification                   Urinalysis Color   Date Value Ref Range Status   02/13/2022 RED   Final     Appearance   Date Value Ref Range Status   02/13/2022 TURBID   Final     Specific gravity   Date Value Ref Range Status   02/13/2022 1.017 1.005 - 1.030   Final     pH (UA)   Date Value Ref Range Status   02/13/2022 8.5 (H) 5.0 - 8.0   Final     Protein   Date Value Ref Range Status   02/13/2022 300 (A) NEG mg/dL Final     Ketone   Date Value Ref Range Status   02/13/2022 Negative NEG mg/dL Final     Bilirubin   Date Value Ref Range Status   02/13/2022 Negative NEG   Final     Blood   Date Value Ref Range Status   02/13/2022 LARGE (A) NEG   Final     Urobilinogen   Date Value Ref Range Status   02/13/2022 0.2 0.2 - 1.0 EU/dL Final     Nitrites   Date Value Ref Range Status   02/13/2022 Negative NEG   Final     Leukocyte Esterase   Date Value Ref Range Status   02/13/2022 LARGE (A) NEG   Final     Potassium   Date Value Ref Range Status   02/16/2022 3.1 (L) 3.5 - 5.5 mmol/L Final     Creatinine   Date Value Ref Range Status   02/16/2022 4.24 (H) 0.6 - 1.3 MG/DL Final     BUN   Date Value Ref Range Status   02/16/2022 28 (H) 7.0 - 18 MG/DL Final     Prostate Specific Ag   Date Value Ref Range Status   01/12/2022 1.32 0.00 - 4.00 ng/mL Final     Comment:     (Methodology: Roche ECLIA)           PSA No results for input(s): PSA in the last 72 hours.    Coagulation Lab Results   Component Value Date/Time    Prothrombin time 12.5 02/13/2022 09:11 AM    Prothrombin time 12.2 12/30/2021 12:31 PM    INR 0.9 02/13/2022 09:11 AM    INR 0.9 12/30/2021 12:31 PM    aPTT 29.1 11/20/2018 02:10 PM    aPTT 63.5 (H) 08/30/2016 05:15 PM

## 2022-02-18 NOTE — PROGRESS NOTES
Palliative Medicine Consult    Patient Name: Danilo Saxena  YOB: 1935    Date of progress note: February 18, 2022  Reason for Consult: goals of care discussion/symptoms management  Requesting Provider: Dr. Travis Henning  Primary Care Physician: Kelly Kirk MD      SUMMARY:     Danilo Saxena is a 80 y.o. with a past history of ESRD, hypertension, CAD, chronic indwelling catheter, metastatic prostate cancer, who was admitted on 2/13/2022 from home with a diagnosis of altered mental status secondary to UTI. Patient was treated with antibiotic with improvement in his altered mental status. He had negative MRI brain. Patient was seen by urology service for metastatic cancer and had a CT scan done which showed persistent disease as well as colon mass. Current medical issues leading to Palliative Medicine involvement include: Goals of care discussion. 2/18/2022: Patient was seen in presence of palliative care RN. Patient denies any headaches or dizziness. No chest pain or abdominal pain currently. No nausea or vomiting. Denies any leg pain currently. Off-and-on back pain present. No headaches or dizziness. No shortness of breath. He stated he lives with his wife. He also stated he walks with the help of walker. No recent history of fall. PALLIATIVE DIAGNOSES:   1. goals of care discussion/symptoms management  2. Metastatic prostate cancer  3. Bone/back pain  4. New colon mass on CT scan  5. ESRD on hemodialysis    Patient Active Problem List   Diagnosis Code    Traumatic rhabdomyolysis ([facility] T79. 6XXA    CAD (coronary artery disease) I25.10    Essential (primary) hypertension I10    Troponin level elevated R77.8    Acute renal failure (ARF) (HCC) N17.9    ESRD (end stage renal disease) ([address] N18.6    Hyperkalemia C70.4    Metabolic acidosis T61.5    Secondary hyperparathyroidism of renal origin ([facility] N25.81    Anemia D64.9    Hydronephrosis N13.30    UTI (urinary tract infection) N39.0    Postobstructive diuresis R35.89    Polyp of ascending colon K63.5    Decrease in appetite R63.0    Elevated prostate specific antigen (PSA) R97.20    Osteoarthrosis M19.90    Shortness of breath R06.02    Chronic renal failure N18.9    Pneumonia J18.9    Hypoalbuminemia E88.09    Leucocytosis D72.829    Nausea and vomiting R11.2    AMS (altered mental status) R41.82    Bladder cancer ([facility] C67.9        PLAN:   1. Full code, full interventions. Please read the goals of care for further detail  2. Initial consult note routed to primary continuity provider  3. Communicated plan of care with: Palliative IDT     GOALS OF CARE / TREATMENT PREFERENCES:     GOALS OF CARE:     2/18/2022:  Received a call from Dr Saravanan Bains that he had a discussion with the patient and he is wanting to be moved to DNR/DNI and would under no circumstances want to be placed on machines at end of life. For this reason our team including this NP and Brianda Matthews RN went down and had this discussion with the patient and his wife. WIfe is supportive but not necessarily in agreement. The patient however verbalized his wishes and completed a POST for DNR/DNI and no feeding tubes with limited interventions. Order updated and POST filed on the chart. Goals of care: DNR/DNI Limited interventions with no feeding tube. 2/18/2022: Patient was seen in presence of palliative care RN. Patient states he has been here because he had infection in the urine but no abdominal pain currently. He knows his date of birth, he knows his wife's name, he knows [facility] president's name and he knows his urologist name as well as he knows what street he lives on. He told me he has been  for more than 35 years. He has capacity to make his medical decisions. He stated his wife and his daughter have been assigned as medical power of  and in a case if he is not able to make any further medical decision.   He stated he knows that he has prostate cancer and follows with a doctor but does not know his name. When I asked him about the CODE STATUS, he stated he would like to stay as long as possible and would like to have all kind of interventions to be done. He gave me permission to talk to his wife. I spoke to patient's wife over the phone. Patient's wife mentioned that she is in agreement with patient's decision being a full code and full interventions. However, she told me that they are open to change that decision if patient is deemed to be an advanced age of cancer by his urologist and/or he started having a lot of pain. She also asked me to see patient can get some pain medications for his bone pain as Tylenol/ibuprofen are not working. I have informed that we will put patient on lidocaine patch for back pain and she can talk to patient's attending physicians about pain management. Plan: Full code, full intervention    TREATMENT PREFERENCES:   Code Status: Full Code    Advance Care Planning:  Advance Care Planning 2/14/2022   Patient's Healthcare Decision Maker is: Named in scanned ACP document   Primary Decision Maker Name -   Primary Decision Maker Phone Number -   Primary Decision Maker Relationship to Patient -   Confirm Advance Directive Yes, on file   Does the patient have other document types Power of           Other Instructions: Consider GI consult for abnormal colon mass on CT scan as well as lidocaine patch for pain management.         HISTORY:     History obtained from: Patient    CHIEF COMPLAINT: Altered mental status    HPI/SUBJECTIVE:    The patient is:   [x] Verbal and participatory  [] Non-participatory due to:        FUNCTIONAL ASSESSMENT:     Palliative Performance Scale (PPS): 60       Clinical Pain Assessment (nonverbal scale for severity on nonverbal patients):              PSYCHOSOCIAL/SPIRITUAL SCREENING:       Any spiritual / Temple concerns:  [] Yes /  [x] No    Caregiver Burnout:  [] Yes /  [] No /  [x] No Caregiver Present      Anticipatory grief assessment:   [x] Normal  / [] Maladaptive          REVIEW OF SYSTEMS:     Positive and pertinent negative findings in ROS are noted above in HPI. The following systems were [x] reviewed / [] unable to be reviewed as noted in HPI  Other findings are noted below. Systems: constitutional, ears/nose/mouth/throat, respiratory, gastrointestinal, genitourinary, musculoskeletal, integumentary, neurologic, psychiatric, endocrine. Positive findings noted below. Modified ESAS Completed by: provider                                   Stool Occurrence(s): 1     As mentioned above   PHYSICAL EXAM:     From RN flowsheet:  Wt Readings from Last 3 Encounters:   02/17/22 72.6 kg (160 lb)   01/12/22 70.3 kg (155 lb)   12/30/21 70.3 kg (155 lb)       BP (!) 145/57   Pulse 91   Temp 97.7 °F (36.5 °C) (Oral)   Resp 18   Ht 5' 6\" (1.676 m)   Wt 72.6 kg (160 lb)   SpO2 100%   BMI 25.82 kg/m²     Constitutional: Awake, follows commands appropriately, not in acute distress. Eyes: pupils equal, anicteric  ENMT: no nasal discharge, moist mucous membranes  Cardiovascular: regular rhythm, distal pulses intact  Respiratory: breathing not labored, symmetric  Gastrointestinal: soft non-tender, +bowel sounds  Musculoskeletal: no deformity, no tenderness to palpation  Skin: warm, dry  Neurologic: following commands, moving all extremities, no tremors noted. Psychiatric: full affect, no hallucinations  Other: Suprapubic catheter in situ. HISTORY:     Past Medical History:   Diagnosis Date    Cancer [facility])     Prostate    Cardiac echocardiogram 08/29/2016    EF 60-65%. No WMA. Mild LVH. Indeterminate diastolic fx. RVSP 35 mmHg. No significant valvular heart disease.  Cardiac nuclear imaging test, abnormal 08/29/2016    Intermediate risk. Previous inferior infarction w/very mild fabiana-infarct ischemia. Inferior hypk. EF 68%.   Nondiagnostic EKG on pharm stress test.  Pt's BP increased from 193/96 to 207/83 during study.  Carotid duplex 08/30/2016    Mild <50% bilateral ICA stenosis.  Chronic kidney disease     on HD MWF ad [facility]    Colonic mass     per Dr Lara Ramachandran notes    Enlarged prostate     Hypertension       Past Surgical History:   Procedure Laterality Date    COLONOSCOPY N/A 10/1/2020    COLONOSCOPY, b'x, w tattoo w polypectomy performed by Margaux Hanks MD at [address]  10/1/2020         Anastasiia Nicole  10/1/2020         COLONOSCPY,FLEX,W/ [facility] [place]  10/1/2020         MD INSJ TUNNELED CVC W/O SUBQ PORT/ AGE 5 YR/> N/A 9/28/2020    INSERTION TUNNELED CENTRAL VENOUS CATHETER performed by Gabe Harley MD at [facility] CATH LAB    VASCULAR SURGERY PROCEDURE UNLIST      dialysis access- right neck      History reviewed. No pertinent family history. History reviewed, no pertinent family history.   Social History     Tobacco Use    Smoking status: Never Smoker    Smokeless tobacco: Never Used   Substance Use Topics    Alcohol use: No     No Known Allergies   Current Facility-Administered Medications   Medication Dose Route Frequency    L. acidophilus,casei,rhamnosus (BIO-K PLUS) capsule 1 Capsule  1 Capsule Oral DAILY    ampicillin-sulbactam (UNASYN) 3 g in 0.9% sodium chloride (MBP/ADV) 100 mL MBP  3 g IntraVENous Q24H    acetaminophen (TYLENOL) tablet 650 mg  650 mg Oral Q6H PRN    [Held by provider] aspirin chewable tablet 81 mg  81 mg Oral DAILY    abiraterone tab 1,000 mg (Patient Supplied)  1,000 mg Oral DAILY    doxercalciferoL (HECTOROL) 4 mcg/2 mL injection 1 mcg  1 mcg IntraVENous DIALYSIS MON, WED & FRI    epoetin brina-epbx (RETACRIT) injection 4,000 Units  4,000 Units SubCUTAneous Q MON, WED & FRI    heparin (porcine) injection 5,000 Units  5,000 Units SubCUTAneous Q8H    sodium chloride (NS) flush 5-10 mL  5-10 mL IntraVENous PRN    labetaloL (NORMODYNE;TRANDATE) 20 mg/4 mL (5 mg/mL) injection 5 mg  5 mg IntraVENous Q10MIN PRN        LAB AND IMAGING FINDINGS:     No results found for this or any previous visit (from the past 24 hour(s)). Total time to take care of this patient was 15 minutes and more than 50% of time was spent counseling and coordinating care. Disclaimer: Sections of this note are dictated using utilizing voice recognition software, which may have resulted in some phonetic based errors in grammar and contents. Even though attempts were made to correct all the mistakes, some may have been missed, and remained in the body of the document. If questions arise, please contact our department.

## 2022-02-18 NOTE — PROGRESS NOTES
Progress Note    Radha Mckinney  80 y.o. Admit Date: 2/13/2022  Active Problems:    Essential (primary) hypertension (9/1/2016) POA: Yes      ESRD (end stage renal disease) ([facility] (9/26/2020) POA: Yes      Secondary hyperparathyroidism of renal origin ([facility] (9/26/2020) POA: Yes      Anemia (9/26/2020) POA: Yes      Hydronephrosis (9/29/2020) POA: Yes      UTI (urinary tract infection) (9/29/2020) POA: Yes      Leucocytosis (10/20/2021) POA: Yes      AMS (altered mental status) (2/13/2022) POA: Unknown      Bladder cancer ([facility] (2/16/2022) POA: Unknown            Subjective:     Patient feels ok, no new complain, Wife at the bed side. Discussed with her that how much she knows & understand about her Husbands health. Told her he has Prostate Cancer with Mets to Bones,Liver,Also has Bladder cancer with UTI, Hematuria. More over has Hydronephrosis required [place]ris,also possibly has Colon cancer. She understands & as long as her  feeling good she wants him to be Full code & continue dialysis      A comprehensive review of systems was negative except for that written in the History of Present Illness.     Objective:     Visit Vitals  BP (!) 152/70 (BP 1 Location: Right arm, BP Patient Position: At rest;Supine;Lying)   Pulse 86   Temp 98.9 °F (37.2 °C)   Resp 20   Ht 5' 6\" (1.676 m)   Wt 69.4 kg (153 lb 1.6 oz)   SpO2 100%   BMI 24.71 kg/m²         Intake/Output Summary (Last 24 hours) at 2/17/2022 1922  Last data filed at 2/17/2022 1404  Gross per 24 hour   Intake 980 ml   Output 550 ml   Net 430 ml       Current Facility-Administered Medications   Medication Dose Route Frequency Provider Last Rate Last Admin    iopamidoL (ISOVUE 300) 61 % contrast injection 100 mL  100 mL IntraVENous RAD Mark Alonso MD        ampicillin-sulbactam (UNASYN) 3 g in 0.9% sodium chloride (MBP/ADV) 100 mL MBP  3 g IntraVENous Q24H Hang CLEMENTE  mL/hr at 02/17/22 0812 3 g at 02/17/22 1849    acetaminophen (TYLENOL) tablet 650 mg  650 mg Oral Q6H PRN Marilou Joe MD   650 mg at 02/17/22 1514    aspirin chewable tablet 81 mg  81 mg Oral DAILY Marilou Joe MD   81 mg at 02/17/22 6435    abiraterone tab 1,000 mg (Patient Supplied)  1,000 mg Oral DAILY Marilou Joe MD   1,000 mg at 02/17/22 0816    doxercalciferoL (HECTOROL) 4 mcg/2 mL injection 1 mcg  1 mcg IntraVENous DIALYSIS MON, WED & Mauricio King MD        epoetin brina-epbx (RETACRIT) injection 4,000 Units  4,000 Units SubCUTAneous Q MON, WED & Mauricio King MD   4,000 Units at 02/16/22 2122    heparin (porcine) injection 5,000 Units  5,000 Units SubCUTAneous Q8H Marilou Joe MD   5,000 Units at 02/17/22 1138    sodium chloride (NS) flush 5-10 mL  5-10 mL IntraVENous PRN Oli Estrada MD        labetaloL (NORMODYNE;TRANDATE) 20 mg/4 mL (5 mg/mL) injection 5 mg  5 mg IntraVENous Q10MIN PRN Luis A Partida, NP            Physical Exam:     Physical Exam:   General:  Alert, cooperative, no distress, appears stated age. Neck: Supple, symmetrical, trachea midline, no adenopathy, thyroid: no enlargement/tenderness/nodules, no carotid bruit and no JVD. Lungs:   Clear to auscultation bilaterally. Heart:  Regular rate and rhythm, S1, S2 normal, no murmur, click, rub or gallop. Abdomen:   Soft, non-tender. Bowel sounds normal. No masses,  No organomegaly.    Extremities: Extremities normal, atraumatic, no cyanosis or edema, AVG has good thrill & Bruit   Skin: Skin color, texture, turgor normal. No rashes or lesions         Data Review:    CBC w/Diff    Recent Labs     02/17/22  0835 02/16/22  0039 02/16/22  0039 02/15/22  0037 02/15/22  0037   WBC 21.7*  --  23.6*  --  24.9*   RBC 3.06*  --  3.06*  --  3.16*   HGB 9.3*  --  9.5*  --  9.6*   HCT 29.7*  --  30.0*  --  30.5*   MCV 97.1   < > 98.0   < > 96.5   MCH 30.4   < > 31.0   < > 30.4   MCHC 31.3   < > 31.7   < > 31.5   RDW 12.8   < > 12.7   < > 12.6    < > = values in this interval not displayed. Recent Labs     02/17/22  0835 02/16/22  0039 02/16/22  0039 02/15/22  0037 02/15/22  0037   MONOS 6  --  6  --  3   EOS 6*  --  7*  --  4   BASOS 1   < > 0   < > 1   RDW 12.8   < > 12.7   < > 12.6    < > = values in this interval not displayed. Comprehensive Metabolic Profile    Recent Labs     02/16/22 0039      K 3.1*      CO2 25   BUN 28*   CREA 4.24*    Recent Labs     02/16/22 0039   CA 7.6*   PHOS 3.1                        Impression:       Active Hospital Problems    Diagnosis Date Noted    Bladder cancer ([facility] 02/16/2022    AMS (altered mental status) 02/13/2022    Leucocytosis 10/20/2021    Hydronephrosis 09/29/2020    UTI (urinary tract infection) 09/29/2020    ESRD (end stage renal disease) ([facility] 09/26/2020    Anemia 09/26/2020    Secondary hyperparathyroidism of renal origin ([facility] 09/26/2020    Essential (primary) hypertension 09/01/2016            Plan:     Continue current supportive care, Dialysis tomorrow.       Breanna Maya MD

## 2022-02-18 NOTE — CONSULTS
Palliative Medicine Consult    Patient Name: Verner Soda  YOB: 1935    Date of Initial Consult: February 18, 2022  Reason for Consult: goals of care discussion/symptoms management  Requesting Provider: Dr. Soledad Smith  Primary Care Physician: Justo Mayo MD      SUMMARY:     Verner Soda is a 80 y.o. with a past history of ESRD, hypertension, CAD, chronic indwelling catheter, metastatic prostate cancer, who was admitted on 2/13/2022 from home with a diagnosis of altered mental status secondary to UTI. Patient was treated with antibiotic with improvement in his altered mental status. He had negative MRI brain. Patient was seen by urology service for metastatic cancer and had a CT scan done which showed persistent disease as well as colon mass. Current medical issues leading to Palliative Medicine involvement include: Goals of care discussion. 2/18/2022: Patient was seen in presence of palliative care RN. Patient denies any headaches or dizziness. No chest pain or abdominal pain currently. No nausea or vomiting. Denies any leg pain currently. Off-and-on back pain present. No headaches or dizziness. No shortness of breath. He stated he lives with his wife. He also stated he walks with the help of walker. No recent history of fall. PALLIATIVE DIAGNOSES:   1. goals of care discussion/symptoms management  2. Metastatic prostate cancer  3. Bone/back pain  4. New colon mass on CT scan  5. ESRD on hemodialysis    Patient Active Problem List   Diagnosis Code    Traumatic rhabdomyolysis ([facility] [address] T79. 6XXA    CAD (coronary artery disease) I25.10    Essential (primary) hypertension I10    Troponin level elevated R77.8    Acute renal failure (ARF) (HCC) N17.9    ESRD (end stage renal disease) ([address] N18.6    Hyperkalemia N13.4    Metabolic acidosis A84.9    Secondary hyperparathyroidism of renal origin ([facility] [address] N25.81    Anemia D64.9    Hydronephrosis N13.30    UTI (urinary tract infection) N39.0    Postobstructive diuresis R35.89    Polyp of ascending colon K63.5    Decrease in appetite R63.0    Elevated prostate specific antigen (PSA) R97.20    Osteoarthrosis M19.90    Shortness of breath R06.02    Chronic renal failure N18.9    Pneumonia J18.9    Hypoalbuminemia E88.09    Leucocytosis D72.829    Nausea and vomiting R11.2    AMS (altered mental status) R41.82    Bladder cancer ([address] C67.9        PLAN:   1. Full code, full interventions. Please read the goals of care for further detail  2. Initial consult note routed to primary continuity provider  3. Communicated plan of care with: Palliative IDT     GOALS OF CARE / TREATMENT PREFERENCES:     GOALS OF CARE:     2/18/2022: Patient was seen in presence of palliative care RN. Patient states he has been here because he had infection in the urine but no abdominal pain currently. He knows his date of birth, he knows his wife's name, he knows [facility] president's name and he knows his urologist name as well as he knows what street he lives on. He told me he has been  for more than 35 years. He has capacity to make his medical decisions. He stated his wife and his daughter have been assigned as medical power of  and in a case if he is not able to make any further medical decision. He stated he knows that he has prostate cancer and follows with a doctor but does not know his name. When I asked him about the CODE STATUS, he stated he would like to stay as long as possible and would like to have all kind of interventions to be done. He gave me permission to talk to his wife. I spoke to patient's wife over the phone. Patient's wife mentioned that she is in agreement with patient's decision being a full code and full interventions. However, she told me that they are open to change that decision if patient is deemed to be an advanced age of cancer by his urologist and/or he started having a lot of pain. She also asked me to see patient can get some pain medications for his bone pain as Tylenol/ibuprofen are not working. I have informed that we will put patient on lidocaine patch for back pain and she can talk to patient's attending physicians about pain management. Plan: Full code, full intervention    TREATMENT PREFERENCES:   Code Status: Full Code    Advance Care Planning:  Advance Care Planning 2/14/2022   Patient's Healthcare Decision Maker is: Named in scanned ACP document   Primary Decision Maker Name -   Primary Decision Maker Phone Number -   Primary Decision Maker Relationship to Patient -   Confirm Advance Directive Yes, on file   Does the patient have other document types Power of           Other Instructions: Consider GI consult for abnormal colon mass on CT scan as well as lidocaine patch for pain management. HISTORY:     History obtained from: Patient    CHIEF COMPLAINT: Altered mental status    HPI/SUBJECTIVE:    The patient is:   [x] Verbal and participatory  [] Non-participatory due to:        FUNCTIONAL ASSESSMENT:     Palliative Performance Scale (PPS): 60       Clinical Pain Assessment (nonverbal scale for severity on nonverbal patients):              PSYCHOSOCIAL/SPIRITUAL SCREENING:       Any spiritual / Druze concerns:  [] Yes /  [x] No    Caregiver Burnout:  [] Yes /  [] No /  [x] No Caregiver Present      Anticipatory grief assessment:   [x] Normal  / [] Maladaptive          REVIEW OF SYSTEMS:     Positive and pertinent negative findings in ROS are noted above in HPI. The following systems were [x] reviewed / [] unable to be reviewed as noted in HPI  Other findings are noted below. Systems: constitutional, ears/nose/mouth/throat, respiratory, gastrointestinal, genitourinary, musculoskeletal, integumentary, neurologic, psychiatric, endocrine. Positive findings noted below.     Modified ESAS Completed by: provider                                   Stool Occurrence(s): 1 As mentioned above   PHYSICAL EXAM:     From RN flowsheet:  Wt Readings from Last 3 Encounters:   02/17/22 72.6 kg (160 lb)   01/12/22 70.3 kg (155 lb)   12/30/21 70.3 kg (155 lb)       BP (!) 145/57   Pulse 91   Temp 97.7 °F (36.5 °C) (Oral)   Resp 18   Ht 5' 6\" (1.676 m)   Wt 72.6 kg (160 lb)   SpO2 100%   BMI 25.82 kg/m²     Constitutional: Awake, follows commands appropriately, not in acute distress. Eyes: pupils equal, anicteric  ENMT: no nasal discharge, moist mucous membranes  Cardiovascular: regular rhythm, distal pulses intact  Respiratory: breathing not labored, symmetric  Gastrointestinal: soft non-tender, +bowel sounds  Musculoskeletal: no deformity, no tenderness to palpation  Skin: warm, dry  Neurologic: following commands, moving all extremities, no tremors noted. Psychiatric: full affect, no hallucinations  Other: Suprapubic catheter in situ. HISTORY:     Past Medical History:   Diagnosis Date    Cancer [facility])     Prostate    Cardiac echocardiogram 08/29/2016    EF 60-65%. No WMA. Mild LVH. Indeterminate diastolic fx. RVSP 35 mmHg. No significant valvular heart disease.  Cardiac nuclear imaging test, abnormal 08/29/2016    Intermediate risk. Previous inferior infarction w/very mild fabiana-infarct ischemia. Inferior hypk. EF 68%. Nondiagnostic EKG on pharm stress test.  Pt's BP increased from 193/96 to 207/83 during study.  Carotid duplex 08/30/2016    Mild <50% bilateral ICA stenosis.       Chronic kidney disease     on HD MWF ad [facility]    Colonic mass     per Dr Mónica Vale notes    Enlarged prostate     Hypertension       Past Surgical History:   Procedure Laterality Date    COLONOSCOPY N/A 10/1/2020    COLONOSCOPY, b'x, w tattoo w polypectomy performed [facility]  10/1/2020         COLONOSCOPY,[address]ADITYA [address]  10/1/2020         COLONOSCPY,FLEX,W/ [facility] [place]  10/1/2020         MD INSJ TUNNELED CVC W/O SUBQ PORT/ AGE 5 YR/> N/A 9/28/2020    INSERTION TUNNELED CENTRAL VENOUS CATHETER performed by Maria Luz Joiner MD at [facility] CATH LAB    VASCULAR SURGERY PROCEDURE UNLIST      dialysis access- right neck      History reviewed. No pertinent family history. History reviewed, no pertinent family history. Social History     Tobacco Use    Smoking status: Never Smoker    Smokeless tobacco: Never Used   Substance Use Topics    Alcohol use: No     No Known Allergies   Current Facility-Administered Medications   Medication Dose Route Frequency    L. acidophilus,casei,rhamnosus (BIO-K PLUS) capsule 1 Capsule  1 Capsule Oral DAILY    ampicillin-sulbactam (UNASYN) 3 g in 0.9% sodium chloride (MBP/ADV) 100 mL MBP  3 g IntraVENous Q24H    acetaminophen (TYLENOL) tablet 650 mg  650 mg Oral Q6H PRN    aspirin chewable tablet 81 mg  81 mg Oral DAILY    abiraterone tab 1,000 mg (Patient Supplied)  1,000 mg Oral DAILY    doxercalciferoL (HECTOROL) 4 mcg/2 mL injection 1 mcg  1 mcg IntraVENous DIALYSIS MON, WED & FRI    epoetin brina-epbx (RETACRIT) injection 4,000 Units  4,000 Units SubCUTAneous Q MON, WED & FRI    heparin (porcine) injection 5,000 Units  5,000 Units SubCUTAneous Q8H    sodium chloride (NS) flush 5-10 mL  5-10 mL IntraVENous PRN    labetaloL (NORMODYNE;TRANDATE) 20 mg/4 mL (5 mg/mL) injection 5 mg  5 mg IntraVENous Q10MIN PRN        LAB AND IMAGING FINDINGS:     No results found for this or any previous visit (from the past 24 hour(s)). Total time to take care of this patient was 60 minutes and more than 50% of time was spent counseling and coordinating care. Disclaimer: Sections of this note are dictated using utilizing voice recognition software, which may have resulted in some phonetic based errors in grammar and contents. Even though attempts were made to correct all the mistakes, some may have been missed, and remained in the body of the document.  If questions arise, please contact our department.

## 2022-02-18 NOTE — ACP (ADVANCE CARE PLANNING)
Palliative Medicine    [facility] (Physician Orders for Scope of Treatment)       Date of conversation: 2/18/2022  Location: [facility]  Length (minutes): 30    Participants:   [x] Patient      Other persons present:   Name: Awa Paul    Relationship to patient: Spouse   Name:     Relationship to patient:      [employer]® ACP Facilitator: Mary Bishop RN      Conversation Topics   (If Patient does not have decision making capacity, Agent/Surrogate responds based on understanding of how patient would respond if capable)    Understanding of Medical Condition/s AND Potential Complications:    Patient response: Pt has informed Dr. Magdaleno Mckenzie that he does not want to be placed on life support or subjected to CPR if he dies. He understands that with his advanced age, metastatic cancer, newly found tumor that these measures would not bring him back to a better state of health. He is agreeable to complete a POST form for DDNR, limited medical interventions, and NO feeding tube. Pt wants to continue dialysis and wants to go home for dispo planning. POST form completed and signed by pt.      Needs to discuss with spiritual/Alevism advisor: [] Yes  [x] No    Needs more information about illness and complications:  [x] Yes  [] No      Cardiopulmonary Resuscitation        Order Elected for CPR:  []  Attempt Resuscitation [x]  Do Not Attempt Resuscitation      When NOT in Cardiopulmonary Arrest, Order Elected:      [] Comfort Measures  [x] Limited Additional Interventions  [] Full Interventions    Artificially Administered Nutrition, Order Elected:    [x] No Feeding Tube   [] Feeding Tube for a defined trial period  [] Feeding Tube long-term if indicated    The following was provided (check all that apply):      Healthcare Decision Information Cards:   [] Help with Breathing Facts   [] Tube Feeding Facts   [] CPR Facts      [x] Review of existing Advance Directive  [] Assistance with Completion of New Advance Directive   [x] Review of [place] POST Form       Meeting Outcomes:   [] ACP discussion completed   [x] [place] form completed  [x] [place] prepared for Provider review and signature   [x] Original placed on Chart, if in facility (form to be sent with patient at discharge)  [x] Copy given to healthcare agent    [] Copy scanned to electronic medical record     Follow-up plan:     [] Schedule follow-up conversation to continue planning   [x] Referred individual to Provider for additional questions/concerns   [x] Advised patient/agent/surrogate to review completed POST form and update if needed with changes in condition, patient preferences or care setting     [] This note routed to one or more involved healthcare providers    Pt is now DNR/DNI. POST form completed by pt. Plan for pt to d/c home when medically stable. Appreciate Attending assistance with this case. Thank you for the Palliative Medicine consult and allowing us to participate in the care of Mr. Carlos Hamilton. Will continue to monitor and provide support.     Polina Vazquez RN, BSN  Palliative Medicine Inpatient RN  [facility]  Palliative COPE Line: [phone])

## 2022-02-18 NOTE — PROGRESS NOTES
Palliative Medicine    Palliative Medicine team Dr. Earl Downey MD and Ranjana Roberts RN met at the pt's bedside in dialysis. Pt was alert and oriented x 3. States that he has been doing really well until recently when he got a urinary infection. Pt had poor insight into how advanced his metastatic prostate cancer has advanced. He also was not aware that a new mass has been found in the intestine close to the liver. Discussed benefits and burdens of CPR and intubation with the pt. The pt stated, \"I want you to do everything because I want to be around as long as possible. \" Explained our concern that in the setting of metastatic cancer and advanced age that the pt would not thrive with those measures. Pt was firm in his decision for FULL code. Telephone call was made to pt's wife and she also confirms FULL code with the pt. She stated, \"Unless someone tells me that he is at the end of his life or that he is suffering, I want him to be brought back. \"    Pt remains FULL code with FULL aggressive medical management. Thank you for the Palliative Medicine consult and allowing us to participate in the care of Mr. Rosie Barragan. Will continue to monitor and provide support.     Ranjana Roberts RN, BSN  Palliative Medicine Inpatient RN  [facility]  Palliative COPE Line: [phone])

## 2022-02-18 NOTE — ROUTINE PROCESS
0710- Bedside, Verbal and Written shift change report received by Christelle Vizcarra (oncoming nurse) by Coretta(offgoing nurse). Report included the following information SBAR, Kardex, Intake/Output, MAR and Recent Results. 0800-Assessment completed, call bell within reach, no distress noted. AM  medications administered, pt tolerated with ease, will continue to monitor. 0910-taken to dialysis  1330-- Shift reassessment, pt condition unchanged, will continue to monitor. Returned from dialysis  1600--  Shift reassessment, pt condition unchanged, will continue to monitor. 1910- Bedside, Verbal and Written shift change report given to Yamilex(oncoming nurse) by Christelle Vizcarra (offgoing nurse). Report included the following information SBAR, Kardex, Intake/Output, MAR and Recent Results. Skin assessment completed.

## 2022-02-18 NOTE — PROGRESS NOTES
Infectious Disease progress Note        Reason: sepsis    Current abx Prior abx     Ampicillin/sulbactam since 2/17 Zosyn, vancomycin  2/16/22     Lines:       Assessment :    80 y.o.  male with PMH of end-stage renal disease, prostate cancer with metastases, hypertension, CAD, chronic indwelling Burris catheter, anemia, who presented to [facility] on 2/13/22 with altered mental status.        Now with significant leukocytosis, hematuria, positive urine culture, altered mental status on admission     I agree that clinical presentation is concerning for sepsis. However after obtaining detailed history, exam, review of available labs/imaging studies, clinical suspicion of sepsis is low. I believe patient's leukocytosis is likely leukemoid reaction to  metastatic disease, hemorrhagic cyst        persistent leukocytosis despite broad-spectrum antibiotics, clinical improvement argues against infectious etiology of leukocytosis    Hemorrhagic cystitis -urine culture 2/13 : Greater than 2000 because of Enterococcus suggestive of Enterococcus cystitis. Persistent hematuria despite appropriate antibiotics could be secondary to cystitis versus underlying prostate cancer. Urology follow-up appreciated    ESRD: on HD     Improving leukocytosis, improving hematuria    Recommendations:     1.    D/c  IV ampicillin/sulbactam  2. Follow-up urology recommendations  for persistent hematuria  3. Follow-up nephrology recommendations  4.  switch to oral Augmentin till 2/29/2022   5. Probiotics while on antibiotics       Above plan was discussed in details with patient,  and primary team. Please call me if any further questions or concerns. Will continue to participate in the care of this patient. HPI:        Patient states that he feels fine. Denies any abdominal pain/flank pain. Denies subjective fever, chills.       Past Medical History:   Diagnosis Date    Cancer [facility])     Prostate    Cardiac echocardiogram 08/29/2016    EF 60-65%. No WMA. Mild LVH. Indeterminate diastolic fx. RVSP 35 mmHg. No significant valvular heart disease.  Cardiac nuclear imaging test, abnormal 08/29/2016    Intermediate risk. Previous inferior infarction w/very mild fabiana-infarct ischemia. Inferior hypk. EF 68%. Nondiagnostic EKG on pharm stress test.  Pt's BP increased from 193/96 to 207/83 during study.  Carotid duplex 08/30/2016    Mild <50% bilateral ICA stenosis.  Chronic kidney disease     on HD MWF ad [facility]    Colonic mass     per Dr Ally Padron notes    Enlarged prostate     Hypertension        Past Surgical History:   Procedure Laterality Date    COLONOSCOPY N/A 10/1/2020    COLONOSCOPY, b'x, w tattoo w polypectomy performed [facility] Quynh Vazquez [facility]  10/1/2020         [facility]  10/1/2020         COLONOSCPY,FLEX,W/ [facility] [place]  10/1/2020         MT INSJ TUNNELED CVC W/O SUBQ PORT/ AGE 5 YR/> N/A 9/28/2020    INSERTION TUNNELED CENTRAL VENOUS CATHETER performed by Dorothea Tao MD at [facility]   [address]      dialysis access- right neck       Current Discharge Medication List      CONTINUE these medications which have NOT CHANGED    Details   predniSONE (DELTASONE) 5 mg tablet TAKE 1 TABLET BY MOUTH 2 TIMES A DAY  Qty: 60 Tablet, Refills: 4    Associated Diagnoses: Malignant neoplasm of prostate ([facility] [address] Osseous metastasis ([facility] [address]      ! ! abiraterone 250 mg tab TAKE 4 TABLETS BY MOUTH 1 TIME A DAY ON AN EMPTY STOMACH. TAKE ONE HOUR PRIOR TO FOOD OR TWO HOURS AFTER FOOD. SWALLOW TABLETS WHOLE WITH WATER AND DO NOT CRUSH OR CHEW TABLETS. Qty: 120 Tablet, Refills: 4      acetaminophen (TYLENOL) 500 mg tablet Take 500 mg by mouth every six (6) hours as needed for Pain.      pantoprazole (PROTONIX) 40 mg tablet Take 1 Tablet by mouth Before breakfast and dinner.   Qty: 60 Tablet, Refills: 0      amLODIPine (NORVASC) 10 mg tablet TAKE 1 TABLET BY MOUTH EVERY DAY      calcium acetate,phosphat bind, (PHOSLO) 667 mg cap Take 1 Capsule by mouth daily. !! abiraterone (Zytiga) 250 mg tab Take four tablets by mouth daily on an empty stomach. Take one hour prior to food or two hours after food. Tablets should be swallowed whole with water. Do not crush or chew tablets. Qty: 120 Tab, Refills: 5    Associated Diagnoses: Malignant neoplasm of prostate ([address] Osseous metastasis ([facility])      B complex w-C no.20/folic acid (TRIPHROCAPS PO) Take 1 Cap by mouth daily. !! - Potential duplicate medications found. Please discuss with provider. Current Facility-Administered Medications   Medication Dose Route Frequency    L. acidophilus,casei,rhamnosus (BIO-K PLUS) capsule 1 Capsule  1 Capsule Oral DAILY    [START ON 2/19/2022] amoxicillin-clavulanate (AUGMENTIN) 500-125 mg per tablet 1 Tablet  1 Tablet Oral Q24H    acetaminophen (TYLENOL) tablet 650 mg  650 mg Oral Q6H PRN    [Held by provider] aspirin chewable tablet 81 mg  81 mg Oral DAILY    abiraterone tab 1,000 mg (Patient Supplied)  1,000 mg Oral DAILY    doxercalciferoL (HECTOROL) 4 mcg/2 mL injection 1 mcg  1 mcg IntraVENous DIALYSIS MON, WED & FRI    epoetin brina-epbx (RETACRIT) injection 4,000 Units  4,000 Units SubCUTAneous Q MON, WED & FRI    heparin (porcine) injection 5,000 Units  5,000 Units SubCUTAneous Q8H    sodium chloride (NS) flush 5-10 mL  5-10 mL IntraVENous PRN    labetaloL (NORMODYNE;TRANDATE) 20 mg/4 mL (5 mg/mL) injection 5 mg  5 mg IntraVENous Q10MIN PRN       Allergies: Patient has no known allergies. History reviewed. No pertinent family history.   Social History     Socioeconomic History    Marital status:      Spouse name: Not on file    Number of children: Not on file    Years of education: Not on file    Highest education level: Not on file   Occupational History    Not on file   Tobacco Use    Smoking status: Never Smoker    Smokeless tobacco: Never Used   Vaping Use    Vaping Use: Never used   Substance and Sexual Activity    Alcohol use: No    Drug use: Never    Sexual activity: Not on file   Other Topics Concern    Not on file   Social History Narrative    Not on file     Social Determinants of Health     Financial Resource Strain:     Difficulty of Paying Living Expenses: Not on file   Food Insecurity:     Worried About Running Out of Food in the Last Year: Not on file    Kaylynn of Food in the Last Year: Not on file   Transportation Needs:     Lack of Transportation (Medical): Not on file    Lack of Transportation (Non-Medical): Not on file   Physical Activity:     Days of Exercise per Week: Not on file    Minutes of Exercise per Session: Not on file   Stress:     Feeling of Stress : Not on file   Social Connections:     Frequency of Communication with Friends and Family: Not on file    Frequency of Social Gatherings with Friends and Family: Not on file    Attends [employer] Services: Not on file    Active Member of [address] [place] [address] or Organizations: Not on file    Attends Club or Organization Meetings: Not on file    Marital Status: Not on file   Intimate Partner Violence:     Fear of Current or Ex-Partner: Not on file    Emotionally Abused: Not on file    Physically Abused: Not on file    Sexually Abused: Not on file   Housing Stability:     Unable to Pay for Housing in the Last Year: Not on file    Number of [place] in the Last Year: Not on file    Unstable Housing in the Last Year: Not on file     Social History     Tobacco Use   Smoking Status Never Smoker   Smokeless Tobacco Never Used        Temp (24hrs), Av.8 °F (37.1 °C), Min:97.7 °F (36.5 °C), Max:99.6 °F (37.6 °C)    Visit Vitals  BP (!) 145/57   Pulse 91   Temp 97.7 °F (36.5 °C) (Oral)   Resp 18   Ht 5' 6\" (1.676 m)   Wt 72.6 kg (160 lb)   SpO2 100%   BMI 25.82 kg/m²       ROS: 12 point ROS obtained in details.  Pertinent positives as mentioned in HPI,   otherwise negative    Physical Exam:    General:          Alert, in NAD  HEENT:          Sclera anicteric.  Conjunctiva pink. Mucous membranes                          Moist, no ear or nasal discharge; right sided facial droop present  Neck:               Supple.  Trachea midline.  No accessory muscle use. CV:                  Regular rate and rhythm. S1S2+  Lungs:             Clear to auscultation bilaterally.  No Wheezing or Rhonchi. No rales. Abdomen:        Soft, non-tender. Not distended.  Bowel sounds normal; Burris catheter in place draining blood-tinged urine  Extremities:     No cyanosis.  No edema. LUE fistula without erythema  Neurologic:      Alert and oriented X person and place only not to time or situation.  Follows commands, strength appears grossly equal.  Skin:                Warm and dry.  No rashes. Labs: Results:   Chemistry Recent Labs     02/16/22  0039   GLU 91      K 3.1*      CO2 25   BUN 28*   CREA 4.24*   CA 7.6*   AGAP 10   BUCR 7*      CBC w/Diff Recent Labs     02/17/22  0835 02/16/22  0039   WBC 21.7* 23.6*   RBC 3.06* 3.06*   HGB 9.3* 9.5*   HCT 29.7* 30.0*    322   GRANS 76* 74*   LYMPH 10* 13*   EOS 6* 7*      Microbiology No results for input(s): CULT in the last 72 hours. RADIOLOGY:    All available imaging studies/reports in [facility] for this admission were reviewed      Disclaimer: Sections of this note are dictated utilizing voice recognition software, which may have resulted in some phonetic based errors in grammar and contents. Even though attempts were made to correct all the mistakes, some may have been missed, and remained in the body of the document. If questions arise, please contact our department.     Dr. Inna Olmedo, Infectious Disease Specialist  [phone]  February 18, 2022  2:40 PM

## 2022-02-18 NOTE — PROGRESS NOTES
[facility]  [facility] Note    Patient: Ed Wells Age: 80 y.o. : 1935 MR#: [id number] SSN: [id number]  Date/Time: 2022     Subjective:     Patient seen and evaluated, lying in bed, no acute distress. 59-year-old -American male with a past medical history of end-stage renal disease on hemodialysis, prostate cancer with mets, hypertension, CAD, chronic indwelling Burris, anemia of chronic disease presents to the emergency room with altered mental status. ER evaluationpatient noted to have a UTI, started on broad-spectrum IV antibiotics. Concern for right-sided facial droop, neurology consulted and recommended MRI for further evaluation. MRI brain negative for acute CVA. ID consulted for persistent leukocytosis. 2/18stop IV Unasyn per ID    I had a long discussion with the patient as well as his wife, they are agreeable to DNR. Palliative care on board, signed DNR form. Discussed with urology regarding hematuria, plan for TURBT on Monday, hold all anticoagulation and keep n.p.o.  night. Assessment/Plan:     1. Acute metabolic encephalopathylikely secondary to UTIcompleted IV antibiotics, mental status back to baseline. 2. UTI with hematuriacompleted IV antibiotics. 3. Leukocytosis likely underlying cancer. 4. Hematuriaper patient this is new, consulted urology since patient has underlying prostate cancer and is following up with them. CT chest abdomen pelvis with contrast ordered, results reviewed, per urology plan for TURBT on Monday. 5. Right-sided facial droop concerning for CVAMRI brain negative for acute CVA. 6. ESRD on HDnephrology on board continue hemodialysis per schedule  7. History of prostate cancer with metson abiraterone / prednisone as out pt, restarted home medications.   8. 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  DVT prophylaxisHeparin  DNR    PT and OT eval, recommending rehab but patient's wife would like to take him home. Continue current treatment plan, will follow. Shemar Alves MD  22      Case discussed with:  []Patient  []Family  [x]Nursing  []Case Management  DVT Prophylaxis:  []Lovenox  [x]Hep SQ  []SCDs  []Coumadin   []On Heparin gtt    Objective:   VS:   Visit Vitals  BP (!) 145/57   Pulse 91   Temp 97.7 °F (36.5 °C) (Oral)   Resp 18   Ht 5' 6\" (1.676 m)   Wt 72.6 kg (160 lb)   SpO2 100%   BMI 25.82 kg/m²      Tmax/24hrs: Temp (24hrs), Av.8 °F (37.1 °C), Min:97.7 °F (36.5 °C), Max:99.6 °F (37.6 °C)  IOBRIEF    Intake/Output Summary (Last 24 hours) at 2022 1616  Last data filed at 2022 1252  Gross per 24 hour   Intake 80 ml   Output 1300 ml   Net -1220 ml       General:  Awake , lying in bed   Pulmonary:  CTA Bilaterally. No Wheezing/Rhonchi/Rales. Cardiovascular: Regular rate and Rhythm. GI:  Soft, Non distended, Non tender. + Bowel sounds. Extremities:  No edema, cyanosis, clubbing. No calf tenderness. Neurologic: Alert and awake, lying in bed, able to move all extremities.   Additional:    Medications:   Current Facility-Administered Medications   Medication Dose Route Frequency    L. acidophilus,casei,rhamnosus (BIO-K PLUS) capsule 1 Capsule  1 Capsule Oral DAILY    [START ON 2022] amoxicillin-clavulanate (AUGMENTIN) 500-125 mg per tablet 1 Tablet  1 Tablet Oral Q24H    acetaminophen (TYLENOL) tablet 650 mg  650 mg Oral Q6H PRN    [Held by provider] aspirin chewable tablet 81 mg  81 mg Oral DAILY    abiraterone tab 1,000 mg (Patient Supplied)  1,000 mg Oral DAILY    doxercalciferoL (HECTOROL) 4 mcg/2 mL injection 1 mcg  1 mcg IntraVENous DIALYSIS MON, WED & FRI    epoetin brina-epbx (RETACRIT) injection 4,000 Units  4,000 Units SubCUTAneous Q MON, WED & FRI    heparin (porcine) injection 5,000 Units  5,000 Units SubCUTAneous Q8H    sodium chloride (NS) flush 5-10 mL  5-10 mL IntraVENous PRN    labetaloL (NORMODYNE;TRANDATE) 20 mg/4 mL (5 mg/mL) injection 5 mg  5 mg IntraVENous Q10MIN PRN       Imaging:   XR Results (most recent):  Results from Hospital Encounter encounter on 02/13/22    XR CHEST PA LAT    Narrative  EXAM: Chest Radiographs    INDICATION:  ? opacities on portable, r/o pna    TECHNIQUE: PA and lateral views of the chest    COMPARISON: 2/13/2022, 10/21/2021, 11/13/2020    FINDINGS: No pneumothorax identified. There is a rounded opacity overlying the  right posterior fifth and sixth intercostal space. This is likely in the right  upper lobe anteriorly. Subtle groundglass changes are noted bilaterally. No  effusions appreciated. The cardiac silhouette is normal. The pulmonary  vascularity is unremarkable. Degenerative changes of the spine. Degenerative  changes of the shoulders are noted. Impression  1. Ovoid density in the right upper lobe likely near the anterior segment. Additional groundglass opacities. Consider further evaluation with CT. CT Results (most recent):  Results from Hospital Encounter encounter on 02/13/22    CT CHEST ABD PELV W CONT    Narrative  CT chest, abdomen and pelvis with contrast    CLINICAL HISTORY: hematuria, check for mets    COMPARISON: CT 10/18/21. TECHNIQUE: Helical scan to the chest, abdomen and pelvis are obtained from the  thoracic inlet to the symphysis pubis after IV contrast administration. All CT scans at this facility performed using dose optimization techniques as  appreciated to a performed exam, to include automated exposure control,  adjustment of the mA and or KU according to patient size (including appropriate  matching for site specific examination), or use of iterative reconstruction  technique. FINDINGS:    CT CHEST:    Lung Parenchyma: Clear. Thyroid: Unremarkable. Lymph nodes: No adenopathy.     Mediastinum: The heart and the pericardium appear normal.    Vasculature: The aorta and the great vessels are unremarkable. Pleural Space And Chest Wall: No significant pleural pathology. CT ABDOMEN AND PELVIS:    Liver: Normal.  Gallbladder: Small layering gallbladder/present. Biliary System: No ductal dilatation. Spleen: Normal.  Pancreas: Normal.  Bowel: Small hiatal hernia. The small and large bowel are nondilated. There is  a large ovoid intraluminal enhancing mass identified in the hepatic flexure of  the colon which measures 5.5 x 7.3 x 4.3 cm. Interval enlargement noted compared  to the prior abdomen MR 5/13/21. There is now associated short intussusception. Significant lumen narrowing identified. Although, no associated bowel  obstruction seen. The colon distal to the mass shows fluid-filled lumen. Homogeneous and slightly hyperdense appearing rectum with no intraluminal air,  difficult to evaluate. Barium enema advised for better evaluation of these  findings. Adrenal Glands: Normal.  Kidneys: Atrophic kidneys, especially on the left. 1 cm cortical cyst at lateral  midpole of right kidney. Lower genitourinary system: The bladder is decompressed with Burris catheter. There is a large complex cystic mass with rim enhancement identified in the dome  of the bladder with lobulated contour and internal septa, measuring 5.5 x 7.7 x  7.2 cm. The mass was smaller and more solid appearance with questionable early  necrosis on prior CT, measuring 4.6 x 4.6 x 5.1 cm. The prostate is small in  size. Peritoneum/Retroperitoneum: No adenopathy. Vasculature: Moderate atherosclerotic aortic disease. Other:  No free fluid. Small periumbilical hernia appears stable and the small  nondilated bowel loop within the hernia sac on prior study is now only partially  seen. Mild fatty stranding within the hernia sac again seen.  There is  subcutaneous foci of air and surrounding fatty stranding identified in the left  upper pelvic wall, probably from recent procedure or injection of medication. Mild edema in pelvic wall. CT OSSEOUS STRUCTURES:    Multiple osteoblastic lesions are again identified along the spine, ribs,  sternum and pelvic bone with no significant interval progression since the prior  study. Dextroscoliosis in upper lumbar spine with associated spondylosis. Impression  1. The large solid mass in the dome of the bladder is interval enlarged and  more cystic in appearance since the prior study, suggestive of tumor necrosis. 2.  Large solid mass in the hepatic flexure of the colon is also interval  enlarged with significant lumen narrowing and short intussusception. No  obstruction. This is more concerned for primary malignancy. Homogeneous  appearing rectum could represent intraluminal fluid but potential mass cannot be  excluded. Recommend barium enema for better evaluation. 3.  Atrophic kidneys, especially on the left. Small cortical cyst in midpole  right kidney. 4.  Extensive osseous metastasis appear grossly unchanged. 5.  Layering gallbladder sludge. 6.  Nonobstructive umbilical hernia. Thank you for this referral.      09/25/20    ECHO ADULT COMPLETE 09/26/2020 9/26/2020    Interpretation Summary  · LV: Calculated LVEF is 50%. Visually measured ejection fraction. Normal cavity size. Mildly increased wall thickness. Globally reduced systolic function. Low normal systolic function. Mild (grade 1) left ventricular diastolic dysfunction. · MV: Mild mitral valve regurgitation is present.     Signed by: Vi Lake DO on 9/26/2020 12:20 PM       Labs:    Recent Results (from the past 48 hour(s))   CBC WITH AUTOMATED DIFF    Collection Time: 02/17/22  8:35 AM   Result Value Ref Range    WBC 21.7 (H) 4.6 - 13.2 K/uL    RBC 3.06 (L) 4.35 - 5.65 M/uL    HGB 9.3 (L) 13.0 - 16.0 g/dL    HCT 29.7 (L) 36.0 - 48.0 %    MCV 97.1 78.0 - 100.0 FL    MCH 30.4 24.0 - 34.0 PG    MCHC 31.3 31.0 - 37.0 g/dL    RDW 12.8 11.6 - 14.5 %    PLATELET 291 719 - 171 K/uL    MPV 10.4 9.2 - 11.8 FL    NRBC 0.0 0  WBC    ABSOLUTE NRBC 0.00 0.00 - 0.01 K/uL    NEUTROPHILS 76 (H) 40 - 73 %    LYMPHOCYTES 10 (L) 21 - 52 %    MONOCYTES 6 3 - 10 %    EOSINOPHILS 6 (H) 0 - 5 %    BASOPHILS 1 0 - 2 %    IMMATURE GRANULOCYTES 1 (H) 0.0 - 0.5 %    ABS. NEUTROPHILS 16.5 (H) 1.8 - 8.0 K/UL    ABS. LYMPHOCYTES 2.2 0.9 - 3.6 K/UL    ABS. MONOCYTES 1.4 (H) 0.05 - 1.2 K/UL    ABS. EOSINOPHILS 1.3 (H) 0.0 - 0.4 K/UL    ABS. BASOPHILS 0.1 0.0 - 0.1 K/UL    ABS. IMM. GRANS. 0.3 (H) 0.00 - 0.04 K/UL    DF AUTOMATED         Signed By: Ernesto Hall MD     February 18, 2022      I spent 25 minutes with the patient in face-to-face consultation, of which greater than 50% was spent in counseling and coordination of care as described above    Disclaimer: Sections of this note are dictated using utilizing voice recognition software. Minor typographical errors may be present. If questions arise, please do not hesitate to contact me or call our department.

## 2022-02-19 NOTE — PROGRESS NOTES
Progress Note    Cyndi Dillon  80 y.o. Admit Date: 2/13/2022  Active Problems:    Essential (primary) hypertension (9/1/2016) POA: Yes      ESRD (end stage renal disease) ([facility] (9/26/2020) POA: Yes      Secondary hyperparathyroidism of renal origin ([facility] (9/26/2020) POA: Yes      Anemia (9/26/2020) POA: Yes      Hydronephrosis (9/29/2020) POA: Yes      UTI (urinary tract infection) (9/29/2020) POA: Yes      Leucocytosis (10/20/2021) POA: Yes      AMS (altered mental status) (2/13/2022) POA: Unknown      Bladder cancer ([facility] (2/16/2022) POA: Unknown            Subjective:     Patient feels good. Wife is at the bedside. Finally he and his family member decided to be DNR. Has urine blood-tinged so the Burris catheter. Has bladder tumor. Urology has finally decided to do transurethral resection of the bladder tumor on Monday. He was dialyzed yesterday without any problem. A comprehensive review of systems was negative except for that written in the History of Present Illness.     Objective:     Visit Vitals  /71 (BP 1 Location: Left arm, BP Patient Position: At rest)   Pulse 81   Temp 98.1 °F (36.7 °C)   Resp 18   Ht 5' 6\" (1.676 m)   Wt 72.6 kg (160 lb)   SpO2 95%   BMI 25.82 kg/m²         Intake/Output Summary (Last 24 hours) at 2/19/2022 1426  Last data filed at 2/19/2022 0020  Gross per 24 hour   Intake    Output 450 ml   Net -450 ml       Current Facility-Administered Medications   Medication Dose Route Frequency Provider Last Rate Last Admin    ondansetron (ZOFRAN) injection 4 mg  4 mg IntraVENous Q4H PRN Peggy Abreu MD        L. acidophilus,casei,rhamnosus (BIO-K PLUS) capsule 1 Capsule  1 Capsule Oral DAILY Jennifer Tolbert MD   1 Capsule at 02/19/22 0808    amoxicillin-clavulanate (AUGMENTIN) 500-125 mg per tablet 1 Tablet  1 Tablet Oral Q24H Jennifer Tolbert MD   1 Tablet at 02/19/22 0934    nystatin (MYCOSTATIN) 100,000 unit/gram powder   Topical BID Peggy Abreu MD Given at 02/19/22 0936    oxyCODONE-acetaminophen (PERCOCET) 5-325 mg per tablet 1 Tablet  1 Tablet Oral Q6H PRN Charan Dsouza MD   1 Tablet at 02/19/22 0934    acetaminophen (TYLENOL) tablet 650 mg  650 mg Oral Q6H PRN Charan Dsouza MD   650 mg at 02/18/22 1537    [Held by provider] aspirin chewable tablet 81 mg  81 mg Oral DAILY Charan Dsouza MD   81 mg at 02/18/22 0810    abiraterone tab 1,000 mg (Patient Supplied)  1,000 mg Oral DAILY Charan Dsouza MD   1,000 mg at 02/19/22 0936    doxercalciferoL (HECTOROL) 4 mcg/2 mL injection 1 mcg  1 mcg IntraVENous DIALYSIS MON, WED & Selvin Vega MD   1 mcg at 02/18/22 1047    epoetin brina-epbx (RETACRIT) injection 4,000 Units  4,000 Units SubCUTAneous Q MON, WED & Krystyna Wynn MD   4,000 Units at 02/18/22 2038    heparin (porcine) injection 5,000 Units  5,000 Units SubCUTAneous Q8H Charan Dsouza MD   5,000 Units at 02/19/22 1239    sodium chloride (NS) flush 5-10 mL  5-10 mL IntraVENous PRN Sinai Moses MD        labetaloL (NORMODYNE;TRANDATE) 20 mg/4 mL (5 mg/mL) injection 5 mg  5 mg IntraVENous Q10MIN PRN Lethia Search, NP            Physical Exam:     Physical Exam:   General:  Alert, cooperative, no distress, appears stated age. Neck: Supple, symmetrical, trachea midline, no adenopathy, thyroid: no enlargement/tenderness/nodules, no carotid bruit and no JVD. Lungs:   Clear to auscultation bilaterally. Heart:  Regular rate and rhythm, S1, S2 normal, no murmur, click, rub or gallop. Abdomen:   Soft, non-tender. Bowel sounds normal. No masses,  No organomegaly. Extremities:  No edema.   His AV fistula on the left arm has good thrill and bruit   Skin: Skin color, texture, turgor normal. No rashes or lesions         Data Review:    CBC w/Diff    Recent Labs     02/17/22  0835   WBC 21.7*   RBC 3.06*   HGB 9.3*   HCT 29.7*   MCV 97.1   MCH 30.4   MCHC 31.3   RDW 12.8    Recent Labs     02/17/22  0835   MONOS 6 EOS 6*   BASOS 1   RDW 12.8        Comprehensive Metabolic Profile    No results for input(s): NA, K, CL, CO2, BUN, CREA in the last 72 hours. No lab exists for component: ANIONGAP, GLUC No results for input(s): CA, PHOS, ALB, TP, ALKP, TBILI in the last 72 hours. No lab exists for component: SGOT, SGPT, CBILI                     Impression:       Active Hospital Problems    Diagnosis Date Noted    Bladder cancer ([facility] 02/16/2022    AMS (altered mental status) 02/13/2022    Leucocytosis 10/20/2021    Hydronephrosis 09/29/2020    UTI (urinary tract infection) 09/29/2020    ESRD (end stage renal disease) ([facility] 09/26/2020    Anemia 09/26/2020    Secondary hyperparathyroidism of renal origin ([facility] 09/26/2020    Essential (primary) hypertension 09/01/2016            Plan:     No need for any dialysis today and tomorrow. Will undergo transurethral resection of the bladder tumor on Monday morning followed by. Dialysis. Subsequently if he remains stable then he could be discharged home from renal point of view.       Roberto Aguirre MD

## 2022-02-19 NOTE — PROGRESS NOTES
Urology Progress Note        Assessment/Plan:     Patient Active Problem List   Diagnosis Code    Traumatic rhabdomyolysis ([facility] T79. 6XXA    CAD (coronary artery disease) I25.10    Essential (primary) hypertension I10    Troponin level elevated R77.8    Acute renal failure (ARF) ([facility]) N17.9    ESRD (end stage renal disease) ([facility] N18.6    Hyperkalemia X96.2    Metabolic acidosis V48.8    Secondary hyperparathyroidism of renal origin ([facility] N25.81    Anemia D64.9    Hydronephrosis N13.30    UTI (urinary tract infection) N39.0    Postobstructive diuresis R35.89    Polyp of ascending colon K63.5    Decrease in appetite R63.0    Elevated prostate specific antigen (PSA) R97.20    Osteoarthrosis M19.90    Shortness of breath R06.02    Chronic renal failure N18.9    Pneumonia J18.9    Hypoalbuminemia E88.09    Leucocytosis D72.829    Nausea and vomiting R11.2    AMS (altered mental status) R41.82    Bladder cancer ([facility]) C67.9          ASSESSMENT:   Gross Hematuria, H/o Gross Hematuria  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 Deepali Bose              Findings: Impression: large, sessile posterior bladder wall    tumor              Hgb 7.3<0.9<8.2>4.9<09     Complicated UTI  Leukocytosis              WBC 21.7<23.6<24.9<27.1>26.9                  UCX >100K E. Faecalis. 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 Deepali Bose in 3/21.     Last PSA:                                1.32 (1/12/22)     Current Disease Status:  Symptomatic Androgen Sensitive M1b CaP  Current Treatment Plan: ADT utilizing Eligard 45mg q6 months; Zytiga 750 mg (decreased dose 2/2 patient's fatigue) + Prednisone 5mg     H/o Ascending Colonic Mass- enlarged, now 5.5 x 7.3 x 4.3 cm    Bony Mets to Spine, Ribs, Sternum, Pelvic Bone     ESRD on HD     Admitted for Acute Metabolic Encephalopathy  Right Side Facial droop     PLAN:    Appreciate overall management per medicine. CT scan reviewed, shows enlarging bladder tumor, large colon mass, osseous mets. Palliative care consulted, wife and patient would like FULL aggressive medical management. Recommend GI and GS consult for colonic mass. Will plan for possible cysto, TURBT on Monday with Dr. Mitchell Jeong. Keep NPO after MN on . Hold all AC at least 48 hours before   Covid test  Not detected  Follow CX, continue IV Unasyn  Will order Repeat PSA. Maintain chronic michel- pink tinged urine with some debris. Please flush catheter with 120 cc sterile water TID, PRN decreased UOP/clots. Per nursing staff no issues with this. Hold all AC if able- on ASA and SQH  Monitor H&H- stable. Patient and wife opted for DNR status and top cancel TURBT on Monday  Trospium for bladder spasms  Will see again Monday     Follow up arranged? Mel Beatty MD, FACS  , Urology  THE [facility]  [facility] [place] [place]      Subjective:     Daily Progress Note: 2022 1:41 PM    Kurt Garcia is doing OK. He is S/p HD with pink tinged urine, with some debris. Spent 30 minutes explaining his options. Desires no to have surgery on Monday. Objective:     Visit Vitals  /71 (BP 1 Location: Left arm, BP Patient Position: At rest)   Pulse 81   Temp 98.1 °F (36.7 °C)   Resp 18   Ht 5' 6\" (1.676 m)   Wt 160 lb (72.6 kg)   SpO2 95%   BMI 25.82 kg/m²        Temp (24hrs), Av.3 °F (36.8 °C), Min:97.4 °F (36.3 °C), Max:99.5 °F (37.5 °C)      Intake and Output:   1901 -  0700  In: 80   Out: 1750 [Urine:750]  No intake/output data recorded.     PHYSICAL EXAMINATION: Visit Vitals  /71 (BP 1 Location: Left arm, BP Patient Position: At rest)   Pulse 81   Temp 98.1 °F (36.7 °C)   Resp 18   Ht 5' 6\" (1.676 m)   Wt 160 lb (72.6 kg)   SpO2 95%   BMI 25.82 kg/m²       Constitutional: Well developed, well nourished male. No acute distress. HEENT: Normocephalic, Atraumatic, EOM's intact   CV:  no edema  Respiratory: No respiratory distress or difficulties breathing   Abdomen:  soft and non tender    Male:    SCROTUM:  No scrotal rash or lesions noticed. Normal bilateral testes and epididymis. PENIS: Urethral meatus normal in location and size. No urethral discharge. UnCircumsized. 16 fr michel in place with pink tinged urine with some debris. Skin: No evidence of jaundice. Normal color  Neuro/Psych:  Alert and oriented. Affect appropriate. Dysarthric         Lab/Data Review: All lab results for the last 24 hours reviewed. CT C/A/P 2/17/22: IMPRESSION  1. The large solid mass in the dome of the bladder is interval enlarged and  more cystic in appearance since the prior study, suggestive of tumor necrosis. 2.  Large solid mass in the hepatic flexure of the colon is also interval  enlarged with significant lumen narrowing and short intussusception. No  obstruction. This is more concerned for primary malignancy. Homogeneous  appearing rectum could represent intraluminal fluid but potential mass cannot be  excluded. Recommend barium enema for better evaluation. 3.  Atrophic kidneys, especially on the left. Small cortical cyst in midpole  right kidney. 4.  Extensive osseous metastasis appear grossly unchanged. 5.  Layering gallbladder sludge. 6.  Nonobstructive umbilical hernia. Thank you for this referral.      Labs:     Labs: Results:   Chemistry    No results for input(s): GLU, NA, K, CL, CO2, BUN, CREA, CA, AGAP, BUCR, TBIL, AP, TP, ALB, GLOB, AGRAT in the last 72 hours.     No lab exists for component: GPT   CBC w/Diff Recent Labs     02/17/22  0835   WBC 21.7*   RBC 3.06*   HGB 9.3*   HCT 29.7*      GRANS 76*   LYMPH 10*   EOS 6*      Cultures No results for input(s): CULT in the last 72 hours. All Micro Results     Procedure Component Value Units Date/Time    CULTURE, BLOOD [[id number]] Collected: 02/13/22 1145    Order Status: Completed Specimen: Blood Updated: 02/19/22 0607     Special Requests: NO SPECIAL REQUESTS        Culture result: NO GROWTH 6 DAYS       CULTURE, BLOOD [[id number]] Collected: 02/13/22 1200    Order Status: Completed Specimen: Blood Updated: 02/19/22 0607     Special Requests: NO SPECIAL REQUESTS        Culture result: NO GROWTH 6 DAYS       CULTURE, URINE [[id number]]  (Abnormal)  (Susceptibility) Collected: 02/13/22 1100    Order Status: Completed Specimen: Cath Urine Updated: 02/16/22 1401     Special Requests: NEW GONZALEZ     [place] Count --        >100,000  COLONIES/mL       Culture result:       ENTEROCOCCUS FAECALIS (PREDOMINATING)                  MIXED UROGENITAL RENUKA ISOLATED          COVID-19 RAPID TEST [[id number]] Collected: 02/13/22 1710    Order Status: Completed Specimen: Nasopharyngeal Updated: 02/13/22 1833     Specimen source Nasopharyngeal        COVID-19 rapid test Not detected        Comment: Rapid Abbott ID Now       Rapid NAAT:  The specimen is NEGATIVE for SARS-CoV-2, the novel coronavirus associated with COVID-19. Negative results should be treated as presumptive and, if inconsistent with clinical signs and symptoms or necessary for patient management, should be tested with an alternative molecular assay. Negative results do not preclude SARS-CoV-2 infection and should not be used as the sole basis for patient management decisions. This test has been authorized by the FDA under an Emergency Use Authorization (EUA) for use by authorized laboratories.    Fact sheet for Healthcare Providers: ConventionUpdate.co.nz  Fact sheet for Patients: ConventionUpdate.co.nz Methodology: Isothermal Nucleic Acid Amplification                 Urinalysis Color   Date Value Ref Range Status   02/13/2022 RED   Final     Appearance   Date Value Ref Range Status   02/13/2022 TURBID   Final     Specific gravity   Date Value Ref Range Status   02/13/2022 1.017 1.005 - 1.030   Final     pH (UA)   Date Value Ref Range Status   02/13/2022 8.5 (H) 5.0 - 8.0   Final     Protein   Date Value Ref Range Status   02/13/2022 300 (A) NEG mg/dL Final     Ketone   Date Value Ref Range Status   02/13/2022 Negative NEG mg/dL Final     Bilirubin   Date Value Ref Range Status   02/13/2022 Negative NEG   Final     Blood   Date Value Ref Range Status   02/13/2022 LARGE (A) NEG   Final     Urobilinogen   Date Value Ref Range Status   02/13/2022 0.2 0.2 - 1.0 EU/dL Final     Nitrites   Date Value Ref Range Status   02/13/2022 Negative NEG   Final     Leukocyte Esterase   Date Value Ref Range Status   02/13/2022 LARGE (A) NEG   Final     Potassium   Date Value Ref Range Status   02/16/2022 3.1 (L) 3.5 - 5.5 mmol/L Final     Creatinine   Date Value Ref Range Status   02/16/2022 4.24 (H) 0.6 - 1.3 MG/DL Final     BUN   Date Value Ref Range Status   02/16/2022 28 (H) 7.0 - 18 MG/DL Final     Prostate Specific Ag   Date Value Ref Range Status   01/12/2022 1.32 0.00 - 4.00 ng/mL Final     Comment:     (Methodology: Roche ECLIA)           PSA No results for input(s): PSA in the last 72 hours.    Coagulation Lab Results   Component Value Date/Time    Prothrombin time 12.5 02/13/2022 09:11 AM    Prothrombin time 12.2 12/30/2021 12:31 PM    INR 0.9 02/13/2022 09:11 AM    INR 0.9 12/30/2021 12:31 PM    aPTT 29.1 11/20/2018 02:10 PM    aPTT 63.5 (H) 08/30/2016 05:15 PM

## 2022-02-19 NOTE — PROGRESS NOTES
[facility] Hospitalist Group  Progress Note    Patient: Alejos Ormond Age: 80 y.o. : 1935 MR#: [id number] SSN: [id number]  Date/Time: 2022     Subjective:     Patient seen and evaluated, lying in bed, no acute distress. 80-year-old -American male with a past medical history of end-stage renal disease on hemodialysis, prostate cancer with mets, hypertension, CAD, chronic indwelling Burris, anemia of chronic disease presents to the emergency room with altered mental status. ER evaluationpatient noted to have a UTI, started on broad-spectrum IV antibiotics. Concern for right-sided facial droop, neurology consulted and recommended MRI for further evaluation. MRI brain negative for acute CVA. ID consulted for persistent leukocytosis. 2/18stop IV Unasyn per ID    I had a long discussion with the patient as well as his wife, they are agreeable to DNR. Palliative care on board, signed DNR form. Discussed with urology regarding hematuria, plan for TURBT on Monday, hold all anticoagulation and keep n.p.o.  night. No new events overnight, patient's wife mentions that he vomited this morning, will give Zofran. Assessment/Plan:     1. Acute metabolic encephalopathylikely secondary to UTIcompleted IV antibiotics, mental status back to baseline. 2. UTI with hematuriacompleted IV antibiotics. 3. Leukocytosis likely underlying cancer. 4. Hematuriaper patient this is new, consulted urology since patient has underlying prostate cancer and is following up with them. CT chest abdomen pelvis with contrast ordered, results reviewed, per urology plan for TURBT on Monday. 5. Right-sided facial droop concerning for CVAMRI brain negative for acute CVA. 6. ESRD on HDnephrology on board continue hemodialysis per schedule  7. History of prostate cancer with metson abiraterone / prednisone as out pt, restarted home medications.   8. 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 DVT prophylaxisHeparin  DNR    PT and OT eval, recommending rehab but patient's wife would like to take him home. Continue current treatment plan, will follow. Kwaku Shah MD  22      Case discussed with:  []Patient  []Family  [x]Nursing  []Case Management  DVT Prophylaxis:  []Lovenox  [x]Hep SQ  []SCDs  []Coumadin   []On Heparin gtt    Objective:   VS:   Visit Vitals  /71 (BP 1 Location: Left arm, BP Patient Position: At rest)   Pulse 81   Temp 98.1 °F (36.7 °C)   Resp 18   Ht 5' 6\" (1.676 m)   Wt 72.6 kg (160 lb)   SpO2 95%   BMI 25.82 kg/m²      Tmax/24hrs: Temp (24hrs), Av.3 °F (36.8 °C), Min:97.4 °F (36.3 °C), Max:99.5 °F (37.5 °C)  IOBRIEF    Intake/Output Summary (Last 24 hours) at 2022 1509  Last data filed at 2022  Gross per 24 hour   Intake    Output 450 ml   Net -450 ml       General:  Awake , lying in bed   Pulmonary:  CTA Bilaterally. No Wheezing/Rhonchi/Rales. Cardiovascular: Regular rate and Rhythm. GI:  Soft, Non distended, Non tender. + Bowel sounds. Extremities:  No edema, cyanosis, clubbing. No calf tenderness. Neurologic: Alert and awake, lying in bed, able to move all extremities.   Additional:    Medications:   Current Facility-Administered Medications   Medication Dose Route Frequency    ondansetron (ZOFRAN) injection 4 mg  4 mg IntraVENous Q4H PRN    L. acidophilus,casei,rhamnosus (BIO-K PLUS) capsule 1 Capsule  1 Capsule Oral DAILY    amoxicillin-clavulanate (AUGMENTIN) 500-125 mg per tablet 1 Tablet  1 Tablet Oral Q24H    nystatin (MYCOSTATIN) 100,000 unit/gram powder   Topical BID    oxyCODONE-acetaminophen (PERCOCET) 5-325 mg per tablet 1 Tablet  1 Tablet Oral Q6H PRN    acetaminophen (TYLENOL) tablet 650 mg  650 mg Oral Q6H PRN    [Held by provider] aspirin chewable tablet 81 mg  81 mg Oral DAILY    abiraterone tab 1,000 mg (Patient Supplied)  1,000 mg Oral DAILY    doxercalciferoL (HECTOROL) 4 mcg/2 mL injection 1 mcg  1 mcg IntraVENous DIALYSIS MON, WED & FRI    epoetin brina-epbx (RETACRIT) injection 4,000 Units  4,000 Units SubCUTAneous Q MON, WED & FRI    heparin (porcine) injection 5,000 Units  5,000 Units SubCUTAneous Q8H    sodium chloride (NS) flush 5-10 mL  5-10 mL IntraVENous PRN    labetaloL (NORMODYNE;TRANDATE) 20 mg/4 mL (5 mg/mL) injection 5 mg  5 mg IntraVENous Q10MIN PRN       Imaging:   XR Results (most recent):  Results from Hospital Encounter encounter on 02/13/22    XR CHEST PA LAT    Narrative  EXAM: Chest Radiographs    INDICATION:  ? opacities on portable, r/o pna    TECHNIQUE: PA and lateral views of the chest    COMPARISON: 2/13/2022, 10/21/2021, 11/13/2020    FINDINGS: No pneumothorax identified. There is a rounded opacity overlying the  right posterior fifth and sixth intercostal space. This is likely in the right  upper lobe anteriorly. Subtle groundglass changes are noted bilaterally. No  effusions appreciated. The cardiac silhouette is normal. The pulmonary  vascularity is unremarkable. Degenerative changes of the spine. Degenerative  changes of the shoulders are noted. Impression  1. Ovoid density in the right upper lobe likely near the anterior segment. Additional groundglass opacities. Consider further evaluation with CT. CT Results (most recent):  Results from Hospital Encounter encounter on 02/13/22    CT CHEST ABD PELV W CONT    Narrative  CT chest, abdomen and pelvis with contrast    CLINICAL HISTORY: hematuria, check for mets    COMPARISON: CT 10/18/21. TECHNIQUE: Helical scan to the chest, abdomen and pelvis are obtained from the  thoracic inlet to the symphysis pubis after IV contrast administration.     All CT scans at this facility performed using dose optimization techniques as  appreciated to a performed exam, to include automated exposure control,  adjustment of the mA and or KU according to patient size (including appropriate  matching for site specific examination), or use of iterative reconstruction  technique. FINDINGS:    CT CHEST:    Lung Parenchyma: Clear. Thyroid: Unremarkable. Lymph nodes: No adenopathy. Mediastinum: The heart and the pericardium appear normal.    Vasculature: The aorta and the great vessels are unremarkable. Pleural Space And Chest Wall: No significant pleural pathology. CT ABDOMEN AND PELVIS:    Liver: Normal.  Gallbladder: Small layering gallbladder/present. Biliary System: No ductal dilatation. Spleen: Normal.  Pancreas: Normal.  Bowel: Small hiatal hernia. The small and large bowel are nondilated. There is  a large ovoid intraluminal enhancing mass identified in the hepatic flexure of  the colon which measures 5.5 x 7.3 x 4.3 cm. Interval enlargement noted compared  to the prior abdomen MR 5/13/21. There is now associated short intussusception. Significant lumen narrowing identified. Although, no associated bowel  obstruction seen. The colon distal to the mass shows fluid-filled lumen. Homogeneous and slightly hyperdense appearing rectum with no intraluminal air,  difficult to evaluate. Barium enema advised for better evaluation of these  findings. Adrenal Glands: Normal.  Kidneys: Atrophic kidneys, especially on the left. 1 cm cortical cyst at lateral  midpole of right kidney. Lower genitourinary system: The bladder is decompressed with Burris catheter. There is a large complex cystic mass with rim enhancement identified in the dome  of the bladder with lobulated contour and internal septa, measuring 5.5 x 7.7 x  7.2 cm. The mass was smaller and more solid appearance with questionable early  necrosis on prior CT, measuring 4.6 x 4.6 x 5.1 cm. The prostate is small in  size. Peritoneum/Retroperitoneum: No adenopathy. Vasculature: Moderate atherosclerotic aortic disease. Other:  No free fluid.  Small periumbilical hernia appears stable and the small  nondilated bowel loop within the hernia sac on prior study is now only partially  seen. Mild fatty stranding within the hernia sac again seen. There is  subcutaneous foci of air and surrounding fatty stranding identified in the left  upper pelvic wall, probably from recent procedure or injection of medication. Mild edema in pelvic wall. CT OSSEOUS STRUCTURES:    Multiple osteoblastic lesions are again identified along the spine, ribs,  sternum and pelvic bone with no significant interval progression since the prior  study. Dextroscoliosis in upper lumbar spine with associated spondylosis. Impression  1. The large solid mass in the dome of the bladder is interval enlarged and  more cystic in appearance since the prior study, suggestive of tumor necrosis. 2.  Large solid mass in the hepatic flexure of the colon is also interval  enlarged with significant lumen narrowing and short intussusception. No  obstruction. This is more concerned for primary malignancy. Homogeneous  appearing rectum could represent intraluminal fluid but potential mass cannot be  excluded. Recommend barium enema for better evaluation. 3.  Atrophic kidneys, especially on the left. Small cortical cyst in midpole  right kidney. 4.  Extensive osseous metastasis appear grossly unchanged. 5.  Layering gallbladder sludge. 6.  Nonobstructive umbilical hernia. Thank you for this referral.      09/25/20    ECHO ADULT COMPLETE 09/26/2020 9/26/2020    Interpretation Summary  · LV: Calculated LVEF is 50%. Visually measured ejection fraction. Normal cavity size. Mildly increased wall thickness. Globally reduced systolic function. Low normal systolic function. Mild (grade 1) left ventricular diastolic dysfunction. · MV: Mild mitral valve regurgitation is present.     Signed by: Chato Britton DO on 9/26/2020 12:20 PM       Labs:    No results found for this or any previous visit (from the past 48 hour(s)). Signed By: Saad Rice MD     February 19, 2022      I spent 25 minutes with the patient in face-to-face consultation, of which greater than 50% was spent in counseling and coordination of care as described above    Disclaimer: Sections of this note are dictated using utilizing voice recognition software. Minor typographical errors may be present. If questions arise, please do not hesitate to contact me or call our department.

## 2022-02-19 NOTE — ROUTINE PROCESS
0715  -- Bedside, Verbal and Written shift change report received by Jose Chacko (oncoming nurse) by Yamilex(offgoing nurse). Report included the following information SBAR, Kardex, Intake/Output, MAR and Recent Results. 0805-Assessment completed, call bell within reach, no distress noted. AM  medications administered, pt tolerated with ease, will continue to monitor. 1200- Shift reassessment, pt condition unchanged, will continue to monitor. 1600-  Shift reassessment, pt condition unchanged, will continue to monitor. 1910- Bedside, Verbal and Written shift change report given to Yamilex included the following information SBAR, Kardex, Intake/Output, MAR and Recent Results. Skin assessment completed.

## 2022-02-19 NOTE — PROGRESS NOTES
Problem: Patient Education: Go to Patient Education Activity  Goal: Patient/Family Education  Outcome: Progressing Towards Goal     Problem: Falls - Risk of  Goal: *Absence of Falls  Description: Document Sofia Fothergill Fall Risk and appropriate interventions in the flowsheet. Outcome: Progressing Towards Goal  Note: Fall Risk Interventions:  Mobility Interventions: Bed/chair exit alarm    Mentation Interventions: Bed/chair exit alarm    Medication Interventions: Patient to call before getting OOB    Elimination Interventions: Patient to call for help with toileting needs,Call light in reach              Problem: Pressure Injury - Risk of  Goal: *Prevention of pressure injury  Description: Document Karthikeyan Scale and appropriate interventions in the flowsheet.   Outcome: Progressing Towards Goal  Note: Pressure Injury Interventions:  Sensory Interventions: Assess changes in LOC    Moisture Interventions: Absorbent underpads    Activity Interventions: Pressure redistribution bed/mattress(bed type)    Mobility Interventions: Pressure redistribution bed/mattress (bed type)    Nutrition Interventions: Document food/fluid/supplement intake    Friction and Shear Interventions: Foam dressings/transparent film/skin sealants                Problem: Pain  Goal: *Control of Pain  Outcome: Progressing Towards Goal

## 2022-02-19 NOTE — PROGRESS NOTES
Progress Note    Radha Mckinney  80 y.o. Admit Date: 2/13/2022  Active Problems:    Essential (primary) hypertension (9/1/2016) POA: Yes      ESRD (end stage renal disease) ([facility] (9/26/2020) POA: Yes      Secondary hyperparathyroidism of renal origin ([facility] (9/26/2020) POA: Yes      Anemia (9/26/2020) POA: Yes      Hydronephrosis (9/29/2020) POA: Yes      UTI (urinary tract infection) (9/29/2020) POA: Yes      Leucocytosis (10/20/2021) POA: Yes      AMS (altered mental status) (2/13/2022) POA: Unknown      Bladder cancer ([facility] (2/16/2022) POA: Unknown            Subjective:     Patient seen earlier during dialysis, no new complain. Now on Augmentin. A comprehensive review of systems was negative except for that written in the History of Present Illness.     Objective:     Visit Vitals  BP (!) 132/47 (BP 1 Location: Left arm, BP Patient Position: At rest)   Pulse 89   Temp 97.5 °F (36.4 °C)   Resp 20   Ht 5' 6\" (1.676 m)   Wt 72.6 kg (160 lb)   SpO2 97%   BMI 25.82 kg/m²         Intake/Output Summary (Last 24 hours) at 2/18/2022 2020  Last data filed at 2/18/2022 1252  Gross per 24 hour   Intake 80 ml   Output 1300 ml   Net -1220 ml       Current Facility-Administered Medications   Medication Dose Route Frequency Provider Last Rate Last Admin    L. acidophilus,casei,rhamnosus (BIO-K PLUS) capsule 1 Capsule  1 Capsule Oral DAILY Hang CLEMENTE MD   1 Capsule at 02/18/22 0810    [START ON 2/19/2022] amoxicillin-clavulanate (AUGMENTIN) 500-125 mg per tablet 1 Tablet  1 Tablet Oral Q24H Ry Kelley MD        nystatin (MYCOSTATIN) 100,000 unit/gram powder   Topical BID Sascha Tierney MD   Given at 02/18/22 1802    oxyCODONE-acetaminophen (PERCOCET) 5-325 mg per tablet 1 Tablet  1 Tablet Oral Q6H PRN Sascha Tierney MD   1 Tablet at 02/18/22 1905    acetaminophen (TYLENOL) tablet 650 mg  650 mg Oral Q6H PRN Sascha Tierney MD   650 mg at 02/18/22 1537    [Held by provider] aspirin chewable tablet 81 mg  81 mg Oral DAILY Flako Woody MD   81 mg at 02/18/22 0810    abiraterone tab 1,000 mg (Patient Supplied)  1,000 mg Oral DAILY Flako Woody MD   1,000 mg at 02/18/22 0810    doxercalciferoL (HECTOROL) 4 mcg/2 mL injection 1 mcg  1 mcg IntraVENous DIALYSIS MON, WED & Kristina Brady MD   1 mcg at 02/18/22 1047    epoetin brina-epbx (RETACRIT) injection 4,000 Units  4,000 Units SubCUTAneous Q MON, WED & Sascha Wong MD   4,000 Units at 02/16/22 2122    heparin (porcine) injection 5,000 Units  5,000 Units SubCUTAneous Q8H Flako Woody MD   5,000 Units at 02/18/22 1905    sodium chloride (NS) flush 5-10 mL  5-10 mL IntraVENous PRN Belen Woody MD        labetaloL (NORMODYNE;TRANDATE) 20 mg/4 mL (5 mg/mL) injection 5 mg  5 mg IntraVENous Q10MIN PRN Nicko Leach, NP            Physical Exam:     Physical Exam:   General:  Alert, cooperative, no distress, appears stated age. Neck: Supple, symmetrical, trachea midline, no adenopathy, thyroid: no enlargement/tenderness/nodules, no carotid bruit and no JVD. Lungs:   Clear to auscultation bilaterally. Heart:  Regular rate and rhythm, S1, S2 normal, no murmur, click, rub or gallop. Abdomen:   Soft, non-tender. Bowel sounds normal. No masses,  No organomegaly. Extremities: Extremities normal, atraumatic, no cyanosis or edema,AVG no issue, tolerating blood flow 350         Data Review:    CBC w/Diff    Recent Labs     02/17/22  0835 02/16/22  0039 02/16/22  0039   WBC 21.7*  --  23.6*   RBC 3.06*  --  3.06*   HGB 9.3*  --  9.5*   HCT 29.7*  --  30.0*   MCV 97.1   < > 98.0   MCH 30.4   < > 31.0   MCHC 31.3   < > 31.7   RDW 12.8   < > 12.7    < > = values in this interval not displayed. Recent Labs     02/17/22  0835 02/16/22  0039 02/16/22  0039   MONOS 6  --  6   EOS 6*  --  7*   BASOS 1   < > 0   RDW 12.8   < > 12.7    < > = values in this interval not displayed.         Comprehensive Metabolic Profile    Recent Labs     02/16/22  0039      K 3.1*      CO2 25   BUN 28*   CREA 4.24*    Recent Labs     02/16/22  0039   CA 7.6*   PHOS 3.1           FINDINGS:      CT CHEST:      Lung Parenchyma: Clear.     Thyroid: Unremarkable.     Lymph nodes: No adenopathy.      Mediastinum: The heart and the pericardium appear normal.     Vasculature: The aorta and the great vessels are unremarkable.      Pleural Space And Chest Wall: No significant pleural pathology.     CT ABDOMEN AND PELVIS:     Liver: Normal.  Gallbladder: Small layering gallbladder/present. Biliary System: No ductal dilatation. Spleen: Normal.  Pancreas: Normal.  Bowel: Small hiatal hernia. The small and large bowel are nondilated. There is  a large ovoid intraluminal enhancing mass identified in the hepatic flexure of  the colon which measures 5.5 x 7.3 x 4.3 cm. Interval enlargement noted compared  to the prior abdomen MR 5/13/21. There is now associated short intussusception. Significant lumen narrowing identified. Although, no associated bowel  obstruction seen. The colon distal to the mass shows fluid-filled lumen. Homogeneous and slightly hyperdense appearing rectum with no intraluminal air,  difficult to evaluate. Barium enema advised for better evaluation of these  findings.     Adrenal Glands: Normal.  Kidneys: Atrophic kidneys, especially on the left. 1 cm cortical cyst at lateral  midpole of right kidney. Lower genitourinary system: The bladder is decompressed with Burris catheter. There is a large complex cystic mass with rim enhancement identified in the dome  of the bladder with lobulated contour and internal septa, measuring 5.5 x 7.7 x  7.2 cm. The mass was smaller and more solid appearance with questionable early  necrosis on prior CT, measuring 4.6 x 4.6 x 5.1 cm. The prostate is small in  size.     Peritoneum/Retroperitoneum: No adenopathy. Vasculature: Moderate atherosclerotic aortic disease. Other:  No free fluid.  Small periumbilical hernia appears stable and the small  nondilated bowel loop within the hernia sac on prior study is now only partially  seen. Mild fatty stranding within the hernia sac again seen. There is  subcutaneous foci of air and surrounding fatty stranding identified in the left  upper pelvic wall, probably from recent procedure or injection of medication. Mild edema in pelvic wall.        CT OSSEOUS STRUCTURES:       Multiple osteoblastic lesions are again identified along the spine, ribs,  sternum and pelvic bone with no significant interval progression since the prior  study. Dextroscoliosis in upper lumbar spine with associated spondylosis.     IMPRESSION  1. The large solid mass in the dome of the bladder is interval enlarged and  more cystic in appearance since the prior study, suggestive of tumor necrosis. 2.  Large solid mass in the hepatic flexure of the colon is also interval  enlarged with significant lumen narrowing and short intussusception. No  obstruction. This is more concerned for primary malignancy. Homogeneous  appearing rectum could represent intraluminal fluid but potential mass cannot be  excluded. Recommend barium enema for better evaluation. 3.  Atrophic kidneys, especially on the left. Small cortical cyst in midpole  right kidney. 4.  Extensive osseous metastasis appear grossly unchanged. 5.  Layering gallbladder sludge. 6.  Nonobstructive umbilical hernia                  Impression:       Active Hospital Problems    Diagnosis Date Noted    Bladder cancer ([facility] 02/16/2022    AMS (altered mental status) 02/13/2022    Leucocytosis 10/20/2021    Hydronephrosis 09/29/2020    UTI (urinary tract infection) 09/29/2020    ESRD (end stage renal disease) ([facility] 09/26/2020    Anemia 09/26/2020    Secondary hyperparathyroidism of renal origin ([facility] 09/26/2020    Essential (primary) hypertension 09/01/2016            Plan:      Tolerates dialysis well ,no Heparin with Dialysis,on 2 K, 2.5 ca,Retacrit as ordered, on Oral antibiotics, continue OT/PT.     Michelle Aragon MD

## 2022-02-20 NOTE — PROGRESS NOTES
Progress Note    Lonza Seble  80 y.o. Admit Date: 2/13/2022  Active Problems:    Essential (primary) hypertension (9/1/2016) POA: Yes      ESRD (end stage renal disease) ([facility] (9/26/2020) POA: Yes      Secondary hyperparathyroidism of renal origin ([facility] (9/26/2020) POA: Yes      Anemia (9/26/2020) POA: Yes      Hydronephrosis (9/29/2020) POA: Yes      UTI (urinary tract infection) (9/29/2020) POA: Yes      Leucocytosis (10/20/2021) POA: Yes      AMS (altered mental status) (2/13/2022) POA: Unknown      Bladder cancer ([facility] (2/16/2022) POA: Unknown            Subjective:     Patient feels okay and comfortable but little upset as he did not get any breakfast.  Discussed with the nursing staff found that by mistake he was kept n.p.o. last night instead that will be starting tonight. Has still blood-tinged urine through the Burris catheter. Urology is planning to do transurethral resection of bladder tumor tomorrow morning. Subsequently he will need dialysis also tomorrow      A comprehensive review of systems was negative except for that written in the History of Present Illness.     Objective:     Visit Vitals  /86   Pulse 83   Temp 99.2 °F (37.3 °C)   Resp 18   Ht 5' 6\" (1.676 m)   Wt 72.6 kg (160 lb)   SpO2 98%   BMI 25.82 kg/m²         Intake/Output Summary (Last 24 hours) at 2/20/2022 1225  Last data filed at 2/19/2022 1919  Gross per 24 hour   Intake 560 ml   Output 400 ml   Net 160 ml       Current Facility-Administered Medications   Medication Dose Route Frequency Provider Last Rate Last Admin    ondansetron (ZOFRAN) injection 4 mg  4 mg IntraVENous Q4H PRN Virginia Abraham MD        [facility]) tablet 20 mg  20 mg Oral ACD Shahid Pabon MD   20 mg at 02/19/22 1703    L. acidophilus,casei,rhamnosus (BIO-K PLUS) capsule 1 Capsule  1 Capsule Oral DAILY Alfonzo Wilson MD   1 Capsule at 02/20/22 0805    amoxicillin-clavulanate (AUGMENTIN) 500-125 mg per tablet 1 Tablet  1 Tablet Oral Q24H Ada De Guzman MD   1 Tablet at 02/20/22 0805    nystatin (MYCOSTATIN) 100,000 unit/gram powder   Topical BID Donal Mccoy MD   Given at 02/20/22 0810    oxyCODONE-acetaminophen (PERCOCET) 5-325 mg per tablet 1 Tablet  1 Tablet Oral Q6H PRN Donal Mccoy MD   1 Tablet at 02/19/22 1746    acetaminophen (TYLENOL) tablet 650 mg  650 mg Oral Q6H PRN Donal Mccoy MD   650 mg at 02/18/22 1537    [Held by provider] aspirin chewable tablet 81 mg  81 mg Oral DAILY Donal Mccoy MD   81 mg at 02/18/22 0810    abiraterone tab 1,000 mg (Patient Supplied)  1,000 mg Oral DAILY Donal Mccoy MD   1,000 mg at 02/20/22 0809    doxercalciferoL (HECTOROL) 4 mcg/2 mL injection 1 mcg  1 mcg IntraVENous DIALYSIS MON, WED & Maria Chinchilla MD   1 mcg at 02/18/22 1047    epoetin brina-epbx (RETACRIT) injection 4,000 Units  4,000 Units SubCUTAneous Q MON, WED & Meka Heredia MD   4,000 Units at 02/18/22 2038    heparin (porcine) injection 5,000 Units  5,000 Units SubCUTAneous Q8H Donal Mccoy MD   5,000 Units at 02/20/22 1144    sodium chloride (NS) flush 5-10 mL  5-10 mL IntraVENous PRN Filipe Prince MD        labetaloL (NORMODYNE;TRANDATE) 20 mg/4 mL (5 mg/mL) injection 5 mg  5 mg IntraVENous Q10MIN PRN Bradley Cruz NP            Physical Exam:     Physical Exam:   General:  Alert, cooperative, no distress, appears stated age. Neck: Supple, symmetrical, trachea midline, no adenopathy, thyroid: no enlargement/tenderness/nodules, no carotid bruit and no JVD. Lungs:   Clear to auscultation bilaterally. Heart:  Regular rate and rhythm, S1, S2 normal, no murmur, click, rub or gallop. Abdomen:   Soft, non-tender. Bowel sounds normal. No masses,  No organomegaly.    Extremities: Extremities normal, atraumatic, no cyanosis or edema, AV graft on the left arm has good thrill and bruit   Skin: Skin color, texture, turgor normal. No rashes or lesions         Data Review:    CBC w/Diff    Recent Labs     02/20/22 0052 02/19/22 1946 02/19/22 1946   WBC 18.8*  --  19.1*   RBC 2.70*  --  2.86*   HGB 8.5*  --  8.8*   HCT 26.1*  --  27.9*   MCV 96.7   < > 97.6   MCH 31.5   < > 30.8   MCHC 32.6   < > 31.5   RDW 12.7   < > 12.6    < > = values in this interval not displayed. Recent Labs     02/20/22 0052 02/19/22 1946 02/19/22 1946   MONOS 7  --  10   EOS 7*  --  11*   BASOS 1   < > 0   RDW 12.7   < > 12.6    < > = values in this interval not displayed. Comprehensive Metabolic Profile    Recent Labs     02/20/22 0052 02/19/22 1946    137   K 3.0* 3.0*    101   CO2 28 28   BUN 19* 18   CREA 4.96* 4.74*    Recent Labs     02/20/22 0052 02/19/22 1946   CA 7.7* 8.0*                        Impression:       Active Hospital Problems    Diagnosis Date Noted    Bladder cancer ([facility] 02/16/2022    AMS (altered mental status) 02/13/2022    Leucocytosis 10/20/2021    Hydronephrosis 09/29/2020    UTI (urinary tract infection) 09/29/2020    ESRD (end stage renal disease) ([facility] 09/26/2020    Anemia 09/26/2020    Secondary hyperparathyroidism of renal origin ([facility] 09/26/2020    Essential (primary) hypertension 09/01/2016            Plan:     Plan is to continue oral antibiotics as per the infectious disease consultant recommendations. His white cell count is still quite high but is coming down is still leukemoid reaction from the recent malignancy. His potassium is also a little bit on the lower side we will follow the potassium tomorrow. Plan is again make sure the patient is kept n.p.o. tomorrow morning after midnight and then will undergo urological procedure followed by dialysis will adjust potassium bath during dialysis and also dialyzed without any heparin.   If things do so well then probably he could be discharged after dialysis from renal point of view      Jennifer Hoover MD

## 2022-02-20 NOTE — PROGRESS NOTES
[facility]  [facility]    Patient: Vignesh Harman Age: 80 y.o. : 1935 MR#: [id number] SSN: [id number]  Date/Time: 2022     Subjective:     Patient seen and evaluated, lying in bed, no acute distress. 28-year-old -American male with a past medical history of end-stage renal disease on hemodialysis, prostate cancer with mets, hypertension, CAD, chronic indwelling Burris, anemia of chronic disease presents to the emergency room with altered mental status. ER evaluationpatient noted to have a UTI, started on broad-spectrum IV antibiotics. Concern for right-sided facial droop, neurology consulted and recommended MRI for further evaluation. MRI brain negative for acute CVA. ID consulted for persistent leukocytosis. 2top IV Unasyn per ID    I had a long discussion with the patient as well as his wife, they are agreeable to DNR. Palliative care on board, signed DNR form. Discussed with urology regarding hematuria, plan for TURBT on Monday, hold all anticoagulation and keep n.p.o.  night. No new events overnight, patient's wife mentions that he vomited this morning, will give Zofran. patient's wife does not want him to undergo procedure in a.m. Informed urology PA. Anticipate discharge home in a.m. post dialysis. Assessment/Plan:     1. Acute metabolic encephalopathylikely secondary to UTIcompleted IV antibiotics, mental status back to baseline. 2. UTI with hematuriacompleted IV antibiotics. 3. Leukocytosis likely underlying cancer. 4. Hematuriaper patient this is new, consulted urology since patient has underlying prostate cancer and is following up with them. CT chest abdomen pelvis with contrast ordered, results reviewed, per urology plan for TURBT on Monday, however wife does not wish to have procedure.   Anticipate discharge home in a.m.  5. Right-sided facial droop concerning for CVAMRI brain negative for acute CVA. 6. ESRD on HDnephrology on board continue hemodialysis per schedule  7. History of prostate cancer with metson abiraterone / prednisone as out pt, restarted home medications. 8. 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 DVT prophylaxisHeparin  DNR    PT and OT eval, recommending rehab but patient's wife would like to take him home. Continue current treatment plan, will follow. Albert Street MD  22      Case discussed with:  []Patient  []Family  [x]Nursing  []Case Management  DVT Prophylaxis:  []Lovenox  [x]Hep SQ  []SCDs  []Coumadin   []On Heparin gtt    Objective:   VS:   Visit Vitals  /86   Pulse 83   Temp 99.2 °F (37.3 °C)   Resp 18   Ht 5' 6\" (1.676 m)   Wt 72.6 kg (160 lb)   SpO2 98%   BMI 25.82 kg/m²      Tmax/24hrs: Temp (24hrs), Av.7 °F (37.1 °C), Min:97.3 °F (36.3 °C), Max:99.4 °F (37.4 °C)  IOBRIEF    Intake/Output Summary (Last 24 hours) at 2022 1447  Last data filed at 2022 1359  Gross per 24 hour   Intake 380 ml   Output 500 ml   Net -120 ml       General:  Awake , lying in bed   Pulmonary:  CTA Bilaterally. No Wheezing/Rhonchi/Rales. Cardiovascular: Regular rate and Rhythm. GI:  Soft, Non distended, Non tender. + Bowel sounds. Extremities:  No edema, cyanosis, clubbing. No calf tenderness. Neurologic: Alert and awake, lying in bed, able to move all extremities.   Additional:    Medications:   Current Facility-Administered Medications   Medication Dose Route Frequency    ondansetron (ZOFRAN) injection 4 mg  4 mg IntraVENous Q4H PRN    trospium (SANCTURA) tablet 20 mg  20 mg Oral ACD    L. acidophilus,casei,rhamnosus (BIO-K PLUS) capsule 1 Capsule  1 Capsule Oral DAILY    amoxicillin-clavulanate (AUGMENTIN) 500-125 mg per tablet 1 Tablet  1 Tablet Oral Q24H    nystatin (MYCOSTATIN) 100,000 unit/gram powder   Topical BID    oxyCODONE-acetaminophen (PERCOCET) 5-325 mg per tablet 1 Tablet  1 Tablet Oral Q6H PRN    acetaminophen (TYLENOL) tablet 650 mg  650 mg Oral Q6H PRN    [Held by provider] aspirin chewable tablet 81 mg  81 mg Oral DAILY    abiraterone tab 1,000 mg (Patient Supplied)  1,000 mg Oral DAILY    doxercalciferoL (HECTOROL) 4 mcg/2 mL injection 1 mcg  1 mcg IntraVENous DIALYSIS MON, WED & FRI    epoetin brina-epbx (RETACRIT) injection 4,000 Units  4,000 Units SubCUTAneous Q MON, WED & FRI    heparin (porcine) injection 5,000 Units  5,000 Units SubCUTAneous Q8H    sodium chloride (NS) flush 5-10 mL  5-10 mL IntraVENous PRN    labetaloL (NORMODYNE;TRANDATE) 20 mg/4 mL (5 mg/mL) injection 5 mg  5 mg IntraVENous Q10MIN PRN       Imaging:   XR Results (most recent):  Results from Hospital Encounter encounter on 02/13/22    XR CHEST PA LAT    Narrative  EXAM: Chest Radiographs    INDICATION:  ? opacities on portable, r/o pna    TECHNIQUE: PA and lateral views of the chest    COMPARISON: 2/13/2022, 10/21/2021, 11/13/2020    FINDINGS: No pneumothorax identified. There is a rounded opacity overlying the  right posterior fifth and sixth intercostal space. This is likely in the right  upper lobe anteriorly. Subtle groundglass changes are noted bilaterally. No  effusions appreciated. The cardiac silhouette is normal. The pulmonary  vascularity is unremarkable. Degenerative changes of the spine. Degenerative  changes of the shoulders are noted. Impression  1. Ovoid density in the right upper lobe likely near the anterior segment. Additional groundglass opacities. Consider further evaluation with CT. CT Results (most recent):  Results from Hospital Encounter encounter on 02/13/22    CT CHEST ABD PELV W CONT    Narrative  CT chest, abdomen and pelvis with contrast    CLINICAL HISTORY: hematuria, check for mets    COMPARISON: CT 10/18/21.     TECHNIQUE: Helical scan to the chest, abdomen and pelvis are obtained from the  thoracic inlet to the symphysis pubis after IV contrast administration. All CT scans at this facility performed using dose optimization techniques as  appreciated to a performed exam, to include automated exposure control,  adjustment of the mA and or KU according to patient size (including appropriate  matching for site specific examination), or use of iterative reconstruction  technique. FINDINGS:    CT CHEST:    Lung Parenchyma: Clear. Thyroid: Unremarkable. Lymph nodes: No adenopathy. Mediastinum: The heart and the pericardium appear normal.    Vasculature: The aorta and the great vessels are unremarkable. Pleural Space And Chest Wall: No significant pleural pathology. CT ABDOMEN AND PELVIS:    Liver: Normal.  Gallbladder: Small layering gallbladder/present. Biliary System: No ductal dilatation. Spleen: Normal.  Pancreas: Normal.  Bowel: Small hiatal hernia. The small and large bowel are nondilated. There is  a large ovoid intraluminal enhancing mass identified in the hepatic flexure of  the colon which measures 5.5 x 7.3 x 4.3 cm. Interval enlargement noted compared  to the prior abdomen MR 5/13/21. There is now associated short intussusception. Significant lumen narrowing identified. Although, no associated bowel  obstruction seen. The colon distal to the mass shows fluid-filled lumen. Homogeneous and slightly hyperdense appearing rectum with no intraluminal air,  difficult to evaluate. Barium enema advised for better evaluation of these  findings. Adrenal Glands: Normal.  Kidneys: Atrophic kidneys, especially on the left. 1 cm cortical cyst at lateral  midpole of right kidney. Lower genitourinary system: The bladder is decompressed with Burris catheter. There is a large complex cystic mass with rim enhancement identified in the dome  of the bladder with lobulated contour and internal septa, measuring 5.5 x 7.7 x  7.2 cm.  The mass was smaller and more solid appearance with questionable early  necrosis on prior CT, measuring 4.6 x 4.6 x 5.1 cm. The prostate is small in  size. Peritoneum/Retroperitoneum: No adenopathy. Vasculature: Moderate atherosclerotic aortic disease. Other:  No free fluid. Small periumbilical hernia appears stable and the small  nondilated bowel loop within the hernia sac on prior study is now only partially  seen. Mild fatty stranding within the hernia sac again seen. There is  subcutaneous foci of air and surrounding fatty stranding identified in the left  upper pelvic wall, probably from recent procedure or injection of medication. Mild edema in pelvic wall. CT OSSEOUS STRUCTURES:    Multiple osteoblastic lesions are again identified along the spine, ribs,  sternum and pelvic bone with no significant interval progression since the prior  study. Dextroscoliosis in upper lumbar spine with associated spondylosis. Impression  1. The large solid mass in the dome of the bladder is interval enlarged and  more cystic in appearance since the prior study, suggestive of tumor necrosis. 2.  Large solid mass in the hepatic flexure of the colon is also interval  enlarged with significant lumen narrowing and short intussusception. No  obstruction. This is more concerned for primary malignancy. Homogeneous  appearing rectum could represent intraluminal fluid but potential mass cannot be  excluded. Recommend barium enema for better evaluation. 3.  Atrophic kidneys, especially on the left. Small cortical cyst in midpole  right kidney. 4.  Extensive osseous metastasis appear grossly unchanged. 5.  Layering gallbladder sludge. 6.  Nonobstructive umbilical hernia. Thank you for this referral.      09/25/20    ECHO ADULT COMPLETE 09/26/2020 9/26/2020    Interpretation Summary  · LV: Calculated LVEF is 50%. Visually measured ejection fraction. Normal cavity size. Mildly increased wall thickness. Globally reduced systolic function. Low normal systolic function. Mild (grade 1) left ventricular diastolic dysfunction. · MV: Mild mitral valve regurgitation is present. Signed by: Sharifa Gusman DO on 9/26/2020 12:20 PM       Labs:    Recent Results (from the past 48 hour(s))   PSA, TOTAL &  FREE    Collection Time: 02/18/22  6:26 PM   Result Value Ref Range    Prostate Specific Ag PENDING ng/mL    PSA, Free 1.00 N/A ng/mL    PSA, % Free PENDING %   CBC WITH AUTOMATED DIFF    Collection Time: 02/19/22  7:46 PM   Result Value Ref Range    WBC 19.1 (H) 4.6 - 13.2 K/uL    RBC 2.86 (L) 4.35 - 5.65 M/uL    HGB 8.8 (L) 13.0 - 16.0 g/dL    HCT 27.9 (L) 36.0 - 48.0 %    MCV 97.6 78.0 - 100.0 FL    MCH 30.8 24.0 - 34.0 PG    MCHC 31.5 31.0 - 37.0 g/dL    RDW 12.6 11.6 - 14.5 %    PLATELET 926 853 - 921 K/uL    MPV 10.5 9.2 - 11.8 FL    NRBC 0.1 (H) 0  WBC    ABSOLUTE NRBC 0.02 (H) 0.00 - 0.01 K/uL    NEUTROPHILS 72 40 - 73 %    LYMPHOCYTES 7 (L) 21 - 52 %    MONOCYTES 10 3 - 10 %    EOSINOPHILS 11 (H) 0 - 5 %    BASOPHILS 0 0 - 2 %    IMMATURE GRANULOCYTES 0 0.0 - 0.5 %    ABS. NEUTROPHILS 13.8 (H) 1.8 - 8.0 K/UL    ABS. LYMPHOCYTES 1.3 0.9 - 3.6 K/UL    ABS. MONOCYTES 1.9 (H) 0.05 - 1.2 K/UL    ABS. EOSINOPHILS 2.1 (H) 0.0 - 0.4 K/UL    ABS. BASOPHILS 0.0 0.0 - 0.1 K/UL    ABS. IMM.  GRANS. 0.0 0.00 - 0.04 K/UL    DF MANUAL      PLATELET COMMENTS ADEQUATE PLATELETS      RBC COMMENTS NORMOCYTIC, NORMOCHROMIC     METABOLIC PANEL, BASIC    Collection Time: 02/19/22  7:46 PM   Result Value Ref Range    Sodium 137 136 - 145 mmol/L    Potassium 3.0 (L) 3.5 - 5.5 mmol/L    Chloride 101 100 - 111 mmol/L    CO2 28 21 - 32 mmol/L    Anion gap 8 3.0 - 18 mmol/L    Glucose 107 (H) 74 - 99 mg/dL    BUN 18 7.0 - 18 MG/DL    Creatinine 4.74 (H) 0.6 - 1.3 MG/DL    BUN/Creatinine ratio 4 (L) 12 - 20      GFR est AA 14 (L) >60 ml/min/1.73m2    GFR est non-AA 12 (L) >60 ml/min/1.73m2    Calcium 8.0 (L) 8.5 - 10.1 MG/DL   CBC WITH AUTOMATED DIFF    Collection Time: 02/20/22 12:52 AM   Result Value Ref Range    WBC 18.8 (H) 4.6 - 13.2 K/uL    RBC 2.70 (L) 4.35 - 5.65 M/uL    HGB 8.5 (L) 13.0 - 16.0 g/dL    HCT 26.1 (L) 36.0 - 48.0 %    MCV 96.7 78.0 - 100.0 FL    MCH 31.5 24.0 - 34.0 PG    MCHC 32.6 31.0 - 37.0 g/dL    RDW 12.7 11.6 - 14.5 %    PLATELET 676 618 - 972 K/uL    MPV 10.5 9.2 - 11.8 FL    NRBC 0.0 0  WBC    ABSOLUTE NRBC 0.00 0.00 - 0.01 K/uL    NEUTROPHILS 72 40 - 73 %    LYMPHOCYTES 11 (L) 21 - 52 %    MONOCYTES 7 3 - 10 %    EOSINOPHILS 7 (H) 0 - 5 %    BASOPHILS 1 0 - 2 %    IMMATURE GRANULOCYTES 2 (H) 0.0 - 0.5 %    ABS. NEUTROPHILS 13.6 (H) 1.8 - 8.0 K/UL    ABS. LYMPHOCYTES 2.1 0.9 - 3.6 K/UL    ABS. MONOCYTES 1.3 (H) 0.05 - 1.2 K/UL    ABS. EOSINOPHILS 1.4 (H) 0.0 - 0.4 K/UL    ABS. BASOPHILS 0.1 0.0 - 0.1 K/UL    ABS. IMM. GRANS. 0.3 (H) 0.00 - 0.04 K/UL    DF AUTOMATED     METABOLIC PANEL, BASIC    Collection Time: 02/20/22 12:52 AM   Result Value Ref Range    Sodium 138 136 - 145 mmol/L    Potassium 3.0 (L) 3.5 - 5.5 mmol/L    Chloride 100 100 - 111 mmol/L    CO2 28 21 - 32 mmol/L    Anion gap 10 3.0 - 18 mmol/L    Glucose 77 74 - 99 mg/dL    BUN 19 (H) 7.0 - 18 MG/DL    Creatinine 4.96 (H) 0.6 - 1.3 MG/DL    BUN/Creatinine ratio 4 (L) 12 - 20      GFR est AA 14 (L) >60 ml/min/1.73m2    GFR est non-AA 11 (L) >60 ml/min/1.73m2    Calcium 7.7 (L) 8.5 - 10.1 MG/DL       Signed By: Derrek Delgadillo MD     February 20, 2022      I spent 25 minutes with the patient in face-to-face consultation, of which greater than 50% was spent in counseling and coordination of care as described above    Disclaimer: Sections of this note are dictated using utilizing voice recognition software. Minor typographical errors may be present. If questions arise, please do not hesitate to contact me or call our department.

## 2022-02-20 NOTE — PROGRESS NOTES
Wife at the bedside declining the procedure in the morning. Wife is declining him to be NPO at midnight. Tyra BUTLER and he will be coming to the bedside to speak to the patient and wife.

## 2022-02-20 NOTE — PROGRESS NOTES
Problem: Patient Education: Go to Patient Education Activity  Goal: Patient/Family Education  Outcome: Progressing Towards Goal     Problem: Pressure Injury - Risk of  Goal: *Prevention of pressure injury  Description: Document Karthikeyan Scale and appropriate interventions in the flowsheet.   Outcome: Progressing Towards Goal  Note: Pressure Injury Interventions:  Sensory Interventions: Assess changes in LOC    Moisture Interventions: Absorbent underpads,Offer toileting Q_hr    Activity Interventions: Pressure redistribution bed/mattress(bed type),PT/OT evaluation,Assess need for specialty bed    Mobility Interventions: Pressure redistribution bed/mattress (bed type),HOB 30 degrees or less    Nutrition Interventions: Document food/fluid/supplement intake    Friction and Shear Interventions: Foam dressings/transparent film/skin sealants,HOB 30 degrees or less,Minimize layers                Problem: Patient Education: Go to Patient Education Activity  Goal: Patient/Family Education  Outcome: Progressing Towards Goal     Problem: Patient Education: Go to Patient Education Activity  Goal: Patient/Family Education  Outcome: Progressing Towards Goal

## 2022-02-20 NOTE — PROGRESS NOTES
Problem: Self Care Deficits Care Plan (Adult)  Goal: *Acute Goals and Plan of Care (Insert Text)  Description: Occupational Therapy Goals  Initiated 2/14/2022 within 7 day(s). 1.  Patient will perform bed mobility with supervision/set-up in prep for ADL routine. 2. Patient will perform toilet transfers with supervision/set-up using RW. 3.  Patient will perform all aspects of toileting with supervision/set-up. 4.  Patient will participate in upper extremity therapeutic exercise/activities with supervision/set-up for 8 minutes. 5.  Patient will utilize energy conservation techniques during functional activities with min verbal cues. Prior Level of Function: per spouse's spouse: she provided total assist with bathing and dressing at bed level, pt able to perform toilet transfer using RW with supv     Outcome: Progressing Towards Goal   OCCUPATIONAL THERAPY TREATMENT    Patient: Don Carter (82 y.o. male)  Date: 2/20/2022  Diagnosis: AMS (altered mental status) [R41.82] <principal problem not specified>  Procedure(s) (LRB):  CYSTOSCOPY, PALLIATIVE  TRANSURETHRAL RESECTION OF BLADDER TUMOR/POSSIBLE CLOT EVACUATION WITH FULGURATION (N/A)    Precautions: Fall    Chart, occupational therapy assessment, plan of care, and goals were reviewed. ASSESSMENT:  Pt is peasant and cooperative. Pt c/o not receiving breakfast. NSG aware. Pt agreeable to EOB/OOB activity. Pt requires assist w/trunk and vc's for use of side rail to increase independence. Pt soiled w/BM. Tolerates standing for toileting ADL. Decrease dynamic standing balance requires Max Assist w/bowel hygiene and clothing mgt. Pt educated on stand pivot transfer technique for energy conservation w/functional transfer to chair. No c/o pain. Pt left in chair and reinforced importance of calling for assistance. Pt anxious for d/c home.   Progression toward goals:  [x]          Improving appropriately and progressing toward goals  []          Improving slowly and progressing toward goals  []          Not making progress toward goals and plan of care will be adjusted     PLAN:  Patient continues to benefit from skilled intervention to address the above impairments. Continue treatment per established plan of care. Discharge Recommendations:  Rehab  Further Equipment Recommendations for Discharge:  bedside commode     SUBJECTIVE:   Patient stated I can walker to the bathroom with my walker.     OBJECTIVE DATA SUMMARY:   Cognitive/Behavioral Status:  Neurologic State: Alert  Orientation Level: Oriented X4  Cognition: Follows commands  Safety/Judgement: Fall prevention    Functional Mobility and Transfers for ADLs:   Bed Mobility:  Supine to Sit: Moderate assistance; Additional time;Bed Modified  Scooting: Maximum assistance   Transfers:  Sit to Stand: Minimum assistance  Bed to Chair: Minimum assistance (EOB <--> chair xfer w/SPT)  Balance:  Sitting: Intact  Standing: Impaired; With support    ADL Intervention:  Grooming  Position Performed: Seated edge of bed  Washing Face: Stand-by assistance  Washing Hands: Stand-by assistance  Brushing Teeth: Stand-by assistance  Brushing/Combing Hair: Stand-by assistance    Toileting  Toileting Assistance: Maximum assistance  Bowel Hygiene: Maximum assistance  Clothing Management: Maximum assistance    Pain:  Pain level pre-treatment: 0/10   Pain level post-treatment: 0/10    Activity Tolerance:    Good    Please refer to the flowsheet for vital signs taken during this treatment. After treatment:   [x]  Patient left in no apparent distress sitting up in chair  []  Patient left in no apparent distress in bed  [x]  Call bell left within reach  [x]  Nursing notified  []  Caregiver present  []  Bed alarm activated    COMMUNICATION/EDUCATION:   [] Role of Occupational Therapy in the acute care setting  [] Home safety education was provided and the patient/caregiver indicated understanding.   [] Patient/family have participated as able in working towards goals and plan of care. [x] Patient/family agree to work toward stated goals and plan of care. [] Patient understands intent and goals of therapy, but is neutral about his/her participation. [] Patient is unable to participate in goal setting and plan of care.       Thank you for this referral.  LISA Stone  Time Calculation: 24 mins

## 2022-02-20 NOTE — PROGRESS NOTES
Pt has been able to swallow water and pills without difficulty. Will reach out to the MD for an advance diet order.

## 2022-02-21 NOTE — DISCHARGE INSTRUCTIONS
Patient armband removed and shredded    DISCHARGE SUMMARY from Nurse    PATIENT INSTRUCTIONS:    After general anesthesia or intravenous sedation, for 24 hours or while taking prescription Narcotics:  · Limit your activities  · Do not drive and operate hazardous machinery  · Do not make important personal or business decisions  · Do  not drink alcoholic beverages  · If you have not urinated within 8 hours after discharge, please contact your surgeon on call. Report the following to your surgeon:  · Excessive pain, swelling, redness or odor of or around the surgical area  · Temperature over 100.5  · Nausea and vomiting lasting longer than 4 hours or if unable to take medications  · Any signs of decreased circulation or nerve impairment to extremity: change in color, persistent  numbness, tingling, coldness or increase pain  · Any questions    What to do at Home:  Recommended activity: Activity as tolerated,     If you experience any of the following symptoms altered mental status, fever, shortness of breath, abdominal pain, please follow up with primary care physician or ED. *  Please give a list of your current medications to your Primary Care Provider. *  Please update this list whenever your medications are discontinued, doses are      changed, or new medications (including over-the-counter products) are added. *  Please carry medication information at all times in case of emergency situations. These are general instructions for a healthy lifestyle:    No smoking/ No tobacco products/ Avoid exposure to second hand smoke  Surgeon General's Warning:  Quitting smoking now greatly reduces serious risk to your health.     Obesity, smoking, and sedentary lifestyle greatly increases your risk for illness    A healthy diet, regular physical exercise & weight monitoring are important for maintaining a healthy lifestyle    You may be retaining fluid if you have a history of heart failure or if you experience any of the following symptoms:  Weight gain of 3 pounds or more overnight or 5 pounds in a week, increased swelling in our hands or feet or shortness of breath while lying flat in bed. Please call your doctor as soon as you notice any of these symptoms; do not wait until your next office visit. The discharge information has been reviewed with the patient. The patient verbalized understanding. Discharge medications reviewed with the patient and appropriate educational materials and side effects teaching were provided.   ___________________________________________________________________________________________________________________________________

## 2022-02-21 NOTE — DISCHARGE SUMMARY
Hospitalist Discharge Summary    Patient: Harley Simons MRN: [id number]  CSN: [id number]    YOB: 1935  Age: 80 y.o. Sex: male    DOA: 2/13/2022 LOS:  LOS: 8 days   Discharge Date:     Admission Diagnoses: AMS (altered mental status) [R41.82]    Discharge Diagnoses:    1. Acute metabolic encephalopathy  2. UTI  3. ESRD on HD  4. History of prostate cancer B[place]ex  5. History of hypertension  6. History of CAD  7. Chronic indwelling Burris  8. Anemia of chronic disease  9. Possible bladder cancer with mass    Discharge Condition: Fair    Discharge To: Home    CODE STATUS: DNR     PHYSICAL EXAM  Visit Vitals  BP (!) 153/70   Pulse (!) 101   Temp 97.7 °F (36.5 °C) (Oral)   Resp 18   Ht 5' 6\" (1.676 m)   Wt 71.2 kg (157 lb)   SpO2 97%   BMI 25.34 kg/m²       General: Alert, cooperative, no acute distress    HEENT: PERRLA, EOMI. Anicteric sclerae. Lungs:  CTA Bilaterally. No Wheezing/Rhonchi/Rales. Heart:  Regular rate and Rhythm. Abdomen: Soft, Non distended, Non tender. + Bowel sounds. Extremities: No edema/ cyanosis/ clubbing  Neurologic:  AA oriented X 3. Moves all extremities. HPI:    Bradley Alarcon is a 80 y.o.  male with PMH of end-stage renal disease, prostate cancer with metastases, hypertension, CAD, chronic indwelling Burris catheter, anemia, who presents with altered mental status. Per family patient was in his normal state of health last night with the exception that he is just been moving a little bit slower. Today when they went to check on him he was not his normal self, family noticed he was chewing his pills which is out of character for him, he is usually alert and oriented x4 but his only able to state where he has currently. There is also some question of some right-sided facial droop prompting presentation to ED. When asking patient he denies any sort of discomfort, denies weakness or other concerns.       Hospital Course: Patient with multiple medical problems admitted to the hospital secondary to altered mental status. There was concern for possible CVA, MRI brain ordered which was negative for acute stroke. Patient noted to have acute metabolic encephalopathy secondary to UTI, treated with IV antibiotics. Patient noted to have persistent leukocytosis, ID consulted, recommended to consult urology for underlying hematuria. Urology consulted since patient has underlying prostate cancer, recommended CT chest abdomen pelvis with contrast.  CT chest abdomen pelvis showed a large solid mass in the dome of the bladder, is intervally enlarged and more cystic in appearance, suggestive of tumor necrosis. Also noted to have a large solid mass in the hepatic flexure of the colon, also interval enlargement with significant luminal narrowing and short intussusception. Urology recommended GI consultation for the mass in the colon. Urology also recommended TURBT and initial plan was to proceed with it however patient's wife refused to get the procedure done and wished to take him home. Nephrology was consulted since patient has end-stage renal disease on hemodialysis. Patient continued hemodialysis per schedule. Given patient's multiple medical problems and extensive disease, palliative care consulted, patient is now DNR and a durable DNR was signed. Patient is currently being discharged home. There were also extensive osseous mets. Follow up Care: With PCP in 2 weeks    Consults: Infectious Disease, Nephrology and Urology    Significant Diagnostic Studies:     Imaging:  XR Results (most recent):  Results from Hospital Encounter encounter on 02/13/22    XR CHEST PA LAT    Narrative  EXAM: Chest Radiographs    INDICATION:  ? opacities on portable, r/o pna    TECHNIQUE: PA and lateral views of the chest    COMPARISON: 2/13/2022, 10/21/2021, 11/13/2020    FINDINGS: No pneumothorax identified.   There is a rounded opacity overlying the  right posterior fifth and sixth intercostal space. This is likely in the right  upper lobe anteriorly. Subtle groundglass changes are noted bilaterally. No  effusions appreciated. The cardiac silhouette is normal. The pulmonary  vascularity is unremarkable. Degenerative changes of the spine. Degenerative  changes of the shoulders are noted. Impression  1. Ovoid density in the right upper lobe likely near the anterior segment. Additional groundglass opacities. Consider further evaluation with CT. CT Results (most recent):  Results from Hospital Encounter encounter on 02/13/22    CT CHEST ABD PELV W CONT    Narrative  CT chest, abdomen and pelvis with contrast    CLINICAL HISTORY: hematuria, check for mets    COMPARISON: CT 10/18/21. TECHNIQUE: Helical scan to the chest, abdomen and pelvis are obtained from the  thoracic inlet to the symphysis pubis after IV contrast administration. All CT scans at this facility performed using dose optimization techniques as  appreciated to a performed exam, to include automated exposure control,  adjustment of the mA and or KU according to patient size (including appropriate  matching for site specific examination), or use of iterative reconstruction  technique. FINDINGS:    CT CHEST:    Lung Parenchyma: Clear. Thyroid: Unremarkable. Lymph nodes: No adenopathy. Mediastinum: The heart and the pericardium appear normal.    Vasculature: The aorta and the great vessels are unremarkable. Pleural Space And Chest Wall: No significant pleural pathology. CT ABDOMEN AND PELVIS:    Liver: Normal.  Gallbladder: Small layering gallbladder/present. Biliary System: No ductal dilatation. Spleen: Normal.  Pancreas: Normal.  Bowel: Small hiatal hernia. The small and large bowel are nondilated. There is  a large ovoid intraluminal enhancing mass identified in the hepatic flexure of  the colon which measures 5.5 x 7.3 x 4.3 cm.  Interval enlargement noted compared  to the prior abdomen MR 5/13/21. There is now associated short intussusception. Significant lumen narrowing identified. Although, no associated bowel  obstruction seen. The colon distal to the mass shows fluid-filled lumen. Homogeneous and slightly hyperdense appearing rectum with no intraluminal air,  difficult to evaluate. Barium enema advised for better evaluation of these  findings. Adrenal Glands: Normal.  Kidneys: Atrophic kidneys, especially on the left. 1 cm cortical cyst at lateral  midpole of right kidney. Lower genitourinary system: The bladder is decompressed with Burris catheter. There is a large complex cystic mass with rim enhancement identified in the dome  of the bladder with lobulated contour and internal septa, measuring 5.5 x 7.7 x  7.2 cm. The mass was smaller and more solid appearance with questionable early  necrosis on prior CT, measuring 4.6 x 4.6 x 5.1 cm. The prostate is small in  size. Peritoneum/Retroperitoneum: No adenopathy. Vasculature: Moderate atherosclerotic aortic disease. Other:  No free fluid. Small periumbilical hernia appears stable and the small  nondilated bowel loop within the hernia sac on prior study is now only partially  seen. Mild fatty stranding within the hernia sac again seen. There is  subcutaneous foci of air and surrounding fatty stranding identified in the left  upper pelvic wall, probably from recent procedure or injection of medication. Mild edema in pelvic wall. CT OSSEOUS STRUCTURES:    Multiple osteoblastic lesions are again identified along the spine, ribs,  sternum and pelvic bone with no significant interval progression since the prior  study. Dextroscoliosis in upper lumbar spine with associated spondylosis. Impression  1. The large solid mass in the dome of the bladder is interval enlarged and  more cystic in appearance since the prior study, suggestive of tumor necrosis.   2.  Large solid mass in the hepatic flexure of the colon is also interval  enlarged with significant lumen narrowing and short intussusception. No  obstruction. This is more concerned for primary malignancy. Homogeneous  appearing rectum could represent intraluminal fluid but potential mass cannot be  excluded. Recommend barium enema for better evaluation. 3.  Atrophic kidneys, especially on the left. Small cortical cyst in midpole  right kidney. 4.  Extensive osseous metastasis appear grossly unchanged. 5.  Layering gallbladder sludge. 6.  Nonobstructive umbilical hernia. Thank you for this referral.      09/25/20    ECHO ADULT COMPLETE 09/26/2020 9/26/2020    Interpretation Summary  · LV: Calculated LVEF is 50%. Visually measured ejection fraction. Normal cavity size. Mildly increased wall thickness. Globally reduced systolic function. Low normal systolic function. Mild (grade 1) left ventricular diastolic dysfunction. · MV: Mild mitral valve regurgitation is present. Signed by: Pairs Fall DO on 9/26/2020 12:20 PM         Procedures:   None    Discharge Medications:     Current Discharge Medication List      START taking these medications    Details   trospium (SANCTURA) 20 mg tablet Take 1 Tablet by mouth Daily (before dinner). Qty: 30 Tablet, Refills: 0      nystatin (MYCOSTATIN) powder Apply  to affected area two (2) times a day. Qty: 1 Each, Refills: 0      amoxicillin-clavulanate (AUGMENTIN) 500-125 mg per tablet Take 1 Tablet by mouth every twenty-four (24) hours for 7 days. Qty: 7 Tablet, Refills: 0         CONTINUE these medications which have NOT CHANGED    Details   predniSONE (DELTASONE) 5 mg tablet TAKE 1 TABLET BY MOUTH 2 TIMES A DAY  Qty: 60 Tablet, Refills: 4    Associated Diagnoses: Malignant neoplasm of prostate ([address] Osseous metastasis ([address]      ! ! abiraterone 250 mg tab TAKE 4 TABLETS BY MOUTH 1 TIME A DAY ON AN EMPTY STOMACH. TAKE ONE HOUR PRIOR TO FOOD OR TWO HOURS AFTER FOOD.  SWALLOW TABLETS WHOLE WITH WATER AND DO NOT CRUSH OR CHEW TABLETS. Qty: 120 Tablet, Refills: 4      acetaminophen (TYLENOL) 500 mg tablet Take 500 mg by mouth every six (6) hours as needed for Pain.      pantoprazole (PROTONIX) 40 mg tablet Take 1 Tablet by mouth Before breakfast and dinner. Qty: 60 Tablet, Refills: 0      amLODIPine (NORVASC) 10 mg tablet TAKE 1 TABLET BY MOUTH EVERY DAY      calcium acetate,phosphat bind, (PHOSLO) 667 mg cap Take 1 Capsule by mouth daily. !! abiraterone (Zytiga) 250 mg tab Take four tablets by mouth daily on an empty stomach. Take one hour prior to food or two hours after food. Tablets should be swallowed whole with water. Do not crush or chew tablets. Qty: 120 Tab, Refills: 5    Associated Diagnoses: Malignant neoplasm of prostate ([address] Osseous metastasis ([facility])      B complex w-C no.20/folic acid (TRIPHROCAPS PO) Take 1 Cap by mouth daily. !! - Potential duplicate medications found. Please discuss with provider.           Current Facility-Administered Medications:     ondansetron (ZOFRAN) injection 4 mg, 4 mg, IntraVENous, Q4H PRN, Flako Woody MD    [facility]) tablet 20 mg, 20 mg, Oral, ACD, Ace Wade MD, 20 mg at 02/20/22 1804    L. acidophilus,casei,rhamnosus (BIO-K PLUS) capsule 1 Capsule, 1 Capsule, Oral, DAILY, Thomas Davidson MD, 1 Capsule at 02/21/22 0805    amoxicillin-clavulanate (AUGMENTIN) 500-125 mg per tablet 1 Tablet, 1 Tablet, Oral, Q24H, Thomas Davidson MD, 1 Tablet at 02/21/22 0805    nystatin (MYCOSTATIN) 100,000 unit/gram powder, , Topical, BID, Flako Woody MD, Given at 02/20/22 1814    oxyCODONE-acetaminophen (PERCOCET) 5-325 mg per tablet 1 Tablet, 1 Tablet, Oral, Q6H PRN, Flako Woody MD, 1 Tablet at 02/20/22 1804    acetaminophen (TYLENOL) tablet 650 mg, 650 mg, Oral, Q6H PRN, Flako Woody MD, 650 mg at 02/18/22 1537    [Held by provider] aspirin chewable tablet 81 mg, 81 mg, Oral, DAILY, Flako Woody MD, 81 mg at 02/18/22 0810    abiraterone tab 1,000 mg (Patient Supplied), 1,000 mg, Oral, DAILY, Kaveh Pickett MD, 1,000 mg at 02/21/22 0813    doxercalciferoL (HECTOROL) 4 mcg/2 mL injection 1 mcg, 1 mcg, IntraVENous, DIALYSIS MON, WED & García Abernathy MD, 1 mcg at 02/21/22 0806    epoetin brina-epbx (RETACRIT) injection 4,000 Units, 4,000 Units, SubCUTAneous, Q MON, WED & García Abernathy MD, 4,000 Units at 02/18/22 2038    [Held by provider] heparin (porcine) injection 5,000 Units, 5,000 Units, SubCUTAneous, Q8H, Kaveh Pickett MD, 5,000 Units at 02/20/22 1944    sodium chloride (NS) flush 5-10 mL, 5-10 mL, IntraVENous, PRN, Leonid Oscar MD    labetaloL (NORMODYNE;TRANDATE) 20 mg/4 mL (5 mg/mL) injection 5 mg, 5 mg, IntraVENous, Q10MIN PRN, Noa Forrest NP     Activity: Activity as tolerated    Diet: Regular Diet    Wound Care: As directed      Prudence MD Mk  2/21/2022, 11:19 AM    Total time spent 42 mins  Disclaimer: Sections of this note are dictated using utilizing voice recognition software. Minor typographical errors may be present. If questions arise, please do not hesitate to contact me or call our department.

## 2022-02-21 NOTE — PROGRESS NOTES
[facility] [place]: [phone]  [facility]: [phone]    Goals of care defined. Pt wants to continue dialysis. POST form completed and signed by patient. He is agreeable to complete a POST form for DDNR, limited medical interventions, and NO feeding tube. Palliative team will sign off. Please consult team as needed, if appropriate. Thank you for the Palliative Medicine consult and allowing us to participate in the care of Mr. Renetta Escamilla DNR/DNI, Limited Medical Interventions  NO FEEDING TUBES.    POST FORM ON FILE    Ne LYNNN, RN, [facility]  Palliative Medicine Inpatient RN  [address]: [phone]

## 2022-02-21 NOTE — PROGRESS NOTES
Progress Note    Kurt Garcia  80 y.o. Admit Date: 2/13/2022  Active Problems:    Essential (primary) hypertension (9/1/2016) POA: Yes      ESRD (end stage renal disease) ([facility] (9/26/2020) POA: Yes      Secondary hyperparathyroidism of renal origin ([facility] (9/26/2020) POA: Yes      Anemia (9/26/2020) POA: Yes      Hydronephrosis (9/29/2020) POA: Yes      UTI (urinary tract infection) (9/29/2020) POA: Yes      Leucocytosis (10/20/2021) POA: Yes      AMS (altered mental status) (2/13/2022) POA: Unknown      Bladder cancer ([facility] (2/16/2022) POA: Unknown            Subjective:     Seen during dialysis. No new complaint. Family decided not to do any urological procedure. Times a rectal bladder tumor resection is canceled. Patient has prostate cancer with possible colon cancer with a distant metastasis in different parts of the body. He is DNR and DNI but the family and the patient wants to continue to be dialyzed. His hematuria is better, a Burris catheter has to remain in . Swapna Neal Still on oral antibiotics      A comprehensive review of systems was negative except for that written in the History of Present Illness.     Objective:     Visit Vitals  /81   Pulse (!) 102   Temp 97.7 °F (36.5 °C) (Oral)   Resp 18   Ht 5' 6\" (1.676 m)   Wt 71.2 kg (157 lb)   SpO2 97%   BMI 25.34 kg/m²         Intake/Output Summary (Last 24 hours) at 2/21/2022 1145  Last data filed at 2/21/2022 0631  Gross per 24 hour   Intake 60 ml   Output 525 ml   Net -465 ml       Current Facility-Administered Medications   Medication Dose Route Frequency Provider Last Rate Last Admin    ondansetron (ZOFRAN) injection 4 mg  4 mg IntraVENous Q4H PRN Michell Gupta MD        [facility]) tablet 20 mg  20 mg Oral ACD Della Mills MD   20 mg at 02/20/22 1804    L. acidophilus,casei,rhamnosus (BIO-K PLUS) capsule 1 Capsule  1 Capsule Oral DAILY Ghada Yaquelin, MD   1 Capsule at 02/21/22 0805    amoxicillin-clavulanate (AUGMENTIN) 500-125 mg per tablet 1 Tablet  1 Tablet Oral Q24H Justino Duffy MD   1 Tablet at 02/21/22 0805    nystatin (MYCOSTATIN) 100,000 unit/gram powder   Topical BID Georgette Maxwell MD   Given at 02/20/22 1814    oxyCODONE-acetaminophen (PERCOCET) 5-325 mg per tablet 1 Tablet  1 Tablet Oral Q6H PRN Georgette Maxwell MD   1 Tablet at 02/20/22 1804    acetaminophen (TYLENOL) tablet 650 mg  650 mg Oral Q6H PRN Georgette Maxwell MD   650 mg at 02/18/22 1537    [Held by provider] aspirin chewable tablet 81 mg  81 mg Oral DAILY Georgette Maxwell MD   81 mg at 02/18/22 0810    abiraterone tab 1,000 mg (Patient Supplied)  1,000 mg Oral DAILY Georgette Maxwell MD   1,000 mg at 02/21/22 0813    doxercalciferoL (HECTOROL) 4 mcg/2 mL injection 1 mcg  1 mcg IntraVENous DIALYSIS MON, WED & Elodia Ryan MD   1 mcg at 02/21/22 0806    epoetin brina-epbx (RETACRIT) injection 4,000 Units  4,000 Units SubCUTAneous Q MON, WED & Desean Perales MD   4,000 Units at 02/18/22 2038    [Held by provider] heparin (porcine) injection 5,000 Units  5,000 Units SubCUTAneous Q8H Georgette Maxwell MD   5,000 Units at 02/20/22 1944    sodium chloride (NS) flush 5-10 mL  5-10 mL IntraVENous PRN Nancy Rosario MD        labetaloL (NORMODYNE;TRANDATE) 20 mg/4 mL (5 mg/mL) injection 5 mg  5 mg IntraVENous Q10MIN PRN Jose Raul Perez NP            Physical Exam:     Physical Exam:   General:  Alert, cooperative, no distress, appears stated age. Neck: Supple, symmetrical, trachea midline, no adenopathy, thyroid: no enlargement/tenderness/nodules, no carotid bruit and no JVD. Lungs:   Clear to auscultation bilaterally. Heart:  Regular rate and rhythm, S1, S2 normal, no murmur, click, rub or gallop. Abdomen:   Soft, non-tender. Bowel sounds normal. No masses,  No organomegaly.    Extremities: Extremities normal, atraumatic, no cyanosis or edema, AV graft on the left arm is functioning well and tolerating blood flow 400   Pulses: 2+ and symmetric all extremities. Skin: Skin color, texture, turgor normal. No rashes or lesions         Data Review:    CBC w/Diff    Recent Labs     02/21/22 0045 02/20/22 0052 02/20/22 0052 02/19/22 1946 02/19/22 1946   WBC 18.9*  --  18.8*  --  19.1*   RBC 2.72*  --  2.70*  --  2.86*   HGB 8.5*  --  8.5*  --  8.8*   HCT 25.9*  --  26.1*  --  27.9*   MCV 95.2   < > 96.7   < > 97.6   MCH 31.3   < > 31.5   < > 30.8   MCHC 32.8   < > 32.6   < > 31.5   RDW 12.6   < > 12.7   < > 12.6    < > = values in this interval not displayed. Recent Labs     02/21/22 0045 02/20/22 0052 02/20/22 0052 02/19/22 1946 02/19/22 1946   MONOS 8  --  7  --  10   EOS 7*  --  7*  --  11*   BASOS 1   < > 1   < > 0   RDW 12.6   < > 12.7   < > 12.6    < > = values in this interval not displayed. Comprehensive Metabolic Profile    Recent Labs     02/21/22 0045 02/20/22 0052 02/19/22 1946    138 137   K 2.9* 3.0* 3.0*   CL 99* 100 101   CO2 26 28 28   BUN 29* 19* 18   CREA 5.98* 4.96* 4.74*    Recent Labs     02/21/22 0045 02/20/22 0052 02/19/22 1946   CA 7.7* 7.7* 8.0*   PHOS 5.0*  --   --                         Impression:       Active Hospital Problems    Diagnosis Date Noted    Bladder cancer ([facility] 02/16/2022    AMS (altered mental status) 02/13/2022    Leucocytosis 10/20/2021    Hydronephrosis 09/29/2020    UTI (urinary tract infection) 09/29/2020    ESRD (end stage renal disease) ([facility] 09/26/2020    Anemia 09/26/2020    Secondary hyperparathyroidism of renal origin ([facility] 09/26/2020    Essential (primary) hypertension 09/01/2016            Plan: On 3 potassium 2.5 calcium bath as this patient has a low potassium. Today's potassium significantly low he will need additional potassium supplement ultrafiltration is on 1 L. He is quite anemic. Elevate her feet should be given on the floor.     From renal point of view patient can be discharged today after dialysis and should follow his regular dialysis schedule every Monday Wednesday Friday at [facility] have a hemodialysis unit. Camryn Mendez MD

## 2022-02-21 NOTE — PROGRESS NOTES
Attempted to see patient for skilled OT re-evaluation. Patient off the unit x 2 attempts 1050 and 1223. Will continue to follow and see patient when available/as appropriate.           Thank you,   Aldair Lin MS, OTR/L

## 2022-02-21 NOTE — DIALYSIS
ACUTE HEMODIALYSIS TREATMENT    HEMODIALYSIS ORDERS: Physician: Dr. Henry Rodriguez     Dialyzer: Revaclear   Duration: 3 hr   BFR: 400   DFR: 800   Dialysate:  Temp 36-37*C   K+  3    Ca+ 3.0   Na 138   Bicarb 35   Wt Readings from Last 1 Encounters:   02/21/22 71.2 kg (157 lb)    Patient Chart [x]   Unable to Obtain []  Dry weight/UF Goal: 1000 ml    Heparin []  Bolus    Units    [] Hourly    Units    [x]None       Pre BP:   142/49   Pulse:  107   Respirations: 18   Temp:  97.7  [] Oral [] Axillary [] Esophageal   Labs: []  Pre  []  Post:   [x] N/A   Additional Orders(medications, blood products, hypotension management): [] Yes   [] No     [x]  [facility] Consent Verified     GRAFT/FISTULA ACCESS:   []N/A     []Right     [x]Left     [x]UE     []LE   []AVG   [x]AVF       [x]Medical Aseptic Prep Utilized   [x]No S/S infection  []Redness  []Drainage [] Cultured  [] Swelling  [] Pain  Bruit:   [x] Strong    [] Weak       Thrill :   [x] Strong    [] Weak     Needle Gauge: 15   Length: 1 inch   If access problem,  notified: []Yes     [x]N/A          GENERAL ASSESSMENT:    LUNGS:  Resp Rate 18   [x] Clear  [] Coarse  [] Crackles  [] Wheezing  [] Diminished         Respirations:  [x]Easy  []Labored  []N/A  Cough:  []Productive  [x]Dry  []N/A      Therapy:  []RA  O2 Type:  []NC  Mask: []  NRB    [] BiPaP  Flow:  l/min                   [] Ventilated  [] Intubated  [] Trach     CARDIAC: [x] Regular      [] Irregular   [] Rhythm:          [] Monitored   [] Bedside   [] Remotely monitored     EDEMA: [] None   [x]Generalized  [] Pitting [] 1+   [] 2 +   [] 3+    [] 4+  [] Pedal    SKIN:   [x] Warm  [] Hot     [] Cold   [x] Dry     [] Pale   [] Diaphoretic                  [] Flushed  [] Jaundiced  [] Cyanotic     LOC:    [x] Alert      [x]Oriented:    [x] Person     [] Place  []Time               [] Confused  [] Lethargic  [] Medicated  [] Non-responsive  [] Non Verbal   GI / ABDOMEN:                     [] Flat    [] Distended    [x] Soft    [] Firm   []  Obese                   [] Diarrhea   [] FMS [x] Bowel Sounds  [] Nausea  [] Vomiting                   [] NGT  [] OGT    [] PEG  [] Tube Feedings @     / URINE ASSESSMENT:                   [] Voiding  []  Burris  [x] Oliguria  [] Anuria                     [] Incontinent  []  Incontinent Brief   []  PureWick     PAIN:  [x] 0 []1  []2   []3   []4   []5   []6   []7   []8   []9   []10                MOBILITY:  [x] Bed    [] Stretcher      All Vitals and Treatment Details on Attached [address]: [facility]          Room # 467/34    [x] Routine         [] 1st Time Acute/Chronic   [] Urgent      [] Stat            [x] Acute Room   []  Bedside    [] ICU/CCU     [] ER   Isolation Precautions:  [x] Dialysis    There are currently no Active Isolations     ALLERGIES:     No Known Allergies   Code Status:  DNR     Hepatitis Status      Lab Results   Component Value Date/Time    Hepatitis B surface Ag <0.10 02/15/2022 12:57 PM    Hepatitis B surface Ab <3.10 (L) 02/15/2022 12:57 PM    Hepatitis B core, IgM Negative 09/27/2020 04:33 AM    Hepatitis C virus Ab 0.15 09/27/2020 04:33 AM        Current Labs:      Lab Results   Component Value Date/Time    WBC 18.9 (H) 02/21/2022 12:45 AM    HGB 8.5 (L) 02/21/2022 12:45 AM    HCT 25.9 (L) 02/21/2022 12:45 AM    PLATELET 397 33/49/6106 12:45 AM    MCV 95.2 02/21/2022 12:45 AM     Lab Results   Component Value Date/Time    Sodium 137 02/21/2022 12:45 AM    Potassium 2.9 (LL) 02/21/2022 12:45 AM    Chloride 99 (L) 02/21/2022 12:45 AM    CO2 26 02/21/2022 12:45 AM    Anion gap 12 02/21/2022 12:45 AM    Glucose 81 02/21/2022 12:45 AM    BUN 29 (H) 02/21/2022 12:45 AM    Creatinine 5.98 (H) 02/21/2022 12:45 AM    BUN/Creatinine ratio 5 (L) 02/21/2022 12:45 AM    GFR est AA 11 (L) 02/21/2022 12:45 AM    GFR est non-AA 9 (L) 02/21/2022 12:45 AM    Calcium 7.7 (L) 02/21/2022 12:45 AM          DIET:  DIET ADULT     PRIMARY NURSE REPORT:   Pre Dialysis: Amy Baca RN    Time: 5368      EDUCATION:    [x] Patient           Knowledge Basis: [x]None []Minimal [] Substantial [] Unknown  Barriers to learning  []N/A  [] Intubated/Trached/Ventilated  [] Sedated/Paralyzed   [] Access Care     [] S&S of infection  [] Fluid Management  [] K+   [x] Procedural    [] Medications   [] Tx Options   [] Transplant   [] Diet      Teaching Tools:  [x] Explain  [] Demo  [] Handouts [] Video  Patient response: [] Verbalized understanding   [] Requires follow up        [x] Time Out/Safety Check    [x] Extracorporeal Circuit Tested for integrity       RO/HEMODIALYSIS MACHINE SAFETY CHECKS  Before each treatment:        [facility]                                    [x] Unit Machine # 6 with centralized RO                                    [] Portable Machine #1/RO serial # [id number]                                  [] Portable Machine #2/RO serial # [id number]                                  [] Portable Machine #4/RO serial # [id number]                                  [] Portable Machine #10/RO serial #  [id number]                                                                                                         Alarm Test:  Pass time 0920            [x] RO/Machine Log Complete    Machine Temp    36-37*C             Dialysate: pH 7.4    Conductivity: Meter 13.7   HD Machine  13.8     TCD: 13.7  Dialyzer Lot # [id number]     Blood Tubing Lot # [id number]     Saline Lot # [id number]     CHLORINE TESTING-Before each treatment and every 4 hours    Total Chlorine: [x] less than 0.1 ppm  Initial Time Check: 0900       4 Hr/2nd Check Time: 1300   (if greater than 0.1 ppm from Primary then every 30 minutes from Secondary)     TREATMENT INITIATION  with Dialysis Precautions:   [x] All Connections Secured              [x] Saline Line Double Clamped   [x] Venous Parameters Set               [x] Arterial Parameters Set    [x] Prime Given 250ml NSS              [x]Air Foam Detector Engaged      Treatment Initiation Note: Receive  Patient in the unit, assessed and deem stable for treatment. LUE AVF Accessed with no signs or symptoms of complication. Treatment initiated      During Treatment Notes:  0957  Vascular access visible with arterial and venous line connections intact. Pt resting comfortably. 1045  Vascular access visible with arterial and venous line connections intact. Pt resting comfortably. 1100  Vascular access visible with arterial and venous line connections intact. Pt resting comfortably. 1115  Vascular access visible with arterial and venous line connections intact. Pt resting comfortably. 1130  Vascular access visible with arterial and venous line connections intact. Pt resting comfortably. 1145  Vascular access visible with arterial and venous line connections intact. Pt resting comfortably. 1200  Vascular access visible with arterial and venous line connections intact. Pt resting comfortably. 1215  Vascular access visible with arterial and venous line connections intact. Pt resting comfortably. 1230  Vascular access visible with arterial and venous line connections intact. Pt resting comfortably. 1245  Vascular access visible with arterial and venous line connections intact. Pt resting comfortably. 1308  Dialysis treatment complete.        Medication Dose Volume Route Time Darryl Nurse, Title      MONTSERRAT Shelton RN HD Cristino Arch, MONTSERRAT     Post Assessment  Dialyzer Cleared:   [] Good  [] Fair  [] Poor  Blood processed:  64 L  UF Removed:  1000 Ml    Post /67   Pulse  96 Resp  18  Temp 97.3 Lungs: [] Clear [] Course  [] Crackles                 []  Wheezing   [x] Diminished   Post Tx Vascular Access: [] N/A  AVF/AVG: Bleeding stopped with  Arterial Pressure for 10 min   Venous Pressure for 10 min      Cardiac :[x] Regular   [] Irregular   Rhythm:  [] Monitored   [] Not Monitored    CVC Catheter: [] N/A Edema:  [] None  [x] Generalized                     Skin:[x] Warm  [x] Dry [] Diaphoretic               [] Flushed  [] Pale [] Cyanotic Pain:  [x]0  []1 []2  []3 []4  []5  []6  []7 []8    []9  []10     Post Treatment Note:    Patient completed and  Tolerated 3 hrs of hemo dialysis. 1000 ml of fiiud removed. Arterial and venous needles removed and pressure applied until bleeding stopped. Dry dressings applied. Bruit/Thrill present above and below dressings.      POST TREATMENT PRIMARY NURSE HANDOFF REPORT:   Post Dialysis: Minerva Ornelas RN               Time:  1330       Abbreviations: AVG-arterial venous graft, AVF-arterial venous fistula, IJ-Internal Jugular, Subcl-Subclavian, Fem-Femoral, Tx-treatment, AP/HR-apical heart rate, VSS- Vital Signs Stable, CVC- Central Venous Catheter, DFR-dialysate flow rate, BFR-blood flow rate, AP-arterial pressure, -venous pressure, UF-ultrafiltrate, TMP-transmembrane pressure, Kyle-Venous, Art-Arterial, RO-Reverse Osmosis

## 2022-02-21 NOTE — PROGRESS NOTES
Pt not seen for skilled PT due to:    []  Nausea/vomiting  []  Eating  []  Pain  []  Pt lethargic  [x]  Off Unit (dialysis, attempt x 2)   Other:     Will f/u later as schedule allows. Thank you.   Jesus Tao, PT, DPT

## 2022-02-21 NOTE — PROGRESS NOTES
Urology Progress Note        Assessment/Plan:     Patient Active Problem List   Diagnosis Code    Traumatic rhabdomyolysis ([facility] [address] [facility] T79. 6XXA    CAD (coronary artery disease) I25.10    Essential (primary) hypertension I10    Troponin level elevated R77.8    Acute renal failure (ARF) ([facility]) N17.9    ESRD (end stage renal disease) ([facility] [address] N18.6    Hyperkalemia T13.4    Metabolic acidosis W43.7    Secondary hyperparathyroidism of renal origin ([facility] [address] N25.81    Anemia D64.9    Hydronephrosis N13.30    UTI (urinary tract infection) N39.0    Postobstructive diuresis R35.89    Polyp of ascending colon K63.5    Decrease in appetite R63.0    Elevated prostate specific antigen (PSA) R97.20    Osteoarthrosis M19.90    Shortness of breath R06.02    Chronic renal failure N18.9    Pneumonia J18.9    Hypoalbuminemia E88.09    Leucocytosis D72.829    Nausea and vomiting R11.2    AMS (altered mental status) R41.82    Bladder cancer ([facility]) C67.9          ASSESSMENT:   Gross Hematuria, H/o Gross Hematuria  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 Doris William              Findings: Impression: large, sessile posterior bladder wall    tumor              Hgb 7.4<9.0<1.3>8.1<29     Complicated UTI  Leukocytosis              WBC 21.7<23.6<24.9<27.1>26.9                  UCX >100K E. Faecalis. 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 Doris William in 3/21.     Last PSA:                                1.32 (1/12/22)     Current Disease Status:  Symptomatic Androgen Sensitive M1b CaP  Current Treatment Plan: ADT utilizing Eligard 45mg q6 months; Zytiga 750 mg (decreased dose 2/2 patient's fatigue) + Prednisone 5mg     H/o Ascending Colonic Mass- enlarged, now 5.5 x 7.3 x 4.3 cm    Bony Mets to Spine, Ribs, Sternum, Pelvic Bone     ESRD on HD     Admitted for Acute Metabolic Encephalopathy  Right Side Facial droop     PLAN:    Appreciate overall management per medicine. Per Hospitalist, wife does not want to proceed with surgery today. Follow CX, continue Augmentin per primary team.  PSA 2.4 on 22. Maintain chronic michel- pink tinged urine with some debris. Please flush catheter with 120 cc sterile water TID, PRN decreased UOP/clots. Per nursing staff no issues with this. Hold all AC if able- on ASA and SQH. Do not recommend res-starting unless need to due to hematuria. Monitor H&H- stable. Trospium for bladder spasms  Will sign off. Follow up arranged? Yes, patient to follow up with Dr. Renetta Rodrigues. Dewayne Anthony PA-C  [facility]  Available  [facility]  Pager: [phone]    I have seen and examined this patient independently, I reviewed pertinent labs and imaging, and I agree with the assessing provider's assessment and plan as outlined above, with ammendments as follows:     Spke wit patient this AM  Family would like to proceed to hospice care only  Hematuria stable for now. Michel draining  I would support this decision in his clinical situation   Available if needed further        Johnnie Gandhi MD  [facility] [place] [place]  Pager [phone]        Subjective:     Daily Progress Note: 2022 1:41 PM    Ed Wells is doing well. VSS    Objective:     Visit Vitals  BP (!) 153/70   Pulse (!) 101   Temp 97.7 °F (36.5 °C) (Oral)   Resp 18   Ht 5' 6\" (1.676 m)   Wt 71.2 kg (157 lb)   SpO2 97%   BMI 25.34 kg/m²        Temp (24hrs), Av.8 °F (37.1 °C), Min:97.7 °F (36.5 °C), Max:99.7 °F (37.6 °C)      Intake and Output:   1901 -  0700  In: 120   Out: 9590 [Urine:1225]  No intake/output data recorded.     PHYSICAL EXAMINATION:   Visit Vitals  BP (!) 153/70 Pulse (!) 101   Temp 97.7 °F (36.5 °C) (Oral)   Resp 18   Ht 5' 6\" (1.676 m)   Wt 71.2 kg (157 lb)   SpO2 97%   BMI 25.34 kg/m²       Constitutional: Well developed, well nourished male. No acute distress. HEENT: Normocephalic, Atraumatic, EOM's intact   CV:  no edema  Respiratory: No respiratory distress or difficulties breathing   Abdomen:  soft and non tender    Male:    SCROTUM:  No scrotal rash or lesions noticed. Normal bilateral testes and epididymis. PENIS: Urethral meatus normal in location and size. No urethral discharge. UnCircumsized. 16 fr michel in place with pink tinged urine with some debris. Skin: No evidence of jaundice. Normal color  Neuro/Psych:  Alert and oriented. Affect appropriate. Dysarthric         Lab/Data Review: All lab results for the last 24 hours reviewed. CT C/A/P 2/17/22: IMPRESSION  1. The large solid mass in the dome of the bladder is interval enlarged and  more cystic in appearance since the prior study, suggestive of tumor necrosis. 2.  Large solid mass in the hepatic flexure of the colon is also interval  enlarged with significant lumen narrowing and short intussusception. No  obstruction. This is more concerned for primary malignancy. Homogeneous  appearing rectum could represent intraluminal fluid but potential mass cannot be  excluded. Recommend barium enema for better evaluation. 3.  Atrophic kidneys, especially on the left. Small cortical cyst in midpole  right kidney. 4.  Extensive osseous metastasis appear grossly unchanged. 5.  Layering gallbladder sludge. 6.  Nonobstructive umbilical hernia.      Thank you for this referral.      Labs:     Labs: Results:   Chemistry    Recent Labs     02/21/22  0045 02/20/22  0052 02/19/22 1946   GLU 81 77 107*    138 137   K 2.9* 3.0* 3.0*   CL 99* 100 101   CO2 26 28 28   BUN 29* 19* 18   CREA 5.98* 4.96* 4.74*   CA 7.7* 7.7* 8.0*   AGAP 12 10 8   BUCR 5* 4* 4*      CBC w/Diff Recent Labs 02/21/22  0045 02/20/22  0052 02/19/22  1946   WBC 18.9* 18.8* 19.1*   RBC 2.72* 2.70* 2.86*   HGB 8.5* 8.5* 8.8*   HCT 25.9* 26.1* 27.9*    342 349   GRANS 72 72 72   LYMPH 11* 11* 7*   EOS 7* 7* 11*      Cultures No results for input(s): CULT in the last 72 hours. All Micro Results     Procedure Component Value Units Date/Time    CULTURE, BLOOD [[id number]] Collected: 02/13/22 1145    Order Status: Completed Specimen: Blood Updated: 02/19/22 0607     Special Requests: NO SPECIAL REQUESTS        Culture result: NO GROWTH 6 DAYS       CULTURE, BLOOD [[id number]] Collected: 02/13/22 1200    Order Status: Completed Specimen: Blood Updated: 02/19/22 0607     Special Requests: NO SPECIAL REQUESTS        Culture result: NO GROWTH 6 DAYS       CULTURE, URINE [[id number]]  (Abnormal)  (Susceptibility) Collected: 02/13/22 1100    Order Status: Completed Specimen: Cath Urine Updated: 02/16/22 1401     Special Requests: NEW GONZALEZ     [place] Count --        >100,000  COLONIES/mL       Culture result:       ENTEROCOCCUS FAECALIS (PREDOMINATING)                  MIXED UROGENITAL RENUKA ISOLATED          COVID-19 RAPID TEST [[id number]] Collected: 02/13/22 1710    Order Status: Completed Specimen: Nasopharyngeal Updated: 02/13/22 1833     Specimen source Nasopharyngeal        COVID-19 rapid test Not detected        Comment: Rapid Abbott ID Now       Rapid NAAT:  The specimen is NEGATIVE for SARS-CoV-2, the novel coronavirus associated with COVID-19. Negative results should be treated as presumptive and, if inconsistent with clinical signs and symptoms or necessary for patient management, should be tested with an alternative molecular assay. Negative results do not preclude SARS-CoV-2 infection and should not be used as the sole basis for patient management decisions. This test has been authorized by the FDA under an Emergency Use Authorization (EUA) for use by authorized laboratories.    Fact sheet for Healthcare Providers: ConventionUpdate.co.nz  Fact sheet for Patients: ConventionUpdate.co.nz       Methodology: Isothermal Nucleic Acid Amplification                 Urinalysis Color   Date Value Ref Range Status   02/13/2022 RED   Final     Appearance   Date Value Ref Range Status   02/13/2022 TURBID   Final     Specific gravity   Date Value Ref Range Status   02/13/2022 1.017 1.005 - 1.030   Final     pH (UA)   Date Value Ref Range Status   02/13/2022 8.5 (H) 5.0 - 8.0   Final     Protein   Date Value Ref Range Status   02/13/2022 300 (A) NEG mg/dL Final     Ketone   Date Value Ref Range Status   02/13/2022 Negative NEG mg/dL Final     Bilirubin   Date Value Ref Range Status   02/13/2022 Negative NEG   Final     Blood   Date Value Ref Range Status   02/13/2022 LARGE (A) NEG   Final     Urobilinogen   Date Value Ref Range Status   02/13/2022 0.2 0.2 - 1.0 EU/dL Final     Nitrites   Date Value Ref Range Status   02/13/2022 Negative NEG   Final     Leukocyte Esterase   Date Value Ref Range Status   02/13/2022 LARGE (A) NEG   Final     Potassium   Date Value Ref Range Status   02/21/2022 2.9 (LL) 3.5 - 5.5 mmol/L Final     Comment:     CALLED TO AND CORRECTLY REPEATED BY:  KAVON SHAH RN ON 4S AT 0237 ON 02/21/22 TO [facility]       Creatinine   Date Value Ref Range Status   02/21/2022 5.98 (H) 0.6 - 1.3 MG/DL Final     BUN   Date Value Ref Range Status   02/21/2022 29 (H) 7.0 - 18 MG/DL Final     Prostate Specific Ag   Date Value Ref Range Status   02/18/2022 2.4 0.0 - 4.0 ng/mL Final     Comment:     (NOTE)  Roche ECLIA methodology. According to the American Urological Association, Serum PSA should  decrease and remain at undetectable levels after radical  prostatectomy. The AUA defines biochemical recurrence as an initial  PSA value 0.2 ng/mL or greater followed by a subsequent  confirmatory PSA value 0.2 ng/mL or greater.   Values obtained with different assay methods or kits cannot be used  interchangeably. Results cannot be interpreted as absolute evidence  of the presence or absence of malignant disease.         PSA Recent Labs     02/18/22  1826   PSA 2.4      Coagulation Lab Results   Component Value Date/Time    Prothrombin time 12.5 02/13/2022 09:11 AM    Prothrombin time 12.2 12/30/2021 12:31 PM    INR 0.9 02/13/2022 09:11 AM    INR 0.9 12/30/2021 12:31 PM    aPTT 29.1 11/20/2018 02:10 PM    aPTT 63.5 (H) 08/30/2016 05:15 PM

## 2022-02-21 NOTE — PROGRESS NOTES
D/C orders received. No needs identified by . Chart reviewed. Pt will be transported home by his wife. Order noted for bsc, clinicals faxed to [address] [place] [address].  available as needed.     Epi Moran RN

## 2022-03-18 NOTE — ED PROVIDER NOTES
EMERGENCY DEPARTMENT HISTORY AND PHYSICAL EXAM    I have evaluated the patient at 9:01 AM      Date: 3/18/2022  Patient Name: Addison Sanchez    History of Presenting Illness     Chief Complaint   Patient presents with    Urinary Catheter Problem         History Provided By: Patient and Patient's Wife  Location/Duration/Severity/Modifying factors   51-year-old male with history of prostate cancer, hypertension, CKD on hemodialysis MWF and indwelling Burris catheter presenting to the emergency department for evaluation of leaking around catheter. Patient and his wife who is at bedside reports that he normally has a size 18 Burris but recently had a size 16 placed. He is having leaking around his Burris catheter. No complaints otherwise. PCP: Delilah Garcia MD    Current Outpatient Medications   Medication Sig Dispense Refill    trospium (SANCTURA) 20 mg tablet Take 1 Tablet by mouth Daily (before dinner). 30 Tablet 0    nystatin (MYCOSTATIN) powder Apply  to affected area two (2) times a day. 1 Each 0    abiraterone 250 mg tab TAKE 4 TABLETS BY MOUTH 1 TIME A DAY ON AN EMPTY STOMACH. TAKE ONE HOUR PRIOR TO FOOD OR TWO HOURS AFTER FOOD. SWALLOW TABLETS WHOLE WITH WATER AND DO NOT CRUSH OR CHEW TABLETS. 120 Tablet 4    predniSONE (DELTASONE) 5 mg tablet TAKE 1 TABLET BY MOUTH 2 TIMES A DAY 60 Tablet 4    acetaminophen (TYLENOL) 500 mg tablet Take 500 mg by mouth every six (6) hours as needed for Pain.  pantoprazole (PROTONIX) 40 mg tablet Take 1 Tablet by mouth Before breakfast and dinner. 60 Tablet 0    amLODIPine (NORVASC) 10 mg tablet TAKE 1 TABLET BY MOUTH EVERY DAY      calcium acetate,phosphat bind, (PHOSLO) 667 mg cap Take 1 Capsule by mouth daily.  abiraterone (Zytiga) 250 mg tab Take four tablets by mouth daily on an empty stomach. Take one hour prior to food or two hours after food. Tablets should be swallowed whole with water. Do not crush or chew tablets.  120 Tab 5    B complex w-C no.20/folic acid (TRIPHROCAPS PO) Take 1 Cap by mouth daily. Past History     Past Medical History:  Past Medical History:   Diagnosis Date    Cancer [facility])     Prostate    Cardiac echocardiogram 08/29/2016    EF 60-65%. No WMA. Mild LVH. Indeterminate diastolic fx. RVSP 35 mmHg. No significant valvular heart disease.  Cardiac nuclear imaging test, abnormal 08/29/2016    Intermediate risk. Previous inferior infarction w/very mild fabiana-infarct ischemia. Inferior hypk. EF 68%. Nondiagnostic EKG on pharm stress test.  Pt's BP increased from 193/96 to 207/83 during study.  Carotid duplex 08/30/2016    Mild <50% bilateral ICA stenosis.  Chronic kidney disease     on HD MWF ad [facility]    Colonic mass     per Dr Sheila Jj notes    Enlarged prostate     Hypertension        Past Surgical History:  Past Surgical History:   Procedure Laterality Date    COLONOSCOPY N/A 10/1/2020    COLONOSCOPY, b'x, w tattoo w polypectomy performed by Estefani Ansari MD at [facility]  10/1/2020         [facility]  10/1/2020         COLONOSCPY,FLEX,W/ [facility]  10/1/2020         [place] INSJ TUNNELED CVC W/O SUBQ PORT/ AGE 5 YR/> N/A 9/28/2020    INSERTION TUNNELED CENTRAL VENOUS CATHETER performed by Luis Fernando Alatorre MD at [facility] CATH LAB    VASCULAR SURGERY PROCEDURE UNLIST      dialysis access- right neck       Family History:  No family history on file. Social History:  Social History     Tobacco Use    Smoking status: Never Smoker    Smokeless tobacco: Never Used   Vaping Use    Vaping Use: Never used   Substance Use Topics    Alcohol use: No    Drug use: Never       Allergies:  No Known Allergies      Review of Systems       Review of Systems   Constitutional: Negative for activity change, chills, diaphoresis, fatigue and fever. HENT: Negative for ear pain.     Respiratory: Negative for cough, chest tightness, shortness of breath, wheezing and stridor. Cardiovascular: Negative for chest pain and palpitations. Gastrointestinal: Negative for abdominal distention, abdominal pain, constipation, diarrhea, nausea and vomiting. Genitourinary: Positive for difficulty urinating. Negative for dysuria, hematuria, penile discharge, penile pain, penile swelling, scrotal swelling and testicular pain. Urine leaking around michel at external meatus   Musculoskeletal: Negative for back pain, joint swelling and myalgias. Skin: Negative for rash. Neurological: Negative for dizziness, weakness and headaches. Psychiatric/Behavioral: Negative for agitation. The patient is not nervous/anxious. Physical Exam     Visit Vitals  BP (!) 161/73   Pulse 89   Temp 98.8 °F (37.1 °C)   Resp 16   SpO2 99%         Physical Exam  Constitutional:       General: He is not in acute distress. Appearance: He is not toxic-appearing. HENT:      Head: Normocephalic and atraumatic. Cardiovascular:      Rate and Rhythm: Normal rate and regular rhythm. Heart sounds: Normal heart sounds. No murmur heard. No friction rub. No gallop. Pulmonary:      Effort: Pulmonary effort is normal.      Breath sounds: Normal breath sounds. Abdominal:      General: There is no distension. Palpations: Abdomen is soft. There is no mass. Tenderness: There is no abdominal tenderness. There is no guarding. Hernia: No hernia is present. Genitourinary:     Penis: Normal.       Comments: Michel catheter with urine leaking around the catheter at the external meatus  Musculoskeletal:         General: No swelling, tenderness or deformity. Cervical back: Normal range of motion and neck supple. Skin:     General: Skin is warm and dry. Findings: No rash. Neurological:      General: No focal deficit present. Mental Status: He is alert and oriented to person, place, and time.    Psychiatric:         Mood and Affect: Mood normal. Diagnostic Study Results     Labs -  No results found for this or any previous visit (from the past 12 hour(s)). Radiologic Studies -   No orders to display         Medical Decision Making   I am the first provider for this patient. I reviewed the vital signs, available nursing notes, past medical history, past surgical history, family history and social history. Vital Signs-Reviewed the patient's vital signs. Records Reviewed: Nursing Notes and Old Medical Records (Time of Review: 9:01 AM)    ED Course: Progress Notes, Reevaluation, and Consults:         Provider Notes (Medical Decision Making):   MDM  Number of Diagnoses or Management Options  Problem with Burris catheter, initial encounter [facility])  Diagnosis management comments: 80-year-old male presenting to the emergency department for evaluation of leaking around his Burris catheter. His Burris catheter was exchanged for a larger caliber. No need for blood work, urinalysis or imaging at this time. Will discharge so that he can make it to his dialysis session. Patient and family reassured. ED return precautions advised. Patient verbalizes good understanding and agreement with plan. Diagnosis     Clinical Impression:   1. Problem with Burris catheter, initial encounter [facility])        Disposition: home    Follow-up Information     Follow up With Specialties Details Why [address] [facility] EMERGENCY DEPT Emergency Medicine  As needed, If symptoms worsen [address] [place] [address] [address] [facility]    Christopher Lopez MD [facility] Medicine Call   [phone] [facility]  [phone]             Patient's Medications   Start Taking    No medications on file   Continue Taking    ABIRATERONE (ZYTIGA) 250 MG TAB    Take four tablets by mouth daily on an empty stomach. Take one hour prior to food or two hours after food. Tablets should be swallowed whole with water. Do not crush or chew tablets.     ABIRATERONE 250 MG TAB    TAKE 4 TABLETS BY MOUTH 1 TIME A DAY ON AN EMPTY STOMACH. TAKE ONE HOUR PRIOR TO FOOD OR TWO HOURS AFTER FOOD. SWALLOW TABLETS WHOLE WITH WATER AND DO NOT CRUSH OR CHEW TABLETS. ACETAMINOPHEN (TYLENOL) 500 MG TABLET    Take 500 mg by mouth every six (6) hours as needed for Pain. AMLODIPINE (NORVASC) 10 MG TABLET    TAKE 1 TABLET BY MOUTH EVERY DAY    B COMPLEX W-C NO.20/FOLIC ACID (TRIPHROCAPS PO)    Take 1 Cap by mouth daily. CALCIUM ACETATE,PHOSPHAT BIND, (PHOSLO) 667 MG CAP    Take 1 Capsule by mouth daily. NYSTATIN (MYCOSTATIN) POWDER    Apply  to affected area two (2) times a day. PANTOPRAZOLE (PROTONIX) 40 MG TABLET    Take 1 Tablet by mouth Before breakfast and dinner. PREDNISONE (DELTASONE) 5 MG TABLET    TAKE 1 TABLET BY MOUTH 2 TIMES A DAY    TROSPIUM (SANCTURA) 20 MG TABLET    Take 1 Tablet by mouth Daily (before dinner). These Medications have changed    No medications on file   Stop Taking    No medications on file     Disclaimer: Sections of this note are dictated using utilizing voice recognition software. Minor typographical errors may be present. If questions arise, please do not hesitate to contact me or call our department.

## 2022-04-20 NOTE — ED NOTES
7:00 PM Assumed care of the pt at this time. Discussed with CORNEL Arguelles concerning patient Saad Burns, standard discussion of reason for visit, HPI, ROS, PE, and current results available. Recommendation for obtaining pending UA followed by bedside re-evaluation to dispo the pt. Mercy Houston PA-C     7:50 PM nurse has obtained a small urine specimen and sent it to the lab. I have seen and re-evaluated the pt, his wife is also at bedside. Pt's wife states states she has noticed intermittent episodes of confusion and not being able to recall dates over the past several weeks. She denies that her  has any diagnosed history of dementia. She states she has noticed that the urine coming out of his Burris catheter has been foul and she was very concerned about a possible UTI. The patient's wife states he completed antibiotics for UTI several weeks ago but she cannot recall what this medication was. She denies noticing any fever at home. Patient was dialyzed today. Upon evaluating the patient he is alert to place but cannot tell me who the president is, what his own birthday is, or what year it is. Patient is moving all extremities equally. No facial droop or focal deficit is noted on exam.  The patient's urinalysis is consistent with UTI, sent for culture    Will plan to obtain basic labs and give the patient a dose of IV ceftriaxone in the ED. April Leslee Koyanagi, PA-C     10:23 PM labs: White blood cell count 29.6 with a left shift. Patient has chronic leukocytosis. Previous white blood cell count was 18.9. Patient is a normal lactic acid of 0.58. Creatinine 2.37, BUN 19, normal electrolytes. [facility] Burris [facility] and placed a 15 Romanian catheter however the patient at baseline uses an 25 Padmini Curlin catheter. Although the catheter is currently following I have a high suspicion that it will become clogged at home.   Patient and his wife requested that it be changed to an [address] [place] [address]. This was completed by myself and the PA student, procedure well tolerated. Bladder Catheterization    Date/Time: 4/20/2022 10:25 PM  Performed by: Sg Coffman  Authorized by: Sg Coffman     Consent:     Consent obtained:  Verbal    Consent given by:  Patient    Risks discussed:  False passage, infection and pain  Pre-procedure details:     Procedure purpose:  Therapeutic  Anesthesia (see MAR for exact dosages): Anesthesia method:  None  Procedure details:     Catheter insertion:  Indwelling    Catheter type: Burris    Catheter size:  18 Fr    Bladder irrigation: no      Number of attempts:  1    Urine characteristics:  Blood-tinged  Post-procedure details:     Patient tolerance of procedure: Tolerated well, no immediate complications       Although the patient has an elevated white blood cell count this is a chronic issue. His lactic acid is normal.  Patient's vitals are stable and he is afebrile. I have discussed this patient with ED attending Dr. Damir Odonnell who agrees with the plan to discharge patient. He was given a dose of IV Rocephin in the ED and will be discharged home with Levaquin. Antibiotic choice was based on the patient's previous urine culture which showed multi drug resistance but did show sensitivity to Levaquin and Macrobid. The patient and his wife state that he has a PCP follow-up appointment scheduled for tomorrow. We will plan to discharge patient at this time. Precautions advised. Patient agrees. Mercy Houston PA-C        Disposition: d/c     Dictation disclaimer:  Please note that this dictation was completed with [employer], the computer voice recognition software. Quite often unanticipated grammatical, syntax, homophones, and other interpretive errors are inadvertently transcribed by the computer software. Please disregard these errors. Please excuse any errors that have escaped final proofreading.

## 2022-04-20 NOTE — ED TRIAGE NOTES
Pt reports pain at the tip of his penis around the michel cath is inserted. Per wife the cath is leaking around the insertion site and there is blood in the urine.

## 2022-04-20 NOTE — ED PROVIDER NOTES
EMERGENCY DEPARTMENT HISTORY AND PHYSICAL EXAM    Date: 4/20/2022  Patient Name: Jason Hooper    History of Presenting Illness     Chief Complaint   Patient presents with    Urinary Catheter Problem         History Provided By: Patient, wife    Chief Complaint: catheter issue  Duration: 2 weeks  Timing:    Location: penis  Quality:   Severity:   Modifying Factors:   Associated Symptoms: none       Additional History (Context): Jason Hooper is a 80 y.o. male with a history of prostate caner, HTN, renal failure on dialysis presents for evaluation of pain in the penis around his catheter. Has michel catheter, states it's time to be changed but noting that he has urine coming out through his urethra around the catheter. Has appt with urologist tomorrow but is having pain with the catheter so came to the ED today. Has blood in the urine but states he always has blood in it. No fever or abdominal pain. Went to dialysis today. PCP: Lien Alba MD    Current Outpatient Medications   Medication Sig Dispense Refill    trospium (SANCTURA) 20 mg tablet Take 1 Tablet by mouth Daily (before dinner). 30 Tablet 0    nystatin (MYCOSTATIN) powder Apply  to affected area two (2) times a day. 1 Each 0    abiraterone 250 mg tab TAKE 4 TABLETS BY MOUTH 1 TIME A DAY ON AN EMPTY STOMACH. TAKE ONE HOUR PRIOR TO FOOD OR TWO HOURS AFTER FOOD. SWALLOW TABLETS WHOLE WITH WATER AND DO NOT CRUSH OR CHEW TABLETS. 120 Tablet 4    predniSONE (DELTASONE) 5 mg tablet TAKE 1 TABLET BY MOUTH 2 TIMES A DAY 60 Tablet 4    acetaminophen (TYLENOL) 500 mg tablet Take 500 mg by mouth every six (6) hours as needed for Pain.  pantoprazole (PROTONIX) 40 mg tablet Take 1 Tablet by mouth Before breakfast and dinner. 60 Tablet 0    amLODIPine (NORVASC) 10 mg tablet TAKE 1 TABLET BY MOUTH EVERY DAY      calcium acetate,phosphat bind, (PHOSLO) 667 mg cap Take 1 Capsule by mouth daily.       abiraterone (Zytiga) 250 mg tab Take four tablets by mouth daily on an empty stomach. Take one hour prior to food or two hours after food. Tablets should be swallowed whole with water. Do not crush or chew tablets. 120 Tab 5    B complex w-C no.20/folic acid (TRIPHROCAPS PO) Take 1 Cap by mouth daily. Past History     Past Medical History:  Past Medical History:   Diagnosis Date    Cancer [facility])     Prostate    Cardiac echocardiogram 08/29/2016    EF 60-65%. No WMA. Mild LVH. Indeterminate diastolic fx. RVSP 35 mmHg. No significant valvular heart disease.  Cardiac nuclear imaging test, abnormal 08/29/2016    Intermediate risk. Previous inferior infarction w/very mild fabiana-infarct ischemia. Inferior hypk. EF 68%. Nondiagnostic EKG on pharm stress test.  Pt's BP increased from 193/96 to 207/83 during study.  Carotid duplex 08/30/2016    Mild <50% bilateral ICA stenosis.  Chronic kidney disease     on HD MWF ad [facility]    Colonic mass     per Dr Raymundo Marroquin notes    Enlarged prostate     Hypertension        Past Surgical History:  Past Surgical History:   Procedure Laterality Date    COLONOSCOPY N/A 10/1/2020    COLONOSCOPY, b'x, w tattoo w polypectomy performed by Jesus Gil MD at [address] [place] [address] Rd  10/1/2020         [facility]  [facility]         COLONOSCPY,FLEX,W/ [facility] [place]  10/1/2020         [place] INSJ TUNNELED CVC W/O SUBQ PORT/ AGE 5 YR/> N/A 9/28/2020    INSERTION TUNNELED CENTRAL VENOUS CATHETER performed by Saad Gandhi MD at [facility] CATH LAB    VASCULAR SURGERY PROCEDURE UNLIST      dialysis access- right neck       Family History:  No family history on file.     Social History:  Social History     Tobacco Use    Smoking status: Never Smoker    Smokeless tobacco: Never Used   Vaping Use    Vaping Use: Never used   Substance Use Topics    Alcohol use: No    Drug use: Never       Allergies:  No Known Allergies      Review of Systems   Review of Systems Constitutional: Negative for chills and fever. Gastrointestinal: Negative for abdominal pain, constipation, diarrhea, nausea and vomiting. Genitourinary: Positive for hematuria and penile pain. Negative for decreased urine volume, genital sores, penile discharge, penile swelling, scrotal swelling, testicular pain and urgency. Neurological: Negative for dizziness and weakness. All Other Systems Negative  Physical Exam     Vitals:    04/20/22 1655   BP: 121/67   Pulse: 95   Resp: 18   Temp: 98.5 °F (36.9 °C)   SpO2: 99%   Weight: 73.5 kg (162 lb)   Height: 5' 6\" (1.676 m)     Physical Exam  Exam conducted with a chaperone present. Constitutional:       General: He is not in acute distress. Appearance: Normal appearance. He is normal weight. He is not ill-appearing or toxic-appearing. HENT:      Head: Normocephalic and atraumatic. Mouth/Throat:      Mouth: Mucous membranes are moist.      Pharynx: Oropharynx is clear. Eyes:      Extraocular Movements: Extraocular movements intact. Conjunctiva/sclera: Conjunctivae normal.   Cardiovascular:      Rate and Rhythm: Normal rate and regular rhythm. Pulses: Normal pulses. Heart sounds: Normal heart sounds. Pulmonary:      Effort: Pulmonary effort is normal.      Breath sounds: Normal breath sounds. Abdominal:      Palpations: Abdomen is soft. Tenderness: There is no abdominal tenderness. There is no guarding or rebound. Genitourinary:     Penis: Normal and uncircumcised. No phimosis or paraphimosis. Testes: Normal.         Right: Mass, tenderness or swelling not present. Left: Mass, tenderness or swelling not present. Musculoskeletal:      Cervical back: Normal range of motion. Skin:     General: Skin is warm and dry. Neurological:      General: No focal deficit present. Mental Status: He is alert. Mental status is at baseline.    Psychiatric:         Mood and Affect: Mood normal.         Behavior: Behavior normal.           Diagnostic Study Results     Labs -   No results found for this or any previous visit (from the past 12 hour(s)). Radiologic Studies -   No orders to display     CT Results  (Last 48 hours)    None        CXR Results  (Last 48 hours)    None            Medical Decision Making   I am the first provider for this patient. I reviewed the vital signs, available nursing notes, past medical history, past surgical history, family history and social history. Vital Signs-Reviewed the patient's vital signs. Records Reviewed: Nursing Notes and Old Medical Records     Procedures: None   Procedures    Provider Notes (Medical Decision Making):   Pt states that the pain was in the suprapubic area when the last catheter was placed. Wife reports the discomfort has been x 2 weeks. New catheter placed but nurse was not getting return, does have urine in the bladder. Replaced catheter and flushed, copious sediment and clots. Pt not having pain after placement of the new catheter. Is not draining at this time. Nursing staff able to get urine, will send for testing. Pt in no distress at this time. No abdominal tenderness. 7:18 PM : [facility] care [facility]. History of patient complaint(s), available diagnostic reports and current treatment plan has been discussed thoroughly. Bedside rounding on patient occured : no . Intended disposition of patient : Discharge  Pending diagnostics reports and/or labs (please list): SUYAPA Houston's assistance in completion of this plan is greatly appreciated but it should be noted that I will be the provider of record for this patient. MED RECONCILIATION:  No current facility-administered medications for this encounter. Current Outpatient Medications   Medication Sig    trospium (SANCTURA) 20 mg tablet Take 1 Tablet by mouth Daily (before dinner).     nystatin (MYCOSTATIN) powder Apply  to affected area two (2) times a day.  abiraterone 250 mg tab TAKE 4 TABLETS BY MOUTH 1 TIME A DAY ON AN EMPTY STOMACH. TAKE ONE HOUR PRIOR TO FOOD OR TWO HOURS AFTER FOOD. SWALLOW TABLETS WHOLE WITH WATER AND DO NOT CRUSH OR CHEW TABLETS.  predniSONE (DELTASONE) 5 mg tablet TAKE 1 TABLET BY MOUTH 2 TIMES A DAY    acetaminophen (TYLENOL) 500 mg tablet Take 500 mg by mouth every six (6) hours as needed for Pain.  pantoprazole (PROTONIX) 40 mg tablet Take 1 Tablet by mouth Before breakfast and dinner.  amLODIPine (NORVASC) 10 mg tablet TAKE 1 TABLET BY MOUTH EVERY DAY    calcium acetate,phosphat bind, (PHOSLO) 667 mg cap Take 1 Capsule by mouth daily.  abiraterone (Zytiga) 250 mg tab Take four tablets by mouth daily on an empty stomach. Take one hour prior to food or two hours after food. Tablets should be swallowed whole with water. Do not crush or chew tablets.  B complex w-C no.20/folic acid (TRIPHROCAPS PO) Take 1 Cap by mouth daily. Disposition:  Home     DISCHARGE NOTE:   Pt has been reexamined. Patient has no new complaints, changes, or physical findings. Care plan outlined and precautions discussed. Results of workup were reviewed with the patient. All medications were reviewed with the patient. All of pt's questions and concerns were addressed. Patient was instructed and agrees to follow up with PCP as well as to return to the ED upon further deterioration. Patient is ready to go home. Follow-up Information    None         Current Discharge Medication List              Diagnosis     Clinical Impression:   1. Problem with urinary catheter ([address]    2. Gross hematuria    3. Prostate cancer [facility])          \"Please note that this dictation was completed with [employer], the [employer] voice recognition software. Quite often unanticipated grammatical, syntax, homophones, and other interpretive errors are inadvertently transcribed by the computer software. Please disregard these errors.  Please excuse any errors that have escaped final proofreading. \"

## 2022-04-20 NOTE — ED NOTES
Burris catheter exchanged. Copious amounts of sediment. Burris irrigated, clots observed. No drainage present. PA notified.

## 2022-04-21 NOTE — DISCHARGE INSTRUCTIONS
Hullabalu Activation    Thank you for requesting access to [facility]alu. Please follow the instructions below to securely access and download your online medical record. Hullabalu allows you to send messages to your doctor, view your test results, renew your prescriptions, schedule appointments, and more. How Do I Sign Up? In your internet browser, go to [email or web address]  Click on the First Time User? Click Here link in the Sign In box. You will be redirect to the New Member Sign Up page. Enter your Hullabalu Access Code exactly as it appears below. You will not need to use this code after youve completed the sign-up process. If you do not sign up before the expiration date, you must request a new code. Hullabalu Access Code: [email or web address]  Expires: 5/15/2022  6:10 AM (This is the date your Hullabalu access code will )    Enter the last four digits of your Social Security Number (xxxx) and Date of Birth (mm/dd/yyyy) as indicated and click Submit. You will be taken to the next sign-up page. Create a Hullabalu ID. This will be your Hullabalu login ID and cannot be changed, so think of one that is secure and easy to remember. Create a Hull[employer]alu password. You can change your password at any time. Enter your Password Reset Question and Answer. This can be used at a later time if you forget your password. Enter your e-mail address. You will receive e-mail notification when new information is available in [address] Click Sign Up. You can now view and download portions of your medical record. Click the [place] link to download a portable copy of your medical information. Additional Information    If you have questions, please visit the Frequently Asked Questions section of the Hullabalu website at [email or web address] Remember, Hullabalu is NOT to be used for urgent needs. For medical emergencies, dial 911.

## 2022-04-24 NOTE — ED TRIAGE NOTES
Client reports having bladder spasm continuously, seen by urologist on Friday, had michel cath changed and flushed. Worked for a few hrs then began to clog. This is 4th episode of client having michel cath probems/pain, spouse is requesting help, having leakage around michel.

## 2022-04-24 NOTE — ED NOTES
Reviewed discharge instructions with pt. Pt verbalized understanding. Pt wheeled to waiting room by family member. Pt in no distress upon discharge.

## 2022-04-24 NOTE — ED NOTES
Bladder scan shows only 25cc in bladder. Catheter appears to be patent, but is noted too be leaking out urethera around catheter. Pt denies abdominal pain, abdomen nondistended or tender. Balloon inflated at 100cc. MD advised of this information.

## 2022-04-24 NOTE — DISCHARGE INSTRUCTIONS
You presented for bladder spasms. Your written for Flomax to help with flow with urine and possibly to help with spasms. You should continue to take your pain medications as prescribed as needed. You should continue with your oral antibiotics to finish full course of treatment. Please follow-up with your urologist for possible up titration of your Burris catheter and for further treatment of bladder spasms.

## 2022-04-25 NOTE — ED PROVIDER NOTES
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 Patient states that he has been seen frequently in the emergency department and by his urologist over the past week for bladder spasms. Most recently in the emergency department roughly 5 days ago and by his urologist 2 days ago where his Burris was recently drained. Patient states that he still has bladder spasms when he feels the urge to urinate which causes him pain and he also has some leakage around the Burris catheter. Patient states that Burris catheter is still working well with normal urine output but presented today due to the bladder spasms which is causing pain. Patient does have a prescription for antibiotics for possible urinary tract infection and pain medications include oxycodone for pain as needed and tramadol. Patient's wife states that she does not like giving him the medications because it makes him a little bit loopy. Patient denies any infectious symptoms such as fever chills nausea vomiting or abdominal pain. PCP: Jaec Felix MD    Current Outpatient Medications   Medication Sig Dispense Refill    tamsulosin (Flomax) 0.4 mg capsule Take 1 Capsule by mouth daily for 15 days. 15 Capsule 0    levoFLOXacin (Levaquin) 750 mg tablet Take 1 Tablet by mouth daily. 5 Tablet 0    trospium (SANCTURA) 20 mg tablet Take 1 Tablet by mouth Daily (before dinner). 30 Tablet 0    nystatin (MYCOSTATIN) powder Apply  to affected area two (2) times a day.  1 Each 0    abiraterone 250 mg tab TAKE 4 TABLETS BY MOUTH 1 TIME A DAY ON AN EMPTY STOMACH. TAKE ONE HOUR PRIOR TO FOOD OR TWO HOURS AFTER FOOD. SWALLOW TABLETS WHOLE WITH WATER AND DO NOT CRUSH OR CHEW TABLETS. 120 Tablet 4    predniSONE (DELTASONE) 5 mg tablet TAKE 1 TABLET BY MOUTH 2 TIMES A DAY 60 Tablet 4    acetaminophen (TYLENOL) 500 mg tablet Take 500 mg by mouth every six (6) hours as needed for Pain.  pantoprazole (PROTONIX) 40 mg tablet Take 1 Tablet by mouth Before breakfast and dinner. 60 Tablet 0    amLODIPine (NORVASC) 10 mg tablet TAKE 1 TABLET BY MOUTH EVERY DAY      calcium acetate,phosphat bind, (PHOSLO) 667 mg cap Take 1 Capsule by mouth daily.  abiraterone (Zytiga) 250 mg tab Take four tablets by mouth daily on an empty stomach. Take one hour prior to food or two hours after food. Tablets should be swallowed whole with water. Do not crush or chew tablets. 120 Tab 5    B complex w-C no.20/folic acid (TRIPHROCAPS PO) Take 1 Cap by mouth daily. Past History     Past Medical History:  Past Medical History:   Diagnosis Date    Cancer [facility])     Prostate    Cardiac echocardiogram 08/29/2016    EF 60-65%. No WMA. Mild LVH. Indeterminate diastolic fx. RVSP 35 mmHg. No significant valvular heart disease.  Cardiac nuclear imaging test, abnormal 08/29/2016    Intermediate risk. Previous inferior infarction w/very mild fabiana-infarct ischemia. Inferior hypk. EF 68%. Nondiagnostic EKG on pharm stress test.  Pt's BP increased from 193/96 to 207/83 during study.  Carotid duplex 08/30/2016    Mild <50% bilateral ICA stenosis.       Chronic kidney disease     on HD MWF ad [facility]    Colonic mass     per Dr Caresse Osgood notes    Enlarged prostate     Hypertension        Past Surgical History:  Past Surgical History:   Procedure Laterality Date    COLONOSCOPY N/A 10/1/2020    COLONOSCOPY, b'x, w tattoo w polypectomy performed by Jose L Galvez MD at [facility]  10/1/2020         Cheryl Castanon 10/1/2020         COLONOSCPY,FLEX,W/DIR SUBMUC INJECT  10/1/2020         [place] INSJ TUNNELED CVC W/O SUBQ PORT/ AGE 5 YR/> N/A 9/28/2020    INSERTION TUNNELED CENTRAL VENOUS CATHETER performed by Federico Morales MD at [facility] CATH LAB   [address]      dialysis access- right neck       Family History:  No family history on file. Social History:  Social History     Tobacco Use    Smoking status: Never Smoker    Smokeless tobacco: Never Used   Vaping Use    Vaping Use: Never used   Substance Use Topics    Alcohol use: No    Drug use: Never       Allergies:  No Known Allergies      Review of Systems       Review of Systems   Constitutional: Negative for activity change, fatigue and fever. Respiratory: Negative for shortness of breath. Gastrointestinal: Negative for abdominal pain, diarrhea, nausea and vomiting. Genitourinary: Negative for hematuria, penile discharge, penile pain, penile swelling, scrotal swelling and testicular pain. Painful bladder spasm   Musculoskeletal: Negative for back pain. Physical Exam     Visit Vitals  BP (!) 128/58 (BP 1 Location: Right upper arm, BP Patient Position: At rest)   Pulse 94   Temp 98.1 °F (36.7 °C)   Resp 16   SpO2 99%         Physical Exam  Vitals and nursing note reviewed. Constitutional:       General: He is not in acute distress. Appearance: He is well-developed. HENT:      Head: Normocephalic and atraumatic. Neck:      Thyroid: No thyromegaly. Vascular: No JVD. Trachea: No tracheal deviation. Cardiovascular:      Rate and Rhythm: Normal rate and regular rhythm. Abdominal:      General: Bowel sounds are normal. There is no distension. Palpations: Abdomen is soft. Tenderness: There is no abdominal tenderness. There is no guarding.    Genitourinary:     Penis: Normal.       Testes: Normal.      Comments: Burris in place with fluid output, no erythema or drainage noted around catheter insertion site of penis, no swelling noted of penis or testicles, there was some urine on overlying close  Musculoskeletal:         General: No tenderness. Normal range of motion. Cervical back: Normal range of motion. Skin:     General: Skin is warm and dry. Neurological:      General: No focal deficit present. Mental Status: He is alert and oriented to person, place, and time. Psychiatric:         Behavior: Behavior normal.         Thought Content: Thought content normal.         Judgment: Judgment normal.           Diagnostic Study Results     Labs -  No results found for this or any previous visit (from the past 12 hour(s)). Radiologic Studies -   No orders to display         Medical Decision Making   I am the first provider for this patient. I reviewed the vital signs, available nursing notes, past medical history, past surgical history, family history and social history. Vital Signs-Reviewed the patient's vital signs. 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 Patient currently without any issues with Burris catheter and bladder scan without any urinary retention. Plan to add Flomax to current regimen of medications and discussed with wife that patient may need pain medication during times of spasm even if it does make him a little weak tired \"loopy\" at times.   Patient to follow-up with his primary care doctor and his urologist in clinic this week. Procedures    Critical Care Time:       Diagnosis     Clinical Impression:   1. Bladder spasm        Disposition: Home    Follow-up Information     Follow up With Specialties Details Why Contact Info    Anibal Raymundo MD Family Medicine Schedule an appointment as soon as possible for a visit   [address]      [facility] EMERGENCY DEPT Emergency Medicine  As needed, If symptoms worsen [address] [facility] [address]  [phone]           Discharge Medication List as of 4/24/2022  1:18 PM      START taking these medications    Details   tamsulosin (Flomax) 0.4 mg capsule Take 1 Capsule by mouth daily for 15 days. , Normal, Disp-15 Capsule, R-0         CONTINUE these medications which have NOT CHANGED    Details   levoFLOXacin (Levaquin) 750 mg tablet Take 1 Tablet by mouth daily. , Normal, Disp-5 Tablet, R-0      trospium (SANCTURA) 20 mg tablet Take 1 Tablet by mouth Daily (before dinner). , Normal, Disp-30 Tablet, R-0      nystatin (MYCOSTATIN) powder Apply  to affected area two (2) times a day., Normal, Disp-1 Each, R-0      !! abiraterone 250 mg tab TAKE 4 TABLETS BY MOUTH 1 TIME A DAY ON AN EMPTY STOMACH. TAKE ONE HOUR PRIOR TO FOOD OR TWO HOURS AFTER FOOD. SWALLOW TABLETS WHOLE WITH WATER AND DO NOT CRUSH OR CHEW TABLETS., Normal, Disp-120 Tablet, R-4      predniSONE (DELTASONE) 5 mg tablet TAKE 1 TABLET BY MOUTH 2 TIMES A DAY, Normal, Disp-60 Tablet, R-4      acetaminophen (TYLENOL) 500 mg tablet Take 500 mg by mouth every six (6) hours as needed for Pain., Historical Med      pantoprazole (PROTONIX) 40 mg tablet Take 1 Tablet by mouth Before breakfast and dinner., Print, Disp-60 Tablet, R-0      amLODIPine (NORVASC) 10 mg tablet TAKE 1 TABLET BY MOUTH EVERY DAY, Historical Med      calcium acetate,phosphat bind, (PHOSLO) 667 mg cap Take 1 Capsule by mouth daily. , Historical Med      !! abiraterone (Zytiga) 250 mg tab Take four tablets by mouth daily on an empty stomach. Take one hour prior to food or two hours after food. Tablets should be swallowed whole with water. Do not crush or chew tablets., Normal, Disp-120 Tab, R-5      B complex w-C no.20/folic acid (TRIPHROCAPS PO) Take 1 Cap by mouth daily. , Historical Med       !! - Potential duplicate medications found. Please discuss with provider. Disclaimer: Sections of this note are dictated using utilizing voice recognition software. Minor typographical errors may be present. If questions arise, please do not hesitate to contact me or call our department.

## 2022-06-02 PROBLEM — C61 PROSTATE CANCER (HCC): Status: ACTIVE | Noted: 2022-01-01

## 2022-06-02 PROBLEM — Z99.2 ESRD ON DIALYSIS (HCC): Status: ACTIVE | Noted: 2020-09-26

## 2022-06-02 PROBLEM — T82.9XXA COMPLICATION OF VASCULAR ACCESS FOR DIALYSIS: Status: ACTIVE | Noted: 2022-01-01

## 2022-06-02 PROBLEM — A41.9 SEPSIS (HCC): Status: ACTIVE | Noted: 2022-01-01

## 2022-06-02 PROBLEM — G93.40 ENCEPHALOPATHY: Status: ACTIVE | Noted: 2022-01-01

## 2022-06-02 PROBLEM — R65.21 SEPTIC SHOCK (HCC): Status: ACTIVE | Noted: 2022-01-01

## 2022-06-02 PROBLEM — D49.0 COLON TUMOR: Status: ACTIVE | Noted: 2022-01-01

## 2022-06-02 PROBLEM — E16.2 HYPOGLYCEMIA: Status: ACTIVE | Noted: 2022-01-01

## 2022-06-02 NOTE — PROGRESS NOTES
Problem: Falls - Risk of  Goal: *Absence of Falls  Description: Document Cindia Neigh Fall Risk and appropriate interventions in the flowsheet. Outcome: Progressing Towards Goal  Note: Fall Risk Interventions:       Mentation Interventions: Adequate sleep, hydration, pain control         Elimination Interventions:  Toileting schedule/hourly rounds    History of Falls Interventions: Room close to nurse's station         Problem: Patient Education: Go to Patient Education Activity  Goal: Patient/Family Education  Outcome: Progressing Towards Goal     Problem: Patient Education: Go to Patient Education Activity  Goal: Patient/Family Education  Outcome: Progressing Towards Goal

## 2022-06-02 NOTE — ED PROVIDER NOTES
EMERGENCY DEPARTMENT HISTORY AND PHYSICAL EXAM    2:36 AM    Date: 6/2/2022  Patient Name: Aram Jones    History of Presenting Illness     No chief complaint on file. History Provided By: Patient  Location/Duration/Severity/Modifying factors   HPI   Aram Jones is a 80 y.o. male with a past medical history of prostate cancer on oral chemo, urinary tension, UTIs, ESRD on HD presenting for evaluation of altered mental status. Per EMS report patient has been altered for the last 2 days, family decided to call EMS for transfer to the hospital for further evaluation. EMS says that they found him with an episode of coffee-ground emesis and appeared to have some abdominal pain. They transferred him over the stretcher and he said that he nearly stopped breathing, had a O2 saturation attempted 84% on room air, placed him on a nonrebreather and brought him into the ER. Patient is nonverbal, unable to contribute any history. PCP: Shruti Adams MD    Current Facility-Administered Medications   Medication Dose Route Frequency Provider Last Rate Last Admin    sodium chloride (NS) flush 5-10 mL  5-10 mL IntraVENous PRN Kathrine Perez MD        pantoprazole (PROTONIX) 40 mg in 0.9% sodium chloride 10 mL injection  40 mg IntraVENous Q12H Kathrine Perez MD   40 mg at 06/02/22 0437    vancomycin (VANCOCIN) 1750 mg in  ml infusion  1,750 mg IntraVENous Sinda Kussmaul,  mL/hr at 06/02/22 0539 1,750 mg at 06/02/22 0539    Vancomycin - Pharmacy to Dose   Other Rx Dosing/Monitoring Kathrine Perez MD         Current Outpatient Medications   Medication Sig Dispense Refill    doxycycline (VIBRAMYCIN) 100 mg capsule Take 1 Capsule by mouth two (2) times a day.  14 Capsule 0    oxyCODONE-acetaminophen (PERCOCET) 5-325 mg per tablet TAKE 1 TABLET BY MOUTH EVERY MORNING AND EVERY EVENING**NOT COVERED**      traMADoL (ULTRAM) 50 mg tablet TAKE 1 TABLET BY MOUTH EVERY 8 HOURS FOR PAIN      trospium (SANCTURA) 20 mg tablet Take 1 Tablet by mouth Daily (before dinner). 30 Tablet 2    levoFLOXacin (Levaquin) 750 mg tablet Take 1 Tablet by mouth daily. 5 Tablet 0    nystatin (MYCOSTATIN) powder Apply  to affected area two (2) times a day. 1 Each 0    abiraterone 250 mg tab TAKE 4 TABLETS BY MOUTH 1 TIME A DAY ON AN EMPTY STOMACH. TAKE ONE HOUR PRIOR TO FOOD OR TWO HOURS AFTER FOOD. SWALLOW TABLETS WHOLE WITH WATER AND DO NOT CRUSH OR CHEW TABLETS. 120 Tablet 4    predniSONE (DELTASONE) 5 mg tablet TAKE 1 TABLET BY MOUTH 2 TIMES A DAY 60 Tablet 4    acetaminophen (TYLENOL) 500 mg tablet Take 500 mg by mouth every six (6) hours as needed for Pain.  pantoprazole (PROTONIX) 40 mg tablet Take 1 Tablet by mouth Before breakfast and dinner. 60 Tablet 0    amLODIPine (NORVASC) 10 mg tablet TAKE 1 TABLET BY MOUTH EVERY DAY      calcium acetate,phosphat bind, (PHOSLO) 667 mg cap Take 1 Capsule by mouth daily.  abiraterone (Zytiga) 250 mg tab Take four tablets by mouth daily on an empty stomach. Take one hour prior to food or two hours after food. Tablets should be swallowed whole with water. Do not crush or chew tablets. 120 Tab 5    B complex w-C no.20/folic acid (TRIPHROCAPS PO) Take 1 Cap by mouth daily. Past History     Past Medical History:  Past Medical History:   Diagnosis Date    Cancer [facility])     Prostate    Cardiac echocardiogram 08/29/2016    EF 60-65%. No WMA. Mild LVH. Indeterminate diastolic fx. RVSP 35 mmHg. No significant valvular heart disease.  Cardiac nuclear imaging test, abnormal 08/29/2016    Intermediate risk. Previous inferior infarction w/very mild fabiana-infarct ischemia. Inferior hypk. EF 68%. Nondiagnostic EKG on pharm stress test.  Pt's BP increased from 193/96 to 207/83 during study.  Carotid duplex 08/30/2016    Mild <50% bilateral ICA stenosis.       Chronic kidney disease     on HD MWF ad [facility]    Colonic mass     per Dr Kian Garcia notes    Enlarged prostate     Hypertension        Past Surgical History:  Past Surgical History:   Procedure Laterality Date    COLONOSCOPY N/A 10/1/2020    COLONOSCOPY, b'x, w tattoo w polypectomy performed by Waylon Sever, [facility] at [facility]  10/1/2020         Lutricia Needle  10/1/2020         COLONOSCPY,FLEX,W/ [facility]  10/1/2020         OK INSJ TUNNELED CVC W/O SUBQ PORT/ AGE 5 YR/> N/A 9/28/2020    INSERTION TUNNELED CENTRAL VENOUS CATHETER performed by Reese Hand MD at [facility] CATH LAB    VASCULAR SURGERY PROCEDURE UNLIST      dialysis access- right neck       Family History:  History reviewed. No pertinent family history. Social History:  Social History     Tobacco Use    Smoking status: Never Smoker    Smokeless tobacco: Never Used   Vaping Use    Vaping Use: Never used   Substance Use Topics    Alcohol use: No    Drug use: Never       Allergies:  No Known Allergies    I reviewed and confirmed the above information with patient and updated as necessary. Review of Systems     Review of Systems   Unable to perform ROS: Patient nonverbal       Physical Exam     Visit Vitals  BP 92/60   Pulse (!) 114   Temp 99.1 °F (37.3 °C)   Resp 28   Ht 5' 6\" (1.676 m)   Wt 73.5 kg (162 lb)   SpO2 97%   BMI 26.15 kg/m²       Physical Exam  Vitals and nursing note reviewed. Constitutional:       Appearance: He is ill-appearing. Comments: Adult male lying in a hospital stretcher, chronically ill-appearing   HENT:      Mouth/Throat:      Mouth: Mucous membranes are moist.   Eyes:      Pupils: Pupils are equal, round, and reactive to light. Cardiovascular:      Rate and Rhythm: Regular rhythm. Tachycardia present. Pulses: Normal pulses. Heart sounds: Normal heart sounds. Pulmonary:      Effort: Pulmonary effort is normal. No respiratory distress. Breath sounds: Normal breath sounds. No wheezing or rhonchi.       Comments: NRB in place  Abdominal:      General: Abdomen is flat. Tenderness: There is abdominal tenderness (Appears to be tender in the abdomen, diffusely). Musculoskeletal:         General: No swelling. Normal range of motion. Cervical back: Normal range of motion. Skin:     General: Skin is warm. Neurological:      Mental Status: He is alert.       Comments: Moving all extremities         Diagnostic Study Results     Labs -  Recent Results (from the past 24 hour(s))   GLUCOSE, POC    Collection Time: 06/02/22  2:50 AM   Result Value Ref Range    Glucose (POC) 74 70 - 110 mg/dL   URINALYSIS W/ RFLX MICROSCOPIC    Collection Time: 06/02/22  3:15 AM   Result Value Ref Range    Color        Macroscopic performed on spun urine due to gross blood  or mucus    Appearance TURBID      Specific gravity 1.020 1.005 - 1.030      pH (UA) 8.5 (H) 5.0 - 8.0      Protein >300 (A) NEG mg/dL    Glucose Negative NEG mg/dL    Ketone TRACE (A) NEG mg/dL    Bilirubin SMALL (A) NEG      Blood LARGE (A) NEG      Urobilinogen 0.2 0.2 - 1.0 EU/dL    Nitrites Negative NEG      Leukocyte Esterase LARGE (A) NEG     URINE MICROSCOPIC ONLY    Collection Time: 06/02/22  3:15 AM   Result Value Ref Range    WBC TOO NUMEROUS TO COUNT 0 - 4 /hpf    RBC TOO NUMEROUS TO COUNT 0 - 5 /hpf    Epithelial cells FEW 0 - 5 /lpf    Bacteria 4+ (A) NEG /hpf   METABOLIC PANEL, COMPREHENSIVE    Collection Time: 06/02/22  3:30 AM   Result Value Ref Range    Sodium 136 136 - 145 mmol/L    Potassium 5.7 (H) 3.5 - 5.5 mmol/L    Chloride 96 (L) 100 - 111 mmol/L    CO2 28 21 - 32 mmol/L    Anion gap 12 3.0 - 18 mmol/L    Glucose 47 (LL) 74 - 99 mg/dL    BUN 35 (H) 7.0 - 18 MG/DL    Creatinine 4.51 (H) 0.6 - 1.3 MG/DL    BUN/Creatinine ratio 8 (L) 12 - 20      GFR est AA 15 (L) >60 ml/min/1.73m2    GFR est non-AA 12 (L) >60 ml/min/1.73m2    Calcium 12.5 (H) 8.5 - 10.1 MG/DL    Bilirubin, total 0.7 0.2 - 1.0 MG/DL    ALT (SGPT) 16 16 - 61 U/L    AST (SGOT) 69 (H) 10 - 38 U/L    Alk. phosphatase 408 (H) 45 - 117 U/L    Protein, total 7.9 6.4 - 8.2 g/dL    Albumin 1.9 (L) 3.4 - 5.0 g/dL    Globulin 6.0 (H) 2.0 - 4.0 g/dL    A-G Ratio 0.3 (L) 0.8 - 1.7     CBC WITH AUTOMATED DIFF    Collection Time: 06/02/22  3:30 AM   Result Value Ref Range    WBC 77.1 (HH) 4.6 - 13.2 K/uL    RBC 4.50 4.35 - 5.65 M/uL    HGB 13.2 13.0 - 16.0 g/dL    HCT 42.3 36.0 - 48.0 %    MCV 94.0 78.0 - 100.0 FL    MCH 29.3 24.0 - 34.0 PG    MCHC 31.2 31.0 - 37.0 g/dL    RDW 15.1 (H) 11.6 - 14.5 %    PLATELET 207 (H) 194 - 420 K/uL    MPV 10.9 9.2 - 11.8 FL    NRBC 0.0 0  WBC    ABSOLUTE NRBC 0.03 (H) 0.00 - 0.01 K/uL    NEUTROPHILS 90 (H) 40 - 73 %    BAND NEUTROPHILS 6 (H) 0 - 5 %    LYMPHOCYTES 3 (L) 21 - 52 %    MONOCYTES 0 (L) 3 - 10 %    EOSINOPHILS 0 0 - 5 %    BASOPHILS 0 0 - 2 %    METAMYELOCYTES 1 (H) 0 %    IMMATURE GRANULOCYTES 0 %    ABS. NEUTROPHILS 74.0 (H) 1.8 - 8.0 K/UL    ABS. LYMPHOCYTES 2.3 0.9 - 3.6 K/UL    ABS. MONOCYTES 0.0 (L) 0.05 - 1.2 K/UL    ABS. EOSINOPHILS 0.0 0.0 - 0.4 K/UL    ABS. BASOPHILS 0.0 0.0 - 0.1 K/UL    ABS. IMM.  GRANS. 0.0 K/UL    DF MANUAL      PLATELET COMMENTS Increased Platelets      RBC COMMENTS ANISOCYTOSIS  1+        WBC COMMENTS VACUOLATED POLYS     MAGNESIUM    Collection Time: 06/02/22  3:30 AM   Result Value Ref Range    Magnesium 2.5 1.6 - 2.6 mg/dL   PHOSPHORUS    Collection Time: 06/02/22  3:30 AM   Result Value Ref Range    Phosphorus 4.3 2.5 - 4.9 MG/DL   PROTHROMBIN TIME + INR    Collection Time: 06/02/22  3:30 AM   Result Value Ref Range    Prothrombin time 14.0 11.5 - 15.2 sec    INR 1.0 0.8 - 1.2     PTT    Collection Time: 06/02/22  3:30 AM   Result Value Ref Range    aPTT 31.2 23.0 - 36.4 SEC   AMMONIA    Collection Time: 06/02/22  3:30 AM   Result Value Ref Range    Ammonia, plasma 34 (H) 11 - 32 UMOL/L   POC LACTIC ACID    Collection Time: 06/02/22  3:41 AM   Result Value Ref Range    Lactic Acid (POC) 9.96 (HH) 0.40 - 2.00 mmol/L GLUCOSE, POC    Collection Time: 06/02/22  4:09 AM   Result Value Ref Range    Glucose (POC) 60 (L) 70 - 110 mg/dL   EKG, 12 LEAD, INITIAL    Collection Time: 06/02/22  4:28 AM   Result Value Ref Range    Ventricular Rate 117 BPM    Atrial Rate 117 BPM    P-R Interval 154 ms    QRS Duration 100 ms    Q-T Interval 358 ms    QTC Calculation (Bezet) 499 ms    Calculated P Axis 27 degrees    Calculated R Axis -60 degrees    Calculated T Axis 108 degrees    Diagnosis       Sinus tachycardia  Left anterior fascicular block  Septal infarct (cited on or before 25-SEP-2020)  Abnormal ECG  When compared with ECG of 13-FEB-2022 09:56,  aberrant conduction is no longer present  MN interval has decreased  Questionable change in initial forces of Septal leads     GLUCOSE, POC    Collection Time: 06/02/22  4:54 AM   Result Value Ref Range    Glucose (POC) 140 (H) 70 - 110 mg/dL         Radiologic Studies -   CT ABD PELV W WO CONT   Final Result      1. Developing colon mass. 2.  Bladder dome thickening and possible bladder wall inferior lateral aspect   thickening. Complex bladder luminal content, hemorrhagic material or purulent   material.   3.  Tiny hepatic hypodensity. 4.  Diffuse esophageal thickening, suggestive of esophagitis. 5.  Right renal cyst.  Renal atrophy bilaterally. 6.  Sclerotic densities in the skeletal structures suggestive of osteosclerotic   metastatic lesions. CT HEAD WO CONT   Final Result                  1.  No acute intracranial abnormalities. 2.  Moderate chronic microvascular ischemic changes. 3.  Chronic left maxillary sinusitis. XR CHEST PORT    (Results Pending)           Medical Decision Making   I am the first provider for this patient. I reviewed the vital signs, available nursing notes, past medical history, past surgical history, family history and social history. Vital Signs-Reviewed the patient's vital signs.     EKG: Sinus tachycardia without ischemia    Records Reviewed: Nursing Notes and Old Medical Records (Time of Review: 2:36 AM)    Provider Notes (Medical Decision Making):   MDM  80year-old male here with altered mental status consider UTI, sepsis, dehydration, arrhythmia, end-of-life, others    ED Course: Progress Notes, Reevaluation, and Consults:  Patient arrives tachycardic, afebrile, normotensive, satting her percent on a nonrebreather  DNR/DNI status reviewed    Will obtain a ultimately status work-up, septic work-up and call a code sepsis we will start on Protonix. Will withhold 30 cc/kg of IV fluids    CBC shows a leukocytosis of 77, with a left shift, consistent with his prior cancer history, lactic acid elevated 9.9  No severe anemia, doubt GI bleed  CMP consistent with end-stage renal disease, slight elevation in potassium, will treat with albuterol  Glucose is low at 47, will treat with D10 and recheck  CT imaging shows no changes in his head, CT abdomen pelvis with bladder wall thickening consistent with urinary tract infection, a    Burris catheter was changed by nursing, had purulent urine in his bladder, this is likely the source of his infection    Patient is maintaining MAP between 65 and 70, tachycardia is improving with slow fluid resuscitation, transitioned to a nasal cannula and seems to be tolerating that pretty well. Overall his propensity for decompensation is high, have discussed with Dr. Charlee Jennings in the ICU who agrees to admit, appreciate his assistance with patient care. Procedures    Critical Care Time: 40 minutes of critical care time for septic shock    Diagnosis     Clinical Impression:   1. Septic shock ([facility] [address]    2. Acute cystitis without hematuria    3. Calcification of indwelling Burris catheter, initial encounter ([facility] [address]    4.  ESRD (end stage renal disease) on dialysis [facility])        Disposition: Admit to [facility]    Follow-up Information    None          Patient's Medications   Start Taking    No medications on file   Continue Taking    ABIRATERONE (ZYTIGA) 250 MG TAB    Take four tablets by mouth daily on an empty stomach. Take one hour prior to food or two hours after food. Tablets should be swallowed whole with water. Do not crush or chew tablets. ABIRATERONE 250 MG TAB    TAKE 4 TABLETS BY MOUTH 1 TIME A DAY ON AN EMPTY STOMACH. TAKE ONE HOUR PRIOR TO FOOD OR TWO HOURS AFTER FOOD. SWALLOW TABLETS WHOLE WITH WATER AND DO NOT CRUSH OR CHEW TABLETS. ACETAMINOPHEN (TYLENOL) 500 MG TABLET    Take 500 mg by mouth every six (6) hours as needed for Pain. AMLODIPINE (NORVASC) 10 MG TABLET    TAKE 1 TABLET BY MOUTH EVERY DAY    B COMPLEX W-C NO.20/FOLIC ACID (TRIPHROCAPS PO)    Take 1 Cap by mouth daily. CALCIUM ACETATE,PHOSPHAT BIND, (PHOSLO) 667 MG CAP    Take 1 Capsule by mouth daily. DOXYCYCLINE (VIBRAMYCIN) 100 MG CAPSULE    Take 1 Capsule by mouth two (2) times a day. LEVOFLOXACIN (LEVAQUIN) 750 MG TABLET    Take 1 Tablet by mouth daily. NYSTATIN (MYCOSTATIN) POWDER    Apply  to affected area two (2) times a day. OXYCODONE-ACETAMINOPHEN (PERCOCET) 5-325 MG PER TABLET    TAKE 1 TABLET BY MOUTH EVERY MORNING AND EVERY EVENING**NOT COVERED**    PANTOPRAZOLE (PROTONIX) 40 MG TABLET    Take 1 Tablet by mouth Before breakfast and dinner. PREDNISONE (DELTASONE) 5 MG TABLET    TAKE 1 TABLET BY MOUTH 2 TIMES A DAY    TRAMADOL (ULTRAM) 50 MG TABLET    TAKE 1 TABLET BY MOUTH EVERY 8 HOURS FOR PAIN    TROSPIUM (SANCTURA) 20 MG TABLET    Take 1 Tablet by mouth Daily (before dinner). These Medications have changed    No medications on file   Stop Taking    No medications on file       Huang Lomeli MD   Emergency Medicine   June 2, 2022, 2:36 AM     This note is dictated utilizing Dragon voice recognition software. Unfortunately this leads to occasional typographical errors using the voice recognition. I apologize in advance if the situation occurs.  If questions occur please do not hesitate to contact me directly.     Ofelia Beasley MD

## 2022-06-02 NOTE — PROGRESS NOTES
[address] [facility] Pharmacokinetic Monitoring Service - Vancomycin     Donna Johnson is a 80 y.o. male starting on vancomycin therapy for Sepsis of Unknown Etiology. Pharmacy consulted by Dr. Silvina Baeza for monitoring and adjustment. Target Concentration: Pre-Dialysis Concentration 21-24 mg/L    Additional Antimicrobials: Piperacillin/Tazobactam    Pertinent Laboratory Values:   Temp: 99.1 °F (37.3 °C)  Weight: 73.5 kg (162 lb)  Recent Labs     06/02/22  0330   CREA 4.51*   BUN 35*   WBC 77.1*     Estimated Creatinine Clearance: 10.6 mL/min (A) (based on SCr of 4.51 mg/dL (H)). Pertinent Cultures:  Culture Date Source Results   06/02/22 blood pending   MRSA Nasal Swab: N/A. Non-respiratory infection    Plan:  Concentration-guided dosing due to renal impairment and intermittent hemodialysis - Outpatient MWF HD.  No inpatient orders yet  Start vancomycin 1750 mg x 1 dose  Will dose according to vancomycin concentration levels at this time due to renal insufficiency  Renal labs as indicated   Vancomycin concentration ordered for  06/03/22 @ 0400  Pharmacy will continue to monitor patient and adjust therapy as indicated    Thank you for the consult,  TRACY Valentine  6/2/2022

## 2022-06-02 NOTE — H&P
[facility]  Pulmonary, Critical Care, and Sleep Medicine    Name: Aram Jones MRN: [id number]   : 1935 Hospital: [address] [place] [address]   Date: 2022        Critical Care History and Physical      IMPRESSION:   · Septic shock - in the setting of UTI  · Complicated UTI in the setting of chronic indwelling michel. Has been on doxy x7 days per wife. 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 6XXA    CAD (coronary artery disease) I25.10    Essential (primary) hypertension I10    Troponin level elevated R77.8    Acute renal failure (ARF) (HCC) N17.9    ESRD (end stage renal disease) ([facility] [address] N18.6    Hyperkalemia R74.0    Metabolic acidosis V53.3    Secondary hyperparathyroidism of renal origin ([facility] [address] N25.81    Anemia D64.9    Hydronephrosis N13.30    UTI (urinary tract infection) N39.0    Postobstructive diuresis R35.89    Polyp of ascending colon K63.5    Decrease in appetite R63.0    Elevated prostate specific antigen (PSA) R97.20    Osteoarthrosis M19.90    Shortness of breath R06.02    Chronic renal failure N18.9    Pneumonia J18.9    Hypoalbuminemia E88.09    Leucocytosis D72.829    Nausea and vomiting R11.2    AMS (altered mental status) R41.82    Bladder cancer (HCC) C67.9    Sepsis (HCC) A41.9        RECOMMENDATIONS:   Neuro: avoid sedating medications  Pulm: Supplemental O2 via NC, titrate flow for goal SPO2> 90%, pulmonary hygiene care, Aspiration precautions, Keep HOB >30 degrees  CVS: maintaining MAP >65 at this time  Fluids:  d10 at 20 cc/hr  GI: SUP, Zofran PRN for N/V, NPO  Renal:  Trend Renal indices, Strict Is/Os, Michel (+). Consult urology and nephrology. HD per nephrology. Hem/Onc: Monitor for s/o active bleeding. Will obtain BLE PVLS. I/D:Sepsis bundle per hospital protocol, Blood and Urine cultures drawn and will be followed. Lactic acid ordered- initial and repeat Q4hrs till normalized. Antibiotics: vanc and zosyn. De-escalate per culture results. Trend WBCs and temperature curve. Endocrine: Q6 glucoses, SSI. Metabolic:  Daily BMP, mag, phos. Trend lytes, replace as needed. Given insulin and D50 for hyperkalemia  Musc/Skin: no acute issues, wound care as needed  DNR/DNI - palliative care consulted     Best practice : APPLICABLE TO PATIENT    Glycemic control  IHI ICU bundles: Michel Bundle Followed    Sress ulcer prophylaxis. Protonix  DVT prophylaxis. hx of gross hematuria  Need for Lines, michel assessed. Palliative care evaluation. Restraints not indicated      Subjective/History: This patient has been seen and evaluated at the request of Dr. Devendra Colunga for septic shock. 06/02/22    Patient is a 80 y.o. male w/ pmhx of prostate CA on chemo, chronic indwelling michel, ESRD and CAD who presented to the ED for evaluation of vomiting with associated altered mentation. Per patient's wife, for the last several days the patient has been more lethargic with poor PO intake. She states a week ago he was diagnosed with a UTI and was started on doxycycline. She states that this morning she was woken up by patient vomiting and altered and EMS was called. Per chart review, on EMS arrival patient was hypoxic with O2 saturation in the mid 80s that improved with NRB. Work up in the ED significant for elevated WBCs, a UTI, hypoglycemia and hyperkalemia. Patient's wife reports that patient missed HD yesterday. Patient admitted to ICU for soft BPs and encephalopathy. Upon my evaluation, patient somnolent and arousable to light shaking.  On arousal he is confused, oriented to person. Follows some commands. Hemodynamics stable on monitor. Past Medical History:   Diagnosis Date    Cancer [facility])     Prostate    Cardiac echocardiogram 08/29/2016    EF 60-65%. No WMA. Mild LVH. Indeterminate diastolic fx. RVSP 35 mmHg. No significant valvular heart disease.  Cardiac nuclear imaging test, abnormal 08/29/2016    Intermediate risk. Previous inferior infarction w/very mild fabiana-infarct ischemia. Inferior hypk. EF 68%. Nondiagnostic EKG on pharm stress test.  Pt's BP increased from 193/96 to 207/83 during study.  Carotid duplex 08/30/2016    Mild <50% bilateral ICA stenosis.  Chronic kidney disease     on HD MWF ad [facility]    Colonic mass     per Dr Josh Ledesma notes    Enlarged prostate     Hypertension         Past Surgical History:   Procedure Laterality Date    COLONOSCOPY N/A 10/1/2020    COLONOSCOPY, b'x, w tattoo w polypectomy performed by Milvia Ruiz MD at [address]  10/1/2020         [facility]  10/1/2020         COLONOSCPY,FLEX,W/ [facility]  10/1/2020         DE INSJ TUNNELED CVC W/O SUBQ PORT/ AGE 5 YR/> N/A 9/28/2020    INSERTION TUNNELED CENTRAL VENOUS CATHETER performed by Joseluis Hernadez MD at [facility] CATH LAB   [address]      dialysis access- right neck        Prior to Admission medications    Medication Sig Start Date End Date Taking? Authorizing Provider   doxycycline (VIBRAMYCIN) 100 mg capsule Take 1 Capsule by mouth two (2) times a day. 5/20/22   CORNEL Tubbs   oxyCODONE-acetaminophen (PERCOCET) 5-325 mg per tablet TAKE 1 TABLET BY MOUTH EVERY MORNING AND EVERY EVENING**NOT COVERED** 4/21/22   Provider, Historical   traMADoL (ULTRAM) 50 mg tablet TAKE 1 TABLET BY MOUTH EVERY 8 HOURS FOR PAIN 4/15/22   Provider, Historical   trospium (SANCTURA) 20 mg tablet Take 1 Tablet by mouth Daily (before dinner).  4/29/22   Tomaskia Wood, KACIE Milan   levoFLOXacin (Levaquin) 750 mg tablet Take 1 Tablet by mouth daily. 4/20/22   [facility] PA-C   nystatin (MYCOSTATIN) powder Apply  to affected area two (2) times a day. 2/21/22   Jane Monte MD   abiraterone 250 mg tab TAKE 4 TABLETS BY MOUTH 1 TIME A DAY ON AN EMPTY STOMACH. TAKE ONE HOUR PRIOR TO FOOD OR TWO HOURS AFTER FOOD. SWALLOW TABLETS WHOLE WITH WATER AND DO NOT CRUSH OR CHEW TABLETS. 12/14/21   Lit Romo MD   predniSONE (DELTASONE) 5 mg tablet TAKE 1 TABLET BY MOUTH 2 TIMES A DAY 12/14/21   Lit Romo MD   acetaminophen (TYLENOL) 500 mg tablet Take 500 mg by mouth every six (6) hours as needed for Pain. Provider, Historical   pantoprazole (PROTONIX) 40 mg tablet Take 1 Tablet by mouth Before breakfast and dinner. 10/27/21   Eve Landers MD   amLODIPine (NORVASC) 10 mg tablet TAKE 1 TABLET BY MOUTH EVERY DAY 5/21/21   Provider, Historical   calcium acetate,phosphat bind, (PHOSLO) 667 mg cap Take 1 Capsule by mouth daily. 6/14/21   Provider, Historical   abiraterone (Zytiga) 250 mg tab Take four tablets by mouth daily on an empty stomach. Take one hour prior to food or two hours after food. Tablets should be swallowed whole with water. Do not crush or chew tablets. 5/4/21   Lit Romo MD   B complex w-C no.20/folic acid (TRIPHROCAPS PO) Take 1 Cap by mouth daily. Provider, Historical       Current Facility-Administered Medications   Medication Dose Route Frequency    pantoprazole (PROTONIX) 40 mg in 0.9% sodium chloride 10 mL injection  40 mg IntraVENous Q12H    vancomycin (VANCOCIN) 1750 mg in  ml infusion  1,750 mg IntraVENous ONCE    Vancomycin - Pharmacy to Dose   Other Rx Dosing/Monitoring       No Known Allergies     Social History     Tobacco Use    Smoking status: Never Smoker    Smokeless tobacco: Never Used   Substance Use Topics    Alcohol use: No        History reviewed. No pertinent family history.        Review of Systems:  Review of systems not obtained due to patient factors. Objective:   Vital Signs:    Visit Vitals  BP 92/60   Pulse (!) 114   Temp 99.1 °F (37.3 °C)   Resp 28   Ht 5' 6\" (1.676 m)   Wt 73.5 kg (162 lb)   SpO2 97%   BMI 26.15 kg/m²       O2 Device: Nasal cannula   O2 Flow Rate (L/min): 3 l/min   Temp (24hrs), Av.1 °F (37.3 °C), Min:99.1 °F (37.3 °C), Max:99.1 °F (37.3 °C)       Intake/Output:   Last shift:       1901 -  0700  In: 600 [I.V.:600]  Out: -   Last 3 shifts: No intake/output data recorded. Intake/Output Summary (Last 24 hours) at 2022 0548  Last data filed at 2022 0540  Gross per 24 hour   Intake 600 ml   Output --   Net 600 ml           Physical Exam:     General:  Somnolent, arousable to light shaking   Head:  Normocephalic, without obvious abnormality, atraumatic. Eyes:  Conjunctivae/corneas clear. PERRL   Nose: Nares normal. Septum midline. Mucosa normal.   Throat: Lips, mucosa, and tongue normal. Teeth and gums normal.   Neck: Supple, symmetrical, trachea midline   Lungs:   Clear to auscultation bilaterally. Chest wall:  No tenderness or deformity. Heart:  Regular rate and rhythm, S1, S2 normal, no murmur, click, rub or gallop. Abdomen:   Soft, non-tender. Bowel sounds normal. No masses,  No organomegaly. Extremities: Extremities normal, atraumatic, no cyanosis. 3+ pitting edema L>R   Pulses: 2+ and symmetric all extremities.    Skin: Skin color, texture, turgor normal. No rashes or lesions   Neurologic:  responsive to light shaking, oriented to person, follows some commands   Devices:  · ETT: (-)  · OGT: (-)  · Lines: (PIVs)  · Drains: (-)  · Burris: (+)    Data:     Recent Results (from the past 24 hour(s))   GLUCOSE, POC    Collection Time: 22  2:50 AM   Result Value Ref Range    Glucose (POC) 74 70 - 110 mg/dL   URINALYSIS W/ RFLX MICROSCOPIC    Collection Time: 22  3:15 AM   Result Value Ref Range    Color        Macroscopic performed on spun urine due to gross blood  or mucus    Appearance TURBID      Specific gravity 1.020 1.005 - 1.030      pH (UA) 8.5 (H) 5.0 - 8.0      Protein >300 (A) NEG mg/dL    Glucose Negative NEG mg/dL    Ketone TRACE (A) NEG mg/dL    Bilirubin SMALL (A) NEG      Blood LARGE (A) NEG      Urobilinogen 0.2 0.2 - 1.0 EU/dL    Nitrites Negative NEG      Leukocyte Esterase LARGE (A) NEG     URINE MICROSCOPIC ONLY    Collection Time: 06/02/22  3:15 AM   Result Value Ref Range    WBC TOO NUMEROUS TO COUNT 0 - 4 /hpf    RBC TOO NUMEROUS TO COUNT 0 - 5 /hpf    Epithelial cells FEW 0 - 5 /lpf    Bacteria 4+ (A) NEG /hpf   METABOLIC PANEL, COMPREHENSIVE    Collection Time: 06/02/22  3:30 AM   Result Value Ref Range    Sodium 136 136 - 145 mmol/L    Potassium 5.7 (H) 3.5 - 5.5 mmol/L    Chloride 96 (L) 100 - 111 mmol/L    CO2 28 21 - 32 mmol/L    Anion gap 12 3.0 - 18 mmol/L    Glucose 47 (LL) 74 - 99 mg/dL    BUN 35 (H) 7.0 - 18 MG/DL    Creatinine 4.51 (H) 0.6 - 1.3 MG/DL    BUN/Creatinine ratio 8 (L) 12 - 20      GFR est AA 15 (L) >60 ml/min/1.73m2    GFR est non-AA 12 (L) >60 ml/min/1.73m2    Calcium 12.5 (H) 8.5 - 10.1 MG/DL    Bilirubin, total 0.7 0.2 - 1.0 MG/DL    ALT (SGPT) 16 16 - 61 U/L    AST (SGOT) 69 (H) 10 - 38 U/L    Alk.  phosphatase 408 (H) 45 - 117 U/L    Protein, total 7.9 6.4 - 8.2 g/dL    Albumin 1.9 (L) 3.4 - 5.0 g/dL    Globulin 6.0 (H) 2.0 - 4.0 g/dL    A-G Ratio 0.3 (L) 0.8 - 1.7     CBC WITH AUTOMATED DIFF    Collection Time: 06/02/22  3:30 AM   Result Value Ref Range    WBC 77.1 (HH) 4.6 - 13.2 K/uL    RBC 4.50 4.35 - 5.65 M/uL    HGB 13.2 13.0 - 16.0 g/dL    HCT 42.3 36.0 - 48.0 %    MCV 94.0 78.0 - 100.0 FL    MCH 29.3 24.0 - 34.0 PG    MCHC 31.2 31.0 - 37.0 g/dL    RDW 15.1 (H) 11.6 - 14.5 %    PLATELET 693 (H) 899 - 420 K/uL    MPV 10.9 9.2 - 11.8 FL    NRBC 0.0 0  WBC    ABSOLUTE NRBC 0.03 (H) 0.00 - 0.01 K/uL    NEUTROPHILS 90 (H) 40 - 73 %    BAND NEUTROPHILS 6 (H) 0 - 5 %    LYMPHOCYTES 3 (L) 21 - 52 %    MONOCYTES 0 (L) 3 - 10 %    EOSINOPHILS 0 0 - 5 %    BASOPHILS 0 0 - 2 %    METAMYELOCYTES 1 (H) 0 %    IMMATURE GRANULOCYTES 0 %    ABS. NEUTROPHILS 74.0 (H) 1.8 - 8.0 K/UL    ABS. LYMPHOCYTES 2.3 0.9 - 3.6 K/UL    ABS. MONOCYTES 0.0 (L) 0.05 - 1.2 K/UL    ABS. EOSINOPHILS 0.0 0.0 - 0.4 K/UL    ABS. BASOPHILS 0.0 0.0 - 0.1 K/UL    ABS. IMM.  GRANS. 0.0 K/UL    DF MANUAL      PLATELET COMMENTS Increased Platelets      RBC COMMENTS ANISOCYTOSIS  1+        WBC COMMENTS VACUOLATED POLYS     MAGNESIUM    Collection Time: 06/02/22  3:30 AM   Result Value Ref Range    Magnesium 2.5 1.6 - 2.6 mg/dL   PHOSPHORUS    Collection Time: 06/02/22  3:30 AM   Result Value Ref Range    Phosphorus 4.3 2.5 - 4.9 MG/DL   PROTHROMBIN TIME + INR    Collection Time: 06/02/22  3:30 AM   Result Value Ref Range    Prothrombin time 14.0 11.5 - 15.2 sec    INR 1.0 0.8 - 1.2     PTT    Collection Time: 06/02/22  3:30 AM   Result Value Ref Range    aPTT 31.2 23.0 - 36.4 SEC   AMMONIA    Collection Time: 06/02/22  3:30 AM   Result Value Ref Range    Ammonia, plasma 34 (H) 11 - 32 UMOL/L   POC LACTIC ACID    Collection Time: 06/02/22  3:41 AM   Result Value Ref Range    Lactic Acid (POC) 9.96 (HH) 0.40 - 2.00 mmol/L   GLUCOSE, POC    Collection Time: 06/02/22  4:09 AM   Result Value Ref Range    Glucose (POC) 60 (L) 70 - 110 mg/dL   EKG, 12 LEAD, INITIAL    Collection Time: 06/02/22  4:28 AM   Result Value Ref Range    Ventricular Rate 117 BPM    Atrial Rate 117 BPM    P-R Interval 154 ms    QRS Duration 100 ms    Q-T Interval 358 ms    QTC Calculation (Bezet) 499 ms    Calculated P Axis 27 degrees    Calculated R Axis -60 degrees    Calculated T Axis 108 degrees    Diagnosis       Sinus tachycardia  Left anterior fascicular block  Septal infarct (cited on or before 25-SEP-2020)  Abnormal ECG  When compared with ECG of 13-FEB-2022 09:56,  aberrant conduction is no longer present  AL interval has decreased  Questionable change in initial forces of Septal leads     GLUCOSE, POC    Collection Time: 06/02/22  4:54 AM   Result Value Ref Range    Glucose (POC) 140 (H) 70 - 110 mg/dL           No results for input(s): FIO2I, IFO2, HCO3I, IHCO3, HCOPOC, PCO2I, PCOPOC, IPHI, PHI, PHPOC, PO2I, PO2POC in the last 72 hours. No lab exists for component: IPOC2    Telemetry:normal sinus rhythm    Imaging:  I have personally reviewed the patients radiographs and have reviewed the reports:    XR Results (most recent):  Results from Hospital Encounter encounter on 02/13/22    XR CHEST PA LAT    Narrative  EXAM: Chest Radiographs    INDICATION:  ? opacities on portable, r/o pna    TECHNIQUE: PA and lateral views of the chest    COMPARISON: 2/13/2022, 10/21/2021, 11/13/2020    FINDINGS: No pneumothorax identified. There is a rounded opacity overlying the  right posterior fifth and sixth intercostal space. This is likely in the right  upper lobe anteriorly. Subtle groundglass changes are noted bilaterally. No  effusions appreciated. The cardiac silhouette is normal. The pulmonary  vascularity is unremarkable. Degenerative changes of the spine. Degenerative  changes of the shoulders are noted. Impression  1. Ovoid density in the right upper lobe likely near the anterior segment. Additional groundglass opacities. Consider further evaluation with CT. CT Results (most recent):  Results from Hospital Encounter encounter on 06/02/22    CT ABD PELV W WO CONT    Narrative  EXAM:  CT Abdomen-Pelvis with Contrast.    CLINICAL INDICATION:    - Abdominal pain with coffee-ground emesis. - 80 y. o.?male?with possible history of prostate cancer on oral chemo, urinary  tension, UTIs, ESRD on HD presenting for evaluation of altered mental status. ? Per EMS report patient has been altered for the last 2 days, family decided to  call EMS for transfer to the hospital for further evaluation.  ?EMS says that  they found him with an episode of coffee-ground emesis and appeared to have some  abdominal pain. ? They transferred him over the stretcher and he said that he  nearly stopped breathing, had a O2 saturation attempted 84% on room air, placed  him on a nonrebreather and brought him into the ER. ? Patient is nonverbal,  unable to contribute any history. COMPARISON:  02/17/22. TECHNIQUE:    - Helical volumetric CT imaging of the abdomen and pelvis is performed following  IV contrast administration. Coronal and sagittal multiplanar reconstruction  images are generated for improved anatomic delineation.  - IV contrast 100 mL Isovue-300. - All CT scans at this facility are performed using dose optimization technique  as appropriate to the performed exam, to include automated exposure control,  adjustment of the mA and/or kV according to patient's size (including  appropriate matching for site-specific examinations), or use of iterative  reconstruction technique. FINDINGS:    Lung Bases:  Bilateral lower lung zone posterior aspect asbestos opacities. Mild bilateral pleural effusion. Liver:  Hypodensity in the right lobe, measuring 0.6 cm (axial #33). Gallbladder, adrenal glands, spleen, pancreas:  Unremarkable. Kidneys:  Atrophied kidneys bilaterally. Right renal cyst measuring about 1.2 x  1.0 cm (axial #47). Hydronephrosis bilaterally. Ureteral dilation in the  proximal ureters bilaterally. Distal ureters are normal in caliber on both  sides. GI tract:  Diffuse esophageal thickening in the distal esophagus. Gastric  distention with fluid. No acute small bowel abnormalities. The appendix is  normal.  Ascending colon with apparent asymmetric enhancing colon wall mass,  measuring up to about 6.6 x 4.1 x 6.5 cm. No active hemorrhage is demonstrated  along the colon. Stool retention in the colon with rectal distention. Abdominal wall:  Small umbilical hernia containing only fatty tissue.     Bladder:  Bladder wall thickening possibly neoplastic process, measuring up to  about 1.9 cm in maximal thickness. Additional focus of bladder wall thickening  in the inferior lateral aspect along the right posterior-lateral aspect  measuring up to about 1.0 cm. Complex fluid in the bladder lumen with scattered  air pockets, suggestive of hemorrhagic material vs purulent material.    Prostate:  Grossly unremarkable. Vascular: Moderate atherosclerotic calcifications. Nodes:  No mesenteric or retroperitoneal adenopathy. Bones:  Scattered abnormal sclerotic foci of the skeletal structures suggestive  of metastatic lesions. Similar pattern compared to recent prior CTs. Impression  1. Developing colon mass. 2.  Bladder dome thickening and possible bladder wall inferior lateral aspect  thickening. Complex bladder luminal content, hemorrhagic material or purulent  material.  3.  Tiny hepatic hypodensity. 4.  Diffuse esophageal thickening, suggestive of esophagitis. 5.  Right renal cyst.  Renal atrophy bilaterally. 6.  Sclerotic densities in the skeletal structures suggestive of osteosclerotic  metastatic lesions.                  ALYSSA time - 29 minutes  Alexa Barnes  06/02/22  Pulmonary, Critical Care Medicine  [facility]

## 2022-06-02 NOTE — DIABETES MGMT
Glycemic Control:  Pt with 2 hypoglycemic events BG 90-26-. Pt is now receiving D10 at 20 ml/hr which provides 163 calories/24 hrs and steroids.      Mariela COULTER BC-ADM

## 2022-06-02 NOTE — PROGRESS NOTES
attended the interdisciplinary rounds for Gi Ortiz, who is a 80 y.o.,male. Patients Primary Language is: [place]. According to the patients EMR Rastafarian Affiliation is: [employer]. The reason the Patient came to the hospital is:   Patient Active Problem List    Diagnosis Date Noted    Sepsis ([facility] [address] 06/02/2022    Bladder cancer ([facility] [address] 02/16/2022    AMS (altered mental status) 02/13/2022    Nausea and vomiting 10/25/2021    Hypoalbuminemia 10/20/2021    Leucocytosis 10/20/2021    Pneumonia 10/19/2021    Polyp of ascending colon 10/01/2020    Postobstructive diuresis 09/30/2020    Hydronephrosis 09/29/2020    UTI (urinary tract infection) 09/29/2020    ESRD (end stage renal disease) ([facility] [address] 09/26/2020    Hyperkalemia 46/01/4660    Metabolic acidosis 03/37/1034    Secondary hyperparathyroidism of renal origin ([facility] [address] 09/26/2020    Anemia 09/26/2020    Acute renal failure (ARF) ([facility] [address] 09/25/2020    Decrease in appetite 09/25/2020    Elevated prostate specific antigen (PSA) 09/25/2020    Osteoarthrosis 09/25/2020    Shortness of breath 09/25/2020    Chronic renal failure 09/25/2020    CAD (coronary artery disease) 09/01/2016    Essential (primary) hypertension 09/01/2016    Troponin level elevated 09/01/2016    Traumatic rhabdomyolysis ([facility] [address] 08/29/2016      Plan:  Chaplains will continue to follow and will provide pastoral care on an as needed/requested basis.  recommends bedside caregivers page  on duty if patient shows signs of acute spiritual or emotional distress.     [address] [facility] [employer]  Board Certified [facility]   [phone]

## 2022-06-02 NOTE — Clinical Note
Status[de-identified] INPATIENT [101]   Type of Bed: Intensive Care [6]   Cardiac Monitoring Required?: Yes   Inpatient Hospitalization Certified Necessary for the Following Reasons: 4.  Patient requires ICU level of care interventions (further clarification in H&P documentation)   Admitting Diagnosis: Sepsis [facility]) [[id number]]   Admitting Physician: Abril Hobbs   Attending Physician: Abril Hobbs   Estimated Length of Stay: 5-7 Midnights   Discharge Plan[de-identified] Home with Office Follow-up

## 2022-06-02 NOTE — PROGRESS NOTES
Consult Note    Patient: Donna Johnson               Sex: male          DOA: 6/2/2022         YOB: 1935      Age:  80 y.o.        LOS:  LOS: 0 days              HPI:     Donna Johnson is a 80 y.o. male who has been seen for for renal evaluation. .  Patient has history of ESRD. He is a stretcher palpation nowadays. Gets dialysis every Monday Wednesday Friday at [facility] dialysis unit. He missed dialysis treatment yesterday as she has an appointment with the cancer doctor. Could not come following day the instead came to the emergency room with altered mental status. He was evaluated in the emergency room and subsequently admitted to the ICU with septic shock hypotension which is resolved with the fluid resuscitation and antibiotics patient was also found to have hyperkalemia and hyperglycemia. This patient has history of prostate cancer bladder cancer with metastasis as well as colonic mass long time back also has metastasis to the different parts of the abdominal organ. Patient's status was discussed with the family particular to his wife several times but the family wanted to continue to dialyze. He has history of retention of urine and has a Burris catheter very frequently as baljinder hematuria also. When I have seen the patient in ICU is sleeping maintaining hemodynamics on oxygen by nasal cannula and on my exam I found his dialysis access is clotted. Try to get in touch with his wife to decide whether he still she wants to continue her  to get dialysis or not. She did not answer left a message. Also noticed palliative care team already discussed with the family and will sit with the wife and others tomorrow. They are feeling that the family still wants to continue to dialyze him. Currently the patient is DNR and DO NOT INTUBATE under the ICU team care.   He will be transferred to the telemetry floor    Past Medical History:   Diagnosis Date    Cancer [facility]) Prostate    Cardiac echocardiogram 08/29/2016    EF 60-65%. No WMA. Mild LVH. Indeterminate diastolic fx. RVSP 35 mmHg. No significant valvular heart disease.  Cardiac nuclear imaging test, abnormal 08/29/2016    Intermediate risk. Previous inferior infarction w/very mild fabiana-infarct ischemia. Inferior hypk. EF 68%. Nondiagnostic EKG on pharm stress test.  Pt's BP increased from 193/96 to 207/83 during study.  Carotid duplex 08/30/2016    Mild <50% bilateral ICA stenosis.  Chronic kidney disease     on HD MWF ad [facility]    Colonic mass     per Dr Day Dean notes    Enlarged prostate     Hypertension        Past Surgical History:   Procedure Laterality Date    COLONOSCOPY N/A 10/1/2020    COLONOSCOPY, b'x, w tattoo w polypectomy [facility]  10/1/2020         [facility]  10/1/2020         COLONOSCPY,FLEX,W/ [facility]  10/1/2020         [place] INSJ TUNNELED CVC W/O SUBQ PORT/ AGE 5 YR/> N/A 9/28/2020    INSERTION TUNNELED CENTRAL VENOUS CATHETER performed by Justo Benoit MD at [facility] CATH LAB    VASCULAR SURGERY PROCEDURE UNLIST      dialysis access- right neck       History reviewed. No pertinent family history. Social History     Socioeconomic History    Marital status:    Tobacco Use    Smoking status: Never Smoker    Smokeless tobacco: Never Used   Vaping Use    Vaping Use: Never used   Substance and Sexual Activity    Alcohol use: No    Drug use: Never       Prior to Admission medications    Medication Sig Start Date End Date Taking? Authorizing Provider   doxycycline (VIBRAMYCIN) 100 mg capsule Take 1 Capsule by mouth two (2) times a day. 5/20/22  Yes CORNEL Varela   nystatin (MYCOSTATIN) powder Apply  to affected area two (2) times a day. 2/21/22  Yes Trini Rodriguez MD   abiraterone 250 mg tab TAKE 4 TABLETS BY MOUTH 1 TIME A DAY ON AN EMPTY STOMACH.  TAKE ONE HOUR PRIOR TO FOOD OR TWO HOURS AFTER FOOD. SWALLOW TABLETS WHOLE WITH WATER AND DO NOT CRUSH OR CHEW TABLETS. 12/14/21  Yes Irais Patel MD   predniSONE (DELTASONE) 5 mg tablet TAKE 1 TABLET BY MOUTH 2 TIMES A DAY 12/14/21  Yes Irais Patel MD   pantoprazole (PROTONIX) 40 mg tablet Take 1 Tablet by mouth Before breakfast and dinner. 10/27/21  Yes eLslye Mckeon MD   amLODIPine (NORVASC) 10 mg tablet TAKE 1 TABLET BY MOUTH EVERY DAY 5/21/21  Yes Provider, Historical   calcium acetate,phosphat bind, (PHOSLO) 667 mg cap Take 1 Capsule by mouth daily. 6/14/21  Yes Provider, Historical   B complex w-C no.20/folic acid (TRIPHROCAPS PO) Take 1 Cap by mouth daily. Yes Provider, Historical   oxyCODONE-acetaminophen (PERCOCET) 5-325 mg per tablet TAKE 1 TABLET BY MOUTH EVERY MORNING AND EVERY EVENING**NOT COVERED** 4/21/22   Provider, Historical   traMADoL (ULTRAM) 50 mg tablet TAKE 1 TABLET BY MOUTH EVERY 8 HOURS FOR PAIN 4/15/22   Provider, Historical   trospium (SANCTURA) 20 mg tablet Take 1 Tablet by mouth Daily (before dinner). Patient not taking: Reported on 6/2/2022 4/29/22   Justino Davis PA-C   levoFLOXacin (Levaquin) 750 mg tablet Take 1 Tablet by mouth daily. Patient not taking: Reported on 6/2/2022 4/20/22   [facility] PA-C   acetaminophen (TYLENOL) 500 mg tablet Take 500 mg by mouth every six (6) hours as needed for Pain. Provider, Historical   abiraterone (Zytiga) 250 mg tab Take four tablets by mouth daily on an empty stomach. Take one hour prior to food or two hours after food. Tablets should be swallowed whole with water. Do not crush or chew tablets. Patient not taking: Reported on 6/2/2022 5/4/21   Irais Patel MD       No Known Allergies    Review of Systems  A comprehensive review of systems was negative except for that written in the History of Present Illness.       Physical Exam:      Visit Vitals  /60   Pulse 97   Temp 98.8 °F (37.1 °C)   Resp 20 Ht 5' 6\" (1.676 m)   Wt 73.5 kg (162 lb)   SpO2 100%   BMI 26.15 kg/m²       Physical Exam:  Physical Exam:   General:   Sleepy and tired looking , no distress , appears stated age. Nose: Nares normal. Septum midline. Mucosa normal. No drainage or sinus tenderness. Neck: Supple, symmetrical, trachea midline, no adenopathy, thyroid: no enlargement/tenderness/nodules, no carotid bruit and no JVD. Back:   Symmetric, no curvature. ROM normal. No CVA tenderness. Lungs:   Clear to auscultation bilaterally. Heart:  Regular rate and rhythm, S1, S2 normal, no murmur, click, rub or gallop. Abdomen:   Soft, non-tender. Bowel sounds normal. No masses,  No organomegaly. Extremities: Extremities normal, atraumatic, no cyanosis or edema. : .  AV graft on the left arm does not have any bruit or thrill is clotted   Skin: Skin color, texture, turgor normal. No rashes or lesions       Labs Reviewed:  CMP:   Lab Results   Component Value Date/Time     (L) 06/02/2022 11:52 AM    K 3.5 06/02/2022 11:52 AM    CL 99 (L) 06/02/2022 11:52 AM    CO2 30 06/02/2022 11:52 AM    AGAP 6 06/02/2022 11:52 AM     (H) 06/02/2022 11:52 AM    BUN 39 (H) 06/02/2022 11:52 AM    CREA 4.33 (H) 06/02/2022 11:52 AM    GFRAA 16 (L) 06/02/2022 11:52 AM    GFRNA 13 (L) 06/02/2022 11:52 AM    CA 10.2 (H) 06/02/2022 11:52 AM    MG 2.5 06/02/2022 03:30 AM    PHOS 4.1 06/02/2022 11:52 AM    ALB 2.3 (L) 06/02/2022 11:52 AM    TP 7.9 06/02/2022 03:30 AM    GLOB 6.0 (H) 06/02/2022 03:30 AM    AGRAT 0.3 (L) 06/02/2022 03:30 AM    ALT 16 06/02/2022 03:30 AM     CBC:   Lab Results   Component Value Date/Time    WBC 77.1 (HH) 06/02/2022 03:30 AM    HGB 13.2 06/02/2022 03:30 AM    HCT 42.3 06/02/2022 03:30 AM     (H) 06/02/2022 03:30 AM       COMPARISON:  02/13/22. TECHNIQUE:       - Helical volumetric CT imaging of the head is performed from the base of the  skull to the vertex without IV contrast administration.   Axial, coronal and  sagittal images are generated from the volumetric data set. - Dose optimization techniques are utilized as appropriate to the performed exam  with combination of automated exposure control, adjustment of mA and/or kV  according to patient size, and use of iterative reconstructive technique.      FINDINGS:      Brain:     - Hemorrhage/ hematoma:  No acute intracranial hemorrhage/ hematoma. - Mass, mass effect:  None. - Gray-white matter differentiation:  Moderate white matter hypodensities,  suggestive of chronic small vessel ischemia.  - Interval assessment:  No significant interval change.     CSF spaces:  No evidence of abnormal effacement or enlargement.     Calvarium:  Intact.     Sinuses, mastoids:  Left maxillary sinus mucosal thickening with osseous wall  thickening and sclerotic changes.     IMPRESSION                1.  No acute intracranial abnormalities.      2. Moderate chronic microvascular ischemic changes. CT Abdomen & Pelvis  TECHNIQUE:       - Helical volumetric CT imaging of the abdomen and pelvis is performed following  IV contrast administration. Coronal and sagittal multiplanar reconstruction  images are generated for improved anatomic delineation.  - IV contrast 100 mL Isovue-300. - All CT scans at this facility are performed using dose optimization technique  as appropriate to the performed exam, to include automated exposure control,  adjustment of the mA and/or kV according to patient's size (including  appropriate matching for site-specific examinations), or use of iterative  reconstruction technique.     FINDINGS:     Lung Bases:  Bilateral lower lung zone posterior aspect asbestos opacities. Mild bilateral pleural effusion.     Liver:  Hypodensity in the right lobe, measuring 0.6 cm (axial #33).    Gallbladder, adrenal glands, spleen, pancreas:  Unremarkable.     Kidneys:  Atrophied kidneys bilaterally.   Right renal cyst measuring about 1.2 x  1.0 cm (axial #47). Hydronephrosis bilaterally. Ureteral dilation in the  proximal ureters bilaterally. Distal ureters are normal in caliber on both  sides.     GI tract:  Diffuse esophageal thickening in the distal esophagus. Gastric  distention with fluid. No acute small bowel abnormalities. The appendix is  normal.  Ascending colon with apparent asymmetric enhancing colon wall mass,  measuring up to about 6.6 x 4.1 x 6.5 cm. No active hemorrhage is demonstrated  along the colon. Stool retention in the colon with rectal distention.     Abdominal wall:  Small umbilical hernia containing only fatty tissue.     Bladder:  Bladder wall thickening possibly neoplastic process, measuring up to  about 1.9 cm in maximal thickness. Additional focus of bladder wall thickening  in the inferior lateral aspect along the right posterior-lateral aspect  measuring up to about 1.0 cm. Complex fluid in the bladder lumen with scattered  air pockets, suggestive of hemorrhagic material vs purulent material.     Prostate:  Grossly unremarkable.     Vascular: Moderate atherosclerotic calcifications.     Nodes:  No mesenteric or retroperitoneal adenopathy.     Bones:  Scattered abnormal sclerotic foci of the skeletal structures suggestive  of metastatic lesions. Similar pattern compared to recent prior CTs.     IMPRESSION     1.  Developing colon mass. 2.  Bladder dome thickening and possible bladder wall inferior lateral aspect  thickening. Complex bladder luminal content, hemorrhagic material or purulent  material.  3.  Tiny hepatic hypodensity. 4.  Diffuse esophageal thickening, suggestive of esophagitis. 5.  Right renal cyst.  Renal atrophy bilaterally. 6.  Sclerotic densities in the skeletal structures suggestive of osteosclerotic  metastatic lesions.   Assessment/Plan     Active Problems:    ESRD (end stage renal disease) ([facility] [address] (9/26/2020)      Anemia (9/26/2020)      AMS (altered mental status) (2/13/2022)      [facility] (2/16/2022)      Sepsis ([facility] [address] (7/7/5640)      Complication of vascular access for dialysis (6/2/2022)      PLAN:  ESRD patient cancer of the prostate and bladder with metastasis to the different parts of the body admitted with altered mental status. Volume status resuscitated with volume suspicion for sepsis on antibiotics cultures are in progress. Palliative care team already discussed with the family. Patient is DD DNR. Patient vascular access on the right graft is clotted. No immediate need for any dialysis. We will need to discuss again with the family members to decide whether they still want to continue to dialyze. In the past given all the findings that we have him patient's family was approached to stop dialysis but they did not take. Will wait for the palliative care's team meeting with the family based on the decision will decide whether the patient should be dialyzed any further or not. My recommendation is given that advanced cancer with metastasis and prolong suffering stretcher bound patient with a very poor quality of life family should give strong consideration of stopping dialysis. But if the family still wants to continue with the expectation that the patient will turn around like he does. then will call her surgeon to put a dialysis catheter.

## 2022-06-02 NOTE — ACP (ADVANCE CARE PLANNING)
Advance Care Planning     General Advance Care Planning (ACP) Conversation      Date of Conversation: 6/2/2022     Conducted with: Patient, patient's wife Abdi Garces) via phone, Charleen Pelletier NP (Palliative) and this writer. Healthcare Decision Maker:     Primary Decision Maker: Harmony Reynolds ( and surgery update) - Spouse - [phone]  Secondary Decision Maker: Edward Bragg - Daughter - [phone]      Content/Action Overview: This writer, along with Charleen Pelletier NP, with the Palliative Care team; visited with patient today to offer support. Patient was resting comfortably, in bed. No family at the bedside. Patient would alert at times, but not enough to have any meaningful dialogue; with the Palliative Care team. Patient lives with his wife Linda Samson). The Palliative Care team then phoned patient's wife to determine when she would return to the hospital; so that the Palliative Care team could meet with her and patient. Patient's wife is planning to return to the hospital tomorrow (6/3/2022) at 12:00PM. The Palliative Care team will meet with patient and his wife, at that time. Patient already has a scanned advance medical directive (AMD) and he also has a scanned POST form. At this time, patient will remain a DNR/DNI with Limited Additional Interventions and NO feeding tubes. Length of Voluntary ACP Conversation in minutes:  21          Domonique Sheth., [facility]  Pall[facility]   MAI#[phone]

## 2022-06-02 NOTE — PROGRESS NOTES
Problem: Dysphagia (Adult)  Goal: *Acute Goals and Plan of Care (Insert Text)  Description: Patient will:  1. Tolerate PO trials with 0 s/s overt distress in 4/5 trials  2. Utilize compensatory swallow strategies/maneuvers (decrease bite/sip, size/rate, alt. liq/sol) with min cues in 4/5 trials  3. Perform oral-motor/laryngeal exercises to increase oropharyngeal swallow function with min cues  4. Complete an objective swallow study (i.e., MBSS) to assess swallow integrity, r/o aspiration, and determine of safest LRD, min A as indicated/ordered by MD     Recommend:   NPO   Strict aspiration precautions (HOB >30 degrees at all times, Oral care TID)  ? alternative means of nutrition or comfort feeds     Outcome: Progressing Towards Goal     SPEECH LANGUAGE PATHOLOGY BEDSIDE SWALLOW EVALUATION    Patient: Zaid Spain (65 y.o. male)  Date: 6/2/2022  Primary Diagnosis: Sepsis ([facility] [A41.9]  Precautions: Aspiration      PLOF: As per H&P    ASSESSMENT :  Based on the objective data described below, the patient presents with severe oral and suspected severe pharyngeal dysphagia. Pt drowsy with eyes opening to voice requiring max stim to maintain arousal.  Oral mech exam demo decreased orolingual strength/control. Significantly reduced vocal intensity noted. Demo minimal bolus propulsion of ice chip, not improved with max cues. Demo significant swallow delay with immediate wet/weak cough post intake. Demo inadequate sensorimotor awareness/control for safe and efficient po intake. Recommend NPO with consideration of alternative nutrition/hydration source vs comfort feeds. D/w pt and RN. Patient will benefit from skilled intervention to address the above impairments.   Patient's rehabilitation potential is considered to be Guarded  Factors which may influence rehabilitation potential include:   []            None noted  [x]            Mental ability/status  [x]            Medical condition  [] Home/family situation and support systems  [x]            Safety awareness  []            Pain tolerance/management  []            Other:      PLAN :  Recommendations and Planned Interventions:  As above   Frequency/Duration: Patient will be followed by speech-language pathology 1-2 times per day/3-7 days per week to address goals. Discharge Recommendations: To Be Determined     SUBJECTIVE:   Patient stated \"yeah\"    OBJECTIVE:     Past Medical History:   Diagnosis Date    Cancer ([address]     Prostate    Cardiac echocardiogram 08/29/2016    EF 60-65%. No WMA. Mild LVH. Indeterminate diastolic fx. RVSP 35 mmHg. No significant valvular heart disease. Cardiac nuclear imaging test, abnormal 08/29/2016    Intermediate risk. Previous inferior infarction w/very mild faibana-infarct ischemia. Inferior hypk. EF 68%. Nondiagnostic EKG on pharm stress test.  Pt's BP increased from 193/96 to 207/83 during study. Carotid duplex 08/30/2016    Mild <50% bilateral ICA stenosis.       Chronic kidney disease     on HD MWF ad [facility]    Colonic mass     per Dr David Hinojosa notes    Enlarged prostate     Hypertension      Past Surgical History:   Procedure Laterality Date    COLONOSCOPY N/A 10/1/2020    COLONOSCOPY, b'x, w tattoo w polypectomy performed by Bernardo Veronica MD at [facility]  10/1/2020         Murphy Roderick  10/1/2020         COLONOSCPY,FLEX,W/ [address] INJECT  10/1/2020         [place] INSJ TUNNELED CVC W/O SUBQ PORT/ AGE 5 YR/> N/A 9/28/2020    INSERTION TUNNELED CENTRAL VENOUS CATHETER performed by Nicki Fields MD at [facility] CATH LAB    VASCULAR SURGERY PROCEDURE UNLIST      dialysis access- right neck     Prior Level of Function/Home Situation: unknown  Diet prior to admission: unknown   Current Diet:  npo   Cognitive and Communication Status:  Neurologic State: Flory Bias open to voice  Orientation Level: Oriented to person,Disoriented to time,Disoriented to situation,Disoriented to place  Cognition: No command following,Decreased attention/concentration  Oral Assessment:  Oral Assessment  Labial: No impairment  Dentition: Natural  Oral Hygiene: Fair  Lingual: Decreased strength  Velum: No impairment  Mandible: No impairment  P.O. Trials:  Patient Position: [facility] at [facility]  Vocal quality prior to P.O.: Low volume  Consistency Presented: Ice chips  How Presented: SLP-fed/presented;Spoon  Bolus Acceptance: Impaired  Bolus Formation/Control: Impaired  Type of Impairment: Delayed;Poor;Posterior  Propulsion: Delayed (# of seconds); Discoordination  Oral Residue: Anterior;10-50% of bolus  Initiation of Swallow: Delayed (# of seconds)  Laryngeal Elevation: Decreased;Weak  Aspiration Signs/Symptoms: Delayed cough/throat clear  Pharyngeal Phase Characteristics: Easily fatigued ;Effortful swallow; Poor endurance  Effective Modifications: None  Cues for Modifications: Maximal  Oral Phase Severity: Severe  Pharyngeal Phase Severity : Severe    PAIN:  Start of Eval: unable to report  End of Eval: unable to report      After treatment:   []            Patient left in no apparent distress sitting up in chair  [x]            Patient left in no apparent distress in bed  [x]            Call bell left within reach  [x]            Nursing notified  []            Family present  []            Caregiver present  []            Bed alarm activated    COMMUNICATION/EDUCATION:   [x]            Aspiration precautions; swallow safety; compensatory techniques. []            Patient/family have participated as able in goal setting and plan of care. []            Patient/family agree to work toward stated goals and plan of care. []            Patient understands intent and goals of therapy; neutral about participation. [x]            Patient unable to participate in goal setting/plan of care; educ ongoing with interdisciplinary staff  [x]         Posted safety precautions in patient's room.     Thank you for this referral,  Vanna Joaquin M.S., [id number] [facility], [place]  Speech-Language Pathologist

## 2022-06-02 NOTE — ROUTINE PROCESS
Patient admit to icu bed 301, resting comfortably vss. No clothing or valuables at bedside. Dentures and home meds sent home with wife.

## 2022-06-02 NOTE — CONSULTS
[facility]: [phone]  [facility]: [phone]     Patient Name: Dilip Aldana  YOB: 1935    Date of Initial Consult : 6/2/2022  Reason for Consult: Care decisions  Requesting Provider: CORNEL Tavares  Primary Care Physician: Arnulfo Tinoco MD      SUMMARY:   Dilip Aldana is a 80 y.o. male with a past history of prostate CA on chemo, chronic indwelling michel catheter, ESRD on HD and CAD, who was admitted on 6/2/2022 from home with a diagnosis of septic shock. Current medical issues leading to Palliative Medicine involvement include: 80year old chronically ill gentleman with multiple medical conditions including ESRD on HD admitted with altered mental status and poor oral intake at home for several days. Recently diagnosed with a UTI and is on doxycycline. Palliative medicine is consulted for care decisions. CHIEF COMPLAINT: lethargic, poor PO intake    HPI/SUBJECTIVE:    Past history as above. Mr. Gunner Ambrocio is an 80year old chronically ill gentleman with multiple comorbid conditions including ESRD on HD who has been more lethargic at home with poor oral intake for the last several days. He was recently diagnosed with a UTI and is on doxycycline. He is admitted with septic shock in the setting of UTI and is transferred to ICU for further medical management. The patient is:   [] Verbal and participatory  [x] Non-participatory due to:  Poorly responsive    GOALS OF CARE:  DNR/DNI  Patient/Health Care Proxy Stated Goals: Prolong life      TREATMENT PREFERENCES:   Code Status: DNR/DNI         PALLIATIVE DIAGNOSES:   1. Encounter for palliative care/goals of care  2. Sepsis  3. ESRD on HD  4. Debility       PLAN:   1. Encounter for palliative care/goals of care - seen at bedside in ICU along with Mr. Amber MSW. Mr. Gunner Ambrocio is lying in bed with eyes closed.   Lethargic, aroused after repeated name call but did not stay awake long. Denied shortness of breath or pain when asked. No family at bedside. Per chart review, patient has a [place] AMD on file which names his wife, Latisha Ferrara, as primary MPOA and his daughter, Meredith Horta, as secondary MPOA. Patient also has a signed POST form on file for DNR/DNI, limited medical interventions, no feeding tube which was done in February 2022. Telephone call placed to patient's spouse, Latisha Ferrara, to set up a family meeting for tomorrow, 6/3/2022 at 1200 to further discuss goals of care. At this time, goals of care remain DNR/DNI, limited interventions, no feeding tube. 2. Sepsis - primary team managing, sepsis protocol, IV antibiotics, admitted to ICU for soft Bps and encephalopathy  3. ESRD on HD - on MWF schedule as outpatient  4. Debility - P[address] 27, lives with spouse, appears mainly bed/chair bound, SLP evaluation, has been going to dialysis MWF as outpatient  5. Initial consult note routed to primary continuity provider  6. Communicated plan of care with: Palliative IDT      Advance Care Planning:  [] The [facility] Interdisciplinary Team has updated the ACP Navigator with [address] and Patient Capacity    Primary Decision [facility] ([address]):   Primary Decision Maker: Kourtney Macias ( and surgery update) - Spouse - [phone]    Secondary Decision Maker: Peter Cowan - Daughter - [phone]    Medical Interventions: Limited additional interventions   Artificially Administered Nutrition: No feeding tube     As far as possible, the palliative care team has discussed with patient / health care proxy about goals of care / treatment preferences for patient. HISTORY:     History obtained from: Chart    Active Problems:    Sepsis ([address] (6/2/2022)      Past Medical History:   Diagnosis Date    Cancer [facility])     Prostate    Cardiac echocardiogram 08/29/2016    EF 60-65%. No WMA. Mild LVH. Indeterminate diastolic fx.   RVSP 35 mmHg.  No significant valvular heart disease.  Cardiac nuclear imaging test, abnormal 08/29/2016    Intermediate risk. Previous inferior infarction w/very mild fabiana-infarct ischemia. Inferior hypk. EF 68%. Nondiagnostic EKG on pharm stress test.  Pt's BP increased from 193/96 to 207/83 during study.  Carotid duplex 08/30/2016    Mild <50% bilateral ICA stenosis.  Chronic kidney disease     on HD MWF ad [facility]    Colonic mass     per Dr Greta Rojas notes    Enlarged prostate     Hypertension       Past Surgical History:   Procedure Laterality Date    COLONOSCOPY N/A 10/1/2020    COLONOSCOPY, b'x, w tattoo w polypectomy performed [facility] Jan [facility]  10/1/2020         [facility]  10/1/2020         COLONOSCPY,FLEX,W/ [facility] [place]  10/1/2020         NM INSJ TUNNELED CVC W/O SUBQ PORT/ AGE 5 YR/> N/A 9/28/2020    INSERTION TUNNELED CENTRAL VENOUS CATHETER performed by Meghna Lockhart MD at [facility]    VASCULAR SURGERY PROCEDURE UNLIST      dialysis access- right neck      History reviewed. No pertinent family history. History reviewed, no pertinent family history.   Social History     Tobacco Use    Smoking status: Never Smoker    Smokeless tobacco: Never Used   Substance Use Topics    Alcohol use: No     No Known Allergies   Current Facility-Administered Medications   Medication Dose Route Frequency    sodium chloride (NS) flush 5-10 mL  5-10 mL IntraVENous PRN    pantoprazole (PROTONIX) 40 mg in 0.9% sodium chloride 10 mL injection  40 mg IntraVENous Q12H    VANCOMYCIN INFORMATION NOTE   Other Rx Dosing/Monitoring    dextrose 10% infusion  20 mL/hr IntraVENous CONTINUOUS    dextrose 10% infusion 250 mL  250 mL IntraVENous ONCE    piperacillin-tazobactam (ZOSYN) 3.375 g in 0.9% sodium chloride (MBP/ADV) 100 mL MBP  3.375 g IntraVENous Q12H    insulin lispro (HUMALOG) injection   SubCUTAneous Q6H    glucose chewable tablet 16 g  4 Tablet Oral PRN    glucagon (GLUCAGEN) injection 1 mg  1 mg IntraMUSCular PRN    dextrose 10% infusion 0-250 mL  0-250 mL IntraVENous PRN    hydrocortisone Sod Succ (PF) (SOLU-CORTEF) injection 50 mg  50 mg IntraVENous Q24H    heparin (porcine) injection 5,000 Units  5,000 Units SubCUTAneous Q8H          Clinical Pain Assessment (nonverbal scale for nonverbal patients): Clinical Pain Assessment  Severity: 0     Activity (Movement): Lying quietly, normal position    Duration: for how long has pt been experiencing pain (e.g., 2 days, 1 month, years)  Frequency: how often pain is an issue (e.g., several times per day, once every few days, constant)     FUNCTIONAL ASSESSMENT:     Palliative Performance Scale (PPS):  PPS: 30    ECOG  ECOG Status : Completely disabled     PSYCHOSOCIAL/SPIRITUAL SCREENING:      Any spiritual / Restoration concerns:  Unable to assess  [] Yes /  [] No    Caregiver Burnout:  [] Yes /  [] No /  [x] No Caregiver Present      Anticipatory grief assessment:  Unable to assess  [] Normal  / [] Maladaptive        REVIEW OF SYSTEMS:     Systems: constitutional, ears/nose/mouth/throat, respiratory, gastrointestinal, genitourinary, musculoskeletal, integumentary, neurologic, psychiatric, endocrine. Positive findings noted below. Modified ESAS Completed by: provider   Fatigue: 7       Pain: 0           Dyspnea: 2               Positive and pertinent negative findings in ROS are noted above in HPI. The following systems were [] reviewed / [x] unable to be reviewed as noted in HPI  Other findings are noted below.      PHYSICAL EXAM:     Constitutional: lying in bed, poorly responsive, opened eyes very briefly x2  Eyes: closed  ENMT: no nasal discharge, moist mucous membranes  Cardiovascular: tachycardic rhythm, distal pulses intact  Respiratory: breathing not labored, symmetric  Gastrointestinal: abdomen slightly distended, hypoactive bowel sounds  Last bowel movement: none recorded  Musculoskeletal: no deformity, no tenderness to palpation  Skin: warm, dry  Neurologic: following simple command - wiggle toes, weakly moving all extremities  Psychiatric: unable to assess    Other: Wt Readings from Last 3 Encounters:   06/02/22 73.5 kg (162 lb)   04/29/22 73.9 kg (163 lb)   04/22/22 73.5 kg (162 lb)     Blood pressure 124/60, pulse 97, temperature 98.8 °F (37.1 °C), resp. rate 20, height 5' 6\" (1.676 m), weight 73.5 kg (162 lb), SpO2 100 %. Pain:  Pain Scale 1: Adult Nonverbal Pain Scale  Pain Intensity 1: 0                      LAB AND IMAGING FINDINGS:     Lab Results   Component Value Date/Time    WBC 77.1 (HH) 06/02/2022 03:30 AM    HGB 13.2 06/02/2022 03:30 AM    PLATELET 482 (H) 85/17/0576 03:30 AM     Lab Results   Component Value Date/Time    Sodium 135 (L) 06/02/2022 11:52 AM    Potassium 3.5 06/02/2022 11:52 AM    Chloride 99 (L) 06/02/2022 11:52 AM    CO2 30 06/02/2022 11:52 AM    BUN 39 (H) 06/02/2022 11:52 AM    Creatinine 4.33 (H) 06/02/2022 11:52 AM    Calcium 10.2 (H) 06/02/2022 11:52 AM    Magnesium 2.5 06/02/2022 03:30 AM    Phosphorus 4.1 06/02/2022 11:52 AM      Lab Results   Component Value Date/Time    Alk.  phosphatase 408 (H) 06/02/2022 03:30 AM    Protein, total 7.9 06/02/2022 03:30 AM    Albumin 2.3 (L) 06/02/2022 11:52 AM    Globulin 6.0 (H) 06/02/2022 03:30 AM     Lab Results   Component Value Date/Time    INR 1.0 06/02/2022 03:30 AM    Prothrombin time 14.0 06/02/2022 03:30 AM    aPTT 31.2 06/02/2022 03:30 AM      Lab Results   Component Value Date/Time    Iron 59 09/27/2020 04:33 AM    TIBC 290 09/27/2020 04:33 AM    Iron % saturation 20 09/27/2020 04:33 AM    Ferritin 108 09/25/2020 06:00 PM      No results found for: PH, PCO2, PO2  No components found for: [place]   Lab Results   Component Value Date/Time    CK 27 (L) 10/22/2021 04:11 PM    CK - MB <1.0 10/22/2021 04:11 PM              Total time: 30 minutes    > 50% counseling / coordination:  Time spent in direct consultation with the patient, medical team, and family     Prolonged service was provided for  []30 min   []75 min in face to face time in the presence of the patient, spent as noted above. Time Start:   Time End:     Disclaimer: Sections of this note are dictated using utilizing voice recognition software, which may have resulted in some phonetic based errors in grammar and contents. Even though attempts were made to correct all the mistakes, some may have been missed, and remained in the body of the document. If questions arise, please contact our department.

## 2022-06-02 NOTE — INTERDISCIPLINARY ROUNDS
[facility]  Pulmonary, Critical Care, and Sleep Medicine  Interdisciplinary and Ventilator Weaning Rounds    Patient discussed in morning walking rounds and interdisciplinary rounds. ICU day: admitted 6/2    Overnight events:    Admitted overnight   Never required vasopressors    Assessments and best practice:   Ventilator  - n/a   Sedation  o none   Other pertinent drips  o D10    Lines noted  o none   Critical labs assessed  o Yes   Antibiotics  o Zosyn and Vancomycin   Medications reviewed  o Yes   Pending imaging  o none   Pending send out labs  o No   Pending Procedures  o None   Glycemic control   Stress ulcer prophylaxis. o Protonix   DVT prophylaxis. o Lovenox   Need for Lines, michel assessed.   o na   Restraint Reevaluation     o none      Family contact/MPOA: wife  Family updated: updated overnight    Palliative consult within 3 days of admission to ICU-  Ethics Guidance: 21 days      Daily Plans:   Add SQH, add Hydrocort on OP prednisone    Palliative consulted, pending [address], ICU recommends hospice   Nephrology consulted     ALYSSA time 15 minutes        Chrissy Guillen PA-C  06/02/22  Pulmonary, [address] [facility] [facility]

## 2022-06-02 NOTE — PROGRESS NOTES
[facility] Hospitalist Group  Progress Note    Patient: Dara Arthur Age: 80 y.o. : 1935 MR#: [id number] SSN: [id number]  Date: 2022         Assessment/Plan:     Hospital Problems  Date Reviewed: 2022          Codes Class Noted POA    Septic shock ([facility] ICD-10-CM: A41.9, R65.21  ICD-9-CM: 038.9, 785.52, 995.92  3/3/8653 Yes        Complication of vascular access for dialysis ICD-10-CM: T82. 9XXA  ICD-9-CM: 996.73  2022 Unknown        Encounter for palliative care ICD-10-CM: Z51.5  ICD-9-CM: V66.7  Unknown Unknown        Debility ICD-10-CM: R53.81  ICD-9-CM: 799.3  Unknown Unknown        Hypoglycemia ICD-10-CM: E16.2  ICD-9-CM: 251.2  2022 Yes        Prostate cancer ([facility] ICD-10-CM: C61  ICD-9-CM: 286  2022 Yes        Colon tumor ICD-10-CM: D49.0  ICD-9-CM: 239.0  2022 Yes        Bladder cancer ([facility] ICD-10-CM: C67.9  ICD-9-CM: 188.9  2022 Yes        Encephalopathy ICD-10-CM: G93.40  ICD-9-CM: 348.30  2022 Yes        Leukocytosis ICD-10-CM: Y23.525  ICD-9-CM: 288.60  10/20/2021 Unknown        ESRD on dialysis [facility]) ICD-10-CM: N18.6, Z99.2  ICD-9-CM: 585.6, V45.11  2020 Yes        Hyperkalemia ICD-10-CM: E87.5  ICD-9-CM: 276.7  2020 Yes              Septic Shock: due to UTI in setting of indwelling michel. Never required pressors. BP improved with albumin, solu-cortef and 500cc bolus. - cont vanc/zosyn  - f/u cultures  - monitor labs and vital signs closely  - transferring to floor today  - will continue solu-cortef for now    Acute toxic metabolic encephalopathy: 2/2 infection +/- uremia. ?brain mets. - consider MRI brain if family wants to proceed with aggressive care. - not currently safe for PO intake - continue NPO    Hypoglycemia: possibly 2/2 sepsis  - monitor BG on D10 infusion    Hyperkalemia: 2/2 missing dialysis. Now resolved. - monitor BMP.     ESRD on dialysis  - appreciate nephrology assistance  - access has reportedly clotted off. Wife states Dr. Yessy Pizarro recently placed that access and patient was supposed to be taking eliquis. His wife gave his the eliquis for a few days, but she stopped with the hematuria (from the bladder tumor) became worse. 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 Patient has deferred colectomy per urology notes. - palliate care consulted - DNR/DNI with limited interventions  - wife will consider continuing current level of care after she visits with him tomorrow      DVT Prophylaxis:  []Lovenox  [x]Hep SQ  []SCDs  []Coumadin   []On Heparin gtt []PO anticoagulant    Anticipated discharge: >2 days    Subjective:     Pt s/e @ bedside. He is not conversant, wakes briefly to tactile stimulation. Had a long phone conversation with wife regarding patient's current prognosis. She would like to see if his mental status is improving tomorrow before making any further decisions. Hospital Course:     Per H&P, \"Patient is a 80 y.o. male w/ pmhx of prostate CA on chemo, chronic indwelling michel, ESRD and CAD who presented to the ED for evaluation of vomiting with associated altered mentation. Per patient's wife, for the last several days the patient has been more lethargic with poor PO intake. She states a week ago he was diagnosed with a UTI and was started on doxycycline. She states that this morning she was woken up by patient vomiting and altered and EMS was called. Per chart review, on EMS arrival patient was hypoxic with O2 saturation in the mid 80s that improved with NRB. Work up in the ED significant for elevated WBCs, a UTI, hypoglycemia and hyperkalemia. Patient's wife reports that patient missed HD yesterday. Patient admitted to ICU for soft BPs and encephalopathy.     Upon my evaluation, patient somnolent and arousable to light shaking.  On arousal he is confused, oriented to person. Follows some commands. Hemodynamics stable on monitor.  \"      Objective:   VS:   Visit Vitals  /60   Pulse 97   Temp 98.8 °F (37.1 °C)   Resp 20   Ht 5' 6\" (1.676 m)   Wt 73.5 kg (162 lb)   SpO2 100%   BMI 26.15 kg/m²      Tmax/24hrs: Temp (24hrs), Av °F (37.2 °C), Min:98.8 °F (37.1 °C), Max:99.1 °F (37.3 °C)      Intake/Output Summary (Last 24 hours) at 2022 1523  Last data filed at 2022 1200  Gross per 24 hour   Intake 1600 ml   Output 60 ml   Net 1540 ml       General Appearance: NAD, somnolent  HENT: normocephalic/atraumatic, moist mucus membranes  Lungs: CTA with normal respiratory effort  CV: RRR, no m/r/g  Abdomen: soft, non-tender, normal bowel sounds  Extremities: no cyanosis, 3+ pitting edema BLE  Neuro: non focal  Skin: Normal color, intact      Current Facility-Administered Medications   Medication Dose Route Frequency    sodium chloride (NS) flush 5-10 mL  5-10 mL IntraVENous PRN    pantoprazole (PROTONIX) 40 mg in 0.9% sodium chloride 10 mL injection  40 mg IntraVENous Q12H    VANCOMYCIN INFORMATION NOTE   Other Rx Dosing/Monitoring    dextrose 10% infusion  20 mL/hr IntraVENous CONTINUOUS    piperacillin-tazobactam (ZOSYN) 3.375 g in 0.9% sodium chloride (MBP/ADV) 100 mL MBP  3.375 g IntraVENous Q12H    insulin lispro (HUMALOG) injection   SubCUTAneous Q6H    glucose chewable tablet 16 g  4 Tablet Oral PRN    glucagon (GLUCAGEN) injection 1 mg  1 mg IntraMUSCular PRN    dextrose 10% infusion 0-250 mL  0-250 mL IntraVENous PRN    hydrocortisone Sod Succ (PF) (SOLU-CORTEF) injection 50 mg  50 mg IntraVENous Q24H    heparin (porcine) injection 5,000 Units  5,000 Units SubCUTAneous Q8H        Labs:    Recent Results (from the past 24 hour(s))   GLUCOSE, POC    Collection Time: 22  2:50 AM   Result Value Ref Range    Glucose (POC) 74 70 - 110 mg/dL   URINALYSIS W/ RFLX MICROSCOPIC    Collection Time: 22  3:15 AM   Result Value Ref Range    Color        Macroscopic performed on spun urine due to gross blood  or mucus    Appearance TURBID      Specific gravity 1.020 1.005 - 1.030      pH (UA) 8.5 (H) 5.0 - 8.0      Protein >300 (A) NEG mg/dL    Glucose Negative NEG mg/dL    Ketone TRACE (A) NEG mg/dL    Bilirubin SMALL (A) NEG      Blood LARGE (A) NEG      Urobilinogen 0.2 0.2 - 1.0 EU/dL    Nitrites Negative NEG      Leukocyte Esterase LARGE (A) NEG     URINE MICROSCOPIC ONLY    Collection Time: 06/02/22  3:15 AM   Result Value Ref Range    WBC TOO NUMEROUS TO COUNT 0 - 4 /hpf    RBC TOO NUMEROUS TO COUNT 0 - 5 /hpf    Epithelial cells FEW 0 - 5 /lpf    Bacteria 4+ (A) NEG /hpf   CULTURE, BLOOD    Collection Time: 06/02/22  3:30 AM    Specimen: Blood   Result Value Ref Range    Special Requests: NO SPECIAL REQUESTS      Culture result: NO GROWTH AFTER 3 HOURS     METABOLIC PANEL, COMPREHENSIVE    Collection Time: 06/02/22  3:30 AM   Result Value Ref Range    Sodium 136 136 - 145 mmol/L    Potassium 5.7 (H) 3.5 - 5.5 mmol/L    Chloride 96 (L) 100 - 111 mmol/L    CO2 28 21 - 32 mmol/L    Anion gap 12 3.0 - 18 mmol/L    Glucose 47 (LL) 74 - 99 mg/dL    BUN 35 (H) 7.0 - 18 MG/DL    Creatinine 4.51 (H) 0.6 - 1.3 MG/DL    BUN/Creatinine ratio 8 (L) 12 - 20      GFR est AA 15 (L) >60 ml/min/1.73m2    GFR est non-AA 12 (L) >60 ml/min/1.73m2    Calcium 12.5 (H) 8.5 - 10.1 MG/DL    Bilirubin, total 0.7 0.2 - 1.0 MG/DL    ALT (SGPT) 16 16 - 61 U/L    AST (SGOT) 69 (H) 10 - 38 U/L    Alk.  phosphatase 408 (H) 45 - 117 U/L    Protein, total 7.9 6.4 - 8.2 g/dL    Albumin 1.9 (L) 3.4 - 5.0 g/dL    Globulin 6.0 (H) 2.0 - 4.0 g/dL    A-G Ratio 0.3 (L) 0.8 - 1.7     CBC WITH AUTOMATED DIFF    Collection Time: 06/02/22  3:30 AM   Result Value Ref Range    WBC 77.1 (HH) 4.6 - 13.2 K/uL    RBC 4.50 4.35 - 5.65 M/uL    HGB 13.2 13.0 - 16.0 g/dL    HCT 42.3 36.0 - 48.0 %    MCV 94.0 78.0 - 100.0 FL    MCH 29.3 24.0 - 34.0 PG    MCHC 31.2 31.0 - 37.0 g/dL RDW 15.1 (H) 11.6 - 14.5 %    PLATELET 879 (H) 499 - 420 K/uL    MPV 10.9 9.2 - 11.8 FL    NRBC 0.0 0  WBC    ABSOLUTE NRBC 0.03 (H) 0.00 - 0.01 K/uL    NEUTROPHILS 90 (H) 40 - 73 %    BAND NEUTROPHILS 6 (H) 0 - 5 %    LYMPHOCYTES 3 (L) 21 - 52 %    MONOCYTES 0 (L) 3 - 10 %    EOSINOPHILS 0 0 - 5 %    BASOPHILS 0 0 - 2 %    METAMYELOCYTES 1 (H) 0 %    IMMATURE GRANULOCYTES 0 %    ABS. NEUTROPHILS 74.0 (H) 1.8 - 8.0 K/UL    ABS. LYMPHOCYTES 2.3 0.9 - 3.6 K/UL    ABS. MONOCYTES 0.0 (L) 0.05 - 1.2 K/UL    ABS. EOSINOPHILS 0.0 0.0 - 0.4 K/UL    ABS. BASOPHILS 0.0 0.0 - 0.1 K/UL    ABS. IMM.  GRANS. 0.0 K/UL    DF MANUAL      PLATELET COMMENTS Increased Platelets      RBC COMMENTS ANISOCYTOSIS  1+        WBC COMMENTS VACUOLATED POLYS     MAGNESIUM    Collection Time: 06/02/22  3:30 AM   Result Value Ref Range    Magnesium 2.5 1.6 - 2.6 mg/dL   PHOSPHORUS    Collection Time: 06/02/22  3:30 AM   Result Value Ref Range    Phosphorus 4.3 2.5 - 4.9 MG/DL   PROTHROMBIN TIME + INR    Collection Time: 06/02/22  3:30 AM   Result Value Ref Range    Prothrombin time 14.0 11.5 - 15.2 sec    INR 1.0 0.8 - 1.2     PTT    Collection Time: 06/02/22  3:30 AM   Result Value Ref Range    aPTT 31.2 23.0 - 36.4 SEC   AMMONIA    Collection Time: 06/02/22  3:30 AM   Result Value Ref Range    Ammonia, plasma 34 (H) 11 - 32 UMOL/L   LIPASE    Collection Time: 06/02/22  3:30 AM   Result Value Ref Range    Lipase 23 (L) 73 - 393 U/L   CULTURE, BLOOD    Collection Time: 06/02/22  3:35 AM    Specimen: Blood   Result Value Ref Range    Special Requests: NO SPECIAL REQUESTS      Culture result: NO GROWTH AFTER 3 HOURS     POC LACTIC ACID    Collection Time: 06/02/22  3:41 AM   Result Value Ref Range    Lactic Acid (POC) 9.96 (HH) 0.40 - 2.00 mmol/L   GLUCOSE, POC    Collection Time: 06/02/22  4:09 AM   Result Value Ref Range    Glucose (POC) 60 (L) 70 - 110 mg/dL   EKG, 12 LEAD, INITIAL    Collection Time: 06/02/22  4:28 AM   Result Value Ref Range    Ventricular Rate 117 BPM    Atrial Rate 117 BPM    P-R Interval 154 ms    QRS Duration 100 ms    Q-T Interval 358 ms    QTC Calculation (Bezet) 499 ms    Calculated P Axis 27 degrees    Calculated R Axis -60 degrees    Calculated T Axis 108 degrees    Diagnosis       Sinus tachycardia  Left anterior fascicular block  Septal infarct (cited on or before 25-SEP-2020)  Abnormal ECG  When compared with ECG of 13-FEB-2022 09:56,  aberrant conduction is no longer present  PA interval has decreased  Questionable change in initial forces of Septal leads  Confirmed by Reed Lei ([id number]) on 6/2/2022 8:03:11 AM     GLUCOSE, POC    Collection Time: 06/02/22  4:54 AM   Result Value Ref Range    Glucose (POC) 140 (H) 70 - 110 mg/dL   BLOOD GAS, ARTERIAL POC    Collection Time: 06/02/22  6:01 AM   Result Value Ref Range    Device: NASAL CANNULA      FIO2 (POC) 32 %    pH (POC) 7.51 (H) 7.35 - 7.45      pCO2 (POC) 36.5 35.0 - 45.0 MMHG    pO2 (POC) 70 (L) 80 - 100 MMHG    HCO3 (POC) 29.2 (H) 22 - 26 MMOL/L    sO2 (POC) 95.4 92 - 97 %    Base excess (POC) 5.9 mmol/L    Allens test (POC) Positive      Total resp.  rate 30      Site RIGHT RADIAL      Specimen type (POC) ARTERIAL      Performed by Rowena Funk, POC    Collection Time: 06/02/22  6:46 AM   Result Value Ref Range    Glucose (POC) 100 70 - 110 mg/dL   POC LACTIC ACID    Collection Time: 06/02/22  6:53 AM   Result Value Ref Range    Lactic Acid (POC) 7.72 (HH) 0.40 - 2.00 mmol/L   GLUCOSE, POC    Collection Time: 06/02/22 10:20 AM   Result Value Ref Range    Glucose (POC) 115 (H) 70 - 110 mg/dL   TYPE & SCREEN    Collection Time: 06/02/22 11:52 AM   Result Value Ref Range    Crossmatch Expiration 06/05/2022,2359     ABO/Rh(D) Adilene Risk POSITIVE     Antibody screen NEG    LACTIC ACID    Collection Time: 06/02/22 11:52 AM   Result Value Ref Range    Lactic acid 2.3 (HH) 0.4 - 2.0 MMOL/L   RENAL FUNCTION PANEL    Collection Time: 06/02/22 11:52 AM   Result Value Ref Range    Sodium 135 (L) 136 - 145 mmol/L    Potassium 3.5 3.5 - 5.5 mmol/L    Chloride 99 (L) 100 - 111 mmol/L    CO2 30 21 - 32 mmol/L    Anion gap 6 3.0 - 18 mmol/L    Glucose 263 (H) 74 - 99 mg/dL    BUN 39 (H) 7.0 - 18 MG/DL    Creatinine 4.33 (H) 0.6 - 1.3 MG/DL    BUN/Creatinine ratio 9 (L) 12 - 20      GFR est AA 16 (L) >60 ml/min/1.73m2    GFR est non-AA 13 (L) >60 ml/min/1.73m2    Calcium 10.2 (H) 8.5 - 10.1 MG/DL    Phosphorus 4.1 2.5 - 4.9 MG/DL    Albumin 2.3 (L) 3.4 - 5.0 g/dL   GLUCOSE, POC    Collection Time: 06/02/22  1:48 PM   Result Value Ref Range    Glucose (POC) 97 70 - 110 mg/dL       Imaging:  CT HEAD WO CONT    Result Date: 6/2/2022  EXAM:  CT Head without Contrast           CLINICAL INDICATION:  - Altered mental status. - 80 y. o.?male?with possible history of prostate cancer on oral chemo, urinary tension, UTIs, ESRD on HD presenting for evaluation of altered mental status. ? Per EMS report patient has been altered for the last 2 days, family decided to call EMS for transfer to the hospital for further evaluation. ?EMS says that they found him with an episode of coffee-ground emesis and appeared to have some abdominal pain. ? They transferred him over the stretcher and he said that he nearly stopped breathing, had a O2 saturation attempted 84% on room air, placed him on a nonrebreather and brought him into the ER. ? Patient is nonverbal, unable to contribute any history. COMPARISON:  02/13/22. TECHNIQUE:  - Helical volumetric CT imaging of the head is performed from the base of the skull to the vertex without IV contrast administration. Axial, coronal and sagittal images are generated from the volumetric data set. - Dose optimization techniques are utilized as appropriate to the performed exam with combination of automated exposure control, adjustment of mA and/or kV according to patient size, and use of iterative reconstructive technique.  FINDINGS: Brain: - Hemorrhage/ hematoma:  No acute intracranial hemorrhage/ hematoma. - Mass, mass effect:  None. - Gray-white matter differentiation:  Moderate white matter hypodensities, suggestive of chronic small vessel ischemia. - Interval assessment:  No significant interval change. CSF spaces:  No evidence of abnormal effacement or enlargement. Calvarium:  Intact. Sinuses, mastoids:  Left maxillary sinus mucosal thickening with osseous wall thickening and sclerotic changes. 1.  No acute intracranial abnormalities. 2.  Moderate chronic microvascular ischemic changes. 3.  Chronic left maxillary sinusitis. CT ABD PELV W WO CONT    Result Date: 6/2/2022  EXAM:  CT Abdomen-Pelvis with Contrast. CLINICAL INDICATION: - Abdominal pain with coffee-ground emesis. - 80 y. o.?male?with possible history of prostate cancer on oral chemo, urinary tension, UTIs, ESRD on HD presenting for evaluation of altered mental status. ? Per EMS report patient has been altered for the last 2 days, family decided to call EMS for transfer to the hospital for further evaluation. ?EMS says that they found him with an episode of coffee-ground emesis and appeared to have some abdominal pain. ? They transferred him over the stretcher and he said that he nearly stopped breathing, had a O2 saturation attempted 84% on room air, placed him on a nonrebreather and brought him into the ER. ? Patient is nonverbal, unable to contribute any history. COMPARISON:  02/17/22. TECHNIQUE:  - Helical volumetric CT imaging of the abdomen and pelvis is performed following IV contrast administration. Coronal and sagittal multiplanar reconstruction images are generated for improved anatomic delineation. - IV contrast 100 mL [id number].  - All CT scans at this facility are performed using dose optimization technique as appropriate to the performed exam, to include automated exposure control, adjustment of the mA and/or kV according to patient's size (including appropriate matching for site-specific examinations), or use of iterative reconstruction technique. FINDINGS: Lung Bases:  Bilateral lower lung zone posterior aspect asbestos opacities. Mild bilateral pleural effusion. Liver:  Hypodensity in the right lobe, measuring 0.6 cm (axial #33). Gallbladder, adrenal glands, spleen, pancreas:  Unremarkable. Kidneys:  Atrophied kidneys bilaterally. Right renal cyst measuring about 1.2 x 1.0 cm (axial #47). Hydronephrosis bilaterally. Ureteral dilation in the proximal ureters bilaterally. Distal ureters are normal in caliber on both sides. GI tract:  Diffuse esophageal thickening in the distal esophagus. Gastric distention with fluid. No acute small bowel abnormalities. The appendix is normal.  Ascending colon with apparent asymmetric enhancing colon wall mass, measuring up to about 6.6 x 4.1 x 6.5 cm. No active hemorrhage is demonstrated along the colon. Stool retention in the colon with rectal distention. Abdominal wall:  Small umbilical hernia containing only fatty tissue. Bladder:  Bladder wall thickening possibly neoplastic process, measuring up to about 1.9 cm in maximal thickness. Additional focus of bladder wall thickening in the inferior lateral aspect along the right posterior-lateral aspect measuring up to about 1.0 cm. Complex fluid in the bladder lumen with scattered air pockets, suggestive of hemorrhagic material vs purulent material. Prostate:  Grossly unremarkable. Vascular: Moderate atherosclerotic calcifications. Nodes:  No mesenteric or retroperitoneal adenopathy. Bones:  Scattered abnormal sclerotic foci of the skeletal structures suggestive of metastatic lesions. Similar pattern compared to recent prior CTs.     1.  Developing colon mass. 2.  Bladder dome thickening and possible bladder wall inferior lateral aspect thickening. Complex bladder luminal content, hemorrhagic material or purulent material. 3.  Tiny hepatic hypodensity.  4.  Diffuse esophageal thickening, suggestive of esophagitis. 5.  Right renal cyst.  Renal atrophy bilaterally. 6.  Sclerotic densities in the skeletal structures suggestive of osteosclerotic metastatic lesions. XR CHEST PORT    Result Date: 6/2/2022  AP CHEST, PORTABLE INDICATION: Above. Meets SIRS criteria. COMPARISON: 2/14/2022. TECHNIQUE: Portable semi-upright AP chest radiograph is reviewed. FINDINGS: Extensive patchy increased opacity in the lungs bilaterally, developing compared to radiograph from 2/14/2022 and chest CT of 2/17/2022, compatible with airspace disease/infiltrates. There is a component of nodularity, in patient with history of metastatic malignancy, underlying pulmonary nodules are possible, imaging follow-up is recommended to assess for resolution. The costophrenic sulci appear sharp. No evidence of pneumothorax. EKG leads/wires overlie the patient. The cardiac silhouette is within normal limits in size. The thoracic aorta is mildly tortuous associated mild deviation of the trachea to the right at the level of the aortic arch. Sclerotic bone lesions, best appreciated partially visualized in the proximal right humerus, in keeping with the multiple osteoblastic bone lesions which have been much better visualized on CT scans. Extensive patchy lung opacities bilaterally consistent with infiltrates/airspace disease, with a component of nodularity also noted. Known osteoblastic lesions better visualized on recent CT scans.          Signed By: KACIE [facility]ingo [facility]  Hospitalist Division  Office:  [phone]  Pager: [phone]         June 2, 2022 3:23 PM

## 2022-06-02 NOTE — PROGRESS NOTES
attempted to complete the initial Spiritual Assessment of the patient in bed 6 of the emergency room, and offered Pastoral Care support to the patient and family member. Patient's wife said that patient did have an advance directive on file here and that she was his agent. Patient is here due to possible droplet plus infection and is not able to communicate now. Patient does not have any Adventism/cultural needs that will affect patients preferences in health care. Chaplains will continue to follow and will provide pastoral care on an as needed/requested basis.     [facility]  Spiritual Care Department  [phone]

## 2022-06-02 NOTE — ED NOTES
TRANSFER - OUT REPORT:    Verbal report given to Hoda(name) on Jason Hooper  being transferred to (unit) for routine progression of care       Report consisted of patients Situation, Background, Assessment and   Recommendations(SBAR). Information from the following report(s) SBAR, ED Summary and MAR was reviewed with the receiving nurse. Lines:   Peripheral IV 06/02/22 Right Forearm (Active)   Site Assessment Clean, dry, & intact 06/02/22 0330   Phlebitis Assessment 0 06/02/22 0330   Infiltration Assessment 0 06/02/22 0330   Dressing Status Clean, dry, & intact 06/02/22 0330        Opportunity for questions and clarification was provided.       Patient transported with:   Monitor  Registered Nurse

## 2022-06-03 NOTE — PALLIATIVE CARE
Full note to follow. Met with wife, sister of patient. Family elects transition to comfort measures with hospice at home. Stop dialysis. Comfort order set placed. Hospice consult placed. POST form updated to reflect DNR/DNI, comfort measures, no feeding tube.     LEOPOLDO Kim-BC  Palliative Medicine

## 2022-06-03 NOTE — PROGRESS NOTES
Reason for Admission:   Sepsis ([facility] [address] [A41.9]               RUR Score:     21             Resources/supports as identified by patient/family:       Top Challenges facing patient (as identified by patient/family and CM): Finances/Medication cost?     Finances/Medication is not a challenge  Transportation      Transportation is not a challenge  Support system or lack thereof? Patient has supportive family   Living arrangements? Living Arrangement is not a challenge  Self-care/ADLs/Cognition? Self-care/ADLs/Cognition is not a challenge       Current Advanced Directive/Advance Care Plan:   yes    Healthcare Decision Maker:   Primary Decision Maker: Roberto Miller ( and surgery update) - Spouse - [phone]    Secondary Decision Maker: Michelle Mcpherson - Daughter - [phone]    Click here to complete [address] including selection of the Healthcare Decision Maker Relationship (ie \"Primary\")                          Plan for utilizing home health:    no                      Likelihood of readmission:   HIGH    Transition of Care Plan:                    Initial assessment completed with spouse/SO Madhu Heard. Cognitive status of patient: unable to evaluate at time of assessment because patient was sleeping. Face sheet information confirmed:  yes. The patient designates wife, Madhu Heard, daughter Renetta Penaloza, and son Oscar Morales to participate in his discharge plan and to receive any needed information. This patient lives in a single family home with wife. The patient lives in a 1 level home. There is a ramp. Patient is not able to navigate steps as needed. Prior to hospitalization, patient was considered to be independent with ADLs/IADLS : no . If not independent,  patient needs assist with : dressing, bathing, food preparation, cooking, toileting and grooming. The patient's wife helps with ADLs and IADLs.     Patient has a current ACP document on file: yes  The patient's wife indicated that the patient will need medical transport upon discharge. The patient already has Ag Ellis W/C, [facility], medical equipment available in the home. Patient is not currently active with home health. Patient has not stayed in a skilled nursing facility or rehab. Was  stay within last 60 days : no. This patient is on dialysis :yes    If yes, hemodialysis patient and receives treatment on Monday/Wednesday/Friday at [place] [address]  Chair time is 10:00am. Pt is transported to/from dialysis by medical transport. Currently, the discharge plan is Hospice. The patient states that he can obtain his medications from the pharmacy, and take his medications as directed. Patient's current insurance is Medicare A and B/[employer]. The patient has no  history. Care Management Interventions  PCP Verified by CM:  Yes  Mode of Transport at Discharge: [address] [place] [address] (CM Consult): Discharge Planning  Discharge Durable Medical Equipment:  (patient has rolling walker, wheelchair, cane, and Bed side commode)  Support Systems: Spouse/Significant Other,Other Family Member(s),Child(erna) (sister)  Confirm Follow Up Transport:  (medical transport)  Discharge Location  Patient Expects to be Discharged to[de-identified] Home with hospice        ARON Kaye

## 2022-06-03 NOTE — PROGRESS NOTES
informed the patient's wife that the patient will discharge tomorrow around 10:00am.   informed the patient's wife that hospices will be reaching out to her to discuss services and durable medical equipment delivery.       Darius Bosworth, MSW

## 2022-06-03 NOTE — PROGRESS NOTES
Speech Pathology Note:    Attempted to this pt for f/u dysphagia tx. RN, Cameron Fu informed SLP that pt's family has decided to go [facility]. ST will sign off.      Thank You for this referral,  Elsa Dance, [address] [facility]  Speech Language Pathologist

## 2022-06-03 NOTE — PROGRESS NOTES
Problem: Pain  Goal: *Control of Pain  Outcome: Not Progressing Towards Goal  Pt is hesitant and very stoic about pain     Problem: Patient Education: Go to Patient Education Activity  Goal: Patient/Family Education  Outcome: Progressing Towards Goal     Problem: Patient Education: Go to Patient Education Activity  Goal: Patient/Family Education  Outcome: Not Progressing Towards Goal

## 2022-06-03 NOTE — PROGRESS NOTES
Patient is comfortable sleeping. No shortness of breath. Vitals and labs reviewed. Opens eyes on command. Very tired looking. Spouse and other family members in the room. Palliative care team also at the bed side. The palliative care team not together patient's s want to talk with me first and then withhe palliative care team not together  Had a long discussion. Micah Hinojosa Spouse having significant difficulty in stopping dialysis. .  Explained to her that he has cancer and in different organs and with metastasis and there is really no treatment cancer doctor. Can nonto anything else. continuation of dialysis is not going to make any difference. It will just continue the suffering. Moreover his Dialysis access is clotted. To do Dialyze him he will need dialysis catheter or thrombectomy which he may not tolerate. Advised them to think about stopping Dialysis &continue Comfort care & start Hospice. .  Finally Spouse agreed. No mor Dialysis. Notified Palliative care team to [address].

## 2022-06-03 NOTE — ROUTINE PROCESS
Patient received via bed, alert to person and place. Burris in place. bedexit activated  meplex on heels and left loer leg  Patient resting comfortably  Bedside shift report given to Sr. Greenwood with sbar

## 2022-06-03 NOTE — PROGRESS NOTES
7477 pt room [address] [facility] [address] wife called and stated that she is to get the  home today but the company that is to deliver hospital bed and the other equipment has not been done. When she calls them she gets to answering machine. Advised to call later and talk with the . Bedside and Verbal shift change report given toGayathri MARIANO    (oncoming nurse) by Cecilia Quezada RN (offgoing nurse). Report included the following information SBAR, Kardex, Intake/Output and MAR.

## 2022-06-03 NOTE — PROGRESS NOTES
Comprehensive Nutrition Assessment    Type and Reason for Visit: Initial,Positive nutrition screen    Nutrition Recommendations/Plan:   1. Provide nutrition support as appropriate to plan of care (hospice)  2. Advance diet when appropriate  3. Monitor readiness to advance diet, and plan of care      Malnutrition Assessment:  Malnutrition Status: At risk for malnutrition (specify) (r/t NPO status) (06/03/22 1725)      Nutrition History and Allergies:   Past medical history of: CAD, HTN, Acute Renal Failure, metabolic acidosis, AMS, sepsis, prostate cancer admitted for septic shock, hypotension resolved with fluid resuscitation and antibiotics. Sepsis due to UTI associated with indwelling Burris catheter, Hyperkalemia and hypoglycemia, ESRD on dialysis. Weight history per chart review: CBW: 06/02/22 : 73.5 kg (162 lb), 90 days: 03/09/22 : 73.5 kg (162 lb), 180 days: 12/30/21 : 70.3 kg (155 lb). Weight stable. Nutrition Assessment:    Visited pt in room, was unavailable with medical team. Per chart review, pt transition to hospice care and dialysis is not recommended any longer. Pt will be discharged on 6/4. Pt has history of prostate cancer bladder cancer with metastasis as well as colonic mass long time back also has metastasis to the different parts of the abdominal organ per MD note. Pt is currently NPO. Wounds noted. Nutrition Related Findings:    Last BM 6/2. Output: 0mL (urine). Pertinent Medications: Dulcolax, sodium chloride NS. Wound Type: Multiple    Current Nutrition Intake & Therapies:  Average Meal Intake: NPO  Average Supplement Intake: NPO  DIET NPO    Anthropometric Measures:  Height: 5' 6\" (167.6 cm)  Ideal Body Weight (IBW): 142 lbs (65 kg)  Admission Body Weight: 162 lb 0.6 oz  Current Body Wt:  73.5 kg (162 lb 0.6 oz), 114.1 % IBW.  Stated  Current BMI (kg/m2): 26.2  Usual Body Weight: 72.6 kg (160 lb)  % Weight Change (Calculated): 1.3  Weight Adjustment: No adjustment                 BMI Category: Overweight (BMI 25.0-29. 9)    Estimated Daily Nutrient Needs:  Energy Requirements Based On: Kcal/kg (25-30)  Weight Used for Energy Requirements: Current  Energy (kcal/day): 6185-7526  Weight Used for Protein Requirements: Current (1.0-1.2)  Protein (g/day): 74-89  Method Used for Fluid Requirements: 1 ml/kcal  Fluid (ml/day): 7009-5393    Nutrition Diagnosis:   · Inadequate oral intake related to catabolic illness,cognitive or neurological impairment,renal dysfunction as evidenced by dialysis,NPO or clear liquid status due to medical condition,wounds      Nutrition Interventions:   Food and/or Nutrient Delivery: Continue NPO,Start oral diet  Nutrition Education/Counseling: Education not indicated,No recommendations at this time  Coordination of Nutrition Care: Continue to monitor while inpatient       Goals:     Goals: Meet at least 75% of estimated needs,by next RD assessment       Nutrition Monitoring and Evaluation:   Behavioral-Environmental Outcomes: None identified  Food/Nutrient Intake Outcomes: Diet advancement/tolerance,Food and nutrient intake  Physical Signs/Symptoms Outcomes: Meal time behavior,Nutrition focused physical findings    Discharge Planning:     Too soon to determine    [facility], [address] [place] [address],   Contact: [phone]

## 2022-06-03 NOTE — PROGRESS NOTES
[facility]: [phone])  [facility]: [phone]     Patient Name: lVad Delarosa  YOB: 1935    Date of Initial Consult : 6/2/2022  Date of follow-up visit:  6/3/2022  Reason for Consult: Care decisions  Requesting Provider: CORNEL Easton  Primary Care Physician: Lieutenant Lexa MD      SUMMARY:   Vlad Delarosa is a 80 y.o. male with a past history of prostate CA on chemo, chronic indwelling michel catheter, ESRD on HD and CAD, who was admitted on 6/2/2022 from home with a diagnosis of septic shock. Current medical issues leading to Palliative Medicine involvement include: 80year old chronically ill gentleman with multiple medical conditions including ESRD on HD admitted with altered mental status and poor oral intake at home for several days. Recently diagnosed with a UTI and is on doxycycline. Palliative medicine is consulted for care decisions. CHIEF COMPLAINT: lethargic, poor PO intake    HPI/SUBJECTIVE:    Past history as above. Mr. Ravi Norman is an 80year old chronically ill gentleman with multiple comorbid conditions including ESRD on HD who has been more lethargic at home with poor oral intake for the last several days. He was recently diagnosed with a UTI and is on doxycycline. He is admitted with septic shock in the setting of UTI and is transferred to ICU for further medical management. 6/3/2022:  Lying in bed with eyes closed, will open eyes briefly but does not keep them open. Appears very fatigued, somnolent. Wife and sister at bedside. Family reports patient not eating for about 2 weeks. The patient is:   [] Verbal and participatory  [x] Non-participatory due to:  Poorly responsive    GOALS OF CARE:  DNR/DNI, comfort measures, no dialysis, no feeding tube  Patient/Health Care Proxy Stated Goals: Comfort      TREATMENT PREFERENCES:   Code Status: DNR/DNI         PALLIATIVE DIAGNOSES:   1.  Encounter for palliative care/goals of care  2. Sepsis  3. ESRD on HD  4. Debility       PLAN:   6/3/2022:  Seen at bedside along with . MS AmberW. Wife and sister present at bedside. Mr. Jason Lopez is lying in bed with eyes closed. Appears comfortable. Will open eyes briefly in response to verbal stimuli but does not keep them open very long. Appears very fatigued and tired. Dr. Zandra Quiñones arrived to visit patient and wife requested to speak with Dr. Zandra Quiñones alone. Palliative team stepped out of room to allow wife to speak with Dr. Zandra Quiñones alone. Palliative team re-engaged with patient's wife and sister after meeting with Dr. Zandra Quiñones. Family shared that Dr. Zandra Quiñones explained that dialysis is not recommended moving forward and wife has agreed with stopping dialysis and shifting the focus of care to one of comfort and hospice. Discussed transitioning patient to comfort measures in the hospital with plans for home with hospice support and wife is agreeable. Explained comfort measures in detail including stopping labs, x-rays, IVs, IV antibiotics, and dialysis. Also explained the use of comfort medications including Morphine and Ativan to manage symptoms of pain, shortness of breath, restlessness or anxiety. POST form updated to reflect DNR/DNI, comfort measures and no feeding tube. POST form placed on chart for scanning. Comfort order set placed. Hospice consult placed. Attending and bedside RN notified of goals of care change. Goals of care are now DNR/DNI, comfort measures, no dialysis, no feeding tube. Please see below for previous conversations with the palliative medicine team:      1. Encounter for palliative care/goals of care - seen at bedside in ICU along with Cuauhtemoc Clark, MSW. Mr. Jason Lopez is lying in bed with eyes closed. Lethargic, aroused after repeated name call but did not stay awake long. Denied shortness of breath or pain when asked. No family at bedside.   Per chart review, patient has a [place] [employer] on file which names his wife, Latisha Ferrara, as primary MPOA and his daughter, Meredith Horta, as secondary MPOA. Patient also has a signed POST form on file for DNR/DNI, limited medical interventions, no feeding tube which was done in February 2022. Telephone call placed to patient's spouse, Latisha Ferrara, to set up a family meeting for tomorrow, 6/3/2022 at 1200 to further discuss goals of care. At this time, goals of care remain DNR/DNI, limited interventions, no feeding tube. 2. Sepsis - primary team managing, sepsis protocol, IV antibiotics, admitted to ICU for soft Bps and encephalopathy  3. ESRD on HD - on MWF schedule as outpatient  4. Debility - PPS 27, lives with spouse, appears mainly bed/chair bound, SLP evaluation, has been going to dialysis MWF as outpatient  5. Initial consult note routed to primary continuity provider  6. Communicated plan of care with: Palliative IDT      Advance Care Planning:  [] [facility] Interdisciplinary Team has updated the ACP Navigator with [address] and Patient Capacity    Primary Decision [facility] ([address]):   Primary Decision Maker: Kourtney Tippah ( and surgery update) - Spouse - [phone]    Secondary Decision Maker: Peter Cowan - Daughter - [phone]    Medical Interventions: Comfort measures   Artificially Administered Nutrition: No feeding tube     As far as possible, the palliative care team has discussed with patient / health care proxy about goals of care / treatment preferences for patient.          HISTORY:     History obtained from: Chart    Principal Problem:    Septic shock ([facility] [address] (6/2/2022)    Active Problems:    ESRD on dialysis [facility]) (9/26/2020)      Hyperkalemia (9/26/2020)      Leukocytosis (10/20/2021)      Encephalopathy (2/13/2022)      Bladder cancer ([facility] [address] (4/84/9323)      Complication of vascular access for dialysis (6/2/2022)      Encounter for palliative care ()      Debility () Hypoglycemia (6/2/2022)      Prostate cancer ([facility] [address] (6/2/2022)      Colon tumor (6/2/2022)      Past Medical History:   Diagnosis Date    Cancer [facility])     Prostate    Cardiac echocardiogram 08/29/2016    EF 60-65%. No WMA. Mild LVH. Indeterminate diastolic fx. RVSP 35 mmHg. No significant valvular heart disease.  Cardiac nuclear imaging test, abnormal 08/29/2016    Intermediate risk. Previous inferior infarction w/very mild fabiana-infarct ischemia. Inferior hypk. EF 68%. Nondiagnostic EKG on pharm stress test.  Pt's BP increased from 193/96 to 207/83 during study.  Carotid duplex 08/30/2016    Mild <50% bilateral ICA stenosis.  Chronic kidney disease     on HD MWF ad [facility]    Colonic mass     per Dr Stephanie Yeager notes    Enlarged prostate     Hypertension       Past Surgical History:   Procedure Laterality Date    COLONOSCOPY N/A 10/1/2020    COLONOSCOPY, b'x, w tattoo w polypectomy performed by Miguel Angel Tavera MD at [facility]  10/1/2020         Guera Marie  10/1/2020         COLONOSCPY,FLEX,W/ [facility] [place]  10/1/2020         MD INSJ TUNNELED CVC W/O SUBQ PORT/ AGE 5 YR/> N/A 9/28/2020    INSERTION TUNNELED CENTRAL VENOUS CATHETER performed by Osiris Chand MD at [facility] CATH LAB    VASCULAR SURGERY PROCEDURE UNLIST      dialysis access- right neck      History reviewed. No pertinent family history. History reviewed, no pertinent family history.   Social History     Tobacco Use    Smoking status: Never Smoker    Smokeless tobacco: Never Used   Substance Use Topics    Alcohol use: No     No Known Allergies   Current Facility-Administered Medications   Medication Dose Route Frequency    LORazepam (INTENSOL) 2 mg/mL oral concentrate 0.5 mg  0.5 mg Oral Q3H PRN    acetaminophen (TYLENOL) tablet 650 mg  650 mg Oral Q4H PRN    Or    acetaminophen (TYLENOL) solution 650 mg  650 mg Oral Q4H PRN    Or    acetaminophen (TYLENOL) suppository 650 mg  650 mg Rectal Q4H PRN    hyoscyamine SL (LEVSIN/SL) tablet 0.125 mg  0.125 mg SubLINGual Q4H PRN    bisacodyL (DULCOLAX) suppository 10 mg  10 mg Rectal DAILY PRN    morphine (ROXANOL) 100 mg/5 mL (20 mg/mL) concentrated solution 5 mg  5 mg Oral Q1H PRN    sodium chloride (NS) flush 5-10 mL  5-10 mL IntraVENous PRN          Clinical Pain Assessment (nonverbal scale for nonverbal patients): Clinical Pain Assessment  Severity: 0     Activity (Movement): Lying quietly, normal position    Duration: for how long has pt been experiencing pain (e.g., 2 days, 1 month, years)  Frequency: how often pain is an issue (e.g., several times per day, once every few days, constant)     FUNCTIONAL ASSESSMENT:     Palliative Performance Scale (PPS):  PPS: 30    ECOG  ECOG Status : Completely disabled     PSYCHOSOCIAL/SPIRITUAL SCREENING:      Any spiritual / Buddhism concerns:  Unable to assess  [] Yes /  [] No    Caregiver Burnout:  [] Yes /  [] No /  [x] No Caregiver Present      Anticipatory grief assessment:  Unable to assess  [] Normal  / [] Maladaptive        REVIEW OF SYSTEMS:     Systems: constitutional, ears/nose/mouth/throat, respiratory, gastrointestinal, genitourinary, musculoskeletal, integumentary, neurologic, psychiatric, endocrine. Positive findings noted below. Modified ESAS Completed by: provider   Fatigue: 8       Pain: 0           Dyspnea: 0               Positive and pertinent negative findings in ROS are noted above in HPI. The following systems were [] reviewed / [x] unable to be reviewed as noted in HPI  Other findings are noted below.      PHYSICAL EXAM:     Constitutional: lying in bed, appears very fatigued, somnolent, opened eyes just briefly but closes them  Eyes: closed  ENMT: no nasal discharge, moist mucous membranes  Cardiovascular: tachycardic rhythm, distal pulses intact  Respiratory: breathing not labored, symmetric  Gastrointestinal: abdomen slightly distended, hypoactive bowel sounds  Last bowel movement: none recorded  Musculoskeletal: no deformity, no tenderness to palpation  Skin: warm, dry  Neurologic: not following commands, not moving all extremities  Psychiatric: unable to assess    Other: Wt Readings from Last 3 Encounters:   06/02/22 73.5 kg (162 lb)   04/29/22 73.9 kg (163 lb)   04/22/22 73.5 kg (162 lb)     Blood pressure 121/64, pulse 91, temperature 98.1 °F (36.7 °C), resp. rate 18, height 5' 6\" (1.676 m), weight 73.5 kg (162 lb), SpO2 91 %. Pain:  Pain Scale 1: Numeric (0 - 10)  Pain Intensity 1: 0                      LAB AND IMAGING FINDINGS:     Lab Results   Component Value Date/Time    WBC 59.2 (HH) 06/03/2022 04:49 AM    HGB 8.3 (L) 06/03/2022 04:49 AM    PLATELET 959 43/94/6172 04:49 AM     Lab Results   Component Value Date/Time    Sodium 138 06/03/2022 04:49 AM    Potassium 3.9 06/03/2022 04:49 AM    Chloride 100 06/03/2022 04:49 AM    CO2 30 06/03/2022 04:49 AM    BUN 43 (H) 06/03/2022 04:49 AM    Creatinine 4.69 (H) 06/03/2022 04:49 AM    Calcium 10.8 (H) 06/03/2022 04:49 AM    Calcium 10.7 (H) 06/03/2022 04:49 AM    Magnesium 2.5 06/03/2022 04:49 AM    Phosphorus 4.2 06/03/2022 04:49 AM      Lab Results   Component Value Date/Time    Alk.  phosphatase 239 (H) 06/03/2022 04:49 AM    Protein, total 6.0 (L) 06/03/2022 04:49 AM    Albumin 2.0 (L) 06/03/2022 04:49 AM    Globulin 4.0 06/03/2022 04:49 AM     Lab Results   Component Value Date/Time    INR 1.0 06/02/2022 03:30 AM    Prothrombin time 14.0 06/02/2022 03:30 AM    aPTT 31.2 06/02/2022 03:30 AM      Lab Results   Component Value Date/Time    Iron 59 09/27/2020 04:33 AM    TIBC 290 09/27/2020 04:33 AM    Iron % saturation 20 09/27/2020 04:33 AM    Ferritin 108 09/25/2020 06:00 PM      No results found for: PH, PCO2, PO2  No components found for: [place]   Lab Results   Component Value Date/Time    CK 27 (L) 10/22/2021 04:11 PM    CK - MB <1.0 10/22/2021 04:11 PM              Total time: 35 minutes    > 50% counseling / coordination:  Time spent in direct consultation with the patient, medical team, and family     Prolonged service was provided for  []30 min   []75 min in face to face time in the presence of the patient, spent as noted above. Time Start:   Time End:     Disclaimer: Sections of this note are dictated using utilizing voice recognition software, which may have resulted in some phonetic based errors in grammar and contents. Even though attempts were made to correct all the mistakes, some may have been missed, and remained in the body of the document. If questions arise, please contact our department.

## 2022-06-03 NOTE — PROGRESS NOTES
followed up with Kaykay Anderson at [address] [place] [address] regarding the patient's admission to hospices. Kaykay Monica indicated that she will be able to admit the patient tomorrow.  discussed transport time for tomorrow discharge.  informed Kaykay Anderson that she would schedule it for 10am tomorrow. Kaykay Anderson voiced an understanding.       ARON Elizabeth

## 2022-06-03 NOTE — PROGRESS NOTES
Antonieta Warren,  in palliative medicine, notified this  that the patient will be going home on hospice. MARK Clark indicated the consult has been placed for hospices.     ARON De La Paz

## 2022-06-03 NOTE — PROGRESS NOTES
Per Ruperto MENDEZ) called [facility] scheduled 10:00 am transport for tomorrow (Saturday, June 4, 2022) with Valarie Aleman. Informed Ruperto Ponce of transportation conversation and arrangements.

## 2022-06-03 NOTE — PROGRESS NOTES
WHEELCHAIR NECESSITY DOCUMENTATION      Patient _______________________________________ was evaluated on _________________ for a manual wheelchair for home use. Patient has a diagnosis(s) of: ______________________________________________________________________ that causes mobility limitations in the home that significantly impairs the ability to participate in mobility related activities of daily living (MRADLs) like-   Please check all that apply:  __ Toileting __Bathing __ Grooming __ Dressing __ Feeding      Patient has the following mobility condition/limitation that  Please check one:  __ Prevents the beneficiary from accomplishing MRADL entirely  __ Places the beneficiary at reasonably determined heightened risk of morbidity or mortality secondary to the attempts to perform MRADL  __ Prevents the beneficiary from completing an MRADL within a reasonable time frame      The Patients: (Please check all that apply)  __ Mobility limitation cannot be sufficiently resolved by the use of appropriately fitted cane or walker   __ Home provides adequate access between rooms, maneuvering space, and surfaces for use of the manual wheelchair that is provided  __ Use of a manual wheelchair will significantly improve the ability to participate in MRADLs and will be able to use it at home on a regular basis  __ Has not expressed an unwillingness to use the manual wheelchair in the home  __ Has sufficient upper extremity function and other physical and mental capabilities needed to safely self-propel the wheelchair that is provided in the home during a typical day. Limitations of strength, endurance and range of motion, or coordination, presence of pain, or deformity or absence of one or both upper extremities are relevant to the assessment of upper extremity function.    __ Has a caregiver who is available, willing and able to provide assistance with the wheelchair     _________ Height                _________ Weight    _______________________________                   ______________________  Neisha Shoemakering                                                         GAVIOTAI

## 2022-06-03 NOTE — PROGRESS NOTES
D/C order noted for today. Orders reviewed. The patient will discharge home on June 4, 2022 at 10:00am. The patient will be transported by [facility]. CM remains available if needed.            ARON Anton

## 2022-06-03 NOTE — PROGRESS NOTES
informed BODØ at [facility] that there is a referral for patient. BODØ indicated that she is working on it and may possibly admit him this weekend.       Devere Paget, MSW

## 2022-06-03 NOTE — HOSPICE
Reached out to Dr Abbie Marmolejo via [employer]    Please send scripts for comfort medications to the outpatient pharmacy to be filled and sent home with the pt at discharge. I have verified with the pharmacy the medication is available. 1. Morphine concentrate 20mg/ml  Give 0.25-1ml po/sl every 3 hours PRN for pain/air hunger  Quantity 30ml     2. Lorazepam oral solution 2mg/ml   Give 0.25-1ml po/sl every 6 hours PRN for anxiety/agitation   Quantity 30 ml     3. Acetaminophen Suppository 650mg  Give one suppository rectally every 4 hours PRN for fever/pain  Quantity 4 suppositories    4. Hyoscyamine 0.125mg tablets  Give 1 tab po/sl every 6 hours PRN for terminal congestion  Quantity 10 tabs.      5. Bisacodyl Suppository 10mg  Give one suppository rectally every day PRN for constipation  Quantity 5 suppositories    Corbin Merrill BSN, [facility] Inpatient Clinical Liaison  [facility]  [address] [facility] [address], [address]  [phone]  Email: [email or web address]

## 2022-06-03 NOTE — PROGRESS NOTES
Requested Case Management specialist to assist with transportation to home. Address is [address] [place], [address] and phone number is [phone] (Ivelisse Damian)  Patient will require ALS transport. Pt requires Stretcher If stretcher, reason: septic shock, unable to stand and pivot. Patient is currently requiring oxygen No No  Height:167.6cm   Weight: 162  Pt is on isolation: No    Is the pt ready now? no  Requested time: Saturday, June 4, 2022 at 10:00am.  PCS Faxed: yes  Insurance verified on face sheet: yes  Auth needed for transport: no  CM completed PCS/ Envelope and placed on chart.        ARON Barrow

## 2022-06-03 NOTE — HOSPICE
1539: Have made several attempts to contact Rena Martin, pts wife, to discuss hospice services. Left messages with name and call back number. 1445: Was informed by CM that pt wife was at the bedside. Upon arrival, the wife had already left. Left hospice brochure with card attached at bedside, as pt was in the bed with his eyes closed and did not want to disturb him. Will reach out to Rena Martin, pts wife. 1305:Hospice referral received. Chart review in process. Thank you for the referral to [facility]. If we can be of further assistance please contact [phone].     Luly WYATT,  Inpatient Clinical Liaison  [facility] [address] [facility], [facility] [address]  [phone]  Email: [email or web address]

## 2022-06-03 NOTE — ACP (ADVANCE CARE PLANNING)
Advance Care Planning     General Advance Care Planning (ACP) Conversation      Date of Conversation: 6/3/2022    Conducted with: Patient's wife Soraida Mallory), patient's sister, Tova Rico NP (Palliative) and this writer. Healthcare Decision Maker:     Primary Decision Maker: Chelsy Jennings ( and surgery update) - Spouse - [phone]  Secondary Decision Maker: Em Berrios - Daughter - [phone]        Content/Action Overview: This writer, along with Tova Rico NP, with the Palliative Care team; visited with patient and his family today to offer support and to also discuss goals of care and hospice, moving forward. Dr. Roxana Sadler arrived, at the beginning of the visit and family requested to speak with Dr. Roxana Sadler alone. The Palliative Care team vacated the room and returned after Dr. Roxana Sadler finished with his visit. Patient's family informed the Palliative Care team that Dr. Roxana Sadler explained to them that dialysis, moving forward, is not recommended. Dr. Roxana Sadler is recommending making patient comfortable and transitioning him home with hospice services. Patient's family agrees with Dr. Yoli Tellez recommendations. The Palliative Care team completed a POST form, with patient's wife Flor Chavis); for comfort measures. The original and two copies were given to patient's wife and a copy was placed on the chart to be scanned in to the EMR. A hospice consult was placed. At this time, patient is a DNR/DNI with Comfort Measures and NO feeding tubes.       [facility] [address] POST (Physician Orders for Scope of Treatment)       Date of conversation: 6/3/2022  Location: [facility]                                 Length (minutes): 30    Participants:       [x] Healthcare agent (already designated in existing ACP document)      Name: Soraida Mallory    Relationship to Patient: Wife    Phone number: [phone] and [phone]                   Other persons present:                  Name: Patient's sister                           Name:  Sd Sparks NP (Palliative)                           Name: Liam Garces. MSW (Palliative)         Advanced [facility]ne ACP Facilitator: Liam Garces MSW      Conversation Topics   (If Patient does not have decision making capacity, Agent/Surrogate responds based on understanding of how patient would respond if capable)      Understanding of Medical Condition(s) AND Potential Complications:    Patient response: Patient was very sleepy and groggy during this visit. Healthcare Agent/Other Surrogate: Patient's wife and sister have full understanding of patient's current medical condition(s) and the potential complications. Wife has decided to transition patient to comfort measures and home with hospice services. Cardiopulmonary Resuscitation      \"What do you understand about CPR? \" Response: Patient's wife and sister have full understanding of the risks and burdens of CPR. They would like to transition patient to comfort measures and home with hospice services.     Order Elected for CPR:  []  Attempt Resuscitation [x]  Do Not Attempt Resuscitation      When NOT in Cardiopulmonary Arrest, Order Elected:      [x] Comfort Measures  [] Limited Additional Interventions  [] Full Interventions    Artificially Administered Nutrition, Order Elected:    [x] No Feeding Tube   [] Feeding Tube for a defined trial period  [] Feeding Tube long-term if indicated      The following was provided (check all that apply):      Healthcare Decision Information Cards:   [] Help with Breathing Facts   [] Tube Feeding Facts   [] CPR Facts               [] Review of existing Advance Directive              [] Assistance with Completion of New Advance Directive               [x] Review of [place] POST Form         Meeting Outcomes:     [] ACP discussion completed   [x] [place] form completed              [x] [place] prepared for Provider review and signature   [x] Original placed on Chart, if in facility (form to be sent with patient at discharge)              [x] Copy given to healthcare agent    [x] Copy placed on chart to be scanned to electronic medical record      If ACP discussion not completed, last interview topic discussed: POST completion and transition to comfort measures and hospice upon discharge. Follow-up plan:       [] Schedule follow-up conversation to continue planning   [] Referred individual to Provider for additional questions/concerns               [x] Advised patient/agent/surrogate to review completed POST form and update if needed with changes in condition, patient preferences or care setting       Recommendations: The Palliative Care team will continue to offer comfort and support, to patient, while he remains hospitalized. Patient will discharge home with hospice services. [x] This note routed to one or more involved healthcare providers          Domonique Pérez [facility]  Palliative Medicine Inpatient   Jason Prescott [phone]: [phone])

## 2022-06-03 NOTE — PROGRESS NOTES
1145:  Wife in to visit, updated and all questions answered to best ability. Will ask attending provider to see pt today. 1200:  Dr Christy Aragon nephrology in room , spoke with wife in room. 1230:  Dr Mary Marcial in to see pt and family. 1250:  Palliative Care on consult. New orders for Hospice with transfer to Medical floor noted. 1445:  Wife leaving hospital, aware of pending transfer to medical unit. 1615:  TRANSFER - OUT REPORT:  report given to MONTSERRAT Bird(name) on Jessy Can  being transferred to [facility](unit) for routine progression of care     Report consisted of patients Situation, Background, Assessment and   Recommendations(SBAR). Information from the following report(s) SBAR, Kardex, Intake/Output, MAR, Accordion, Recent Results and Cardiac Rhythm NSR. was reviewed with the receiving nurse. Lines:   Peripheral IV 06/02/22 Right Forearm (Active)   Site Assessment Clean, dry, & intact 06/03/22 0800   Phlebitis Assessment 0 06/03/22 0800   Infiltration Assessment 0 06/03/22 0800   Dressing Status Clean, dry, & intact 06/03/22 0800   Dressing Type Transparent 06/03/22 0800   Hub Color/Line Status Pink; Infusing;Flushed;Patent 06/03/22 0800   Action Taken Open ports on tubing capped 06/03/22 0800   Alcohol Cap Used Yes 06/03/22 0800   Opportunity for questions and clarification was provided. Patient transported with:   [facility] transport service. Message left for wife of transfer room number #975.

## 2022-06-04 NOTE — PROGRESS NOTES
Unable to confirm delivery of DME. Will plan for d/c tomorrow.     Reina Sparrow, MSN, RN, ACM-RN  Care Management  [phone]

## 2022-06-04 NOTE — PROGRESS NOTES
Comfortable, no SOB, Trying to say some thing,Mumbles, NPO, AVG remained clotted. No more dialysis. Continue comfort care.

## 2022-06-04 NOTE — PROGRESS NOTES
[facility] Hospitalist Group  Progress Note    Patient: Sharyle Diones Age: 80 y.o. : 1935 MR#: [id number] SSN: [id number]  Date: 6/3/2022         Assessment/Plan:     Hospital Problems  Date Reviewed: 2022          Codes Class Noted POA    * (Principal) Septic shock ([facility] ICD-10-CM: A41.9, R65.21  ICD-9-CM: 038.9, 785.52, 995.92  2/3/3876 Yes        Complication of vascular access for dialysis ICD-10-CM: T82. 9XXA  ICD-9-CM: 996.73  2022 Unknown        Encounter for palliative care ICD-10-CM: Z51.5  ICD-9-CM: V66.7  Unknown Unknown        Debility ICD-10-CM: R53.81  ICD-9-CM: 799.3  Unknown Unknown        Hypoglycemia ICD-10-CM: E16.2  ICD-9-CM: 251.2  2022 Yes        Prostate cancer ([facility] ICD-10-CM: C61  ICD-9-CM: 890  2022 Yes        Colon tumor ICD-10-CM: D49.0  ICD-9-CM: 239.0  2022 Yes        Bladder cancer ([facility] ICD-10-CM: C67.9  ICD-9-CM: 188.9  2022 Yes        Encephalopathy ICD-10-CM: G93.40  ICD-9-CM: 348.30  2022 Yes        Leukocytosis ICD-10-CM: B41.257  ICD-9-CM: 288.60  10/20/2021 Unknown        ESRD on dialysis [facility]) ICD-10-CM: N18.6, Z99.2  ICD-9-CM: 585.6, V45.11  2020 Yes        Hyperkalemia ICD-10-CM: E87.5  ICD-9-CM: 276.7  2020 Yes              Septic Shock: due to UTI in setting of indwelling michel. Never required pressors. BP improved with albumin, solu-cortef and 500cc bolus. - cont vanc/zosyn  - f/u cultures  - monitor labs and vital signs closely  - transferring to floor today  - initially started on solu-cortef     Acute toxic metabolic encephalopathy: 2/2 infection +/- uremia. ?brain mets. - not currently safe for PO intake - continue NPO    Hypoglycemia: possibly 2/2 sepsis  - monitor BG on D10 infusion    Hyperkalemia: 2/2 missing dialysis. Now resolved. - monitor BMP. ESRD on dialysis  - appreciate nephrology assistance  - access has reportedly clotted off.  Wife states Dr. Ren Sorensen recently placed that access and patient was supposed to be taking eliquis. His wife gave his the eliquis for a few days, but she stopped with the hematuria (from the bladder tumor) became worse. 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 Patient has deferred colectomy per urology notes. - palliate care consulted - DNR/DNI with limited interventions    Patient now comfort care. Transport and discharge tomorrow at 10:00      DVT Prophylaxis:  []Lovenox  [x]Hep SQ  []SCDs  []Coumadin   []On Heparin gtt []PO anticoagulant    Anticipated discharge: >2 days    Subjective:     Pt s/e @ bedside. Wife and sister at bedside. Patient to go home on hospice tomorrow. Hospital Course:     Per H&P, \"Patient is a 80 y.o. male w/ pmhx of prostate CA on chemo, chronic indwelling michel, ESRD and CAD who presented to the ED for evaluation of vomiting with associated altered mentation. Per patient's wife, for the last several days the patient has been more lethargic with poor PO intake. She states a week ago he was diagnosed with a UTI and was started on doxycycline. She states that this morning she was woken up by patient vomiting and altered and EMS was called. Per chart review, on EMS arrival patient was hypoxic with O2 saturation in the mid 80s that improved with NRB. Work up in the ED significant for elevated WBCs, a UTI, hypoglycemia and hyperkalemia. Patient's wife reports that patient missed HD yesterday. Patient admitted to ICU for soft BPs and encephalopathy.     Upon my evaluation, patient somnolent and arousable to light shaking. On arousal he is confused, oriented to person. Follows some commands. Hemodynamics stable on monitor.  \"      Objective:   VS:   Visit Vitals  BP (!) 169/77 (BP 1 Location: Right upper arm, BP Patient Position: At rest)   Pulse 99   Temp 98.4 °F (36.9 °C) Resp 16   Ht 5' 6\" (1.676 m)   Wt 73.5 kg (162 lb)   SpO2 98%   BMI 26.15 kg/m²      Tmax/24hrs: Temp (24hrs), Av.3 °F (36.8 °C), Min:97.9 °F (36.6 °C), Max:98.9 °F (37.2 °C)      Intake/Output Summary (Last 24 hours) at 6/3/2022 2149  Last data filed at 6/3/2022 1827  Gross per 24 hour   Intake 0 ml   Output 25 ml   Net -25 ml       General Appearance: NAD, somnolent  HENT: normocephalic/atraumatic, moist mucus membranes  Lungs: CTA with normal respiratory effort  CV: RRR, no m/r/g  Abdomen: soft, non-tender, normal bowel sounds  Extremities: no cyanosis, 3+ pitting edema BLE  Neuro: non focal  Skin: Normal color, intact      Current Facility-Administered Medications   Medication Dose Route Frequency    LORazepam (INTENSOL) 2 mg/mL oral concentrate 0.5 mg  0.5 mg Oral Q3H PRN    acetaminophen (TYLENOL) tablet 650 mg  650 mg Oral Q4H PRN    Or    acetaminophen (TYLENOL) solution 650 mg  650 mg Oral Q4H PRN    Or    acetaminophen (TYLENOL) suppository 650 mg  650 mg Rectal Q4H PRN    hyoscyamine SL (LEVSIN/SL) tablet 0.125 mg  0.125 mg SubLINGual Q4H PRN    bisacodyL (DULCOLAX) suppository 10 mg  10 mg Rectal DAILY PRN    morphine (ROXANOL) 100 mg/5 mL (20 mg/mL) concentrated solution 5 mg  5 mg Oral Q1H PRN    sodium chloride (NS) flush 5-10 mL  5-10 mL IntraVENous PRN        Labs:    Recent Results (from the past 24 hour(s))   GLUCOSE, POC    Collection Time: 22 12:32 AM   Result Value Ref Range    Glucose (POC) 111 (H) 70 - 110 mg/dL   CBC WITH AUTOMATED DIFF    Collection Time: 22  4:49 AM   Result Value Ref Range    WBC 59.2 (HH) 4.6 - 13.2 K/uL    RBC 2.85 (L) 4.35 - 5.65 M/uL    HGB 8.3 (L) 13.0 - 16.0 g/dL    HCT 26.6 (L) 36.0 - 48.0 %    MCV 93.3 78.0 - 100.0 FL    MCH 29.1 24.0 - 34.0 PG    MCHC 31.2 31.0 - 37.0 g/dL    RDW 15.3 (H) 11.6 - 14.5 %    PLATELET 388 897 - 698 K/uL    MPV 11.5 9.2 - 11.8 FL    NRBC 0.0 0  WBC    ABSOLUTE NRBC 0.00 0.00 - 0.01 K/uL    NEUTROPHILS 91 (H) 40 - 73 %    BAND NEUTROPHILS 1 0 - 5 %    LYMPHOCYTES 5 (L) 21 - 52 %    MONOCYTES 3 3 - 10 %    EOSINOPHILS 0 0 - 5 %    BASOPHILS 0 0 - 2 %    IMMATURE GRANULOCYTES 0 %    ABS. NEUTROPHILS 54.4 (H) 1.8 - 8.0 K/UL    ABS. LYMPHOCYTES 3.0 0.9 - 3.6 K/UL    ABS. MONOCYTES 1.8 (H) 0.05 - 1.2 K/UL    ABS. EOSINOPHILS 0.0 0.0 - 0.4 K/UL    ABS. BASOPHILS 0.0 0.0 - 0.1 K/UL    ABS. IMM. GRANS. 0.0 K/UL    DF MANUAL      PLATELET COMMENTS ADEQUATE PLATELETS      RBC COMMENTS ANISOCYTOSIS  1+       MAGNESIUM    Collection Time: 06/03/22  4:49 AM   Result Value Ref Range    Magnesium 2.5 1.6 - 2.6 mg/dL   PHOSPHORUS    Collection Time: 06/03/22  4:49 AM   Result Value Ref Range    Phosphorus 4.2 2.5 - 4.9 MG/DL   HEP B SURFACE AG    Collection Time: 06/03/22  4:49 AM   Result Value Ref Range    Hepatitis B surface Ag <0.10 <1.00 Index    Hep B surface Ag Interp. Negative NEG     HEP B SURFACE AB    Collection Time: 06/03/22  4:49 AM   Result Value Ref Range    Hepatitis B surface Ab <3.10 (L) >10.0 mIU/mL    Hep B surface Ab Interp. Negative (A) POS      Hep B surface Ab comment        Samples with a  value of 10 mIU/mL or greater are considered positive (protective immunity) in accordance with the CDC guidelines. METABOLIC PANEL, COMPREHENSIVE    Collection Time: 06/03/22  4:49 AM   Result Value Ref Range    Sodium 138 136 - 145 mmol/L    Potassium 3.9 3.5 - 5.5 mmol/L    Chloride 100 100 - 111 mmol/L    CO2 30 21 - 32 mmol/L    Anion gap 8 3.0 - 18 mmol/L    Glucose 95 74 - 99 mg/dL    BUN 43 (H) 7.0 - 18 MG/DL    Creatinine 4.69 (H) 0.6 - 1.3 MG/DL    BUN/Creatinine ratio 9 (L) 12 - 20      GFR est AA 14 (L) >60 ml/min/1.73m2    GFR est non-AA 12 (L) >60 ml/min/1.73m2    Calcium 10.8 (H) 8.5 - 10.1 MG/DL    Bilirubin, total 0.4 0.2 - 1.0 MG/DL    ALT (SGPT) 9 (L) 16 - 61 U/L    AST (SGOT) 17 10 - 38 U/L    Alk.  phosphatase 239 (H) 45 - 117 U/L    Protein, total 6.0 (L) 6.4 - 8.2 g/dL    Albumin 2.0 (L) 3.4 - 5.0 g/dL    Globulin 4.0 2.0 - 4.0 g/dL    A-G Ratio 0.5 (L) 0.8 - 1.7     PTH INTACT    Collection Time: 06/03/22  4:49 AM   Result Value Ref Range    Calcium 10.7 (H) 8.5 - 10.1 MG/DL    PTH, Intact 27.1 18.4 - 88.0 pg/mL   GLUCOSE, POC    Collection Time: 06/03/22  6:25 AM   Result Value Ref Range    Glucose (POC) 98 70 - 110 mg/dL   GLUCOSE, POC    Collection Time: 06/03/22 10:58 AM   Result Value Ref Range    Glucose (POC) 107 70 - 110 mg/dL       Imaging:  No results found.       Signed By: Rebekah Keita PA-C [facility]  [facility]  Office:  [phone]  Pager: [phone]         Renetta 3, 2022 3:23 PM

## 2022-06-04 NOTE — PROGRESS NOTES
Spoke with RN from [facility]. Equipment will be delivered between now and 1700.     Jolene Price, MSN, RN, ACM-RN  Care Management  [phone]

## 2022-06-04 NOTE — PROGRESS NOTES
Message sent to Dr. Sandhya Livingston via [employer] to inquire re: if prescriptions for comfort meds were sent to the outpatient pharmacy yesterday.     Maryuri Corona, MSN, RN, ACM-RN  Care Management  [phone]

## 2022-06-04 NOTE — ROUTINE PROCESS
Bedside shift change report given to Gifty Valdivia RN (oncoming nurse) by Jovi Rick RN (offgoing nurse). Report included the following information SBAR, Kardex, Intake/Output and MAR.

## 2022-06-04 NOTE — PROGRESS NOTES
[facility] Hospitalist Group  Progress Note    Patient: Luan Jeong Age: 80 y.o. : 1935 MR#: [id number] SSN: [id number]  Date: 2022         Assessment/Plan:     Hospital Problems  Date Reviewed: 2022          Codes Class Noted POA    * (Principal) Septic shock ([facility].) ICD-10-CM: A41.9, R65.21  ICD-9-CM: 038.9, 785.52, 995.92  6851 Yes        Complication of vascular access for dialysis ICD-10-CM: T82. 9XXA  ICD-9-CM: 996.73  2022 Unknown        Encounter for palliative care ICD-10-CM: Z51.5  ICD-9-CM: V66.7  Unknown Unknown        Debility ICD-10-CM: R53.81  ICD-9-CM: 799.3  Unknown Unknown        Hypoglycemia ICD-10-CM: E16.2  ICD-9-CM: 251.2  2022 Yes        Prostate cancer ([facility] ICD-10-CM: C61  ICD-9-CM: 695  2022 Yes        Colon tumor ICD-10-CM: D49.0  ICD-9-CM: 239.0  2022 Yes        Bladder cancer ([facility] ICD-10-CM: C67.9  ICD-9-CM: 188.9  2022 Yes        Encephalopathy ICD-10-CM: G93.40  ICD-9-CM: 348.30  2022 Yes        Leukocytosis ICD-10-CM: R93.292  ICD-9-CM: 288.60  10/20/2021 Unknown        ESRD on dialysis [facility]) ICD-10-CM: N18.6, Z99.2  ICD-9-CM: 585.6, V45.11  2020 Yes        Hyperkalemia ICD-10-CM: E87.5  ICD-9-CM: 276.7  2020 Yes              Septic Shock: due to UTI in setting of indwelling michel. Never required pressors. BP improved with albumin, solu-cortef and 500cc bolus. Acute toxic metabolic encephalopathy: 2/2 infection +/- uremia. ?brain mets    Hypoglycemia: possibly 2/2 sepsis    Hyperkalemia: 2/2 missing dialysis. Now resolved. ESRD formerly on dialysis. 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 Patient has deferred colectomy per urology notes. Patient now [facility]. DME could not be set up today. Can be discharged tomorrow if DME ordered.        DVT Prophylaxis:  []Lovenox  [x]Hep SQ  []SCDs  []Coumadin   []On Heparin gtt []PO anticoagulant    Anticipated discharge: >2 days    Subjective:     Pt s/e @ bedside. No family in room. Unable to discharged secondary to having DME not being delivered. Hospital Course:     Per H&P, \"Patient is a 80 y.o. male w/ pmhx of prostate CA on chemo, chronic indwelling michel, ESRD and CAD who presented to the ED for evaluation of vomiting with associated altered mentation. Per patient's wife, for the last several days the patient has been more lethargic with poor PO intake. She states a week ago he was diagnosed with a UTI and was started on doxycycline. She states that this morning she was woken up by patient vomiting and altered and EMS was called. Per chart review, on EMS arrival patient was hypoxic with O2 saturation in the mid 80s that improved with NRB. Work up in the ED significant for elevated WBCs, a UTI, hypoglycemia and hyperkalemia. Patient's wife reports that patient missed HD yesterday. Patient admitted to ICU for soft BPs and encephalopathy.     Upon my evaluation, patient somnolent and arousable to light shaking. On arousal he is confused, oriented to person. Follows some commands. Hemodynamics stable on monitor.        Objective:   VS:   Visit Vitals  BP (!) 143/77 (BP 1 Location: Left upper arm)   Pulse 95   Temp 98 °F (36.7 °C)   Resp 16   Ht 5' 6\" (1.676 m)   Wt 73.5 kg (162 lb)   SpO2 98%   BMI 26.15 kg/m²      Tmax/24hrs: Temp (24hrs), Av.4 °F (36.9 °C), Min:98 °F (36.7 °C), Max:98.6 °F (37 °C)      Intake/Output Summary (Last 24 hours) at 2022  Last data filed at 2022 0350  Gross per 24 hour   Intake --   Output 50 ml   Net -50 ml       General Appearance: NAD, somnolent  HENT: normocephalic/atraumatic, moist mucus membranes  Lungs: CTA with normal respiratory effort  CV: RRR, no m/r/g  Abdomen: soft, non-tender, normal bowel sounds  Extremities: no cyanosis, 3+ pitting edema BLE  Neuro: non focal  Skin: Normal color, intact      Current Facility-Administered Medications   Medication Dose Route Frequency    LORazepam (INTENSOL) 2 mg/mL oral concentrate 0.5 mg  0.5 mg Oral Q3H PRN    acetaminophen (TYLENOL) tablet 650 mg  650 mg Oral Q4H PRN    Or    acetaminophen (TYLENOL) solution 650 mg  650 mg Oral Q4H PRN    Or    acetaminophen (TYLENOL) suppository 650 mg  650 mg Rectal Q4H PRN    hyoscyamine SL (LEVSIN/SL) tablet 0.125 mg  0.125 mg SubLINGual Q4H PRN    bisacodyL (DULCOLAX) suppository 10 mg  10 mg Rectal DAILY PRN    morphine (ROXANOL) 100 mg/5 mL (20 mg/mL) concentrated solution 5 mg  5 mg Oral Q1H PRN    sodium chloride (NS) flush 5-10 mL  5-10 mL IntraVENous PRN        Labs:    No results found for this or any previous visit (from the past 24 hour(s)). Imaging:  No results found.       Signed By: Dimple Chino PA-C [facility]  Hospitalist Division  Office:  [phone]  Pager: [phone]         June 4, 2022 3:23 PM

## 2022-06-04 NOTE — PROGRESS NOTES
Call placed to [address] [place] [address] and Hospice. Spoke with Gabrielle. Gabrielle will reach out to on call hospice nurse and have the nurse call to verify status of equipment.     Maryuri Corona, MSN, RN, ACM-RN  Care Management  [phone]

## 2022-06-04 NOTE — PROGRESS NOTES
Received call from Ms. Isabel Castillo, patient's wife. Per Ms. Isabel Castillo she does not have equipment in the home at this time. [facility] called. Spoke with Argentina Vickers. Transportation placed on will call.     Clary Andino, MSN, RN, ACM-RN  Care Management  [phone] details… detailed exam

## 2022-06-04 NOTE — PROGRESS NOTES
DME has not yet been delivered. Spoke with Hospice RN. She can order a comfort med pack but will not be able to do so until tomorrow. Discharge contingent on delivery of DME/resolution of comfort meds.     Cole Miranda MSN, RN, ACM-RN  Care Management  [phone]

## 2022-06-04 NOTE — PROGRESS NOTES
Per Dr. Gayathri Landers prescriptions were sent to the outpatient pharmacy [facility] for the comfort meds for discharge. Per nursing staff on 5S and CVT SD meds not in med room. Call placed to on call [employer] [facility] nurse to inform.     Sakina Pedroza, MSN, RN, ACM-RN  Care Management  [phone]

## 2022-06-04 NOTE — PROGRESS NOTES
Problem: Falls - Risk of  Goal: *Absence of Falls  Description: Document Andreas Soriano Fall Risk and appropriate interventions in the flowsheet. Outcome: Progressing Towards Goal  Note: Fall Risk Interventions:       Mentation Interventions: Adequate sleep, hydration, pain control,More frequent rounding    Medication Interventions: Patient to call before getting OOB,Teach patient to arise slowly    Elimination Interventions: Bed/chair exit alarm,Toileting schedule/hourly rounds    History of Falls Interventions: Bed/chair exit alarm,Consult care management for discharge planning         Problem: Patient Education: Go to Patient Education Activity  Goal: Patient/Family Education  Outcome: Progressing Towards Goal     Problem: Patient Education: Go to Patient Education Activity  Goal: Patient/Family Education  Outcome: Progressing Towards Goal     Problem: Pain  Goal: *Control of Pain  Outcome: Progressing Towards Goal  Goal: *PALLIATIVE CARE:  Alleviation of Pain  Outcome: Progressing Towards Goal     Problem: Patient Education: Go to Patient Education Activity  Goal: Patient/Family Education  Outcome: Progressing Towards Goal     Problem: Pressure Injury - Risk of  Goal: *Prevention of pressure injury  Description: Document Karthikeyan Scale and appropriate interventions in the flowsheet.   Outcome: Progressing Towards Goal  Note: Pressure Injury Interventions:  Sensory Interventions: Assess changes in LOC    Moisture Interventions: Maintain skin hydration (lotion/cream)    Activity Interventions: Assess need for specialty bed,Pressure redistribution bed/mattress(bed type)    Mobility Interventions: Assess need for specialty bed,HOB 30 degrees or less,Pressure redistribution bed/mattress (bed type)    Nutrition Interventions: Document food/fluid/supplement intake    Friction and Shear Interventions: HOB 30 degrees or less                Problem: Patient Education: Go to Patient Education Activity  Goal: Patient/Family Education  Outcome: Progressing Towards Goal     Problem: Nutrition Deficit  Goal: *Optimize nutritional status  Outcome: Progressing Towards Goal

## 2022-06-05 NOTE — PROGRESS NOTES
Problem: Falls - Risk of  Goal: *Absence of Falls  Description: Document Rejilian Reich Fall Risk and appropriate interventions in the flowsheet. Outcome: Progressing Towards Goal  Note: Fall Risk Interventions:       Mentation Interventions: Adequate sleep, hydration, pain control,More frequent rounding    Medication Interventions: Patient to call before getting OOB    Elimination Interventions: Bed/chair exit alarm,Toileting schedule/hourly rounds    History of Falls Interventions: Bed/chair exit alarm,Consult care management for discharge planning         Problem: Pressure Injury - Risk of  Goal: *Prevention of pressure injury  Description: Document Karthikeyan Scale and appropriate interventions in the flowsheet.   Outcome: Progressing Towards Goal  Note: Pressure Injury Interventions:  Sensory Interventions: Assess changes in LOC    Moisture Interventions: Maintain skin hydration (lotion/cream)    Activity Interventions: Assess need for specialty bed,Pressure redistribution bed/mattress(bed type)    Mobility Interventions: Assess need for specialty bed,HOB 30 degrees or less,Pressure redistribution bed/mattress (bed type)    Nutrition Interventions: Document food/fluid/supplement intake    Friction and Shear Interventions: HOB 30 degrees or less                Problem: Nutrition Deficit  Goal: *Optimize nutritional status  Outcome: Progressing Towards Goal

## 2022-06-05 NOTE — PROGRESS NOTES
Problem: Falls - Risk of  Goal: *Absence of Falls  Description: Document Genevieve Hassan Fall Risk and appropriate interventions in the flowsheet.   Outcome: Progressing Towards Goal  Note: Fall Risk Interventions:       Mentation Interventions: Adequate sleep, hydration, pain control,More frequent rounding    Medication Interventions: Patient to call before getting OOB    Elimination Interventions: Bed/chair exit alarm,Toileting schedule/hourly rounds    History of Falls Interventions: Bed/chair exit alarm,Consult care management for discharge planning         Problem: Patient Education: Go to Patient Education Activity  Goal: Patient/Family Education  Outcome: Progressing Towards Goal     Problem: Pain  Goal: *Control of Pain  Outcome: Progressing Towards Goal     Problem: Patient Education: Go to Patient Education Activity  Goal: Patient/Family Education  Outcome: Progressing Towards Goal

## 2022-06-05 NOTE — PROGRESS NOTES
Call placed to [facility]. Spoke with Do Ovalle. Transportation set up for 1300 to home. Call placed to Agustín Boone, wife ([phone]). Confirmed delivery of DME and informed of transport time. Spoke with [address] [facility] and informed of transport time. Informed MONTSERRAT Johnston of transport time.     Radha Shirley, MSN, RN, ACM-RN  Care Management  [phone]

## 2022-06-05 NOTE — PROGRESS NOTES
Spoke with hospice RN. Per DME company, DME has been delivered. Have been unable to confirm with wife. Once DME delivery is confirmed, CM will arrange transport to home.     Jose Simon MSN, RN, ACM-RN  Care Management  [phone]

## 2022-06-05 NOTE — PROGRESS NOTES
Remained comfortable on comfort. Stopped dialysis after discussion with the family members. Expect patient to be discharged today with hospice care.  will sign off

## 2022-06-06 NOTE — HOSPICE
The following standard precautions for infection control were utilized:  Mask and Eye protection  Patient was in bed upon Chaplains arrival but he never acknowledged the chaplains presences. Spoke to spouse and had a great conversation she shared about their marriage and their Buddhist. Agreed to once a month visits from  and their /Buddhist very involved.

## 2022-06-06 NOTE — HOSPICE
Patient resting in bed in no apparent distress, supine on room air. Patient behavior indicators negative for pain and discomfort. Wife states that comfort medications have not arrived yet. Writer informed spouse to let hospice know when they arrive so she can be instructed on medications and when to give them. Wife verbalized understanding. She asked this nurse if many families are afraid to give the Morphine. Wife and this nurse had a conversation about common misconceptions regarding Morphine usage and  how Morphine is beneficial to patients. Wife verbalized understanding. She also informed this nurse that one of her family members believes that \"Morphine killed her dad. \"      Patient spouse explains to this nurse how her family/friends doesn't understand how she could make the decision to make the patient hospice, that \"they want to be with their love ones as long as possible. \"  Writer explained to her that sometimes quality is better over quantity. Wife stated that she was looking for quality time with her  that she was not trying to keep putting him through surgeries. Wife discussed a little family dynamics with this nurse. She also informed me that she liked the visit with the  today. Verbal, but not really. She states that he will wake up and go back to sleep. She states that he hardly opens his eyes. Wife states that he does not eat, she states that she tried to give him a shack but he did not drink it. She atates that he did drink some water this morning with his blood pressure pill. She states that she had to remind him to swallow and it finally went down. She states silvina the sleeps most of the day. Wife denies any issues with pain, she states that he scrunched his face once, but prior he used to whince all the time with urination. No dialysis since last Monday. Used to go three times a week. Been having a catheter for the last two years.   Wife states that she knows there is a decreased urine output. She states that when she emptied it this morning it was 400mL. Fistula used to clot a lot. LBM:  smear earlier today.

## 2022-06-07 NOTE — Clinical Note
Bereavement assessment complete for spouse Anya Jaleesayer who scores low on bereavement scale. Assessment sent to bereavement coordinator.

## 2022-06-08 NOTE — HOSPICE
Reason for today's visit is to Complete assessment. PPE consist of proper hand hygiene, mask, shield. Pt is an 80year old male admitted to hospice on 6-5-22 with ESRD. Pt is received lying in hospital bed, aide at bedside performing hygiene care. Pt is cared for by his spouse of 39 years and spouse is able to safely provide care, and can call on family if she needs help. Spouse agrees for  to follow each month to assess emotional support needs, although spouse is coping well at this time. 1. Care Needs/Safety  a. What particular problems has your illness caused in regards to caregiving and safety? None, spouse states she is able to safely care for pt and has family who can assist if needed. b. What do you need help with now? None  c. What do you anticipate needing help w/ in the future? No needs  d. Do you have any safety concerns? No, pt is bedbound  2. Financial Needs  a. Has your illness impacted you financially? No  b. Are you concerned that it may in the future? No, spouse manages finances and denies any hardships at this time. 3. Are there any necessities that you are unable to afford? No  4. Awareness and Understanding of Prognosis  a. Can you tell me about the history of your illness? Spouse states pt has shown a significant decline in the past couple of weeks, as pt is no longer able to hold a conversation and pt is not eating, other than a small bite and sips of fluid.  educates on nutrition in hospice care and spouse verbalizes understanding  b. What is your knowledge and understanding of your prognosis and disease process? Spouse reports she is aware of pt's prognosis and states her goals are pt's comfort at this time  c. Do you feel you have enough info about what is going on with you? Yes  d. How have you handled difficult conversations with your loved ones? I don't have difficult conversations with anyone.   e.  Do you and your family accept the hospice care and philosophy, or do you have denial?  Spouse accepts hospice philosophy  5. Sense of Well Being/Adjustment  a. What is most important to you at this time? Pt's comfort  b. Do you have any concerns that are not being addressed? No  c. What kind of physical changes have you been experiencing? Pt has loss of appetite, unable to hold a conversation  d. Do these changes worry you? Spouse feels these changes are expected  e. How do you handle stress and worry? Spouse uses prayer to cope and the support of family  f. Can you think of past struggles or situations you have had to deal with? No  g. How did you get through that time? What was helpful? 6. Interpersonal Issues  a. Sometimes families don't agree on everything in these types of situations- are there any areas where this is the case for you? Family all agrees on hospice care for pt and want pt comfortable  b. What stressors are occurring in your life right now? None  c. Who do you talk with about stressors? Family and God  d. Is there anyone you are most worried about? Are there small children in the home? No  e. Sometimes people use alcohol or other substances to cope, is this an issue for you or your family? No  f. Is there anyone important to you that you would like to work out any past differences with? Spouse states pt's children have not been around in years. SW offers a family meeting with these children and spouse states Alex Albert are kind of crazy and he is not having issues with these kids not coming around.  RICH explains that pt's can often times hold on because they need to talk/or see someone and asks spouse if this could be a problem in the near future. Spouse states she will speak with pt about this but feels pt is fine. 7. Coping related to loss and anticipatory grief  a. What emotional factors are you experiencing? Spouse feels she has done all she can for pt.  Spouse admits to having sadness abut stresses the importance of keeping pt comfortable is her focus at this time. Spouse cared for her brother in hospice in 2017  b. How do you and your family communicate with each other during this stressful time? With kindness  c. This can be a balancing act of holding on and letting go. How are you dealing with this? Spouse feels she has spent 36 good years with pt and is not battling with this  d. Sometimes current stress brings up old problems and past losses in unexpected ways. Have you been revisiting any past hurts? No  8. Suicidal ideations  a. Do you every think of ending all of this yourself? No, spouse reports pt would never consider  9. Cultural Values  a. Are there any cultural preferences, beliefs or traditions that you would like for out care team to know about? No  b. Are there any [employer] values we should know about? No, spouse states she met the hospice chaplain and enjoyed his visit  10. Decision Making  a. Do you have a DNR/Advanced Directive? Yes  b. Does your family agree with your decision? If not, how can I help? Yes  c. Do you have final arrangements in place? Yes  d. Would you like information about your options for making final arrangements? No, already made through [facility]  Death Anxiety- leads to a lack of awareness of prognosis or denial and can cause: spouse dines denial for herself and pt   Fear of loneliness   Fear of suffering   Fear of the unknown  · Allow pt to openly discuss spiritual issues, beliefs, and social support used to reduce anxiety.

## 2022-06-08 NOTE — HOSPICE
The following standard precautions for infection control were utilized:  GLOVES, [address] provided per established plan of care:  YES    Patient is without complaints during care provided. Patient requests: N/A    Family/Caregiver requests: N/A    Communicated to , Clinical Manager, or Director regarding patient/family/caregiver/aide findings:  N/A    Status of patient upon end of hospice aide visit:  Nohelia Hernández.

## 2022-06-09 NOTE — HOSPICE
Patient resting in bed in no apparent distress. Patient behavior indicators negative for pain and discomfort. WIfe denies patient receiving pain medication as she only received medications yesterday and \"doesn't know what to do with them. \"  Writer educated spouse and daughter on comfort medications, indications for use and how to administer. Writer also put stoppers in Morphine and Haldol bottles and educated spouse and daughter how to and how much to draw up in syringe. Writer assessed patient. Patient facial grimacing durng assessment. Writer and MONTSERRAT Garcia performed incontinence care and educated spouse on how to place a tabbed diaper versus a pullup for comfort and ease. Patient also given a Dulcolax suppository due to patient not having a bowel movement in an unknown amount of time, grimacing upon auscultation for bowel sounds and slight abdominal distention. Wife educated that patient could have a bowel movement soon or it could take up to 24-hours for patient to have a bowel movement. Wife joked stating, \"oh great, he will wait and I will have to clean it up by myself. \"  Writer administered 0.25mL of Morphine while demonstrating for spouse and daughter how to administer. Patient opened his mouth and lifted his tongue but did not open his eyes. Patient michel catheter draining clear tea colored urine with scant sediment. Bag contained approximately 1000mL of urine. Spouse states she has not emptied it since previous visit. Write educated her on not allowing the bag to fill over half way as it could pull and cause discomfort for patient. Wife and daughter verbalized understanding. Supplies left bedside:  2 washbasins, a pack of Large diapers, a pack of wipes, 1 pack of green mouthswabs and a pack of chux.

## 2022-06-13 ENCOUNTER — HOME CARE VISIT (OUTPATIENT)
Dept: HOSPICE | Facility: HOSPICE | Age: 87
End: 2022-06-13
Payer: MEDICARE

## 2022-06-13 NOTE — HOSPICE
The following standard precautions for infection control were utilized:  GLOVES, [address] provided per established plan of care:  YES    Patient is without complaints during care provided.      Patient requests: N/A    Family/Caregiver requests:  N/A    Communicated to , Clinical Manager, or Director regarding patient/family/caregiver/aide findings: N/A    Status of patient upon end of hospice aide visit:  IN BED, RESTING COMFORTABLY

## 2022-06-14 NOTE — PROGRESS NOTES
Physician Progress Note      Hao George  CSN #:                  [id number]  :                       1935  ADMIT DATE:       2022 2:30 AM  DISCH DATE:        2022 1:37 PM  RESPONDING  PROVIDER #:        Cirilo Sherman MD          QUERY TEXT:    Patient admitted with Sepsis due to UTI . Documentation reflects Sepsis due to UTI associated with indwelling Michel catheter in H+P note(s) dated 22. If possible, please document in the discharge summary if Sepsis due to UTI associated with indwelling Michel catheter was: The medical record reflects the following:  Risk Factors: -H+P 86/M history of metastatic prostate cancer admitted for septic shock, hypotension resolved with fluid resuscitation and antibiotics. Sepsis due to UTI associated with indwelling Michel catheter. Hyperkalemia and hypoglycemia. ESRD on dialysis    Clinical Indicators: 6/3 - MD PN states - Septic Shock: due to UTI in setting of indwelling michel. MD PN  pmhx of prostate CA on chemo, chronic indwelling michel    Treatment: albumin, solu-cortef and 500cc bolus ,IV vanc/zosyn ,Labs carefully monitored    Thank you  Quita [facility] [email or web address]  Options provided:  -- Sepsis due to UTI associated with indwelling Michel catheter confirmed after study  -- Sepsis due to UTI associated with indwelling Michel catheter ruled out after study  -- Other - I will add my own diagnosis  -- Disagree - Not applicable / Not valid  -- Disagree - Clinically unable to determine / Unknown  -- Refer to Clinical Documentation Reviewer    PROVIDER RESPONSE TEXT:    Sepsis due to UTI associated with indwelling Michel catheter confirmed after study. Query created by:  Vandana Sanchez on 2022 7:56 AM      Electronically signed by:  Cirilo Sherman MD 2022 10:54 AM

## 2022-06-20 NOTE — DISCHARGE SUMMARY
Discharge Summary    Patient: Twyla Maya MRN: [id number]  CSN: [id number]    YOB: 1935  Age: 80 y.o. Sex: male    DOA: 6/2/2022 LOS:  LOS: 3 days   Discharge Date: 6/5/2022     Admission Diagnoses: Sepsis ([facility] [address] [A41.9]    Discharge Diagnoses:    UTI and patient with indwelling Burris catheter  Sepsis with septic shock secondary to above  Hyperkalemia, resolved  ESRD, HD dependent  Acute metabolic encephalopathy  Metastatic prostate cancer      Discharge Condition: Stable    PHYSICAL EXAM  Visit Vitals  BP (!) 150/69 (BP 1 Location: Right arm)   Pulse 64   Temp 98 °F (36.7 °C)   Resp 16   Ht 5' 6\" (1.676 m)   Wt 73.5 kg (162 lb)   SpO2 98%   BMI 26.15 kg/m²       General: Chronically ill-appearing but in NAD. HEENT: NCAT. Sclerae anicteric. Lungs:  Clear, no wheezes. No accessory muscle use. Heart:  RRR. Abdomen: Soft, NT/ND. Extremities: Warm, no edema or ischemia. Psych:   Calm, no agitation. Hospital Course:   See admission H&P for full details of HPI. Patient was admitted to the hospital for evaluation of altered mental status. He was admitted to ICU due to to hypotension and sepsis picture. Patient never required pressor support and pressures improved with administration of fluids and albumin. Given multiple underlying medical issues and overall poor condition, patient was transitioned to comfort measures per request of family. Durable medical equipment and hospice services have been arranged and patient is currently stable for discharge home with home hospice care services. Cons[facility]   [facility]  Palliative medicine    Significant Diagnostic Studies/Procedures:  CT abdomen/pelvis:  IMPRESSION     1.  Developing colon mass. 2.  Bladder dome thickening and possible bladder wall inferior lateral aspect  thickening. Complex bladder luminal content, hemorrhagic material or purulent  material.  3.  Tiny hepatic hypodensity.   4.  Diffuse esophageal thickening, suggestive of esophagitis. 5.  Right renal cyst.  Renal atrophy bilaterally. 6.  Sclerotic densities in the skeletal structures suggestive of osteosclerotic  metastatic lesions. CT head:    IMPRESSION                1.  No acute intracranial abnormalities.      2. Moderate chronic microvascular ischemic changes.     3. Chronic left maxillary sinusitis. Discharge Medications:     Discharge Medication List as of 6/5/2022 12:32 PM      START taking these medications    Details   LORazepam (INTENSOL) 2 mg/mL concentrated solution Take 0.25 mL by mouth every three (3) hours as needed for Agitation, Anxiety or Restlessness. Max Daily Amount: 4 mg., Normal, Disp-30 mL, R-0      hyoscyamine SL (LEVSIN/SL) 0.125 mg SL tablet 1 Tablet by SubLINGual route every four (4) hours as needed for PRN Reason (Other) (Secretions) for up to 30 doses. , Normal, Disp-30 Tablet, R-0      morphine (ROXANOL) 100 mg/5 mL (20 mg/mL) concentrated solution Take 0.25 mL by mouth every one (1) hour as needed for Pain for up to 3 days.  Max Daily Amount: 120 mg., Normal, Disp-30 mL, R-0         STOP taking these medications       doxycycline (VIBRAMYCIN) 100 mg capsule Comments:   Reason for Stopping:         oxyCODONE-acetaminophen (PERCOCET) 5-325 mg per tablet Comments:   Reason for Stopping:         traMADoL (ULTRAM) 50 mg tablet Comments:   Reason for Stopping:         trospium (SANCTURA) 20 mg tablet Comments:   Reason for Stopping:         levoFLOXacin (Levaquin) 750 mg tablet Comments:   Reason for Stopping:         nystatin (MYCOSTATIN) powder Comments:   Reason for Stopping:         abiraterone 250 mg tab Comments:   Reason for Stopping:         predniSONE (DELTASONE) 5 mg tablet Comments:   Reason for Stopping:         acetaminophen (TYLENOL) 500 mg tablet Comments:   Reason for Stopping:         pantoprazole (PROTONIX) 40 mg tablet Comments:   Reason for Stopping:         amLODIPine (NORVASC) 10 mg tablet Comments: Reason for Stopping:         calcium acetate,phosphat bind, (PHOSLO) 667 mg cap Comments:   Reason for Stopping:         abiraterone (Zytiga) 250 mg tab Comments:   Reason for Stopping:         B complex w-C no.20/folic acid (TRIPHROCAPS PO) Comments:   Reason for Stopping:                   Activity: As tolerated. Diet: Comfort feeding    Disposition: Home with home hospice services. Jos Archuleta MD  [address] [place] [address]    Disclaimer: Sections of this note are dictated using utilizing voice recognition software. Minor typographical errors may be present. If questions arise, please do not hesitate to contact me or call our department.

## 2022-09-16 NOTE — PROGRESS NOTES
Progress Note    Leticia Cantu  80 y.o. Admit Date: 9/25/2020  Active Problems:    Acute renal failure (ARF) ([facility] (9/25/2020) POA: Unknown      ESRD (end stage renal disease) ([facility] [address] (9/26/2020) POA: Unknown      Hyperkalemia (9/26/2020) POA: Unknown      Metabolic acidosis (8/49/9467) POA: Unknown      Secondary hyperparathyroidism, renal ([facility] (9/26/2020) POA: Unknown      Anemia (9/26/2020) POA: Unknown            Subjective:     Patient feels tired & fatigue, on Bicarb drip, no SOB, Cardiology  & Vascular Surgeons notes reviewed. Making urine through Burris Catheter      A comprehensive review of systems was negative except for that written in the History of Present Illness.     Objective:     Visit Vitals  /65 (BP 1 Location: Left arm, BP Patient Position: At rest)   Pulse 69   Temp 98.3 °F (36.8 °C)   Resp 18   Ht 5' 6\" (1.676 m)   Wt 81.2 kg (179 lb)   SpO2 98%   BMI 28.89 kg/m²         Intake/Output Summary (Last 24 hours) at 9/27/2020 1330  Last data filed at 9/27/2020 1134  Gross per 24 hour   Intake 1440 ml   Output 1150 ml   Net 290 ml       Current Facility-Administered Medications   Medication Dose Route Frequency Provider Last Rate Last Dose    [START ON 9/28/2020] epoetin brina-epbx (RETACRIT) injection 10,000 Units  10,000 Units SubCUTAneous Q MON, WED & Excell MD Lorraine        [START ON 9/28/2020] ferrous sulfate tablet 325 mg  1 Tab Oral DAILY WITH Jarrod Guillermo MD       [facility] [START ON 9/28/2020] doxercalciferoL (HECTOROL) 4 mcg/2 mL injection 1 mcg  1 mcg IntraVENous Q MON, WED & Excell MD Lorraine        0.9% sodium chloride infusion 250 mL  250 mL IntraVENous PRN Regi Dickerson MD        cefTRIAXone (ROCEPHIN) 1 g in sterile water (preservative free) 10 mL IV syringe  1 g IntraVENous Q24H Neita Sacks, MD   1 g at 09/27/20 0252    calcium acetate(phosphat bind) (PHOSLO) capsule 1,334 mg  2 Cap Oral TID WITH MEALS Regi Dickerson MD   1,334 mg at 09/27/20 1304    B complex-vitaminC-folic acid (NEPHROCAP) cap  1 Cap Oral DAILY Titus Castillo MD   1 Cap at 09/27/20 0839    amLODIPine (NORVASC) tablet 10 mg  10 mg Oral DAILY Titus Castillo MD   10 mg at 09/27/20 0839    0.9% sodium chloride infusion 250 mL  250 mL IntraVENous PRN Rosa Curiel DO        sodium bicarbonate (8.4%) 100 mEq in dextrose 5% 1,000 mL infusion   IntraVENous CONTINUOUS Titus Castillo MD 75 mL/hr at 09/27/20 1052      sodium chloride (NS) flush 5-40 mL  5-40 mL IntraVENous Q8H Silva Beverly NP   10 mL at 09/27/20 0528    sodium chloride (NS) flush 5-40 mL  5-40 mL IntraVENous PRN Sebastian Beverly NP        acetaminophen (TYLENOL) tablet 650 mg  650 mg Oral Q6H PRN Sebastian Beverly NP        Or    acetaminophen (TYLENOL) suppository 650 mg  650 mg Rectal Q6H PRN Sebastian Beverly NP        polyethylene glycol (MIRALAX) packet 17 g  17 g Oral DAILY PRN Sebastian Beverly NP        promethazine (PHENERGAN) tablet 12.5 mg  12.5 mg Oral Q6H PRN Sebastian Beverly NP        Or    ondansetron (ZOFRAN) injection 4 mg  4 mg IntraVENous Q6H PRN Silva Beverly NP        pantoprazole (PROTONIX) tablet 40 mg  40 mg Oral ACB Silva Beverly NP   40 mg at 09/27/20 0521    labetaloL (NORMODYNE;TRANDATE) 20 mg/4 mL (5 mg/mL) injection 20 mg  20 mg IntraVENous Q5MIN PRN Ya Martinez NP            Physical Exam:     Physical Exam:   General:  Alert, cooperative, no distress, appears stated age. Neck: Supple, symmetrical, trachea midline, no adenopathy, thyroid: no enlargement/tenderness/nodules, no carotid bruit and no JVD. Lungs:   Clear to auscultation bilaterally. Heart:  Regular rate and rhythm, S1, S2 normal, no murmur, click, rub or gallop. Abdomen:   Soft, non-tender. Bowel sounds normal. No masses,  No organomegaly.    Extremities: Extremities normal, atraumatic, no cyanosis,has edema   Skin: Skin color, texture, turgor normal. No rashes or lesions         Data Review:    CBC w/Diff    Recent Labs     09/27/20 0433 09/26/20 0410 09/25/20  1800   WBC 8.2 8.1 9.4   RBC 2.42* 2.47* 2.31*   HGB 6.9* 7.2* 6.6*   HCT 20.5* 21.1* 19.8*   MCV 84.7 85.4 85.7   MCH 28.5 29.1 28.6   MCHC 33.7 34.1 33.3   RDW 15.0* 14.9* 15.1*    Recent Labs     09/27/20 0433 09/26/20 0410 09/25/20  1800   MONOS 9 9 7   EOS 1 3 2   BASOS 2 1 1   RDW 15.0* 14.9* 15.1*        Comprehensive Metabolic Profile    Recent Labs     09/27/20 0433 09/26/20  0715 09/25/20  1800    144 144   K 3.9 4.5 5.2   * 119* 122*   CO2 13* 11* 8*   * 122* 129*   CREA 14.70* 14.80* 15.40*    Recent Labs     09/27/20 0433 09/26/20  0715 09/25/20  1800   CA 8.2* 8.1*  8.1* 8.4*   PHOS 8.4* 8.5*  --    ALB  --   --  3.6   TP  --   --  8.8*   TBILI  --   --  0.3        Component  Value  Flag  Ref Range  Units  Status    Complement C3  95   82 - 167  mg/dL  Final    Complement C4  27   14 - 44  mg/dL  Final    Comment:        Date: 9/25/2020  Department: [address]  Released By: Gabriela Mayer RN (auto-released)  Authorizing: Alicia Slater MD    Component  Value  Flag  Ref Range  Units  Status    Hepatitis B surface Ag  <0.10   <1.00  Index  Final    Hep B surface Ag Interp. Negative            Calcium  8.1  Low    8.5 - 10.1  MG/DL  Final    PTH, Intact  353.5  High    18.4 - 88.0  pg/mL  Final                  Impression:       Active Hospital Problems    Diagnosis Date Noted    ESRD (end stage renal disease) ([facility] [address] 09/26/2020    Hyperkalemia 19/76/4455    Metabolic acidosis 82/66/5080    Secondary hyperparathyroidism, renal ([facility] [address] 09/26/2020    Anemia 09/26/2020    Acute renal failure (ARF) ([facility] [address] 09/25/2020            Plan:     Decrease Bicarb drip at 50 cc/hour. Await HD catheter by Vascular surgery & start Dialysis Tomorrow.  Will give 1 unit of PRBC during Dialysis      Darryn Medina MD Daniel

## 2022-09-30 NOTE — DIALYSIS
ACUTE HEMODIALYSIS FLOW SHEET    HEMODIALYSIS ORDERS: Physician: Dr. Annabella Peñaloza: Vince   Duration: 3 hr   BFR: 400   DFR: 800   Dialysate:  Temp 36-37*C   K+  3    Ca+ 2.5   Na 138   Bicarb 35   Wt Readings from Last 1 Encounters:   10/25/21 71.2 kg (157 lb)    Patient Chart [x]   Unable to Obtain []  Dry weight/UF Goal: 2000 ml    Heparin []  Bolus    Units    [] Hourly    Units    [x]None       Pre BP: 141/80  Pulse: 93  Respirations: 18 Temp: 99.0  [x] Oral  [] Ax  [] Esoph   Labs: []  Pre  []  Post:   [x] N/A   Additional Orders (medications, blood products, hypotension management): [x] Yes   [] No     [x]  [facility] Consent Verified     CATHETER ACCESS:  []N/A   [x]Right   []Left   []IJ   []Fem  [x]Chest wall  []TransHepatic   [] First use X-ray verified     [x]Tunnel    [] Non Tunneled   [x]No S/S infection  []Redness  []Drainage []Cultured []Swelling []Pain   [x]Medical Aseptic Prep Utilized   []Dressing Changed  [] Biopatch  Date: 10/22/21   []Clotted   [x]Patent   Flows: [x]Good  []Poor  []Reversed   If access problem,  notified: []Yes    [x]N/A        GRAFT/FISTULA ACCESS:   [x]N/A     []Right     []Left     []UE     []LE                   GENERAL ASSESSMENT:    LUNGS:  Resp Rate 18   [] Clear  [x] Coarse  [] Crackles  [] Wheezing  [] Diminished                                                           [x] RLL   [x] LLL  [x] RUL   [x] ROBERT            Respirations:  [x]Easy  []Labored  []N/A  Cough:  []Productive  []Dry  []N/A               Therapy:  [x]RA   [] Ventilated   [] Intubated   [] Trach            O2 Device:  [] NC   [] NRB  [] Trach Mask  [] BiPaP  Flow:   l/min                                                    CARDIAC: [] Regular      [] Irregular   [] Rhythm:          [] Monitored   [] Bedside   [x] Remotely monitored       EDEMA: [] None   [x]Generalized  [] Pitting [] 1+   [x] 2 +   [] 3+    [] 4+        SKIN:   [] Hot     [] Cold    [x] Warm   [x] Dry    [] 9/30 CT HEAD MPRESSION:  Interval demarcation of left lateral frontoparietal acute infarction.  No loren hemorrhagic transformation.    SWALLOW HISTORY: No reports in SCM or in PACS prior to this admission. Diaphoretic                 [] Flushed  [] Jaundiced  [] Cyanotic  [] Pale      LOC:    [x] Alert      [x]Oriented:    [x] Person     [x] Place   []Time               [] Confused  [] Lethargic  [] Medicated  [] Non-responsive  [] Non-Verbal     GI / ABDOMEN:                     [] Flat    [] Distended    [x] Soft    [] Firm   []  Obese                   [] Diarrhea   [] FMS [] Bowel Sounds  [] Nausea  [] Vomiting                   [] NGT  [] OGT  [] PEG  [] Tube Feedings @     mL/hr     / URINE ASSESSMENT:                   [] Voiding    [] Oliguria  [] Anuria                     [x]  Burris   [] Incontinent  []  Incontinent Brief   []  PureWick     PAIN:  [x] 0 []1  []2   []3   []4   []5   []6   []7   []8   []9   []10                MOBILITY:  [x] Bed    [] Stretcher      All Vitals and Treatment Details on Attached PlasmaSi[address]: [facility]          Room # 208/01    [x] Routine         [] 1st Time Acute/Chronic   [] Urgent      [] Stat            [x] Acute Room   []  Bedside    [] ICU/CCU     [] ER     Isolation Precautions:  [x] Dialysis    There are currently no Active Isolations     ALLERGIES:     No Known Allergies     Code Status:  Full Code     Hepatitis Status      Lab Results   Component Value Date/Time    Hepatitis B surface Ag <0.10 10/18/2021 08:39 PM    Hepatitis B surface Ab <3.10 (L) 10/18/2021 08:39 PM    Hepatitis B core, IgM Negative 09/27/2020 04:33 AM    Hepatitis C virus Ab 0.15 09/27/2020 04:33 AM        Current Labs:      Lab Results   Component Value Date/Time    WBC 25.5 (H) 10/25/2021 01:19 AM    HGB 11.5 (L) 10/25/2021 01:19 AM    HCT 35.1 (L) 10/25/2021 01:19 AM    PLATELET 638 43/02/3101 01:19 AM    MCV 96.2 10/25/2021 01:19 AM     Lab Results   Component Value Date/Time    Sodium 134 (L) 10/25/2021 01:19 AM    Potassium 3.4 (L) 10/25/2021 01:19 AM    Chloride 91 (L) 10/25/2021 01:19 AM    CO2 31 10/25/2021 01:19 AM    Anion gap 12 10/25/2021 01:19 AM    Glucose 97 10/25/2021 01:19 AM    BUN 59 (H) 10/25/2021 01:19 AM    Creatinine 7.83 (H) 10/25/2021 01:19 AM    BUN/Creatinine ratio 8 (L) 10/25/2021 01:19 AM    GFR est AA 8 (L) 10/25/2021 01:19 AM    GFR est non-AA 7 (L) 10/25/2021 01:19 AM    Calcium 8.0 (L) 10/25/2021 01:19 AM          DIET:  DIET ADULT ORAL NUTRITION SUPPLEMENT  DIET ADULT     PRIMARY NURSE REPORT:   Pre Dialysis:  MONTSERRAT Cobb    Time: 0908      EDUCATION:    [x] Patient           Knowledge Basis: []None [x]Minimal [] Substantial [] Unknown  Barriers to learning  [x]None  [] Intubated/Trached/Ventilated  [] Sedated/Paralyzed   [] Access Care     [] S&S of infection  [] Fluid Management  [] K+   [x] Procedural    [] Medications   [] Tx Options   [] Transplant   [] Diet      Teaching Tools:  [x] Explain  [] Demo  [] Handouts [] Video  Patient response: [x] Verbalized understanding   [] Requires follow up        [x] Time Out/Safety Check    [x] Extracorporeal Circuit Tested for integrity       RO/HEMODIALYSIS MACHINE SAFETY CHECKS  Before each treatment:        [facility]                                    [x] Unit Machine # 8 with centralized RO                                                                                                                                Alarm Test:  Pass time 0830            [x] RO/Machine Log Complete    Machine Temp    36-37*C             Dialysate: pH  7.4    Conductivity: Meter 14.0    HD Machine  13.9     TCD: 13.7  Dialyzer Lot # [id number]     Blood Tubing Lot # [id number]     Saline Lot # [id number]     CHLORINE TESTING-Before each treatment and every 4 hours    Total Chlorine: [x] less than 0.1 ppm  Initial Time Check: 0900       4 Hr/2nd Check Time: N/A   (if greater than 0.1 ppm from Primary then every 30 minutes from Secondary)     TREATMENT INITIATION  with Dialysis Precautions:   [x] All Connections Secured              [x] Saline Line Double Clamped   [x] Venous Parameters Set               [x] Arterial Parameters Set [x] Prime Given 250ml NSS              [x]Air Foam Detector Engaged        Treatment Initiation Note:      During Treatment Notes:  1000  Face & Vascular access visible with art and carlie line connections intact. Pt tolerating dialysis. 1015  Face & Vascular access visible with art and carlie line connections intact. Pt tolerating dialysis. 1030  Face & Vascular access visible with art and carlie line connections intact. Pt tolerating dialysis. 1045  Face & Vascular access visible with art and carlie line connections intact. Pt tolerating dialysis. 1100  Face & Vascular access visible with art and carlie line connections intact. Pt tolerating dialysis. 1115  Face & Vascular access visible with art and carlie line connections intact. Pt tolerating dialysis. 1130  Face & Vascular access visible with art and carlie line connections intact. Pt states\" Am feeling bad, have never felt this way before. Pt is sweating upon obsevation. Dr Debra Coffey made aware. Orders received to stop pulling fluids and give Pt  200mls of NS.   1135--Pt States he is not still feeling good. 1145--Pt taken off dialysis with 1.5hrs remaining per  Dr Cindy Kincaid order.            Medication    Dose    Volume Route      Time       Darryl Nurse   hectorol 4mcg 2ml HD  Gerri Monsivais, RN      HD  Gerri Monsivais, RN      HD  Gerri Monsivais, RN     Post Assessment  Dialyzer Cleared:   [] Good  [x] Fair  [] Poor  Blood processed:  31.0 L  UF Removed:  500 Ml    Post BP: 140/69  Pulse: 96  Respirations: 20   Temp: 99.1  [x] Oral  [] Ax  [] Esophageal   Lungs: [] Clear                [x] No change from initial assessment   Post Tx Vascular Access: [x] N/A   Cardiac:  [] Regular   [] Irregular   Rhythm:  [] Monitored   [x] Not Monitored    CVC Catheter: [] N/A  Locking solution: Heparin 1:1000 U  Arterial port 1.9 ml   Venous port 1.9 ml   Edema:  [] None  [x] Generalized                     Skin:[x] Warm  [x] Dry [] Diaphoretic               [] Flushed  [] Pale [] Cyanotic Pain:  [x]0  []1-2  []3-4  []5-6   []7-8  []9-10         Post Treatment Note:    Dialysis catheter intact, patent and heplocked as noted above.      POST TREATMENT PRIMARY NURSE HANDOFF REPORT:   Post Dialysis: Negin Richardson RN        Time:  5962       Abbreviations: AVG-arterial venous graft, AVF-arterial venous fistula, IJ-Internal Jugular, Subcl-Subclavian, Fem-Femoral, Tx-treatment, AP/HR-apical heart rate, VSS- Vital Signs Stable, CVC- Central Venous Catheter, DFR-dialysate flow rate, BFR-blood flow rate, AP-arterial pressure, -venous pressure, UF-ultrafiltrate, TMP-transmembrane pressure, Kyle-Venous, Art-Arterial, RO-Reverse Osmosis ALEXIS given R hemiparesis, pt unable to hold a marker predominantly nonverbal, attempts made at vocalizations Goals:  1. Pt will improve expressive language skills for functional communication.  2. Pt will improve receptive language skills for functional communication.  3. Pt will demonstrate appropriate use of AAC board for functional communication.  4. Family/caregiver will demonstrate understanding/carryover of strategies to improve patient’s communication of wants/needs. ALEXIS Use of AAC board is effective in conveying patient's wants/needs Unable to assess due to severity of language deficits Use of AAC board is effective in conveying patient's wants/needs. Of note, patient using dominant hand (R) to communicate wants/needs + identify on communication board despite R hemiparesis. Pt instructed to use L hand, however pt always using R hand despite cueing (?related to aphasia vs. cognitive)

## 2023-01-01 NOTE — PROGRESS NOTES
PT DAILY TREATMENT NOTE 10-18 Patient Name: Lisa Clarke Date:10/23/2018 : 1935 [x]  Patient  Verified Payor: [place] / Plan: Isaías Huff / Product Type: Medicare / In time:1100  Out time:1133 Total Treatment Time (min): 33 Visit #: 5 of 12 Medicare/BCBS Only Total Timed Codes (min):  33 1:1 Treatment Time:  35 Treatment Area: Gait disturbance [R26.9] SUBJECTIVE Pain Level (0-10 scale): 5/10 Any medication changes, allergies to medications, adverse drug reactions, diagnosis change, or new procedure performed?: [x] No    [] Yes (see summary sheet for update) Subjective functional status/changes:   [] No changes reported Pt stated that his right leg cont to hurt OBJECTIVE 33 min Therapeutic Exercise:  [x] See flow sheet :  
Rationale: increase ROM and increase strength to improve the patients ability to increase ease with ADls With 
 [x] TE 
 [] TA 
 [] neuro 
 [] other: Patient Education: [x] Review HEP [] Progressed/Changed HEP based on:  
[] positioning   [] body mechanics   [] transfers   [] heat/ice application   
[] other:   
 
Other Objective/Functional Measures:  
Needed cueing to slow down with exercises No complaint of increased leg pain with exercises Needed assistance with HS stretches Pain Level (0-10 scale) post treatment: 2/10 ASSESSMENT/Changes in Function:  
Pt is slowly progressing toward goals. Pt cont to ambulate with SPC and has poor gait pattern. Cont with decreased strength in left hip. Patient will continue to benefit from skilled PT services to modify and progress therapeutic interventions, address functional mobility deficits, address ROM deficits and address strength deficits to attain remaining goals. [x]  See Plan of Care 
[]  See progress note/recertification 
[]  See Discharge Summary Progress towards goals / Updated goals: 
Short Term Goals: To be accomplished in 1 weeks: 1. Pt will report compliance and independence to HEP to help the pt manage stiffness and improve flexibility.  Met (10/19/18)     
Long Term Goals: To be accomplished in 6 weeks: 1. Pt will increase FOTO score by 11 points to improve ability to perform ADLs. 2. Pt will improve TUG score to 30 sec or less with SPC to decrease the pt's fall risk. 3. Pt will increase MMT left hip flex to 3+/5, left knee flex to 4/5 to improve ability to tolerate ambulation in the community. 4. Pt will report >70% improvement to return to community ambulation safely.   
 
PLAN 
[]  Upgrade activities as tolerated     [x]  Continue plan of care 
[]  Update interventions per flow sheet      
[]  Discharge due to:_ 
[]  Other:_ Marcela Clayton PTA 10/23/2018  11:05 AM 
 
Future Appointments Date Time Provider Noel Harp 10/26/2018 12:30 PM Gui Westfall PTA [facility]  
10/30/2018  9:00 AM Sal Preciado PTA [facility]  
11/2/2018  9:00 AM Sal Preciado PTA [facility]  
 
 2023

## 2023-09-18 NOTE — HOSPICE
Hospice Admission Summary  Mr. Sharyle Diones 80year old male, admitted to Hospice services for a terminal diagnosis of ESRD. Patient has elected hospice services and is no longer seeking aggressive treatment. Co-morbidities related to the terminal diagnosis are Prostate cancer, debility. Patient also has a past medical history of HTN, Prostate cancer with mets to bone, bladder, colon, CAD. Mr. Colleen Fuenets status 6 months  prior to hospice admission Mrs. Colleen Fuentes was able to transfer to wheelchair with assistances, was verbal with family and friends, eating 100% of meals, incontinent and wearing pull-ups, able to assist wife with his care. Before hospital admission wife reports patient only eating bites of meals, became bed-bound, complete assistances with all ADLs. The patient/family/caregiver  Kade Ramos  is present and willing and safely able to provide care and administer medications, their availability is daily. The patient/family/caregiver participated in goal setting, care planning, and are agreeable to the care plan. Admission booklet reviewed with patient/family/caregiver; services provided under hospice benefit, review of rights and responsibilities, disposal of medications, contact information for , Joint Commission, Medicare, O, and outside resources for independence. The patient/family/caregiver educated on IDT and their right to attend meetings. Education provided regarding 24-hour availability of hospice services and on-call number provided. Attending physician:  Dr. Jacque Duverney Director: Dr. Kely Contreras of Care:  Routine  Advance Directives: On file  Allergies:  NKDA  MAC:  24 right arm  Height:  5\"6\"  Weight:  162 lb  PPS:  30%  FAST:  Na  NYHA:  N/A   EF%:  N/A   Tobacco usage:  N/A,   Functional status:   Total care, dependent in all ADLs  Infection:  N/A   Pain:  N/A    -  medications included, Morphine, Tylenol order in comfort kit  Bowel Regimen: Bisacodyl  Lines, Drains, or Airways present:  :   Burris catheter , See Care Plan for Intervention Orders  Wounds present:  Sacrum , See wound care plan for intervention orders  Symptoms to monitor to maintain comfort:  Pain   Hospice Aide Services Requested:  yes 2 x/week  MSW Requested: yes   Requested: yes  Volunteer Services Requested:  N/A  Bereavement risk/contact:  Eliud Davis specific end of life goal: comfort, management of any pain  Training and education provided this visit: Hospice philosphy, handbook  Plan for next visit:  Medication review,   Care coordination with Medical Director, IDG, and MARK Ellis RN regarding admission to hospice and all are in agreement with plan of care. Comfort Kit order through [employer] order: 2 pack Lr briefs, 2 pack brielle pads, 3 pack wipes, 2 box gloves, 2 box/5 each foam dressing, 1 bottle DWC, 1 tube of zinc paste. 0

## 2024-03-27 NOTE — REMOTE MONITORING
Spoke with primary RN Seamus Hazel regarding 3 and 6 hour Sepsis bundle.     Thank you,    Osiris Arana 227 RN,Natacha PADILLA [facility] BSN, Jason Dominguez 20  Callback # [phone] Satisfactory

## (undated) DEVICE — SUTURE MCRYL SZ 4 0 L18IN ABSRB VLT PS 1 L24MM 3 8 CIR REV Y682H

## (undated) DEVICE — SYR 10ML LUER LOK 1/5ML GRAD --

## (undated) DEVICE — CANNULA ORIG TL CLR W FOAM CUSHIONS AND 14FT SUPL TB 3 CHN

## (undated) DEVICE — Device

## (undated) DEVICE — KIT CLN UP BON SECOURS MARYV

## (undated) DEVICE — SUTURE GOR TX SZ 5-0 L30IN NONABSORBABLE L12MM TTC-12 3/8 6M10A

## (undated) DEVICE — APPLIER CLP L9.38IN M LIG TI DISP STR RNG HNDL LIGACLP

## (undated) DEVICE — TRAP SPEC COLL POLYP POLYSTYR --

## (undated) DEVICE — FLEX ADVANTAGE 3000CC: Brand: FLEX ADVANTAGE

## (undated) DEVICE — AIRLIFE™ NASAL OXYGEN CANNULA CURVED, NONFLARED TIP WITH 14 FOOT (4.3 M) CRUSH-RESISTANT TUBING, OVER-THE-EAR STYLE: Brand: AIRLIFE™

## (undated) DEVICE — INTENDED FOR TISSUE SEPARATION, AND OTHER PROCEDURES THAT REQUIRE A SHARP SURGICAL BLADE TO PUNCTURE OR CUT.: Brand: BARD-PARKER ® STAINLESS STEEL BLADES

## (undated) DEVICE — HF-RESECTION ELECTRODE PLASMAROLLER ROLLER, 24 FR., 12°-30°, ESG TURIS: Brand: OLYMPUS

## (undated) DEVICE — SUTURE PROL SZ 2-0 L36IN NONABSORBABLE BLU V-7 L26MM 1/2 8977H

## (undated) DEVICE — PREP SKN CHLRAPRP APL 26ML STR --

## (undated) DEVICE — Z DUP USE 2565107 PACK SURG PROC LEG CYSTO T-DRAPE REINF TBL CVR HND TWL

## (undated) DEVICE — GARMENT,MEDLINE,DVT,SEQUENTIAL,CALF,MD: Brand: MEDLINE

## (undated) DEVICE — STERILE PACKED. BORE DIAMETER 1.6 MM; ANGLE OF INSERTION 19° TO THE LONG AXIS OF THE TRANSDUCER: Brand: SINGLE-USE BIPLANE BIOPSY GUIDE

## (undated) DEVICE — GLOVE SURG SZ 7.5 L11.73IN FNGR THK9.8MIL STRW LTX POLYMER

## (undated) DEVICE — HF-RESECTION ELECTRODE PLASMALOOP LOOP, MEDIUM, 24 FR., 12°-30°, ESG TURIS: Brand: OLYMPUS

## (undated) DEVICE — SUTURE MCRYL SZ 2-0 L36IN ABSRB UD L36MM CT-1 1/2 CIR Y945H

## (undated) DEVICE — DECANTER BAG 9": Brand: MEDLINE INDUSTRIES, INC.

## (undated) DEVICE — MEDI-VAC NON-CONDUCTIVE SUCTION TUBING: Brand: CARDINAL HEALTH

## (undated) DEVICE — GUIDEWIRE WITH ICE™ HYDROPHILIC COATING: Brand: V-18™ CONTROL WIRE™

## (undated) DEVICE — FORCEPS BX L240CM JAW DIA2.8MM L CAP W/ NDL MIC MESH TOOTH

## (undated) DEVICE — BLADE ASSEMB CLP HAIR FINE --

## (undated) DEVICE — GAUZE,SPONGE,4"X4",16PLY,STRL,LF,10/TRAY: Brand: MEDLINE

## (undated) DEVICE — SOLUTION IRRIG 1000ML H2O STRL BLT

## (undated) DEVICE — APPLICATOR BNDG 1MM ADH PREMIERPRO EXOFIN

## (undated) DEVICE — REM POLYHESIVE ADULT PATIENT RETURN ELECTRODE: Brand: VALLEYLAB

## (undated) DEVICE — SUT PROL 6-0 30IN C1 DA BLU --

## (undated) DEVICE — PROBE VASC 8MHZ WTRPRF

## (undated) DEVICE — 4-PORT MANIFOLD: Brand: NEPTUNE 2

## (undated) DEVICE — BLANKET WRM AD W50XL85.8IN PACU FULL BODY FORC AIR

## (undated) DEVICE — ENDOSCOPY PUMP TUBING/ CAP SET: Brand: ERBE

## (undated) DEVICE — SUTURE PROL SZ 5-0 L36IN NONABSORBABLE BLU L13MM C-1 3/8 8720H

## (undated) DEVICE — NDL INJ SCLERO 25G 240CM -- INTERJECT M00518360 BX/5

## (undated) DEVICE — GOWN,PREVENTION PLUS,XLN/XL,ST,24/CS: Brand: MEDLINE

## (undated) DEVICE — MEDI-VAC SUCTION HIGH CAPACITY: Brand: CARDINAL HEALTH

## (undated) DEVICE — INFLATION DEVICE: Brand: ENCORE 26

## (undated) DEVICE — STER SINGLE BASIN SET W/BOWLS: Brand: CARDINAL HEALTH

## (undated) DEVICE — SUT PROL 6-0 24IN BV1 DA BLU --

## (undated) DEVICE — SOLUTION IV 1000ML 0.9% SOD CHL

## (undated) DEVICE — ADHESIVE SKN CLSR HI VISC 2-O --

## (undated) DEVICE — TIGER TAIL

## (undated) DEVICE — SUTURE MCRYL SZ 3-0 L27IN ABSRB UD L26MM SH 1/2 CIR Y416H

## (undated) DEVICE — SUTURE MCRYL SZ 4-0 L18IN ABSRB UD L19MM PS-2 3/8 CIR PRIM Y496G

## (undated) DEVICE — FLUFF AND POLYMER UNDERPAD,EXTRA HEAVY: Brand: WINGS

## (undated) DEVICE — Y-TYPE TUR/BLADDER IRRIGATION SET, REGULATING CLAMP

## (undated) DEVICE — PACK PROCEDURE SURG VASC CATH 161 MMC LF

## (undated) DEVICE — LUB JELLY PETROLEUM 1 OZ -- CONVERT TO ITEM 357604

## (undated) DEVICE — GLOVE SURG SZ 7 L11.33IN FNGR THK9.8MIL STRW LTX POLYMER

## (undated) DEVICE — SYR 20ML LL STRL LF --

## (undated) DEVICE — SUTURE VCRL SZ 3-0 L27IN ABSRB UD L26MM SH 1/2 CIR J416H

## (undated) DEVICE — ANGIOGRAPHY KIT CUST VASC

## (undated) DEVICE — TRAY PREP DRY W/ PREM GLV 2 APPL 6 SPNG 2 UNDPD 1 OVERWRAP

## (undated) DEVICE — LUB SURG MEDC STRL 2OZ TUBE MC -- MEDICHOICE

## (undated) DEVICE — NEEDLE ANGIO 1 WALL ULT SMOOTH 19GAX7CM MERIT AVANCE

## (undated) DEVICE — BAG DRAINAGE CUST DISP

## (undated) DEVICE — Device: Brand: SPOT EX ENDOSCOPIC TATTOO

## (undated) DEVICE — SOLUTION IRRIG 3000ML 0.9% SOD CHL FLX CONT 0797208] ICU MEDICAL INC]

## (undated) DEVICE — INTRODUCER SHTH 4FR CANN L11CM DIL TIP 25MM RED TUNGSTEN

## (undated) DEVICE — SYR 50ML SLIP TIP NSAF LF STRL --

## (undated) DEVICE — CATHETER DLYS PALINDROME

## (undated) DEVICE — SYRINGE,TOOMEY,IRRIGATION,70CC,STERILE: Brand: MEDLINE

## (undated) DEVICE — STERILE POLYISOPRENE POWDER-FREE SURGICAL GLOVES: Brand: PROTEXIS

## (undated) DEVICE — FOGARTY ARTERIAL EMBOLECTOMY CATHETER 4F 40CM: Brand: FOGARTY

## (undated) DEVICE — MAX-CORE® DISPOSABLE CORE BIOPSY INSTRUMENT, 18G X 20CM: Brand: MAX-CORE

## (undated) DEVICE — COVER US PRB W15XL120CM W/ GEL RUBBERBAND TAPE STRP FLD GEN

## (undated) DEVICE — CATHETER SUCT TR FL TIP 14FR W/ O CTRL

## (undated) DEVICE — DRESSING FOAM DISK DIA1IN H 7MM HYDRPHLC CHG IMPREG IN SL

## (undated) DEVICE — SNARE VASC L240CM LOOP W10MM SHTH DIA2.4MM RND STIFF CLD

## (undated) DEVICE — SYRINGE MED 25GA 3ML L5/8IN SUBQ PLAS W/ DETACH NDL SFTY

## (undated) DEVICE — SUTURE PERMA-HAND SZ 3-0 L24IN NONABSORBABLE BLK W/O NDL SA74H

## (undated) DEVICE — GOWN ISOL IMPERV UNIV, DISP, OPEN BACK, BLUE --

## (undated) DEVICE — SNARE POLYP M W27MMXL240CM OVL STIFF DISP CAPTIVATOR

## (undated) DEVICE — PTA BALLOON DILATATION CATHETER: Brand: STERLING™